# Patient Record
Sex: MALE | Race: OTHER
[De-identification: names, ages, dates, MRNs, and addresses within clinical notes are randomized per-mention and may not be internally consistent; named-entity substitution may affect disease eponyms.]

---

## 2017-01-19 ENCOUNTER — APPOINTMENT (OUTPATIENT)
Dept: CARDIOLOGY | Facility: HOSPITAL | Age: 72
End: 2017-01-19

## 2017-03-13 ENCOUNTER — APPOINTMENT (OUTPATIENT)
Dept: UROLOGY | Facility: HOSPITAL | Age: 72
End: 2017-03-13

## 2017-04-14 ENCOUNTER — INPATIENT (INPATIENT)
Facility: HOSPITAL | Age: 72
LOS: 5 days | Discharge: HOME | End: 2017-04-20
Attending: INTERNAL MEDICINE | Admitting: INTERNAL MEDICINE
Payer: MEDICARE

## 2017-04-14 VITALS — HEIGHT: 65 IN | WEIGHT: 149.91 LBS

## 2017-04-14 LAB
ALBUMIN SERPL ELPH-MCNC: 3.5 G/DL — SIGNIFICANT CHANGE UP (ref 3.3–5)
ALP SERPL-CCNC: 106 U/L — SIGNIFICANT CHANGE UP (ref 40–120)
ALT FLD-CCNC: 17 U/L — SIGNIFICANT CHANGE UP (ref 12–78)
ANION GAP SERPL CALC-SCNC: 7 MMOL/L — SIGNIFICANT CHANGE UP (ref 5–17)
APPEARANCE UR: CLEAR — SIGNIFICANT CHANGE UP
APTT BLD: 37.1 SEC — SIGNIFICANT CHANGE UP (ref 27.5–37.4)
AST SERPL-CCNC: 25 U/L — SIGNIFICANT CHANGE UP (ref 15–37)
BASOPHILS # BLD AUTO: 0.1 K/UL — SIGNIFICANT CHANGE UP (ref 0–0.2)
BASOPHILS NFR BLD AUTO: 1.6 % — SIGNIFICANT CHANGE UP (ref 0–2)
BILIRUB SERPL-MCNC: 1.4 MG/DL — HIGH (ref 0.2–1.2)
BILIRUB UR-MCNC: NEGATIVE — SIGNIFICANT CHANGE UP
BUN SERPL-MCNC: 20 MG/DL — SIGNIFICANT CHANGE UP (ref 7–23)
CALCIUM SERPL-MCNC: 8.6 MG/DL — SIGNIFICANT CHANGE UP (ref 8.5–10.1)
CHLORIDE SERPL-SCNC: 112 MMOL/L — HIGH (ref 96–108)
CK SERPL-CCNC: 250 U/L — SIGNIFICANT CHANGE UP (ref 26–308)
CO2 SERPL-SCNC: 28 MMOL/L — SIGNIFICANT CHANGE UP (ref 22–31)
COLOR SPEC: YELLOW — SIGNIFICANT CHANGE UP
CREAT SERPL-MCNC: 1.02 MG/DL — SIGNIFICANT CHANGE UP (ref 0.5–1.3)
DIFF PNL FLD: NEGATIVE — SIGNIFICANT CHANGE UP
EOSINOPHIL # BLD AUTO: 0.1 K/UL — SIGNIFICANT CHANGE UP (ref 0–0.5)
EOSINOPHIL NFR BLD AUTO: 1.6 % — SIGNIFICANT CHANGE UP (ref 0–6)
GLUCOSE SERPL-MCNC: 88 MG/DL — SIGNIFICANT CHANGE UP (ref 70–99)
GLUCOSE UR QL: NEGATIVE MG/DL — SIGNIFICANT CHANGE UP
HCT VFR BLD CALC: 41.7 % — SIGNIFICANT CHANGE UP (ref 39–50)
HGB BLD-MCNC: 13.3 G/DL — SIGNIFICANT CHANGE UP (ref 13–17)
INR BLD: 1.59 RATIO — HIGH (ref 0.88–1.16)
KETONES UR-MCNC: NEGATIVE — SIGNIFICANT CHANGE UP
LEUKOCYTE ESTERASE UR-ACNC: NEGATIVE — SIGNIFICANT CHANGE UP
LYMPHOCYTES # BLD AUTO: 1.2 K/UL — SIGNIFICANT CHANGE UP (ref 1–3.3)
LYMPHOCYTES # BLD AUTO: 17.6 % — SIGNIFICANT CHANGE UP (ref 13–44)
MCHC RBC-ENTMCNC: 30.6 PG — SIGNIFICANT CHANGE UP (ref 27–34)
MCHC RBC-ENTMCNC: 32 GM/DL — SIGNIFICANT CHANGE UP (ref 32–36)
MCV RBC AUTO: 95.8 FL — SIGNIFICANT CHANGE UP (ref 80–100)
MONOCYTES # BLD AUTO: 0.9 K/UL — SIGNIFICANT CHANGE UP (ref 0–0.9)
MONOCYTES NFR BLD AUTO: 12.4 % — SIGNIFICANT CHANGE UP (ref 2–14)
NEUTROPHILS # BLD AUTO: 4.6 K/UL — SIGNIFICANT CHANGE UP (ref 1.8–7.4)
NEUTROPHILS NFR BLD AUTO: 66.7 % — SIGNIFICANT CHANGE UP (ref 43–77)
NITRITE UR-MCNC: NEGATIVE — SIGNIFICANT CHANGE UP
NT-PROBNP SERPL-SCNC: 2949 PG/ML — HIGH (ref 0–125)
PH UR: 6 — SIGNIFICANT CHANGE UP (ref 4.8–8)
PLATELET # BLD AUTO: 125 K/UL — LOW (ref 150–400)
POTASSIUM SERPL-MCNC: 4.8 MMOL/L — SIGNIFICANT CHANGE UP (ref 3.5–5.3)
POTASSIUM SERPL-SCNC: 4.8 MMOL/L — SIGNIFICANT CHANGE UP (ref 3.5–5.3)
PROT SERPL-MCNC: 6.4 GM/DL — SIGNIFICANT CHANGE UP (ref 6–8.3)
PROT UR-MCNC: NEGATIVE MG/DL — SIGNIFICANT CHANGE UP
PROTHROM AB SERPL-ACNC: 17.3 SEC — HIGH (ref 9.8–12.7)
RBC # BLD: 4.35 M/UL — SIGNIFICANT CHANGE UP (ref 4.2–5.8)
RBC # FLD: 14.2 % — SIGNIFICANT CHANGE UP (ref 10.3–14.5)
SODIUM SERPL-SCNC: 147 MMOL/L — HIGH (ref 135–145)
SP GR SPEC: 1.01 — SIGNIFICANT CHANGE UP (ref 1.01–1.02)
TROPONIN I SERPL-MCNC: 0.42 NG/ML — HIGH (ref 0.01–0.04)
TROPONIN I SERPL-MCNC: 0.44 NG/ML — HIGH (ref 0.01–0.04)
UROBILINOGEN FLD QL: NEGATIVE MG/DL — SIGNIFICANT CHANGE UP
WBC # BLD: 6.9 K/UL — SIGNIFICANT CHANGE UP (ref 3.8–10.5)
WBC # FLD AUTO: 6.9 K/UL — SIGNIFICANT CHANGE UP (ref 3.8–10.5)

## 2017-04-14 PROCEDURE — 93010 ELECTROCARDIOGRAM REPORT: CPT

## 2017-04-14 PROCEDURE — 76870 US EXAM SCROTUM: CPT | Mod: 26

## 2017-04-14 PROCEDURE — 71020: CPT | Mod: 26

## 2017-04-14 RX ORDER — ASPIRIN/CALCIUM CARB/MAGNESIUM 324 MG
324 TABLET ORAL ONCE
Refills: 0 | Status: COMPLETED | OUTPATIENT
Start: 2017-04-14 | End: 2017-04-14

## 2017-04-14 RX ORDER — SODIUM CHLORIDE 9 MG/ML
3 INJECTION INTRAMUSCULAR; INTRAVENOUS; SUBCUTANEOUS EVERY 8 HOURS
Refills: 0 | Status: DISCONTINUED | OUTPATIENT
Start: 2017-04-14 | End: 2017-04-20

## 2017-04-14 RX ORDER — FUROSEMIDE 40 MG
40 TABLET ORAL EVERY 12 HOURS
Refills: 0 | Status: DISCONTINUED | OUTPATIENT
Start: 2017-04-14 | End: 2017-04-19

## 2017-04-14 RX ORDER — FUROSEMIDE 40 MG
40 TABLET ORAL ONCE
Refills: 0 | Status: COMPLETED | OUTPATIENT
Start: 2017-04-14 | End: 2017-04-14

## 2017-04-14 RX ADMIN — Medication 40 MILLIGRAM(S): at 17:47

## 2017-04-14 RX ADMIN — Medication 324 MILLIGRAM(S): at 17:47

## 2017-04-14 NOTE — ED PROVIDER NOTE - PHYSICAL EXAMINATION
Constitutional: mild distress  Eyes: PERRLA EOMI  Head: Normocephalic atraumatic  Cardiac: regular rate 2+ pitting edema  Resp: b/l rales  GI: Abd distended no ttp. no cvat  : left testicular ttp   Neuro: CN2-12 intact  Skin: No rashes

## 2017-04-14 NOTE — ED PROVIDER NOTE - NS ED ROS FT
Constitutional: No fever or chills  Eyes: No visual changes  HEENT: No throat pain  CV: + chest pain + le swelling  Resp: + SOB no cough  GI: + abd pain, no nausea or vomiting  : + dysuria + left testicular pain  MSK: No musculoskeletal pain  Skin: No rash  Neuro: No headache

## 2017-04-14 NOTE — ED PROVIDER NOTE - OBJECTIVE STATEMENT
number [id number]  71M hx afib on coumadin cad pacemaker lv disfunction presents with LE swelling and abdominal swelling. Symptoms started 3 days ago. associated with exertional dyspnea, pnd, orthopnea and chest discomfort. Pt said that he noticed increased swelling in LE and abdomen. Mild dysuria. No f/c/ha/dizziness. no back pain. pt also complains of left testicular pain and swelling.

## 2017-04-14 NOTE — ED PROVIDER NOTE - DETAILS:
I, Ryland Lucas, performed the initial face to face bedside interview with this patient regarding history of present illness, review of symptoms and relevant past medical, social and family history.  I completed an independent physical examination.  I was the initial provider who evaluated this patient.  The history, relevant review of systems, past medical and surgical history, medical decision making, and physical examination was documented by the scribe in my presence and I attest to the accuracy of the documentation. I, Ryland Lucas, performed the initial face to face bedside interview with this patient regarding history of present illness, review of symptoms and relevant past medical, social and family history.  I completed an independent physical examination.  I was the initial provider who evaluated this patient. I have signed out the follow up of any pending tests (i.e. labs, radiological studies) to the resident.  I have communicated the patient’s plan of care and disposition with the resident.  The history, relevant review of systems, past medical and surgical history, medical decision making, and physical examination was documented by the scribe in my presence and I attest to the accuracy of the documentation.

## 2017-04-14 NOTE — ED ADULT NURSE REASSESSMENT NOTE - NS ED NURSE REASSESS COMMENT FT1
Report taken at the change of shift. pt awake alert and oriented x4 resting comfortably in bed with no acute distress noted. c/o 5/10 chest pain. denies sob,ha,dz,n/v/d/fever/chills or urinary sx. VSS. Afebrile. awaiting for hospitalist. Will cont to monitor for safety and comfort.

## 2017-04-14 NOTE — ED ADULT NURSE NOTE - OBJECTIVE STATEMENT
71  presents with LE swelling and abdominal swelling x 3 days ago. pt also has had exertional dyspnea chest discomfort. Pt said that he noticed increased swelling in abdomen. pt also c/o pain on urination and complains of left testicular pain. IVL estab, pt medicated will mtr. Rales noted on auscultation

## 2017-04-14 NOTE — ED PROVIDER NOTE - PROGRESS NOTE DETAILS
Nathan Loya: Pt seen and evaluated by ED attending Dr. Lucas.  used ID#[id number]. 72 y/o male with PMHx of PPM presents to the ED c/o bilateral LE edema, testicular edema and abdominal edema. Pt states that he has SOB that is worse with walking and is associated with chest pain. He states that he has been having a sore throat. Currently pt has no other complaints and denies fever, chills and n/v/d. PMD at [facility]. On exam pt has right basilar rales.

## 2017-04-14 NOTE — ED PROVIDER NOTE - PMH
AF (atrial fibrillation)    AF (paroxysmal atrial fibrillation)    Fatigue    Hypokinesis  mild global hypokinesis  LBBB (left bundle branch block)    LV dysfunction    Pacemaker  single chamber meditronic  placed by Dr Tomas  May  of  2016

## 2017-04-14 NOTE — ED PROVIDER NOTE - MEDICAL DECISION MAKING DETAILS
71M hx afib on coumadin cad pacemaker lv dysfunction presents with orthopnea exertional dyspnea pnd dysuria and LE/abd swelling. Will obtain labs ce ekg xray chest ua us testicles and reassess

## 2017-04-15 DIAGNOSIS — Z95.0 PRESENCE OF CARDIAC PACEMAKER: Chronic | ICD-10-CM

## 2017-04-15 LAB
ANION GAP SERPL CALC-SCNC: 6 MMOL/L — SIGNIFICANT CHANGE UP (ref 5–17)
BUN SERPL-MCNC: 18 MG/DL — SIGNIFICANT CHANGE UP (ref 7–23)
CALCIUM SERPL-MCNC: 8.8 MG/DL — SIGNIFICANT CHANGE UP (ref 8.5–10.1)
CHLORIDE SERPL-SCNC: 110 MMOL/L — HIGH (ref 96–108)
CHOLEST SERPL-MCNC: 103 MG/DL — SIGNIFICANT CHANGE UP (ref 10–199)
CO2 SERPL-SCNC: 31 MMOL/L — SIGNIFICANT CHANGE UP (ref 22–31)
CREAT SERPL-MCNC: 1.12 MG/DL — SIGNIFICANT CHANGE UP (ref 0.5–1.3)
CULTURE RESULTS: SIGNIFICANT CHANGE UP
GLUCOSE SERPL-MCNC: 86 MG/DL — SIGNIFICANT CHANGE UP (ref 70–99)
HDLC SERPL-MCNC: 24 MG/DL — LOW (ref 40–125)
LIPID PNL WITH DIRECT LDL SERPL: 68 MG/DL — SIGNIFICANT CHANGE UP
MAGNESIUM SERPL-MCNC: 1.9 MG/DL — SIGNIFICANT CHANGE UP (ref 1.8–2.4)
NT-PROBNP SERPL-SCNC: 2387 PG/ML — HIGH (ref 0–125)
NT-PROBNP SERPL-SCNC: 2462 PG/ML — HIGH (ref 0–125)
PHOSPHATE SERPL-MCNC: 3.9 MG/DL — SIGNIFICANT CHANGE UP (ref 2.5–4.5)
POTASSIUM SERPL-MCNC: 4.9 MMOL/L — SIGNIFICANT CHANGE UP (ref 3.5–5.3)
POTASSIUM SERPL-SCNC: 4.9 MMOL/L — SIGNIFICANT CHANGE UP (ref 3.5–5.3)
SODIUM SERPL-SCNC: 147 MMOL/L — HIGH (ref 135–145)
SPECIMEN SOURCE: SIGNIFICANT CHANGE UP
TOTAL CHOLESTEROL/HDL RATIO MEASUREMENT: 4.3 RATIO — SIGNIFICANT CHANGE UP (ref 3.4–9.6)
TRIGL SERPL-MCNC: 56 MG/DL — SIGNIFICANT CHANGE UP (ref 10–149)
TSH SERPL-MCNC: 0.84 UIU/ML — SIGNIFICANT CHANGE UP (ref 0.36–3.74)

## 2017-04-15 PROCEDURE — 99285 EMERGENCY DEPT VISIT HI MDM: CPT

## 2017-04-15 RX ORDER — FUROSEMIDE 40 MG
20 TABLET ORAL DAILY
Refills: 0 | Status: DISCONTINUED | OUTPATIENT
Start: 2017-04-15 | End: 2017-04-17

## 2017-04-15 RX ORDER — MORPHINE SULFATE 50 MG/1
2 CAPSULE, EXTENDED RELEASE ORAL ONCE
Refills: 0 | Status: DISCONTINUED | OUTPATIENT
Start: 2017-04-15 | End: 2017-04-15

## 2017-04-15 RX ORDER — RIVAROXABAN 15 MG-20MG
20 KIT ORAL DAILY
Refills: 0 | Status: DISCONTINUED | OUTPATIENT
Start: 2017-04-15 | End: 2017-04-20

## 2017-04-15 RX ORDER — FUROSEMIDE 40 MG
20 TABLET ORAL ONCE
Refills: 0 | Status: COMPLETED | OUTPATIENT
Start: 2017-04-15 | End: 2017-04-15

## 2017-04-15 RX ORDER — DILTIAZEM HCL 120 MG
120 CAPSULE, EXT RELEASE 24 HR ORAL EVERY 12 HOURS
Refills: 0 | Status: DISCONTINUED | OUTPATIENT
Start: 2017-04-15 | End: 2017-04-17

## 2017-04-15 RX ORDER — ZOLPIDEM TARTRATE 10 MG/1
5 TABLET ORAL AT BEDTIME
Refills: 0 | Status: DISCONTINUED | OUTPATIENT
Start: 2017-04-15 | End: 2017-04-15

## 2017-04-15 RX ADMIN — SODIUM CHLORIDE 3 MILLILITER(S): 9 INJECTION INTRAMUSCULAR; INTRAVENOUS; SUBCUTANEOUS at 15:11

## 2017-04-15 RX ADMIN — SODIUM CHLORIDE 3 MILLILITER(S): 9 INJECTION INTRAMUSCULAR; INTRAVENOUS; SUBCUTANEOUS at 06:52

## 2017-04-15 RX ADMIN — Medication 20 MILLIGRAM(S): at 02:14

## 2017-04-15 RX ADMIN — Medication 120 MILLIGRAM(S): at 07:05

## 2017-04-15 RX ADMIN — Medication 40 MILLIGRAM(S): at 06:47

## 2017-04-15 RX ADMIN — MORPHINE SULFATE 2 MILLIGRAM(S): 50 CAPSULE, EXTENDED RELEASE ORAL at 02:14

## 2017-04-15 RX ADMIN — SODIUM CHLORIDE 3 MILLILITER(S): 9 INJECTION INTRAMUSCULAR; INTRAVENOUS; SUBCUTANEOUS at 22:45

## 2017-04-15 RX ADMIN — Medication 120 MILLIGRAM(S): at 18:05

## 2017-04-15 RX ADMIN — MORPHINE SULFATE 2 MILLIGRAM(S): 50 CAPSULE, EXTENDED RELEASE ORAL at 02:43

## 2017-04-15 RX ADMIN — RIVAROXABAN 20 MILLIGRAM(S): KIT at 12:45

## 2017-04-15 RX ADMIN — Medication 40 MILLIGRAM(S): at 18:04

## 2017-04-15 NOTE — H&P ADULT - NSHPLABSRESULTS_GEN_ALL_CORE
CXR elevated R hemidiaphragm, + PVC + CM    EKG  AFib w NS ST-T changes      testicular sono: Mildly heterogeneous left testis unchanged from prior exam. No increased parenchymal vascularity to suggest orchitis. No focal masses.        Enlarged bilateral epididymal bodies left greater than right likely   unchanged however no definite increased vascularity to suggest   epididymitis.

## 2017-04-15 NOTE — DIETITIAN INITIAL EVALUATION ADULT. - ORAL INTAKE PTA
Pt reports difficulty swallowing at times. Pt reports neck swells each month making it hard to eat or breath/fair

## 2017-04-15 NOTE — H&P ADULT - ASSESSMENT
A/P   71 yr old hisp male w hx PAF on NOAC, non-obstr CAD, tachy-yair , PPM, non-ischemic dilated CM w systolic CHF , mitral valve disease, HTN, hx L testicular heaviness  presented to  ED w 3 days of SOB, LOPEZ and increased lower extremity edema as well as L groin pain    1) acute on chronic systolic  CHF w demand ischemia  A) has mitral valve disease  3+ MR on echo  as well as AR 1+, TR 2+ MI 1+ w EF 40-45%  B)   SNEHA showed LV minimallly dilated; mild global hypokinesia w focal hypokinesia of inferolat wall; non-obstCAD  C) anasarca  D) unknown duration in AFib; previously SR so loss of 15%  1. Increase lasix to 40 mg IV q 12  2. ACE I are contra-indicated due to severe documented allergic reaction  3. to follow weights and I/Os  4. may need to add spironolactone  5. enz + ekg  6. supplemental oxygen    2) R neck pain may be anginal equivalent  A) elevated troponin as above  1. management as above    3) AF  A) brief chart review he had been cardioverted in past  B) PAF now AF  C) continue xarelto  1. follow rate  2. on diltiazem    4) HTN w elevated BP most likely due to volume overload  1. added lasix IV 20  2. diltiazem 60 mg po x 1  then resume as  continue as 120 mg q 12    5) PPM for tachy yair  monitor rhythm    6) L testicular heaviness  A) referrred to groin likely from increased pressure if fluid in body   B) sono compared to prior one in  when he had similar complaint  1. looser clothing  2. if continued discomfort, may consider scrotal sling w a towel   3. okayed 1 dose morphine

## 2017-04-15 NOTE — H&P ADULT - NSHPPHYSICALEXAM_GEN_ALL_CORE
Vital Signs Last 24 Hrs  T(C): 36.2, Max: 36.4 (04-14 @ 16:19)  T(F): 97.2, Max: 97.5 (04-14 @ 16:19)  HR: 91 (66 - 99)  BP: 129/99 (129/99 - 151/99)  BP(mean): --  RR: 20 (18 - 20)  SpO2: 98% (95% - 98%)      gen pleasant male in distress when attempts to lie flat, improved when at 45 degrees  HEENT no asymmetry pupils reactive, anicteric sclera  Neck no bruits + JVD  Chest scattered rales bilaterally w decreased BS at R base  CW nontender  Cor irreg irreg  2-3/6 murmur at mitral area and 2/6 tricuspid  ABD + BS distended unable to percuss organ size, + mild suprapubic tenderness   testicles descended  bilaterally, nontender to palpation  no erythema or rash  no urethral discharge  extrem + bilateral pitting edema 4+ up to periumbilical area  skin warm and dry  breast and rectal deferred as not indicated for current complaint

## 2017-04-15 NOTE — H&P ADULT - HISTORY OF PRESENT ILLNESS
Pt is 71 yr old  male w hx PAF on NOAC, non-obstructive CAD, tachy-yair syndrome, PPM, non-ischemic dilated cardiomyopathy w systolic CHF LVEF 40-45% , mitral valve disease, HTN, hx L testicular heaviness ) presented to  ED w    3 days of SOB, LOPEZ and increased lower extremity edema           Pt reports compliance w meds.  No change in diet.        R sided throat discomfort that is described as 8/10 and pulsating.  No radiation.  worsens w activity.  Not clear how long it lasts.  No nausea. palpitations, diaphoresis       c/o abd pain and swelling w radiation of pain into L testicle, no N,V, bowel or bladder incontinence    PMHX/PSHX as per HPI          PE  gen pleasant male in distress when attempts to lie flat, improved when at 45 degrees  HEENT no asymmetry pupils reactive  Neck no bruits + JVD  Chest scattered rales bilaterally w decreased BS R base  CW nontender  Cor irreg irreg  2-3/6 murmur at mitral area and 2/6 tricuspid  ABD + BS distended unable to percuss organ size   testicles descended  bilaterally, nontender to palpation  no erythema or rash   no urethral discharge  extrem + bilateral pitting edema 4+ up to periumbilical area  skin warm and dry  breast and rectal deferred as not indicated for current complaint

## 2017-04-15 NOTE — H&P ADULT - PMH
AF (atrial fibrillation)    AF (paroxysmal atrial fibrillation)    Coronary artery disease involving native heart with angina pectoris, unspecified vessel or lesion type    Fatigue    Hypokinesis  mild global hypokinesis  LBBB (left bundle branch block)    LV dysfunction    Pacemaker  single chamber meditronic  placed by Dr Tomas  May  of  2016

## 2017-04-16 DIAGNOSIS — I48.91 UNSPECIFIED ATRIAL FIBRILLATION: ICD-10-CM

## 2017-04-16 DIAGNOSIS — I10 ESSENTIAL (PRIMARY) HYPERTENSION: ICD-10-CM

## 2017-04-16 DIAGNOSIS — I50.9 HEART FAILURE, UNSPECIFIED: ICD-10-CM

## 2017-04-16 PROCEDURE — 93010 ELECTROCARDIOGRAM REPORT: CPT

## 2017-04-16 RX ADMIN — RIVAROXABAN 20 MILLIGRAM(S): KIT at 11:10

## 2017-04-16 RX ADMIN — Medication 40 MILLIGRAM(S): at 17:28

## 2017-04-16 RX ADMIN — Medication 40 MILLIGRAM(S): at 06:36

## 2017-04-16 RX ADMIN — Medication 20 MILLIGRAM(S): at 06:35

## 2017-04-16 RX ADMIN — Medication 120 MILLIGRAM(S): at 17:28

## 2017-04-16 RX ADMIN — SODIUM CHLORIDE 3 MILLILITER(S): 9 INJECTION INTRAMUSCULAR; INTRAVENOUS; SUBCUTANEOUS at 22:21

## 2017-04-16 RX ADMIN — Medication 120 MILLIGRAM(S): at 06:35

## 2017-04-16 RX ADMIN — SODIUM CHLORIDE 3 MILLILITER(S): 9 INJECTION INTRAMUSCULAR; INTRAVENOUS; SUBCUTANEOUS at 15:59

## 2017-04-16 RX ADMIN — SODIUM CHLORIDE 3 MILLILITER(S): 9 INJECTION INTRAMUSCULAR; INTRAVENOUS; SUBCUTANEOUS at 10:17

## 2017-04-16 NOTE — PROGRESS NOTE ADULT - PROBLEM SELECTOR PLAN 1
- continue tele monitoring  - continue lasix 40mg IV BID  - not on ACE secondary to documented allergy  - monitor daily weights, I/Os

## 2017-04-17 LAB
ALBUMIN SERPL ELPH-MCNC: 3.2 G/DL — LOW (ref 3.3–5)
ANION GAP SERPL CALC-SCNC: 8 MMOL/L — SIGNIFICANT CHANGE UP (ref 5–17)
BUN SERPL-MCNC: 16 MG/DL — SIGNIFICANT CHANGE UP (ref 7–23)
CALCIUM SERPL-MCNC: 8.7 MG/DL — SIGNIFICANT CHANGE UP (ref 8.5–10.1)
CHLORIDE SERPL-SCNC: 104 MMOL/L — SIGNIFICANT CHANGE UP (ref 96–108)
CO2 SERPL-SCNC: 31 MMOL/L — SIGNIFICANT CHANGE UP (ref 22–31)
CREAT SERPL-MCNC: 0.98 MG/DL — SIGNIFICANT CHANGE UP (ref 0.5–1.3)
GLUCOSE SERPL-MCNC: 93 MG/DL — SIGNIFICANT CHANGE UP (ref 70–99)
PHOSPHATE SERPL-MCNC: 2.9 MG/DL — SIGNIFICANT CHANGE UP (ref 2.5–4.5)
POTASSIUM SERPL-MCNC: 3.5 MMOL/L — SIGNIFICANT CHANGE UP (ref 3.5–5.3)
POTASSIUM SERPL-SCNC: 3.5 MMOL/L — SIGNIFICANT CHANGE UP (ref 3.5–5.3)
SODIUM SERPL-SCNC: 143 MMOL/L — SIGNIFICANT CHANGE UP (ref 135–145)
TROPONIN I SERPL-MCNC: 0.44 NG/ML — HIGH (ref 0.01–0.04)
TROPONIN I SERPL-MCNC: 0.45 NG/ML — HIGH (ref 0.01–0.04)
TROPONIN I SERPL-MCNC: 0.47 NG/ML — HIGH (ref 0.01–0.04)

## 2017-04-17 RX ADMIN — SODIUM CHLORIDE 3 MILLILITER(S): 9 INJECTION INTRAMUSCULAR; INTRAVENOUS; SUBCUTANEOUS at 21:59

## 2017-04-17 RX ADMIN — Medication 20 MILLIGRAM(S): at 06:01

## 2017-04-17 RX ADMIN — Medication 120 MILLIGRAM(S): at 06:01

## 2017-04-17 RX ADMIN — RIVAROXABAN 20 MILLIGRAM(S): KIT at 12:23

## 2017-04-17 RX ADMIN — SODIUM CHLORIDE 3 MILLILITER(S): 9 INJECTION INTRAMUSCULAR; INTRAVENOUS; SUBCUTANEOUS at 05:56

## 2017-04-17 RX ADMIN — SODIUM CHLORIDE 3 MILLILITER(S): 9 INJECTION INTRAMUSCULAR; INTRAVENOUS; SUBCUTANEOUS at 12:18

## 2017-04-17 RX ADMIN — Medication 40 MILLIGRAM(S): at 18:17

## 2017-04-17 RX ADMIN — Medication 40 MILLIGRAM(S): at 06:01

## 2017-04-17 NOTE — PROGRESS NOTE ADULT - PROBLEM SELECTOR PLAN 1
- continue tele monitoring  - continue lasix 40mg IV BID  - not on ACE secondary to documented allergy  - monitor daily weights, I/Os  - cardio consult pending
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
## 2017-04-17 NOTE — CONSULT NOTE ADULT - ASSESSMENT
Acute on chronic decompensated HFrEF with known LVEF 45%, s/p ICD complicated by Afib- will continue IV lasix  DC cardizem.  Will start coreg 12.5mg po daily and lisinopril 5 mg po daily.  Strict I/O and daily wt checks. Low sodium diet. Nutrition education.  close monitoring of electorlytes and renal function recommended.    EKG changes with biphasic T waves in lateral leads- not clear if these are new changes.  He will benefit from stress test as inpt.    CHronic Afib- will start coreg.  Continue xarelto for full dose anticoagulation.    HTN- meds as above.    Thank you for allowing me to participate in the care of this patient. Please feel free to contact me with any questions.

## 2017-04-17 NOTE — CHART NOTE - NSCHARTNOTEFT_GEN_A_CORE
RN notified that the patient is having chest pain.    Pt is 71 yr old  male w hx PAF on NOAC, non-obstructive CAD, tachy-yair syndrome, PPM, non-ischemic dilated cardiomyopathy w systolic CHF LVEF 40-45% , mitral valve disease, HTN, hx L testicular heaviness ) presented to  ED w    3 days of SOB, LOPEZ and increased lower extremity edema. Pt reports compliance w meds.  No change in diet. R sided throat discomfort that is described as 8/10 and pulsating.  No radiation.  worsens w activity.  Not clear how long it lasts.  No nausea. palpitations, diaphoresis. c/o abd pain and swelling w radiation of pain into L testicle, no N,V, bowel or bladder incontinence currently treated for the CHF exacerbation.    Vital Signs Last 24 Hrs  T(C): 36.4, Max: 36.5 (04-16 @ 05:36)  T(F): 97.6, Max: 97.7 (04-16 @ 05:36)  HR: 71 (67 - 91)  BP: 116/71 (106/64 - 147/95)  BP(mean): --  RR: 18 (16 - 18)  SpO2: 98% (95% - 98%)    As per the patient its the similar chest pain why he is admitted initially at the time of the admission 8/10 now 6/10 says mild also says he has pain in the Right side of the throat and very mild pain in the epigastric region. Mild SOB and pedal edema. He says he is feeling better with the medication given in the hospital and he is eating and sleeping better.    On examination: pt appears comfortable no apparent distress.  Cvs:s1s2+  Resp:ctal  Abd:soft and nontender  Extr:2+edema.    A/P  Chest pain secondary to the CHF exacerbation.  -EKG stat:71,Atrial fibrillation, no acute st and t wave changes, new T wave inversion in the lead V6 which is present in the previous ekg done at 1/17 ekg read by Dr Ramirez  -Lucia stat  -Patient already on xarelto    D/W with Dr Ramirez and Dr Celis.

## 2017-04-18 DIAGNOSIS — I48.0 PAROXYSMAL ATRIAL FIBRILLATION: ICD-10-CM

## 2017-04-18 DIAGNOSIS — R74.8 ABNORMAL LEVELS OF OTHER SERUM ENZYMES: ICD-10-CM

## 2017-04-18 DIAGNOSIS — I50.23 ACUTE ON CHRONIC SYSTOLIC (CONGESTIVE) HEART FAILURE: ICD-10-CM

## 2017-04-18 DIAGNOSIS — Q21.1 ATRIAL SEPTAL DEFECT: ICD-10-CM

## 2017-04-18 LAB
ANION GAP SERPL CALC-SCNC: 7 MMOL/L — SIGNIFICANT CHANGE UP (ref 5–17)
BUN SERPL-MCNC: 15 MG/DL — SIGNIFICANT CHANGE UP (ref 7–23)
CALCIUM SERPL-MCNC: 8.8 MG/DL — SIGNIFICANT CHANGE UP (ref 8.5–10.1)
CHLORIDE SERPL-SCNC: 103 MMOL/L — SIGNIFICANT CHANGE UP (ref 96–108)
CO2 SERPL-SCNC: 32 MMOL/L — HIGH (ref 22–31)
CREAT SERPL-MCNC: 0.98 MG/DL — SIGNIFICANT CHANGE UP (ref 0.5–1.3)
GLUCOSE SERPL-MCNC: 92 MG/DL — SIGNIFICANT CHANGE UP (ref 70–99)
HCT VFR BLD CALC: 43.1 % — SIGNIFICANT CHANGE UP (ref 39–50)
HGB BLD-MCNC: 13.3 G/DL — SIGNIFICANT CHANGE UP (ref 13–17)
MCHC RBC-ENTMCNC: 29 PG — SIGNIFICANT CHANGE UP (ref 27–34)
MCHC RBC-ENTMCNC: 30.8 GM/DL — LOW (ref 32–36)
MCV RBC AUTO: 94.2 FL — SIGNIFICANT CHANGE UP (ref 80–100)
PLATELET # BLD AUTO: 134 K/UL — LOW (ref 150–400)
POTASSIUM SERPL-MCNC: 3.5 MMOL/L — SIGNIFICANT CHANGE UP (ref 3.5–5.3)
POTASSIUM SERPL-SCNC: 3.5 MMOL/L — SIGNIFICANT CHANGE UP (ref 3.5–5.3)
RBC # BLD: 4.57 M/UL — SIGNIFICANT CHANGE UP (ref 4.2–5.8)
RBC # FLD: 14.1 % — SIGNIFICANT CHANGE UP (ref 10.3–14.5)
SODIUM SERPL-SCNC: 142 MMOL/L — SIGNIFICANT CHANGE UP (ref 135–145)
WBC # BLD: 5.5 K/UL — SIGNIFICANT CHANGE UP (ref 3.8–10.5)
WBC # FLD AUTO: 5.5 K/UL — SIGNIFICANT CHANGE UP (ref 3.8–10.5)

## 2017-04-18 PROCEDURE — 93306 TTE W/DOPPLER COMPLETE: CPT | Mod: 26

## 2017-04-18 PROCEDURE — 93018 CV STRESS TEST I&R ONLY: CPT

## 2017-04-18 PROCEDURE — 99223 1ST HOSP IP/OBS HIGH 75: CPT

## 2017-04-18 PROCEDURE — 93016 CV STRESS TEST SUPVJ ONLY: CPT

## 2017-04-18 PROCEDURE — 99233 SBSQ HOSP IP/OBS HIGH 50: CPT

## 2017-04-18 PROCEDURE — 78452 HT MUSCLE IMAGE SPECT MULT: CPT | Mod: 26

## 2017-04-18 PROCEDURE — 93042 RHYTHM ECG REPORT: CPT

## 2017-04-18 RX ORDER — POTASSIUM CHLORIDE 20 MEQ
20 PACKET (EA) ORAL ONCE
Refills: 0 | Status: COMPLETED | OUTPATIENT
Start: 2017-04-18 | End: 2017-04-18

## 2017-04-18 RX ORDER — CARVEDILOL PHOSPHATE 80 MG/1
25 CAPSULE, EXTENDED RELEASE ORAL EVERY 12 HOURS
Refills: 0 | Status: DISCONTINUED | OUTPATIENT
Start: 2017-04-18 | End: 2017-04-20

## 2017-04-18 RX ORDER — CARVEDILOL PHOSPHATE 80 MG/1
12.5 CAPSULE, EXTENDED RELEASE ORAL EVERY 12 HOURS
Refills: 0 | Status: DISCONTINUED | OUTPATIENT
Start: 2017-04-18 | End: 2017-04-18

## 2017-04-18 RX ORDER — ASPIRIN/CALCIUM CARB/MAGNESIUM 324 MG
81 TABLET ORAL DAILY
Refills: 0 | Status: DISCONTINUED | OUTPATIENT
Start: 2017-04-18 | End: 2017-04-20

## 2017-04-18 RX ORDER — ATORVASTATIN CALCIUM 80 MG/1
20 TABLET, FILM COATED ORAL AT BEDTIME
Refills: 0 | Status: DISCONTINUED | OUTPATIENT
Start: 2017-04-18 | End: 2017-04-20

## 2017-04-18 RX ADMIN — SODIUM CHLORIDE 3 MILLILITER(S): 9 INJECTION INTRAMUSCULAR; INTRAVENOUS; SUBCUTANEOUS at 13:02

## 2017-04-18 RX ADMIN — Medication 40 MILLIGRAM(S): at 21:11

## 2017-04-18 RX ADMIN — SODIUM CHLORIDE 3 MILLILITER(S): 9 INJECTION INTRAMUSCULAR; INTRAVENOUS; SUBCUTANEOUS at 21:16

## 2017-04-18 RX ADMIN — Medication 20 MILLIEQUIVALENT(S): at 21:11

## 2017-04-18 RX ADMIN — CARVEDILOL PHOSPHATE 12.5 MILLIGRAM(S): 80 CAPSULE, EXTENDED RELEASE ORAL at 13:08

## 2017-04-18 RX ADMIN — ATORVASTATIN CALCIUM 20 MILLIGRAM(S): 80 TABLET, FILM COATED ORAL at 21:11

## 2017-04-18 RX ADMIN — CARVEDILOL PHOSPHATE 25 MILLIGRAM(S): 80 CAPSULE, EXTENDED RELEASE ORAL at 17:44

## 2017-04-18 RX ADMIN — RIVAROXABAN 20 MILLIGRAM(S): KIT at 13:08

## 2017-04-18 RX ADMIN — Medication 40 MILLIGRAM(S): at 13:07

## 2017-04-18 RX ADMIN — SODIUM CHLORIDE 3 MILLILITER(S): 9 INJECTION INTRAMUSCULAR; INTRAVENOUS; SUBCUTANEOUS at 06:44
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
## 2017-04-18 NOTE — CONSULT NOTE ADULT - ASSESSMENT
70 yo male PMH non-obstructive CAD, NIDCM (EF40-45%), a.fib on xarelto, difficult to rate control,  previously intolerant of amiodarone and sotalol, underwent cryoablation with PVI december of 2016, presented with sob and lower extremity swelling, with CHF exacerbation and found to have a.fib with RVR.  Given that he developed QT-prolongation on sotalol, it's unlikely that he will be able tolerate dofetilide. At this point would recommend doubling beta blocker to coreg 25bid.

## 2017-04-18 NOTE — PROGRESS NOTE ADULT - PROBLEM SELECTOR PLAN 1
- continue tele monitoring  - continue lasix 40mg IV BID  - not on ACE secondary to documented allergy  - monitor daily weights, I/Os  - cardio consult appreciated , EP eval appreciated  for now will inc coreg to 25 mg BID.    nuclear stress test . EF 41 % , fixed wall defect

## 2017-04-18 NOTE — PROGRESS NOTE ADULT - PROBLEM SELECTOR PLAN 1
Exacerbation of chronic systolic HF with worsening LV function on echocardiography; ischemia evaluation (nuclear stress test) is in progress; suspect exacerbation is related to a recurrence of atrial fibrillation.  Medical optimization with Coreg, lisinopril, IV lasix. Exacerbation of chronic systolic HF with worsening LV function on echocardiography; ischemia evaluation (nuclear stress test) is in progress; suspect exacerbation is related to a recurrence of atrial fibrillation.  Medical optimization with Coreg, IV lasix; no ACE-I due to reported allergy.

## 2017-04-19 DIAGNOSIS — R94.31 ABNORMAL ELECTROCARDIOGRAM [ECG] [EKG]: ICD-10-CM

## 2017-04-19 DIAGNOSIS — R07.0 PAIN IN THROAT: ICD-10-CM

## 2017-04-19 DIAGNOSIS — I34.0 NONRHEUMATIC MITRAL (VALVE) INSUFFICIENCY: ICD-10-CM

## 2017-04-19 LAB
ANION GAP SERPL CALC-SCNC: 9 MMOL/L — SIGNIFICANT CHANGE UP (ref 5–17)
BUN SERPL-MCNC: 17 MG/DL — SIGNIFICANT CHANGE UP (ref 7–23)
CALCIUM SERPL-MCNC: 8.8 MG/DL — SIGNIFICANT CHANGE UP (ref 8.5–10.1)
CHLORIDE SERPL-SCNC: 104 MMOL/L — SIGNIFICANT CHANGE UP (ref 96–108)
CO2 SERPL-SCNC: 30 MMOL/L — SIGNIFICANT CHANGE UP (ref 22–31)
CREAT SERPL-MCNC: 1.08 MG/DL — SIGNIFICANT CHANGE UP (ref 0.5–1.3)
GLUCOSE SERPL-MCNC: 89 MG/DL — SIGNIFICANT CHANGE UP (ref 70–99)
POTASSIUM SERPL-MCNC: 3.7 MMOL/L — SIGNIFICANT CHANGE UP (ref 3.5–5.3)
POTASSIUM SERPL-SCNC: 3.7 MMOL/L — SIGNIFICANT CHANGE UP (ref 3.5–5.3)
SODIUM SERPL-SCNC: 143 MMOL/L — SIGNIFICANT CHANGE UP (ref 135–145)

## 2017-04-19 PROCEDURE — 99233 SBSQ HOSP IP/OBS HIGH 50: CPT

## 2017-04-19 RX ORDER — FUROSEMIDE 40 MG
40 TABLET ORAL
Refills: 0 | Status: DISCONTINUED | OUTPATIENT
Start: 2017-04-19 | End: 2017-04-20

## 2017-04-19 RX ADMIN — Medication 40 MILLIGRAM(S): at 05:34

## 2017-04-19 RX ADMIN — ATORVASTATIN CALCIUM 20 MILLIGRAM(S): 80 TABLET, FILM COATED ORAL at 23:45

## 2017-04-19 RX ADMIN — SODIUM CHLORIDE 3 MILLILITER(S): 9 INJECTION INTRAMUSCULAR; INTRAVENOUS; SUBCUTANEOUS at 05:34

## 2017-04-19 RX ADMIN — RIVAROXABAN 20 MILLIGRAM(S): KIT at 11:35

## 2017-04-19 RX ADMIN — CARVEDILOL PHOSPHATE 25 MILLIGRAM(S): 80 CAPSULE, EXTENDED RELEASE ORAL at 17:58

## 2017-04-19 RX ADMIN — Medication 81 MILLIGRAM(S): at 11:35

## 2017-04-19 RX ADMIN — SODIUM CHLORIDE 3 MILLILITER(S): 9 INJECTION INTRAMUSCULAR; INTRAVENOUS; SUBCUTANEOUS at 15:05

## 2017-04-19 RX ADMIN — Medication 40 MILLIGRAM(S): at 17:59

## 2017-04-19 RX ADMIN — SODIUM CHLORIDE 3 MILLILITER(S): 9 INJECTION INTRAMUSCULAR; INTRAVENOUS; SUBCUTANEOUS at 23:46

## 2017-04-19 RX ADMIN — CARVEDILOL PHOSPHATE 25 MILLIGRAM(S): 80 CAPSULE, EXTENDED RELEASE ORAL at 05:34

## 2017-04-19 NOTE — PROGRESS NOTE ADULT - ATTENDING COMMENTS
Patient seen and examined with Dr. Mohamud Latif, Dr. Dayton Fallon and Dr. Neel Pichardo on the Family Medicine Teaching Service.  Agree with history, physical, labs and plan which were reviewed in detail.

## 2017-04-19 NOTE — PROGRESS NOTE ADULT - PROBLEM SELECTOR PLAN 1
- continue tele monitoring  - continue lasix 40mg IV BID changed to PO 40 mg BID   - not on ACE secondary to documented allergy  - monitor daily weights, I/Os  - cardio consult appreciated , EP sudhakar appreciated  for now will inc coreg to 25 mg BID.    nuclear stress test . EF 41 % , fixed wall defect

## 2017-04-19 NOTE — PROGRESS NOTE ADULT - PROBLEM SELECTOR PLAN 1
Persistent AF; ventricular rate control has improved; appreciate EP/Dr. Tomas's input; continue rate control / anticoagulation strategy with Coreg, Xarelto.

## 2017-04-20 ENCOUNTER — TRANSCRIPTION ENCOUNTER (OUTPATIENT)
Age: 72
End: 2017-04-20

## 2017-04-20 VITALS — WEIGHT: 174.39 LBS

## 2017-04-20 DIAGNOSIS — I48.2 CHRONIC ATRIAL FIBRILLATION: ICD-10-CM

## 2017-04-20 DIAGNOSIS — I51.9 HEART DISEASE, UNSPECIFIED: ICD-10-CM

## 2017-04-20 DIAGNOSIS — I50.23 ACUTE ON CHRONIC SYSTOLIC (CONGESTIVE) HEART FAILURE: ICD-10-CM

## 2017-04-20 PROCEDURE — 99232 SBSQ HOSP IP/OBS MODERATE 35: CPT

## 2017-04-20 PROCEDURE — 99233 SBSQ HOSP IP/OBS HIGH 50: CPT

## 2017-04-20 RX ORDER — CARVEDILOL PHOSPHATE 80 MG/1
1 CAPSULE, EXTENDED RELEASE ORAL
Qty: 60 | Refills: 0
Start: 2017-04-20 | End: 2017-05-20

## 2017-04-20 RX ORDER — ASPIRIN/CALCIUM CARB/MAGNESIUM 324 MG
1 TABLET ORAL
Qty: 30 | Refills: 0
Start: 2017-04-20 | End: 2017-05-20

## 2017-04-20 RX ORDER — IBUPROFEN 200 MG
1 TABLET ORAL
Qty: 10 | Refills: 0
Start: 2017-04-20 | End: 2017-04-25

## 2017-04-20 RX ORDER — ATORVASTATIN CALCIUM 80 MG/1
1 TABLET, FILM COATED ORAL
Qty: 30 | Refills: 0
Start: 2017-04-20 | End: 2017-05-20

## 2017-04-20 RX ORDER — IBUPROFEN 200 MG
400 TABLET ORAL ONCE
Refills: 0 | Status: COMPLETED | OUTPATIENT
Start: 2017-04-20 | End: 2017-04-20

## 2017-04-20 RX ADMIN — RIVAROXABAN 20 MILLIGRAM(S): KIT at 12:53

## 2017-04-20 RX ADMIN — CARVEDILOL PHOSPHATE 25 MILLIGRAM(S): 80 CAPSULE, EXTENDED RELEASE ORAL at 05:42

## 2017-04-20 RX ADMIN — Medication 81 MILLIGRAM(S): at 12:53

## 2017-04-20 RX ADMIN — Medication 40 MILLIGRAM(S): at 05:42

## 2017-04-20 RX ADMIN — SODIUM CHLORIDE 3 MILLILITER(S): 9 INJECTION INTRAMUSCULAR; INTRAVENOUS; SUBCUTANEOUS at 14:49

## 2017-04-20 RX ADMIN — SODIUM CHLORIDE 3 MILLILITER(S): 9 INJECTION INTRAMUSCULAR; INTRAVENOUS; SUBCUTANEOUS at 05:42

## 2017-04-20 RX ADMIN — Medication 400 MILLIGRAM(S): at 13:05

## 2017-04-20 NOTE — PROGRESS NOTE ADULT - PROBLEM SELECTOR PLAN 6
no erythema in throat noted   lymphadenopathy noted mobile left soft lymphnodes .  no dysphagia  - c./op itching /dryness   could be allergic    f/u outpatient with ENT  - unrelated to CHF hx  low dose ibuprofen PRN
no erythema in throat noted  no lymphadenopathy   no dysphagia  - c./op itching /dryness   could be allergic   Will start flonase   f/u outpatient with ENT  - unrelated to CHF hx

## 2017-04-20 NOTE — DISCHARGE NOTE ADULT - CARE PROVIDER_API CALL
Co, Florentino POP), Internal Medicine; Pulmonary Disease  [address] [place] [address]  Phone: [phone]  Fax: [phone]    Jai Foster (MD), Cardiovascular Disease; Internal Medicine  [address] [place] [address]  Phone: [phone]  Fax: [phone]    Dayton Tomas), Cardiac Electrophysiology; Cardiovascular Disease  [address] [place] [address]  Phone: [phone]  Fax: [phone]    Benigno Meade), Otolaryngology  [address] [place] [address]  Phone: [phone]  Fax: [phone]

## 2017-04-20 NOTE — DISCHARGE NOTE ADULT - MEDICATION SUMMARY - MEDICATIONS TO STOP TAKING
I will STOP taking the medications listed below when I get home from the hospital:    amiodarone 200 mg oral tablet  -- 2 tab(s) by mouth once a day    potassium chloride 20 mEq oral granule, extended release  --  by mouth    diltiaZEM 120 mg/12 hours oral capsule, extended release  -- 1 cap(s) by mouth every 12 hours

## 2017-04-20 NOTE — DISCHARGE NOTE ADULT - PLAN OF CARE
prevent exacerbation continue with coreg inc dose. Cont with low salt diet. F/u with cardiologist rate control and anticoagulate cont with coreg increased dose. Stop cardizem. cont with xarelto prevent heart attack likely elevated due to demand ischemia  f/u with Cardiologist   cont with Aspirin , xarelto , coreg and statin medication work up with ENT f/u with ENT physician and Primary doctor. No dysphagia. lymphadenopathy freely mobile ,non tender. soft in neck lateral aspect.no eveidence of infection. Likely post infection stable BP cont with coreg   f./u with primary doctor and cardiologist monitor left to right shunt , likely post ablation. No surgical intervention. Cont to monitor

## 2017-04-20 NOTE — PROGRESS NOTE ADULT - PROBLEM SELECTOR PROBLEM 1
Acute congestive heart failure, unspecified congestive heart failure type Acute on chronic systolic heart failure

## 2017-04-20 NOTE — DISCHARGE NOTE ADULT - CARE PROVIDERS DIRECT ADDRESSES
,DirectAddress_Unknown,[email or web address],DirectAddress_Unknown,DirectAddress_Unknown,DirectAddress_Unknown

## 2017-04-20 NOTE — DISCHARGE NOTE ADULT - MEDICATION SUMMARY - MEDICATIONS TO TAKE
I will START or STAY ON the medications listed below when I get home from the hospital:    aspirin 81 mg oral delayed release tablet  -- 1 tab(s) by mouth once a day  -- Indication: For Primary prevention    Advil 200 mg oral tablet  -- 1 tab(s) by mouth 2 times a day  -- Do not take this drug if you are pregnant.  It is very important that you take or use this exactly as directed.  Do not skip doses or discontinue unless directed by your doctor.  May cause drowsiness or dizziness.  Obtain medical advice before taking any non-prescription drugs as some may affect the action of this medication.  Take with food or milk.    -- Indication: For Throat pain    Xarelto 20 mg oral tablet  -- 1 tab(s) by mouth once a day (in the evening)  -- Indication: For Atrial fibrillation     atorvastatin 20 mg oral tablet  -- 1 tab(s) by mouth once a day (at bedtime)  -- Indication: For hyperlipidemia     carvedilol 25 mg oral tablet  -- 1 tab(s) by mouth every 12 hours  -- Indication: For Atrial fibrillation    furosemide 40 mg oral tablet  -- 1 tab(s) by mouth 2 times a day  -- Indication: For Chf

## 2017-04-20 NOTE — PROGRESS NOTE ADULT - PROBLEM SELECTOR PLAN 1
- d/c planning   - continue lasix 40mg IV BID changed to PO 40 mg BID   - not on ACE secondary to documented allergy  - monitor daily weights, I/Os  - cardio consult appreciated , JEAN PAUL de [place] fuente appreciated  for now will inc coreg to 25 mg BID.  pt has ICD as d/w . Pt to f/u with Dr. Foster   nuclear stress test . EF 41 % , fixed wall defect

## 2017-04-20 NOTE — DISCHARGE NOTE ADULT - ADDITIONAL INSTRUCTIONS
f/u with cardiologist and primary care physician regarding A fib and CHF . also f/u with primary and cardiologist regarding throat pain and lymphadenopathy.

## 2017-04-20 NOTE — DISCHARGE NOTE ADULT - PATIENT PORTAL LINK FT
“You can access the [facility] Patient Portal, offered by [facility], by registering with the following website: [email or web address]”

## 2017-04-20 NOTE — PROGRESS NOTE ADULT - PROBLEM SELECTOR PLAN 5
likely secondary to demand ischemia and PAF   - stress test shows fixed defect   medical mgmt  started on ASA81 + statin

## 2017-04-20 NOTE — PROGRESS NOTE ADULT - SUBJECTIVE AND OBJECTIVE BOX
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  DBili  x      /  AST  x      /  ALT  x      /  AlkPhos  x      2017 06:10      CAPILLARY BLOOD GLUCOSE    LIVER FUNCTIONS - ( 2017 06:10 )  Alb: 3.2 g/dL / Pro: x     / ALK PHOS: x     / ALT: x     / AST: x     / GGT: x
Pt is 71 yr old  male w hx PAF on NOAC, non-obstructive CAD, tachy-yair syndrome, PPM, non-ischemic dilated cardiomyopathy w systolic CHF LVEF 40-45% , mitral valve disease, HTN, hx L testicular heaviness ) presented to  ED w    3 days of SOB, LOPEZ and increased lower extremity edema. Pt reports compliance w meds.  No change in diet. R sided throat discomfort that is described as 8/10 and pulsating.  No radiation.  worsens w activity.  Not clear how long it lasts.  No nausea. palpitations, diaphoresis. c/o abd pain and swelling w radiation of pain into L testicle, no N,V, bowel or bladder incontinence    : Pt seen and examined. No new complaints. SOB and pedal edema greatly improved. Doing well.     REVIEW OF SYSTEMS:  General: NAD, hemodynamically stable, (-)  fever, (-) chills, (-) weakness  HEENT:  Eyes:  No visual loss, blurred vision, double vision or yellow sclerae. Ears, Nose, Throat:  No hearing loss, sneezing, congestion, runny nose or sore throat.  SKIN:  No rash or itching.  CARDIOVASCULAR:  No chest pain, chest pressure or chest discomfort. No palpitations or edema.  RESPIRATORY:  + shortness of breath, No cough or sputum.  GASTROINTESTINAL:  No anorexia, nausea, vomiting or diarrhea. No abdominal pain or blood.  NEUROLOGICAL:  No headache, dizziness, syncope, paralysis, ataxia, numbness or tingling in the extremities. No change in bowel or bladder control.  MUSCULOSKELETAL:  No muscle, back pain, joint pain or stiffness.  HEMATOLOGIC:  No anemia, bleeding or bruising.  LYMPHATICS:  No enlarged nodes. No history of splenectomy.  ENDOCRINOLOGIC:  No reports of sweating, cold or heat intolerance. No polyuria or polydipsia.  ALLERGIES:  No history of asthma, hives, eczema or rhinitis.    Vital Signs Last 24 Hrs  T(C): 36.4, Max: 36.5 (-16 @ 05:36)  T(F): 97.5, Max: 97.7 (-16 @ 05:36)  HR: 67 (67 - 91)  BP: 107/75 (107/75 - 147/95)  BP(mean): --  RR: 17 (16 - 17)  SpO2: 95% (95% - 95%)    PHYSICAL EXAM:      Constitutional: NAD, well-groomed, well-developed  HEENT: PERRLA, EOMI, Normal Hearing  Neck: No LAD, No JVD  Back: Normal spine flexure, No CVA tenderness  Cardiovascular: S1 and S2, irregularly irregular, grade 2/6 systolic murmur  Respiratory: slight rhonchi  Gastrointestinal: BS+, soft, NT/ND  Extremities: b/l pitting edema  Vascular: 2+ peripheral pulses  Neurological: A/O x 3, no focal deficits  Psychiatric: Normal mood, normal affect  Musculoskeletal: 5/5 strength b/l upper and lower extremities  Skin: No rashes    T(C): 36.4, Max: 36.5 (04-16 @ 05:36)  HR: 67 (67 - 91)  BP: 107/75 (107/75 - 147/95)  RR: 17 (16 - 17)  SpO2: 95% (95% - 95%)  Wt(kg): --    CARDIAC MARKERS ( 2017 19:24 )  0.436 ng/mL / x     / x     / x     / x      CARDIAC MARKERS ( 2017 17:24 )  0.422 ng/mL / x     / 250 U/L / x     / x                                13.3   6.9   )-----------( 125      ( 2017 17:24 )             41.7     15 Apr 2017 06:27    147    |  110    |  18     ----------------------------<  86     4.9     |  31     |  1.12     Ca    8.8        15 Apr 2017 06:27  Phos  3.9       15 Apr 2017 06:27  Mg     1.9       15 Apr 2017 06:27    TPro  6.4    /  Alb  3.5    /  TBili  1.4    /  DBili  x      /  AST  25     /  ALT  17     /  AlkPhos  106    2017 17:24    PT/INR - ( 2017 17:24 )   PT: 17.3 sec;   INR: 1.59 ratio         PTT - ( 2017 17:24 )  PTT:37.1 sec  CAPILLARY BLOOD GLUCOSE    LIVER FUNCTIONS - ( 2017 17:24 )  Alb: 3.5 g/dL / Pro: 6.4 gm/dL / ALK PHOS: 106 U/L / ALT: 17 U/L / AST: 25 U/L / GGT: x           Urinalysis Basic - ( 2017 20:48 )    Color: Yellow / Appearance: Clear / S.010 / pH: x  Gluc: x / Ketone: Negative  / Bili: Negative / Urobili: Negative mg/dL   Blood: x / Protein: Negative mg/dL / Nitrite: Negative   Leuk Esterase: Negative / RBC: x / WBC x   Sq Epi: x / Non Sq Epi: x / Bacteria: x
Pt is OOB, still c/o ' throat pulsating symptoms' denies SOB/palpitations.  Tele: Afib, rate is controlled at 's    MEDICATIONS  (STANDING):  sodium chloride 0.9% lock flush 3milliLiter(s) IV Push every 8 hours  rivaroxaban 20milliGRAM(s) Oral daily  carvedilol 25milliGRAM(s) Oral every 12 hours  aspirin enteric coated 81milliGRAM(s) Oral daily  atorvastatin 20milliGRAM(s) Oral at bedtime  furosemide    Tablet 40milliGRAM(s) Oral two times a day    MEDICATIONS  (PRN):      Allergies    ACE inhibitors (Other)  amiodarone (Other)    Intolerances        General: Pt denies recent weight loss/fever/chills    Neurological: denies numbness or  sensation loss    HEENT: denies visual changes, no hearing loss, denies sore throat    Cardiovascular: denies chest pain/palpitations/leg edema (+) pulsating symptoms on throat    Respiratory and Thorax: denies SOB/cough/wheezing    Gastrointestinal: denies abdominal pain/diarrhea/constipation/bloody stool    Genitourinary: denies urinary frequency/urgency/ dysuria    Musculoskeletal: denies joint pain or swelling, denies restricted motion    Skin: denies rashes/sores    Endocrine: denies heat or cold intolerance/excessive thirst    Hematologic: denies abnormal bleeding  	    Vital Signs Last 24 Hrs  T(C): 36.8, Max: 36.8 (04-20 @ 05:38)  T(F): 98.2, Max: 98.2 (04-20 @ 05:38)  HR: 76 (76 - 90)  BP: 136/84 (103/74 - 136/84)  BP(mean): --  RR: 18 (18 - 18)  SpO2: 100% (97% - 100%)      Constitutional: well developed, well nourished, no deformities and no acute distress    Neurological: Alert & Oriented x 3, JACOB, no focal deficits    HEENT: NC/AT, PERRLA, EOMI,  Neck supple.    Respiratory: CTA B/L, No wheezing/crackles/rhonchi (+) Rt chest wall PPM    Cardiovascular: irregular S1S2, No m/r/g    Gastrointestinal: soft, NT, nondistended, (+) BS    Genitourinary: non distended bladder, voiding freely    Extremities: 1+ B/L pedal edema    Skin:  normal skin color and pigmentation, no skin lesions            LABS:    04-19    143  |  104  |  17  ----------------------------<  89  3.7   |  30  |  1.08    Ca    8.8      19 Apr 2017 06:24      TELE: Afib 's bpm
REASON FOR VISIT: CHF    HPI:  Mr Raymundo Melchor is a 71 yr old man with a history of atrial fibrillation s/p ablation (anticoagulated with Xarelto), non-obstructive CAD, tachy-yair syndrome, PPM, non-ischemic with chronic systolic HF (EF previously estimated at 40-45% in 10/2016), mitral valve insufficiency, admitted 4/15/17 with complaints of dyspnea and pedal edema x 3 days associated with "throat pulsating."  He was diagnosed with a recurrence of atrial fibrillation and exacerbation of HF.    4/18/17:  Improving dyspnea; no angina.  4/19/17:  Comfortable; marked improvement in dyspnea and edema; no orthopnea; ambulating.    MEDICATIONS  (STANDING):  furosemide   Injectable 40milliGRAM(s) IV Push every 12 hours  rivaroxaban 20milliGRAM(s) Oral daily  carvedilol 25milliGRAM(s) Oral every 12 hours  aspirin enteric coated 81milliGRAM(s) Oral daily  atorvastatin 20milliGRAM(s) Oral at bedtime    Vital Signs Last 24 Hrs  T(C): 36.4, Max: 36.5 (04-18 @ 16:37)  T(F): 97.5, Max: 97.7 (04-18 @ 16:37)  HR: 87 (82 - 106)  BP: 140/99 (100/65 - 145/81)  BP(mean): --  RR: 18 (17 - 18)  SpO2: 97% (95% - 98%)    PHYSICAL EXAMINATION:  General:  Appears comfortable; no distress  HEENT: Pupils round, EOMI  CARDIAC: Irregularly irregular  PULM: Lungs are clear to auscultation, non labored  GI: Abdomen is soft and nontender with active bowel counds  LYMPH: Trace pedal edema b/l, no cervical LAD  SKIN: warm and dry  PSYCH: Appropriate mood and affect    LABS:                     13.3   5.5   )-----------( 134      ( 18 Apr 2017 06:13 )             43.1     143  |  104  |  17  ----------------------------<  89  3.7   |  30  |  1.08    Ca    8.8      19 Apr 2017 06:24    CXR (4/14/17): small RIGHT pleural effusion with underlying atelectasis or pneumonia. Mild vascular congestion.     ECG (4/14/17): Atrial fibrillation. Nonspecific T wave abnormality  When compared with ECG of 03-JAN-2017 11:36,  Atrial fibrillation has replaced Sinus rhythm    Nuclear Stress: Exercise SPECT Myocardial PerfusionImaging demonstrates:  Dilated and globally hypokinetic left ventricle compatible with cardiomyopathy.   Fixed perfusion defect in the mid and basal segments of the inferolateral  wall with impaired contractility and wall thickening suggestive of a zone of infarct.  No scan evidence of reversible perfusion defects.  Reduced left ventricular ejection fraction of 41%    Echo (4/18):   Moderate to severe (3+) mitral regurgitation is present.  Mild (1+) aortic regurgitation is present.  Moderate (2+) tricuspid valve regurgitation is present.  The left ventricle cavity is mildly dilated.  Mild concentric left ventricular hypertrophy is present.  Estimated left ventricular ejection fraction is 25-30%.  Severe, diffuse hypokinesis of the left ventricle is present.  The right atrium appears mildly dilated.  Left to right shunt across the atrial septum is noted on color doppler

## 2017-04-20 NOTE — PROGRESS NOTE ADULT - PROBLEM SELECTOR PLAN 4
No evidence of stress-induced myocardial ischemia.
likely post ablation.  stable no intervention  left to right shunt

## 2017-04-20 NOTE — DISCHARGE NOTE ADULT - SECONDARY DIAGNOSIS.
Chronic atrial fibrillation Elevated troponin Throat pain Essential hypertension Atrial septal defect

## 2017-04-20 NOTE — DISCHARGE NOTE ADULT - CARE PLAN
Principal Discharge DX:	Acute on chronic systolic (congestive) heart failure  Goal:	prevent exacerbation  Instructions for follow-up, activity and diet:	continue with coreg inc dose. Cont with low salt diet. F/u with cardiologist  Secondary Diagnosis:	Chronic atrial fibrillation  Goal:	rate control and anticoagulate  Instructions for follow-up, activity and diet:	cont with coreg increased dose. Stop cardizem. cont with xarelto  Secondary Diagnosis:	Elevated troponin  Goal:	prevent heart attack  Instructions for follow-up, activity and diet:	likely elevated due to demand ischemia  f/u with Cardiologist   cont with Aspirin , xarelto , coreg and statin medication  Secondary Diagnosis:	Throat pain  Goal:	work up with ENT  Instructions for follow-up, activity and diet:	f/u with ENT physician and Primary doctor. No dysphagia. lymphadenopathy freely mobile ,non tender. soft in neck lateral aspect.no eveidence of infection. Likely post infection  Secondary Diagnosis:	Essential hypertension  Goal:	stable BP  Instructions for follow-up, activity and diet:	cont with coreg   f./u with primary doctor and cardiologist  Secondary Diagnosis:	Atrial septal defect  Goal:	monitor  Instructions for follow-up, activity and diet:	left to right shunt , likely post ablation. No surgical intervention. Cont to monitor

## 2017-04-20 NOTE — PROGRESS NOTE ADULT - ASSESSMENT
72 yo male PMH non-obstructive CAD, NIDCM (EF40-45%), a.fib on xarelto, difficult to rate control,  previously intolerant of amiodarone and sotalol, underwent cryoablation with PVI december of 2016, presented with sob and lower extremity swelling, with CHF exacerbation and found to have a.fib with RVR.  Given that he developed QT-prolongation on sotalol, it's unlikely that he will be able tolerate dofetilide.     - Afib with RVR:       continue coreg 25mg po BID for rate control      anticoagulation with xarelto    -Acute on chronic HFrEF : CHF Mx
Acute on chronic decompensated HFrEF with known LVEF 45%, s/p ICD complicated by Afib- will continue IV lasix  DC cardizem.
Pt is 71 yr old  male w hx PAF on NOAC, non-obstructive CAD, tachy-yair syndrome, PPM, non-ischemic dilated cardiomyopathy w systolic CHF LVEF 40-45% , mitral valve disease, HTN, hx L testicular heaviness ) presented to  ED w    3 days of SOB, LOPEZ and increased lower extremity edema.
* I discussed case with Dr. Guerrero

## 2017-04-20 NOTE — PROGRESS NOTE ADULT - PROBLEM SELECTOR PROBLEM 2
AF (atrial fibrillation)
Acute on chronic systolic (congestive) heart failure
Paroxysmal atrial fibrillation
AF (atrial fibrillation)
LV dysfunction

## 2017-04-20 NOTE — PROGRESS NOTE ADULT - PROBLEM SELECTOR PLAN 2
- continue coreg with inc in dose   - cardizem d/c ed   - continue xarelto
- continue coreg with inc in dose   - cardizem d/c ed   - continue xarelto
- continue coreg with inc in dose   - cardizem d/c ed   - continue xarelto  no ablation for now. pt to f.u with cardiology
- continue diltiazem  - continue xarelto
PAF s/p previous ablation with Dr. Mahmood - patient had been maintaining sinus rhythm; continue Xarelto; rate control with beta-blockade - Correg.  I will consult EP service (? role for anti-arrhythmic therapy).
Resolving acute exacerbation of chronic systolic HF; continue to diurese but change lasix to oral route. No ACE-I or ARB dure to allergy.
- continue diltiazem  - continue xarelto
Continue BB. Continue with BB

## 2017-04-20 NOTE — PROGRESS NOTE ADULT - PROBLEM SELECTOR PLAN 3
- stable  - continue home meds
ASD with left to right shunt seen on today's echo likely related to AF ablation with trans-septal puncture; continue anticoagulation.
Moderate to severe MR due to dilated LV; medical therapy planned.
- stable  - continue home meds

## 2017-04-20 NOTE — PROGRESS NOTE ADULT - PROBLEM SELECTOR PROBLEM 3
Essential hypertension
Atrial septal defect
Non-rheumatic mitral regurgitation
Essential hypertension
Essential hypertension

## 2017-04-20 NOTE — PROGRESS NOTE ADULT - ATTENDING COMMENTS
Patient seen and examined with Dr. Mohamud Latif, Dr. Dayton Fallon and Dr. Neel Pichardo on the Family Medicine Teaching Service.  Agree with history, physical, labs and plan which were reviewed in detail. Patient seen and examined with Dr. Mohamud Latif, Dr. Dayton Fallon and Dr. Neel Pichardo on the [facility] Medicine Teaching Service.  Agree with history, physical, labs and plan which were reviewed in detail.    Acute on chronic decompensated HFrEF with known LVEF 45%, s/p ICD complicated by Afib

## 2017-04-20 NOTE — DISCHARGE NOTE ADULT - HOSPITAL COURSE
Pt is 71 yr old  male w hx PAF on NOAC, non-obstructive CAD, tachy-yair syndrome, PPM, non-ischemic dilated cardiomyopathy w systolic CHF LVEF 40-45% , mitral valve disease, HTN, hx L testicular heaviness ) presented to  ED w 3 days of SOB, LOPEZ and increased lower extremity edema. Pt reports compliance w meds.  No change in diet. R sided throat discomfort that is described as 8/10 and pulsating.  No radiation.  worsens w activity.  Not clear how long it lasts.  No nausea. palpitations, diaphoresis. c/o abd pain and swelling w radiation of pain into L testicle, no N,V, bowel or bladder incontinence  In the hosp,. pt received lasix and gradually improved. He did make minimally elevated troponins and was in rapid A fib.  Previously intolerant of amiodarone and sotalol, underwent cryoablation with PVI december of 2016, presented with sob and lower extremity swelling, with CHF exacerbation and found to have a.fib with RVR.Given that he developed QT-prolongation on sotalol, it's unlikely that he will be able tolerate dofetilide.  hence his coreg dose was inc and cardizem was stopped.He was rat6e controlled on that   Pt also had a nuclear stress test which was as follows      Exercise SPECT Myocardial PerfusionImaging demonstrates:    Dilated and globally hypokinetic left ventricle compatible with   cardiomyopathy.     Fixed perfusion defect in the mid and basal segments of the inferolateral   wall with impaired contractility and wall thickening suggestiveof a zone   of infarct.    No scan evidence of reversible perfusion defects    Reduced left ventricular ejection fraction of 41% (Normal: 50% or   greater).    ECHO was as follows    No evidence of pericardial effusion.   Moderate to severe (3+) mitral regurgitation is present.   Fibrocalcific changes noted to the aortic valve leaflets with preserved   excursion.   Mild (1+) aortic regurgitation is present.   Moderate (2+) tricuspid valve regurgitation is present.   The left ventricle cavity is mildly dilated.   Mild concentric left ventricular hypertrophy is present.   Estim Impression  ated left ventricular ejection fraction is 25-30%.   Severe, diffuse hypokinesis of the left ventricle is present.   The right atrium appears mildly dilated.   A device wire is seen in the RV and RA.   Left to right shunt across the atrial septum is noted on color doppler      PHYSICAL EXAM:    Constitutional: NAD, well-groomed, well-developed  HEENT: PERRLA, EOMI, Normal Hearing  Neck: No LAD, No JVD. No lympadenopathy noted   Back: Normal spine flexure, No CVA tenderness  Cardiovascular: S1 and S2, irregularly irregular, grade 2/6 systolic murmur  Respiratory: slight rhonchi  Gastrointestinal: BS+, soft, NT/ND  Extremities: b/l pitting edema but improved  Vascular: 2+ peripheral pulses  Neurological: A/O x 3, no focal deficits  Psychiatric: Normal mood, normal affect  Musculoskeletal: 5/5 strength b/l upper and lower extremities  Skin: No rashes      T(C): 36.2, Max: 36.8 (04-20 @ 05:38)  HR: 81 (76 - 88)  BP: 128/76 (123/80 - 136/84)  RR: 16 (16 - 18)  SpO2: 100% (97% - 100%)  Wt(kg): --          19 Apr 2017 06:24    143    |  104    |  17     ----------------------------<  89     3.7     |  30     |  1.08     Ca    8.8        19 Apr 2017 06:24        CAPILLARY BLOOD GLUCOSE  95 (20 Apr 2017 08:06)

## 2017-04-20 NOTE — DISCHARGE NOTE ADULT - MEDICATION SUMMARY - MEDICATIONS TO CHANGE
I will SWITCH the dose or number of times a day I take the medications listed below when I get home from the hospital:    furosemide 20 mg oral tablet  -- 1 tab(s) by mouth once a day

## 2017-04-24 DIAGNOSIS — I42.0 DILATED CARDIOMYOPATHY: ICD-10-CM

## 2017-04-24 DIAGNOSIS — I25.10 ATHEROSCLEROTIC HEART DISEASE OF NATIVE CORONARY ARTERY WITHOUT ANGINA PECTORIS: ICD-10-CM

## 2017-04-24 DIAGNOSIS — M19.90 UNSPECIFIED OSTEOARTHRITIS, UNSPECIFIED SITE: ICD-10-CM

## 2017-04-24 DIAGNOSIS — I48.2 CHRONIC ATRIAL FIBRILLATION: ICD-10-CM

## 2017-04-24 DIAGNOSIS — I24.8 OTHER FORMS OF ACUTE ISCHEMIC HEART DISEASE: ICD-10-CM

## 2017-04-24 DIAGNOSIS — R06.02 SHORTNESS OF BREATH: ICD-10-CM

## 2017-04-24 DIAGNOSIS — D56.9 THALASSEMIA, UNSPECIFIED: ICD-10-CM

## 2017-04-24 DIAGNOSIS — Z95.0 PRESENCE OF CARDIAC PACEMAKER: ICD-10-CM

## 2017-04-24 DIAGNOSIS — I44.7 LEFT BUNDLE-BRANCH BLOCK, UNSPECIFIED: ICD-10-CM

## 2017-04-24 DIAGNOSIS — Q21.1 ATRIAL SEPTAL DEFECT: ICD-10-CM

## 2017-04-24 DIAGNOSIS — I11.0 HYPERTENSIVE HEART DISEASE WITH HEART FAILURE: ICD-10-CM

## 2017-04-24 DIAGNOSIS — I48.0 PAROXYSMAL ATRIAL FIBRILLATION: ICD-10-CM

## 2017-04-24 DIAGNOSIS — I08.8 OTHER RHEUMATIC MULTIPLE VALVE DISEASES: ICD-10-CM

## 2017-04-24 DIAGNOSIS — Z79.01 LONG TERM (CURRENT) USE OF ANTICOAGULANTS: ICD-10-CM

## 2017-04-24 DIAGNOSIS — I50.23 ACUTE ON CHRONIC SYSTOLIC (CONGESTIVE) HEART FAILURE: ICD-10-CM

## 2017-04-24 DIAGNOSIS — R59.1 GENERALIZED ENLARGED LYMPH NODES: ICD-10-CM

## 2017-05-01 ENCOUNTER — INPATIENT (INPATIENT)
Facility: HOSPITAL | Age: 72
LOS: 3 days | Discharge: HOME | End: 2017-05-05
Attending: FAMILY MEDICINE | Admitting: FAMILY MEDICINE
Payer: MEDICARE

## 2017-05-01 VITALS
TEMPERATURE: 97 F | SYSTOLIC BLOOD PRESSURE: 151 MMHG | RESPIRATION RATE: 18 BRPM | DIASTOLIC BLOOD PRESSURE: 95 MMHG | HEART RATE: 134 BPM | WEIGHT: 229.94 LBS | OXYGEN SATURATION: 99 %

## 2017-05-01 DIAGNOSIS — Z95.0 PRESENCE OF CARDIAC PACEMAKER: Chronic | ICD-10-CM

## 2017-05-01 DIAGNOSIS — I50.23 ACUTE ON CHRONIC SYSTOLIC (CONGESTIVE) HEART FAILURE: ICD-10-CM

## 2017-05-01 DIAGNOSIS — I25.119 ATHEROSCLEROTIC HEART DISEASE OF NATIVE CORONARY ARTERY WITH UNSPECIFIED ANGINA PECTORIS: ICD-10-CM

## 2017-05-01 DIAGNOSIS — I48.91 UNSPECIFIED ATRIAL FIBRILLATION: ICD-10-CM

## 2017-05-01 DIAGNOSIS — I86.1 SCROTAL VARICES: ICD-10-CM

## 2017-05-01 DIAGNOSIS — R07.0 PAIN IN THROAT: ICD-10-CM

## 2017-05-01 DIAGNOSIS — D69.6 THROMBOCYTOPENIA, UNSPECIFIED: ICD-10-CM

## 2017-05-01 LAB
ALBUMIN SERPL ELPH-MCNC: 3.7 G/DL — SIGNIFICANT CHANGE UP (ref 3.3–5)
ALP SERPL-CCNC: 163 U/L — HIGH (ref 40–120)
ALT FLD-CCNC: 50 U/L — SIGNIFICANT CHANGE UP (ref 12–78)
ANION GAP SERPL CALC-SCNC: 11 MMOL/L — SIGNIFICANT CHANGE UP (ref 5–17)
APPEARANCE UR: CLEAR — SIGNIFICANT CHANGE UP
APTT BLD: 33.2 SEC — SIGNIFICANT CHANGE UP (ref 27.5–37.4)
AST SERPL-CCNC: 56 U/L — HIGH (ref 15–37)
BASOPHILS # BLD AUTO: 0.1 K/UL — SIGNIFICANT CHANGE UP (ref 0–0.2)
BASOPHILS NFR BLD AUTO: 0.9 % — SIGNIFICANT CHANGE UP (ref 0–2)
BILIRUB SERPL-MCNC: 2 MG/DL — HIGH (ref 0.2–1.2)
BILIRUB UR-MCNC: NEGATIVE — SIGNIFICANT CHANGE UP
BUN SERPL-MCNC: 24 MG/DL — HIGH (ref 7–23)
CALCIUM SERPL-MCNC: 8.7 MG/DL — SIGNIFICANT CHANGE UP (ref 8.5–10.1)
CHLORIDE SERPL-SCNC: 106 MMOL/L — SIGNIFICANT CHANGE UP (ref 96–108)
CO2 SERPL-SCNC: 24 MMOL/L — SIGNIFICANT CHANGE UP (ref 22–31)
COLOR SPEC: YELLOW — SIGNIFICANT CHANGE UP
CREAT SERPL-MCNC: 1.2 MG/DL — SIGNIFICANT CHANGE UP (ref 0.5–1.3)
DIFF PNL FLD: NEGATIVE — SIGNIFICANT CHANGE UP
EOSINOPHIL # BLD AUTO: 0.1 K/UL — SIGNIFICANT CHANGE UP (ref 0–0.5)
EOSINOPHIL NFR BLD AUTO: 0.7 % — SIGNIFICANT CHANGE UP (ref 0–6)
GLUCOSE SERPL-MCNC: 107 MG/DL — HIGH (ref 70–99)
GLUCOSE UR QL: NEGATIVE MG/DL — SIGNIFICANT CHANGE UP
HCT VFR BLD CALC: 43.7 % — SIGNIFICANT CHANGE UP (ref 39–50)
HGB BLD-MCNC: 13.2 G/DL — SIGNIFICANT CHANGE UP (ref 13–17)
INR BLD: 1.48 RATIO — HIGH (ref 0.88–1.16)
KETONES UR-MCNC: NEGATIVE — SIGNIFICANT CHANGE UP
LACTATE SERPL-SCNC: 2.7 MMOL/L — HIGH (ref 0.7–2)
LEUKOCYTE ESTERASE UR-ACNC: NEGATIVE — SIGNIFICANT CHANGE UP
LIDOCAIN IGE QN: 77 U/L — SIGNIFICANT CHANGE UP (ref 73–393)
LYMPHOCYTES # BLD AUTO: 1.5 K/UL — SIGNIFICANT CHANGE UP (ref 1–3.3)
LYMPHOCYTES # BLD AUTO: 20.3 % — SIGNIFICANT CHANGE UP (ref 13–44)
MAGNESIUM SERPL-MCNC: 2.2 MG/DL — SIGNIFICANT CHANGE UP (ref 1.8–2.4)
MCHC RBC-ENTMCNC: 29.1 PG — SIGNIFICANT CHANGE UP (ref 27–34)
MCHC RBC-ENTMCNC: 30.3 GM/DL — LOW (ref 32–36)
MCV RBC AUTO: 96 FL — SIGNIFICANT CHANGE UP (ref 80–100)
MONOCYTES # BLD AUTO: 1 K/UL — HIGH (ref 0–0.9)
MONOCYTES NFR BLD AUTO: 13.1 % — SIGNIFICANT CHANGE UP (ref 2–14)
NEUTROPHILS # BLD AUTO: 5 K/UL — SIGNIFICANT CHANGE UP (ref 1.8–7.4)
NEUTROPHILS NFR BLD AUTO: 65 % — SIGNIFICANT CHANGE UP (ref 43–77)
NITRITE UR-MCNC: NEGATIVE — SIGNIFICANT CHANGE UP
NT-PROBNP SERPL-SCNC: 6941 PG/ML — HIGH (ref 0–125)
PH UR: 5 — SIGNIFICANT CHANGE UP (ref 5–8)
PLATELET # BLD AUTO: 109 K/UL — LOW (ref 150–400)
POTASSIUM SERPL-MCNC: 4.1 MMOL/L — SIGNIFICANT CHANGE UP (ref 3.5–5.3)
POTASSIUM SERPL-SCNC: 4.1 MMOL/L — SIGNIFICANT CHANGE UP (ref 3.5–5.3)
PROT SERPL-MCNC: 6.7 GM/DL — SIGNIFICANT CHANGE UP (ref 6–8.3)
PROT UR-MCNC: NEGATIVE MG/DL — SIGNIFICANT CHANGE UP
PROTHROM AB SERPL-ACNC: 16.1 SEC — HIGH (ref 9.8–12.7)
RBC # BLD: 4.55 M/UL — SIGNIFICANT CHANGE UP (ref 4.2–5.8)
RBC # FLD: 14.8 % — HIGH (ref 10.3–14.5)
SODIUM SERPL-SCNC: 141 MMOL/L — SIGNIFICANT CHANGE UP (ref 135–145)
SP GR SPEC: 1.01 — SIGNIFICANT CHANGE UP (ref 1.01–1.02)
TROPONIN I SERPL-MCNC: 0.4 NG/ML — HIGH (ref 0.01–0.04)
TROPONIN I SERPL-MCNC: 0.42 NG/ML — HIGH (ref 0.01–0.04)
UROBILINOGEN FLD QL: NEGATIVE MG/DL — SIGNIFICANT CHANGE UP
WBC # BLD: 7.6 K/UL — SIGNIFICANT CHANGE UP (ref 3.8–10.5)
WBC # FLD AUTO: 7.6 K/UL — SIGNIFICANT CHANGE UP (ref 3.8–10.5)

## 2017-05-01 PROCEDURE — 99285 EMERGENCY DEPT VISIT HI MDM: CPT

## 2017-05-01 PROCEDURE — 76770 US EXAM ABDO BACK WALL COMP: CPT | Mod: 26

## 2017-05-01 PROCEDURE — 71010: CPT | Mod: 26

## 2017-05-01 PROCEDURE — 76870 US EXAM SCROTUM: CPT | Mod: 26

## 2017-05-01 PROCEDURE — 71275 CT ANGIOGRAPHY CHEST: CPT | Mod: 26

## 2017-05-01 PROCEDURE — 93010 ELECTROCARDIOGRAM REPORT: CPT

## 2017-05-01 PROCEDURE — 70491 CT SOFT TISSUE NECK W/DYE: CPT | Mod: 26

## 2017-05-01 RX ORDER — FUROSEMIDE 40 MG
40 TABLET ORAL EVERY 12 HOURS
Refills: 0 | Status: DISCONTINUED | OUTPATIENT
Start: 2017-05-01 | End: 2017-05-01

## 2017-05-01 RX ORDER — NITROGLYCERIN 6.5 MG
1 CAPSULE, EXTENDED RELEASE ORAL ONCE
Refills: 0 | Status: COMPLETED | OUTPATIENT
Start: 2017-05-01 | End: 2017-05-01

## 2017-05-01 RX ORDER — SODIUM CHLORIDE 0.65 %
1 AEROSOL, SPRAY (ML) NASAL
Refills: 0 | Status: DISCONTINUED | OUTPATIENT
Start: 2017-05-01 | End: 2017-05-05

## 2017-05-01 RX ORDER — FLUTICASONE PROPIONATE 50 MCG
1 SPRAY, SUSPENSION NASAL DAILY
Refills: 0 | Status: DISCONTINUED | OUTPATIENT
Start: 2017-05-01 | End: 2017-05-05

## 2017-05-01 RX ORDER — DILTIAZEM HCL 120 MG
26 CAPSULE, EXT RELEASE 24 HR ORAL ONCE
Refills: 0 | Status: COMPLETED | OUTPATIENT
Start: 2017-05-01 | End: 2017-05-01

## 2017-05-01 RX ORDER — RIVAROXABAN 15 MG-20MG
20 KIT ORAL
Refills: 0 | Status: DISCONTINUED | OUTPATIENT
Start: 2017-05-01 | End: 2017-05-03

## 2017-05-01 RX ORDER — FUROSEMIDE 40 MG
40 TABLET ORAL ONCE
Refills: 0 | Status: COMPLETED | OUTPATIENT
Start: 2017-05-01 | End: 2017-05-01

## 2017-05-01 RX ORDER — ASPIRIN/CALCIUM CARB/MAGNESIUM 324 MG
81 TABLET ORAL DAILY
Refills: 0 | Status: DISCONTINUED | OUTPATIENT
Start: 2017-05-01 | End: 2017-05-05

## 2017-05-01 RX ORDER — CARVEDILOL PHOSPHATE 80 MG/1
25 CAPSULE, EXTENDED RELEASE ORAL
Refills: 0 | Status: DISCONTINUED | OUTPATIENT
Start: 2017-05-01 | End: 2017-05-05

## 2017-05-01 RX ORDER — ATORVASTATIN CALCIUM 80 MG/1
20 TABLET, FILM COATED ORAL AT BEDTIME
Refills: 0 | Status: DISCONTINUED | OUTPATIENT
Start: 2017-05-01 | End: 2017-05-05

## 2017-05-01 RX ORDER — FUROSEMIDE 40 MG
40 TABLET ORAL EVERY 12 HOURS
Refills: 0 | Status: DISCONTINUED | OUTPATIENT
Start: 2017-05-01 | End: 2017-05-05

## 2017-05-01 RX ADMIN — Medication 100 MILLIGRAM(S): at 23:27

## 2017-05-01 RX ADMIN — Medication 81 MILLIGRAM(S): at 18:25

## 2017-05-01 RX ADMIN — Medication 1 INCH(S): at 17:38

## 2017-05-01 RX ADMIN — Medication 1 SPRAY(S): at 17:37

## 2017-05-01 RX ADMIN — ATORVASTATIN CALCIUM 20 MILLIGRAM(S): 80 TABLET, FILM COATED ORAL at 23:27

## 2017-05-01 RX ADMIN — Medication 40 MILLIGRAM(S): at 17:38

## 2017-05-01 RX ADMIN — Medication 40 MILLIGRAM(S): at 06:24

## 2017-05-01 RX ADMIN — Medication 26 MILLIGRAM(S): at 07:12

## 2017-05-01 RX ADMIN — CARVEDILOL PHOSPHATE 25 MILLIGRAM(S): 80 CAPSULE, EXTENDED RELEASE ORAL at 17:37

## 2017-05-01 RX ADMIN — Medication 1 INCH(S): at 05:54

## 2017-05-01 RX ADMIN — RIVAROXABAN 20 MILLIGRAM(S): KIT at 17:38

## 2017-05-01 NOTE — H&P ADULT - PROBLEM SELECTOR PLAN 4
CT neck 10/2016 -  1.2 cm LEFT submental lymph node is noted measuring 1.2 x  0.9 cm and is likely reactive. Small BILATERAL pleural effusions.  will repeat CT neck  LAD can be related to poor dentition vs allergic postnasal drip  stop NSAIDs - can cause HF exacerbation, prn tylenol for pain  nasal saline spray, artificial tear  for eye itchiness c/w ASA, BB, statin

## 2017-05-01 NOTE — ED PROVIDER NOTE - OBJECTIVE STATEMENT
72 y/o male presents to ED with c/o throat pain and cough began weeks ago.  Pt states he was seen in ED for similar symptoms had workup and was given medication.  Pt continues to feel same symptoms. Has abdominal pain associated with cough. Has leg swelling and testicular swelling. Pt has appointment with PMD in 1 day. Denies fever, chills, n/v/d. Pt states medication compliance. 72 y/o male presents to ED with c/o throat pain and cough began weeks ago.  Pt states he was seen in ED for similar symptoms had workup and was given medication.  Pt continues to feel same symptoms. Has abdominal pain associated with cough. Has leg swelling and testicular swelling. Pt also c/o chest pain, sob and palpitations.  Pt has appointment with PMD in 1 day. Denies fever, chills, n/v/d. Pt states medication compliance.

## 2017-05-01 NOTE — H&P ADULT - NSHPREVIEWOFSYSTEMS_GEN_ALL_CORE
REVIEW OF SYSTEMS:    CONSTITUTIONAL: No weakness, fevers or chills  EYES/ENT: No visual changes;  No vertigo or throat pain   NECK: No pain or stiffness  RESPIRATORY: +  cough,  no wheezing, no hemoptysis; + shortness of breath  CARDIOVASCULAR: No chest pain, +  palpitations  GASTROINTESTINAL: No abdominal or epigastric pain. No nausea, vomiting, or hematemesis; No diarrhea or constipation. No melena or hematochezia.+ abdominal distention  GENITOURINARY: No dysuria, frequency or hematuria  NEUROLOGICAL: No numbness or weakness  SKIN: No itching, burning, rashes, or lesions   All other review of systems is negative unless indicated above.

## 2017-05-01 NOTE — ED ADULT NURSE NOTE - OBJECTIVE STATEMENT
Pt presents to ED c/o generalized pain, chest pain, and SOB. Pt upon arrival is found to be in rapid Afib. Examined by MD Montague. Will continue to monitor.

## 2017-05-01 NOTE — H&P ADULT - NSHPPHYSICALEXAM_GEN_ALL_CORE
PHYSICAL EXAM:    Constitutional: NAD, awake and alert  HEENT: PERR, EOMI, Normal Hearing, MMM  Neck: Soft and supple, No LAD, + JVD  Respiratory: Breath sounds  decreased bilateral , + rales, No wheezing, rales, + occasional  rhonchi  Cardiovascular: S1 and S2, irregular rate and rhythm, + 3/6 systolic Murmurs, gallops or rubs  Gastrointestinal: Bowel Sounds present, soft, nontender, + distended, no guarding, no rebound  Extremities: 2+  peripheral edema  Vascular: 2+ peripheral pulses  Neurological: A/O x 3, no focal deficits  Musculoskeletal: 5/5 strength b/l upper and lower extremities  Skin: No rashes  rectal : deferred, not indicated

## 2017-05-01 NOTE — ED PROVIDER NOTE - CONSTITUTIONAL, MLM
normal... Well appearing, well nourished, awake, alert, oriented to person, place, time/situation and in no apparent distress.  Occasional cough

## 2017-05-01 NOTE — DIETITIAN INITIAL EVALUATION ADULT. - OTHER INFO
Patient reports pain in his throat/swelling- has had poor appetite due to pain. However, patient has gained ~26#, has hx of CHF

## 2017-05-01 NOTE — H&P ADULT - PMH
AF (atrial fibrillation)    Coronary artery disease involving native heart with angina pectoris, unspecified vessel or lesion type    Fatigue    Hypokinesis  mild global hypokinesis  LBBB (left bundle branch block)    LV dysfunction    Pacemaker  single chamber meditronic  placed by Dr Tomas  May  of  2016

## 2017-05-01 NOTE — H&P ADULT - PROBLEM SELECTOR PLAN 5
testicular sono - bilateral varicocele, no acute pathology  likely related to CHF  monitor for improvement after IV diuresis  check renal sono to r/o renal pathology   consider urology consult if not improving CT neck 10/2016 -  1.2 cm LEFT submental lymph node is noted measuring 1.2 x  0.9 cm and is likely reactive. Small BILATERAL pleural effusions.  will repeat CT neck  LAD can be related to poor dentition vs allergic postnasal drip  stop NSAIDs - can cause HF exacerbation, prn tylenol for pain  nasal saline spray, artificial tear  for eye itchiness

## 2017-05-01 NOTE — DIETITIAN INITIAL EVALUATION ADULT. - ETIOLOGY
food and nutrition knowledge deficit, hx of CHF swallowing difficulty- decreased ability to consume sufficient energy

## 2017-05-01 NOTE — ED PROVIDER NOTE - DETAILS:
Maria T Montague MD - The scribe's documentation has been prepared under my direction and personally reviewed by me in its entirety. I confirm that the note above accurately reflects all work, treatment, procedures, and medical decision making performed by me.

## 2017-05-01 NOTE — ED PROVIDER NOTE - NS ED MD SCRIBE ATTENDING SCRIBE SECTIONS
HISTORY OF PRESENT ILLNESS/HIV/REVIEW OF SYSTEMS/VITAL SIGNS( Pullset)/PHYSICAL EXAM/RESULTS/PROGRESS NOTE/CONSULTATIONS/SHIFT CHANGE/DISPOSITION/PROVIDER CARE INITIATION/MANDATORY DOCUMENTATION/SCALES/PAST MEDICAL/SURGICAL/SOCIAL HISTORY/OBSERVATION MONITORING PLAN/INTAKE ASSESSMENT/SCREENINGS

## 2017-05-01 NOTE — H&P ADULT - HISTORY OF PRESENT ILLNESS
71 yr old Lao speaking male with PMH of  A fib on Xarelto ( but pt not taking it- medication review at bedside),  non-obstructive CAD, tachy-yair syndrome s/p PPM , non-ischemic dilated cardiomyopathy, systolic CHF LVEF 41%,  mitral valve disease, HTN, throat pain on advil, recently discharged from  for similar complains presented to  with 15 days history of  SOB, LOPEZ and increased lower extremity edema, scrotal edema, difficulties walking due to that.  Pt reports compliance with meds, but on review of medication - Xarelto is missing. Patient reports drinking 3 cups of water a day, does not check weight regularly. On prior admission weight was 68 kg now 104 kg.   Patient poor historian, has poor health literacy and understanding of his medical conditions.  Patient reports throat pain, cough , can not describe better, worse with exertion. Patient denies CP, but feels palpitations, abdominal swelling and dyspneic more than before. Patient concerns about testicular swelling which was present on prior admission, but it getting worse.    In ED - Vital Signs T(F): 98.4, Max: 98.5 , HR: 98 (91 - 134), BP: 151/92 (123/98 - 155/107), RR: 16 (16 - 23), SpO2: 97% (96% - 100%) CXR - no pulmonary pathology, US SCROTUM 05/01/2017  Bilateral varicoceles, greater in size on the left, unchanged. No evidence of testicular torsion or epididymal orchitis.  EKG - A fib w , troponin0.4--> 0.4. BNP >6000, Platelets 109, INR 1.48, Cr 1.20, BUN 24, Lactate 2.7  s/p IV cardizem, lasix 40, nitro with improvement of dyspnea

## 2017-05-01 NOTE — H&P ADULT - NSHPLABSRESULTS_GEN_ALL_CORE
141  |  106  |  24<H>  ----------------------------<  107<H>  4.1   |  24  |  1.20    Ca    8.7      01 May 2017 05:16  Mg     2.2         TPro  6.7  /  Alb  3.7  /  TBili  2.0<H>  /  DBili  x   /  AST  56<H>  /  ALT  50  /  AlkPhos  163<H>                              13.2   7.6   )-----------( 109      ( 01 May 2017 05:16 )             43.7       CARDIAC MARKERS ( 01 May 2017 09:09 )  0.416 ng/mL / x     / x     / x     / x      CARDIAC MARKERS ( 01 May 2017 05:16 )  0.400 ng/mL / x     / x     / x     / x            LIVER FUNCTIONS - ( 01 May 2017 05:16 )  Alb: 3.7 g/dL / Pro: 6.7 gm/dL / ALK PHOS: 163 U/L / ALT: 50 U/L / AST: 56 U/L / GGT: x             PT/INR - ( 01 May 2017 05:16 )   PT: 16.1 sec;   INR: 1.48 ratio         PTT - ( 01 May 2017 05:16 )  PTT:33.2 sec    Urinalysis Basic - ( 01 May 2017 07:12 )    Color: Yellow / Appearance: Clear / S.010 / pH: x  Gluc: x / Ketone: Negative  / Bili: Negative / Urobili: Negative mg/dL   Blood: x / Protein: Negative mg/dL / Nitrite: Negative   Leuk Esterase: Negative / RBC: x / WBC x   Sq Epi: x / Non Sq Epi: x / Bacteria: x  EXAM:  CHEST SINGLE VIEW FRONTAL                            PROCEDURE DATE:  2017        INTERPRETATION:  Views:1  Comparison: 17  History: Shortness of breath    The lungs are clear of infiltrates and effusions. A pacemaker remains in   place. There is mild cardiomegaly. The cardiac and mediastinal contours   appear unremarkable.     Impression:  1. No evidence of acute pulmonary disease.      US SCROTUM AND CONTENTS                            PROCEDURE DATE:  2017        INTERPRETATION:  HISTORY: Pain and swelling.    TECHNIQUE: Scrotal sonogram was performed using high lucency linear   transducer. Color and spectral doppler was also performed.    COMPARISON: Scrotal sonogram dated 2017    FINDINGS: Both testes are normal in size with stable homogeneous   parenchymal echotexture on the right and mild heterogeneity on the lef.   The left testis measures 4.4 x 2.6 x 4.3 cm and the right testis measures   4.7 x 2.2 x 3.9 cm. There are no intratesticular masses. Normal,   symmetric arterial flow is present in both testes.    The left epididymis is again greater in size in the right, however,   without increased vascularity.    Bilateral varicoceles, greater in size on the left, unchanged. No   significant hydroceles.      IMPRESSION:   Stable exam. No evidence of testicular torsion or epididymal orchitis.   Stable additional findings as above.

## 2017-05-01 NOTE — H&P ADULT - PROBLEM SELECTOR PROBLEM 3
Coronary artery disease involving native heart with angina pectoris, unspecified vessel or lesion type R/O Troponin I above reference range

## 2017-05-01 NOTE — ED PROVIDER NOTE - CARE PLAN
Principal Discharge DX:	Rapid atrial fibrillation  Secondary Diagnosis:	CHF (congestive heart failure)

## 2017-05-01 NOTE — DIETITIAN INITIAL EVALUATION ADULT. - ENERGY NEEDS
HT: 66 inches   WT: 104.3Kg  BMI: 37  IBW: 63.6  %IBW: 164%  Adjusted: 73.8kg HT: 66 inches   WT: 85.5Kg- bed scale 5/1/17  BMI: 30.4  IBW: 63.6  %IBW: 139%  Adjusted: 69kg, UBW:74.8kg, %UBW:114%

## 2017-05-01 NOTE — H&P ADULT - PROBLEM SELECTOR PLAN 1
tele, serial troponins, IV diuresis, cardiology consult, last 2 d echo and nuclear stress test done in April , CTA - r/o PE, PNA, effusions  c/w BB, statin, ASA

## 2017-05-01 NOTE — H&P ADULT - PROBLEM SELECTOR PLAN 6
mild , monitor testicular sono - bilateral varicocele, no acute pathology  likely related to CHF  monitor for improvement after IV diuresis  check renal sono to r/o renal pathology   consider urology consult if not improving

## 2017-05-01 NOTE — H&P ADULT - ASSESSMENT
71 yr old Samoan speaking male with PMH of  A fib on Xarelto ( but pt not taking it- medication review at bedside),  non-obstructive CAD, tachy-yair syndrome s/p PPM , non-ischemic dilated cardiomyopathy, systolic CHF LVEF 25-30%,  mitral valve disease, HTN, throat pain on advil, recently discharged from  for similar complains presented to  with 15 days history of  SOB, OLPEZ and increased lower extremity edema, scrotal edema, difficulties walking due to that.  Pt reports compliance with meds, but on review of medication - Xarelto is missing. Patient reports drinking 3 cups of water a day, does not check weight regularly. On prior admission weight was 68 kg now 104 kg.   Patient poor historian, has poor health literacy and understanding of his medical conditions.  Patient reports throat pain, cough , can not describe better, worse with exertion. Patient denies CP, but feels palpitations, abdominal swelling and dyspneic more than before. Patient concerns about testicular swelling which was present on prior admission, but it getting worse.    In ED - Vital Signs T(F): 98.4, Max: 98.5 , HR: 98 (91 - 134), BP: 151/92 (123/98 - 155/107), RR: 16 (16 - 23), SpO2: 97% (96% - 100%) CXR - no pulmonary pathology, US SCROTUM 05/01/2017  Bilateral varicoceles, greater in size on the left, unchanged. No evidence of testicular torsion or epididymal orchitis.  EKG - A fib w , troponin0.4--> 0.4. BNP >6000, Platelets 109, INR 1.48, Cr 1.20, BUN 24, Lactate 2.7  s/p IV cardizem, lasix 40, nitro with improvement of dyspnea

## 2017-05-01 NOTE — H&P ADULT - PROBLEM SELECTOR PROBLEM 4
Throat pain Coronary artery disease involving native heart with angina pectoris, unspecified vessel or lesion type

## 2017-05-01 NOTE — ED PROVIDER NOTE - MEDICAL DECISION MAKING DETAILS
70 yo male with complicated PMH with chest pain, sob, palpitations as well as throat pain and c/o testicular swelling.  will admit for further w/u

## 2017-05-02 LAB
ANION GAP SERPL CALC-SCNC: 9 MMOL/L — SIGNIFICANT CHANGE UP (ref 5–17)
BUN SERPL-MCNC: 20 MG/DL — SIGNIFICANT CHANGE UP (ref 7–23)
CALCIUM SERPL-MCNC: 8.6 MG/DL — SIGNIFICANT CHANGE UP (ref 8.5–10.1)
CHLORIDE SERPL-SCNC: 104 MMOL/L — SIGNIFICANT CHANGE UP (ref 96–108)
CO2 SERPL-SCNC: 30 MMOL/L — SIGNIFICANT CHANGE UP (ref 22–31)
CREAT SERPL-MCNC: 1.03 MG/DL — SIGNIFICANT CHANGE UP (ref 0.5–1.3)
GLUCOSE SERPL-MCNC: 94 MG/DL — SIGNIFICANT CHANGE UP (ref 70–99)
HCT VFR BLD CALC: 40.7 % — SIGNIFICANT CHANGE UP (ref 39–50)
HGB BLD-MCNC: 13.1 G/DL — SIGNIFICANT CHANGE UP (ref 13–17)
MCHC RBC-ENTMCNC: 30.1 PG — SIGNIFICANT CHANGE UP (ref 27–34)
MCHC RBC-ENTMCNC: 32.2 GM/DL — SIGNIFICANT CHANGE UP (ref 32–36)
MCV RBC AUTO: 93.4 FL — SIGNIFICANT CHANGE UP (ref 80–100)
NT-PROBNP SERPL-SCNC: 4180 PG/ML — HIGH (ref 0–125)
PLATELET # BLD AUTO: 110 K/UL — LOW (ref 150–400)
POTASSIUM SERPL-MCNC: 3.2 MMOL/L — LOW (ref 3.5–5.3)
POTASSIUM SERPL-SCNC: 3.2 MMOL/L — LOW (ref 3.5–5.3)
RBC # BLD: 4.35 M/UL — SIGNIFICANT CHANGE UP (ref 4.2–5.8)
RBC # FLD: 14.2 % — SIGNIFICANT CHANGE UP (ref 10.3–14.5)
SODIUM SERPL-SCNC: 143 MMOL/L — SIGNIFICANT CHANGE UP (ref 135–145)
TROPONIN I SERPL-MCNC: 0.43 NG/ML — HIGH (ref 0.01–0.04)
TSH SERPL-MCNC: 1 UU/ML — SIGNIFICANT CHANGE UP (ref 0.36–3.74)
WBC # BLD: 7 K/UL — SIGNIFICANT CHANGE UP (ref 3.8–10.5)
WBC # FLD AUTO: 7 K/UL — SIGNIFICANT CHANGE UP (ref 3.8–10.5)

## 2017-05-02 PROCEDURE — 93279 PRGRMG DEV EVAL PM/LDLS PM: CPT | Mod: 26

## 2017-05-02 PROCEDURE — 99223 1ST HOSP IP/OBS HIGH 75: CPT

## 2017-05-02 RX ORDER — POTASSIUM CHLORIDE 20 MEQ
20 PACKET (EA) ORAL DAILY
Refills: 0 | Status: DISCONTINUED | OUTPATIENT
Start: 2017-05-02 | End: 2017-05-05

## 2017-05-02 RX ADMIN — RIVAROXABAN 20 MILLIGRAM(S): KIT at 17:56

## 2017-05-02 RX ADMIN — Medication 100 MILLIGRAM(S): at 05:38

## 2017-05-02 RX ADMIN — Medication 40 MILLIGRAM(S): at 18:03

## 2017-05-02 RX ADMIN — Medication 81 MILLIGRAM(S): at 11:47

## 2017-05-02 RX ADMIN — CARVEDILOL PHOSPHATE 25 MILLIGRAM(S): 80 CAPSULE, EXTENDED RELEASE ORAL at 17:56

## 2017-05-02 RX ADMIN — Medication 1 DROP(S): at 11:47

## 2017-05-02 RX ADMIN — Medication 40 MILLIGRAM(S): at 05:34

## 2017-05-02 RX ADMIN — ATORVASTATIN CALCIUM 20 MILLIGRAM(S): 80 TABLET, FILM COATED ORAL at 22:19

## 2017-05-02 RX ADMIN — Medication 20 MILLIEQUIVALENT(S): at 14:12

## 2017-05-02 RX ADMIN — Medication 1 DROP(S): at 17:56

## 2017-05-02 RX ADMIN — Medication 100 MILLIGRAM(S): at 11:47

## 2017-05-02 RX ADMIN — CARVEDILOL PHOSPHATE 25 MILLIGRAM(S): 80 CAPSULE, EXTENDED RELEASE ORAL at 05:34

## 2017-05-02 RX ADMIN — Medication 100 MILLIGRAM(S): at 17:56

## 2017-05-02 RX ADMIN — Medication 1 SPRAY(S): at 17:56

## 2017-05-02 RX ADMIN — Medication 1 SPRAY(S): at 11:47

## 2017-05-02 NOTE — CONSULT NOTE ADULT - ASSESSMENT
Troponin I above reference range  Coronary artery disease involving native heart with angina pectoris, unspecified vessel or lesion type: Coronary artery disease involving native heart with angina pectoris, unspecified vessel or lesion type  Rapid atrial fibrillation: Rapid atrial fibrillation  Heart failure, systolic, acute on chronic: Heart failure, systolic, acute on chronic  ?throat pain

## 2017-05-02 NOTE — PROCEDURE NOTE - ADDITIONAL PROCEDURE DETAILS
Tele with short burst of NSVT.  Normal PPM function with uncontrolled atrial fib rates. Tele with short burst of NSVT.  Normal PPM function with uncontrolled atrial fib rates.  Consider AVJ ablation in this gentleman.
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
## 2017-05-02 NOTE — PROGRESS NOTE ADULT - SUBJECTIVE AND OBJECTIVE BOX
Chief Complaint/Reason for Admission: dyspnea   	  History of Present Illness: 	  Patient is a 72 y/o [place]ian speaking male, well known to me from previous hospitalizations, extensive hospital admissions, prolonged stays, PMHx A fib (on Xarelto, but pt not taking it- medication review at bedside), non-obstructive CAD, tachy-yair syndrome (s/p PPM), non-ischemic dilated cardiomyopathy, systolic CHF LVEF 41%,  mitral valve disease, HTN, throat pain on advil, recently discharged from  for similar complains presented to  with 15 days history of  SOB, LOPEZ and increased lower extremity edema, scrotal edema, difficulties walking due to that.  Pt reports compliance with meds, but on review of medication - Xarelto is missing. Patient reports drinking 3 cups of water a day, does not check weight regularly. On prior admission weight was 68 kg now 104 kg.   Patient poor historian, has poor health literacy and understanding of his medical conditions.  Patient reports throat pain, cough , can not describe better, worse with exertion. Patient denies CP, but feels palpitations, abdominal swelling and dyspneic more than before. Patient concerns about testicular swelling which was present on prior admission, but it getting worse.    In ED - Vital Signs T(F): 98.4, Max: 98.5 , HR: 98 (91 - 134), BP: 151/92 (123/98 - 155/107), RR: 16 (16 - 23), SpO2: 97% (96% - 100%) CXR - no pulmonary pathology, US SCROTUM 2017  Bilateral varicoceles, greater in size on the left, unchanged. No evidence of testicular torsion or epididymal orchitis.  EKG - A fib w , troponin0.4--> 0.4. BNP >6000, Platelets 109, INR 1.48, Cr 1.20, BUN 24, Lactate 2.7  s/p IV cardizem, lasix 40, nitro with improvement of dyspnea      Physical Exam:   GENERAL APPEARANCE:  NAD, hemodynamically stable  T(C): 36.6, Max: 36.9 (- @ 13:30)  HR: 95 (91 - 109)  BP: 142/88 (123/98 - 151/92)  RR: 16 (16 - 17)  SpO2: 95% (95% - 99%)  Wt(kg): --  HEENT:  Head is normocephalic    Skin:  Warm and dry without any rash   NECK:  Supple without lymphadenopathy.   HEART:  Regular rate and rhythm. normal S1 and S2, No M/R/G  LUNGS:  Good ins/exp effort, no W/R/R/C  ABDOMEN:  Soft, nontender, nondistended with good bowel sounds heard  EXTREMITIES:  Without cyanosis, clubbing or edema.   NEUROLOGICAL:  Gross nonfocal       CBC Full  -  ( 02 May 2017 06:06 )  WBC Count : 7.0 K/uL  Hemoglobin : 13.1 g/dL  Hematocrit : 40.7 %  Platelet Count - Automated : 110 K/uL  Mean Cell Volume : 93.4 fl  Mean Cell Hemoglobin : 30.1 pg  Mean Cell Hemoglobin Concentration : 32.2 gm/dL  Auto Neutrophil # : x  Auto Lymphocyte # : x  Auto Monocyte # : x  Auto Eosinophil # : x  Auto Basophil # : x  Auto Neutrophil % : x  Auto Lymphocyte % : x  Auto Monocyte % : x  Auto Eosinophil % : x  Auto Basophil % : x    PT/INR - ( 01 May 2017 05:16 )   PT: 16.1 sec;   INR: 1.48 ratio         PTT - ( 01 May 2017 05:16 )  PTT:33.2 sec  Urinalysis Basic - ( 01 May 2017 07:12 )    Color: Yellow / Appearance: Clear / S.010 / pH: x  Gluc: x / Ketone: Negative  / Bili: Negative / Urobili: Negative mg/dL   Blood: x / Protein: Negative mg/dL / Nitrite: Negative   Leuk Esterase: Negative / RBC: x / WBC x   Sq Epi: x / Non Sq Epi: x / Bacteria: x      -    143  |  104  |  20  ----------------------------<  94  3.2<L>   |  30  |  1.03    Ca    8.6      02 May 2017 06:06  Mg     2.2     -    TPro  6.7  /  Alb  3.7  /  TBili  2.0<H>  /  DBili  x   /  AST  56<H>  /  ALT  50  /  AlkPhos  163<H>        # acute on chronic hypoxic respiratory distress in the setting   - tele monitor  - serial troponin  - IV diuresis  - last 2 d echo and nuclear stress test done in April , CTA - r/o PE, PNA, effusions  - c/w BB, statin, ASA.       # Rapid atrial fibrillation  - rate uncontrolled, s/p ablation in the past, restart Xarelto, EP consult, tele monitoring.     # Coronary artery disease involving native heart with angina pectoris, unspecified vessel or lesion typeThroat pain.  - c/w ASA, BB, statinCT neck 10/2016 -  1.2 cm LEFT submental lymph node is noted measuring 1.2 x  0.9 cm and is likely reactive. Small BILATERAL pleural effusions.  will repeat CT neck  LAD can be related to poor dentition vs allergic postnasal drip  stop NSAIDs - can cause HF exacerbation, prn tylenol for pain  nasal saline spray, artificial tear  for eye itchiness  # Throat painVaricocele.  Plan: CT neck 10/2016 -  1.2 cm LEFT submental lymph node is noted measuring 1.2 x  0.9 cm and is likely reactive. Small BILATERAL pleural effusions.  will repeat CT neck  LAD can be related to poor dentition vs allergic postnasal drip  stop NSAIDs - can cause HF exacerbation, prn tylenol for pain  nasal saline spray, artificial tear  for eye itchinestesticular sono - bilateral varicocele, no acute pathology  likely related to CHF  monitor for improvement after IV diuresis  check renal sono to r/o renal pathology   consider urology consult if not improving  # testicular swelling secondary to related to CHF  - monitor for improvement after IV diuresis  - check renal sono to r/o renal pathology   - consider urology consult if not improvingmild , monitor. Chief Complaint/Reason for Admission: dyspnea   	  History of Present Illness: 	  Patient is a 72 y/o [place] speaking male, well known to me from previous hospitalizations, extensive hospital admissions, prolonged stays, PMHx A fib (on Xarelto, but pt not taking it- medication review at bedside), non-obstructive CAD, tachy-yair syndrome (s/p PPM), non-ischemic dilated cardiomyopathy, systolic CHF LVEF 41%,  mitral valve disease, HTN, throat pain on advil, recently discharged from  for similar complains presented to  with 15 days history of  SOB, LOPEZ and increased lower extremity edema, scrotal edema, difficulties walking due to that.  Pt reports compliance with meds, but on review of medication - Xarelto is missing. Patient reports drinking 3 cups of water a day, does not check weight regularly. On prior admission weight was 68 kg now 104 kg.   Patient poor historian, has poor health literacy and understanding of his medical conditions.  Patient reports throat pain, cough , can not describe better, worse with exertion. Patient denies CP, but feels palpitations, abdominal swelling and dyspneic more than before. Patient concerns about testicular swelling which was present on prior admission, but it getting worse.    In ED - Vital Signs T(F): 98.4, Max: 98.5 , HR: 98 (91 - 134), BP: 151/92 (123/98 - 155/107), RR: 16 (16 - 23), SpO2: 97% (96% - 100%) CXR - no pulmonary pathology, US SCROTUM 2017  Bilateral varicoceles, greater in size on the left, unchanged. No evidence of testicular torsion or epididymal orchitis.  EKG - A fib w , troponin0.4--> 0.4. BNP >6000, Platelets 109, INR 1.48, Cr 1.20, BUN 24, Lactate 2.7  s/p IV cardizem, lasix 40, nitro with improvement of dyspnea    5/2: seen and eval. [place] speaking -  at the bedside. complaints of testicular swelling - sonogram reasons reviewed. Denies any HA, CP, SOB. Does not of lymph node swelling around the neck region. no overnight fevers, chills or shakes. occasional cough with yellow sputum.       Physical Exam:   GENERAL APPEARANCE:  elderly, deconditioned, ambulating on his own  T(C): 36.6, Max: 36.9 ( @ 13:30)  HR: 95 (91 - 109)  BP: 142/88 (123/98 - 151/92)  RR: 16 (16 - 17)  SpO2: 95% (95% - 99%)  HEENT:  Head is normocephalic    Skin:  Warm and dry without any rash   NECK:  lymphadenopathy   HEART:  irregularly irregular  LUNGS:  Good ins/exp effort, no W/R/R/C  ABDOMEN: soft, mildly distended  EXTREMITIES:  lower extremity swelling  NEUROLOGICAL:  Gross nonfocal       CBC Full  -  ( 02 May 2017 06:06 )  WBC Count : 7.0 K/uL  Hemoglobin : 13.1 g/dL  Hematocrit : 40.7 %  Platelet Count - Automated : 110 K/uL  Mean Cell Volume : 93.4 fl  Mean Cell Hemoglobin : 30.1 pg  Mean Cell Hemoglobin Concentration : 32.2 gm/dL    PT/INR - ( 01 May 2017 05:16 )   PT: 16.1 sec;   INR: 1.48 ratio         PTT - ( 01 May 2017 05:16 )  PTT:33.2 sec  Urinalysis Basic - ( 01 May 2017 07:12 )    Color: Yellow / Appearance: Clear / S.010 / pH: x  Gluc: x / Ketone: Negative  / Bili: Negative / Urobili: Negative mg/dL   Blood: x / Protein: Negative mg/dL / Nitrite: Negative   Leuk Esterase: Negative / RBC: x / WBC x   Sq Epi: x / Non Sq Epi: x / Bacteria: x      -    143  |  104  |  20  ----------------------------<  94  3.2<L>   |  30  |  1.03    Ca    8.6      02 May 2017 06:06  Mg     2.2     -    TPro  6.7  /  Alb  3.7  /  TBili  2.0<H>  /  DBili  x   /  AST  56<H>  /  ALT  50  /  AlkPhos  163<H>        # acute on chronic hypoxic respiratory distress in the setting acute on chronic systolic CHF in a setting of rapid atrial fibrillation  - tele monitor - HR 80s  - mildly elevated trops most likely secondary to demand ischemia  - IV diuresis  - last 2 d echo and nuclear stress test done in  - r/o PE, PNA, effusions  - c/w BB, statin, ASA.   - cardiology follow up      # Rapid atrial fibrillation (currently good rate control)  - rate uncontrolled, s/p ablation in the past, restart Xarelto     # Coronary artery disease involving native heart with angina pectoris, unspecified vessel or lesion typeThroat pain.  - c/w ASA, BB, statinCT neck 10/2016 -  1.2 cm LEFT submental lymph node is noted measuring 1.2 x  0.9 cm and is likely reactive. Small BILATERAL pleural effusions.  will repeat CT neck  LAD can be related to poor dentition vs allergic postnasal drip  stop NSAIDs - can cause HF exacerbation, prn tylenol for pain  nasal saline spray, artificial tear  for eye itchiness  # Throat painVaricocele.  Plan: CT neck 10/2016 -  1.2 cm LEFT submental lymph node is noted measuring 1.2 x  0.9 cm and is likely reactive. Small BILATERAL pleural effusions.  - will repeat CT neck upon discharge  - LAD can be related to poor dentition vs allergic postnasal drip  - stop NSAIDs - can cause HF exacerbation, prn tylenol for pain    likely related to CHF  monitor for improvement after IV diuresis  check renal sono to r/o renal pathology   consider urology consult if not improving  # testicular swelling secondary to related to CHF  - monitor for improvement after IV diuresis  - check renal sono to r/o renal pathology   - consider urology consult if not improvingmild , monitor.

## 2017-05-02 NOTE — PHYSICAL THERAPY INITIAL EVALUATION ADULT - PERTINENT HX OF CURRENT PROBLEM, REHAB EVAL
15 days history of  SOB, LOPEZ and increased lower extremity edema, scrotal edema, difficulties walking.CTA (-) PE, CT neck (soft tissue ) (-) acute findings. mildly elevated trops most likely secondary to demand ischemia per MD note. rate controlled AF. also w/ testicular swelling related to CHF per MD note

## 2017-05-02 NOTE — CONSULT NOTE ADULT - PROBLEM SELECTOR PROBLEM 4
Coronary artery disease involving native heart with angina pectoris, unspecified vessel or lesion type

## 2017-05-02 NOTE — PHYSICAL THERAPY INITIAL EVALUATION ADULT - DIAGNOSIS, PT EVAL
acute on chronic hypoxic respiratory distress in the setting acute on chronic systolic CHF in a setting of rapid atrial fibrillation

## 2017-05-03 DIAGNOSIS — I48.91 UNSPECIFIED ATRIAL FIBRILLATION: ICD-10-CM

## 2017-05-03 LAB
ANION GAP SERPL CALC-SCNC: 4 MMOL/L — LOW (ref 5–17)
BUN SERPL-MCNC: 20 MG/DL — SIGNIFICANT CHANGE UP (ref 7–23)
CALCIUM SERPL-MCNC: 8 MG/DL — LOW (ref 8.5–10.1)
CHLORIDE SERPL-SCNC: 103 MMOL/L — SIGNIFICANT CHANGE UP (ref 96–108)
CO2 SERPL-SCNC: 36 MMOL/L — HIGH (ref 22–31)
CREAT SERPL-MCNC: 0.85 MG/DL — SIGNIFICANT CHANGE UP (ref 0.5–1.3)
GLUCOSE SERPL-MCNC: 95 MG/DL — SIGNIFICANT CHANGE UP (ref 70–99)
HCT VFR BLD CALC: 36.9 % — LOW (ref 39–50)
HGB BLD-MCNC: 11.9 G/DL — LOW (ref 13–17)
MCHC RBC-ENTMCNC: 30.3 PG — SIGNIFICANT CHANGE UP (ref 27–34)
MCHC RBC-ENTMCNC: 32.4 GM/DL — SIGNIFICANT CHANGE UP (ref 32–36)
MCV RBC AUTO: 93.6 FL — SIGNIFICANT CHANGE UP (ref 80–100)
PLATELET # BLD AUTO: 109 K/UL — LOW (ref 150–400)
POTASSIUM SERPL-MCNC: 3.4 MMOL/L — LOW (ref 3.5–5.3)
POTASSIUM SERPL-SCNC: 3.4 MMOL/L — LOW (ref 3.5–5.3)
RBC # BLD: 3.94 M/UL — LOW (ref 4.2–5.8)
RBC # FLD: 14 % — SIGNIFICANT CHANGE UP (ref 10.3–14.5)
SODIUM SERPL-SCNC: 143 MMOL/L — SIGNIFICANT CHANGE UP (ref 135–145)
WBC # BLD: 6.5 K/UL — SIGNIFICANT CHANGE UP (ref 3.8–10.5)
WBC # FLD AUTO: 6.5 K/UL — SIGNIFICANT CHANGE UP (ref 3.8–10.5)

## 2017-05-03 PROCEDURE — 99233 SBSQ HOSP IP/OBS HIGH 50: CPT

## 2017-05-03 RX ORDER — ONDANSETRON 8 MG/1
4 TABLET, FILM COATED ORAL ONCE
Refills: 0 | Status: COMPLETED | OUTPATIENT
Start: 2017-05-03 | End: 2017-05-03

## 2017-05-03 RX ADMIN — Medication 20 MILLIEQUIVALENT(S): at 11:35

## 2017-05-03 RX ADMIN — Medication 1 SPRAY(S): at 11:38

## 2017-05-03 RX ADMIN — ONDANSETRON 4 MILLIGRAM(S): 8 TABLET, FILM COATED ORAL at 02:56

## 2017-05-03 RX ADMIN — ATORVASTATIN CALCIUM 20 MILLIGRAM(S): 80 TABLET, FILM COATED ORAL at 22:51

## 2017-05-03 RX ADMIN — Medication 81 MILLIGRAM(S): at 11:35

## 2017-05-03 RX ADMIN — Medication 40 MILLIGRAM(S): at 05:57

## 2017-05-03 RX ADMIN — CARVEDILOL PHOSPHATE 25 MILLIGRAM(S): 80 CAPSULE, EXTENDED RELEASE ORAL at 05:57

## 2017-05-03 RX ADMIN — CARVEDILOL PHOSPHATE 25 MILLIGRAM(S): 80 CAPSULE, EXTENDED RELEASE ORAL at 18:35

## 2017-05-03 RX ADMIN — Medication 40 MILLIGRAM(S): at 18:35

## 2017-05-03 RX ADMIN — Medication 1 SPRAY(S): at 11:35
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
## 2017-05-03 NOTE — PROGRESS NOTE ADULT - SUBJECTIVE AND OBJECTIVE BOX
REASON FOR VISIT: Atrial fibrillation    HPI:  71 yr old man with a history of AF s/p ablation with recurrence, non-obstructive CAD, tachy-yair syndrome s/p PPM, non-ischemic dilated cardiomyopathy (chronic systolic HF with LVEF 41%),   mitral valve disease, HTN, who was admitted 5/1/17 with complaints of dyspnea, edema, throat pain, reflux, difficulty ambulating, weight gain - diagnosed with an exacerbation of HF with PPM interrogation revealing numerous episodes of AF with RVR.    5/3/17:  No new complaints; feels modestly better with medical therapy AVN ablation and CRT-P upgrade tomorrow.  * Note:  used during this encounter.    MEDICATIONS  (STANDING):  carvedilol 25milliGRAM(s) Oral two times a day  atorvastatin 20milliGRAM(s) Oral at bedtime  aspirin enteric coated 81milliGRAM(s) Oral daily  furosemide   Injectable 40milliGRAM(s) IV Push every 12 hours  fluticasone propionate 50 MICROgram(s)/spray Nasal Spray 1Spray(s) Both Nostrils daily  potassium chloride   Powder 20milliEquivalent(s) Oral daily    MEDICATIONS  (PRN):  sodium chloride 0.65% Nasal 1Spray(s) Both Nostrils every 2 hours PRN Nasal Congestion  artificial  tears Solution 1Drop(s) Both EYES every 2 hours PRN Dry Eyes  guaiFENesin    Syrup 100milliGRAM(s) Oral every 6 hours PRN Cough    Vital Signs Last 24 Hrs  T(C): 36.4, Max: 36.7 (05-02 @ 20:11)  T(F): 97.5, Max: 98 (05-02 @ 20:11)  HR: 81 (76 - 92)  BP: 125/76 (122/72 - 125/76)  RR: 16 (16 - 16)  SpO2: 92% (92% - 96%)    PHYSICAL EXAM:  Constitutional: Seated in bedside chair, no distress  Respiratory: Breath sounds are clear bilaterally, no crackles  Cardiovascular: Irregularly irregular, systolic murmur  Gastrointestinal: Bowel Sounds present, soft, nontender.   Extremities: + edema.   Musculoskeletal: no calf tenderness.  Skin: No rash.    LABS:   CARDIAC MARKERS ( 02 May 2017 06:06 ) 0.427 ng/mL / x     / x     / x     / x                          11.9   6.5   )-----------( 109      ( 03 May 2017 05:26 )             36.9     143  |  103  |  20  ----------------------------<  95  3.4<L>   |  36<H>  |  0.85    Ca    8.0<L>      03 May 2017 05:26    TELE: YONI

## 2017-05-03 NOTE — PROGRESS NOTE ADULT - SUBJECTIVE AND OBJECTIVE BOX
Chief Complaint/Reason for Admission: dyspnea   	  History of Present Illness: 	  Patient is a 72 y/o [place]ian speaking male, well known to me from previous hospitalizations, extensive hospital admissions, prolonged stays, PMHx A fib (on Xarelto, but pt not taking it- medication review at bedside), non-obstructive CAD, tachy-yair syndrome (s/p PPM), non-ischemic dilated cardiomyopathy, systolic CHF LVEF 41%,  mitral valve disease, HTN, throat pain on advil, recently discharged from  for similar complains presented to  with 15 days history of  SOB, LOPEZ and increased lower extremity edema, scrotal edema, difficulties walking due to that.  Pt reports compliance with meds, but on review of medication - Xarelto is missing. Patient reports drinking 3 cups of water a day, does not check weight regularly. On prior admission weight was 68 kg now 104 kg.   Patient poor historian, has poor health literacy and understanding of his medical conditions.  Patient reports throat pain, cough , can not describe better, worse with exertion. Patient denies CP, but feels palpitations, abdominal swelling and dyspneic more than before. Patient concerns about testicular swelling which was present on prior admission, but it getting worse.    In ED - Vital Signs T(F): 98.4, Max: 98.5 , HR: 98 (91 - 134), BP: 151/92 (123/98 - 155/107), RR: 16 (16 - 23), SpO2: 97% (96% - 100%) CXR - no pulmonary pathology, US SCROTUM 2017  Bilateral varicoceles, greater in size on the left, unchanged. No evidence of testicular torsion or epididymal orchitis.  EKG - A fib w , troponin0.4--> 0.4. BNP >6000, Platelets 109, INR 1.48, Cr 1.20, BUN 24, Lactate 2.7  s/p IV cardizem, lasix 40, nitro with improvement of dyspnea    5/2: seen and eval. [place]ian speaking -  at the bedside. complaints of testicular swelling - sonogram reasons reviewed. Denies any HA, CP, SOB. Does not of lymph node swelling around the neck region. no overnight fevers, chills or shakes. occasional cough with yellow sputum.     5/3: seen and eval. hemodynamically stable. Denies any HA, CP, SOB. comfortable. no fevers, chills or shakes. I have extensively discussed with patient risks and benefits of ablation - patient states that if Dr. Tomas discusses with him - consideration of AVJ ablation is being explored with upgrade to CRT-P. Care discussed with Dr. Babin and cardiac nurses.       Physical Exam:   GENERAL APPEARANCE:  elderly, deconditioned, ambulating on his own  T(C): 36.6, Max: 36.9 (- @ 13:30)  HR: 95 (91 - 109)  BP: 142/88 (123/98 - 151/92)  RR: 16 (16 - 17)  SpO2: 95% (95% - 99%)  HEENT:  Head is normocephalic    Skin:  Warm and dry without any rash   NECK:  lymphadenopathy   HEART:  irregularly irregular  LUNGS:  Good ins/exp effort, no W/R/R/C  ABDOMEN: soft, mildly distended  EXTREMITIES:  lower extremity swelling  NEUROLOGICAL:  Gross nonfocal       CBC Full  -  ( 02 May 2017 06:06 )  WBC Count : 7.0 K/uL  Hemoglobin : 13.1 g/dL  Hematocrit : 40.7 %  Platelet Count - Automated : 110 K/uL  Mean Cell Volume : 93.4 fl  Mean Cell Hemoglobin : 30.1 pg  Mean Cell Hemoglobin Concentration : 32.2 gm/dL    PT/INR - ( 01 May 2017 05:16 )   PT: 16.1 sec;   INR: 1.48 ratio         PTT - ( 01 May 2017 05:16 )  PTT:33.2 sec  Urinalysis Basic - ( 01 May 2017 07:12 )    Color: Yellow / Appearance: Clear / S.010 / pH: x  Gluc: x / Ketone: Negative  / Bili: Negative / Urobili: Negative mg/dL   Blood: x / Protein: Negative mg/dL / Nitrite: Negative   Leuk Esterase: Negative / RBC: x / WBC x   Sq Epi: x / Non Sq Epi: x / Bacteria: x          143  |  104  |  20  ----------------------------<  94  3.2<L>   |  30  |  1.03    Ca    8.6      02 May 2017 06:06  Mg     2.2     -    TPro  6.7  /  Alb  3.7  /  TBili  2.0<H>  /  DBili  x   /  AST  56<H>  /  ALT  50  /  AlkPhos  163<H>  05-      # acute on chronic hypoxic respiratory distress in the setting acute on chronic systolic CHF in a setting of rapid atrial fibrillation  - tele monitor - HR 80s  - mildly elevated trops most likely secondary to demand ischemia  - IV diuresis  - last 2 d echo and nuclear stress test done in April , CTA - r/o PE, PNA, effusions  - c/w BB, statin, ASA.   - cardiology follow up      # Rapid atrial fibrillation (currently good rate control)  - rate uncontrolled, s/p ablation in the past, restart Xarelto   - Consideration of AVJ ablation is being explored with upgrade to CRT-P  - EP following    # Coronary artery disease involving native heart with angina pectoris, unspecified vessel or lesion typeThroat pain.  - c/w ASA, BB, statinCT neck 10/2016 -  1.2 cm LEFT submental lymph node is noted measuring 1.2 x  0.9 cm and is likely reactive. Small BILATERAL pleural effusions.  will repeat CT neck  LAD can be related to poor dentition vs allergic postnasal drip  stop NSAIDs - can cause HF exacerbation, prn tylenol for pain  nasal saline spray, artificial tear  for eye itchiness  # Throat painVaricocele.  Plan: CT neck 10/2016 -  1.2 cm LEFT submental lymph node is noted measuring 1.2 x  0.9 cm and is likely reactive. Small BILATERAL pleural effusions.  - will repeat CT neck upon discharge  - LAD can be related to poor dentition vs allergic postnasal drip  - stop NSAIDs - can cause HF exacerbation, prn tylenol for pain    likely related to CHF  monitor for improvement after IV diuresis  check renal sono to r/o renal pathology   consider urology consult if not improving  # testicular swelling secondary to related to CHF  - monitor for improvement after IV diuresis  - check renal sono to r/o renal pathology   - consider urology consult if not improvingmild , monitor.

## 2017-05-04 PROCEDURE — 93010 ELECTROCARDIOGRAM REPORT: CPT

## 2017-05-04 PROCEDURE — 33229 REMV&REPLC PM GEN MULT LEADS: CPT

## 2017-05-04 PROCEDURE — 33225 L VENTRIC PACING LEAD ADD-ON: CPT

## 2017-05-04 PROCEDURE — 93650 ICAR CATH ABLTJ AV NODE FUNC: CPT

## 2017-05-04 RX ORDER — CEFAZOLIN SODIUM 1 G
1000 VIAL (EA) INJECTION EVERY 8 HOURS
Refills: 0 | Status: COMPLETED | OUTPATIENT
Start: 2017-05-04 | End: 2017-05-05

## 2017-05-04 RX ORDER — POTASSIUM CHLORIDE 20 MEQ
20 PACKET (EA) ORAL ONCE
Refills: 0 | Status: COMPLETED | OUTPATIENT
Start: 2017-05-04 | End: 2017-05-04

## 2017-05-04 RX ORDER — OXYCODONE HYDROCHLORIDE 5 MG/1
5 TABLET ORAL EVERY 6 HOURS
Refills: 0 | Status: DISCONTINUED | OUTPATIENT
Start: 2017-05-04 | End: 2017-05-05

## 2017-05-04 RX ORDER — ACETAMINOPHEN 500 MG
650 TABLET ORAL EVERY 6 HOURS
Refills: 0 | Status: DISCONTINUED | OUTPATIENT
Start: 2017-05-04 | End: 2017-05-05

## 2017-05-04 RX ORDER — CEFAZOLIN SODIUM 1 G
1000 VIAL (EA) INJECTION EVERY 8 HOURS
Refills: 0 | Status: DISCONTINUED | OUTPATIENT
Start: 2017-05-04 | End: 2017-05-04

## 2017-05-04 RX ORDER — CEFAZOLIN SODIUM 1 G
2000 VIAL (EA) INJECTION ONCE
Refills: 0 | Status: COMPLETED | OUTPATIENT
Start: 2017-05-04 | End: 2017-05-04

## 2017-05-04 RX ADMIN — Medication 100 MILLIGRAM(S): at 21:42

## 2017-05-04 RX ADMIN — Medication 81 MILLIGRAM(S): at 11:14

## 2017-05-04 RX ADMIN — OXYCODONE HYDROCHLORIDE 5 MILLIGRAM(S): 5 TABLET ORAL at 21:42

## 2017-05-04 RX ADMIN — CARVEDILOL PHOSPHATE 25 MILLIGRAM(S): 80 CAPSULE, EXTENDED RELEASE ORAL at 05:28

## 2017-05-04 RX ADMIN — Medication 100 MILLIGRAM(S): at 22:59

## 2017-05-04 RX ADMIN — Medication 40 MILLIGRAM(S): at 18:36

## 2017-05-04 RX ADMIN — Medication 100 MILLIGRAM(S): at 14:14

## 2017-05-04 RX ADMIN — Medication 20 MILLIEQUIVALENT(S): at 11:14

## 2017-05-04 RX ADMIN — ATORVASTATIN CALCIUM 20 MILLIGRAM(S): 80 TABLET, FILM COATED ORAL at 21:42

## 2017-05-04 RX ADMIN — Medication 1 SPRAY(S): at 11:15

## 2017-05-04 RX ADMIN — Medication 40 MILLIGRAM(S): at 05:28

## 2017-05-04 RX ADMIN — CARVEDILOL PHOSPHATE 25 MILLIGRAM(S): 80 CAPSULE, EXTENDED RELEASE ORAL at 18:36

## 2017-05-04 NOTE — PROGRESS NOTE ADULT - SUBJECTIVE AND OBJECTIVE BOX
Chief Complaint/Reason for Admission: dyspnea   	  History of Present Illness: 	  Patient is a 70 y/o Bengali speaking male, well known to me from previous hospitalizations, extensive hospital admissions, prolonged stays, PMHx A fib (on Xarelto, but pt not taking it- medication review at bedside), non-obstructive CAD, tachy-yair syndrome (s/p PPM), non-ischemic dilated cardiomyopathy, systolic CHF LVEF 41%,  mitral valve disease, HTN, throat pain on advil, recently discharged from  for similar complains presented to  with 15 days history of  SOB, LOPEZ and increased lower extremity edema, scrotal edema, difficulties walking due to that.  Pt reports compliance with meds, but on review of medication - Xarelto is missing. Patient reports drinking 3 cups of water a day, does not check weight regularly. On prior admission weight was 68 kg now 104 kg.   Patient poor historian, has poor health literacy and understanding of his medical conditions.  Patient reports throat pain, cough , can not describe better, worse with exertion. Patient denies CP, but feels palpitations, abdominal swelling and dyspneic more than before. Patient concerns about testicular swelling which was present on prior admission, but it getting worse.    In ED - Vital Signs T(F): 98.4, Max: 98.5 , HR: 98 (91 - 134), BP: 151/92 (123/98 - 155/107), RR: 16 (16 - 23), SpO2: 97% (96% - 100%) CXR - no pulmonary pathology, US SCROTUM 05/01/2017  Bilateral varicoceles, greater in size on the left, unchanged. No evidence of testicular torsion or epididymal orchitis.  EKG - A fib w , troponin0.4--> 0.4. BNP >6000, Platelets 109, INR 1.48, Cr 1.20, BUN 24, Lactate 2.7  s/p IV cardizem, lasix 40, nitro with improvement of dyspnea    5/4: no overngiht event. hemodynamically stable. Denies any HA, CP, SOB. No fevers, chills or shakes. Labs and vitals reviewed. AVN ablation and CRT-P upgrade today - patient throughly explained procedure by EP team and I.  used at the bedside.     Physical Exam:   GENERAL APPEARANCE:  NAD, hemodynamically stable  T(C): 36.5, Max: 36.8 (05-03 @ 15:51)  HR: 75 (75 - 94)  BP: 118/81 (115/85 - 119/81)  RR: 16 (16 - 17)  HEENT:  Head is normocephalic    Skin:  Warm and dry without any rash   NECK: no lymphadenopathy noted upon palpation, no submandibular pain.   HEART:  Regular rate and rhythm. normal S1 and S2, No M/R/G  LUNGS:  Good ins/exp effort, no W/R/R/C  ABDOMEN:  Soft, nontender, nondistended with good bowel sounds heard  EXTREMITIES:  Without cyanosis, clubbing or edema.   NEUROLOGICAL:  Gross nonfocal     CBC Full  -  ( 03 May 2017 05:26 )  WBC Count : 6.5 K/uL  Hemoglobin : 11.9 g/dL  Hematocrit : 36.9 %  Platelet Count - Automated : 109 K/uL      143  |  103  |  20  ----------------------------<  95  3.4<L>   |  36<H>  |  0.85    Ca    8.0<L>      03 May 2017 05:26    # acute on chronic hypoxic respiratory distress in the setting acute on chronic systolic CHF in a setting of rapid atrial fibrillation  - tele monitor  - VN ablation and CRT-P upgrade today by EP team  - mildly elevated trops most likely secondary to demand ischemia  - IV diuresis  - last 2 d echo and nuclear stress test done in April , CTA - r/o PE, PNA, effusions  - c/w BB, statin, ASA.     # Rapid atrial fibrillation (upon pacemaker intoragation - poorly controlled)  - restart Xarelto post VN ablation and CRT-P upgrade today by EP team    # Coronary artery disease involving native heart with angina pectoris, unspecified vessel or lesion typeThroat pain.  - c/w ASA, BB, statinCT neck 10/2016 -  1.2 cm LEFT submental lymph node is noted measuring 1.2 x  0.9 cm and is likely reactive. Small BILATERAL pleural effusions.  will repeat CT neck  LAD can be related to poor dentition vs allergic postnasal drip  stop NSAIDs - can cause HF exacerbation, prn tylenol for pain  nasal saline spray, artificial tear  for eye itchiness  # Throat painVaricocele.  Plan: CT neck 10/2016 -  1.2 cm LEFT submental lymph node is noted measuring 1.2 x  0.9 cm and is likely reactive. Small BILATERAL pleural effusions.  - will repeat CT neck upon discharge  - LAD can be related to poor dentition vs allergic postnasal drip  - stop NSAIDs - can cause HF exacerbation, prn tylenol for pain    likely related to CHF  monitor for improvement after IV diuresis  check renal sono to r/o renal pathology   consider urology consult if not improving  # testicular swelling secondary to related to CHF  - resolved  - renal pathology - vericocele

## 2017-05-04 NOTE — PACU DISCHARGE NOTE - COMMENTS
pt. transferred to  via stretcher.Report given to Estevan BOSS.Telemetry confirmed with Estevan BOSS.

## 2017-05-05 ENCOUNTER — TRANSCRIPTION ENCOUNTER (OUTPATIENT)
Age: 72
End: 2017-05-05

## 2017-05-05 VITALS
SYSTOLIC BLOOD PRESSURE: 123 MMHG | HEART RATE: 80 BPM | OXYGEN SATURATION: 96 % | TEMPERATURE: 98 F | DIASTOLIC BLOOD PRESSURE: 74 MMHG | RESPIRATION RATE: 18 BRPM

## 2017-05-05 LAB
ANION GAP SERPL CALC-SCNC: 9 MMOL/L — SIGNIFICANT CHANGE UP (ref 5–17)
BASOPHILS # BLD AUTO: 0.1 K/UL — SIGNIFICANT CHANGE UP (ref 0–0.2)
BASOPHILS NFR BLD AUTO: 1 % — SIGNIFICANT CHANGE UP (ref 0–2)
BUN SERPL-MCNC: 20 MG/DL — SIGNIFICANT CHANGE UP (ref 7–23)
CALCIUM SERPL-MCNC: 8.5 MG/DL — SIGNIFICANT CHANGE UP (ref 8.5–10.1)
CHLORIDE SERPL-SCNC: 98 MMOL/L — SIGNIFICANT CHANGE UP (ref 96–108)
CO2 SERPL-SCNC: 34 MMOL/L — HIGH (ref 22–31)
CREAT SERPL-MCNC: 1.11 MG/DL — SIGNIFICANT CHANGE UP (ref 0.5–1.3)
EOSINOPHIL # BLD AUTO: 0.2 K/UL — SIGNIFICANT CHANGE UP (ref 0–0.5)
EOSINOPHIL NFR BLD AUTO: 2 % — SIGNIFICANT CHANGE UP (ref 0–6)
GLUCOSE SERPL-MCNC: 97 MG/DL — SIGNIFICANT CHANGE UP (ref 70–99)
HCT VFR BLD CALC: 40.9 % — SIGNIFICANT CHANGE UP (ref 39–50)
HGB BLD-MCNC: 12.7 G/DL — LOW (ref 13–17)
LYMPHOCYTES # BLD AUTO: 1.6 K/UL — SIGNIFICANT CHANGE UP (ref 1–3.3)
LYMPHOCYTES # BLD AUTO: 20.1 % — SIGNIFICANT CHANGE UP (ref 13–44)
MCHC RBC-ENTMCNC: 28.9 PG — SIGNIFICANT CHANGE UP (ref 27–34)
MCHC RBC-ENTMCNC: 31 GM/DL — LOW (ref 32–36)
MCV RBC AUTO: 93.2 FL — SIGNIFICANT CHANGE UP (ref 80–100)
MONOCYTES # BLD AUTO: 0.9 K/UL — SIGNIFICANT CHANGE UP (ref 0–0.9)
MONOCYTES NFR BLD AUTO: 10.7 % — SIGNIFICANT CHANGE UP (ref 2–14)
NEUTROPHILS # BLD AUTO: 5.3 K/UL — SIGNIFICANT CHANGE UP (ref 1.8–7.4)
NEUTROPHILS NFR BLD AUTO: 66.2 % — SIGNIFICANT CHANGE UP (ref 43–77)
PLATELET # BLD AUTO: 147 K/UL — LOW (ref 150–400)
POTASSIUM SERPL-MCNC: 3.8 MMOL/L — SIGNIFICANT CHANGE UP (ref 3.5–5.3)
POTASSIUM SERPL-SCNC: 3.8 MMOL/L — SIGNIFICANT CHANGE UP (ref 3.5–5.3)
RBC # BLD: 4.38 M/UL — SIGNIFICANT CHANGE UP (ref 4.2–5.8)
RBC # FLD: 14.1 % — SIGNIFICANT CHANGE UP (ref 10.3–14.5)
SODIUM SERPL-SCNC: 141 MMOL/L — SIGNIFICANT CHANGE UP (ref 135–145)
WBC # BLD: 8 K/UL — SIGNIFICANT CHANGE UP (ref 3.8–10.5)
WBC # FLD AUTO: 8 K/UL — SIGNIFICANT CHANGE UP (ref 3.8–10.5)

## 2017-05-05 PROCEDURE — 70490 CT SOFT TISSUE NECK W/O DYE: CPT | Mod: 26

## 2017-05-05 PROCEDURE — 93010 ELECTROCARDIOGRAM REPORT: CPT

## 2017-05-05 PROCEDURE — 99233 SBSQ HOSP IP/OBS HIGH 50: CPT

## 2017-05-05 PROCEDURE — 71010: CPT | Mod: 26

## 2017-05-05 RX ADMIN — OXYCODONE HYDROCHLORIDE 5 MILLIGRAM(S): 5 TABLET ORAL at 03:56

## 2017-05-05 RX ADMIN — Medication 20 MILLIEQUIVALENT(S): at 11:19

## 2017-05-05 RX ADMIN — Medication 81 MILLIGRAM(S): at 11:18

## 2017-05-05 RX ADMIN — Medication 40 MILLIGRAM(S): at 06:15

## 2017-05-05 RX ADMIN — Medication 1 SPRAY(S): at 11:19

## 2017-05-05 RX ADMIN — Medication 100 MILLIGRAM(S): at 06:14

## 2017-05-05 RX ADMIN — CARVEDILOL PHOSPHATE 25 MILLIGRAM(S): 80 CAPSULE, EXTENDED RELEASE ORAL at 06:14

## 2017-05-05 RX ADMIN — OXYCODONE HYDROCHLORIDE 5 MILLIGRAM(S): 5 TABLET ORAL at 15:19

## 2017-05-05 NOTE — DISCHARGE NOTE ADULT - CARE PROVIDERS DIRECT ADDRESSES
,DirectAddress_Unknown,[email or web address]

## 2017-05-05 NOTE — PROGRESS NOTE ADULT - PROBLEM SELECTOR PLAN 1
Persistent AF now s/p AVN ablation and CRT-P upgrade due to frequent AF with uncontrolled ventricular rate associated with decompensated heart failure.
Uncontolled rates likely contributing to HF exacerbation.    NPO after MN tonight for AV reta ablation Thursday and upgrade to CRT-P.  Hold Xarelto.   phone used # [phone].
AF is associated with uncontrolled ventricular rate on PPM interrogation and is likely contributing to HF recurrence; EP plans AVN ablation and upgrade to CRT-P device.

## 2017-05-05 NOTE — PROGRESS NOTE ADULT - ASSESSMENT
72 yo [place] speaking male with a.fib difficult to rate control with multiple admission in the last few months due to rapid a.fib, previously intolerant of antiarrhythmics, non-ischemic cardiomyopathy EF 41%, presented with dyspnea and palpitations with rapid afib.   today POD #1 s/p AVJ ablation with ppm upgrade to CRT-P. doing well, stable for discharge from EP stand point. All instructions explained using [employer], pt to restart xarelto tomorrow evening, EP follow up in 2 weeks on 5/17/17 @ 3pm, pt verbalized understanding.
71 yr old Cym[place] speaking male admitted with dyspnea, scrotal and LE edema, found to be in rapid atrial fib and acute on chronic systolic HF exacerbation.  Many episodes of AT fib with RVR since last admission 2 weeks ago on PPM interrogation.

## 2017-05-05 NOTE — PROGRESS NOTE ADULT - SUBJECTIVE AND OBJECTIVE BOX
HPI:  71 yr old Frisian speaking male with PMH of  A fib on Xarelto ( but pt not taking it- medication review at bedside),  non-obstructive CAD, tachy-yair syndrome s/p PPM , non-ischemic dilated cardiomyopathy, systolic CHF LVEF 41%,  mitral valve disease, HTN, throat pain on advil, recently discharged from  for similar complains presented to  with 15 days history of  SOB, LOPEZ and increased lower extremity edema, scrotal edema, difficulties walking due to that.  Pt reports compliance with meds, but on review of medication - Xarelto is missing. Patient reports drinking 3 cups of water a day, does not check weight regularly. On prior admission weight was 68 kg now 104 kg.   Patient poor historian, has poor health literacy and understanding of his medical conditions.  Patient reports throat pain, cough , can not describe better, worse with exertion. Patient denies CP, but feels palpitations, abdominal swelling and dyspneic more than before. Patient concerns about testicular swelling which was present on prior admission, but it getting worse.    In ED - Vital Signs T(F): 98.4, Max: 98.5 , HR: 98 (91 - 134), BP: 151/92 (123/98 - 155/107), RR: 16 (16 - 23), SpO2: 97% (96% - 100%) CXR - no pulmonary pathology, US SCROTUM 05/01/2017  Bilateral varicoceles, greater in size on the left, unchanged. No evidence of testicular torsion or epididymal orchitis.  EKG - A fib w , troponin0.4--> 0.4. BNP >6000, Platelets 109, INR 1.48, Cr 1.20, BUN 24, Lactate 2.7  s/p IV cardizem, lasix 40, nitro with improvement of dyspnea (01 May 2017 13:21)    TELE: v-paced at 80bpm    MEDICATIONS  (STANDING):  carvedilol 25milliGRAM(s) Oral two times a day  atorvastatin 20milliGRAM(s) Oral at bedtime  aspirin enteric coated 81milliGRAM(s) Oral daily  furosemide   Injectable 40milliGRAM(s) IV Push every 12 hours  fluticasone propionate 50 MICROgram(s)/spray Nasal Spray 1Spray(s) Both Nostrils daily  potassium chloride   Powder 20milliEquivalent(s) Oral daily    MEDICATIONS  (PRN):  sodium chloride 0.65% Nasal 1Spray(s) Both Nostrils every 2 hours PRN Nasal Congestion  artificial  tears Solution 1Drop(s) Both EYES every 2 hours PRN Dry Eyes  guaiFENesin    Syrup 100milliGRAM(s) Oral every 6 hours PRN Cough  acetaminophen    Suspension. 650milliGRAM(s) Oral every 6 hours PRN Mild Pain (1 - 3)  oxyCODONE IR 5milliGRAM(s) Oral every 6 hours PRN Moderate Pain (4 - 6)      Allergies    ACE inhibitors (Other)  amiodarone (Other)      Vital Signs Last 24 Hrs  T(C): 36.6, Max: 36.8 (05-04 @ 18:31)  T(F): 97.9, Max: 98.2 (05-04 @ 18:31)  HR: 79 (75 - 90)  BP: 113/71 (113/71 - 136/91)  BP(mean): --  RR: 18 (16 - 20)  SpO2: 96% (91% - 98%)    PHYSICAL EXAMINATION:    GENERAL APPEARANCE:  Pt. is not currently dyspneic, in no distress. Pt. is alert, oriented, and pleasant.  HEENT:  Pupils are normal and react normally. No icterus. Mucous membranes well colored.  NECK:  Supple. No lymphadenopathy. Jugular venous pressure not elevated. Carotids equal.   HEART:   The cardiac impulse has a normal quality. There are no murmurs, rubs or gallops noted  CHEST:  Chest is clear to auscultation. Normal respiratory effort.  ABDOMEN:  Soft and nontender.   EXTREMITIES:  There is no edema.   SKIN:  Right chest wall surgical incision dressing removed, steri strips intact, clean and dry, scant amount of dried blood noted, no evidence of active bleeding or infection.     LABS:                        12.7   8.0   )-----------( 147      ( 05 May 2017 06:36 )             40.9     05-05    141  |  98  |  20  ----------------------------<  97  3.8   |  34<H>  |  1.11    Ca    8.5      05 May 2017 06:36      CXR: post device implant pending.

## 2017-05-05 NOTE — DISCHARGE NOTE ADULT - CARE PROVIDER_API CALL
Dayton Tomas), Cardiac Electrophysiology; Cardiovascular Disease  [address] [place] [address]  Phone: [phone]  Fax: [phone]    Jai Foster), Cardiovascular Disease; Internal Medicine  [address] [place] [address]  Phone: [phone]  Fax: [phone]

## 2017-05-05 NOTE — DISCHARGE NOTE ADULT - MEDICATION SUMMARY - MEDICATIONS TO TAKE
I will START or STAY ON the medications listed below when I get home from the hospital:    Xarelto 15 mg oral tablet  -- 1 tab(s) by mouth once a day (in the evening)  -- Indication: For patient can resume home dose of xarelto tomorrow    carvedilol 25 mg oral tablet  -- 1 tab(s) by mouth every 12 hours  -- Indication: For Heart failure, systolic, acute on chronic    furosemide 40 mg oral tablet  -- 1 tab(s) by mouth 2 times a day  -- Indication: For Heart failure, systolic, acute on chronic

## 2017-05-05 NOTE — DISCHARGE NOTE ADULT - MEDICATION SUMMARY - MEDICATIONS TO CHANGE
I will SWITCH the dose or number of times a day I take the medications listed below when I get home from the hospital:    aspirin 81 mg oral delayed release tablet  -- 1 tab(s) by mouth once a day    atorvastatin 20 mg oral tablet  -- 1 tab(s) by mouth once a day (at bedtime)    carvedilol 25 mg oral tablet  -- 1 tab(s) by mouth every 12 hours    furosemide 40 mg oral tablet  -- 1 tab(s) by mouth 2 times a day    Advil 200 mg oral tablet  -- 1 tab(s) by mouth 2 times a day  -- Do not take this drug if you are pregnant.  It is very important that you take or use this exactly as directed.  Do not skip doses or discontinue unless directed by your doctor.  May cause drowsiness or dizziness.  Obtain medical advice before taking any non-prescription drugs as some may affect the action of this medication.  Take with food or milk.

## 2017-05-05 NOTE — DISCHARGE NOTE ADULT - PATIENT PORTAL LINK FT
“You can access the Follow[employer] Patient Portal, offered by [facility], by registering with the following website: [email or web address]”

## 2017-05-05 NOTE — PROGRESS NOTE ADULT - SUBJECTIVE AND OBJECTIVE BOX
REASON FOR VISIT: Atrial fibrillation    HPI:  71 yr old man with a history of AF s/p ablation with recurrence, non-obstructive CAD, tachy-yair syndrome s/p PPM, non-ischemic dilated cardiomyopathy (chronic systolic HF with LVEF 41%),   mitral valve disease, HTN, who was admitted 5/1/17 with complaints of dyspnea, edema, throat pain, reflux, difficulty ambulating, weight gain - diagnosed with an exacerbation of HF with PPM interrogation revealing numerous episodes of AF with RVR.    5/3/17:  No new complaints; feels modestly better with medical therapy AVN ablation and CRT-P upgrade tomorrow.  * Note:  used during this encounter.  5/5/17:  S/p CRT-P upgrade and AVN ablation; feels well this morning; no new complaints.    MEDICATIONS  (STANDING):  carvedilol 25milliGRAM(s) Oral two times a day  atorvastatin 20milliGRAM(s) Oral at bedtime  aspirin enteric coated 81milliGRAM(s) Oral daily  furosemide   Injectable 40milliGRAM(s) IV Push every 12 hours  fluticasone propionate 50 MICROgram(s)/spray Nasal Spray 1Spray(s) Both Nostrils daily  potassium chloride   Powder 20milliEquivalent(s) Oral daily    Vital Signs Last 24 Hrs  T(C): 36.6, Max: 36.8 (05-04 @ 18:31)  T(F): 97.9, Max: 98.2 (05-04 @ 18:31)  HR: 79 (75 - 90)  BP: 113/71 (113/71 - 136/91)  RR: 18 (16 - 20)  SpO2: 96% (91% - 98%)    PHYSICAL EXAM:  Constitutional: Ambulating in room; well-appearing  Respiratory: Breath sounds are clear bilaterally, no crackles  Cardiovascular: Regular; bandage over PPM generator site with minimal dry blood, systolic murmur  Gastrointestinal: Bowel Sounds present, soft, nontender.   Musculoskeletal: no calf tenderness.  Skin: No rash.    LABS:                    12.7   8.0   )-----------( 147      ( 05 May 2017 06:36 )             40.9     141  |  98  |  20  ----------------------------<  97  3.8   |  34<H>  |  1.11    Ca    8.5      05 May 2017 06:36    TELE: AF, biV pacing    Echo:  Moderate to severe (3+) mitral regurgitation is present.  Mild (1+) aortic regurgitation is present.  Moderate (2+) tricuspid valve regurgitation is present.  The left ventricle cavity is mildly dilated.  Mild concentric left ventricular hypertrophy is present.   Estimated left ventricular ejection fraction is 25-30%.  Severe, diffuse hypokinesis of the left ventricle is present.  The right atrium appears mildly dilated.  A device wire is seen in the RV and RA.  Left to right shunt across the atrial septum is noted on color doppler

## 2017-05-05 NOTE — DISCHARGE NOTE ADULT - PLAN OF CARE
- maintain good medication compliance  - POD #1 s/p AVJ ablation with ppm upgrade to CRT-P  - care discussed with Dr. Babin - needs a close follow up with cardiology within 1 week   - contineu with coreg / lasix  - strict daily weights  - low salt diet  - close follow up with [facility] - monday 11 am.   - Close follow up with Dr. Tomas 5/17 3 pm - appt made - shortness of breath free - patient can restart home dose of xarelto tomorrow - since patient had a 5/4 procedure  - EP NP thorughly discussed with patient goals of care and holding anticoagulation for 1 day - - infection free - patient does not appears to have any signs of infection, cough, post nasal drip, maxillary tenderness  - will need close follow up with [facility] - appt made for patient monday 11 am. Will be seen by Dr. Shen. Care discussed with the nurse in [facility]

## 2017-05-05 NOTE — DISCHARGE NOTE ADULT - CARE PLAN
Principal Discharge DX:	Acute on chronic systolic congestive heart failure  Goal:	- shortness of breath free  Instructions for follow-up, activity and diet:	- maintain good medication compliance  - POD #1 s/p AVJ ablation with ppm upgrade to CRT-P  - care discussed with Dr. Babin - needs a close follow up with cardiology within 1 week   - contineu with coreg / lasix  - strict daily weights  - low salt diet  - close follow up with [facility] - monday 11 am.   - Close follow up with Dr. Tomas 5/17 3 pm - appt made  Secondary Diagnosis:	Atrial fibrillation, unspecified type  Goal:	-  Instructions for follow-up, activity and diet:	- patient can restart home dose of xarelto tomorrow - since patient had a 5/4 procedure  - EP NP vitoly discussed with patient goals of care and holding anticoagulation for 1 day  Secondary Diagnosis:	Cervical lymphadenopathy  Goal:	- infection free  Instructions for follow-up, activity and diet:	- patient does not appears to have any signs of infection, cough, post nasal drip, maxillary tenderness  - will need close follow up with [facility] - appt made for patient monday 11 am. Will be seen by Dr. Shne. Care discussed with the nurse in [facility] Principal Discharge DX:	Acute on chronic systolic congestive heart failure  Goal:	- shortness of breath free  Instructions for follow-up, activity and diet:	- maintain good medication compliance  - POD #1 s/p AVJ ablation with ppm upgrade to CRT-P  - care discussed with Dr. Babin - needs a close follow up with cardiology within 1 week   - contineu with coreg / lasix  - strict daily weights  - low salt diet  - close follow up with [facility] - monday 11 am.   - Close follow up with Dr. Tomas 5/17 3 pm - appt made  Secondary Diagnosis:	Atrial fibrillation, unspecified type  Goal:	-  Instructions for follow-up, activity and diet:	- patient can restart home dose of xarelto tomorrow - since patient had a 5/4 procedure  - EP NP vitoly discussed with patient goals of care and holding anticoagulation for 1 day  Secondary Diagnosis:	Cervical lymphadenopathy  Goal:	- infection free  Instructions for follow-up, activity and diet:	- patient does not appears to have any signs of infection, cough, post nasal drip, maxillary tenderness  - will need close follow up with [facility] - appt made for patient monday 11 am. Will be seen by Dr. Shen. Care discussed with the nurse in [facility]

## 2017-05-05 NOTE — PROGRESS NOTE ADULT - PROBLEM SELECTOR PLAN 2
Acute on chronic systolic HF is improving -- less edema; continue coreg, transition lasix from IV to enteral route; no ACE-I/ARB due to previous reported allergy
Acute on chronic systolic HF; continue coreg, IV lasix

## 2017-05-05 NOTE — DISCHARGE NOTE ADULT - HOSPITAL COURSE
ele is a 70 y/o Canadian speaking male, well known to me from previous hospitalizations, extensive hospital admissions, prolonged stays, PMHx A fib (on Xarelto, but pt not taking it- medication review at bedside), non-obstructive CAD, tachy-yair syndrome (s/p PPM), non-ischemic dilated cardiomyopathy, systolic CHF LVEF 41%,  mitral valve disease, HTN, throat pain on advil, recently discharged from  for similar complains presented to  with 15 days history of  SOB, LOPEZ and increased lower extremity edema, scrotal edema, difficulties walking due to that.  Pt reports compliance with meds, but on review of medication - Xarelto is missing. Patient reports drinking 3 cups of water a day, does not check weight regularly. On prior admission weight was 68 kg now 104 kg.   Patient poor historian, has poor health literacy and understanding of his medical conditions.  Patient reports throat pain, cough , can not describe better, worse with exertion. Patient denies CP, but feels palpitations, abdominal swelling and dyspneic more than before. Patient concerns about testicular swelling which was present on prior admission, but it getting worse.    In ED - Vital Signs T(F): 98.4, Max: 98.5 , HR: 98 (91 - 134), BP: 151/92 (123/98 - 155/107), RR: 16 (16 - 23), SpO2: 97% (96% - 100%) CXR - no pulmonary pathology, US SCROTUM 05/01/2017  Bilateral varicoceles, greater in size on the left, unchanged. No evidence of testicular torsion or epididymal orchitis.  EKG - A fib w , troponin0.4--> 0.4. BNP >6000, Platelets 109, INR 1.48, Cr 1.20, BUN 24, Lactate 2.7  s/p IV cardizem, lasix 40, nitro with improvement of dyspnea    5/5:  POD #1 AVN ablation and CRT-P upgrade. Patient in good health. Denies any HA, CP, SOB. Still notes of submandibular pain - much better. ambulating. tolerating po diet. patient is strongly recommended to follow up with [facility] within 1 week. Patient does have Numerous bilateral scattered lymph nodes without adenopathy without any signs of infection.     Physical Exam:   GENERAL APPEARANCE:  NAD, hemodynamically stable  T(C): 36.6, Max: 36.8 (05-04 @ 18:31)  HR: 79 (75 - 90)  BP: 113/71 (113/71 - 136/91)  RR: 18 (16 - 20)  SpO2: 96% (91% - 98%)  HEENT:  no maxilary sinus tenderness, no signs of swelling aroudn the eyes, no post nasal drip  Skin:  Warm and dry without any rash   NECK:  neck lymphadenopathy / slight pain with palpation.   HEART:  Regular rate and rhythm. normal S1 and S2, No M/R/G  LUNGS:  Good ins/exp effort, no W/R/R/C  ABDOMEN:  Soft, nontender, nondistended with good bowel sounds heard  EXTREMITIES:  Without cyanosis, clubbing or edema.   NEUROLOGICAL:  Gross nonfocal       CBC Full  -  ( 05 May 2017 06:36 )  WBC Count : 8.0 K/uL  Hemoglobin : 12.7 g/dL  Hematocrit : 40.9 %  Platelet Count - Automated : 147 K/uL  Mean Cell Volume : 93.2 fl  Mean Cell Hemoglobin : 28.9 pg  Mean Cell Hemoglobin Concentration : 31.0 gm/dL  Auto Neutrophil # : 5.3 K/uL  Auto Lymphocyte # : 1.6 K/uL  Auto Monocyte # : 0.9 K/uL  Auto Eosinophil # : 0.2 K/uL  Auto Basophil # : 0.1 K/uL  Auto Neutrophil % : 66.2 %  Auto Lymphocyte % : 20.1 %  Auto Monocyte % : 10.7 %  Auto Eosinophil % : 2.0 %  Auto Basophil % : 1.0 %        05-05    141  |  98  |  20  ----------------------------<  97  3.8   |  34<H>  |  1.11    Ca    8.5      05 May 2017 06:36

## 2017-05-08 ENCOUNTER — INPATIENT (INPATIENT)
Facility: HOSPITAL | Age: 72
LOS: 0 days | Discharge: ANOTHER HOSPITAL | End: 2017-05-09
Attending: FAMILY MEDICINE | Admitting: FAMILY MEDICINE
Payer: MEDICARE

## 2017-05-08 VITALS
HEIGHT: 67 IN | DIASTOLIC BLOOD PRESSURE: 106 MMHG | TEMPERATURE: 100 F | WEIGHT: 169.98 LBS | RESPIRATION RATE: 18 BRPM | HEART RATE: 80 BPM | OXYGEN SATURATION: 98 % | SYSTOLIC BLOOD PRESSURE: 146 MMHG

## 2017-05-08 DIAGNOSIS — I50.22 CHRONIC SYSTOLIC (CONGESTIVE) HEART FAILURE: ICD-10-CM

## 2017-05-08 DIAGNOSIS — Z95.0 PRESENCE OF CARDIAC PACEMAKER: Chronic | ICD-10-CM

## 2017-05-08 DIAGNOSIS — R74.8 ABNORMAL LEVELS OF OTHER SERUM ENZYMES: ICD-10-CM

## 2017-05-08 DIAGNOSIS — I97.618 POSTPROCEDURAL HEMORRHAGE OF A CIRCULATORY SYSTEM ORGAN OR STRUCTURE FOLLOWING OTHER CIRCULATORY SYSTEM PROCEDURE: ICD-10-CM

## 2017-05-08 DIAGNOSIS — I48.1 PERSISTENT ATRIAL FIBRILLATION: ICD-10-CM

## 2017-05-08 DIAGNOSIS — J20.9 ACUTE BRONCHITIS, UNSPECIFIED: ICD-10-CM

## 2017-05-08 LAB
ALBUMIN SERPL ELPH-MCNC: 3.8 G/DL — SIGNIFICANT CHANGE UP (ref 3.3–5)
ALP SERPL-CCNC: 195 U/L — HIGH (ref 40–120)
ALT FLD-CCNC: 29 U/L — SIGNIFICANT CHANGE UP (ref 12–78)
ANION GAP SERPL CALC-SCNC: 7 MMOL/L — SIGNIFICANT CHANGE UP (ref 5–17)
APTT BLD: 40 SEC — HIGH (ref 27.5–37.4)
AST SERPL-CCNC: 29 U/L — SIGNIFICANT CHANGE UP (ref 15–37)
BASOPHILS # BLD AUTO: 0.1 K/UL — SIGNIFICANT CHANGE UP (ref 0–0.2)
BASOPHILS NFR BLD AUTO: 1.1 % — SIGNIFICANT CHANGE UP (ref 0–2)
BILIRUB SERPL-MCNC: 1.2 MG/DL — SIGNIFICANT CHANGE UP (ref 0.2–1.2)
BLD GP AB SCN SERPL QL: SIGNIFICANT CHANGE UP
BUN SERPL-MCNC: 16 MG/DL — SIGNIFICANT CHANGE UP (ref 7–23)
CALCIUM SERPL-MCNC: 8.5 MG/DL — SIGNIFICANT CHANGE UP (ref 8.5–10.1)
CHLORIDE SERPL-SCNC: 106 MMOL/L — SIGNIFICANT CHANGE UP (ref 96–108)
CO2 SERPL-SCNC: 29 MMOL/L — SIGNIFICANT CHANGE UP (ref 22–31)
CREAT SERPL-MCNC: 0.95 MG/DL — SIGNIFICANT CHANGE UP (ref 0.5–1.3)
EOSINOPHIL # BLD AUTO: 0.2 K/UL — SIGNIFICANT CHANGE UP (ref 0–0.5)
EOSINOPHIL NFR BLD AUTO: 1.8 % — SIGNIFICANT CHANGE UP (ref 0–6)
GLUCOSE SERPL-MCNC: 104 MG/DL — HIGH (ref 70–99)
HCT VFR BLD CALC: 38.6 % — LOW (ref 39–50)
HGB BLD-MCNC: 12.2 G/DL — LOW (ref 13–17)
INR BLD: 1.5 RATIO — HIGH (ref 0.88–1.16)
LYMPHOCYTES # BLD AUTO: 1.3 K/UL — SIGNIFICANT CHANGE UP (ref 1–3.3)
LYMPHOCYTES # BLD AUTO: 13.3 % — SIGNIFICANT CHANGE UP (ref 13–44)
MCHC RBC-ENTMCNC: 29.2 PG — SIGNIFICANT CHANGE UP (ref 27–34)
MCHC RBC-ENTMCNC: 31.5 GM/DL — LOW (ref 32–36)
MCV RBC AUTO: 92.6 FL — SIGNIFICANT CHANGE UP (ref 80–100)
MONOCYTES # BLD AUTO: 1 K/UL — HIGH (ref 0–0.9)
MONOCYTES NFR BLD AUTO: 10.5 % — SIGNIFICANT CHANGE UP (ref 2–14)
NEUTROPHILS # BLD AUTO: 7 K/UL — SIGNIFICANT CHANGE UP (ref 1.8–7.4)
NEUTROPHILS NFR BLD AUTO: 73.3 % — SIGNIFICANT CHANGE UP (ref 43–77)
PLATELET # BLD AUTO: 157 K/UL — SIGNIFICANT CHANGE UP (ref 150–400)
POTASSIUM SERPL-MCNC: 4.3 MMOL/L — SIGNIFICANT CHANGE UP (ref 3.5–5.3)
POTASSIUM SERPL-SCNC: 4.3 MMOL/L — SIGNIFICANT CHANGE UP (ref 3.5–5.3)
PROT SERPL-MCNC: 7.4 GM/DL — SIGNIFICANT CHANGE UP (ref 6–8.3)
PROTHROM AB SERPL-ACNC: 16.3 SEC — HIGH (ref 9.8–12.7)
RBC # BLD: 4.17 M/UL — LOW (ref 4.2–5.8)
RBC # FLD: 14.7 % — HIGH (ref 10.3–14.5)
SODIUM SERPL-SCNC: 142 MMOL/L — SIGNIFICANT CHANGE UP (ref 135–145)
TYPE + AB SCN PNL BLD: SIGNIFICANT CHANGE UP
WBC # BLD: 9.6 K/UL — SIGNIFICANT CHANGE UP (ref 3.8–10.5)
WBC # FLD AUTO: 9.6 K/UL — SIGNIFICANT CHANGE UP (ref 3.8–10.5)

## 2017-05-08 PROCEDURE — 93010 ELECTROCARDIOGRAM REPORT: CPT

## 2017-05-08 PROCEDURE — 71010: CPT | Mod: 26

## 2017-05-08 RX ORDER — VANCOMYCIN HCL 1 G
1000 VIAL (EA) INTRAVENOUS ONCE
Refills: 0 | Status: COMPLETED | OUTPATIENT
Start: 2017-05-08 | End: 2017-05-08

## 2017-05-08 RX ADMIN — Medication 250 MILLIGRAM(S): at 22:20

## 2017-05-08 NOTE — ED PROVIDER NOTE - SKIN, MLM
Large blood clot coming from PM incision site in right chest wall. No tenderness or active bleeding. No surrounding swelling or erythema.

## 2017-05-08 NOTE — ED ADULT NURSE NOTE - OBJECTIVE STATEMENT
Pt states he was recently discharged from  for placement of right sided pacemaker.  Pt states that he noticed last night the pacemaker was bleeding.  Pt states that now the pacemaker is "falling out".  Large red bloody ball noted to be coming out of skin.  Unable to tell if it is his pacemaker or large hematoma.  Pt A/Ox4 at this time, VSS.  Fever as noted, Dr Matt aware.  Safety maintained

## 2017-05-08 NOTE — ED PROVIDER NOTE - OBJECTIVE STATEMENT
70 y/o male presenting to ED for blood clot bulging from PM incision site of right chest wall. Pt states the PM site looked normal yesterday, then today, he noticed increased bleeding from the site. PM was placed x5 days ago. No CP, SOB. 72 y/o male presenting to ED for blood clot bulging from PM incision site of right chest wall. Pt states the PM site looked normal yesterday, then today, he noticed increased bleeding from the site. PM was placed x5 days ago by Dr. Tomas (EP) for AFib, heart failure, and tachy/yair syndrome. No CP, SOB.

## 2017-05-08 NOTE — ED PROVIDER NOTE - PROGRESS NOTE DETAILS
AJM: ECG shows pacer is working. spoke with dr tinajero who is aware of pt. agrees with abx and admit to tele. will repair in AM. pt stable at this time. AJM: pacemaker working. placed on pacer pads for backup. pt stable and signed out to dr vasquez

## 2017-05-09 ENCOUNTER — TRANSCRIPTION ENCOUNTER (OUTPATIENT)
Age: 72
End: 2017-05-09

## 2017-05-09 ENCOUNTER — INPATIENT (INPATIENT)
Facility: HOSPITAL | Age: 72
LOS: 13 days | Discharge: HOME | DRG: 261 | End: 2017-05-23
Attending: THORACIC SURGERY (CARDIOTHORACIC VASCULAR SURGERY) | Admitting: THORACIC SURGERY (CARDIOTHORACIC VASCULAR SURGERY)
Payer: MEDICARE

## 2017-05-09 VITALS
OXYGEN SATURATION: 96 % | SYSTOLIC BLOOD PRESSURE: 102 MMHG | TEMPERATURE: 99 F | WEIGHT: 167.99 LBS | HEART RATE: 80 BPM | DIASTOLIC BLOOD PRESSURE: 68 MMHG | RESPIRATION RATE: 14 BRPM | HEIGHT: 63.78 IN

## 2017-05-09 VITALS
RESPIRATION RATE: 18 BRPM | HEART RATE: 80 BPM | TEMPERATURE: 98 F | OXYGEN SATURATION: 100 % | SYSTOLIC BLOOD PRESSURE: 128 MMHG | DIASTOLIC BLOOD PRESSURE: 85 MMHG

## 2017-05-09 DIAGNOSIS — R74.8 ABNORMAL LEVELS OF OTHER SERUM ENZYMES: ICD-10-CM

## 2017-05-09 DIAGNOSIS — T82.7XXA INFECTION AND INFLAMMATORY REACTION DUE TO OTHER CARDIAC AND VASCULAR DEVICES, IMPLANTS AND GRAFTS, INITIAL ENCOUNTER: ICD-10-CM

## 2017-05-09 DIAGNOSIS — I42.0 DILATED CARDIOMYOPATHY: ICD-10-CM

## 2017-05-09 DIAGNOSIS — I51.9 HEART DISEASE, UNSPECIFIED: ICD-10-CM

## 2017-05-09 DIAGNOSIS — J45.41 MODERATE PERSISTENT ASTHMA WITH (ACUTE) EXACERBATION: ICD-10-CM

## 2017-05-09 DIAGNOSIS — I50.22 CHRONIC SYSTOLIC (CONGESTIVE) HEART FAILURE: ICD-10-CM

## 2017-05-09 DIAGNOSIS — I48.91 UNSPECIFIED ATRIAL FIBRILLATION: ICD-10-CM

## 2017-05-09 DIAGNOSIS — Z95.0 PRESENCE OF CARDIAC PACEMAKER: Chronic | ICD-10-CM

## 2017-05-09 DIAGNOSIS — I48.2 CHRONIC ATRIAL FIBRILLATION: ICD-10-CM

## 2017-05-09 DIAGNOSIS — I48.1 PERSISTENT ATRIAL FIBRILLATION: ICD-10-CM

## 2017-05-09 DIAGNOSIS — T82.9XXA UNSPECIFIED COMPLICATION OF CARDIAC AND VASCULAR PROSTHETIC DEVICE, IMPLANT AND GRAFT, INITIAL ENCOUNTER: ICD-10-CM

## 2017-05-09 DIAGNOSIS — Z71.89 OTHER SPECIFIED COUNSELING: ICD-10-CM

## 2017-05-09 LAB
ALBUMIN SERPL ELPH-MCNC: 3.4 G/DL — SIGNIFICANT CHANGE UP (ref 3.3–5)
ALBUMIN SERPL ELPH-MCNC: 3.8 G/DL — SIGNIFICANT CHANGE UP (ref 3.3–5.2)
ALBUMIN SERPL ELPH-MCNC: 3.8 G/DL — SIGNIFICANT CHANGE UP (ref 3.3–5.2)
ALP SERPL-CCNC: 131 U/L — HIGH (ref 40–120)
ALP SERPL-CCNC: 141 U/L — HIGH (ref 40–120)
ALP SERPL-CCNC: 143 U/L — HIGH (ref 40–120)
ALT FLD-CCNC: 16 U/L — SIGNIFICANT CHANGE UP
ALT FLD-CCNC: 16 U/L — SIGNIFICANT CHANGE UP
ALT FLD-CCNC: 26 U/L — SIGNIFICANT CHANGE UP (ref 12–78)
ANION GAP SERPL CALC-SCNC: 11 MMOL/L — SIGNIFICANT CHANGE UP (ref 5–17)
ANION GAP SERPL CALC-SCNC: 5 MMOL/L — SIGNIFICANT CHANGE UP (ref 5–17)
APTT BLD: 36.3 SEC — SIGNIFICANT CHANGE UP (ref 27.5–37.4)
APTT BLD: 37.6 SEC — HIGH (ref 27.5–37.4)
AST SERPL-CCNC: 21 U/L — SIGNIFICANT CHANGE UP
AST SERPL-CCNC: 22 U/L — SIGNIFICANT CHANGE UP
AST SERPL-CCNC: 23 U/L — SIGNIFICANT CHANGE UP (ref 15–37)
BASOPHILS # BLD AUTO: 0 K/UL — SIGNIFICANT CHANGE UP (ref 0–0.2)
BASOPHILS # BLD AUTO: 0.1 K/UL — SIGNIFICANT CHANGE UP (ref 0–0.2)
BASOPHILS NFR BLD AUTO: 0.6 % — SIGNIFICANT CHANGE UP (ref 0–2)
BASOPHILS NFR BLD AUTO: 1 % — SIGNIFICANT CHANGE UP (ref 0–2)
BILIRUB DIRECT SERPL-MCNC: 0.6 MG/DL — HIGH (ref 0–0.3)
BILIRUB INDIRECT FLD-MCNC: 1.2 MG/DL — HIGH (ref 0.2–1)
BILIRUB SERPL-MCNC: 1.5 MG/DL — HIGH (ref 0.2–1.2)
BILIRUB SERPL-MCNC: 1.8 MG/DL — SIGNIFICANT CHANGE UP (ref 0.4–2)
BILIRUB SERPL-MCNC: 1.8 MG/DL — SIGNIFICANT CHANGE UP (ref 0.4–2)
BLD GP AB SCN SERPL QL: SIGNIFICANT CHANGE UP
BUN SERPL-MCNC: 13 MG/DL — SIGNIFICANT CHANGE UP (ref 7–23)
BUN SERPL-MCNC: 17 MG/DL — SIGNIFICANT CHANGE UP (ref 8–20)
CALCIUM SERPL-MCNC: 8.5 MG/DL — SIGNIFICANT CHANGE UP (ref 8.5–10.1)
CALCIUM SERPL-MCNC: 9 MG/DL — SIGNIFICANT CHANGE UP (ref 8.6–10.2)
CHLORIDE SERPL-SCNC: 100 MMOL/L — SIGNIFICANT CHANGE UP (ref 98–107)
CHLORIDE SERPL-SCNC: 108 MMOL/L — SIGNIFICANT CHANGE UP (ref 96–108)
CHOLEST SERPL-MCNC: 75 MG/DL — SIGNIFICANT CHANGE UP (ref 10–199)
CK SERPL-CCNC: 71 U/L — SIGNIFICANT CHANGE UP (ref 26–308)
CO2 SERPL-SCNC: 28 MMOL/L — SIGNIFICANT CHANGE UP (ref 22–31)
CO2 SERPL-SCNC: 29 MMOL/L — SIGNIFICANT CHANGE UP (ref 22–29)
CREAT SERPL-MCNC: 0.7 MG/DL — SIGNIFICANT CHANGE UP (ref 0.5–1.3)
CREAT SERPL-MCNC: 0.78 MG/DL — SIGNIFICANT CHANGE UP (ref 0.5–1.3)
EOSINOPHIL # BLD AUTO: 0.1 K/UL — SIGNIFICANT CHANGE UP (ref 0–0.5)
EOSINOPHIL # BLD AUTO: 0.1 K/UL — SIGNIFICANT CHANGE UP (ref 0–0.5)
EOSINOPHIL NFR BLD AUTO: 1.5 % — SIGNIFICANT CHANGE UP (ref 0–6)
EOSINOPHIL NFR BLD AUTO: 1.7 % — SIGNIFICANT CHANGE UP (ref 0–5)
GLUCOSE SERPL-MCNC: 129 MG/DL — HIGH (ref 70–115)
GLUCOSE SERPL-MCNC: 90 MG/DL — SIGNIFICANT CHANGE UP (ref 70–99)
HCT VFR BLD CALC: 34.5 % — LOW (ref 39–50)
HCT VFR BLD CALC: 35.7 % — LOW (ref 42–52)
HCT VFR BLD CALC: 36.7 % — LOW (ref 39–50)
HDLC SERPL-MCNC: 22 MG/DL — LOW (ref 40–125)
HGB BLD-MCNC: 11.2 G/DL — LOW (ref 13–17)
HGB BLD-MCNC: 11.2 G/DL — LOW (ref 14–18)
HGB BLD-MCNC: 12.1 G/DL — LOW (ref 13–17)
INR BLD: 1.32 RATIO — HIGH (ref 0.88–1.16)
INR BLD: 1.35 RATIO — HIGH (ref 0.88–1.16)
LACTATE SERPL-SCNC: 1.2 MMOL/L — SIGNIFICANT CHANGE UP (ref 0.7–2)
LIPID PNL WITH DIRECT LDL SERPL: 43 MG/DL — SIGNIFICANT CHANGE UP
LYMPHOCYTES # BLD AUTO: 1.3 K/UL — SIGNIFICANT CHANGE UP (ref 1–4.8)
LYMPHOCYTES # BLD AUTO: 1.6 K/UL — SIGNIFICANT CHANGE UP (ref 1–3.3)
LYMPHOCYTES # BLD AUTO: 19.4 % — LOW (ref 20–55)
LYMPHOCYTES # BLD AUTO: 19.8 % — SIGNIFICANT CHANGE UP (ref 13–44)
MAGNESIUM SERPL-MCNC: 2.2 MG/DL — SIGNIFICANT CHANGE UP (ref 1.8–2.4)
MCHC RBC-ENTMCNC: 29.8 PG — SIGNIFICANT CHANGE UP (ref 27–31)
MCHC RBC-ENTMCNC: 30.2 PG — SIGNIFICANT CHANGE UP (ref 27–34)
MCHC RBC-ENTMCNC: 31.4 G/DL — LOW (ref 32–36)
MCHC RBC-ENTMCNC: 32.4 GM/DL — SIGNIFICANT CHANGE UP (ref 32–36)
MCV RBC AUTO: 93.1 FL — SIGNIFICANT CHANGE UP (ref 80–100)
MCV RBC AUTO: 94.9 FL — HIGH (ref 80–94)
MONOCYTES # BLD AUTO: 0.9 K/UL — HIGH (ref 0–0.8)
MONOCYTES # BLD AUTO: 1 K/UL — HIGH (ref 0–0.9)
MONOCYTES NFR BLD AUTO: 11.9 % — SIGNIFICANT CHANGE UP (ref 2–14)
MONOCYTES NFR BLD AUTO: 13.3 % — HIGH (ref 3–10)
NEUTROPHILS # BLD AUTO: 4.5 K/UL — SIGNIFICANT CHANGE UP (ref 1.8–8)
NEUTROPHILS # BLD AUTO: 5.3 K/UL — SIGNIFICANT CHANGE UP (ref 1.8–7.4)
NEUTROPHILS NFR BLD AUTO: 64.9 % — SIGNIFICANT CHANGE UP (ref 37–73)
NEUTROPHILS NFR BLD AUTO: 65.8 % — SIGNIFICANT CHANGE UP (ref 43–77)
NT-PROBNP SERPL-SCNC: 3352 PG/ML — HIGH (ref 0–300)
PHOSPHATE SERPL-MCNC: 2.7 MG/DL — SIGNIFICANT CHANGE UP (ref 2.5–4.5)
PLATELET # BLD AUTO: 154 K/UL — SIGNIFICANT CHANGE UP (ref 150–400)
PLATELET # BLD AUTO: 155 K/UL — SIGNIFICANT CHANGE UP (ref 150–400)
POTASSIUM SERPL-MCNC: 4.3 MMOL/L — SIGNIFICANT CHANGE UP (ref 3.5–5.3)
POTASSIUM SERPL-MCNC: 4.8 MMOL/L — SIGNIFICANT CHANGE UP (ref 3.5–5.3)
POTASSIUM SERPL-SCNC: 4.3 MMOL/L — SIGNIFICANT CHANGE UP (ref 3.5–5.3)
POTASSIUM SERPL-SCNC: 4.8 MMOL/L — SIGNIFICANT CHANGE UP (ref 3.5–5.3)
PREALB SERPL-MCNC: 13 MG/DL — LOW (ref 18–38)
PROT SERPL-MCNC: 6.5 GM/DL — SIGNIFICANT CHANGE UP (ref 6–8.3)
PROT SERPL-MCNC: 6.8 G/DL — SIGNIFICANT CHANGE UP (ref 6.6–8.7)
PROT SERPL-MCNC: 6.9 G/DL — SIGNIFICANT CHANGE UP (ref 6.6–8.7)
PROTHROM AB SERPL-ACNC: 14.6 SEC — HIGH (ref 9.8–12.7)
PROTHROM AB SERPL-ACNC: 14.7 SEC — HIGH (ref 9.8–12.7)
RBC # BLD: 3.7 M/UL — LOW (ref 4.2–5.8)
RBC # BLD: 3.76 M/UL — LOW (ref 4.6–6.2)
RBC # FLD: 14 % — SIGNIFICANT CHANGE UP (ref 10.3–14.5)
RBC # FLD: 14.8 % — SIGNIFICANT CHANGE UP (ref 11–15.6)
SODIUM SERPL-SCNC: 140 MMOL/L — SIGNIFICANT CHANGE UP (ref 135–145)
SODIUM SERPL-SCNC: 141 MMOL/L — SIGNIFICANT CHANGE UP (ref 135–145)
TOTAL CHOLESTEROL/HDL RATIO MEASUREMENT: 3 RATIO — LOW (ref 3.4–9.6)
TRIGL SERPL-MCNC: 48 MG/DL — SIGNIFICANT CHANGE UP (ref 10–149)
TROPONIN I SERPL-MCNC: 0.65 NG/ML — HIGH (ref 0.01–0.04)
TYPE + AB SCN PNL BLD: SIGNIFICANT CHANGE UP
WBC # BLD: 6.9 K/UL — SIGNIFICANT CHANGE UP (ref 4.8–10.8)
WBC # BLD: 8.1 K/UL — SIGNIFICANT CHANGE UP (ref 3.8–10.5)
WBC # FLD AUTO: 6.9 K/UL — SIGNIFICANT CHANGE UP (ref 4.8–10.8)
WBC # FLD AUTO: 8.1 K/UL — SIGNIFICANT CHANGE UP (ref 3.8–10.5)

## 2017-05-09 PROCEDURE — 74176 CT ABD & PELVIS W/O CONTRAST: CPT | Mod: 26

## 2017-05-09 PROCEDURE — 99223 1ST HOSP IP/OBS HIGH 75: CPT

## 2017-05-09 PROCEDURE — 71010: CPT | Mod: 26

## 2017-05-09 PROCEDURE — 71250 CT THORAX DX C-: CPT | Mod: 26

## 2017-05-09 PROCEDURE — 99285 EMERGENCY DEPT VISIT HI MDM: CPT

## 2017-05-09 PROCEDURE — 99233 SBSQ HOSP IP/OBS HIGH 50: CPT

## 2017-05-09 PROCEDURE — 93010 ELECTROCARDIOGRAM REPORT: CPT

## 2017-05-09 RX ORDER — VANCOMYCIN HCL 1 G
VIAL (EA) INTRAVENOUS
Refills: 0 | Status: DISCONTINUED | OUTPATIENT
Start: 2017-05-09 | End: 2017-05-09

## 2017-05-09 RX ORDER — IPRATROPIUM/ALBUTEROL SULFATE 18-103MCG
3 AEROSOL WITH ADAPTER (GRAM) INHALATION
Qty: 0 | Refills: 0 | DISCHARGE
Start: 2017-05-09

## 2017-05-09 RX ORDER — TRAMADOL HYDROCHLORIDE 50 MG/1
50 TABLET ORAL EVERY 6 HOURS
Qty: 0 | Refills: 0 | Status: DISCONTINUED | OUTPATIENT
Start: 2017-05-09 | End: 2017-05-11

## 2017-05-09 RX ORDER — TRAMADOL HYDROCHLORIDE 50 MG/1
1 TABLET ORAL
Qty: 0 | Refills: 0 | DISCHARGE
Start: 2017-05-09

## 2017-05-09 RX ORDER — VANCOMYCIN HCL 1 G
1000 VIAL (EA) INTRAVENOUS ONCE
Refills: 0 | Status: COMPLETED | OUTPATIENT
Start: 2017-05-09 | End: 2017-05-09

## 2017-05-09 RX ORDER — TRAMADOL HYDROCHLORIDE 50 MG/1
50 TABLET ORAL EVERY 6 HOURS
Refills: 0 | Status: DISCONTINUED | OUTPATIENT
Start: 2017-05-09 | End: 2017-05-09

## 2017-05-09 RX ORDER — VANCOMYCIN HCL 1 G
1000 VIAL (EA) INTRAVENOUS EVERY 12 HOURS
Qty: 0 | Refills: 0 | Status: DISCONTINUED | OUTPATIENT
Start: 2017-05-09 | End: 2017-05-11

## 2017-05-09 RX ORDER — MORPHINE SULFATE 50 MG/1
3 CAPSULE, EXTENDED RELEASE ORAL ONCE
Refills: 0 | Status: DISCONTINUED | OUTPATIENT
Start: 2017-05-09 | End: 2017-05-09

## 2017-05-09 RX ORDER — MORPHINE SULFATE 50 MG/1
2.5 CAPSULE, EXTENDED RELEASE ORAL ONCE
Refills: 0 | Status: DISCONTINUED | OUTPATIENT
Start: 2017-05-09 | End: 2017-05-09

## 2017-05-09 RX ORDER — CARVEDILOL PHOSPHATE 80 MG/1
25 CAPSULE, EXTENDED RELEASE ORAL EVERY 12 HOURS
Refills: 0 | Status: DISCONTINUED | OUTPATIENT
Start: 2017-05-09 | End: 2017-05-09

## 2017-05-09 RX ORDER — FUROSEMIDE 40 MG
40 TABLET ORAL DAILY
Qty: 0 | Refills: 0 | Status: DISCONTINUED | OUTPATIENT
Start: 2017-05-09 | End: 2017-05-11

## 2017-05-09 RX ORDER — IPRATROPIUM/ALBUTEROL SULFATE 18-103MCG
3 AEROSOL WITH ADAPTER (GRAM) INHALATION EVERY 8 HOURS
Refills: 0 | Status: DISCONTINUED | OUTPATIENT
Start: 2017-05-09 | End: 2017-05-09

## 2017-05-09 RX ORDER — CARVEDILOL PHOSPHATE 80 MG/1
25 CAPSULE, EXTENDED RELEASE ORAL EVERY 12 HOURS
Qty: 0 | Refills: 0 | Status: DISCONTINUED | OUTPATIENT
Start: 2017-05-09 | End: 2017-05-11

## 2017-05-09 RX ORDER — IPRATROPIUM/ALBUTEROL SULFATE 18-103MCG
3 AEROSOL WITH ADAPTER (GRAM) INHALATION EVERY 8 HOURS
Qty: 0 | Refills: 0 | Status: DISCONTINUED | OUTPATIENT
Start: 2017-05-09 | End: 2017-05-11

## 2017-05-09 RX ORDER — ACETAMINOPHEN 500 MG
650 TABLET ORAL EVERY 6 HOURS
Refills: 0 | Status: DISCONTINUED | OUTPATIENT
Start: 2017-05-09 | End: 2017-05-09

## 2017-05-09 RX ORDER — FUROSEMIDE 40 MG
40 TABLET ORAL
Refills: 0 | Status: DISCONTINUED | OUTPATIENT
Start: 2017-05-09 | End: 2017-05-09

## 2017-05-09 RX ORDER — VANCOMYCIN HCL 1 G
1000 VIAL (EA) INTRAVENOUS EVERY 12 HOURS
Refills: 0 | Status: DISCONTINUED | OUTPATIENT
Start: 2017-05-09 | End: 2017-05-09

## 2017-05-09 RX ORDER — CEFEPIME 1 G/1
2000 INJECTION, POWDER, FOR SOLUTION INTRAMUSCULAR; INTRAVENOUS EVERY 12 HOURS
Refills: 0 | Status: DISCONTINUED | OUTPATIENT
Start: 2017-05-09 | End: 2017-05-09

## 2017-05-09 RX ORDER — CEFEPIME 1 G/1
1 INJECTION, POWDER, FOR SOLUTION INTRAMUSCULAR; INTRAVENOUS
Qty: 0 | Refills: 0 | DISCHARGE
Start: 2017-05-09

## 2017-05-09 RX ORDER — VANCOMYCIN HCL 1 G
1 VIAL (EA) INTRAVENOUS
Qty: 0 | Refills: 0 | DISCHARGE
Start: 2017-05-09

## 2017-05-09 RX ORDER — SODIUM CHLORIDE 9 MG/ML
3 INJECTION INTRAMUSCULAR; INTRAVENOUS; SUBCUTANEOUS EVERY 8 HOURS
Qty: 0 | Refills: 0 | Status: DISCONTINUED | OUTPATIENT
Start: 2017-05-09 | End: 2017-05-11

## 2017-05-09 RX ORDER — ACETAMINOPHEN 500 MG
2 TABLET ORAL
Qty: 0 | Refills: 0 | DISCHARGE
Start: 2017-05-09

## 2017-05-09 RX ORDER — CEFEPIME 1 G/1
1000 INJECTION, POWDER, FOR SOLUTION INTRAMUSCULAR; INTRAVENOUS EVERY 12 HOURS
Qty: 0 | Refills: 0 | Status: DISCONTINUED | OUTPATIENT
Start: 2017-05-09 | End: 2017-05-11

## 2017-05-09 RX ADMIN — CEFEPIME 100 MILLIGRAM(S): 1 INJECTION, POWDER, FOR SOLUTION INTRAMUSCULAR; INTRAVENOUS at 23:49

## 2017-05-09 RX ADMIN — MORPHINE SULFATE 3 MILLIGRAM(S): 50 CAPSULE, EXTENDED RELEASE ORAL at 04:30

## 2017-05-09 RX ADMIN — Medication 250 MILLIGRAM(S): at 12:10

## 2017-05-09 RX ADMIN — TRAMADOL HYDROCHLORIDE 50 MILLIGRAM(S): 50 TABLET ORAL at 15:44

## 2017-05-09 RX ADMIN — Medication 3 MILLILITER(S): at 23:09

## 2017-05-09 RX ADMIN — SODIUM CHLORIDE 3 MILLILITER(S): 9 INJECTION INTRAMUSCULAR; INTRAVENOUS; SUBCUTANEOUS at 22:08

## 2017-05-09 RX ADMIN — Medication 250 MILLIGRAM(S): at 22:10

## 2017-05-09 RX ADMIN — Medication 40 MILLIGRAM(S): at 12:13

## 2017-05-09 RX ADMIN — CARVEDILOL PHOSPHATE 25 MILLIGRAM(S): 80 CAPSULE, EXTENDED RELEASE ORAL at 22:09

## 2017-05-09 RX ADMIN — CEFEPIME 100 MILLIGRAM(S): 1 INJECTION, POWDER, FOR SOLUTION INTRAMUSCULAR; INTRAVENOUS at 04:27

## 2017-05-09 RX ADMIN — Medication 3 MILLILITER(S): at 16:11

## 2017-05-09 RX ADMIN — MORPHINE SULFATE 3 MILLIGRAM(S): 50 CAPSULE, EXTENDED RELEASE ORAL at 03:57

## 2017-05-09 RX ADMIN — CARVEDILOL PHOSPHATE 25 MILLIGRAM(S): 80 CAPSULE, EXTENDED RELEASE ORAL at 12:13

## 2017-05-09 RX ADMIN — Medication 650 MILLIGRAM(S): at 11:26

## 2017-05-09 NOTE — PROGRESS NOTE ADULT - PROBLEM SELECTOR PLAN 1
No further fevers or bleeding   C/w IV cefepime and VAnco  ID eval pending   Blood CX x 2 sent   D/w DR Thompson will reansfer to [facility] for devise extraction and reimplant

## 2017-05-09 NOTE — ED ADULT NURSE REASSESSMENT NOTE - NS ED NURSE REASSESS COMMENT FT1
pt returned from CT chest. States current pain level 0/10 s/p morphine. /99, HR 80, paced rhythm. RT upper shoulder PPM site dsg intact with bloody drainage noted. Pt resting at present. In no distress. Will monitor.

## 2017-05-09 NOTE — CONSULT NOTE ADULT - PROBLEM SELECTOR RECOMMENDATION 9
I discussed case with Dr. Tomas (electrophysiology) earlier this AM and defer management of device-related complication to him.

## 2017-05-09 NOTE — H&P ADULT - PROBLEM SELECTOR PLAN 1
ID consult  Continue Rocephin, vanco  Blood cultures  OR for explant as per Dr Boyd> unsure of timing

## 2017-05-09 NOTE — CONSULT NOTE ADULT - PROBLEM SELECTOR RECOMMENDATION 2
Chronic HF appears to be compensated with resolution of last week's acute exacerbation; continue Coreg; no ACE-I or ARB due to past allergy.

## 2017-05-09 NOTE — PROGRESS NOTE ADULT - ASSESSMENT
71 yr old Mosotho speaking male with PMH of  A fib on Xarelto ,  non-obstructive CAD, tachy-yair syndrome s/p PPM , non-ischemic dilated cardiomyopathy, systolic CHF LVEF 25-30%,  mitral valve disease, HTN, presented to ED c/o bleeding and blood clot bulging from PM incision site of right chest wall.
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
## 2017-05-09 NOTE — H&P ADULT - ATTENDING COMMENTS
Pt has erosion of PPM generator.  Risk and benefit PPM explant and temp PPM  d/w pt.  agree to proceed

## 2017-05-09 NOTE — H&P ADULT - HISTORY OF PRESENT ILLNESS
· HPI Objective Statement: 70 y/o male presenting to ED for blood clot bulging from PM incision site of right chest wall. Pt states the PM site looked normal yesterday, then today, he noticed increased bleeding from the site. PM was placed x5 days ago by Dr. Tomas (EP) for AFib, heart failure, and tachy/yair syndrome. No CP, SOB.	  · Presenting Symptoms: blood clot bulging from PM incision site of right chest wall	  · Negative Findings: no chest pain	  · Location: right chest wall	  · Radiation: no radiation	  · Timing: gradual onset, constant	  · Duration: today	  · Context: recent PM placement	  · Aggravating Factors: none 71 yr old Surinamese speaking male with PMH of  A fib on Xarelto ( but pt not taking it- medication review at bedside),  non-obstructive CAD, tachy-yair syndrome s/p PPM , non-ischemic dilated cardiomyopathy, systolic CHF LVEF 41%,  mitral valve disease, HTN,      · HPI Objective Statement: 70 y/o male presenting to ED for blood clot bulging from PM incision site of right chest wall. Pt states the PM site looked normal yesterday, then today, he noticed increased bleeding from the site. PM was placed x5 days ago by Dr. Tomas (EP) for AFib, heart failure, and tachy/yair syndrome. No CP, SOB.	  · Presenting Symptoms: blood clot bulging from PM incision site of right chest wall	  · Negative Findings: no chest pain	  · Location: right chest wall	  · Radiation: no radiation	  · Timing: gradual onset, constant	  · Duration: today	  · Context: recent PM placement	  · Aggravating Factors: none 71 yr old Peruvian speaking male with PMH of  A fib on Xarelto ,  non-obstructive CAD, tachy-yair syndrome s/p PPM , non-ischemic dilated cardiomyopathy, systolic CHF LVEF 25-30%,  mitral valve disease, HTN,       presenting to ED for blood clot bulging from PM incision site of right chest wall. Pt states the PM site looked normal yesterday, then today, he noticed increased bleeding from the site. PM was placed x5 days ago by Dr. Tomas (EP) for AFib, heart failure, and tachy/yair syndrome. No CP, SOB.  Patient reports cough for 2 days with phlegm ,reports epigatsric pain only on coughing,denies nausea or vomiting or diarrhea  Rectal temp in ED was 101  EKG- atrial sensed ventricular paced rhythm with prolonged AV conduction nonspecific ST/T chnages   CXR- no pleural effusion,no consolidation,no acute pulmonary edema ,CXR unchanged from May1/2017

## 2017-05-09 NOTE — PROGRESS NOTE ADULT - PROBLEM SELECTOR PLAN 2
Non ischemic Cardiomyopathy, thought to be tachycardia induced due to AFIB with RVR. S/p AVN ablation   Chronic CHF, compensated now  c/w coreg, lasix   Consider repeat ECHO, might need device upgrade if EF did not improve   Not on ACEI? consider restart if EF <40%

## 2017-05-09 NOTE — DISCHARGE NOTE ADULT - MEDICATION SUMMARY - MEDICATIONS TO TAKE
I will START or STAY ON the medications listed below when I get home from the hospital:    acetaminophen 325 mg oral tablet  -- 2 tab(s) by mouth every 6 hours, As needed, For Temp greater than 38 C (100.4 F) or mild pain  -- Indication: For Pain/fever    benzonatate 100 mg oral capsule  -- 2 cap(s) by mouth every 8 hours, As needed, Cough  -- Indication: For Cough    Coreg 25 mg oral tablet  -- 1 tab(s) by mouth 2 times a day  -- Indication: For CHF    albuterol-ipratropium 2.5 mg-0.5 mg/3 mL inhalation solution  -- 3 milliliter(s) inhaled every 8 hours  -- Indication: For ACute Bronchitis    cefepime  -- 1 gram(s) intravenous 2 times a day  -- Indication: For Fever/infection     furosemide 40 mg oral tablet  -- 1 tab(s) by mouth 2 times a day  -- Indication: For CHF     vancomycin  -- 1 gram(s) intravenous every 12 h   -- Indication: For Infection I will START or STAY ON the medications listed below when I get home from the hospital:    acetaminophen 325 mg oral tablet  -- 2 tab(s) by mouth every 6 hours, As needed, For Temp greater than 38 C (100.4 F) or mild pain  -- Indication: For Pain/fever    traMADol 50 mg oral tablet  -- 1 tab(s) by mouth every 6 hours, As needed, Moderate Pain (4 - 6)  -- Indication: For PAin    benzonatate 100 mg oral capsule  -- 2 cap(s) by mouth every 8 hours, As needed, Cough  -- Indication: For Cough    Coreg 25 mg oral tablet  -- 1 tab(s) by mouth 2 times a day  -- Indication: For CHF    albuterol-ipratropium 2.5 mg-0.5 mg/3 mL inhalation solution  -- 3 milliliter(s) inhaled every 8 hours  -- Indication: For ACute Bronchitis    cefepime  -- 1 gram(s) intravenous 2 times a day  -- Indication: For Fever/infection     furosemide 40 mg oral tablet  -- 1 tab(s) by mouth 2 times a day  -- Indication: For CHF     vancomycin  -- 1 gram(s) intravenous every 12 h   -- Indication: For Infection

## 2017-05-09 NOTE — ED ADULT NURSE REASSESSMENT NOTE - NS ED NURSE REASSESS COMMENT FT1
pt awake, aox3, rates pain to lt PPM site 7/10. Pt medicated with Morphine 3mg ivp. Pt taken to CT. /100, HR 80, R 18

## 2017-05-09 NOTE — H&P ADULT - PROBLEM SELECTOR PLAN 1
Admit to Cardiac step down  wilh pacemaker pads in place (hemodynamically stable)    1. IV cefepime ,IV vanco  2.blood cx    3.monitor H/H  4.Kylah panel  5.avoid anticoagulation for now  6. Dr tinajero consult - for OR in am  7.DVT Ppx- IPC

## 2017-05-09 NOTE — PROGRESS NOTE ADULT - PROBLEM SELECTOR PLAN 5
Pt to be transferred to [facility] to [facility]IN's service when bed available  as arranged by Dr Tomas

## 2017-05-09 NOTE — DISCHARGE NOTE ADULT - CARE PLAN
Principal Discharge DX:	Pacemaker complications, initial encounter  Goal:	resolve  Instructions for follow-up, activity and diet:	to be transferred to [facility]  Secondary Diagnosis:	Atrial fibrillation, unspecified type  Goal:	control  Instructions for follow-up, activity and diet:	c/w current meds, Hold Xarelto fir procedure  Secondary Diagnosis:	Chronic systolic (congestive) heart failure  Goal:	compensate  Instructions for follow-up, activity and diet:	c/w current meds, diet Principal Discharge DX:	Pacemaker complications, initial encounter  Goal:	resolve  Instructions for follow-up, activity and diet:	to be transferred to [facility]  Secondary Diagnosis:	Atrial fibrillation, unspecified type  Goal:	control  Instructions for follow-up, activity and diet:	c/w current meds, Hold Xarelto fir procedure  Secondary Diagnosis:	Chronic systolic (congestive) heart failure  Goal:	compensate  Instructions for follow-up, activity and diet:	c/w current meds, diet Principal Discharge DX:	Pacemaker complications, initial encounter  Goal:	resolve  Instructions for follow-up, activity and diet:	to be transferred to [facility]  Secondary Diagnosis:	Atrial fibrillation, unspecified type  Goal:	control  Instructions for follow-up, activity and diet:	c/w current meds, Hold Xarelto fir procedure  Secondary Diagnosis:	Chronic systolic (congestive) heart failure  Goal:	compensate  Instructions for follow-up, activity and diet:	c/w current meds, diet Principal Discharge DX:	Pacemaker complications, initial encounter  Goal:	resolve  Instructions for follow-up, activity and diet:	to be transferred to [facility]  Secondary Diagnosis:	Atrial fibrillation, unspecified type  Goal:	control  Instructions for follow-up, activity and diet:	c/w current meds, Hold Xarelto fir procedure  Secondary Diagnosis:	Chronic systolic (congestive) heart failure  Goal:	compensate  Instructions for follow-up, activity and diet:	c/w current meds, diet Principal Discharge DX:	Pacemaker complications, initial encounter  Goal:	resolve  Instructions for follow-up, activity and diet:	to be transferred to [facility]  Secondary Diagnosis:	Atrial fibrillation, unspecified type  Goal:	control  Instructions for follow-up, activity and diet:	c/w current meds, Hold Xarelto fir procedure  Secondary Diagnosis:	Chronic systolic (congestive) heart failure  Goal:	compensate  Instructions for follow-up, activity and diet:	c/w current meds, diet

## 2017-05-09 NOTE — PROVIDER CONTACT NOTE (OTHER) - SITUATION
Dr. Matt spoke with EP Dr. Tomas.
left message with answering service;  aware of consult.
left message with answering service;  aware of consult.

## 2017-05-09 NOTE — PROGRESS NOTE ADULT - SUBJECTIVE AND OBJECTIVE BOX
CC: blood clot bulging from pacemaker site, fever        HPI: 71 yr old [place] speaking male with PMH of  A fib on Xarelto ,  non-obstructive CAD, tachy-yair syndrome s/p PPM , non-ischemic dilated cardiomyopathy, systolic CHF LVEF 25-30%,  mitral valve disease, HTN, presented to ED c/o bleeding and blood clot bulging from PM incision site of right chest wall. Pt reported that bleeding started morning of admission, start having pain. Pt also reported fever x 1 day and cough  with yellow  phlegm since discharge, associated with epigastric pain while coughing.  No SOB, no LE swelling, No Chest pain.  Pt had PPM  placed x5 days ago by Dr. Tomas (EP) for AFib, heart failure, and tachy/yair syndrome. Pt was admitted to the hospital started on cefepime and Vanco for possible device infection.  Today Pt states that feels better, less PPM  site pain, no fevers, less cough. Denies SOB or CP. Results and POC discussed including transfer to [facility], Pt agrees with transfer      Vital Signs Last 24 Hrs  T(C): 36.7, Max: 38.4 (05-08 @ 22:10)  T(F): 98, Max: 101.2 (05-08 @ 22:10)  HR: 81 (79 - 81)  BP: 146/89 (136/91 - 169/109)  BP(mean): --  RR: 18 (16 - 21)  SpO2: 96% (96% - 100%)  I&O's Detail      CARDIAC MARKERS ( 09 May 2017 07:56 )  0.650 ng/mL / x     / 71 U/L / x     / x                                11.2   8.1   )-----------( 154      ( 09 May 2017 07:57 )             34.5     09 May 2017 07:56    141    |  108    |  13     ----------------------------<  90     4.3     |  28     |  0.78     Ca    8.5        09 May 2017 07:56  Phos  2.7       09 May 2017 07:56  Mg     2.2       09 May 2017 07:56    TPro  6.5    /  Alb  3.4    /  TBili  1.5    /  DBili  x      /  AST  23     /  ALT  26     /  AlkPhos  141    09 May 2017 07:56    PT/INR - ( 09 May 2017 07:56 )   PT: 14.7 sec;   INR: 1.35 ratio         PTT - ( 09 May 2017 07:56 )  PTT:37.6 sec  CAPILLARY BLOOD GLUCOSE    LIVER FUNCTIONS - ( 09 May 2017 07:56 )  Alb: 3.4 g/dL / Pro: 6.5 gm/dL / ALK PHOS: 141 U/L / ALT: 26 U/L / AST: 23 U/L / GGT: x               MEDICATIONS  (STANDING):  cefepime  IVPB 2000milliGRAM(s) IV Intermittent every 12 hours  furosemide    Tablet 40milliGRAM(s) Oral two times a day  carvedilol 25milliGRAM(s) Oral every 12 hours  ALBUTerol/ipratropium for Nebulization 3milliLiter(s) Nebulizer every 8 hours  vancomycin  IVPB 1000milliGRAM(s) IV Intermittent once  vancomycin  IVPB  IV Intermittent   vancomycin  IVPB 1000milliGRAM(s) IV Intermittent every 12 hours    MEDICATIONS  (PRN):  acetaminophen   Tablet 650milliGRAM(s) Oral every 6 hours PRN For Temp greater than 38 C (100.4 F) or mild pain  benzonatate 200milliGRAM(s) Oral every 8 hours PRN Cough      RADIOLOGY & ADDITIONAL TESTS:  EXAM:  CT ABDOMEN AND PELVIS                            PROCEDURE DATE:  05/09/2017        INTERPRETATION:  CT of the chest, abdomen and pelvis    Indication: pacemaker installed 5 days ago, now with dehiscence    Technique: Axial images were obtained from the thoracic inlet through   pubic symphysis without oral or IV contrast.       Reformatted coronal   and sagittal images were submitted.    Comparison: None    FINDINGS:    Evaluation of the solid abdominal organs is limited without contrast.    The visualized neck, axilla and subcutaneous tissues are unremarkable.   The patient is status post placement of a battery pack in the left upper   thoracic wall.  A couple of foci of soft tissue and intramuscular gas is   postoperative. There ismild infiltrative change in the right anterior   thoracic subcutaneous tissues to be expected in a postoperative patient.    The tracheobronchial tree is patent centrally.  There is no mediastinal   hematoma. Main pulmonary artery is dilated, measuring 3.4 cm, suggesting   pulmonary arterial hypertension.  There is no significant mediastinal or   hilar adenopathy.      The heart is enlarged.  Pacemaker wires are seen in the epicardium and   right ventricle. There is no pericardial effusion.    Lungs: There are dependent atelectatic changes at the lung bases.   Calcified granulomas are present in the lingula.    Pleura: There are trace bilateral pleural effusions.  There is no free air or focal collection.  No free fluid.    Liver: Subcentimeter lucencies too small to characterize.  Spleen: Normal.  Gallbladder: Unremarkable.  Biliary tree: Unremarkable.  Adrenal glands: Normal.  Pancreas: Normal.  Kidneys: No hydronephrosis or obstructing stone.    Bowel:  There is no small bowel obstruction. The appendix is   unremarkable.  There is diverticulosis.    Bladder: Unremarkable.  Pelvic organs: The prostate is enlarged, measuring 5.2 x 4.5 x 5.4 cm.    There is no significant adenopathy.  Vasculature: The aorta is not dilated. Minimal atherosclerotic vascular   calcification.    Retroperitoneum: There is no mass.  Bones: Chronic degenerative changes of the spine.  Subcutaneous tissues: Small fat-containing umbilical hernia.    IMPRESSION:    Postoperative changes in the right upper thoracic subcutaneous tissues   related to recent placement of a battery pack from a pacemaker. There is   no separation of the incisional wound on CT to suggest gross dehiscence.     EXAM:  2D ECHOCARDIOGRAM AD         PROCEDURE DATE:  04/18/2017        INTERPRETATION:  Transthoracic Echocardiography Report (TTE)     Demographics     Patient name          GEOVANNI JALLOH     Age           71 year(s)                         VIRGILIO     [facility] Rec #             [id number]          Gender        Male     Account #             [id number]            Date of Birth 1945     Interpreting          Spencer Mejia MD  Room Number   0316   Physician     Referring Physician   Mohamud Latif M.D.    Sonographer   Jerica Bailey     Date of study         04/18/2017 09:01                         AM     Height                64.96 in           Weight        149.92 pounds    Type of Study:     TTE procedure: 2D echocardiogram     BP: 130/87 mmHg     Study Location: [address] [place] [address] Quality: Fair    Indications   1) I50.9 - Heart failure    M-Mode Measurements (cm)     LVEDd: 6.01 cm            LVESd: 5.33 cm   IVSEd: 1.4 cm   LVPWd: 1.45 cm            AO Root Dimension: 3.5 cm             ACS: 2.2 cm                             LA: 5.6 cm    Doppler Measurements:     TR Velocity:303 cm/s   TR Gradient:36.7236 mmHg   Estimated RAP:10 mmHg   RVSP:47 mmHg     Findings     Mitral Valve   Moderate to severe (3+) mitral regurgitation is present.     Aortic Valve   Fibrocalcific changes noted to the aortic valve leaflets with preserved   excursion.   Mild (1+) aortic regurgitation is present.     Tricuspid Valve   Moderate (2+) tricuspid valve regurgitation is present.     Pulmonic Valve   Mild pulmonic valvular regurgitation (1+) is present.     Left Atrium   The left atrium is moderately dilated.     Left Ventricle   The left ventricle cavity is mildly dilated.   Mild concentric left ventricular hypertrophy is present.  Estimated left ventricular ejection fraction is 25-30 %.   Severe, diffuse hypokinesis of the left ventricle is present.     Right Atrium   The right atrium appears mildly dilated.   A device wire is seen in the RV and RA.   Left to right shunt across the atrial septum is noted on color doppler     Right Ventricle   Normal right ventricle structure and function.     Pericardial Effusion   No evidence of pericardial effusion.     Pleural Effusion   No evidence of pleural effusion.     Miscellaneous   The IVC is dilated.     Impression     Summary     No evidence of pericardial effusion.   Moderate to severe (3+) mitral regurgitation is present.   Fibrocalcific changes noted to the aortic valve leaflets with preserved   excursion.   Mild (1+) aortic regurgitation is present.   Moderate (2+) tricuspid valve regurgitation is present.   The left ventricle cavity is mildly dilated.   Mild concentric left ventricular hypertrophy is present.   Estimated left ventricular ejection fraction is 25-30%.   Severe, diffuse hypokinesis of the left ventricle is present.   The right atrium appears mildly dilated.   A device wire is seen in the RV and RA.   Left to right shunt across the atrial septum is noted on color doppler      EXAM:  ECHO TRANSESOPHAGEAL    EXAM:  DOPPLER ECHO COMP W SPECTRAL    EXAM:  DOPPLER ECHOCARD COLOR FLOW      PROCEDURE DATE:  Dec  7 2016   .      INTERPRETATION:  REPORT:    TRANSESOPHAGEAL ECHOCARDIOGRAM REPORT           Patient Name:   VIRGILIO FELTON Patient Location: [facility] Rec #:  [id number]           Accession #:      [id number]  Account #:                         Height:           66.9 in 170.0 cm  YOB: 1945         Weight:           173.1 lb 78.50 kg  Patient Age:    71 years           BSA:              1.90 m²  Patient Gender: M                  BP:               / mmHg        Date of Exam:        12/7/2016 11:13:47 AM  Sonographer:         Danae Burns  Referring Physician: Juni Grover MD     Procedure:     Transesophageal Echocardiogram.  Indications:   Persistent atrial fibrillation - I48.1  Diagnosis:     Persistent atrial fibrillation - I48.1  Study Details: Technically good study.           2D AND M-MODE MEASUREMENTS (normal ranges within parentheses):  Left Ventricle:                  Normal  IVSd (2D):              1.01 cm (0.7-1.1)  LVPWd (2D):             0.85 cm (0.7-1.1)  LVIDd (2D):             5.88 cm (3.4-5.7)  LVIDs (2D):             4.95 cm  LV FS (2D):             15.8%   (>25%)  Relative Wall Thickness  0.29    (<0.42)     LV SYSTOLIC FUNCTION BY 2D PLANIMETRY (MOD):  EF-A4C View: 43.0 % EF-A2C View: 25.1 % EF-Biplane: 33.4 %     LV DIASTOLIC FUNCTION:  MV Peak E: 0.48 m/s     SPECTRAL DOPPLER ANALYSIS (where applicable):  Mitral Insufficiency by PISA:  MR Volume: 19.39 ml MR Flow Rate: 50.67 ml/s MR EROA: 14.49 mm²     Tricuspid Valve and PA/RV Systolic Pressure: TR Max Velocity: 2.30 m/s   RA Pressure: 5 mmHg RVSP/PASP: 26.2 mmHg           PROCEDURE: After discussion of the risks and benefits of the SNEHA, an   informed consent was obtained. Intravenous sedation was performed by   anesthesia. The SNEHA probe was passed without difficulty. Imaged were   obtained with the patient in a supine position. The patient's vital   signs; including heart rate, blood pressure, and oxygen saturation;   remained stable throughout the procedure. The patient tolerated the   procedure well and without complications.     PHYSICIAN INTERPRETATION:  Left Ventricle: The left ventricular internal cavity size is normal.  Left ventricular ejection fraction, by visual estimation, is 35 to 40%.  Right Ventricle: The right ventricular size is mildly enlarged. RV   systolic function is mildly reduced.  Left Atrium: Severely enlarged left atrium. No left atrial appendage   thrombus is seen, the left atrial appendage is enlarged and decreased   left atrial appendage velocities.  Right Atrium: The right atrium is moderately dilated.  Pericardium: Trivial pericardial effusion is present.  Mitral Valve: Moderate mitral valve regurgitation is seen.  Tricuspid Valve: Moderate tricuspid regurgitation is visualized.  Aortic Valve: Trivial aortic valve regurgitation is seen.  Pulmonic Valve: Trace pulmonic valve regurgitation.  Aorta: The aortic root is normal in size and structure.  Pulmonary Artery: The main pulmonary artery is normal in size.  Venous: All four pulmonary veins are dilated. A systolic blunting flow   pattern is recorded from all four pulmonary veins.       Summary:   1. No cardiac mass, vegetations, thrombus or shunts visualized.   2. Severely enlarged left atrium.   3. No left atrial or left atrial appendage thrombus visualized. Left   atrial appendage enlargement and decreased left atrial appendage   velocities. No PFO.   4. Global diffuse hypokinesis. Left ventricular ejection fraction, by   visual estimation, is 35 to 40%.   5. Moderately dilated right atrium.   6. The right ventricular size is mildly enlarged. RV systolic function   is mildly reduced.   7. Moderate mitral valve regurgitation.   8. Moderate tricuspid regurgitation.   9. Trivial pericardial effusion.

## 2017-05-09 NOTE — DISCHARGE NOTE ADULT - HOSPITAL COURSE
71 yr old [place] speaking male with PMH of  A fib on Xarelto ,  non-obstructive CAD, tachy-yair syndrome s/p PPM , non-ischemic dilated cardiomyopathy, systolic CHF LVEF 25-30%,  mitral valve disease, HTN, presented to ED c/o bleeding and blood clot bulging from PM incision site of right chest wall. Pt reported that bleeding started morning of admission, start having pain. Pt also reported fever x 1 day and cough  with yellow  phlegm since discharge, associated with epigastric pain while coughing.  No SOB, no LE swelling, No Chest pain.  Pt had PPM  placed x5 days ago by Dr. Tomas (EP) for AFib, heart failure, and tachy/yair syndrome. Pt was admitted to the hospital started on cefepime and Vanco for possible device infection. CT Chest showed no PNA, no mediastinal od cutaneous hematoma or fluid collection.  CT abd neg for source of infection.  Pt also started on Neb Tx and cough suppressants for Acute Bronchitis. Was evaluated by Cardio and EP, decision was made to transfer to [facility] for divice extraction and reimplant to DR. Boyd's service. Pt agrees with transfer.

## 2017-05-09 NOTE — CONSULT NOTE ADULT - ASSESSMENT
71 yr old male with PMH of  A fib on Xarelto ,  non-obstructive CAD, tachy-yair syndrome s/p PPM placed 5 days prior to admisssion, non-ischemic dilated cardiomyopathy, systolic CHF LVEF 25-30%,  mitral valve disease, HTN, now admitted on 5/8 for evaluation of bleeding from incision site of PPM. Upon admission the patient was noted to be febrile to 101,has not other specific complaints; earlier on 5/9 the patient had the PPM explanted.  1. PPM pocket infection, s/p explantation  - follow up cultures   - agree with cefepime as ordered  - will add vancomycin to treat resistant bacteria   - check vancomycin trough prior to fourth dose   - wound care  2. other issues;  A fib on Xarelto ,  non-obstructive CAD, tachy-yair syndrome   - per medicine 71 yr old male with PMH of  A fib on Xarelto ,  non-obstructive CAD, tachy-yair syndrome s/p PPM placed 5 days prior to admisssion, non-ischemic dilated cardiomyopathy, systolic CHF LVEF 25-30%,  mitral valve disease, HTN, now admitted on 5/8 for evaluation of bleeding from incision site of PPM. Upon admission the patient was noted to be febrile to 101,has not other specific complaints; earlier on 5/9 the patient had the PPM explanted.  1. PPM pocket infection, awaiting explantation  - follow up cultures   - agree with cefepime as ordered  - will add vancomycin to treat resistant bacteria   - check vancomycin trough prior to fourth dose   - wound care  2. other issues;  A fib on Xarelto ,  non-obstructive CAD, tachy-yair syndrome   - per medicine

## 2017-05-09 NOTE — CONSULT NOTE ADULT - SUBJECTIVE AND OBJECTIVE BOX
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 Upon admission the patient was noted to be febrile to 101,has not other specific complaints; earlier on  the patient had the PPM explanted.          PMH: as above  PSH: as above  Meds: per reconcilation sheet, noted below  MEDICATIONS  (STANDING):  cefepime  IVPB 2000milliGRAM(s) IV Intermittent every 12 hours  furosemide    Tablet 40milliGRAM(s) Oral two times a day  carvedilol 25milliGRAM(s) Oral every 12 hours  ALBUTerol/ipratropium for Nebulization 3milliLiter(s) Nebulizer every 8 hours  vancomycin  IVPB  IV Intermittent   vancomycin  IVPB 1000milliGRAM(s) IV Intermittent every 12 hours    MEDICATIONS  (PRN):  acetaminophen   Tablet 650milliGRAM(s) Oral every 6 hours PRN For Temp greater than 38 C (100.4 F) or mild pain  benzonatate 200milliGRAM(s) Oral every 8 hours PRN Cough  traMADol 50milliGRAM(s) Oral every 6 hours PRN Moderate Pain (4 - 6)    Allergies    ACE inhibitors (Other)  amiodarone (Other)    Intolerances      Social: no smoking, no alcohol, no illegal drugs; no recent travel, no exposure to TB  FAMILY HISTORY:  No pertinent family history in first degree relatives  No pertinent family history in first degree relatives    ROS: the patient has  no chills, no HA, no dizziness, no sore throat, no blurry vision, no CP, no palpitations, no SOB, no cough, no abdominal pain, no diarrhea, no N/V, no dysuria, no leg pain, no claudication, no rash, no joint aches, no rectal pain or bleeding, no night sweats  Vital Signs Last 24 Hrs  T(C): 36.7, Max: 38.4 (05-08 @ 22:10)  T(F): 98, Max: 101.2 (05-08 @ 22:10)  HR: 81 (79 - 81)  BP: 146/89 (136/91 - 169/109)  BP(mean): --  RR: 18 (16 - 21)  SpO2: 96% (96% - 100%)  Daily Height in cm: 170.18 (08 May 2017 21:44)    Daily Weight in k.2 (09 May 2017 10:24)  Constitutional: frail looking  HEENT: NC/AT, EOMI, PERRLA  Neck: supple  Respiratory: clear  Cardiovascular: S1S2 regular, no murmurs  Abdomen: soft, not tender, not distended, positive BS  Genitourinary: deferred  Rectal: deferred  Musculoskeletal: no muscle tenderness, no joint swelling or tenderness  Neurological: AxOx3, moving all extremities, no focal deficits  Skin: ecchymosis of right upper extremity and chest; large dressing over right upper chest                          11.2   8.1   )-----------( 154      ( 09 May 2017 07:57 )             34.5         141  |  108  |  13  ----------------------------<  90  4.3   |  28  |  0.78    Ca    8.5      09 May 2017 07:56  Phos  2.7     05-  Mg     2.2     -    TPro  6.5  /  Alb  3.4  /  TBili  1.5<H>  /  DBili  x   /  AST  23  /  ALT  26  /  AlkPhos  141<H>  -     LIVER FUNCTIONS - ( 09 May 2017 07:56 )  Alb: 3.4 g/dL / Pro: 6.5 gm/dL / ALK PHOS: 141 U/L / ALT: 26 U/L / AST: 23 U/L / GGT: x                 Radiology:  EXAM:  CT ABDOMEN AND PELVIS                            PROCEDURE DATE:  2017        INTERPRETATION:  CT of the chest, abdomen and pelvis    Indication: pacemaker installed 5 days ago, now with dehiscence    Technique: Axial images were obtained from the thoracic inlet through   pubic symphysis without oral or IV contrast.       Reformatted coronal   and sagittal images were submitted.    Comparison: None    FINDINGS:    Evaluation of the solid abdominal organs is limited without contrast.    The visualized neck, axilla and subcutaneous tissues are unremarkable.   The patient is status post placement of a battery pack in the left upper   thoracic wall.  A couple of foci of soft tissue and intramuscular gas is   postoperative. There ismild infiltrative change in the right anterior   thoracic subcutaneous tissues to be expected in a postoperative patient.    The tracheobronchial tree is patent centrally.  There is no mediastinal   hematoma. Main pulmonary artery is dilated, measuring 3.4 cm, suggesting   pulmonary arterial hypertension.  There is no significant mediastinal or   hilar adenopathy.      The heart is enlarged.  Pacemaker wires are seen in the epicardium and   right ventricle. There is no pericardial effusion.    Lungs: There are dependent atelectatic changes at the lung bases.   Calcified granulomas are present in the lingula.    Pleura: There are trace bilateral pleural effusions.  There is no free air or focal collection.  No free fluid.    Liver: Subcentimeter lucencies too small to characterize.  Spleen: Normal.  Gallbladder: Unremarkable.  Biliary tree: Unremarkable.  Adrenal glands: Normal.  Pancreas: Normal.  Kidneys: No hydronephrosis or obstructing stone.    Bowel:  There is no small bowel obstruction. The appendix is   unremarkable.  There is diverticulosis.    Bladder: Unremarkable.  Pelvic organs: The prostate is enlarged, measuring 5.2 x 4.5 x 5.4 cm.    There is no significant adenopathy.  Vasculature: The aorta is not dilated. Minimal atherosclerotic vascular   calcification.    Retroperitoneum: There is no mass.  Bones: Chronic degenerative changes of the spine.  Subcutaneous tissues: Small fat-containing umbilical hernia.    IMPRESSION:    Postoperative changes in the right upper thoracic subcutaneous tissues   related to recent placement of a battery pack from a pacemaker. There is   no separation of the incisional wound on CT to suggest gross dehiscence.        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 Upon admission the patient was noted to be febrile to 101,has not other specific complaints; the plan is to have PPM explanted          PMH: as above  PSH: as above  Meds: per reconcilation sheet, noted below  MEDICATIONS  (STANDING):  cefepime  IVPB 2000milliGRAM(s) IV Intermittent every 12 hours  furosemide    Tablet 40milliGRAM(s) Oral two times a day  carvedilol 25milliGRAM(s) Oral every 12 hours  ALBUTerol/ipratropium for Nebulization 3milliLiter(s) Nebulizer every 8 hours  vancomycin  IVPB  IV Intermittent   vancomycin  IVPB 1000milliGRAM(s) IV Intermittent every 12 hours    MEDICATIONS  (PRN):  acetaminophen   Tablet 650milliGRAM(s) Oral every 6 hours PRN For Temp greater than 38 C (100.4 F) or mild pain  benzonatate 200milliGRAM(s) Oral every 8 hours PRN Cough  traMADol 50milliGRAM(s) Oral every 6 hours PRN Moderate Pain (4 - 6)    Allergies    ACE inhibitors (Other)  amiodarone (Other)    Intolerances      Social: no smoking, no alcohol, no illegal drugs; no recent travel, no exposure to TB  FAMILY HISTORY:  No pertinent family history in first degree relatives  No pertinent family history in first degree relatives    ROS: the patient has  no chills, no HA, no dizziness, no sore throat, no blurry vision, no CP, no palpitations, no SOB, no cough, no abdominal pain, no diarrhea, no N/V, no dysuria, no leg pain, no claudication, no rash, no joint aches, no rectal pain or bleeding, no night sweats  Vital Signs Last 24 Hrs  T(C): 36.7, Max: 38.4 (05-08 @ 22:10)  T(F): 98, Max: 101.2 (05-08 @ 22:10)  HR: 81 (79 - 81)  BP: 146/89 (136/91 - 169/109)  BP(mean): --  RR: 18 (16 - 21)  SpO2: 96% (96% - 100%)  Daily Height in cm: 170.18 (08 May 2017 21:44)    Daily Weight in k.2 (09 May 2017 10:24)  Constitutional: frail looking  HEENT: NC/AT, EOMI, PERRLA  Neck: supple  Respiratory: clear  Cardiovascular: S1S2 regular, no murmurs  Abdomen: soft, not tender, not distended, positive BS  Genitourinary: deferred  Rectal: deferred  Musculoskeletal: no muscle tenderness, no joint swelling or tenderness  Neurological: AxOx3, moving all extremities, no focal deficits  Skin: ecchymosis of right upper extremity and chest; large dressing over right upper chest                          11.2   8.1   )-----------( 154      ( 09 May 2017 07:57 )             34.5         141  |  108  |  13  ----------------------------<  90  4.3   |  28  |  0.78    Ca    8.5      09 May 2017 07:56  Phos  2.7     -  Mg     2.2     -    TPro  6.5  /  Alb  3.4  /  TBili  1.5<H>  /  DBili  x   /  AST  23  /  ALT  26  /  AlkPhos  141<H>       LIVER FUNCTIONS - ( 09 May 2017 07:56 )  Alb: 3.4 g/dL / Pro: 6.5 gm/dL / ALK PHOS: 141 U/L / ALT: 26 U/L / AST: 23 U/L / GGT: x                 Radiology:  EXAM:  CT ABDOMEN AND PELVIS                            PROCEDURE DATE:  2017        INTERPRETATION:  CT of the chest, abdomen and pelvis    Indication: pacemaker installed 5 days ago, now with dehiscence    Technique: Axial images were obtained from the thoracic inlet through   pubic symphysis without oral or IV contrast.       Reformatted coronal   and sagittal images were submitted.    Comparison: None    FINDINGS:    Evaluation of the solid abdominal organs is limited without contrast.    The visualized neck, axilla and subcutaneous tissues are unremarkable.   The patient is status post placement of a battery pack in the left upper   thoracic wall.  A couple of foci of soft tissue and intramuscular gas is   postoperative. There ismild infiltrative change in the right anterior   thoracic subcutaneous tissues to be expected in a postoperative patient.    The tracheobronchial tree is patent centrally.  There is no mediastinal   hematoma. Main pulmonary artery is dilated, measuring 3.4 cm, suggesting   pulmonary arterial hypertension.  There is no significant mediastinal or   hilar adenopathy.      The heart is enlarged.  Pacemaker wires are seen in the epicardium and   right ventricle. There is no pericardial effusion.    Lungs: There are dependent atelectatic changes at the lung bases.   Calcified granulomas are present in the lingula.    Pleura: There are trace bilateral pleural effusions.  There is no free air or focal collection.  No free fluid.    Liver: Subcentimeter lucencies too small to characterize.  Spleen: Normal.  Gallbladder: Unremarkable.  Biliary tree: Unremarkable.  Adrenal glands: Normal.  Pancreas: Normal.  Kidneys: No hydronephrosis or obstructing stone.    Bowel:  There is no small bowel obstruction. The appendix is   unremarkable.  There is diverticulosis.    Bladder: Unremarkable.  Pelvic organs: The prostate is enlarged, measuring 5.2 x 4.5 x 5.4 cm.    There is no significant adenopathy.  Vasculature: The aorta is not dilated. Minimal atherosclerotic vascular   calcification.    Retroperitoneum: There is no mass.  Bones: Chronic degenerative changes of the spine.  Subcutaneous tissues: Small fat-containing umbilical hernia.    IMPRESSION:    Postoperative changes in the right upper thoracic subcutaneous tissues   related to recent placement of a battery pack from a pacemaker. There is   no separation of the incisional wound on CT to suggest gross dehiscence.        Advanced directive addressed: full resuscitation

## 2017-05-09 NOTE — H&P ADULT - NSHPLABSRESULTS_GEN_ALL_CORE
12.2   9.6   )-----------( 157      ( 08 May 2017 21:49 )             38.6       CBC Full  -  ( 08 May 2017 21:49 )  WBC Count : 9.6 K/uL  Hemoglobin : 12.2 g/dL  Hematocrit : 38.6 %  Platelet Count - Automated : 157 K/uL  Mean Cell Volume : 92.6 fl  Mean Cell Hemoglobin : 29.2 pg  Mean Cell Hemoglobin Concentration : 31.5 gm/dL  Auto Neutrophil # : 7.0 K/uL  Auto Lymphocyte # : 1.3 K/uL  Auto Monocyte # : 1.0 K/uL  Auto Eosinophil # : 0.2 K/uL  Auto Basophil # : 0.1 K/uL  Auto Neutrophil % : 73.3 %  Auto Lymphocyte % : 13.3 %  Auto Monocyte % : 10.5 %  Auto Eosinophil % : 1.8 %  Auto Basophil % : 1.1 %      05-08    142  |  106  |  16  ----------------------------<  104<H>  4.3   |  29  |  0.95    Ca    8.5      08 May 2017 21:49    TPro  7.4  /  Alb  3.8  /  TBili  1.2  /  DBili  x   /  AST  29  /  ALT  29  /  AlkPhos  195<H>  05-08      LIVER FUNCTIONS - ( 08 May 2017 21:49 )  Alb: 3.8 g/dL / Pro: 7.4 gm/dL / ALK PHOS: 195 U/L / ALT: 29 U/L / AST: 29 U/L / GGT: x             PT/INR - ( 08 May 2017 21:49 )   PT: 16.3 sec;   INR: 1.50 ratio         PTT - ( 08 May 2017 21:49 )  PTT:40.0 sec

## 2017-05-09 NOTE — DISCHARGE NOTE ADULT - PATIENT PORTAL LINK FT
“You can access the [employer] Patient Portal, offered by [facility], by registering with the following website: [email or web address]”

## 2017-05-09 NOTE — PROGRESS NOTE ADULT - SKIN COMMENTS
R UPPER CHEST ECCHYMOSIS with edema surrounding PPM devise, no erythema no warmth, no active bleeding

## 2017-05-09 NOTE — DISCHARGE NOTE ADULT - CARE PROVIDER_API CALL
Co, Florentino POP), Internal Medicine; Pulmonary Disease  [address] [place] [address]  Phone: [phone]  Fax: [phone]

## 2017-05-09 NOTE — ED ADULT NURSE REASSESSMENT NOTE - NS ED NURSE REASSESS COMMENT FT1
Told by Dr Matt to cover PPM with gauze and tape.  I carefully applied the gauze and tape.  After a few hours, blood noted to be bleeding thru the gauze.  ABD pad applied.  Pt comfortable at this time.  Vitals as noted, pt connected to pacing monitor for caution.  Safety maintained.

## 2017-05-09 NOTE — DISCHARGE NOTE ADULT - PLAN OF CARE
resolve to be transferred to [facility] control c/w current meds, Hold Xarelto fir procedure compensate c/w current meds, diet

## 2017-05-09 NOTE — H&P ADULT - PROBLEM SELECTOR PLAN 4
elevated alkaline phosphatase level with epigastric rosangela only when coughing    1.CT abd /pelvis r/o gall stones ,r/o CBD stones

## 2017-05-09 NOTE — H&P ADULT - NSHPPHYSICALEXAM_GEN_ALL_CORE
PHYSICAL EXAM:    Daily Height in cm: 170.18 (08 May 2017 21:44)    Daily     ICU Vital Signs Last 24 Hrs  T(C): 36.8, Max: 38.4 (05-08 @ 22:10)  T(F): 98.2, Max: 101.2 (05-08 @ 22:10)  HR: 80 (80 - 80)  BP: 157/94 (136/91 - 159/118)  BP(mean): --  ABP: --  ABP(mean): --  RR: 17 (17 - 18)  SpO2: 100% (98% - 100%)      Constitutional: Well appearing  HEENT: Atraumatic, NAEEM, Normal, No congestion  Respiratory: Breath Sounds normal, no rhonchi/wheeze  Cardiovascular: N S1S2; DOROTHEA present  Gastrointestinal: Abdomen soft, non tender, Bowel Ssounds present  Extremities: No edema, peripheral pulses present  Neurological: AAO x 3, no gross focal motor deficits  Skin: Non cellulitic, no rash, ulcers  Lymph Nodes: No lymphadenopathy noted  Back: No CVA tenderness   Musculoskeletal: non tender  Breasts: Deferred  Genitourinary: deferred  Rectal: Deferred PHYSICAL EXAM:    Daily Height in cm: 170.18 (08 May 2017 21:44)    Daily     ICU Vital Signs Last 24 Hrs  T(C): 36.8, Max: 38.4 (05-08 @ 22:10)  T(F): 98.2, Max: 101.2 (05-08 @ 22:10)  HR: 80 (80 - 80)  BP: 157/94 (136/91 - 159/118)  BP(mean): --  ABP: --  ABP(mean): --  RR: 17 (17 - 18)  SpO2: 100% (98% - 100%)      Constitutional: NAD  HEENT: Atraumatic, NAEEM, Normal, No congestion  Respiratory: Breath Sounds normal anterior chest wall, no rhonchi/wheeze,unable to auscultate lungs posteriorly as patient does not want to move due to right shoulder pain at PPM site   Cardiovascular: N S1S2; DOROTHEA present  Gastrointestinal: Abdomen soft, non tender, Bowel Ssounds present  Extremities: No edema, peripheral pulses present  Neurological: AAO x 3, no gross focal motor deficits  Skin: · SKIN: Large blood clot coming from PM incision site in right chest wall. No tenderness or active bleeding. No surrounding swelling or erythema.    Back: No CVA tenderness     Breasts: Deferred  Genitourinary: deferred  Rectal: Deferred

## 2017-05-09 NOTE — H&P ADULT - ASSESSMENT
71 yr old Greek speaking male with PMH of  A fib on Xarelto ,  non-obstructive CAD, tachy-yair syndrome s/p PPM , non-ischemic dilated cardiomyopathy, systolic CHF LVEF 25-30%,  mitral valve disease, HTN,       presenting to ED for blood clot bulging from PM incision site of right chest wall. Pt states the PM site looked normal yesterday, then today, he noticed increased bleeding from the site. PM was placed x5 days ago by Dr. Tomas (EP) for AFib, heart failure, and tachy/yair syndrome. No CP, SOB.  Patient reports cough for 2 days with phlegm ,reports epigatsric pain only on coughing,denies nausea or vomiting or diarrhea  Rectal temp in ED was 101  EKG- atrial sensed ventricular paced rhythm with prolonged AV conduction nonspecific ST/T chnages   CXR- no pleural effusion,no consolidation,no acute pulmonary edema ,CXR unchanged from May1/2017 71 yr old Belgian speaking male with PMH of  A fib on Xarelto ,  non-obstructive CAD, tachy-yair syndrome s/p PPM , non-ischemic dilated cardiomyopathy, systolic CHF LVEF 25-30%,  mitral valve disease, HTN,       presenting to ED for blood clot bulging from PM incision site of right chest wall. Pt states the PM site looked normal yesterday, then today, he noticed increased bleeding from the site. PM was placed x5 days ago by Dr. Tinajero (EP) for AFib, heart failure, and tachy/yiar syndrome. No CP, SOB.  Patient reports cough for 2 days with phlegm ,reports epigatsric pain only on coughing,denies nausea or vomiting or diarrhea  Rectal temp in ED was 101  EKG- atrial sensed ventricular paced rhythm with prolonged AV conduction nonspecific ST/T chnages   CXR- no pleural effusion,no consolidation,no acute pulmonary edema ,CXR unchanged from May1/2017  .  :.ED physician  spoke with dr tinajero who is aware of pt. agrees with abx and admit to tele. will repair in AM.

## 2017-05-09 NOTE — H&P ADULT - HISTORY OF PRESENT ILLNESS
72 y/o male transferred from [facility] where he reports S/P  PPM 6 days ago (Dr Mancera) @   for SSS>  discharged home 5/4 , subsequent low grade temp over weekend,chills  neg sick exposures, denies getting site wet  Today states he saw "big clot" on top of incision> family member drove him to ER upon arrival Temp 101 , clot protruding from incision ( on xarelto)  Pressure dsg applied, antibiotics initiated> transferred H for Dr Boyd to evaluate for explant

## 2017-05-10 DIAGNOSIS — I44.7 LEFT BUNDLE-BRANCH BLOCK, UNSPECIFIED: ICD-10-CM

## 2017-05-10 DIAGNOSIS — I24.8 OTHER FORMS OF ACUTE ISCHEMIC HEART DISEASE: ICD-10-CM

## 2017-05-10 DIAGNOSIS — R07.0 PAIN IN THROAT: ICD-10-CM

## 2017-05-10 DIAGNOSIS — I25.10 ATHEROSCLEROTIC HEART DISEASE OF NATIVE CORONARY ARTERY WITHOUT ANGINA PECTORIS: ICD-10-CM

## 2017-05-10 DIAGNOSIS — I48.91 UNSPECIFIED ATRIAL FIBRILLATION: ICD-10-CM

## 2017-05-10 DIAGNOSIS — I05.9 RHEUMATIC MITRAL VALVE DISEASE, UNSPECIFIED: ICD-10-CM

## 2017-05-10 DIAGNOSIS — J96.21 ACUTE AND CHRONIC RESPIRATORY FAILURE WITH HYPOXIA: ICD-10-CM

## 2017-05-10 DIAGNOSIS — I42.9 CARDIOMYOPATHY, UNSPECIFIED: ICD-10-CM

## 2017-05-10 DIAGNOSIS — I50.23 ACUTE ON CHRONIC SYSTOLIC (CONGESTIVE) HEART FAILURE: ICD-10-CM

## 2017-05-10 DIAGNOSIS — I11.0 HYPERTENSIVE HEART DISEASE WITH HEART FAILURE: ICD-10-CM

## 2017-05-10 DIAGNOSIS — Z95.0 PRESENCE OF CARDIAC PACEMAKER: ICD-10-CM

## 2017-05-10 LAB
ALBUMIN SERPL ELPH-MCNC: 3.4 G/DL — SIGNIFICANT CHANGE UP (ref 3.3–5.2)
ALP SERPL-CCNC: 144 U/L — HIGH (ref 40–120)
ALT FLD-CCNC: 14 U/L — SIGNIFICANT CHANGE UP
ANION GAP SERPL CALC-SCNC: 10 MMOL/L — SIGNIFICANT CHANGE UP (ref 5–17)
APPEARANCE UR: CLEAR — SIGNIFICANT CHANGE UP
AST SERPL-CCNC: 19 U/L — SIGNIFICANT CHANGE UP
BASOPHILS # BLD AUTO: 0 K/UL — SIGNIFICANT CHANGE UP (ref 0–0.2)
BASOPHILS NFR BLD AUTO: 0.4 % — SIGNIFICANT CHANGE UP (ref 0–2)
BILIRUB SERPL-MCNC: 1.4 MG/DL — SIGNIFICANT CHANGE UP (ref 0.4–2)
BILIRUB UR-MCNC: ABNORMAL
BUN SERPL-MCNC: 22 MG/DL — HIGH (ref 8–20)
CALCIUM SERPL-MCNC: 8.7 MG/DL — SIGNIFICANT CHANGE UP (ref 8.6–10.2)
CHLORIDE SERPL-SCNC: 100 MMOL/L — SIGNIFICANT CHANGE UP (ref 98–107)
CO2 SERPL-SCNC: 27 MMOL/L — SIGNIFICANT CHANGE UP (ref 22–29)
COLOR SPEC: YELLOW — SIGNIFICANT CHANGE UP
CREAT SERPL-MCNC: 0.84 MG/DL — SIGNIFICANT CHANGE UP (ref 0.5–1.3)
DIFF PNL FLD: NEGATIVE — SIGNIFICANT CHANGE UP
EOSINOPHIL # BLD AUTO: 0.2 K/UL — SIGNIFICANT CHANGE UP (ref 0–0.5)
EOSINOPHIL NFR BLD AUTO: 2.6 % — SIGNIFICANT CHANGE UP (ref 0–5)
EPI CELLS # UR: SIGNIFICANT CHANGE UP
GLUCOSE SERPL-MCNC: 103 MG/DL — SIGNIFICANT CHANGE UP (ref 70–115)
GLUCOSE UR QL: NEGATIVE MG/DL — SIGNIFICANT CHANGE UP
HCT VFR BLD CALC: 33.6 % — LOW (ref 42–52)
HGB BLD-MCNC: 10.3 G/DL — LOW (ref 14–18)
KETONES UR-MCNC: NEGATIVE — SIGNIFICANT CHANGE UP
LEUKOCYTE ESTERASE UR-ACNC: ABNORMAL
LYMPHOCYTES # BLD AUTO: 1.5 K/UL — SIGNIFICANT CHANGE UP (ref 1–4.8)
LYMPHOCYTES # BLD AUTO: 20 % — SIGNIFICANT CHANGE UP (ref 20–55)
MCHC RBC-ENTMCNC: 29 PG — SIGNIFICANT CHANGE UP (ref 27–31)
MCHC RBC-ENTMCNC: 30.7 G/DL — LOW (ref 32–36)
MCV RBC AUTO: 94.6 FL — HIGH (ref 80–94)
MONOCYTES # BLD AUTO: 1 K/UL — HIGH (ref 0–0.8)
MONOCYTES NFR BLD AUTO: 13.4 % — HIGH (ref 3–10)
MRSA PCR RESULT.: SIGNIFICANT CHANGE UP
NEUTROPHILS # BLD AUTO: 4.7 K/UL — SIGNIFICANT CHANGE UP (ref 1.8–8)
NEUTROPHILS NFR BLD AUTO: 63.5 % — SIGNIFICANT CHANGE UP (ref 37–73)
NITRITE UR-MCNC: NEGATIVE — SIGNIFICANT CHANGE UP
PH UR: 6 — SIGNIFICANT CHANGE UP (ref 5–8)
PLATELET # BLD AUTO: 146 K/UL — LOW (ref 150–400)
POTASSIUM SERPL-MCNC: 4.4 MMOL/L — SIGNIFICANT CHANGE UP (ref 3.5–5.3)
POTASSIUM SERPL-SCNC: 4.4 MMOL/L — SIGNIFICANT CHANGE UP (ref 3.5–5.3)
PROT SERPL-MCNC: 6.4 G/DL — LOW (ref 6.6–8.7)
PROT UR-MCNC: NEGATIVE MG/DL — SIGNIFICANT CHANGE UP
RBC # BLD: 3.55 M/UL — LOW (ref 4.6–6.2)
RBC # FLD: 14.7 % — SIGNIFICANT CHANGE UP (ref 11–15.6)
RBC CASTS # UR COMP ASSIST: NEGATIVE /HPF — SIGNIFICANT CHANGE UP (ref 0–4)
S AUREUS DNA NOSE QL NAA+PROBE: DETECTED
SODIUM SERPL-SCNC: 137 MMOL/L — SIGNIFICANT CHANGE UP (ref 135–145)
SP GR SPEC: 1.01 — SIGNIFICANT CHANGE UP (ref 1.01–1.02)
UROBILINOGEN FLD QL: 12 MG/DL
WBC # BLD: 7.4 K/UL — SIGNIFICANT CHANGE UP (ref 4.8–10.8)
WBC # FLD AUTO: 7.4 K/UL — SIGNIFICANT CHANGE UP (ref 4.8–10.8)
WBC UR QL: SIGNIFICANT CHANGE UP

## 2017-05-10 PROCEDURE — 93306 TTE W/DOPPLER COMPLETE: CPT | Mod: 26

## 2017-05-10 PROCEDURE — 99222 1ST HOSP IP/OBS MODERATE 55: CPT

## 2017-05-10 PROCEDURE — 99233 SBSQ HOSP IP/OBS HIGH 50: CPT

## 2017-05-10 RX ORDER — MUPIROCIN 20 MG/G
1 OINTMENT TOPICAL
Qty: 0 | Refills: 0 | Status: DISCONTINUED | OUTPATIENT
Start: 2017-05-10 | End: 2017-05-11

## 2017-05-10 RX ORDER — ACETAMINOPHEN 500 MG
1000 TABLET ORAL ONCE
Qty: 0 | Refills: 0 | Status: COMPLETED | OUTPATIENT
Start: 2017-05-10 | End: 2017-05-10

## 2017-05-10 RX ADMIN — SODIUM CHLORIDE 3 MILLILITER(S): 9 INJECTION INTRAMUSCULAR; INTRAVENOUS; SUBCUTANEOUS at 06:33

## 2017-05-10 RX ADMIN — CARVEDILOL PHOSPHATE 25 MILLIGRAM(S): 80 CAPSULE, EXTENDED RELEASE ORAL at 21:12

## 2017-05-10 RX ADMIN — CEFEPIME 100 MILLIGRAM(S): 1 INJECTION, POWDER, FOR SOLUTION INTRAMUSCULAR; INTRAVENOUS at 12:18

## 2017-05-10 RX ADMIN — SODIUM CHLORIDE 3 MILLILITER(S): 9 INJECTION INTRAMUSCULAR; INTRAVENOUS; SUBCUTANEOUS at 21:12

## 2017-05-10 RX ADMIN — Medication 3 MILLILITER(S): at 09:10

## 2017-05-10 RX ADMIN — CARVEDILOL PHOSPHATE 25 MILLIGRAM(S): 80 CAPSULE, EXTENDED RELEASE ORAL at 12:17

## 2017-05-10 RX ADMIN — Medication 250 MILLIGRAM(S): at 22:56

## 2017-05-10 RX ADMIN — MUPIROCIN 1 APPLICATION(S): 20 OINTMENT TOPICAL at 18:49

## 2017-05-10 RX ADMIN — CEFEPIME 100 MILLIGRAM(S): 1 INJECTION, POWDER, FOR SOLUTION INTRAMUSCULAR; INTRAVENOUS at 22:13

## 2017-05-10 RX ADMIN — TRAMADOL HYDROCHLORIDE 50 MILLIGRAM(S): 50 TABLET ORAL at 19:10

## 2017-05-10 RX ADMIN — Medication 3 MILLILITER(S): at 16:08

## 2017-05-10 RX ADMIN — Medication 400 MILLIGRAM(S): at 21:11

## 2017-05-10 RX ADMIN — Medication 40 MILLIGRAM(S): at 06:36

## 2017-05-10 RX ADMIN — Medication 250 MILLIGRAM(S): at 12:16

## 2017-05-10 RX ADMIN — TRAMADOL HYDROCHLORIDE 50 MILLIGRAM(S): 50 TABLET ORAL at 18:49

## 2017-05-10 RX ADMIN — SODIUM CHLORIDE 3 MILLILITER(S): 9 INJECTION INTRAMUSCULAR; INTRAVENOUS; SUBCUTANEOUS at 12:22

## 2017-05-10 RX ADMIN — Medication 1000 MILLIGRAM(S): at 21:40

## 2017-05-10 NOTE — CONSULT NOTE ADULT - ASSESSMENT
71 yr old man with a history of atrial fibrillation, non-obstructive CAD, Bsc PPM (implant date 5/10/2016 active fixation lead 53cm Dextrus [facility] [place] [place] pacing lead ), non-ischemic dilated cardiomyopathy with moderate, chronic  systolic heart failure (LVEF 40-45%) , mitral valve disease, HTN, and recurrent admissions for decompensated heart failure in the setting of AF with uncontrolled ventricular rate who had an AVN ablation with CRT-P upgrade last week implant LV lead ( [facility] [place] [place] Quad Model 4672- passive spiral fixation).  He was discharged home on 5/5/17 and initially did well. On 5/9/2017 readmitted to [facility] with bleeding and open bi-PPM pocket.  He describes bleeding and swelling at the site of the new pacemaker generator  but denies fever, chills, rigors upon arrival Temp 101  clot protruding from incision ( on xarelto). Pressure dsg applied, upon ID recommendation surveillance blood cultures were sent broad coverage anti-biotics was initiated Vanco and Cefipime  and the patient was  transferred to [facility] for Dr Boyd to evaluate for explant (09 May 2017 18:55). Patient is pacemaker dependent--- s/p AV node ablation.    Recc;    Follow surveillance cultures  ID eval   Repeat 2-D echo here  Hold Xarelto  Will discuss plans for explant with Dr Boyd ( will need TVP)  and re-implant when cleared by ID service  Will follow with you 71 yr old man with a history of atrial fibrillation, non-obstructive CAD, Bsc PPM (implant date 5/10/2016 active fixation lead 53cm [employer] [facility] pacing [place] ), non-ischemic dilated cardiomyopathy with moderate, chronic  systolic heart failure (LVEF 40-45%) , mitral valve disease, HTN, and recurrent admissions for decompensated heart failure in the setting of AF with uncontrolled ventricular rate who had an AVN ablation with CRT-P upgrade last week implant LV lead ( [facility] Quad Model 4672- passive spiral fixation).  He was discharged home on 5/5/17 and initially did well. On 5/9/2017 readmitted to [facility] with bleeding and open bi-PPM pocket.  He describes bleeding and swelling at the site of the new pacemaker generator  but denies fever, chills, rigors upon arrival Temp 101  clot protruding from incision ( on xarelto). Pressure dsg applied, upon ID recommendation surveillance blood cultures were sent broad coverage anti-biotics was initiated Vanco and Cefipime  and the patient was  transferred to [facility] for Dr Boyd to evaluate for explant (09 May 2017 18:55). Patient is pacemaker dependent--- s/p AV node ablation.    Recc;    Follow surveillance cultures  ID eval   Repeat 2-D echo here  Hold Xarelto  Will discuss plans for explant with Dr Boyd ( will need TVP)  and re-implant when cleared by ID service  Npo past midnight  Type and Hold x 2 units    Risks benefits outlined these include but are not limited to a 3-5% risk of delfina-op bleeding, infection, stroke, death ,damage to any vascular structure, risk of emergent open heart surgery, the patient understands these risks and wishes to proceed informed consent obtained. Consent obtained via  and witnessed
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
## 2017-05-11 ENCOUNTER — APPOINTMENT (OUTPATIENT)
Dept: CARDIOTHORACIC SURGERY | Facility: HOSPITAL | Age: 72
End: 2017-05-11

## 2017-05-11 ENCOUNTER — RESULT REVIEW (OUTPATIENT)
Age: 72
End: 2017-05-11

## 2017-05-11 LAB
ALBUMIN SERPL ELPH-MCNC: 3.9 G/DL — SIGNIFICANT CHANGE UP (ref 3.3–5.2)
ALP SERPL-CCNC: 137 U/L — HIGH (ref 40–120)
ALT FLD-CCNC: 12 U/L — SIGNIFICANT CHANGE UP
ANION GAP SERPL CALC-SCNC: 11 MMOL/L — SIGNIFICANT CHANGE UP (ref 5–17)
ANION GAP SERPL CALC-SCNC: 13 MMOL/L — SIGNIFICANT CHANGE UP (ref 5–17)
APTT BLD: 34.9 SEC — SIGNIFICANT CHANGE UP (ref 27.5–37.4)
AST SERPL-CCNC: 16 U/L — SIGNIFICANT CHANGE UP
BASOPHILS # BLD AUTO: 0 K/UL — SIGNIFICANT CHANGE UP (ref 0–0.2)
BASOPHILS NFR BLD AUTO: 0.3 % — SIGNIFICANT CHANGE UP (ref 0–2)
BILIRUB SERPL-MCNC: 1.7 MG/DL — SIGNIFICANT CHANGE UP (ref 0.4–2)
BUN SERPL-MCNC: 17 MG/DL — SIGNIFICANT CHANGE UP (ref 8–20)
BUN SERPL-MCNC: 19 MG/DL — SIGNIFICANT CHANGE UP (ref 8–20)
CALCIUM SERPL-MCNC: 8.5 MG/DL — LOW (ref 8.6–10.2)
CALCIUM SERPL-MCNC: 9 MG/DL — SIGNIFICANT CHANGE UP (ref 8.6–10.2)
CHLORIDE SERPL-SCNC: 100 MMOL/L — SIGNIFICANT CHANGE UP (ref 98–107)
CHLORIDE SERPL-SCNC: 102 MMOL/L — SIGNIFICANT CHANGE UP (ref 98–107)
CO2 SERPL-SCNC: 26 MMOL/L — SIGNIFICANT CHANGE UP (ref 22–29)
CO2 SERPL-SCNC: 27 MMOL/L — SIGNIFICANT CHANGE UP (ref 22–29)
CREAT SERPL-MCNC: 0.74 MG/DL — SIGNIFICANT CHANGE UP (ref 0.5–1.3)
CREAT SERPL-MCNC: 0.79 MG/DL — SIGNIFICANT CHANGE UP (ref 0.5–1.3)
EOSINOPHIL # BLD AUTO: 0.1 K/UL — SIGNIFICANT CHANGE UP (ref 0–0.5)
EOSINOPHIL NFR BLD AUTO: 1.4 % — SIGNIFICANT CHANGE UP (ref 0–5)
GAS PNL BLDA: SIGNIFICANT CHANGE UP
GLUCOSE SERPL-MCNC: 107 MG/DL — SIGNIFICANT CHANGE UP (ref 70–115)
GLUCOSE SERPL-MCNC: 96 MG/DL — SIGNIFICANT CHANGE UP (ref 70–115)
HAV IGM SER-ACNC: SIGNIFICANT CHANGE UP
HBV CORE IGM SER-ACNC: SIGNIFICANT CHANGE UP
HBV SURFACE AG SER-ACNC: SIGNIFICANT CHANGE UP
HCT VFR BLD CALC: 34.8 % — LOW (ref 42–52)
HCT VFR BLD CALC: 35.4 % — LOW (ref 42–52)
HCV AB S/CO SERPL IA: 0.13 S/CO — SIGNIFICANT CHANGE UP
HCV AB SERPL-IMP: SIGNIFICANT CHANGE UP
HGB BLD-MCNC: 10.9 G/DL — LOW (ref 14–18)
HGB BLD-MCNC: 11.1 G/DL — LOW (ref 14–18)
INR BLD: 1.21 RATIO — HIGH (ref 0.88–1.16)
LYMPHOCYTES # BLD AUTO: 1.1 K/UL — SIGNIFICANT CHANGE UP (ref 1–4.8)
LYMPHOCYTES # BLD AUTO: 15.4 % — LOW (ref 20–55)
MAGNESIUM SERPL-MCNC: 1.9 MG/DL — SIGNIFICANT CHANGE UP (ref 1.8–2.5)
MCHC RBC-ENTMCNC: 29.1 PG — SIGNIFICANT CHANGE UP (ref 27–31)
MCHC RBC-ENTMCNC: 29.2 PG — SIGNIFICANT CHANGE UP (ref 27–31)
MCHC RBC-ENTMCNC: 31.3 G/DL — LOW (ref 32–36)
MCHC RBC-ENTMCNC: 31.4 G/DL — LOW (ref 32–36)
MCV RBC AUTO: 92.9 FL — SIGNIFICANT CHANGE UP (ref 80–94)
MCV RBC AUTO: 93.3 FL — SIGNIFICANT CHANGE UP (ref 80–94)
MONOCYTES # BLD AUTO: 0.9 K/UL — HIGH (ref 0–0.8)
MONOCYTES NFR BLD AUTO: 12.3 % — HIGH (ref 3–10)
NEUTROPHILS # BLD AUTO: 5 K/UL — SIGNIFICANT CHANGE UP (ref 1.8–8)
NEUTROPHILS NFR BLD AUTO: 70.3 % — SIGNIFICANT CHANGE UP (ref 37–73)
PHOSPHATE SERPL-MCNC: 4.1 MG/DL — SIGNIFICANT CHANGE UP (ref 2.4–4.7)
PLATELET # BLD AUTO: 146 K/UL — LOW (ref 150–400)
PLATELET # BLD AUTO: 154 K/UL — SIGNIFICANT CHANGE UP (ref 150–400)
POTASSIUM SERPL-MCNC: 3.9 MMOL/L — SIGNIFICANT CHANGE UP (ref 3.5–5.3)
POTASSIUM SERPL-MCNC: 4.4 MMOL/L — SIGNIFICANT CHANGE UP (ref 3.5–5.3)
POTASSIUM SERPL-SCNC: 3.9 MMOL/L — SIGNIFICANT CHANGE UP (ref 3.5–5.3)
POTASSIUM SERPL-SCNC: 4.4 MMOL/L — SIGNIFICANT CHANGE UP (ref 3.5–5.3)
PROT SERPL-MCNC: 7 G/DL — SIGNIFICANT CHANGE UP (ref 6.6–8.7)
PROTHROM AB SERPL-ACNC: 13.4 SEC — HIGH (ref 9.8–12.7)
RBC # BLD: 3.73 M/UL — LOW (ref 4.6–6.2)
RBC # BLD: 3.81 M/UL — LOW (ref 4.6–6.2)
RBC # FLD: 14.5 % — SIGNIFICANT CHANGE UP (ref 11–15.6)
RBC # FLD: 14.5 % — SIGNIFICANT CHANGE UP (ref 11–15.6)
SODIUM SERPL-SCNC: 139 MMOL/L — SIGNIFICANT CHANGE UP (ref 135–145)
SODIUM SERPL-SCNC: 140 MMOL/L — SIGNIFICANT CHANGE UP (ref 135–145)
VANCOMYCIN TROUGH SERPL-MCNC: 11.6 UG/ML — SIGNIFICANT CHANGE UP (ref 10–20)
WBC # BLD: 7 K/UL — SIGNIFICANT CHANGE UP (ref 4.8–10.8)
WBC # BLD: 7.1 K/UL — SIGNIFICANT CHANGE UP (ref 4.8–10.8)
WBC # FLD AUTO: 7 K/UL — SIGNIFICANT CHANGE UP (ref 4.8–10.8)
WBC # FLD AUTO: 7.1 K/UL — SIGNIFICANT CHANGE UP (ref 4.8–10.8)

## 2017-05-11 PROCEDURE — 99232 SBSQ HOSP IP/OBS MODERATE 35: CPT

## 2017-05-11 PROCEDURE — 33234 REMOVAL OF PACEMAKER SYSTEM: CPT

## 2017-05-11 PROCEDURE — 93010 ELECTROCARDIOGRAM REPORT: CPT

## 2017-05-11 PROCEDURE — 71010: CPT | Mod: 26,77

## 2017-05-11 PROCEDURE — 88300 SURGICAL PATH GROSS: CPT | Mod: 26

## 2017-05-11 PROCEDURE — 71010: CPT | Mod: 26

## 2017-05-11 PROCEDURE — 33229 REMV&REPLC PM GEN MULT LEADS: CPT

## 2017-05-11 RX ORDER — POTASSIUM CHLORIDE 20 MEQ
10 PACKET (EA) ORAL ONCE
Qty: 0 | Refills: 0 | Status: DISCONTINUED | OUTPATIENT
Start: 2017-05-11 | End: 2017-05-11

## 2017-05-11 RX ORDER — INSULIN HUMAN 100 [IU]/ML
2 INJECTION, SOLUTION SUBCUTANEOUS
Qty: 250 | Refills: 0 | Status: DISCONTINUED | OUTPATIENT
Start: 2017-05-11 | End: 2017-05-11

## 2017-05-11 RX ORDER — VANCOMYCIN HCL 1 G
1000 VIAL (EA) INTRAVENOUS EVERY 12 HOURS
Qty: 0 | Refills: 0 | Status: DISCONTINUED | OUTPATIENT
Start: 2017-05-12 | End: 2017-05-12

## 2017-05-11 RX ORDER — FENTANYL CITRATE 50 UG/ML
25 INJECTION INTRAVENOUS ONCE
Qty: 0 | Refills: 0 | Status: DISCONTINUED | OUTPATIENT
Start: 2017-05-11 | End: 2017-05-11

## 2017-05-11 RX ORDER — ASPIRIN/CALCIUM CARB/MAGNESIUM 324 MG
325 TABLET ORAL DAILY
Qty: 0 | Refills: 0 | Status: DISCONTINUED | OUTPATIENT
Start: 2017-05-11 | End: 2017-05-19

## 2017-05-11 RX ORDER — DOCUSATE SODIUM 100 MG
100 CAPSULE ORAL THREE TIMES A DAY
Qty: 0 | Refills: 0 | Status: DISCONTINUED | OUTPATIENT
Start: 2017-05-11 | End: 2017-05-19

## 2017-05-11 RX ORDER — FUROSEMIDE 40 MG
40 TABLET ORAL DAILY
Qty: 0 | Refills: 0 | Status: DISCONTINUED | OUTPATIENT
Start: 2017-05-11 | End: 2017-05-19

## 2017-05-11 RX ORDER — CARVEDILOL PHOSPHATE 80 MG/1
25 CAPSULE, EXTENDED RELEASE ORAL EVERY 12 HOURS
Qty: 0 | Refills: 0 | Status: DISCONTINUED | OUTPATIENT
Start: 2017-05-11 | End: 2017-05-19

## 2017-05-11 RX ORDER — DOCUSATE SODIUM 100 MG
100 CAPSULE ORAL THREE TIMES A DAY
Qty: 0 | Refills: 0 | Status: DISCONTINUED | OUTPATIENT
Start: 2017-05-11 | End: 2017-05-11

## 2017-05-11 RX ORDER — FAMOTIDINE 10 MG/ML
20 INJECTION INTRAVENOUS EVERY 12 HOURS
Qty: 0 | Refills: 0 | Status: DISCONTINUED | OUTPATIENT
Start: 2017-05-11 | End: 2017-05-12

## 2017-05-11 RX ORDER — MUPIROCIN 20 MG/G
1 OINTMENT TOPICAL
Qty: 0 | Refills: 0 | Status: COMPLETED | OUTPATIENT
Start: 2017-05-11 | End: 2017-05-16

## 2017-05-11 RX ORDER — ENOXAPARIN SODIUM 100 MG/ML
40 INJECTION SUBCUTANEOUS DAILY
Qty: 0 | Refills: 0 | Status: DISCONTINUED | OUTPATIENT
Start: 2017-05-12 | End: 2017-05-16

## 2017-05-11 RX ORDER — ACETAMINOPHEN 500 MG
1000 TABLET ORAL ONCE
Qty: 0 | Refills: 0 | Status: COMPLETED | OUTPATIENT
Start: 2017-05-11 | End: 2017-05-11

## 2017-05-11 RX ORDER — SODIUM CHLORIDE 9 MG/ML
1000 INJECTION, SOLUTION INTRAVENOUS
Qty: 0 | Refills: 0 | Status: DISCONTINUED | OUTPATIENT
Start: 2017-05-11 | End: 2017-05-12

## 2017-05-11 RX ORDER — SENNA PLUS 8.6 MG/1
2 TABLET ORAL AT BEDTIME
Qty: 0 | Refills: 0 | Status: DISCONTINUED | OUTPATIENT
Start: 2017-05-11 | End: 2017-05-19

## 2017-05-11 RX ORDER — MAGNESIUM SULFATE 500 MG/ML
2 VIAL (ML) INJECTION ONCE
Qty: 0 | Refills: 0 | Status: COMPLETED | OUTPATIENT
Start: 2017-05-11 | End: 2017-05-11

## 2017-05-11 RX ORDER — POTASSIUM CHLORIDE 20 MEQ
10 PACKET (EA) ORAL
Qty: 0 | Refills: 0 | Status: COMPLETED | OUTPATIENT
Start: 2017-05-11 | End: 2017-05-11

## 2017-05-11 RX ADMIN — Medication 40 MILLIGRAM(S): at 06:39

## 2017-05-11 RX ADMIN — SODIUM CHLORIDE 3 MILLILITER(S): 9 INJECTION INTRAMUSCULAR; INTRAVENOUS; SUBCUTANEOUS at 12:12

## 2017-05-11 RX ADMIN — Medication 3 MILLILITER(S): at 00:22

## 2017-05-11 RX ADMIN — Medication 50 GRAM(S): at 07:07

## 2017-05-11 RX ADMIN — Medication 100 MILLIEQUIVALENT(S): at 10:10

## 2017-05-11 RX ADMIN — FENTANYL CITRATE 25 MICROGRAM(S): 50 INJECTION INTRAVENOUS at 21:50

## 2017-05-11 RX ADMIN — Medication 325 MILLIGRAM(S): at 22:12

## 2017-05-11 RX ADMIN — Medication 3 MILLILITER(S): at 09:11

## 2017-05-11 RX ADMIN — FAMOTIDINE 20 MILLIGRAM(S): 10 INJECTION INTRAVENOUS at 19:20

## 2017-05-11 RX ADMIN — FENTANYL CITRATE 25 MICROGRAM(S): 50 INJECTION INTRAVENOUS at 19:02

## 2017-05-11 RX ADMIN — Medication 40 MILLIGRAM(S): at 22:12

## 2017-05-11 RX ADMIN — Medication 250 MILLIGRAM(S): at 10:10

## 2017-05-11 RX ADMIN — Medication 100 MILLIEQUIVALENT(S): at 08:23

## 2017-05-11 RX ADMIN — FENTANYL CITRATE 25 MICROGRAM(S): 50 INJECTION INTRAVENOUS at 17:45

## 2017-05-11 RX ADMIN — Medication 100 MILLIGRAM(S): at 22:12

## 2017-05-11 RX ADMIN — FENTANYL CITRATE 25 MICROGRAM(S): 50 INJECTION INTRAVENOUS at 21:35

## 2017-05-11 RX ADMIN — CEFEPIME 100 MILLIGRAM(S): 1 INJECTION, POWDER, FOR SOLUTION INTRAMUSCULAR; INTRAVENOUS at 06:39

## 2017-05-11 RX ADMIN — MUPIROCIN 1 APPLICATION(S): 20 OINTMENT TOPICAL at 23:06

## 2017-05-11 RX ADMIN — FENTANYL CITRATE 25 MICROGRAM(S): 50 INJECTION INTRAVENOUS at 17:01

## 2017-05-11 RX ADMIN — Medication 1000 MILLIGRAM(S): at 22:35

## 2017-05-11 RX ADMIN — CARVEDILOL PHOSPHATE 25 MILLIGRAM(S): 80 CAPSULE, EXTENDED RELEASE ORAL at 19:02

## 2017-05-11 RX ADMIN — TRAMADOL HYDROCHLORIDE 50 MILLIGRAM(S): 50 TABLET ORAL at 07:07

## 2017-05-11 RX ADMIN — CARVEDILOL PHOSPHATE 25 MILLIGRAM(S): 80 CAPSULE, EXTENDED RELEASE ORAL at 06:39

## 2017-05-11 RX ADMIN — SODIUM CHLORIDE 3 MILLILITER(S): 9 INJECTION INTRAMUSCULAR; INTRAVENOUS; SUBCUTANEOUS at 06:23

## 2017-05-11 RX ADMIN — Medication 400 MILLIGRAM(S): at 22:05

## 2017-05-11 RX ADMIN — TRAMADOL HYDROCHLORIDE 50 MILLIGRAM(S): 50 TABLET ORAL at 06:39

## 2017-05-11 RX ADMIN — SENNA PLUS 2 TABLET(S): 8.6 TABLET ORAL at 22:12

## 2017-05-11 RX ADMIN — MUPIROCIN 1 APPLICATION(S): 20 OINTMENT TOPICAL at 06:39

## 2017-05-11 NOTE — BRIEF OPERATIVE NOTE - PROCEDURE
Epicardial placement of electrode lead  05/11/2017  via right IJ under flouro  Active  MHOERNING  Debridement and reforming pocket (skin and subcutaneous tissue)  05/11/2017    Active  MHOERNING  Extraction of cardiac pacemaker electrode lead  05/11/2017    Active  MHOERNING

## 2017-05-11 NOTE — PROGRESS NOTE ADULT - SUBJECTIVE AND OBJECTIVE BOX
VIRGILIO SHELLEY is a 71y Male with HPI:  70 y/o male transferred from [facility] where he reports S/P  PPM 6 days ago (Dr Mancera) @   for SSS>  discharged home  , subsequent low grade temp over weekend,chills  neg sick exposures, denies getting site wet  Today states he saw "big clot" on top of incision> family member drove him to ER upon arrival Temp 101 , clot protruding from incision ( on xarelto)  Pressure dsg applied, antibiotics initiated> transferred [facility] for Dr Boyd to evaluate for explant    PT FOR EXTRACTION OF PPM TODAY      Allergies:  ACE inhibitors (Other)  amiodarone (Other)      Medications:  sodium chloride 0.9% lock flush 3milliLiter(s) IV Push every 8 hours  ALBUTerol/ipratropium for Nebulization 3milliLiter(s) Nebulizer every 8 hours  traMADol 50milliGRAM(s) Oral every 6 hours PRN  cefepime  IVPB 1000milliGRAM(s) IV Intermittent every 12 hours  vancomycin  IVPB 1000milliGRAM(s) IV Intermittent every 12 hours  carvedilol 25milliGRAM(s) Oral every 12 hours  furosemide    Tablet 40milliGRAM(s) Oral daily  mupirocin 2% Ointment 1Application(s) Topical two times a day      ANTIBIOTICS:         Review of Systems: - Negative except as mentioned above     Physical Exam:  ICU Vital Signs Last 24 Hrs  T(C): 36.8, Max: 36.9 (05-10 @ 21:06)  T(F): 98.2, Max: 98.5 (05-11 @ 06:20)  HR: 80 (80 - 80)  BP: 120/68 (108/62 - 148/70)  BP(mean): --  ABP: --  ABP(mean): --  RR: 16 (16 - 16)  SpO2: 98% (95% - 98%)    GEN: NAD, pleasant  HEENT: normocephalic and atraumatic. EOMI. KAYA...  NECK: Supple. No carotid bruits.  No lymphadenopathy or thyromegaly.  LUNGS: Clear to auscultation.  HEART: Regular rate and rhythm without murmur. PRESSURE DRESSING IN PLACE NO OBV CELLULITS  ABDOMEN: Soft, nontender, and nondistended.  Positive bowel sounds.  No hepatosplenomegaly was noted.  NO REBOUND NO GUARDING  EXTREMITIES: Without any cyanosis, clubbing, rash, lesions or edema.  NEUROLOGIC: Cranial nerves II through XII are grossly intact.    SKIN: No ulceration or induration present.      Labs:      139  |  102  |  19.0  ----------------------------<  96  3.9   |  26.0  |  0.79    Ca    9.0      11 May 2017 04:44  Phos  4.1       Mg     1.9         TPro  6.4<L>  /  Alb  3.4  /  TBili  1.4  /  DBili  x   /  AST  19  /  ALT  14  /  AlkPhos  144<H>  05-10                          10.9   7.0   )-----------( 146      ( 11 May 2017 04:44 )             34.8       PT/INR - ( 09 May 2017 19:37 )   PT: 14.6 sec;   INR: 1.32 ratio         PTT - ( 09 May 2017 19:37 )  PTT:36.3 sec  Urinalysis Basic - ( 10 May 2017 02:01 )    Color: Yellow / Appearance: Clear / S.015 / pH: x  Gluc: x / Ketone: Negative  / Bili: Small / Urobili: 12 mg/dL   Blood: x / Protein: Negative mg/dL / Nitrite: Negative   Leuk Esterase: Trace / RBC: Negative /HPF / WBC 0-2   Sq Epi: x / Non Sq Epi: Occasional / Bacteria: x      LIVER FUNCTIONS - ( 10 May 2017 05:08 )  Alb: 3.4 g/dL / Pro: 6.4 g/dL / ALK PHOS: 144 U/L / ALT: 14 U/L / AST: 19 U/L / GGT: x               CAPILLARY BLOOD GLUCOSE        RECENT CULTURES:   .Blood None XXXX XXXX   No growth to date.

## 2017-05-11 NOTE — BRIEF OPERATIVE NOTE - POST-OP DX
Hematoma of pacemaker pocket, sequela  05/11/2017    Active  Jo Fabian  Infection of pacemaker pocket, sequela  05/11/2017    Jo Moreland

## 2017-05-11 NOTE — BRIEF OPERATIVE NOTE - PRE-OP DX
Hematoma of pacemaker pocket, initial encounter  05/11/2017    Jo Moreland  Infection of pacemaker pocket, initial encounter  05/11/2017    Jo Moreland

## 2017-05-12 ENCOUNTER — TRANSCRIPTION ENCOUNTER (OUTPATIENT)
Age: 72
End: 2017-05-12

## 2017-05-12 DIAGNOSIS — I48.91 UNSPECIFIED ATRIAL FIBRILLATION: ICD-10-CM

## 2017-05-12 DIAGNOSIS — I08.1 RHEUMATIC DISORDERS OF BOTH MITRAL AND TRICUSPID VALVES: ICD-10-CM

## 2017-05-12 DIAGNOSIS — I48.2 CHRONIC ATRIAL FIBRILLATION: ICD-10-CM

## 2017-05-12 DIAGNOSIS — M19.90 UNSPECIFIED OSTEOARTHRITIS, UNSPECIFIED SITE: ICD-10-CM

## 2017-05-12 DIAGNOSIS — I50.42 CHRONIC COMBINED SYSTOLIC (CONGESTIVE) AND DIASTOLIC (CONGESTIVE) HEART FAILURE: ICD-10-CM
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
## 2017-05-12 DIAGNOSIS — I48.0 PAROXYSMAL ATRIAL FIBRILLATION: ICD-10-CM

## 2017-05-12 DIAGNOSIS — D56.9 THALASSEMIA, UNSPECIFIED: ICD-10-CM

## 2017-05-12 DIAGNOSIS — I44.7 LEFT BUNDLE-BRANCH BLOCK, UNSPECIFIED: ICD-10-CM
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
## 2017-05-12 DIAGNOSIS — I50.32 CHRONIC DIASTOLIC (CONGESTIVE) HEART FAILURE: ICD-10-CM

## 2017-05-12 DIAGNOSIS — I97.638 POSTPROCEDURAL HEMATOMA OF A CIRCULATORY SYSTEM ORGAN OR STRUCTURE FOLLOWING OTHER CIRCULATORY SYSTEM PROCEDURE: ICD-10-CM

## 2017-05-12 DIAGNOSIS — T82.7XXA INFECTION AND INFLAMMATORY REACTION DUE TO OTHER CARDIAC AND VASCULAR DEVICES, IMPLANTS AND GRAFTS, INITIAL ENCOUNTER: ICD-10-CM

## 2017-05-12 DIAGNOSIS — I42.9 CARDIOMYOPATHY, UNSPECIFIED: ICD-10-CM

## 2017-05-12 DIAGNOSIS — I25.10 ATHEROSCLEROTIC HEART DISEASE OF NATIVE CORONARY ARTERY WITHOUT ANGINA PECTORIS: ICD-10-CM

## 2017-05-12 DIAGNOSIS — J45.41 MODERATE PERSISTENT ASTHMA WITH (ACUTE) EXACERBATION: ICD-10-CM

## 2017-05-12 LAB
ALBUMIN SERPL ELPH-MCNC: 3.7 G/DL — SIGNIFICANT CHANGE UP (ref 3.3–5.2)
ALP SERPL-CCNC: 130 U/L — HIGH (ref 40–120)
ALT FLD-CCNC: 12 U/L — SIGNIFICANT CHANGE UP
ANION GAP SERPL CALC-SCNC: 13 MMOL/L — SIGNIFICANT CHANGE UP (ref 5–17)
APTT BLD: 34.4 SEC — SIGNIFICANT CHANGE UP (ref 27.5–37.4)
AST SERPL-CCNC: 16 U/L — SIGNIFICANT CHANGE UP
BILIRUB SERPL-MCNC: 1.9 MG/DL — SIGNIFICANT CHANGE UP (ref 0.4–2)
BUN SERPL-MCNC: 17 MG/DL — SIGNIFICANT CHANGE UP (ref 8–20)
CALCIUM SERPL-MCNC: 8.7 MG/DL — SIGNIFICANT CHANGE UP (ref 8.6–10.2)
CHLORIDE SERPL-SCNC: 97 MMOL/L — LOW (ref 98–107)
CK SERPL-CCNC: 42 U/L — SIGNIFICANT CHANGE UP (ref 30–200)
CO2 SERPL-SCNC: 26 MMOL/L — SIGNIFICANT CHANGE UP (ref 22–29)
CREAT SERPL-MCNC: 0.73 MG/DL — SIGNIFICANT CHANGE UP (ref 0.5–1.3)
GLUCOSE SERPL-MCNC: 103 MG/DL — SIGNIFICANT CHANGE UP (ref 70–115)
HBA1C BLD-MCNC: 5.6 % — SIGNIFICANT CHANGE UP (ref 4–5.6)
HCT VFR BLD CALC: 35.4 % — LOW (ref 42–52)
HGB BLD-MCNC: 11.1 G/DL — LOW (ref 14–18)
INR BLD: 1.24 RATIO — HIGH (ref 0.88–1.16)
MAGNESIUM SERPL-MCNC: 1.8 MG/DL — SIGNIFICANT CHANGE UP (ref 1.6–2.6)
MCHC RBC-ENTMCNC: 29.2 PG — SIGNIFICANT CHANGE UP (ref 27–31)
MCHC RBC-ENTMCNC: 31.4 G/DL — LOW (ref 32–36)
MCV RBC AUTO: 93.2 FL — SIGNIFICANT CHANGE UP (ref 80–94)
PHOSPHATE SERPL-MCNC: 4.1 MG/DL — SIGNIFICANT CHANGE UP (ref 2.4–4.7)
PLATELET # BLD AUTO: 158 K/UL — SIGNIFICANT CHANGE UP (ref 150–400)
POTASSIUM SERPL-MCNC: 3.9 MMOL/L — SIGNIFICANT CHANGE UP (ref 3.5–5.3)
POTASSIUM SERPL-SCNC: 3.9 MMOL/L — SIGNIFICANT CHANGE UP (ref 3.5–5.3)
PROT SERPL-MCNC: 6.6 G/DL — SIGNIFICANT CHANGE UP (ref 6.6–8.7)
PROTHROM AB SERPL-ACNC: 13.7 SEC — HIGH (ref 9.8–12.7)
RBC # BLD: 3.8 M/UL — LOW (ref 4.6–6.2)
RBC # FLD: 14.4 % — SIGNIFICANT CHANGE UP (ref 11–15.6)
SODIUM SERPL-SCNC: 136 MMOL/L — SIGNIFICANT CHANGE UP (ref 135–145)
TROPONIN T SERPL-MCNC: <0.01 NG/ML — SIGNIFICANT CHANGE UP (ref 0–0.06)
WBC # BLD: 6.9 K/UL — SIGNIFICANT CHANGE UP (ref 4.8–10.8)
WBC # FLD AUTO: 6.9 K/UL — SIGNIFICANT CHANGE UP (ref 4.8–10.8)

## 2017-05-12 PROCEDURE — 71010: CPT | Mod: 26

## 2017-05-12 PROCEDURE — 99233 SBSQ HOSP IP/OBS HIGH 50: CPT

## 2017-05-12 RX ORDER — DEXTROSE 50 % IN WATER 50 %
12.5 SYRINGE (ML) INTRAVENOUS ONCE
Qty: 0 | Refills: 0 | Status: DISCONTINUED | OUTPATIENT
Start: 2017-05-12 | End: 2017-05-12

## 2017-05-12 RX ORDER — DEXTROSE 50 % IN WATER 50 %
25 SYRINGE (ML) INTRAVENOUS ONCE
Qty: 0 | Refills: 0 | Status: DISCONTINUED | OUTPATIENT
Start: 2017-05-12 | End: 2017-05-12

## 2017-05-12 RX ORDER — MAGNESIUM SULFATE 500 MG/ML
2 VIAL (ML) INJECTION ONCE
Qty: 0 | Refills: 0 | Status: COMPLETED | OUTPATIENT
Start: 2017-05-12 | End: 2017-05-12

## 2017-05-12 RX ORDER — SODIUM CHLORIDE 9 MG/ML
1000 INJECTION, SOLUTION INTRAVENOUS
Qty: 0 | Refills: 0 | Status: DISCONTINUED | OUTPATIENT
Start: 2017-05-12 | End: 2017-05-12

## 2017-05-12 RX ORDER — VANCOMYCIN HCL 1 G
1250 VIAL (EA) INTRAVENOUS EVERY 12 HOURS
Qty: 0 | Refills: 0 | Status: DISCONTINUED | OUTPATIENT
Start: 2017-05-12 | End: 2017-05-15

## 2017-05-12 RX ORDER — FAMOTIDINE 10 MG/ML
20 INJECTION INTRAVENOUS
Qty: 0 | Refills: 0 | Status: DISCONTINUED | OUTPATIENT
Start: 2017-05-12 | End: 2017-05-19

## 2017-05-12 RX ORDER — POTASSIUM CHLORIDE 20 MEQ
10 PACKET (EA) ORAL ONCE
Qty: 0 | Refills: 0 | Status: COMPLETED | OUTPATIENT
Start: 2017-05-12 | End: 2017-05-12

## 2017-05-12 RX ORDER — GLUCAGON INJECTION, SOLUTION 0.5 MG/.1ML
1 INJECTION, SOLUTION SUBCUTANEOUS ONCE
Qty: 0 | Refills: 0 | Status: DISCONTINUED | OUTPATIENT
Start: 2017-05-12 | End: 2017-05-12

## 2017-05-12 RX ORDER — OXYCODONE HYDROCHLORIDE 5 MG/1
5 TABLET ORAL EVERY 4 HOURS
Qty: 0 | Refills: 0 | Status: DISCONTINUED | OUTPATIENT
Start: 2017-05-12 | End: 2017-05-19

## 2017-05-12 RX ORDER — POTASSIUM CHLORIDE 20 MEQ
10 PACKET (EA) ORAL ONCE
Qty: 0 | Refills: 0 | Status: DISCONTINUED | OUTPATIENT
Start: 2017-05-12 | End: 2017-05-12

## 2017-05-12 RX ORDER — INSULIN LISPRO 100/ML
VIAL (ML) SUBCUTANEOUS
Qty: 0 | Refills: 0 | Status: DISCONTINUED | OUTPATIENT
Start: 2017-05-12 | End: 2017-05-12

## 2017-05-12 RX ORDER — DEXTROSE 50 % IN WATER 50 %
1 SYRINGE (ML) INTRAVENOUS ONCE
Qty: 0 | Refills: 0 | Status: DISCONTINUED | OUTPATIENT
Start: 2017-05-12 | End: 2017-05-12

## 2017-05-12 RX ADMIN — SODIUM CHLORIDE 10 MILLILITER(S): 9 INJECTION, SOLUTION INTRAVENOUS at 05:42

## 2017-05-12 RX ADMIN — FAMOTIDINE 20 MILLIGRAM(S): 10 INJECTION INTRAVENOUS at 05:41

## 2017-05-12 RX ADMIN — Medication 100 MILLIGRAM(S): at 21:58

## 2017-05-12 RX ADMIN — Medication 40 MILLIGRAM(S): at 05:51

## 2017-05-12 RX ADMIN — OXYCODONE HYDROCHLORIDE 5 MILLIGRAM(S): 5 TABLET ORAL at 21:28

## 2017-05-12 RX ADMIN — SODIUM CHLORIDE 10 MILLILITER(S): 9 INJECTION, SOLUTION INTRAVENOUS at 02:56

## 2017-05-12 RX ADMIN — MUPIROCIN 1 APPLICATION(S): 20 OINTMENT TOPICAL at 05:41

## 2017-05-12 RX ADMIN — OXYCODONE HYDROCHLORIDE 5 MILLIGRAM(S): 5 TABLET ORAL at 11:00

## 2017-05-12 RX ADMIN — SENNA PLUS 2 TABLET(S): 8.6 TABLET ORAL at 21:58

## 2017-05-12 RX ADMIN — Medication 325 MILLIGRAM(S): at 12:40

## 2017-05-12 RX ADMIN — Medication 100 MILLIGRAM(S): at 13:23

## 2017-05-12 RX ADMIN — Medication 50 GRAM(S): at 05:42

## 2017-05-12 RX ADMIN — Medication 250 MILLIGRAM(S): at 02:56

## 2017-05-12 RX ADMIN — OXYCODONE HYDROCHLORIDE 5 MILLIGRAM(S): 5 TABLET ORAL at 20:28

## 2017-05-12 RX ADMIN — FAMOTIDINE 20 MILLIGRAM(S): 10 INJECTION INTRAVENOUS at 17:10

## 2017-05-12 RX ADMIN — FAMOTIDINE 20 MILLIGRAM(S): 10 INJECTION INTRAVENOUS at 21:58

## 2017-05-12 RX ADMIN — Medication 10 MILLIEQUIVALENT(S): at 05:51

## 2017-05-12 RX ADMIN — Medication 166.67 MILLIGRAM(S): at 14:06

## 2017-05-12 RX ADMIN — ENOXAPARIN SODIUM 40 MILLIGRAM(S): 100 INJECTION SUBCUTANEOUS at 12:40

## 2017-05-12 RX ADMIN — CARVEDILOL PHOSPHATE 25 MILLIGRAM(S): 80 CAPSULE, EXTENDED RELEASE ORAL at 17:27

## 2017-05-12 RX ADMIN — MUPIROCIN 1 APPLICATION(S): 20 OINTMENT TOPICAL at 17:10

## 2017-05-12 RX ADMIN — CARVEDILOL PHOSPHATE 25 MILLIGRAM(S): 80 CAPSULE, EXTENDED RELEASE ORAL at 05:41

## 2017-05-12 RX ADMIN — Medication 100 MILLIGRAM(S): at 05:41

## 2017-05-12 RX ADMIN — OXYCODONE HYDROCHLORIDE 5 MILLIGRAM(S): 5 TABLET ORAL at 09:31

## 2017-05-12 NOTE — PROGRESS NOTE ADULT - PROBLEM SELECTOR PLAN 2
s/p PPM placement two weeks ago  s/p PPM infection  Continue Vancomycin antibiotics per ID  Continue following with ID
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
## 2017-05-12 NOTE — PROGRESS NOTE ADULT - SUBJECTIVE AND OBJECTIVE BOX
VIRGILIO SHELLEY is a 71y Male with HPI:  70 y/o male transferred from [facility] where he reports S/P  PPM 6 days ago (Dr Mancera) @   for SSS>  discharged home  , subsequent low grade temp over weekend,chills  neg sick exposures, denies getting site wet  Today states he saw "big clot" on top of incision> family member drove him to ER upon arrival Temp 101 , clot protruding from incision ( on xarelto)  Pressure dsg applied, antibiotics initiated> transferred H for Dr Boyd to evaluate for explant    PT S/P   EXTRACTION OF PPM    IN CICU FEELS WELL OOB TO CHAIR      Allergies:  ACE inhibitors (Other)  amiodarone (Other)      Medications:  sodium chloride 0.9% lock flush 3milliLiter(s) IV Push every 8 hours  ALBUTerol/ipratropium for Nebulization 3milliLiter(s) Nebulizer every 8 hours  traMADol 50milliGRAM(s) Oral every 6 hours PRN  cefepime  IVPB 1000milliGRAM(s) IV Intermittent every 12 hours  vancomycin  IVPB 1000milliGRAM(s) IV Intermittent every 12 hours  carvedilol 25milliGRAM(s) Oral every 12 hours  furosemide    Tablet 40milliGRAM(s) Oral daily  mupirocin 2% Ointment 1Application(s) Topical two times a day      ANTIBIOTICS:    VANCOMYCIN     Review of Systems: - Negative except as mentioned above     Physical Exam:  .Vital Signs Last 24 Hrs  T(C): 36.9, Max: 36.9 (05-12 @ 06:30)  T(F): 98.4, Max: 98.4 (05-12 @ 06:30)  HR: 80 (80 - 80)  BP: 157/78 (120/68 - 157/78)  BP(mean): 110 (110 - 110)  RR: 14 (10 - 38)  SpO2: 91% (90% - 100%)    GEN: NAD, pleasant  HEENT: normocephalic and atraumatic. EOMI. KAYA...  NECK: Supple. No carotid bruits.  No lymphadenopathy or thyromegaly.  LUNGS: Clear to auscultation.  HEART: Regular rate and rhythm without murmur. PRESSURE DRESSING IN PLACE NO OBV CELLULITS  ABDOMEN: Soft, nontender, and nondistended.  Positive bowel sounds.  No hepatosplenomegaly was noted.  NO REBOUND NO GUARDING  EXTREMITIES: Without any cyanosis, clubbing, rash, lesions or edema.  NEUROLOGIC: Cranial nerves II through XII are grossly intact.    SKIN: No ulceration or induration present.      Labs:      139  |  102  |  19.0  ----------------------------<  96  3.9   |  26.0  |  0.79    Ca    9.0      11 May 2017 04:44  Phos  4.1       Mg     1.9         TPro  6.4<L>  /  Alb  3.4  /  TBili  1.4  /  DBili  x   /  AST  19  /  ALT  14  /  AlkPhos  144<H>  05-10                          10.9   7.0   )-----------( 146      ( 11 May 2017 04:44 )             34.8       PT/INR - ( 09 May 2017 19:37 )   PT: 14.6 sec;   INR: 1.32 ratio         PTT - ( 09 May 2017 19:37 )  PTT:36.3 sec  Urinalysis Basic - ( 10 May 2017 02:01 )    Color: Yellow / Appearance: Clear / S.015 / pH: x  Gluc: x / Ketone: Negative  / Bili: Small / Urobili: 12 mg/dL   Blood: x / Protein: Negative mg/dL / Nitrite: Negative   Leuk Esterase: Trace / RBC: Negative /HPF / WBC 0-2   Sq Epi: x / Non Sq Epi: Occasional / Bacteria: x      LIVER FUNCTIONS - ( 10 May 2017 05:08 )  Alb: 3.4 g/dL / Pro: 6.4 g/dL / ALK PHOS: 144 U/L / ALT: 14 U/L / AST: 19 U/L / GGT: x               CAPILLARY BLOOD GLUCOSE        RECENT CULTURES:   .Blood None XXXX XXXX   No growth to date.

## 2017-05-12 NOTE — PROGRESS NOTE ADULT - SUBJECTIVE AND OBJECTIVE BOX
Post-op Day 1 S/P PPM extraction, temporary wire wire placement      Pt AAOx3 and OOBTC.  Tolerating clears after being treated with nausea medicine.  Throat a little sore but reassured this would resolve.  Pain is under control.  VSS- [id number], Hr- paced at 80 bpm, O2sat on 2 L/min = 97%. No residual anesthetic issues or complications.
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
## 2017-05-13 LAB
ANION GAP SERPL CALC-SCNC: 9 MMOL/L — SIGNIFICANT CHANGE UP (ref 5–17)
BUN SERPL-MCNC: 17 MG/DL — SIGNIFICANT CHANGE UP (ref 8–20)
CALCIUM SERPL-MCNC: 8.7 MG/DL — SIGNIFICANT CHANGE UP (ref 8.6–10.2)
CHLORIDE SERPL-SCNC: 97 MMOL/L — LOW (ref 98–107)
CO2 SERPL-SCNC: 32 MMOL/L — HIGH (ref 22–29)
CREAT SERPL-MCNC: 0.79 MG/DL — SIGNIFICANT CHANGE UP (ref 0.5–1.3)
GLUCOSE SERPL-MCNC: 115 MG/DL — SIGNIFICANT CHANGE UP (ref 70–115)
HCT VFR BLD CALC: 35.3 % — LOW (ref 42–52)
HGB BLD-MCNC: 11.1 G/DL — LOW (ref 14–18)
MAGNESIUM SERPL-MCNC: 2 MG/DL — SIGNIFICANT CHANGE UP (ref 1.6–2.6)
MCHC RBC-ENTMCNC: 29.4 PG — SIGNIFICANT CHANGE UP (ref 27–31)
MCHC RBC-ENTMCNC: 31.4 G/DL — LOW (ref 32–36)
MCV RBC AUTO: 93.4 FL — SIGNIFICANT CHANGE UP (ref 80–94)
PLATELET # BLD AUTO: 148 K/UL — LOW (ref 150–400)
POTASSIUM SERPL-MCNC: 4 MMOL/L — SIGNIFICANT CHANGE UP (ref 3.5–5.3)
POTASSIUM SERPL-SCNC: 4 MMOL/L — SIGNIFICANT CHANGE UP (ref 3.5–5.3)
RBC # BLD: 3.78 M/UL — LOW (ref 4.6–6.2)
RBC # FLD: 14.2 % — SIGNIFICANT CHANGE UP (ref 11–15.6)
SODIUM SERPL-SCNC: 138 MMOL/L — SIGNIFICANT CHANGE UP (ref 135–145)
VANCOMYCIN TROUGH SERPL-MCNC: 16.8 UG/ML — SIGNIFICANT CHANGE UP (ref 10–20)
WBC # BLD: 8.1 K/UL — SIGNIFICANT CHANGE UP (ref 4.8–10.8)
WBC # FLD AUTO: 8.1 K/UL — SIGNIFICANT CHANGE UP (ref 4.8–10.8)

## 2017-05-13 PROCEDURE — 93010 ELECTROCARDIOGRAM REPORT: CPT

## 2017-05-13 PROCEDURE — 71010: CPT | Mod: 26

## 2017-05-13 RX ADMIN — OXYCODONE HYDROCHLORIDE 5 MILLIGRAM(S): 5 TABLET ORAL at 12:25

## 2017-05-13 RX ADMIN — SENNA PLUS 2 TABLET(S): 8.6 TABLET ORAL at 21:14

## 2017-05-13 RX ADMIN — CARVEDILOL PHOSPHATE 25 MILLIGRAM(S): 80 CAPSULE, EXTENDED RELEASE ORAL at 05:06

## 2017-05-13 RX ADMIN — OXYCODONE HYDROCHLORIDE 5 MILLIGRAM(S): 5 TABLET ORAL at 11:38

## 2017-05-13 RX ADMIN — ENOXAPARIN SODIUM 40 MILLIGRAM(S): 100 INJECTION SUBCUTANEOUS at 11:39

## 2017-05-13 RX ADMIN — MUPIROCIN 1 APPLICATION(S): 20 OINTMENT TOPICAL at 17:46

## 2017-05-13 RX ADMIN — OXYCODONE HYDROCHLORIDE 5 MILLIGRAM(S): 5 TABLET ORAL at 02:26

## 2017-05-13 RX ADMIN — CARVEDILOL PHOSPHATE 25 MILLIGRAM(S): 80 CAPSULE, EXTENDED RELEASE ORAL at 17:46

## 2017-05-13 RX ADMIN — MUPIROCIN 1 APPLICATION(S): 20 OINTMENT TOPICAL at 05:06

## 2017-05-13 RX ADMIN — Medication 40 MILLIGRAM(S): at 05:08

## 2017-05-13 RX ADMIN — Medication 325 MILLIGRAM(S): at 11:38

## 2017-05-13 RX ADMIN — Medication 166.67 MILLIGRAM(S): at 03:12

## 2017-05-13 RX ADMIN — FAMOTIDINE 20 MILLIGRAM(S): 10 INJECTION INTRAVENOUS at 05:06

## 2017-05-13 RX ADMIN — FAMOTIDINE 20 MILLIGRAM(S): 10 INJECTION INTRAVENOUS at 17:46

## 2017-05-13 RX ADMIN — OXYCODONE HYDROCHLORIDE 5 MILLIGRAM(S): 5 TABLET ORAL at 20:18

## 2017-05-13 RX ADMIN — OXYCODONE HYDROCHLORIDE 5 MILLIGRAM(S): 5 TABLET ORAL at 03:15

## 2017-05-13 RX ADMIN — Medication 100 MILLIGRAM(S): at 21:14

## 2017-05-13 RX ADMIN — Medication 100 MILLIGRAM(S): at 05:06

## 2017-05-13 RX ADMIN — OXYCODONE HYDROCHLORIDE 5 MILLIGRAM(S): 5 TABLET ORAL at 21:13

## 2017-05-13 RX ADMIN — Medication 166.67 MILLIGRAM(S): at 15:42
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
## 2017-05-13 NOTE — PROGRESS NOTE ADULT - PROBLEM SELECTOR PLAN 2
s/p PPM placement two weeks ago   5/11  s/p PPM explant> temporary wire in place currently  Occlusive dressing to be maintained  Continue Vancomycin antibiotics per ID  Continue following with ID

## 2017-05-14 LAB
ALBUMIN SERPL ELPH-MCNC: 3.6 G/DL — SIGNIFICANT CHANGE UP (ref 3.3–5.2)
ALP SERPL-CCNC: 143 U/L — HIGH (ref 40–120)
ALT FLD-CCNC: 11 U/L — SIGNIFICANT CHANGE UP
ANION GAP SERPL CALC-SCNC: 11 MMOL/L — SIGNIFICANT CHANGE UP (ref 5–17)
APTT BLD: 37 SEC — SIGNIFICANT CHANGE UP (ref 27.5–37.4)
AST SERPL-CCNC: 19 U/L — SIGNIFICANT CHANGE UP
BILIRUB SERPL-MCNC: 1.2 MG/DL — SIGNIFICANT CHANGE UP (ref 0.4–2)
BLD GP AB SCN SERPL QL: SIGNIFICANT CHANGE UP
BUN SERPL-MCNC: 17 MG/DL — SIGNIFICANT CHANGE UP (ref 8–20)
CALCIUM SERPL-MCNC: 9 MG/DL — SIGNIFICANT CHANGE UP (ref 8.6–10.2)
CHLORIDE SERPL-SCNC: 95 MMOL/L — LOW (ref 98–107)
CO2 SERPL-SCNC: 32 MMOL/L — HIGH (ref 22–29)
CREAT SERPL-MCNC: 0.81 MG/DL — SIGNIFICANT CHANGE UP (ref 0.5–1.3)
CULTURE RESULTS: SIGNIFICANT CHANGE UP
GLUCOSE SERPL-MCNC: 125 MG/DL — HIGH (ref 70–115)
HCT VFR BLD CALC: 35.5 % — LOW (ref 42–52)
HGB BLD-MCNC: 11 G/DL — LOW (ref 14–18)
INR BLD: 1.15 RATIO — SIGNIFICANT CHANGE UP (ref 0.88–1.16)
MAGNESIUM SERPL-MCNC: 1.9 MG/DL — SIGNIFICANT CHANGE UP (ref 1.6–2.6)
MCHC RBC-ENTMCNC: 29.1 PG — SIGNIFICANT CHANGE UP (ref 27–31)
MCHC RBC-ENTMCNC: 31 G/DL — LOW (ref 32–36)
MCV RBC AUTO: 93.9 FL — SIGNIFICANT CHANGE UP (ref 80–94)
PLATELET # BLD AUTO: 149 K/UL — LOW (ref 150–400)
POTASSIUM SERPL-MCNC: 4.1 MMOL/L — SIGNIFICANT CHANGE UP (ref 3.5–5.3)
POTASSIUM SERPL-SCNC: 4.1 MMOL/L — SIGNIFICANT CHANGE UP (ref 3.5–5.3)
PROT SERPL-MCNC: 6.9 G/DL — SIGNIFICANT CHANGE UP (ref 6.6–8.7)
PROTHROM AB SERPL-ACNC: 12.7 SEC — SIGNIFICANT CHANGE UP (ref 9.8–12.7)
RBC # BLD: 3.78 M/UL — LOW (ref 4.6–6.2)
RBC # FLD: 14.5 % — SIGNIFICANT CHANGE UP (ref 11–15.6)
SODIUM SERPL-SCNC: 138 MMOL/L — SIGNIFICANT CHANGE UP (ref 135–145)
SPECIMEN SOURCE: SIGNIFICANT CHANGE UP
TYPE + AB SCN PNL BLD: SIGNIFICANT CHANGE UP
VANCOMYCIN TROUGH SERPL-MCNC: 14.7 UG/ML — SIGNIFICANT CHANGE UP (ref 10–20)
WBC # BLD: 5.6 K/UL — SIGNIFICANT CHANGE UP (ref 4.8–10.8)
WBC # FLD AUTO: 5.6 K/UL — SIGNIFICANT CHANGE UP (ref 4.8–10.8)

## 2017-05-14 PROCEDURE — 99233 SBSQ HOSP IP/OBS HIGH 50: CPT

## 2017-05-14 RX ORDER — MAGNESIUM SULFATE 500 MG/ML
2 VIAL (ML) INJECTION ONCE
Qty: 0 | Refills: 0 | Status: COMPLETED | OUTPATIENT
Start: 2017-05-14 | End: 2017-05-14

## 2017-05-14 RX ADMIN — Medication 50 GRAM(S): at 11:13

## 2017-05-14 RX ADMIN — Medication 325 MILLIGRAM(S): at 11:13

## 2017-05-14 RX ADMIN — Medication 40 MILLIGRAM(S): at 09:06

## 2017-05-14 RX ADMIN — MUPIROCIN 1 APPLICATION(S): 20 OINTMENT TOPICAL at 17:23

## 2017-05-14 RX ADMIN — OXYCODONE HYDROCHLORIDE 5 MILLIGRAM(S): 5 TABLET ORAL at 05:17

## 2017-05-14 RX ADMIN — CARVEDILOL PHOSPHATE 25 MILLIGRAM(S): 80 CAPSULE, EXTENDED RELEASE ORAL at 17:23

## 2017-05-14 RX ADMIN — Medication 100 MILLIGRAM(S): at 21:33

## 2017-05-14 RX ADMIN — CARVEDILOL PHOSPHATE 25 MILLIGRAM(S): 80 CAPSULE, EXTENDED RELEASE ORAL at 05:15

## 2017-05-14 RX ADMIN — SENNA PLUS 2 TABLET(S): 8.6 TABLET ORAL at 21:33

## 2017-05-14 RX ADMIN — MUPIROCIN 1 APPLICATION(S): 20 OINTMENT TOPICAL at 05:14

## 2017-05-14 RX ADMIN — FAMOTIDINE 20 MILLIGRAM(S): 10 INJECTION INTRAVENOUS at 17:23

## 2017-05-14 RX ADMIN — FAMOTIDINE 20 MILLIGRAM(S): 10 INJECTION INTRAVENOUS at 05:15

## 2017-05-14 RX ADMIN — ENOXAPARIN SODIUM 40 MILLIGRAM(S): 100 INJECTION SUBCUTANEOUS at 21:33

## 2017-05-14 RX ADMIN — OXYCODONE HYDROCHLORIDE 5 MILLIGRAM(S): 5 TABLET ORAL at 06:00

## 2017-05-14 RX ADMIN — Medication 166.67 MILLIGRAM(S): at 03:04

## 2017-05-14 RX ADMIN — Medication 100 MILLIGRAM(S): at 05:15

## 2017-05-14 RX ADMIN — Medication 100 MILLIGRAM(S): at 11:13

## 2017-05-14 RX ADMIN — Medication 166.67 MILLIGRAM(S): at 16:38
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
## 2017-05-14 NOTE — PROGRESS NOTE ADULT - PROBLEM SELECTOR PLAN 2
s/p PPM placement two weeks ago   5/11  s/p PPM explant> temporary wire in place currently  Occlusive dressing to be maintained  Continue Vancomycin antibiotics per ID

## 2017-05-15 LAB
ALBUMIN SERPL ELPH-MCNC: 3.8 G/DL — SIGNIFICANT CHANGE UP (ref 3.3–5.2)
ALP SERPL-CCNC: 163 U/L — HIGH (ref 40–120)
ALT FLD-CCNC: 15 U/L — SIGNIFICANT CHANGE UP
ANION GAP SERPL CALC-SCNC: 11 MMOL/L — SIGNIFICANT CHANGE UP (ref 5–17)
AST SERPL-CCNC: 24 U/L — SIGNIFICANT CHANGE UP
BILIRUB SERPL-MCNC: 1.1 MG/DL — SIGNIFICANT CHANGE UP (ref 0.4–2)
BUN SERPL-MCNC: 18 MG/DL — SIGNIFICANT CHANGE UP (ref 8–20)
CALCIUM SERPL-MCNC: 9.3 MG/DL — SIGNIFICANT CHANGE UP (ref 8.6–10.2)
CHLORIDE SERPL-SCNC: 101 MMOL/L — SIGNIFICANT CHANGE UP (ref 98–107)
CO2 SERPL-SCNC: 32 MMOL/L — HIGH (ref 22–29)
CREAT SERPL-MCNC: 0.71 MG/DL — SIGNIFICANT CHANGE UP (ref 0.5–1.3)
GLUCOSE SERPL-MCNC: 91 MG/DL — SIGNIFICANT CHANGE UP (ref 70–115)
HCT VFR BLD CALC: 37.2 % — LOW (ref 42–52)
HGB BLD-MCNC: 11.8 G/DL — LOW (ref 14–18)
MAGNESIUM SERPL-MCNC: 2.1 MG/DL — SIGNIFICANT CHANGE UP (ref 1.6–2.6)
MCHC RBC-ENTMCNC: 29.4 PG — SIGNIFICANT CHANGE UP (ref 27–31)
MCHC RBC-ENTMCNC: 31.7 G/DL — LOW (ref 32–36)
MCV RBC AUTO: 92.8 FL — SIGNIFICANT CHANGE UP (ref 80–94)
PLATELET # BLD AUTO: 145 K/UL — LOW (ref 150–400)
POTASSIUM SERPL-MCNC: 4.3 MMOL/L — SIGNIFICANT CHANGE UP (ref 3.5–5.3)
POTASSIUM SERPL-SCNC: 4.3 MMOL/L — SIGNIFICANT CHANGE UP (ref 3.5–5.3)
PROT SERPL-MCNC: 7.3 G/DL — SIGNIFICANT CHANGE UP (ref 6.6–8.7)
RBC # BLD: 4.01 M/UL — LOW (ref 4.6–6.2)
RBC # FLD: 14.4 % — SIGNIFICANT CHANGE UP (ref 11–15.6)
SODIUM SERPL-SCNC: 144 MMOL/L — SIGNIFICANT CHANGE UP (ref 135–145)
SURGICAL PATHOLOGY FINAL REPORT - CH: SIGNIFICANT CHANGE UP
WBC # BLD: 4.9 K/UL — SIGNIFICANT CHANGE UP (ref 4.8–10.8)
WBC # FLD AUTO: 4.9 K/UL — SIGNIFICANT CHANGE UP (ref 4.8–10.8)

## 2017-05-15 PROCEDURE — 99233 SBSQ HOSP IP/OBS HIGH 50: CPT

## 2017-05-15 RX ORDER — SORBITOL SOLUTION 70 %
30 SOLUTION, ORAL MISCELLANEOUS ONCE
Qty: 0 | Refills: 0 | Status: COMPLETED | OUTPATIENT
Start: 2017-05-15 | End: 2017-05-15

## 2017-05-15 RX ORDER — VANCOMYCIN HCL 1 G
1250 VIAL (EA) INTRAVENOUS EVERY 12 HOURS
Qty: 0 | Refills: 0 | Status: DISCONTINUED | OUTPATIENT
Start: 2017-05-15 | End: 2017-05-16

## 2017-05-15 RX ADMIN — MUPIROCIN 1 APPLICATION(S): 20 OINTMENT TOPICAL at 17:02

## 2017-05-15 RX ADMIN — OXYCODONE HYDROCHLORIDE 5 MILLIGRAM(S): 5 TABLET ORAL at 06:42

## 2017-05-15 RX ADMIN — Medication 100 MILLIGRAM(S): at 22:17

## 2017-05-15 RX ADMIN — FAMOTIDINE 20 MILLIGRAM(S): 10 INJECTION INTRAVENOUS at 05:55

## 2017-05-15 RX ADMIN — Medication 325 MILLIGRAM(S): at 14:01

## 2017-05-15 RX ADMIN — ENOXAPARIN SODIUM 40 MILLIGRAM(S): 100 INJECTION SUBCUTANEOUS at 22:17

## 2017-05-15 RX ADMIN — OXYCODONE HYDROCHLORIDE 5 MILLIGRAM(S): 5 TABLET ORAL at 05:55

## 2017-05-15 RX ADMIN — Medication 166.67 MILLIGRAM(S): at 05:56

## 2017-05-15 RX ADMIN — CARVEDILOL PHOSPHATE 25 MILLIGRAM(S): 80 CAPSULE, EXTENDED RELEASE ORAL at 05:55

## 2017-05-15 RX ADMIN — SENNA PLUS 2 TABLET(S): 8.6 TABLET ORAL at 22:17

## 2017-05-15 RX ADMIN — CARVEDILOL PHOSPHATE 25 MILLIGRAM(S): 80 CAPSULE, EXTENDED RELEASE ORAL at 17:03

## 2017-05-15 RX ADMIN — Medication 100 MILLIGRAM(S): at 14:01

## 2017-05-15 RX ADMIN — Medication 30 MILLILITER(S): at 17:02

## 2017-05-15 RX ADMIN — MUPIROCIN 1 APPLICATION(S): 20 OINTMENT TOPICAL at 05:56

## 2017-05-15 RX ADMIN — Medication 100 MILLIGRAM(S): at 05:55

## 2017-05-15 RX ADMIN — FAMOTIDINE 20 MILLIGRAM(S): 10 INJECTION INTRAVENOUS at 17:03

## 2017-05-15 RX ADMIN — Medication 166.67 MILLIGRAM(S): at 17:02

## 2017-05-15 RX ADMIN — Medication 40 MILLIGRAM(S): at 05:55

## 2017-05-15 NOTE — PROGRESS NOTE ADULT - PROBLEM SELECTOR PLAN 2
s/p PPM placement two weeks ago   5/11  s/p PPM explant> temporary wire in place currently  Occlusive dressing to be maintained  Continue Vancomycin antibiotics per ID  daily vanco trough
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
## 2017-05-16 LAB
ALBUMIN SERPL ELPH-MCNC: 3.8 G/DL — SIGNIFICANT CHANGE UP (ref 3.3–5.2)
ALP SERPL-CCNC: 157 U/L — HIGH (ref 40–120)
ALT FLD-CCNC: 16 U/L — SIGNIFICANT CHANGE UP
ANION GAP SERPL CALC-SCNC: 12 MMOL/L — SIGNIFICANT CHANGE UP (ref 5–17)
AST SERPL-CCNC: 21 U/L — SIGNIFICANT CHANGE UP
BILIRUB SERPL-MCNC: 1.2 MG/DL — SIGNIFICANT CHANGE UP (ref 0.4–2)
BLD GP AB SCN SERPL QL: SIGNIFICANT CHANGE UP
BUN SERPL-MCNC: 14 MG/DL — SIGNIFICANT CHANGE UP (ref 8–20)
CALCIUM SERPL-MCNC: 9.2 MG/DL — SIGNIFICANT CHANGE UP (ref 8.6–10.2)
CHLORIDE SERPL-SCNC: 97 MMOL/L — LOW (ref 98–107)
CO2 SERPL-SCNC: 31 MMOL/L — HIGH (ref 22–29)
CREAT SERPL-MCNC: 0.75 MG/DL — SIGNIFICANT CHANGE UP (ref 0.5–1.3)
GLUCOSE SERPL-MCNC: 97 MG/DL — SIGNIFICANT CHANGE UP (ref 70–115)
HCT VFR BLD CALC: 36.3 % — LOW (ref 42–52)
HGB BLD-MCNC: 11.8 G/DL — LOW (ref 14–18)
MAGNESIUM SERPL-MCNC: 2 MG/DL — SIGNIFICANT CHANGE UP (ref 1.6–2.6)
MCHC RBC-ENTMCNC: 29.8 PG — SIGNIFICANT CHANGE UP (ref 27–31)
MCHC RBC-ENTMCNC: 32.5 G/DL — SIGNIFICANT CHANGE UP (ref 32–36)
MCV RBC AUTO: 91.7 FL — SIGNIFICANT CHANGE UP (ref 80–94)
PLATELET # BLD AUTO: 138 K/UL — LOW (ref 150–400)
POTASSIUM SERPL-MCNC: 3.8 MMOL/L — SIGNIFICANT CHANGE UP (ref 3.5–5.3)
POTASSIUM SERPL-SCNC: 3.8 MMOL/L — SIGNIFICANT CHANGE UP (ref 3.5–5.3)
PROT SERPL-MCNC: 7.1 G/DL — SIGNIFICANT CHANGE UP (ref 6.6–8.7)
RBC # BLD: 3.96 M/UL — LOW (ref 4.6–6.2)
RBC # FLD: 14.3 % — SIGNIFICANT CHANGE UP (ref 11–15.6)
SODIUM SERPL-SCNC: 140 MMOL/L — SIGNIFICANT CHANGE UP (ref 135–145)
TYPE + AB SCN PNL BLD: SIGNIFICANT CHANGE UP
VANCOMYCIN TROUGH SERPL-MCNC: 18 UG/ML — SIGNIFICANT CHANGE UP (ref 10–20)
WBC # BLD: 7 K/UL — SIGNIFICANT CHANGE UP (ref 4.8–10.8)
WBC # FLD AUTO: 7 K/UL — SIGNIFICANT CHANGE UP (ref 4.8–10.8)

## 2017-05-16 PROCEDURE — 99232 SBSQ HOSP IP/OBS MODERATE 35: CPT

## 2017-05-16 PROCEDURE — 93010 ELECTROCARDIOGRAM REPORT: CPT

## 2017-05-16 RX ORDER — POTASSIUM CHLORIDE 20 MEQ
40 PACKET (EA) ORAL ONCE
Qty: 0 | Refills: 0 | Status: COMPLETED | OUTPATIENT
Start: 2017-05-16 | End: 2017-05-16

## 2017-05-16 RX ORDER — CEFAZOLIN SODIUM 1 G
2000 VIAL (EA) INJECTION EVERY 8 HOURS
Qty: 0 | Refills: 0 | Status: DISCONTINUED | OUTPATIENT
Start: 2017-05-16 | End: 2017-05-19

## 2017-05-16 RX ADMIN — Medication 166.67 MILLIGRAM(S): at 05:18

## 2017-05-16 RX ADMIN — Medication 100 MILLIGRAM(S): at 22:47

## 2017-05-16 RX ADMIN — Medication 100 MILLIGRAM(S): at 05:16

## 2017-05-16 RX ADMIN — Medication 40 MILLIEQUIVALENT(S): at 06:53

## 2017-05-16 RX ADMIN — SENNA PLUS 2 TABLET(S): 8.6 TABLET ORAL at 22:47

## 2017-05-16 RX ADMIN — CARVEDILOL PHOSPHATE 25 MILLIGRAM(S): 80 CAPSULE, EXTENDED RELEASE ORAL at 05:16

## 2017-05-16 RX ADMIN — FAMOTIDINE 20 MILLIGRAM(S): 10 INJECTION INTRAVENOUS at 05:16

## 2017-05-16 RX ADMIN — Medication 100 MILLIGRAM(S): at 13:29

## 2017-05-16 RX ADMIN — FAMOTIDINE 20 MILLIGRAM(S): 10 INJECTION INTRAVENOUS at 17:08

## 2017-05-16 RX ADMIN — MUPIROCIN 1 APPLICATION(S): 20 OINTMENT TOPICAL at 05:16

## 2017-05-16 RX ADMIN — Medication 325 MILLIGRAM(S): at 12:05

## 2017-05-16 RX ADMIN — CARVEDILOL PHOSPHATE 25 MILLIGRAM(S): 80 CAPSULE, EXTENDED RELEASE ORAL at 17:08

## 2017-05-16 RX ADMIN — Medication 40 MILLIGRAM(S): at 05:16
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
## 2017-05-17 ENCOUNTER — APPOINTMENT (OUTPATIENT)
Dept: ELECTROPHYSIOLOGY | Facility: CLINIC | Age: 72
End: 2017-05-17

## 2017-05-17 LAB
ANION GAP SERPL CALC-SCNC: 13 MMOL/L — SIGNIFICANT CHANGE UP (ref 5–17)
BUN SERPL-MCNC: 19 MG/DL — SIGNIFICANT CHANGE UP (ref 8–20)
CALCIUM SERPL-MCNC: 9.5 MG/DL — SIGNIFICANT CHANGE UP (ref 8.6–10.2)
CHLORIDE SERPL-SCNC: 101 MMOL/L — SIGNIFICANT CHANGE UP (ref 98–107)
CO2 SERPL-SCNC: 28 MMOL/L — SIGNIFICANT CHANGE UP (ref 22–29)
CREAT SERPL-MCNC: 0.78 MG/DL — SIGNIFICANT CHANGE UP (ref 0.5–1.3)
GLUCOSE SERPL-MCNC: 95 MG/DL — SIGNIFICANT CHANGE UP (ref 70–115)
HCT VFR BLD CALC: 37.7 % — LOW (ref 42–52)
HGB BLD-MCNC: 11.9 G/DL — LOW (ref 14–18)
MAGNESIUM SERPL-MCNC: 2 MG/DL — SIGNIFICANT CHANGE UP (ref 1.6–2.6)
MCHC RBC-ENTMCNC: 29.3 PG — SIGNIFICANT CHANGE UP (ref 27–31)
MCHC RBC-ENTMCNC: 31.6 G/DL — LOW (ref 32–36)
MCV RBC AUTO: 92.9 FL — SIGNIFICANT CHANGE UP (ref 80–94)
PLATELET # BLD AUTO: 137 K/UL — LOW (ref 150–400)
POTASSIUM SERPL-MCNC: 4 MMOL/L — SIGNIFICANT CHANGE UP (ref 3.5–5.3)
POTASSIUM SERPL-SCNC: 4 MMOL/L — SIGNIFICANT CHANGE UP (ref 3.5–5.3)
RBC # BLD: 4.06 M/UL — LOW (ref 4.6–6.2)
RBC # FLD: 14.7 % — SIGNIFICANT CHANGE UP (ref 11–15.6)
SODIUM SERPL-SCNC: 142 MMOL/L — SIGNIFICANT CHANGE UP (ref 135–145)
WBC # BLD: 5.2 K/UL — SIGNIFICANT CHANGE UP (ref 4.8–10.8)
WBC # FLD AUTO: 5.2 K/UL — SIGNIFICANT CHANGE UP (ref 4.8–10.8)

## 2017-05-17 PROCEDURE — 0387T: CPT | Mod: Q0

## 2017-05-17 PROCEDURE — 93010 ELECTROCARDIOGRAM REPORT: CPT

## 2017-05-17 RX ADMIN — Medication 100 MILLIGRAM(S): at 18:00

## 2017-05-17 RX ADMIN — Medication 40 MILLIGRAM(S): at 06:09

## 2017-05-17 RX ADMIN — CARVEDILOL PHOSPHATE 25 MILLIGRAM(S): 80 CAPSULE, EXTENDED RELEASE ORAL at 06:09

## 2017-05-17 RX ADMIN — Medication 100 MILLIGRAM(S): at 14:19

## 2017-05-17 RX ADMIN — CARVEDILOL PHOSPHATE 25 MILLIGRAM(S): 80 CAPSULE, EXTENDED RELEASE ORAL at 21:12

## 2017-05-17 RX ADMIN — Medication 325 MILLIGRAM(S): at 12:24

## 2017-05-17 RX ADMIN — OXYCODONE HYDROCHLORIDE 5 MILLIGRAM(S): 5 TABLET ORAL at 22:06

## 2017-05-17 RX ADMIN — Medication 100 MILLIGRAM(S): at 06:08

## 2017-05-17 RX ADMIN — FAMOTIDINE 20 MILLIGRAM(S): 10 INJECTION INTRAVENOUS at 06:09

## 2017-05-17 RX ADMIN — OXYCODONE HYDROCHLORIDE 5 MILLIGRAM(S): 5 TABLET ORAL at 22:36

## 2017-05-17 RX ADMIN — FAMOTIDINE 20 MILLIGRAM(S): 10 INJECTION INTRAVENOUS at 22:06

## 2017-05-17 RX ADMIN — Medication 100 MILLIGRAM(S): at 06:09

## 2017-05-17 NOTE — PROGRESS NOTE ADULT - SUBJECTIVE AND OBJECTIVE BOX
PROCEDURE(S): [employer] Micra Leadless PPM Implant.     ELECTRPHYSIOLOGIST(S): Jai Carrion MD & Miah Smith MD         COMPLICATIONS:  none        DISPOSITION:  observation   CONDITION: Stable      Pt doing well s/p leadless PPM implant. Denies complaint.       MEDICATIONS  (STANDING):  aspirin enteric coated 325milliGRAM(s) Oral daily  docusate sodium 100milliGRAM(s) Oral three times a day  furosemide    Tablet 40milliGRAM(s) Oral daily  senna 2Tablet(s) Oral at bedtime  carvedilol 25milliGRAM(s) Oral every 12 hours  famotidine    Tablet 20milliGRAM(s) Oral two times a day  ceFAZolin   IVPB 2000milliGRAM(s) IV Intermittent every 8 hours    MEDICATIONS  (PRN):  oxyCODONE IR 5milliGRAM(s) Oral every 4 hours PRN Mild Pain (1 - 3)      Allergies  ACE inhibitors (Other)  amiodarone (Other)    Exam:   T(C): 36.6, Max: 36.8 (05-17 @ 07:21)  HR: 80 (80 - 80)  BP: 136/80 (120/72 - 138/87)  RR: 29 (16 - 29)  SpO2: 100% (100% - 100%)    VSS, NAD, A&O x 3  Card: S1/S2, RRR, no m/g/r  Resp: lungs CTA b/l  Abd: S/NT/ND  Groin (right only):hemostatic suture in place; bandage C/D/I; no bleeding, hematoma, erythema, exudate or edema  Ext: no edema; distal pulses intact      Assessment:   71 yr old man with a history of atrial fibrillation, non-obstructive CAD, Bsc PPM (implant date 5/10/2016 active fixation lead 53cm Dextrus BSC pacing lead ), non-ischemic dilated cardiomyopathy with moderate, chronic  systolic heart failure (LVEF 40-45%) , mitral valve disease, HTN, and recurrent admissions for decompensated heart failure in the setting of AF with uncontrolled ventricular rate who had an AVN ablation with CRT-P upgrade 2 weeks ago, w/ subsequent pocket infection requiring system extraction & semi-perm RV pacer implant. He is now status post uncomplicated MDT Micra leadless PPM implant.       Plan:   Bedrest x 6 hours following sheath removal and hemostasis, then OOB  w/ assist & advance as tolerated.   Groin suture to be removed by EP service in AM.   Pain control w/ PO analgesia PRN.   Continued observation on telemetry overnight.   Cont Ancef per ID recommendations.    NO HEPARIN OR LOVENOX, INCLUDING PROPHYLACTIC/SUBCUT DOSING, UNTIL OTHERWISE ADVISED BY EP.   Resume other prior medications.   PA/Lat CXR and device check in AM.   Pending status overnight, anticipate d/c semi perm pacer in AM.
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
## 2017-05-18 DIAGNOSIS — S30.1XXA CONTUSION OF ABDOMINAL WALL, INITIAL ENCOUNTER: ICD-10-CM

## 2017-05-18 LAB
ANION GAP SERPL CALC-SCNC: 10 MMOL/L — SIGNIFICANT CHANGE UP (ref 5–17)
BUN SERPL-MCNC: 21 MG/DL — HIGH (ref 8–20)
CALCIUM SERPL-MCNC: 8.7 MG/DL — SIGNIFICANT CHANGE UP (ref 8.6–10.2)
CHLORIDE SERPL-SCNC: 104 MMOL/L — SIGNIFICANT CHANGE UP (ref 98–107)
CO2 SERPL-SCNC: 29 MMOL/L — SIGNIFICANT CHANGE UP (ref 22–29)
CREAT SERPL-MCNC: 0.87 MG/DL — SIGNIFICANT CHANGE UP (ref 0.5–1.3)
GLUCOSE SERPL-MCNC: 92 MG/DL — SIGNIFICANT CHANGE UP (ref 70–115)
HCT VFR BLD CALC: 36.1 % — LOW (ref 42–52)
HCT VFR BLD CALC: 36.6 % — LOW (ref 42–52)
HGB BLD-MCNC: 11.3 G/DL — LOW (ref 14–18)
HGB BLD-MCNC: 11.5 G/DL — LOW (ref 14–18)
MAGNESIUM SERPL-MCNC: 1.8 MG/DL — SIGNIFICANT CHANGE UP (ref 1.6–2.6)
MCHC RBC-ENTMCNC: 29.3 PG — SIGNIFICANT CHANGE UP (ref 27–31)
MCHC RBC-ENTMCNC: 29.5 PG — SIGNIFICANT CHANGE UP (ref 27–31)
MCHC RBC-ENTMCNC: 31.3 G/DL — LOW (ref 32–36)
MCHC RBC-ENTMCNC: 31.4 G/DL — LOW (ref 32–36)
MCV RBC AUTO: 93.1 FL — SIGNIFICANT CHANGE UP (ref 80–94)
MCV RBC AUTO: 94.3 FL — HIGH (ref 80–94)
PHOSPHATE SERPL-MCNC: 4.9 MG/DL — HIGH (ref 2.4–4.7)
PLATELET # BLD AUTO: 120 K/UL — LOW (ref 150–400)
PLATELET # BLD AUTO: 128 K/UL — LOW (ref 150–400)
POTASSIUM SERPL-MCNC: 4 MMOL/L — SIGNIFICANT CHANGE UP (ref 3.5–5.3)
POTASSIUM SERPL-SCNC: 4 MMOL/L — SIGNIFICANT CHANGE UP (ref 3.5–5.3)
RBC # BLD: 3.83 M/UL — LOW (ref 4.6–6.2)
RBC # BLD: 3.93 M/UL — LOW (ref 4.6–6.2)
RBC # FLD: 14.8 % — SIGNIFICANT CHANGE UP (ref 11–15.6)
RBC # FLD: 15 % — SIGNIFICANT CHANGE UP (ref 11–15.6)
SODIUM SERPL-SCNC: 143 MMOL/L — SIGNIFICANT CHANGE UP (ref 135–145)
WBC # BLD: 4.8 K/UL — SIGNIFICANT CHANGE UP (ref 4.8–10.8)
WBC # BLD: 5.2 K/UL — SIGNIFICANT CHANGE UP (ref 4.8–10.8)
WBC # FLD AUTO: 4.8 K/UL — SIGNIFICANT CHANGE UP (ref 4.8–10.8)
WBC # FLD AUTO: 5.2 K/UL — SIGNIFICANT CHANGE UP (ref 4.8–10.8)

## 2017-05-18 PROCEDURE — 93926 LOWER EXTREMITY STUDY: CPT | Mod: 26,RT

## 2017-05-18 PROCEDURE — 93010 ELECTROCARDIOGRAM REPORT: CPT | Mod: 76

## 2017-05-18 PROCEDURE — 71020: CPT | Mod: 26

## 2017-05-18 RX ORDER — FENTANYL CITRATE 50 UG/ML
25 INJECTION INTRAVENOUS ONCE
Qty: 0 | Refills: 0 | Status: DISCONTINUED | OUTPATIENT
Start: 2017-05-18 | End: 2017-05-18

## 2017-05-18 RX ORDER — MAGNESIUM SULFATE 500 MG/ML
2 VIAL (ML) INJECTION ONCE
Qty: 0 | Refills: 0 | Status: COMPLETED | OUTPATIENT
Start: 2017-05-18 | End: 2017-05-18

## 2017-05-18 RX ADMIN — OXYCODONE HYDROCHLORIDE 5 MILLIGRAM(S): 5 TABLET ORAL at 23:30

## 2017-05-18 RX ADMIN — Medication 325 MILLIGRAM(S): at 14:56

## 2017-05-18 RX ADMIN — Medication 100 MILLIGRAM(S): at 14:54

## 2017-05-18 RX ADMIN — OXYCODONE HYDROCHLORIDE 5 MILLIGRAM(S): 5 TABLET ORAL at 04:53

## 2017-05-18 RX ADMIN — FENTANYL CITRATE 25 MICROGRAM(S): 50 INJECTION INTRAVENOUS at 10:55

## 2017-05-18 RX ADMIN — FENTANYL CITRATE 25 MICROGRAM(S): 50 INJECTION INTRAVENOUS at 10:40

## 2017-05-18 RX ADMIN — OXYCODONE HYDROCHLORIDE 5 MILLIGRAM(S): 5 TABLET ORAL at 05:30

## 2017-05-18 RX ADMIN — Medication 100 MILLIGRAM(S): at 22:33

## 2017-05-18 RX ADMIN — OXYCODONE HYDROCHLORIDE 5 MILLIGRAM(S): 5 TABLET ORAL at 22:33

## 2017-05-18 RX ADMIN — Medication 100 MILLIGRAM(S): at 04:57

## 2017-05-18 RX ADMIN — Medication 40 MILLIGRAM(S): at 05:50

## 2017-05-18 RX ADMIN — Medication 100 MILLIGRAM(S): at 05:50

## 2017-05-18 RX ADMIN — CARVEDILOL PHOSPHATE 25 MILLIGRAM(S): 80 CAPSULE, EXTENDED RELEASE ORAL at 05:50

## 2017-05-18 RX ADMIN — Medication 100 MILLIGRAM(S): at 05:52

## 2017-05-18 RX ADMIN — FAMOTIDINE 20 MILLIGRAM(S): 10 INJECTION INTRAVENOUS at 05:50

## 2017-05-18 RX ADMIN — CARVEDILOL PHOSPHATE 25 MILLIGRAM(S): 80 CAPSULE, EXTENDED RELEASE ORAL at 18:18

## 2017-05-18 RX ADMIN — Medication 50 GRAM(S): at 08:27

## 2017-05-18 RX ADMIN — Medication 100 MILLIGRAM(S): at 14:56

## 2017-05-18 RX ADMIN — FAMOTIDINE 20 MILLIGRAM(S): 10 INJECTION INTRAVENOUS at 18:18

## 2017-05-18 NOTE — PROGRESS NOTE ADULT - PROBLEM SELECTOR PLAN 2
s/p PPM placement two weeks ago   5/11  s/p PPM explant> temporary wire in place currently  Occlusive dressing to be maintained  Continue Vancomycin antibiotics per ID  daily vanco trough s/p PPM placement two weeks ago   5/11  s/p PPM explant> temporary wire in place currently  Occlusive dressing to be maintained  Continue ancef (IV inpatient only)

## 2017-05-18 NOTE — PROGRESS NOTE ADULT - SUBJECTIVE AND OBJECTIVE BOX
Called by RN around 10pm, pt c/o R groin pain on arrival to the floor. Pt seen and assessed, small R groin hematoma noted and manually compressed x15 minutes with significant improvement, however, some swelling noted again about 1 hour later, likely due to pt's coughing. Vital signs stable.  A/P:  -small R groin hematoma  -manual compression followed by sandbag x1 hour  -bedrest overnight  -Robitussin for cough  -pain control  -frequent groin checks  -pt to be seen by EP service in AM
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
## 2017-05-19 LAB
ALBUMIN SERPL ELPH-MCNC: 3.7 G/DL — SIGNIFICANT CHANGE UP (ref 3.3–5.2)
ALP SERPL-CCNC: 126 U/L — HIGH (ref 40–120)
ALT FLD-CCNC: 8 U/L — SIGNIFICANT CHANGE UP
ANION GAP SERPL CALC-SCNC: 12 MMOL/L — SIGNIFICANT CHANGE UP (ref 5–17)
ANION GAP SERPL CALC-SCNC: 15 MMOL/L — SIGNIFICANT CHANGE UP (ref 5–17)
APTT BLD: 35.9 SEC — SIGNIFICANT CHANGE UP (ref 27.5–37.4)
AST SERPL-CCNC: 21 U/L — SIGNIFICANT CHANGE UP
BASOPHILS # BLD AUTO: 0 K/UL — SIGNIFICANT CHANGE UP (ref 0–0.2)
BASOPHILS NFR BLD AUTO: 0.5 % — SIGNIFICANT CHANGE UP (ref 0–2)
BILIRUB SERPL-MCNC: 1.5 MG/DL — SIGNIFICANT CHANGE UP (ref 0.4–2)
BUN SERPL-MCNC: 16 MG/DL — SIGNIFICANT CHANGE UP (ref 8–20)
BUN SERPL-MCNC: 20 MG/DL — SIGNIFICANT CHANGE UP (ref 8–20)
CALCIUM SERPL-MCNC: 8.6 MG/DL — SIGNIFICANT CHANGE UP (ref 8.6–10.2)
CALCIUM SERPL-MCNC: 9.1 MG/DL — SIGNIFICANT CHANGE UP (ref 8.6–10.2)
CHLORIDE SERPL-SCNC: 100 MMOL/L — SIGNIFICANT CHANGE UP (ref 98–107)
CHLORIDE SERPL-SCNC: 98 MMOL/L — SIGNIFICANT CHANGE UP (ref 98–107)
CO2 SERPL-SCNC: 25 MMOL/L — SIGNIFICANT CHANGE UP (ref 22–29)
CO2 SERPL-SCNC: 29 MMOL/L — SIGNIFICANT CHANGE UP (ref 22–29)
CREAT SERPL-MCNC: 0.78 MG/DL — SIGNIFICANT CHANGE UP (ref 0.5–1.3)
CREAT SERPL-MCNC: 0.83 MG/DL — SIGNIFICANT CHANGE UP (ref 0.5–1.3)
EOSINOPHIL # BLD AUTO: 0.2 K/UL — SIGNIFICANT CHANGE UP (ref 0–0.5)
EOSINOPHIL NFR BLD AUTO: 3.6 % — SIGNIFICANT CHANGE UP (ref 0–5)
GAS PNL BLDA: SIGNIFICANT CHANGE UP
GLUCOSE SERPL-MCNC: 96 MG/DL — SIGNIFICANT CHANGE UP (ref 70–115)
GLUCOSE SERPL-MCNC: 98 MG/DL — SIGNIFICANT CHANGE UP (ref 70–115)
HCT VFR BLD CALC: 36.7 % — LOW (ref 42–52)
HCT VFR BLD CALC: 37.6 % — LOW (ref 42–52)
HGB BLD-MCNC: 11.7 G/DL — LOW (ref 14–18)
HGB BLD-MCNC: 11.9 G/DL — LOW (ref 14–18)
INR BLD: 1.24 RATIO — HIGH (ref 0.88–1.16)
LYMPHOCYTES # BLD AUTO: 1.2 K/UL — SIGNIFICANT CHANGE UP (ref 1–4.8)
LYMPHOCYTES # BLD AUTO: 20.5 % — SIGNIFICANT CHANGE UP (ref 20–55)
MAGNESIUM SERPL-MCNC: 2 MG/DL — SIGNIFICANT CHANGE UP (ref 1.6–2.6)
MCHC RBC-ENTMCNC: 29.5 PG — SIGNIFICANT CHANGE UP (ref 27–31)
MCHC RBC-ENTMCNC: 29.6 PG — SIGNIFICANT CHANGE UP (ref 27–31)
MCHC RBC-ENTMCNC: 31.6 G/DL — LOW (ref 32–36)
MCHC RBC-ENTMCNC: 31.9 G/DL — LOW (ref 32–36)
MCV RBC AUTO: 92.4 FL — SIGNIFICANT CHANGE UP (ref 80–94)
MCV RBC AUTO: 93.5 FL — SIGNIFICANT CHANGE UP (ref 80–94)
MONOCYTES # BLD AUTO: 0.5 K/UL — SIGNIFICANT CHANGE UP (ref 0–0.8)
MONOCYTES NFR BLD AUTO: 7.8 % — SIGNIFICANT CHANGE UP (ref 3–10)
NEUTROPHILS # BLD AUTO: 4 K/UL — SIGNIFICANT CHANGE UP (ref 1.8–8)
NEUTROPHILS NFR BLD AUTO: 67.4 % — SIGNIFICANT CHANGE UP (ref 37–73)
PHOSPHATE SERPL-MCNC: 3.8 MG/DL — SIGNIFICANT CHANGE UP (ref 2.4–4.7)
PLATELET # BLD AUTO: 112 K/UL — LOW (ref 150–400)
PLATELET # BLD AUTO: 116 K/UL — LOW (ref 150–400)
POTASSIUM SERPL-MCNC: 3.5 MMOL/L — SIGNIFICANT CHANGE UP (ref 3.5–5.3)
POTASSIUM SERPL-MCNC: 4.2 MMOL/L — SIGNIFICANT CHANGE UP (ref 3.5–5.3)
POTASSIUM SERPL-SCNC: 3.5 MMOL/L — SIGNIFICANT CHANGE UP (ref 3.5–5.3)
POTASSIUM SERPL-SCNC: 4.2 MMOL/L — SIGNIFICANT CHANGE UP (ref 3.5–5.3)
PROT SERPL-MCNC: 6.6 G/DL — SIGNIFICANT CHANGE UP (ref 6.6–8.7)
PROTHROM AB SERPL-ACNC: 13.7 SEC — HIGH (ref 9.8–12.7)
RBC # BLD: 3.97 M/UL — LOW (ref 4.6–6.2)
RBC # BLD: 4.02 M/UL — LOW (ref 4.6–6.2)
RBC # FLD: 14.6 % — SIGNIFICANT CHANGE UP (ref 11–15.6)
RBC # FLD: 14.9 % — SIGNIFICANT CHANGE UP (ref 11–15.6)
SODIUM SERPL-SCNC: 139 MMOL/L — SIGNIFICANT CHANGE UP (ref 135–145)
SODIUM SERPL-SCNC: 140 MMOL/L — SIGNIFICANT CHANGE UP (ref 135–145)
WBC # BLD: 4.8 K/UL — SIGNIFICANT CHANGE UP (ref 4.8–10.8)
WBC # BLD: 5.9 K/UL — SIGNIFICANT CHANGE UP (ref 4.8–10.8)
WBC # FLD AUTO: 4.8 K/UL — SIGNIFICANT CHANGE UP (ref 4.8–10.8)
WBC # FLD AUTO: 5.9 K/UL — SIGNIFICANT CHANGE UP (ref 4.8–10.8)

## 2017-05-19 PROCEDURE — 33207 INSERT HEART PM VENTRICULAR: CPT | Mod: 78,KX

## 2017-05-19 PROCEDURE — 93010 ELECTROCARDIOGRAM REPORT: CPT

## 2017-05-19 PROCEDURE — 71010: CPT | Mod: 26

## 2017-05-19 RX ORDER — FAMOTIDINE 10 MG/ML
20 INJECTION INTRAVENOUS DAILY
Qty: 0 | Refills: 0 | Status: DISCONTINUED | OUTPATIENT
Start: 2017-05-19 | End: 2017-05-23

## 2017-05-19 RX ORDER — POTASSIUM CHLORIDE 20 MEQ
20 PACKET (EA) ORAL ONCE
Qty: 0 | Refills: 0 | Status: COMPLETED | OUTPATIENT
Start: 2017-05-19 | End: 2017-05-19

## 2017-05-19 RX ORDER — CEFAZOLIN SODIUM 1 G
2000 VIAL (EA) INJECTION EVERY 8 HOURS
Qty: 0 | Refills: 0 | Status: DISCONTINUED | OUTPATIENT
Start: 2017-05-19 | End: 2017-05-22

## 2017-05-19 RX ORDER — CARVEDILOL PHOSPHATE 80 MG/1
12.5 CAPSULE, EXTENDED RELEASE ORAL ONCE
Qty: 0 | Refills: 0 | Status: COMPLETED | OUTPATIENT
Start: 2017-05-19 | End: 2017-05-19

## 2017-05-19 RX ORDER — SENNA PLUS 8.6 MG/1
2 TABLET ORAL AT BEDTIME
Qty: 0 | Refills: 0 | Status: DISCONTINUED | OUTPATIENT
Start: 2017-05-19 | End: 2017-05-23

## 2017-05-19 RX ORDER — FENTANYL CITRATE 50 UG/ML
25 INJECTION INTRAVENOUS ONCE
Qty: 0 | Refills: 0 | Status: DISCONTINUED | OUTPATIENT
Start: 2017-05-19 | End: 2017-05-19

## 2017-05-19 RX ORDER — CARVEDILOL PHOSPHATE 80 MG/1
25 CAPSULE, EXTENDED RELEASE ORAL EVERY 12 HOURS
Qty: 0 | Refills: 0 | Status: DISCONTINUED | OUTPATIENT
Start: 2017-05-19 | End: 2017-05-23

## 2017-05-19 RX ORDER — FUROSEMIDE 40 MG
40 TABLET ORAL DAILY
Qty: 0 | Refills: 0 | Status: DISCONTINUED | OUTPATIENT
Start: 2017-05-19 | End: 2017-05-23

## 2017-05-19 RX ORDER — SODIUM CHLORIDE 9 MG/ML
1000 INJECTION INTRAMUSCULAR; INTRAVENOUS; SUBCUTANEOUS
Qty: 0 | Refills: 0 | Status: DISCONTINUED | OUTPATIENT
Start: 2017-05-19 | End: 2017-05-21

## 2017-05-19 RX ORDER — ASPIRIN/CALCIUM CARB/MAGNESIUM 324 MG
325 TABLET ORAL DAILY
Qty: 0 | Refills: 0 | Status: DISCONTINUED | OUTPATIENT
Start: 2017-05-19 | End: 2017-05-22

## 2017-05-19 RX ORDER — DOCUSATE SODIUM 100 MG
100 CAPSULE ORAL DAILY
Qty: 0 | Refills: 0 | Status: DISCONTINUED | OUTPATIENT
Start: 2017-05-19 | End: 2017-05-23

## 2017-05-19 RX ADMIN — Medication 20 MILLIEQUIVALENT(S): at 23:22

## 2017-05-19 RX ADMIN — FENTANYL CITRATE 25 MICROGRAM(S): 50 INJECTION INTRAVENOUS at 23:34

## 2017-05-19 RX ADMIN — Medication 40 MILLIGRAM(S): at 23:36

## 2017-05-19 RX ADMIN — FAMOTIDINE 20 MILLIGRAM(S): 10 INJECTION INTRAVENOUS at 05:39

## 2017-05-19 RX ADMIN — CARVEDILOL PHOSPHATE 12.5 MILLIGRAM(S): 80 CAPSULE, EXTENDED RELEASE ORAL at 23:49

## 2017-05-19 RX ADMIN — Medication 100 MILLIGRAM(S): at 05:40

## 2017-05-19 RX ADMIN — Medication 100 MILLIGRAM(S): at 22:30

## 2017-05-19 RX ADMIN — Medication 100 MILLIGRAM(S): at 14:00

## 2017-05-19 RX ADMIN — CARVEDILOL PHOSPHATE 25 MILLIGRAM(S): 80 CAPSULE, EXTENDED RELEASE ORAL at 05:40

## 2017-05-19 RX ADMIN — OXYCODONE HYDROCHLORIDE 5 MILLIGRAM(S): 5 TABLET ORAL at 13:21

## 2017-05-19 RX ADMIN — OXYCODONE HYDROCHLORIDE 5 MILLIGRAM(S): 5 TABLET ORAL at 12:21

## 2017-05-19 RX ADMIN — FENTANYL CITRATE 25 MICROGRAM(S): 50 INJECTION INTRAVENOUS at 23:51

## 2017-05-19 RX ADMIN — Medication 40 MILLIGRAM(S): at 05:39

## 2017-05-19 RX ADMIN — SENNA PLUS 2 TABLET(S): 8.6 TABLET ORAL at 23:22

## 2017-05-19 RX ADMIN — Medication 1 DROP(S): at 23:22

## 2017-05-19 RX ADMIN — Medication 100 MILLIGRAM(S): at 23:22

## 2017-05-19 NOTE — CHART NOTE - NSCHARTNOTEFT_GEN_A_CORE
Called ~2210 to assess pt for bradycardia. On tele, pt with ventricular capture at rate of 40 with other non capturing spikes.  Pt asymptomatic. /84.  patient began again pacing at 80. EKG obtained which showed appropriate capture at 80.  spoke with Dr. Carrion. leadless pacemaker set for 80, RIJ backup pacer set at 40. likely leadless pacemaker stopped capturing (noted to have high threshold during day 5/18). decision made to transfer pt to ICU for monitoring. pt made NPO, bedrest. will need revision in AM with EP. Dr. Boyd aware.
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
## 2017-05-19 NOTE — PRE-OP CHECKLIST - SELECT TESTS ORDERED
CMP/EKG/Type and Screen/Results in MD note/Urinalysis/Hepatic Function/BMP/PT/PTT/Type and Cross/CXR/INR
CBC/Results in MD note/PT/PTT/BMP
EKG/CBC/BMP

## 2017-05-20 LAB
ANION GAP SERPL CALC-SCNC: 14 MMOL/L — SIGNIFICANT CHANGE UP (ref 5–17)
BUN SERPL-MCNC: 17 MG/DL — SIGNIFICANT CHANGE UP (ref 8–20)
CALCIUM SERPL-MCNC: 8.8 MG/DL — SIGNIFICANT CHANGE UP (ref 8.6–10.2)
CHLORIDE SERPL-SCNC: 101 MMOL/L — SIGNIFICANT CHANGE UP (ref 98–107)
CO2 SERPL-SCNC: 25 MMOL/L — SIGNIFICANT CHANGE UP (ref 22–29)
CREAT SERPL-MCNC: 0.85 MG/DL — SIGNIFICANT CHANGE UP (ref 0.5–1.3)
GLUCOSE SERPL-MCNC: 107 MG/DL — SIGNIFICANT CHANGE UP (ref 70–115)
HCT VFR BLD CALC: 36 % — LOW (ref 42–52)
HGB BLD-MCNC: 11.3 G/DL — LOW (ref 14–18)
MAGNESIUM SERPL-MCNC: 1.7 MG/DL — LOW (ref 1.8–2.6)
MCHC RBC-ENTMCNC: 29.1 PG — SIGNIFICANT CHANGE UP (ref 27–31)
MCHC RBC-ENTMCNC: 31.4 G/DL — LOW (ref 32–36)
MCV RBC AUTO: 92.8 FL — SIGNIFICANT CHANGE UP (ref 80–94)
PHOSPHATE SERPL-MCNC: 4.1 MG/DL — SIGNIFICANT CHANGE UP (ref 2.4–4.7)
PLATELET # BLD AUTO: 112 K/UL — LOW (ref 150–400)
POTASSIUM SERPL-MCNC: 4.4 MMOL/L — SIGNIFICANT CHANGE UP (ref 3.5–5.3)
POTASSIUM SERPL-SCNC: 4.4 MMOL/L — SIGNIFICANT CHANGE UP (ref 3.5–5.3)
RBC # BLD: 3.88 M/UL — LOW (ref 4.6–6.2)
RBC # FLD: 14.6 % — SIGNIFICANT CHANGE UP (ref 11–15.6)
SODIUM SERPL-SCNC: 140 MMOL/L — SIGNIFICANT CHANGE UP (ref 135–145)
WBC # BLD: 8.8 K/UL — SIGNIFICANT CHANGE UP (ref 4.8–10.8)
WBC # FLD AUTO: 8.8 K/UL — SIGNIFICANT CHANGE UP (ref 4.8–10.8)

## 2017-05-20 PROCEDURE — 99231 SBSQ HOSP IP/OBS SF/LOW 25: CPT

## 2017-05-20 PROCEDURE — 71010: CPT | Mod: 26

## 2017-05-20 PROCEDURE — 93010 ELECTROCARDIOGRAM REPORT: CPT

## 2017-05-20 RX ORDER — ENOXAPARIN SODIUM 100 MG/ML
40 INJECTION SUBCUTANEOUS DAILY
Qty: 0 | Refills: 0 | Status: DISCONTINUED | OUTPATIENT
Start: 2017-05-20 | End: 2017-05-22

## 2017-05-20 RX ORDER — MAGNESIUM OXIDE 400 MG ORAL TABLET 241.3 MG
400 TABLET ORAL ONCE
Qty: 0 | Refills: 0 | Status: COMPLETED | OUTPATIENT
Start: 2017-05-20 | End: 2017-05-20

## 2017-05-20 RX ORDER — OFLOXACIN 0.3 %
1 DROPS OPHTHALMIC (EYE)
Qty: 0 | Refills: 0 | Status: DISCONTINUED | OUTPATIENT
Start: 2017-05-20 | End: 2017-05-21

## 2017-05-20 RX ORDER — BENZOCAINE AND MENTHOL 5; 1 G/100ML; G/100ML
1 LIQUID ORAL THREE TIMES A DAY
Qty: 0 | Refills: 0 | Status: DISCONTINUED | OUTPATIENT
Start: 2017-05-20 | End: 2017-05-23

## 2017-05-20 RX ADMIN — ENOXAPARIN SODIUM 40 MILLIGRAM(S): 100 INJECTION SUBCUTANEOUS at 11:45

## 2017-05-20 RX ADMIN — Medication 100 MILLIGRAM(S): at 06:52

## 2017-05-20 RX ADMIN — Medication 1 DROP(S): at 06:52

## 2017-05-20 RX ADMIN — BENZOCAINE AND MENTHOL 1 LOZENGE: 5; 1 LIQUID ORAL at 22:55

## 2017-05-20 RX ADMIN — CARVEDILOL PHOSPHATE 25 MILLIGRAM(S): 80 CAPSULE, EXTENDED RELEASE ORAL at 06:52

## 2017-05-20 RX ADMIN — Medication 100 MILLIGRAM(S): at 22:57

## 2017-05-20 RX ADMIN — FAMOTIDINE 20 MILLIGRAM(S): 10 INJECTION INTRAVENOUS at 11:45

## 2017-05-20 RX ADMIN — Medication 100 MILLIGRAM(S): at 11:45

## 2017-05-20 RX ADMIN — Medication 1 DROP(S): at 14:02

## 2017-05-20 RX ADMIN — SENNA PLUS 2 TABLET(S): 8.6 TABLET ORAL at 22:55

## 2017-05-20 RX ADMIN — SODIUM CHLORIDE 5 MILLILITER(S): 9 INJECTION INTRAMUSCULAR; INTRAVENOUS; SUBCUTANEOUS at 06:56

## 2017-05-20 RX ADMIN — Medication 40 MILLIGRAM(S): at 06:52

## 2017-05-20 RX ADMIN — Medication 100 MILLIGRAM(S): at 14:45

## 2017-05-20 RX ADMIN — MAGNESIUM OXIDE 400 MG ORAL TABLET 400 MILLIGRAM(S): 241.3 TABLET ORAL at 06:52

## 2017-05-20 RX ADMIN — Medication 325 MILLIGRAM(S): at 11:45
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
## 2017-05-20 NOTE — PROGRESS NOTE ADULT - SUBJECTIVE AND OBJECTIVE BOX
Cardiology NP note:     -s/p successful implantation of single chamber PPM via left cephalic vein cutdown; VVIR ; unsuccessful explantation of micra pacemaker  -LCW site benign without hematoma/bleeding; LUE warm/mobile/acyanotic; + Left radial pulse  -RCW prior site of PPM extraction secondary to pocket erosion with staples CDI  -Right groin site benign without hematoma; mild ooze; soft no bruit; + distal pulse  -Sutures discontinued by me from Left groin site--benign without hematoma/bleeding; soft; + distal pulse  -VSS  -EKG: paced 60 bpm    A/P: 72 y/o male with h/o single chamber ppm implant 2016 and CRT-P upgrade April 2017 who presented with ppm pocket erosion and underwent ppm extraction placement of temporary semi-permanent pacemaker.  He underwent leadless ppm implant 5/17/17 but had intermittent loss of capture thought to be positional.  Given pacemaker dependency, the patient was referred for ppm implantation and removal of semipermanent device now s/p the above mentioned procedures.  -Restart Xarelto 3 days post op.  -CXR PA and lateral prior to discharge as per Dr. Smith  -Wound check and ppm testing as outpatient in two weeks.   -IV abx as ordered post procedure  -Activity instructions d/w patient with   -Additonal plan as per CTICU staff

## 2017-05-20 NOTE — CHART NOTE - NSCHARTNOTEFT_GEN_A_CORE
Provider met with  at bedside to analyze and assess patient.  Patient made provider aware via  that he was having discomfort in his left eye,  Left medial aspect of eye conjunctiva was noted to be injected and appeared inflamed.  As per patient, he denies difficulty seeing or blurry vision but does admit pain and is requesting drops.  Patient stated he believed that he might have scratched his eye with the pulse ox finger sticker when he was itching his eye after returning back to the ICU after PPM placement in the OR.  As such drops were prescribed.  Patient agreed with treatment plan.   Patient without other acute complaints at this time.

## 2017-05-21 DIAGNOSIS — I50.43 ACUTE ON CHRONIC COMBINED SYSTOLIC (CONGESTIVE) AND DIASTOLIC (CONGESTIVE) HEART FAILURE: ICD-10-CM

## 2017-05-21 LAB
ANION GAP SERPL CALC-SCNC: 12 MMOL/L — SIGNIFICANT CHANGE UP (ref 5–17)
BUN SERPL-MCNC: 17 MG/DL — SIGNIFICANT CHANGE UP (ref 8–20)
CALCIUM SERPL-MCNC: 8.7 MG/DL — SIGNIFICANT CHANGE UP (ref 8.6–10.2)
CHLORIDE SERPL-SCNC: 98 MMOL/L — SIGNIFICANT CHANGE UP (ref 98–107)
CO2 SERPL-SCNC: 29 MMOL/L — SIGNIFICANT CHANGE UP (ref 22–29)
CREAT SERPL-MCNC: 0.87 MG/DL — SIGNIFICANT CHANGE UP (ref 0.5–1.3)
GLUCOSE SERPL-MCNC: 95 MG/DL — SIGNIFICANT CHANGE UP (ref 70–115)
HCT VFR BLD CALC: 34.2 % — LOW (ref 42–52)
HGB BLD-MCNC: 10.9 G/DL — LOW (ref 14–18)
MAGNESIUM SERPL-MCNC: 1.7 MG/DL — SIGNIFICANT CHANGE UP (ref 1.6–2.6)
MCHC RBC-ENTMCNC: 29.7 PG — SIGNIFICANT CHANGE UP (ref 27–31)
MCHC RBC-ENTMCNC: 31.9 G/DL — LOW (ref 32–36)
MCV RBC AUTO: 93.2 FL — SIGNIFICANT CHANGE UP (ref 80–94)
PHOSPHATE SERPL-MCNC: 3.7 MG/DL — SIGNIFICANT CHANGE UP (ref 2.4–4.7)
PLATELET # BLD AUTO: 98 K/UL — LOW (ref 150–400)
POTASSIUM SERPL-MCNC: 3.9 MMOL/L — SIGNIFICANT CHANGE UP (ref 3.5–5.3)
POTASSIUM SERPL-SCNC: 3.9 MMOL/L — SIGNIFICANT CHANGE UP (ref 3.5–5.3)
RBC # BLD: 3.67 M/UL — LOW (ref 4.6–6.2)
RBC # FLD: 14.6 % — SIGNIFICANT CHANGE UP (ref 11–15.6)
SODIUM SERPL-SCNC: 139 MMOL/L — SIGNIFICANT CHANGE UP (ref 135–145)
WBC # BLD: 6 K/UL — SIGNIFICANT CHANGE UP (ref 4.8–10.8)
WBC # FLD AUTO: 6 K/UL — SIGNIFICANT CHANGE UP (ref 4.8–10.8)

## 2017-05-21 PROCEDURE — 71010: CPT | Mod: 26

## 2017-05-21 RX ORDER — MAGNESIUM SULFATE 500 MG/ML
1 VIAL (ML) INJECTION ONCE
Qty: 0 | Refills: 0 | Status: COMPLETED | OUTPATIENT
Start: 2017-05-21 | End: 2017-05-21

## 2017-05-21 RX ORDER — MAGNESIUM SULFATE 500 MG/ML
2 VIAL (ML) INJECTION ONCE
Qty: 0 | Refills: 0 | Status: DISCONTINUED | OUTPATIENT
Start: 2017-05-21 | End: 2017-05-21

## 2017-05-21 RX ORDER — POTASSIUM CHLORIDE 20 MEQ
40 PACKET (EA) ORAL ONCE
Qty: 0 | Refills: 0 | Status: COMPLETED | OUTPATIENT
Start: 2017-05-21 | End: 2017-05-21

## 2017-05-21 RX ADMIN — Medication 40 MILLIEQUIVALENT(S): at 13:39

## 2017-05-21 RX ADMIN — Medication 1 DROP(S): at 13:39

## 2017-05-21 RX ADMIN — Medication 40 MILLIGRAM(S): at 05:16

## 2017-05-21 RX ADMIN — Medication 100 MILLIGRAM(S): at 13:38

## 2017-05-21 RX ADMIN — Medication 100 MILLIGRAM(S): at 13:43

## 2017-05-21 RX ADMIN — Medication 1 DROP(S): at 05:17

## 2017-05-21 RX ADMIN — CARVEDILOL PHOSPHATE 25 MILLIGRAM(S): 80 CAPSULE, EXTENDED RELEASE ORAL at 18:49

## 2017-05-21 RX ADMIN — Medication 100 MILLIGRAM(S): at 06:14

## 2017-05-21 RX ADMIN — Medication 1 DROP(S): at 05:16

## 2017-05-21 RX ADMIN — Medication 100 MILLIGRAM(S): at 23:00

## 2017-05-21 RX ADMIN — SENNA PLUS 2 TABLET(S): 8.6 TABLET ORAL at 23:23

## 2017-05-21 RX ADMIN — FAMOTIDINE 20 MILLIGRAM(S): 10 INJECTION INTRAVENOUS at 13:38

## 2017-05-21 RX ADMIN — Medication 325 MILLIGRAM(S): at 13:38

## 2017-05-21 RX ADMIN — ENOXAPARIN SODIUM 40 MILLIGRAM(S): 100 INJECTION SUBCUTANEOUS at 23:23

## 2017-05-21 RX ADMIN — CARVEDILOL PHOSPHATE 25 MILLIGRAM(S): 80 CAPSULE, EXTENDED RELEASE ORAL at 05:16

## 2017-05-21 RX ADMIN — Medication 1 DROP(S): at 23:23

## 2017-05-21 RX ADMIN — Medication 100 GRAM(S): at 09:38

## 2017-05-21 RX ADMIN — Medication 1 DROP(S): at 18:49
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
## 2017-05-21 NOTE — PROGRESS NOTE ADULT - PROBLEM SELECTOR PLAN 2
Continue ancef (IV inpatient only)  Dr. Smith would like Keflex 500 mg PO BID x 14 days upon discharge

## 2017-05-22 ENCOUNTER — TRANSCRIPTION ENCOUNTER (OUTPATIENT)
Age: 72
End: 2017-05-22

## 2017-05-22 LAB
ANION GAP SERPL CALC-SCNC: 12 MMOL/L — SIGNIFICANT CHANGE UP (ref 5–17)
BUN SERPL-MCNC: 16 MG/DL — SIGNIFICANT CHANGE UP (ref 8–20)
CALCIUM SERPL-MCNC: 9 MG/DL — SIGNIFICANT CHANGE UP (ref 8.6–10.2)
CHLORIDE SERPL-SCNC: 99 MMOL/L — SIGNIFICANT CHANGE UP (ref 98–107)
CO2 SERPL-SCNC: 29 MMOL/L — SIGNIFICANT CHANGE UP (ref 22–29)
CREAT SERPL-MCNC: 0.95 MG/DL — SIGNIFICANT CHANGE UP (ref 0.5–1.3)
GLUCOSE SERPL-MCNC: 98 MG/DL — SIGNIFICANT CHANGE UP (ref 70–115)
HCT VFR BLD CALC: 32.9 % — LOW (ref 42–52)
HGB BLD-MCNC: 10.5 G/DL — LOW (ref 14–18)
MAGNESIUM SERPL-MCNC: 1.9 MG/DL — SIGNIFICANT CHANGE UP (ref 1.8–2.6)
MCHC RBC-ENTMCNC: 29.8 PG — SIGNIFICANT CHANGE UP (ref 27–31)
MCHC RBC-ENTMCNC: 31.9 G/DL — LOW (ref 32–36)
MCV RBC AUTO: 93.5 FL — SIGNIFICANT CHANGE UP (ref 80–94)
PHOSPHATE SERPL-MCNC: 3.5 MG/DL — SIGNIFICANT CHANGE UP (ref 2.4–4.7)
PLATELET # BLD AUTO: 98 K/UL — LOW (ref 150–400)
POTASSIUM SERPL-MCNC: 4.6 MMOL/L — SIGNIFICANT CHANGE UP (ref 3.5–5.3)
POTASSIUM SERPL-SCNC: 4.6 MMOL/L — SIGNIFICANT CHANGE UP (ref 3.5–5.3)
RBC # BLD: 3.52 M/UL — LOW (ref 4.6–6.2)
RBC # FLD: 14.7 % — SIGNIFICANT CHANGE UP (ref 11–15.6)
SODIUM SERPL-SCNC: 140 MMOL/L — SIGNIFICANT CHANGE UP (ref 135–145)
WBC # BLD: 6.6 K/UL — SIGNIFICANT CHANGE UP (ref 4.8–10.8)
WBC # FLD AUTO: 6.6 K/UL — SIGNIFICANT CHANGE UP (ref 4.8–10.8)

## 2017-05-22 PROCEDURE — 71020: CPT | Mod: 26

## 2017-05-22 PROCEDURE — 71010: CPT | Mod: 26

## 2017-05-22 RX ORDER — RIVAROXABAN 15 MG-20MG
1 KIT ORAL
Qty: 0 | Refills: 0 | COMMUNITY
Start: 2017-05-22

## 2017-05-22 RX ORDER — CEPHALEXIN 500 MG
500 CAPSULE ORAL EVERY 12 HOURS
Qty: 0 | Refills: 0 | Status: DISCONTINUED | OUTPATIENT
Start: 2017-05-22 | End: 2017-05-23

## 2017-05-22 RX ORDER — FAMOTIDINE 10 MG/ML
1 INJECTION INTRAVENOUS
Qty: 0 | Refills: 0 | COMMUNITY
Start: 2017-05-22

## 2017-05-22 RX ORDER — CEPHALEXIN 500 MG
1 CAPSULE ORAL
Qty: 28 | Refills: 0 | OUTPATIENT
Start: 2017-05-22 | End: 2017-06-05

## 2017-05-22 RX ORDER — RIVAROXABAN 15 MG-20MG
20 KIT ORAL DAILY
Qty: 0 | Refills: 0 | Status: DISCONTINUED | OUTPATIENT
Start: 2017-05-22 | End: 2017-05-23

## 2017-05-22 RX ORDER — ASPIRIN/CALCIUM CARB/MAGNESIUM 324 MG
81 TABLET ORAL DAILY
Qty: 0 | Refills: 0 | Status: DISCONTINUED | OUTPATIENT
Start: 2017-05-22 | End: 2017-05-23

## 2017-05-22 RX ORDER — ASPIRIN/CALCIUM CARB/MAGNESIUM 324 MG
1 TABLET ORAL
Qty: 0 | Refills: 0 | DISCHARGE
Start: 2017-05-22

## 2017-05-22 RX ADMIN — RIVAROXABAN 20 MILLIGRAM(S): KIT at 17:33

## 2017-05-22 RX ADMIN — Medication 1 DROP(S): at 06:42

## 2017-05-22 RX ADMIN — Medication 5 MILLIGRAM(S): at 09:51

## 2017-05-22 RX ADMIN — Medication 1 DROP(S): at 21:31

## 2017-05-22 RX ADMIN — Medication 100 MILLIGRAM(S): at 12:46

## 2017-05-22 RX ADMIN — Medication 40 MILLIGRAM(S): at 06:41

## 2017-05-22 RX ADMIN — Medication 1 DROP(S): at 17:36

## 2017-05-22 RX ADMIN — Medication 100 MILLIGRAM(S): at 06:41

## 2017-05-22 RX ADMIN — Medication 81 MILLIGRAM(S): at 12:48

## 2017-05-22 RX ADMIN — Medication 1 DROP(S): at 12:47

## 2017-05-22 RX ADMIN — CARVEDILOL PHOSPHATE 25 MILLIGRAM(S): 80 CAPSULE, EXTENDED RELEASE ORAL at 17:33

## 2017-05-22 RX ADMIN — CARVEDILOL PHOSPHATE 25 MILLIGRAM(S): 80 CAPSULE, EXTENDED RELEASE ORAL at 06:41

## 2017-05-22 RX ADMIN — Medication 500 MILLIGRAM(S): at 17:33

## 2017-05-22 RX ADMIN — FAMOTIDINE 20 MILLIGRAM(S): 10 INJECTION INTRAVENOUS at 12:46
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
## 2017-05-22 NOTE — PROGRESS NOTE ADULT - PROBLEM SELECTOR PLAN 2
Staples to d/c as O/P  Follow up June 7 at 4 pm Dr Gilbert>spoke w/ Chela  Instructed pt w/ language services> verbalizes understanding   Keflex 500 mg PO BID x 14 days upon discharge  Discharge home today

## 2017-05-22 NOTE — DISCHARGE NOTE ADULT - PLAN OF CARE
recover from surgery Follow up Dr Terrazas [id number]  [phone]  June 7th at 4  pm for exam and staple removal  Continue antibiotics x 2 weeks  No DRIVING  NO SHOWER FOR  2 WEEKS  Sleep on back x 2 weeks  Seek medical attention if fever, chills, swelling or drainage from pacemaker site

## 2017-05-22 NOTE — DISCHARGE NOTE ADULT - PATIENT PORTAL LINK FT
“You can access the [facility] Patient Portal, offered by [facility], by registering with the following website: [email or web address]”

## 2017-05-22 NOTE — PROGRESS NOTE ADULT - PROBLEM SELECTOR PROBLEM 4
Dilated cardiomyopathy

## 2017-05-22 NOTE — DISCHARGE NOTE ADULT - HOSPITAL COURSE
72 y/o male with h/o single chamber ppm implant 2016 and CRT-P upgrade April 2017 who presented with ppm pocket erosion and underwent ppm extraction placement of temporary semi-permanent pacemaker.  He underwent leadless ppm implant 5/17/17 but had intermittent loss of capture thought to be positional.  Given pacemaker dependency, the patient was referred for ppm implantation and removal of semipermanent device now s/p the above mentioned procedures.  -Restart Xarelto   -Wound check and ppm testing as outpatient Dr Terrazas office June 7th at 4pm  -oral abx x 2 weeks  post procedure  -Activity instructions d/w patient with

## 2017-05-22 NOTE — PROGRESS NOTE ADULT - PROBLEM SELECTOR PLAN 3
ASA coreg statin CAD
encourage f/u with outpatient physician

## 2017-05-22 NOTE — PROGRESS NOTE ADULT - PROBLEM SELECTOR PLAN 1
Patient is 100% PPM dependent  Left chest PPM placed by Dr. Carrion  Leadless PPM still in place - unable to be retrieved.
Patient is 100% PPM dependent  Left chest PPM placed by Dr. Carrion  Leadless PPM still in place - unable to be retrieved.
Patient is 100% PPM dependent  Left chest PPM placed by Dr. Vance  Leadless PPM still in place
Patient is 100% PPM dependent  Plan for pacemaker genarator Today w/ Dr Talley-  Pt currently NPO
Patient is 100% PPM dependent  Plan for pacemaker genarator Wednesday w/ Dr Jauregui
Patient is 100% PPM dependent  Plan for pacemaker iraida Valera w/ Dr Boyd
Patient is 100% PPM dependent  Plan for pacemaker iraida Valera w/ Dr Boyd
Patient is 100% PPM dependent  leadless pacemaker with high thresholds - maintain temporary pacemaker for another day and recheck.
NPO p midnight for PPM explantation. Plan to place temporary pacing wire before reimplanting new PPM   awaiting ID consult cont Vanco and Cefepime for now    Blood culture results pending  Maintain pressure dressing change as needed
s/p extraction 5/11/17  All blood cultures no growth  No fever no leukocytosis  No cultures taken from old PPM site since it was mostly hematoma  Will D/C Vancomycin  Start Cefazolin for Pre op antibiotics prior New PPM  Placing new PPM benefit outweighs the risk of not having one so if he needs a PPM please place it given negative blood cultures, no fever and no sign of active infection.
s/p PPM placement  Patient is 100% PPM dependent  Plan to be discussed with CTU team in AM regarding implantation of PPM generator

## 2017-05-22 NOTE — PROGRESS NOTE ADULT - PROBLEM SELECTOR PROBLEM 2
Pacemaker infection, initial encounter
Chronic atrial fibrillation
Dilated cardiomyopathy
Pacemaker infection, initial encounter

## 2017-05-22 NOTE — PROGRESS NOTE ADULT - PROBLEM SELECTOR PROBLEM 6
Acute on chronic combined systolic (congestive) and diastolic (congestive) heart failure
Acute on chronic combined systolic (congestive) and diastolic (congestive) heart failure
Hematoma of groin, initial encounter
Hematoma of groin, initial encounter

## 2017-05-22 NOTE — PROGRESS NOTE ADULT - PROBLEM SELECTOR PROBLEM 1
Chronic atrial fibrillation
Pacemaker electrode infection, initial encounter
Pacemaker infection, initial encounter
Chronic atrial fibrillation

## 2017-05-22 NOTE — DISCHARGE NOTE ADULT - NS AS ACTIVITY OBS
Stairs allowed/Bathing allowed/Do not make important decisions/Walking-Indoors allowed/Walking-Outdoors allowed/No Heavy lifting/straining
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
## 2017-05-22 NOTE — PROGRESS NOTE ADULT - PROBLEM SELECTOR PLAN 4
Continue Coreg 25mg BID  Continue diuresis
Continue Coreg 25mg BID  Continue diuresis  Encourage outpatient f/u

## 2017-05-22 NOTE — DISCHARGE NOTE ADULT - CARE PROVIDER_API CALL
Dayton Tomas), Cardiac Electrophysiology; Cardiovascular Disease  [address] [place] [address]  Phone: [phone]  Fax: [phone]

## 2017-05-22 NOTE — PROGRESS NOTE ADULT - PROBLEM SELECTOR PROBLEM 5
Chronic diastolic heart failure
Hematoma of groin, initial encounter
Hematoma of groin, initial encounter
Chronic diastolic heart failure

## 2017-05-22 NOTE — PROGRESS NOTE ADULT - PROBLEM SELECTOR PLAN 5
Continue Coreg 25mg BID  Continue diuresis
Q Shift R groin checks  Stable hematoma
Stable hematoma
Continue Coreg 25mg BID  Continue diuresis  Encourage outpatient f/u

## 2017-05-22 NOTE — DISCHARGE NOTE ADULT - MEDICATION SUMMARY - MEDICATIONS TO STOP TAKING
I will STOP taking the medications listed below when I get home from the hospital:    cefepime  -- 1 gram(s) intravenous 2 times a day    vancomycin  -- 1 gram(s) intravenous every 12 h

## 2017-05-22 NOTE — PROGRESS NOTE ADULT - PROBLEM SELECTOR PLAN 6
Q Shift R groin checks  Stable hematoma
diuresis  pacing
diuresis  pacing
check hct  frequent groin checks  ultrasound to r/o pseudoaneurysm

## 2017-05-22 NOTE — CHART NOTE - NSCHARTNOTEFT_GEN_A_CORE
Examined/interviewed w/ language services Mark  Pt declines discharge"My stomach is upset," "I spit yellow liquid in toilet"  Pt reported no BM x 48 hrs earlier , declining discharge until BM  given fleets with good results,  Abdominal exam unremarkable  friend has left the hospital, pt doesn't feel he is "not ready" to leave..."I had a major operation"  Nurse manager notified. Will refer to  tomorrow for potential dispo options

## 2017-05-22 NOTE — DISCHARGE NOTE ADULT - CARE PLAN
Principal Discharge DX:	Cardiac pacemaker  Goal:	recover from surgery  Instructions for follow-up, activity and diet:	Follow up Dr Terrazas [id number]  [phone]  June 7th at 4  pm for exam and staple removal  Continue antibiotics x 2 weeks  No DRIVING  NO SHOWER FOR  2 WEEKS  Sleep on back x 2 weeks  Seek medical attention if fever, chills, swelling or drainage from pacemaker site Principal Discharge DX:	Cardiac pacemaker  Goal:	recover from surgery  Instructions for follow-up, activity and diet:	Follow up Dr Terrazas [id number]  [phone]  June 7th at 4  pm for exam and staple removal  Continue antibiotics x 2 weeks  No DRIVING  NO SHOWER FOR  2 WEEKS  Sleep on back x 2 weeks  Seek medical attention if fever, chills, swelling or drainage from pacemaker site

## 2017-05-22 NOTE — DISCHARGE NOTE ADULT - MEDICATION SUMMARY - MEDICATIONS TO TAKE
I will START or STAY ON the medications listed below when I get home from the hospital:    acetaminophen 325 mg oral tablet  -- 2 tab(s) by mouth every 6 hours, As needed, For Temp greater than 38 C (100.4 F) or mild pain  -- Indication: For Pain    traMADol 50 mg oral tablet  -- 1 tab(s) by mouth every 6 hours, As needed, Moderate Pain (4 - 6)  -- Indication: For Pain    aspirin 81 mg oral tablet, chewable  -- 1 tab(s) by mouth once a day  -- Indication: For Pain    rivaroxaban 20 mg oral tablet  -- 1 tab(s) by mouth once a day  -- Indication: For Anticoagulation    benzonatate 100 mg oral capsule  -- 2 cap(s) by mouth every 8 hours, As needed, Cough  -- Indication: For Cough    Coreg 25 mg oral tablet  -- 1 tab(s) by mouth 2 times a day  -- Indication: For betablocker    albuterol-ipratropium 2.5 mg-0.5 mg/3 mL inhalation solution  -- 3 milliliter(s) inhaled every 8 hours  -- Indication: For neb    Keflex 500 mg oral capsule  -- 1 cap(s) by mouth every 12 hours  -- Indication: For Antiboitic x 2 weeks    furosemide 40 mg oral tablet  -- 1 tab(s) by mouth 2 times a day  -- Indication: For Diuretic    Pepcid AC Maximum Strength 20 mg oral tablet  -- 1 tab(s) by mouth once a day  -- Indication: For PPI

## 2017-05-22 NOTE — PROGRESS NOTE ADULT - PROBLEM SELECTOR PROBLEM 3
Coronary artery disease involving native coronary artery of native heart without angina pectoris

## 2017-05-23 VITALS
SYSTOLIC BLOOD PRESSURE: 128 MMHG | RESPIRATION RATE: 16 BRPM | TEMPERATURE: 99 F | DIASTOLIC BLOOD PRESSURE: 68 MMHG | HEART RATE: 60 BPM | OXYGEN SATURATION: 100 %

## 2017-05-23 PROCEDURE — 93926 LOWER EXTREMITY STUDY: CPT

## 2017-05-23 PROCEDURE — 76000 FLUOROSCOPY <1 HR PHYS/QHP: CPT

## 2017-05-23 PROCEDURE — 82435 ASSAY OF BLOOD CHLORIDE: CPT

## 2017-05-23 PROCEDURE — 93005 ELECTROCARDIOGRAM TRACING: CPT

## 2017-05-23 PROCEDURE — C1769: CPT

## 2017-05-23 PROCEDURE — 85014 HEMATOCRIT: CPT

## 2017-05-23 PROCEDURE — 36415 COLL VENOUS BLD VENIPUNCTURE: CPT

## 2017-05-23 PROCEDURE — 93306 TTE W/DOPPLER COMPLETE: CPT

## 2017-05-23 PROCEDURE — 85027 COMPLETE CBC AUTOMATED: CPT

## 2017-05-23 PROCEDURE — T1013: CPT

## 2017-05-23 PROCEDURE — 83036 HEMOGLOBIN GLYCOSYLATED A1C: CPT

## 2017-05-23 PROCEDURE — 84100 ASSAY OF PHOSPHORUS: CPT

## 2017-05-23 PROCEDURE — 93320 DOPPLER ECHO COMPLETE: CPT

## 2017-05-23 PROCEDURE — 82803 BLOOD GASES ANY COMBINATION: CPT

## 2017-05-23 PROCEDURE — 84295 ASSAY OF SERUM SODIUM: CPT

## 2017-05-23 PROCEDURE — 86900 BLOOD TYPING SEROLOGIC ABO: CPT

## 2017-05-23 PROCEDURE — 85610 PROTHROMBIN TIME: CPT

## 2017-05-23 PROCEDURE — C1773: CPT

## 2017-05-23 PROCEDURE — 87641 MR-STAPH DNA AMP PROBE: CPT

## 2017-05-23 PROCEDURE — C1786: CPT

## 2017-05-23 PROCEDURE — 94640 AIRWAY INHALATION TREATMENT: CPT

## 2017-05-23 PROCEDURE — 83605 ASSAY OF LACTIC ACID: CPT

## 2017-05-23 PROCEDURE — C1894: CPT

## 2017-05-23 PROCEDURE — 88300 SURGICAL PATH GROSS: CPT

## 2017-05-23 PROCEDURE — 80076 HEPATIC FUNCTION PANEL: CPT

## 2017-05-23 PROCEDURE — 82550 ASSAY OF CK (CPK): CPT

## 2017-05-23 PROCEDURE — 85730 THROMBOPLASTIN TIME PARTIAL: CPT

## 2017-05-23 PROCEDURE — 81001 URINALYSIS AUTO W/SCOPE: CPT

## 2017-05-23 PROCEDURE — 93312 ECHO TRANSESOPHAGEAL: CPT

## 2017-05-23 PROCEDURE — 83735 ASSAY OF MAGNESIUM: CPT

## 2017-05-23 PROCEDURE — 83880 ASSAY OF NATRIURETIC PEPTIDE: CPT

## 2017-05-23 PROCEDURE — C1898: CPT

## 2017-05-23 PROCEDURE — 80053 COMPREHEN METABOLIC PANEL: CPT

## 2017-05-23 PROCEDURE — 94760 N-INVAS EAR/PLS OXIMETRY 1: CPT

## 2017-05-23 PROCEDURE — 86920 COMPATIBILITY TEST SPIN: CPT

## 2017-05-23 PROCEDURE — 82947 ASSAY GLUCOSE BLOOD QUANT: CPT

## 2017-05-23 PROCEDURE — 84134 ASSAY OF PREALBUMIN: CPT

## 2017-05-23 PROCEDURE — 84132 ASSAY OF SERUM POTASSIUM: CPT

## 2017-05-23 PROCEDURE — C1766: CPT

## 2017-05-23 PROCEDURE — 80202 ASSAY OF VANCOMYCIN: CPT

## 2017-05-23 PROCEDURE — 86850 RBC ANTIBODY SCREEN: CPT

## 2017-05-23 PROCEDURE — 93325 DOPPLER ECHO COLOR FLOW MAPG: CPT

## 2017-05-23 PROCEDURE — 80074 ACUTE HEPATITIS PANEL: CPT

## 2017-05-23 PROCEDURE — 84484 ASSAY OF TROPONIN QUANT: CPT

## 2017-05-23 PROCEDURE — 87640 STAPH A DNA AMP PROBE: CPT

## 2017-05-23 PROCEDURE — 87040 BLOOD CULTURE FOR BACTERIA: CPT

## 2017-05-23 PROCEDURE — 80048 BASIC METABOLIC PNL TOTAL CA: CPT

## 2017-05-23 PROCEDURE — 82330 ASSAY OF CALCIUM: CPT

## 2017-05-23 PROCEDURE — 86901 BLOOD TYPING SEROLOGIC RH(D): CPT

## 2017-05-23 PROCEDURE — 71046 X-RAY EXAM CHEST 2 VIEWS: CPT

## 2017-05-23 PROCEDURE — 71045 X-RAY EXAM CHEST 1 VIEW: CPT

## 2017-05-23 RX ORDER — FUROSEMIDE 40 MG
1 TABLET ORAL
Qty: 60 | Refills: 0
Start: 2017-05-23 | End: 2017-06-06

## 2017-05-23 RX ADMIN — Medication 40 MILLIGRAM(S): at 06:21

## 2017-05-23 RX ADMIN — Medication 1 DROP(S): at 06:22

## 2017-05-23 RX ADMIN — CARVEDILOL PHOSPHATE 25 MILLIGRAM(S): 80 CAPSULE, EXTENDED RELEASE ORAL at 06:21

## 2017-05-23 RX ADMIN — Medication 500 MILLIGRAM(S): at 06:20

## 2017-05-24 RX ORDER — RIVAROXABAN 15 MG-20MG
1 KIT ORAL
Qty: 30 | Refills: 1 | OUTPATIENT
Start: 2017-05-24 | End: 2017-07-22

## 2017-05-24 RX ORDER — TRAMADOL HYDROCHLORIDE 50 MG/1
1 TABLET ORAL
Qty: 40 | Refills: 0
Start: 2017-05-24 | End: 2017-06-03

## 2017-05-24 RX ORDER — CARVEDILOL PHOSPHATE 80 MG/1
1 CAPSULE, EXTENDED RELEASE ORAL
Qty: 60 | Refills: 1
Start: 2017-05-24 | End: 2017-07-22

## 2017-06-07 ENCOUNTER — APPOINTMENT (OUTPATIENT)
Dept: ELECTROPHYSIOLOGY | Facility: CLINIC | Age: 72
End: 2017-06-07

## 2017-06-07 VITALS
BODY MASS INDEX: 34.36 KG/M2 | HEIGHT: 60 IN | DIASTOLIC BLOOD PRESSURE: 62 MMHG | HEART RATE: 62 BPM | WEIGHT: 175 LBS | SYSTOLIC BLOOD PRESSURE: 146 MMHG

## 2017-06-07 RX ORDER — ASPIRIN 81 MG/1
81 TABLET, DELAYED RELEASE ORAL DAILY
Qty: 90 | Refills: 3 | Status: ACTIVE | COMMUNITY
Start: 2017-06-07 | End: 1900-01-01

## 2017-06-07 RX ORDER — RIVAROXABAN 20 MG/1
20 TABLET, FILM COATED ORAL DAILY
Qty: 90 | Refills: 1 | Status: COMPLETED | COMMUNITY
Start: 2017-06-07

## 2017-06-12 ENCOUNTER — CLINICAL ADVICE (OUTPATIENT)
Age: 72
End: 2017-06-12

## 2017-06-21 ENCOUNTER — INPATIENT (INPATIENT)
Facility: HOSPITAL | Age: 72
LOS: 2 days | Discharge: HOME | End: 2017-06-24
Attending: INTERNAL MEDICINE | Admitting: INTERNAL MEDICINE
Payer: MEDICARE

## 2017-06-21 VITALS — HEIGHT: 65 IN | WEIGHT: 175.05 LBS

## 2017-06-21 DIAGNOSIS — Z95.0 PRESENCE OF CARDIAC PACEMAKER: Chronic | ICD-10-CM

## 2017-06-21 LAB
ALBUMIN SERPL ELPH-MCNC: 3.3 G/DL — SIGNIFICANT CHANGE UP (ref 3.3–5)
ALP SERPL-CCNC: 165 U/L — HIGH (ref 40–120)
ALT FLD-CCNC: 18 U/L — SIGNIFICANT CHANGE UP (ref 12–78)
ANION GAP SERPL CALC-SCNC: 9 MMOL/L — SIGNIFICANT CHANGE UP (ref 5–17)
APPEARANCE UR: CLEAR — SIGNIFICANT CHANGE UP
APTT BLD: 33.9 SEC — SIGNIFICANT CHANGE UP (ref 27.5–37.4)
AST SERPL-CCNC: 28 U/L — SIGNIFICANT CHANGE UP (ref 15–37)
BACTERIA # UR AUTO: (no result)
BASOPHILS # BLD AUTO: 0.1 K/UL — SIGNIFICANT CHANGE UP (ref 0–0.2)
BASOPHILS NFR BLD AUTO: 1.7 % — SIGNIFICANT CHANGE UP (ref 0–2)
BILIRUB SERPL-MCNC: 1.6 MG/DL — HIGH (ref 0.2–1.2)
BILIRUB UR-MCNC: NEGATIVE — SIGNIFICANT CHANGE UP
BUN SERPL-MCNC: 19 MG/DL — SIGNIFICANT CHANGE UP (ref 7–23)
CALCIUM SERPL-MCNC: 8.7 MG/DL — SIGNIFICANT CHANGE UP (ref 8.5–10.1)
CHLORIDE SERPL-SCNC: 110 MMOL/L — HIGH (ref 96–108)
CO2 SERPL-SCNC: 24 MMOL/L — SIGNIFICANT CHANGE UP (ref 22–31)
COLOR SPEC: YELLOW — SIGNIFICANT CHANGE UP
COMMENT - URINE: SIGNIFICANT CHANGE UP
CREAT SERPL-MCNC: 1.12 MG/DL — SIGNIFICANT CHANGE UP (ref 0.5–1.3)
DIFF PNL FLD: NEGATIVE — SIGNIFICANT CHANGE UP
EOSINOPHIL # BLD AUTO: 0.3 K/UL — SIGNIFICANT CHANGE UP (ref 0–0.5)
EOSINOPHIL NFR BLD AUTO: 3.9 % — SIGNIFICANT CHANGE UP (ref 0–6)
EPI CELLS # UR: SIGNIFICANT CHANGE UP
GLUCOSE SERPL-MCNC: 86 MG/DL — SIGNIFICANT CHANGE UP (ref 70–99)
GLUCOSE UR QL: NEGATIVE MG/DL — SIGNIFICANT CHANGE UP
GRAN CASTS # UR COMP ASSIST: (no result) /LPF
HCT VFR BLD CALC: 39.8 % — SIGNIFICANT CHANGE UP (ref 39–50)
HGB BLD-MCNC: 12.7 G/DL — LOW (ref 13–17)
HYALINE CASTS # UR AUTO: (no result) /LPF
INR BLD: 1.63 RATIO — HIGH (ref 0.88–1.16)
KETONES UR-MCNC: NEGATIVE — SIGNIFICANT CHANGE UP
LEUKOCYTE ESTERASE UR-ACNC: (no result)
LYMPHOCYTES # BLD AUTO: 2 K/UL — SIGNIFICANT CHANGE UP (ref 1–3.3)
LYMPHOCYTES # BLD AUTO: 26.3 % — SIGNIFICANT CHANGE UP (ref 13–44)
MCHC RBC-ENTMCNC: 30 PG — SIGNIFICANT CHANGE UP (ref 27–34)
MCHC RBC-ENTMCNC: 31.9 GM/DL — LOW (ref 32–36)
MCV RBC AUTO: 94.1 FL — SIGNIFICANT CHANGE UP (ref 80–100)
MONOCYTES # BLD AUTO: 0.9 K/UL — SIGNIFICANT CHANGE UP (ref 0–0.9)
MONOCYTES NFR BLD AUTO: 12.1 % — SIGNIFICANT CHANGE UP (ref 2–14)
NEUTROPHILS # BLD AUTO: 4.2 K/UL — SIGNIFICANT CHANGE UP (ref 1.8–7.4)
NEUTROPHILS NFR BLD AUTO: 56.1 % — SIGNIFICANT CHANGE UP (ref 43–77)
NITRITE UR-MCNC: NEGATIVE — SIGNIFICANT CHANGE UP
NT-PROBNP SERPL-SCNC: 7251 PG/ML — HIGH (ref 0–125)
PH UR: 6 — SIGNIFICANT CHANGE UP (ref 5–8)
PLATELET # BLD AUTO: 180 K/UL — SIGNIFICANT CHANGE UP (ref 150–400)
POTASSIUM SERPL-MCNC: 4.3 MMOL/L — SIGNIFICANT CHANGE UP (ref 3.5–5.3)
POTASSIUM SERPL-SCNC: 4.3 MMOL/L — SIGNIFICANT CHANGE UP (ref 3.5–5.3)
PROT SERPL-MCNC: 6.8 GM/DL — SIGNIFICANT CHANGE UP (ref 6–8.3)
PROT UR-MCNC: 30 MG/DL
PROTHROM AB SERPL-ACNC: 17.8 SEC — HIGH (ref 9.8–12.7)
RBC # BLD: 4.23 M/UL — SIGNIFICANT CHANGE UP (ref 4.2–5.8)
RBC # FLD: 15.7 % — HIGH (ref 10.3–14.5)
RBC CASTS # UR COMP ASSIST: NEGATIVE /HPF — SIGNIFICANT CHANGE UP (ref 0–4)
SODIUM SERPL-SCNC: 143 MMOL/L — SIGNIFICANT CHANGE UP (ref 135–145)
SP GR SPEC: 1.01 — SIGNIFICANT CHANGE UP (ref 1.01–1.02)
TROPONIN I SERPL-MCNC: 0.23 NG/ML — HIGH (ref 0.01–0.04)
UROBILINOGEN FLD QL: 1 MG/DL
WBC # BLD: 7.4 K/UL — SIGNIFICANT CHANGE UP (ref 3.8–10.5)
WBC # FLD AUTO: 7.4 K/UL — SIGNIFICANT CHANGE UP (ref 3.8–10.5)
WBC UR QL: (no result)

## 2017-06-21 PROCEDURE — 71010: CPT | Mod: 26

## 2017-06-21 PROCEDURE — 99285 EMERGENCY DEPT VISIT HI MDM: CPT

## 2017-06-21 PROCEDURE — 93010 ELECTROCARDIOGRAM REPORT: CPT

## 2017-06-21 RX ORDER — FUROSEMIDE 40 MG
40 TABLET ORAL ONCE
Refills: 0 | Status: COMPLETED | OUTPATIENT
Start: 2017-06-21 | End: 2017-06-21

## 2017-06-21 RX ORDER — NITROGLYCERIN 6.5 MG
0.5 CAPSULE, EXTENDED RELEASE ORAL ONCE
Refills: 0 | Status: COMPLETED | OUTPATIENT
Start: 2017-06-21 | End: 2017-06-21

## 2017-06-21 RX ORDER — FUROSEMIDE 40 MG
20 TABLET ORAL ONCE
Refills: 0 | Status: COMPLETED | OUTPATIENT
Start: 2017-06-21 | End: 2017-06-21

## 2017-06-21 RX ORDER — ASPIRIN/CALCIUM CARB/MAGNESIUM 324 MG
325 TABLET ORAL ONCE
Refills: 0 | Status: COMPLETED | OUTPATIENT
Start: 2017-06-21 | End: 2017-06-21

## 2017-06-21 RX ADMIN — Medication 0.5 INCH(S): at 19:01

## 2017-06-21 RX ADMIN — Medication 325 MILLIGRAM(S): at 19:01

## 2017-06-21 RX ADMIN — Medication 40 MILLIGRAM(S): at 18:00

## 2017-06-21 RX ADMIN — Medication 20 MILLIGRAM(S): at 22:19

## 2017-06-21 NOTE — H&P ADULT - ASSESSMENT
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 The patient was subsequently transferred to [facility] where the patient underwent lead extraction on 5/11/2017. A Medtronic Micra Leadless PPM was implanted on 5/17/17 but had intermittent loss of capture. The patient is s/p LCW single-chamber PPM implantation on 5/19/2017 who presents to the ED w/ c/o progressive worsening sob, associated w/ B/L lower extremity swelling, w/ significant weight gain (~5kgs). The patient denies any associated chest pain, or palpitations.

## 2017-06-21 NOTE — ED PROVIDER NOTE - OBJECTIVE STATEMENT
72 y/o M with PMHx of asthma, AFib, CAD, LBBB s/p pacemaker presents to the ED for SOB, leg swelling, and abd swelling x3 days. Pt states it felt like he was choking. Pt states x3 months ago he had similar symptoms ans that x2 months ago a new pacemaker was placed. Pt denies alcohol, drug, or tobacco use. Pt also c/o mild cough but thinks its his asthma. No CP, LOPEZ, chills, fever, HA, back pain, N/V/D, diaphoresis, abd pain. PMD  at [facility].

## 2017-06-21 NOTE — ED ADULT TRIAGE NOTE - CHIEF COMPLAINT QUOTE
c/o tonsil swelling, abdominal swelling, testicular swelling radiating to bilateral lower extremities x 2 days, pt states swelling has caused pain when walking, pt states he has history PPM

## 2017-06-21 NOTE — ED ADULT NURSE NOTE - OBJECTIVE STATEMENT
Pt presents to ED for SOB, leg swelling. and abd swelling x3 days.  Legs +3 edema noted. Pt also c/o mild cough but thinks its his asthma. No active CP or SOB. IVL estab by prior RN. Pt placed on cardiac mtr. PT b/p elevated. pt medicated per MD orders. no acute distress noted.

## 2017-06-21 NOTE — ED PROVIDER NOTE - ENMT, MLM
Airway patent, Nasal mucosa clear. Mouth with normal mucosa. Throat has no vesicles, no oropharyngeal exudates and uvula is midline. +JVD

## 2017-06-21 NOTE — ED STATDOCS - PROGRESS NOTE DETAILS
70 y/o male with PMHx of AFib, CAD, LBBB s/p pacemaker presents to the ED c/o generalized swelling to the body. Denies dyspnea. Will send pt to the main ED for further evaluation.

## 2017-06-21 NOTE — ED PROVIDER NOTE - MUSCULOSKELETAL, MLM
Spine appears normal, range of motion is not limited, no muscle or joint tenderness. 3+  pedal edema.

## 2017-06-21 NOTE — ED PROVIDER NOTE - CARE PLAN
Principal Discharge DX:	Acute on chronic congestive heart failure, unspecified congestive heart failure type  Secondary Diagnosis:	ACS (acute coronary syndrome)

## 2017-06-21 NOTE — H&P ADULT - HISTORY OF PRESENT ILLNESS
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 The patient was subsequently transferred to [facility] where the patient underwent lead extraction on 5/11/2017. A Medtronic Micra Leadless PPM was implanted on 5/17/17 but had intermittent loss of capture. The patient is s/p LCW single-chamber PPM implantation on 5/19/2017 who presents to the ED w/ c/o progressive worsening sob, associated w/ B/L lower extremity swelling, w/ significant weight gain (~5kgs). The patient denies any associated chest pain, or palpitations. 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 The patient was subsequently transferred to [facility] where the patient underwent lead extraction on 5/11/2017. A Medtronic Micra Leadless PPM was implanted on 5/17/17 but had intermittent loss of capture. The patient is s/p LCW Medtronic single-chamber PPM implantation on 5/19/2017 who presents to the ED w/ c/o progressive worsening sob, associated w/ B/L lower extremity swelling, w/ significant weight gain (~5kgs). The patient denies any associated chest pain, or palpitations.

## 2017-06-21 NOTE — H&P ADULT - NSHPPHYSICALEXAM_GEN_ALL_CORE
Vital Signs Last 24 Hrs  T(C): 36.6, Max: 36.6 (06-21 @ 19:13)  T(F): 97.8, Max: 97.8 (06-21 @ 19:13)  HR: 60 (59 - 60)  BP: 187/109 (165/103 - 187/109)  RR: 16 (16 - 18)  SpO2: 98% (98% - 100%)

## 2017-06-21 NOTE — H&P ADULT - PROBLEM SELECTOR PLAN 1
~admit to Telemetry  ~serial Abigail/EKGs  ~daily wts.  ~strict I/Os  ~cont. Lasix 40mg IV q12h then reassess

## 2017-06-22 DIAGNOSIS — I48.2 CHRONIC ATRIAL FIBRILLATION: ICD-10-CM

## 2017-06-22 DIAGNOSIS — R60.0 LOCALIZED EDEMA: ICD-10-CM

## 2017-06-22 DIAGNOSIS — Z95.0 PRESENCE OF CARDIAC PACEMAKER: ICD-10-CM

## 2017-06-22 DIAGNOSIS — Z29.9 ENCOUNTER FOR PROPHYLACTIC MEASURES, UNSPECIFIED: ICD-10-CM

## 2017-06-22 DIAGNOSIS — R07.0 PAIN IN THROAT: ICD-10-CM

## 2017-06-22 DIAGNOSIS — I50.9 HEART FAILURE, UNSPECIFIED: ICD-10-CM

## 2017-06-22 DIAGNOSIS — I25.119 ATHEROSCLEROTIC HEART DISEASE OF NATIVE CORONARY ARTERY WITH UNSPECIFIED ANGINA PECTORIS: ICD-10-CM

## 2017-06-22 LAB
ALBUMIN SERPL ELPH-MCNC: 3 G/DL — LOW (ref 3.3–5)
ALP SERPL-CCNC: 139 U/L — HIGH (ref 40–120)
ALT FLD-CCNC: 15 U/L — SIGNIFICANT CHANGE UP (ref 12–78)
ANION GAP SERPL CALC-SCNC: 9 MMOL/L — SIGNIFICANT CHANGE UP (ref 5–17)
AST SERPL-CCNC: 22 U/L — SIGNIFICANT CHANGE UP (ref 15–37)
BILIRUB SERPL-MCNC: 1.5 MG/DL — HIGH (ref 0.2–1.2)
BUN SERPL-MCNC: 17 MG/DL — SIGNIFICANT CHANGE UP (ref 7–23)
CALCIUM SERPL-MCNC: 8.1 MG/DL — LOW (ref 8.5–10.1)
CHLORIDE SERPL-SCNC: 106 MMOL/L — SIGNIFICANT CHANGE UP (ref 96–108)
CO2 SERPL-SCNC: 28 MMOL/L — SIGNIFICANT CHANGE UP (ref 22–31)
CREAT SERPL-MCNC: 1.03 MG/DL — SIGNIFICANT CHANGE UP (ref 0.5–1.3)
GLUCOSE SERPL-MCNC: 83 MG/DL — SIGNIFICANT CHANGE UP (ref 70–99)
HCT VFR BLD CALC: 37.7 % — LOW (ref 39–50)
HGB BLD-MCNC: 11.7 G/DL — LOW (ref 13–17)
MAGNESIUM SERPL-MCNC: 1.8 MG/DL — SIGNIFICANT CHANGE UP (ref 1.6–2.6)
MCHC RBC-ENTMCNC: 29.1 PG — SIGNIFICANT CHANGE UP (ref 27–34)
MCHC RBC-ENTMCNC: 30.9 GM/DL — LOW (ref 32–36)
MCV RBC AUTO: 94.2 FL — SIGNIFICANT CHANGE UP (ref 80–100)
NT-PROBNP SERPL-SCNC: 5786 PG/ML — HIGH (ref 0–125)
NT-PROBNP SERPL-SCNC: 5932 PG/ML — HIGH (ref 0–125)
PLATELET # BLD AUTO: 177 K/UL — SIGNIFICANT CHANGE UP (ref 150–400)
POTASSIUM SERPL-MCNC: 3.1 MMOL/L — LOW (ref 3.5–5.3)
POTASSIUM SERPL-SCNC: 3.1 MMOL/L — LOW (ref 3.5–5.3)
PROT SERPL-MCNC: 6.1 GM/DL — SIGNIFICANT CHANGE UP (ref 6–8.3)
RBC # BLD: 4 M/UL — LOW (ref 4.2–5.8)
RBC # FLD: 15.9 % — HIGH (ref 10.3–14.5)
SODIUM SERPL-SCNC: 143 MMOL/L — SIGNIFICANT CHANGE UP (ref 135–145)
TROPONIN I SERPL-MCNC: 0.25 NG/ML — HIGH (ref 0.01–0.04)
WBC # BLD: 7 K/UL — SIGNIFICANT CHANGE UP (ref 3.8–10.5)
WBC # FLD AUTO: 7 K/UL — SIGNIFICANT CHANGE UP (ref 3.8–10.5)

## 2017-06-22 PROCEDURE — 74176 CT ABD & PELVIS W/O CONTRAST: CPT | Mod: 26

## 2017-06-22 PROCEDURE — 99223 1ST HOSP IP/OBS HIGH 75: CPT

## 2017-06-22 RX ORDER — DOCUSATE SODIUM 100 MG
100 CAPSULE ORAL THREE TIMES A DAY
Refills: 0 | Status: DISCONTINUED | OUTPATIENT
Start: 2017-06-22 | End: 2017-06-24

## 2017-06-22 RX ORDER — CARVEDILOL PHOSPHATE 80 MG/1
25 CAPSULE, EXTENDED RELEASE ORAL EVERY 12 HOURS
Refills: 0 | Status: DISCONTINUED | OUTPATIENT
Start: 2017-06-22 | End: 2017-06-22

## 2017-06-22 RX ORDER — DILTIAZEM HCL 120 MG
120 CAPSULE, EXT RELEASE 24 HR ORAL DAILY
Refills: 0 | Status: DISCONTINUED | OUTPATIENT
Start: 2017-06-22 | End: 2017-06-24

## 2017-06-22 RX ORDER — PANTOPRAZOLE SODIUM 20 MG/1
40 TABLET, DELAYED RELEASE ORAL DAILY
Refills: 0 | Status: DISCONTINUED | OUTPATIENT
Start: 2017-06-22 | End: 2017-06-24

## 2017-06-22 RX ORDER — CARVEDILOL PHOSPHATE 80 MG/1
25 CAPSULE, EXTENDED RELEASE ORAL
Refills: 0 | Status: DISCONTINUED | OUTPATIENT
Start: 2017-06-22 | End: 2017-06-24

## 2017-06-22 RX ORDER — FUROSEMIDE 40 MG
40 TABLET ORAL EVERY 12 HOURS
Refills: 0 | Status: COMPLETED | OUTPATIENT
Start: 2017-06-22 | End: 2017-06-22

## 2017-06-22 RX ORDER — SENNA PLUS 8.6 MG/1
2 TABLET ORAL AT BEDTIME
Refills: 0 | Status: DISCONTINUED | OUTPATIENT
Start: 2017-06-22 | End: 2017-06-24

## 2017-06-22 RX ORDER — POTASSIUM CHLORIDE 20 MEQ
40 PACKET (EA) ORAL ONCE
Refills: 0 | Status: COMPLETED | OUTPATIENT
Start: 2017-06-22 | End: 2017-06-22

## 2017-06-22 RX ORDER — RIVAROXABAN 15 MG-20MG
20 KIT ORAL DAILY
Refills: 0 | Status: DISCONTINUED | OUTPATIENT
Start: 2017-06-22 | End: 2017-06-24

## 2017-06-22 RX ORDER — ONDANSETRON 8 MG/1
4 TABLET, FILM COATED ORAL EVERY 6 HOURS
Refills: 0 | Status: DISCONTINUED | OUTPATIENT
Start: 2017-06-22 | End: 2017-06-24

## 2017-06-22 RX ORDER — ASPIRIN/CALCIUM CARB/MAGNESIUM 324 MG
81 TABLET ORAL DAILY
Refills: 0 | Status: DISCONTINUED | OUTPATIENT
Start: 2017-06-22 | End: 2017-06-24

## 2017-06-22 RX ORDER — DILTIAZEM HCL 120 MG
120 CAPSULE, EXT RELEASE 24 HR ORAL DAILY
Refills: 0 | Status: DISCONTINUED | OUTPATIENT
Start: 2017-06-22 | End: 2017-06-22

## 2017-06-22 RX ADMIN — Medication 120 MILLIGRAM(S): at 06:10

## 2017-06-22 RX ADMIN — Medication 40 MILLIGRAM(S): at 06:09

## 2017-06-22 RX ADMIN — Medication 0.5 INCH(S): at 09:10

## 2017-06-22 RX ADMIN — Medication 81 MILLIGRAM(S): at 11:07

## 2017-06-22 RX ADMIN — CARVEDILOL PHOSPHATE 25 MILLIGRAM(S): 80 CAPSULE, EXTENDED RELEASE ORAL at 06:10

## 2017-06-22 RX ADMIN — RIVAROXABAN 20 MILLIGRAM(S): KIT at 11:07

## 2017-06-22 RX ADMIN — Medication 40 MILLIGRAM(S): at 18:03

## 2017-06-22 RX ADMIN — CARVEDILOL PHOSPHATE 25 MILLIGRAM(S): 80 CAPSULE, EXTENDED RELEASE ORAL at 18:04

## 2017-06-22 RX ADMIN — PANTOPRAZOLE SODIUM 40 MILLIGRAM(S): 20 TABLET, DELAYED RELEASE ORAL at 12:24

## 2017-06-22 RX ADMIN — Medication 40 MILLIEQUIVALENT(S): at 12:24

## 2017-06-22 NOTE — CONSULT NOTE ADULT - PROBLEM SELECTOR RECOMMENDATION 9
improving ,probably mild decompensation , now improving , encourage to be compliant to diet , medication , increase physical activity , continue IV lasix ,

## 2017-06-22 NOTE — CONSULT NOTE ADULT - PROBLEM SELECTOR RECOMMENDATION 5
with nocturnal cough , upper abdominal discomfort ( poor historian ) suspect GERD  add Protonix.  consider GI evaluation

## 2017-06-22 NOTE — DIETITIAN INITIAL EVALUATION ADULT. - OTHER INFO
patient has a hx of heatr failure Patient has a hx of heart failure, has recently gained 9# due to fluid retention. Patient reports good PO intake since admission. 24 hour Recall: Breakfast: Pancake, coffee; Lunch: Salad with grilled chicken and tomatoes; dinner: Milk and bread- sometimes nothing. Patient reports that his largest meal is at lunch time

## 2017-06-22 NOTE — ED ADULT NURSE REASSESSMENT NOTE - NS ED NURSE REASSESS COMMENT FT1
MD Caban made aware of pt's elevated BP, despite IV Lasix and nitroglycerine paste.  Pt denies chest pain, Headache, sob dizziness.  Will continue to monitor.
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
## 2017-06-22 NOTE — DIETITIAN INITIAL EVALUATION ADULT. - PERTINENT LABORATORY DATA
6/22: Hgb/Hct: 11.7/37.7 (L), K+: 3.1 (L), Ca+: 8.1 (L), Albumin: 3 (L), Corrected Ca+: 8.9 (WNL) 6/22: Hgb/Hct: 11.7/37.7 (L), K+: 3.1 (L), Ca+: 8.1 (L), Albumin: 3 (L), Corrected Ca+: 8.9 (WNL), HgbA1c: 5.6% (WNL)

## 2017-06-22 NOTE — CONSULT NOTE ADULT - PROBLEM SELECTOR RECOMMENDATION 3
mostly dependent edema with mild decompensation ,CHF , continue diuretic , elevate feet , would benefit with pressure stockings ,

## 2017-06-22 NOTE — PROGRESS NOTE ADULT - SUBJECTIVE AND OBJECTIVE BOX
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 The patient was subsequently transferred to [facility] where the patient underwent lead extraction on 5/11/2017. A Medtronic Micra Leadless PPM was implanted on 5/17/17 but had intermittent loss of capture. The patient is s/p LCW Medtronic single-chamber PPM implantation on 5/19/2017 who presents to the ED w/ c/o progressive  mild worsening sob, associated w/ B/L lower extremity swelling, w/ significant weight gain (~5kgs). Adm for CHF    Feels better today; c/o pain assoc with cough LLQ; bilat LE swelling, dry cough at times      Vital Signs Last 24 Hrs  T(C): 36.7, Max: 36.7 (06-22 @ 09:51)  T(F): 98.1, Max: 98.1 (06-22 @ 09:51)  HR: 60 (59 - 63)  BP: 134/70 (134/70 - 176/115)  BP(mean): --  RR: 17 (16 - 18)  SpO2: 97% (97% - 100%)      PHYSICAL EXAM:    Constitutional: NAD, awake and alert, well-developed  HEENT: PERR, EOMI,  Nol cyanosis  Neck:  supple,  No JVD  Respiratory: Breath sounds are clear bilaterally, No wheezing, rales or rhonchi  Cardiovascular: S1 and S2, regular rate and rhythm, no Murmurs, gallops or rubs  Gastrointestinal: distended  Bowel Sounds present, soft, poss left side hernia, ascites  Extremities: 2+  LE  edema. No clubbing or cyanosis.  Vascular: 1+ peripheral pulses  Neurological: A/O x 3, no focal deficits  Musculoskeletal: no calf tenderness.  Skin: No rashes.      LABS: All Labs Reviewed:                        11.7   7.0   )-----------( 177      ( 22 Jun 2017 06:01 )             37.7                         12.7   7.4   )-----------( 180      ( 21 Jun 2017 17:08 )             39.8     22 Jun 2017 06:01    143    |  106    |  17     ----------------------------<  83     3.1     |  28     |  1.03   21 Jun 2017 17:08    143    |  110    |  19     ----------------------------<  86     4.3     |  24     |  1.12     Ca    8.1        22 Jun 2017 06:01  Ca    8.7        21 Jun 2017 17:08  Mg     1.8       22 Jun 2017 06:01    TPro  6.1    /  Alb  3.0    /  TBili  1.5    /  DBili  x      /  AST  22     /  ALT  15     /  AlkPhos  139    22 Jun 2017 06:01  TPro  6.8    /  Alb  3.3    /  TBili  1.6    /  DBili  x      /  AST  28     /  ALT  18     /  AlkPhos  165    21 Jun 2017 17:08    PT/INR - ( 21 Jun 2017 17:08 )   PT: 17.8 sec;   INR: 1.63 ratio         PTT - ( 21 Jun 2017 17:08 )  PTT:33.9 sec  CARDIAC MARKERS ( 22 Jun 2017 02:49 )  0.248 ng/mL / x     / x     / x     / x      CARDIAC MARKERS ( 21 Jun 2017 17:08 )  0.234 ng/mL / x     / x     / x     / x          Blood Culture:   06-22 @ 06:01  Pro Bnp [id number]  06-22 @ 02:49  Pro Bnp [id number]  06-21 @ 17:08  Pro Bnp [id number]        RADIOLOGY/EKG:  Ventricular paced rhythm        nuclear  study      4/18/2017      SPECT Myocardial PerfusionImaging demonstrates:    Dilated and globally hypokinetic left ventricle compatible with   cardiomyopathy.     Fixed perfusion defect in the mid and basal segments of the inferolateral   wall with impaired contractility and wall thickening suggestive of a zone   of infarct.    No scan evidence of reversible perfusion defects    Reduced left ventricular ejection fraction of 41% (Normal: 50% or   greater).    Echo  5/10/2017  Summary:   1. Technically good study.   2. Moderately increased LV wall thickness.   3. Moderately decreased global left ventricular systolic function.   4. Left ventricular ejection fraction, by visual estimation, is 35 to   40%.   5. Spectral Doppler shows impaired relaxation pattern of left   ventricular myocardial filling (Grade I diastolic dysfunction).   6. There is moderate concentric left ventricular hypertrophy.   7. Severely enlarged left atrium.   8. Severely dilated right atrium.   9. Thickening of the anterior and posterior mitral valve leaflets.  10. Mild to moderate mitral valve regurgitation.  11. Small secundum atrial septal defect with predominantly left to right   shunting across the atrial septum.  12. Trivial pericardial effusion.             Problem/Recommendation - 1:  Problem: Acute on chronic congestive heart failure, unspecified congestive heart failure type; assoc MR; severely dilated LA and RA; right side component > left side in this case  - lasix, supplem O2  - hypokalemia- replace po  - left to right shunting through ASD    Problem/Recommendation - 2:  ·  Problem: Chronic atrial fibrillation.  Recommendation: s/p Av reta ablation Single chamber PPM ,.   - rate is ctr; on ASA; not AC seems reasonable    Problem/Recommendation - 3:  ·  Problem: Bilateral edema of lower extremity.  Recommendation: mostly dependent edema with mild decompensation ,CHF , continue diuretic , elevate feet , would benefit with pressure stockings ,.     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 The patient was subsequently transferred to [facility] where the patient underwent lead extraction on 5/11/2017. A Medtronic Micra Leadless PPM was implanted on 5/17/17 but had intermittent loss of capture. The patient is s/p LCW Medtronic single-chamber PPM implantation on 5/19/2017 who presents to the ED w/ c/o progressive  mild worsening sob, associated w/ B/L lower extremity swelling, w/ significant weight gain (~5kgs). Adm for CHF    Feels better today; c/o pain assoc with cough LLQ; bilat LE swelling, dry cough at times      Vital Signs Last 24 Hrs  T(C): 36.7, Max: 36.7 (06-22 @ 09:51)  T(F): 98.1, Max: 98.1 (06-22 @ 09:51)  HR: 60 (59 - 63)  BP: 134/70 (134/70 - 176/115)  BP(mean): --  RR: 17 (16 - 18)  SpO2: 97% (97% - 100%)      PHYSICAL EXAM:    Constitutional: NAD, awake and alert, well-developed  HEENT: PERR, EOMI,  Nol cyanosis  Neck:  supple,  No JVD  Respiratory: Breath sounds are clear bilaterally, No wheezing, rales or rhonchi  Cardiovascular: S1 and S2, regular rate and rhythm, no Murmurs, gallops or rubs  Gastrointestinal: distended  Bowel Sounds present, soft, poss left side hernia, ascites  Extremities: 2+  LE  edema. No clubbing or cyanosis.  Vascular: 1+ peripheral pulses  Neurological: A/O x 3, no focal deficits  Musculoskeletal: no calf tenderness.  Skin: No rashes.      LABS: All Labs Reviewed:                        11.7   7.0   )-----------( 177      ( 22 Jun 2017 06:01 )             37.7                         12.7   7.4   )-----------( 180      ( 21 Jun 2017 17:08 )             39.8     22 Jun 2017 06:01    143    |  106    |  17     ----------------------------<  83     3.1     |  28     |  1.03   21 Jun 2017 17:08    143    |  110    |  19     ----------------------------<  86     4.3     |  24     |  1.12     Ca    8.1        22 Jun 2017 06:01  Ca    8.7        21 Jun 2017 17:08  Mg     1.8       22 Jun 2017 06:01    TPro  6.1    /  Alb  3.0    /  TBili  1.5    /  DBili  x      /  AST  22     /  ALT  15     /  AlkPhos  139    22 Jun 2017 06:01  TPro  6.8    /  Alb  3.3    /  TBili  1.6    /  DBili  x      /  AST  28     /  ALT  18     /  AlkPhos  165    21 Jun 2017 17:08    PT/INR - ( 21 Jun 2017 17:08 )   PT: 17.8 sec;   INR: 1.63 ratio         PTT - ( 21 Jun 2017 17:08 )  PTT:33.9 sec  CARDIAC MARKERS ( 22 Jun 2017 02:49 )  0.248 ng/mL / x     / x     / x     / x      CARDIAC MARKERS ( 21 Jun 2017 17:08 )  0.234 ng/mL / x     / x     / x     / x          Blood Culture:   06-22 @ 06:01  Pro Bnp [id number]  06-22 @ 02:49  Pro Bnp [id number]  06-21 @ 17:08  Pro Bnp [id number]        RADIOLOGY/EKG:  Ventricular paced rhythm        nuclear  study      4/18/2017      SPECT Myocardial PerfusionImaging demonstrates:    Dilated and globally hypokinetic left ventricle compatible with   cardiomyopathy.     Fixed perfusion defect in the mid and basal segments of the inferolateral   wall with impaired contractility and wall thickening suggestive of a zone   of infarct.    No scan evidence of reversible perfusion defects    Reduced left ventricular ejection fraction of 41% (Normal: 50% or   greater).    Echo  5/10/2017  Summary:   1. Technically good study.   2. Moderately increased LV wall thickness.   3. Moderately decreased global left ventricular systolic function.   4. Left ventricular ejection fraction, by visual estimation, is 35 to   40%.   5. Spectral Doppler shows impaired relaxation pattern of left   ventricular myocardial filling (Grade I diastolic dysfunction).   6. There is moderate concentric left ventricular hypertrophy.   7. Severely enlarged left atrium.   8. Severely dilated right atrium.   9. Thickening of the anterior and posterior mitral valve leaflets.  10. Mild to moderate mitral valve regurgitation.  11. Small secundum atrial septal defect with predominantly left to right   shunting across the atrial septum.  12. Trivial pericardial effusion.             Problem/Recommendation - 1:  Problem: Acute on chronic congestive heart failure, unspecified congestive heart failure type; assoc MR; severely dilated LA and RA; right side component > left side in this case  - lasix, supplem O2  - hypokalemia- replace po  - left to right shunting through ASD    Problem/Recommendation - 2:  ·  Problem: Chronic atrial fibrillation.  Recommendation: s/p Av reta ablation Single chamber PPM ,.   - rate is ctr; on DOAC  Problem/Recommendation - 3:  ·  Problem: Bilateral edema of lower extremity.  Recommendation: mostly dependent edema with mild decompensation ,CHF , continue diuretic , elevate feet , would benefit with pressure stockings ,.     Problem/Recommendation - 4:  ·  Problem:Abd pain with cough: CT abd r/o hernia; asses ascites

## 2017-06-22 NOTE — ED ADULT NURSE REASSESSMENT NOTE - COMFORT CARE
Assisted with urinal.
Rounded with patient at 19:00, pillow provided and patient resting comfortably
ambulated to bathroom
assisted with urinal/warm blanket provided
emptied urinal/assisted to bathroom
meal provided/warm blanket provided
darkened lights/side rails up
side rails up

## 2017-06-23 LAB
ANION GAP SERPL CALC-SCNC: 6 MMOL/L — SIGNIFICANT CHANGE UP (ref 5–17)
BUN SERPL-MCNC: 16 MG/DL — SIGNIFICANT CHANGE UP (ref 7–23)
CALCIUM SERPL-MCNC: 8.2 MG/DL — LOW (ref 8.5–10.1)
CHLORIDE SERPL-SCNC: 102 MMOL/L — SIGNIFICANT CHANGE UP (ref 96–108)
CO2 SERPL-SCNC: 33 MMOL/L — HIGH (ref 22–31)
CREAT SERPL-MCNC: 0.93 MG/DL — SIGNIFICANT CHANGE UP (ref 0.5–1.3)
GLUCOSE SERPL-MCNC: 96 MG/DL — SIGNIFICANT CHANGE UP (ref 70–99)
POTASSIUM SERPL-MCNC: 3.4 MMOL/L — LOW (ref 3.5–5.3)
POTASSIUM SERPL-SCNC: 3.4 MMOL/L — LOW (ref 3.5–5.3)
SODIUM SERPL-SCNC: 141 MMOL/L — SIGNIFICANT CHANGE UP (ref 135–145)

## 2017-06-23 PROCEDURE — 99233 SBSQ HOSP IP/OBS HIGH 50: CPT

## 2017-06-23 RX ORDER — FUROSEMIDE 40 MG
40 TABLET ORAL DAILY
Refills: 0 | Status: DISCONTINUED | OUTPATIENT
Start: 2017-06-23 | End: 2017-06-24

## 2017-06-23 RX ORDER — SPIRONOLACTONE 25 MG/1
25 TABLET, FILM COATED ORAL DAILY
Refills: 0 | Status: DISCONTINUED | OUTPATIENT
Start: 2017-06-23 | End: 2017-06-24

## 2017-06-23 RX ADMIN — PANTOPRAZOLE SODIUM 40 MILLIGRAM(S): 20 TABLET, DELAYED RELEASE ORAL at 11:02

## 2017-06-23 RX ADMIN — SPIRONOLACTONE 25 MILLIGRAM(S): 25 TABLET, FILM COATED ORAL at 17:22

## 2017-06-23 RX ADMIN — Medication 120 MILLIGRAM(S): at 05:49

## 2017-06-23 RX ADMIN — Medication 40 MILLIGRAM(S): at 13:31

## 2017-06-23 RX ADMIN — RIVAROXABAN 20 MILLIGRAM(S): KIT at 11:02

## 2017-06-23 RX ADMIN — CARVEDILOL PHOSPHATE 25 MILLIGRAM(S): 80 CAPSULE, EXTENDED RELEASE ORAL at 05:49

## 2017-06-23 RX ADMIN — Medication 81 MILLIGRAM(S): at 11:02

## 2017-06-23 RX ADMIN — CARVEDILOL PHOSPHATE 25 MILLIGRAM(S): 80 CAPSULE, EXTENDED RELEASE ORAL at 17:21
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
## 2017-06-23 NOTE — PROGRESS NOTE ADULT - SUBJECTIVE AND OBJECTIVE BOX
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 The patient was subsequently transferred to [facility] where the patient underwent lead extraction on 2017. A Medtronic Micra Leadless PPM was implanted on 17 but had intermittent loss of capture. The patient is s/p LCW Medtronic single-chamber PPM implantation on 2017 who presents to the ED w/ c/o progressive  mild worsening sob, associated w/ B/L lower extremity swelling, w/ significant weight gain (~5kgs). The patient denies any associated chest pain, or palpitations. (2017 21:55). Patient is mainly concerned about his lower extremity swelling and mild distention of abdomen  than shortness of breath which is some what same as before , Patient sits most of the day , swelling is worse in the evening . Patient also complaining nocturnal cough  spasm, throat discomfort  , denies any orthopnea .    : seen and eval. hemodynamically stable. Mongolian speaker. denies any HA, CP, SOB. notes of lower extremity swelling. no overnight events.     REVIEW OF SYSTEMS:  General: NAD, hemodynamically stable, (-)  fever, (-) chills, (+) weakness  HEENT:  Eyes:  No visual loss, blurred vision, double vision or yellow sclerae. Ears, Nose, Throat:  No hearing loss, sneezing, congestion, runny nose or sore throat.  SKIN:  No rash or itching.  CARDIOVASCULAR:  No chest pain, chest pressure or chest discomfort. No palpitations or edema.  RESPIRATORY:  No shortness of breath, cough or sputum.  GASTROINTESTINAL:  No anorexia, nausea, vomiting or diarrhea. No abdominal pain or blood.  NEUROLOGICAL:  No headache, dizziness, syncope, paralysis, ataxia, numbness or tingling in the extremities. No change in bowel or bladder control.  MUSCULOSKELETAL:  No muscle, back pain, joint pain or stiffness.  HEMATOLOGIC:  No anemia, bleeding or bruising.  LYMPHATICS:  No enlarged nodes. No history of splenectomy.  ENDOCRINOLOGIC:  No reports of sweating, cold or heat intolerance. No polyuria or polydipsia.  ALLERGIES:  No history of asthma, hives, eczema or rhinitis.    PHYSICAL EXAM:  Constitutional: NAD, awake and alert, well-developed  HEENT: PERR, EOMI,  Nol cyanosis  Neck:  supple,  No JVD  Respiratory: Breath sounds are clear bilaterally, No wheezing, rales or rhonchi  Cardiovascular: S1 and S2, regular rate and rhythm, no Murmurs, gallops or rubs  Gastrointestinal: distended  Bowel Sounds present, soft, poss left side hernia, ascites  Extremities: 2+  LE  edema. No clubbing or cyanosis.  Vascular: 1+ peripheral pulses  Neurological: A/O x 3, no focal deficits  Musculoskeletal: no calf tenderness.  Skin: No rashes.        CBC Full  -  ( 2017 06:01 )  WBC Count : 7.0 K/uL  Hemoglobin : 11.7 g/dL  Hematocrit : 37.7 %  Platelet Count - Automated : 177 K/uL     PTT - ( 2017 17:08 )  PTT:33.9 sec  Urinalysis Basic - ( 2017 19:14 )    Color: Yellow / Appearance: Clear / S.015 / pH: x  Gluc: x / Ketone: Negative  / Bili: Negative / Urobili: 1 mg/dL   Blood: x / Protein: 30 mg/dL / Nitrite: Negative   Leuk Esterase: Trace / RBC: Negative /HPF / WBC 6-10   Sq Epi: x / Non Sq Epi: Occasional / Bacteria: Occasional          141  |  102  |  16  ----------------------------<  96  3.4<L>   |  33<H>  |  0.93    Ca    8.2<L>      2017 05:48  Mg     1.8         TPro  6.1  /  Alb  3.0<L>  /  TBili  1.5<H>  /  DBili  x   /  AST  22  /  ALT  15  /  AlkPhos  139<H>          # Acute on chronic systolic congestive heart failure,  - improving, with frequent recurrent hospitalizations  - daily weights, daily i/o, low salt diet  - encourage physical activity  - on lasix / start on aldactone today  - on coreg  - patient will need arrangement with Wolfgang and close follow up with cardiology. Given extensive hospitalizations for similar issues - patient high risk for re-admissions.     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 The patient was subsequently transferred to [facility] where the patient underwent lead extraction on 2017. A Medtronic Micra Leadless PPM was implanted on 17 but had intermittent loss of capture. The patient is s/p LCW Medtronic single-chamber PPM implantation on 2017 who presents to the ED w/ c/o progressive  mild worsening sob, associated w/ B/L lower extremity swelling, w/ significant weight gain (~5kgs). The patient denies any associated chest pain, or palpitations. (2017 21:55). Patient is mainly concerned about his lower extremity swelling and mild distention of abdomen  than shortness of breath which is some what same as before , Patient sits most of the day , swelling is worse in the evening . Patient also complaining nocturnal cough  spasm, throat discomfort  , denies any orthopnea .    : seen and eval. hemodynamically stable. Hong Konger speaker. denies any HA, CP, SOB. notes of lower extremity swelling. no overnight events.     REVIEW OF SYSTEMS:  General: NAD, hemodynamically stable, (-)  fever, (-) chills, (+) weakness  HEENT:  Eyes:  No visual loss, blurred vision, double vision or yellow sclerae. Ears, Nose, Throat:  No hearing loss, sneezing, congestion, runny nose or sore throat.  SKIN:  No rash or itching.  CARDIOVASCULAR:  No chest pain, chest pressure or chest discomfort. No palpitations or edema.  RESPIRATORY:  No shortness of breath, cough or sputum.  GASTROINTESTINAL:  No anorexia, nausea, vomiting or diarrhea. No abdominal pain or blood.  NEUROLOGICAL:  No headache, dizziness, syncope, paralysis, ataxia, numbness or tingling in the extremities. No change in bowel or bladder control.  MUSCULOSKELETAL:  No muscle, back pain, joint pain or stiffness.  HEMATOLOGIC:  No anemia, bleeding or bruising.  LYMPHATICS:  No enlarged nodes. No history of splenectomy.  ENDOCRINOLOGIC:  No reports of sweating, cold or heat intolerance. No polyuria or polydipsia.  ALLERGIES:  No history of asthma, hives, eczema or rhinitis.    PHYSICAL EXAM:  Constitutional: NAD, awake and alert, well-developed  HEENT: PERR, EOMI,  Nol cyanosis  Neck:  supple,  No JVD  Respiratory: Breath sounds are clear bilaterally, No wheezing, rales or rhonchi  Cardiovascular: S1 and S2, regular rate and rhythm, no Murmurs, gallops or rubs  Gastrointestinal: distended  Bowel Sounds present, soft, poss left side hernia, ascites  Extremities: 2+  LE  edema. No clubbing or cyanosis.  Vascular: 1+ peripheral pulses  Neurological: A/O x 3, no focal deficits  Musculoskeletal: no calf tenderness.  Skin: No rashes.        CBC Full  -  ( 2017 06:01 )  WBC Count : 7.0 K/uL  Hemoglobin : 11.7 g/dL  Hematocrit : 37.7 %  Platelet Count - Automated : 177 K/uL     PTT - ( 2017 17:08 )  PTT:33.9 sec  Urinalysis Basic - ( 2017 19:14 )    Color: Yellow / Appearance: Clear / S.015 / pH: x  Gluc: x / Ketone: Negative  / Bili: Negative / Urobili: 1 mg/dL   Blood: x / Protein: 30 mg/dL / Nitrite: Negative   Leuk Esterase: Trace / RBC: Negative /HPF / WBC 6-10   Sq Epi: x / Non Sq Epi: Occasional / Bacteria: Occasional          141  |  102  |  16  ----------------------------<  96  3.4<L>   |  33<H>  |  0.93    Ca    8.2<L>      2017 05:48  Mg     1.8         TPro  6.1  /  Alb  3.0<L>  /  TBili  1.5<H>  /  DBili  x   /  AST  22  /  ALT  15  /  AlkPhos  139<H>          # Acute on chronic systolic congestive heart failure,  - improving /  frequent recurrent hospitalizations - will need  arrangments since patient is high risk of re-admission.   - daily weights, daily i/o, low salt diet  - encourage physical activity  - on lasix / start on aldactone today  - on coreg  - patient will need arrangement with Wolfgang and close follow up with cardiology. Given extensive hospitalizations for similar issues - patient high risk for re-admissions.     # Chronic atrial fibrillation - good rate control  - continue with AV reta blocker agents (coreg / cardizem)  - s/p AVN ablation and CRT-P upgrade during last admissio,  - tele noting atrial fibrillation with V-paced  - on xarelto    # troponemia secondary to CHF  - chest pain free

## 2017-06-24 VITALS — WEIGHT: 177.47 LBS

## 2017-06-24 LAB
ANION GAP SERPL CALC-SCNC: 5 MMOL/L — SIGNIFICANT CHANGE UP (ref 5–17)
BUN SERPL-MCNC: 16 MG/DL — SIGNIFICANT CHANGE UP (ref 7–23)
CALCIUM SERPL-MCNC: 8.8 MG/DL — SIGNIFICANT CHANGE UP (ref 8.5–10.1)
CHLORIDE SERPL-SCNC: 102 MMOL/L — SIGNIFICANT CHANGE UP (ref 96–108)
CO2 SERPL-SCNC: 34 MMOL/L — HIGH (ref 22–31)
CREAT SERPL-MCNC: 0.97 MG/DL — SIGNIFICANT CHANGE UP (ref 0.5–1.3)
GLUCOSE SERPL-MCNC: 109 MG/DL — HIGH (ref 70–99)
MAGNESIUM SERPL-MCNC: 2 MG/DL — SIGNIFICANT CHANGE UP (ref 1.6–2.6)
PHOSPHATE SERPL-MCNC: 3.3 MG/DL — SIGNIFICANT CHANGE UP (ref 2.5–4.5)
POTASSIUM SERPL-MCNC: 4 MMOL/L — SIGNIFICANT CHANGE UP (ref 3.5–5.3)
POTASSIUM SERPL-SCNC: 4 MMOL/L — SIGNIFICANT CHANGE UP (ref 3.5–5.3)
SODIUM SERPL-SCNC: 141 MMOL/L — SIGNIFICANT CHANGE UP (ref 135–145)

## 2017-06-24 RX ORDER — RIVAROXABAN 15 MG-20MG
1 KIT ORAL
Qty: 30 | Refills: 1
Start: 2017-06-24 | End: 2017-08-22

## 2017-06-24 RX ORDER — POTASSIUM CHLORIDE 20 MEQ
1 PACKET (EA) ORAL
Qty: 30 | Refills: 0
Start: 2017-06-24 | End: 2017-07-24

## 2017-06-24 RX ORDER — PANTOPRAZOLE SODIUM 20 MG/1
1 TABLET, DELAYED RELEASE ORAL
Qty: 42 | Refills: 0
Start: 2017-06-24 | End: 2017-08-05

## 2017-06-24 RX ORDER — DILTIAZEM HCL 120 MG
1 CAPSULE, EXT RELEASE 24 HR ORAL
Qty: 30 | Refills: 0
Start: 2017-06-24 | End: 2017-07-24

## 2017-06-24 RX ORDER — FUROSEMIDE 40 MG
1 TABLET ORAL
Qty: 30 | Refills: 0
Start: 2017-06-24 | End: 2017-07-24

## 2017-06-24 RX ADMIN — Medication 81 MILLIGRAM(S): at 13:23

## 2017-06-24 RX ADMIN — SPIRONOLACTONE 25 MILLIGRAM(S): 25 TABLET, FILM COATED ORAL at 06:45

## 2017-06-24 RX ADMIN — Medication 120 MILLIGRAM(S): at 06:45

## 2017-06-24 RX ADMIN — Medication 40 MILLIGRAM(S): at 06:45

## 2017-06-24 RX ADMIN — CARVEDILOL PHOSPHATE 25 MILLIGRAM(S): 80 CAPSULE, EXTENDED RELEASE ORAL at 06:45

## 2017-06-24 RX ADMIN — RIVAROXABAN 20 MILLIGRAM(S): KIT at 13:23

## 2017-06-24 RX ADMIN — PANTOPRAZOLE SODIUM 40 MILLIGRAM(S): 20 TABLET, DELAYED RELEASE ORAL at 13:23

## 2017-06-24 NOTE — DISCHARGE NOTE ADULT - HOSPITAL COURSE
70 y/o M PMHx significant for non-obstructive CAD, HFrEF (LVEF 35-40%), with recurrent admissions for decompensated HF, mitral valve disease, AFib s/p ablation with recurrence, LBBB, tachy-yair syndrome s/p RCW single chamber ppm implantation 4/2016 and recent AV ablation and CRT-P upgrade on 5/3/2017 who was readmitted to  on 5/8/2017 for evaluation/management PPM pocket erosion. The patient was subsequently transferred to [facility] where the patient underwent lead extraction on 5/11/2017. A Medtronic Micra Leadless PPM was implanted on 5/17/17 but had intermittent loss of capture. The patient is s/p LCW Medtronic single-chamber PPM implantation on 5/19/2017 who presented to the ED w/ c/o progressive worsening sob, associated w/ B/L lower extremity swelling, w/ significant weight gain (~5kgs). The patient denied any associated chest pain, or palpitations.  He was admitted with acute CHF exacerbation likely related to medication non compliance.  Was diuresed while here. medication compliance has been stressed. Had CT a/p for abd pain which was negative. Also had s/s suggestive of GERD and was started on protonix. At this point he is hemodynamically stable. Tele was negative for significant arrhythmia. He will be discharged home today. He has been advised to f/u with cardiology and his pcp within one week. Please note patient is not on ace/arb due to documented allergy.    For physical exam, please see progress note from 6/24/17    LABS:    06-24    141  |  102  |  16  ----------------------------<  109<H>  4.0   |  34<H>  |  0.97    Ca    8.8      24 Jun 2017 09:49  Phos  3.3     06-24  Mg     2.0     06-24        CT a/p:  IMPRESSION:     No acute intra-abdominal or pelvic pathology. Specifically, no bowel   obstruction or hernia.    Small right pleural effusion and small volume abdominal and pelvic   ascites.    Final diagnosis:  1. Acute systolic chf exacerbation  2. Atrial fibrillation/tachybrady syndrome s/p ppm  3. GERD  4. Troponinemia    time taken in preparation for dc 65 minutes.

## 2017-06-24 NOTE — DISCHARGE NOTE ADULT - CARE PLAN
Principal Discharge DX:	Acute on chronic congestive heart failure, unspecified congestive heart failure type  Goal:	prevent exacerbation  Instructions for follow-up, activity and diet:	take medications as prescribed and follow up with cardiology and your primary care physician  Secondary Diagnosis:	Gastroesophageal reflux disease, esophagitis presence not specified  Goal:	improvement in symptoms  Instructions for follow-up, activity and diet:	take protonix as prescribed and follow up with your primary care physician in 1-2 weeks. if no improvement in symptoms you will need GI referal.

## 2017-06-24 NOTE — DISCHARGE NOTE ADULT - CARE PROVIDER_API CALL
Dayton Kowalski (MD), Cardiovascular Disease  [address] [place] [address]  Phone: [phone]  Fax: [phone]    Co, Florentino POP), Internal Medicine  [address] [place] [address]  Phone: [phone]  Fax: [phone]

## 2017-06-24 NOTE — DISCHARGE NOTE ADULT - MEDICATION SUMMARY - MEDICATIONS TO STOP TAKING
I will STOP taking the medications listed below when I get home from the hospital:    ibuprofen 200 mg oral tablet  -- 1 tab(s) by mouth 2 times a day, As Needed

## 2017-06-24 NOTE — PROGRESS NOTE ADULT - SUBJECTIVE AND OBJECTIVE BOX
72 y/o M PMHx significant for non-obstructive CAD, HFrEF (LVEF 35-40%), with recurrent admissions for decompensated HF, mitral valve disease, AFib s/p ablation with recurrence, LBBB, tachy-yair syndrome s/p RCW single chamber ppm implantation 4/2016 and recent AV ablation and CRT-P upgrade on 5/3/2017 who was readmitted to  on 5/8/2017 for evaluation/management PPM pocket erosion. The patient was subsequently transferred to [facility] where the patient underwent lead extraction on 5/11/2017. A Medtronic Micra Leadless PPM was implanted on 5/17/17 but had intermittent loss of capture. The patient is s/p LCW Medtronic single-chamber PPM implantation on 5/19/2017 who presented to the ED w/ c/o progressive worsening sob, associated w/ B/L lower extremity swelling, w/ significant weight gain (~5kgs). The patient denied any associated chest pain, or palpitations. He was admitted with acute CHF exacerbation. Was diuresed while here. Had CT a/p for abd pain which was negative. Also had s/s suggestive of GERD and was started on protonix. At this point he is hemodynamically stable. Tele was negative for significant arrhythmia.     6/24: pt seen and examined earlier today. Was feeling better. no sob/chest pain. Wanted to go home. Tolerating po. No dizziness/lightheadedness.     REVIEW OF SYSTEMS:  All 10 systems reviewed and is as per hpi otherwise negative.    PHYSICAL EXAM:  Constitutional: NAD, awake and alert, well-developed  HEENT: PERRLA, EOMI,    Neck:  supple,  No JVD  Respiratory: Breath sounds are clear bilaterally, No wheezing, rales or rhonchi  Cardiovascular: S1 and S2, regular rate and rhythm  Gastrointestinal: soft, non tender, bowel sounds present.   Extremities: 2+  LE  edema. No clubbing or cyanosis.  Vascular: 1+ peripheral pulses  Neurological: A/O x 3, no focal deficits  Musculoskeletal: no calf tenderness.  Skin: No rashes.    LABS:    06-24    141  |  102  |  16  ----------------------------<  109<H>  4.0   |  34<H>  |  0.97    Ca    8.8      24 Jun 2017 09:49  Phos  3.3     06-24  Mg     2.0     06-24

## 2017-06-24 NOTE — DISCHARGE NOTE ADULT - PATIENT PORTAL LINK FT
“You can access the FollowHealth Patient Portal, offered by [facility], by registering with the following website: [email or web address]”

## 2017-06-24 NOTE — DISCHARGE NOTE ADULT - PLAN OF CARE
prevent exacerbation take medications as prescribed and follow up with cardiology and your primary care physician improvement in symptoms take protonix as prescribed and follow up with your primary care physician in 1-2 weeks. if no improvement in symptoms you will need GI referal.

## 2017-06-24 NOTE — DISCHARGE NOTE ADULT - CARE PROVIDERS DIRECT ADDRESSES
[email or web address]

## 2017-06-24 NOTE — DISCHARGE NOTE ADULT - CONDITIONS AT DISCHARGE
stable mina,  unable to make an appointment with Dr Bearden for f/u CHF.  Gave the phone number and address to patient to make an appointment on Monday.  [address] [facility], [phone] mina,  made an appointment with Dr Bearden for f/u CHF on Monday at 4;30 pm.  [address] [place] [address], [phone]

## 2017-06-24 NOTE — PROGRESS NOTE ADULT - ASSESSMENT
71M, pmh as above a/w:    1. Acute systolic CHF exacerbation:  -2Decho from 5/10/17 with LVEV 35-40%, grade 1DD, mod LVH, enlarged LA/dilated RA, mild to mod MR, Small asd with L to R shunt  -likely related to medication non compliance  -needs close f/u as outpt.  -will dc on po lasix.  -continue bb    2. Atrial fibrillation/Tachybrady syndrome s/p ablation, s/p LCW single chamber PPM:  -continue coreg/ccb  -on xarelto  -to f/u with EP as outpt.    3. Nocturnal cough/epigastric discomfort/throat pain:  -started on ppi for suspected gerd  -to f/u with pcp/gi as outpt.    4. Elevated troponins:  -likely related to chf    5. Dispo:  dc planning to home with home care  close outpt f/u  d/w patient and RN.

## 2017-06-24 NOTE — DISCHARGE NOTE ADULT - MEDICATION SUMMARY - MEDICATIONS TO TAKE
I will START or STAY ON the medications listed below when I get home from the hospital:    aspirin 81 mg oral tablet, chewable  -- 1 tab(s) by mouth once a day  -- Indication: For home med    DilTIAZem Hydrochloride  mg/24 hours oral capsule, extended release  -- 1 cap(s) by mouth once a day  -- Indication: For home med    rivaroxaban 20 mg oral tablet  -- 1 tab(s) by mouth once a day  -- Indication: For home med    Coreg 25 mg oral tablet  -- 1 tab(s) by mouth 2 times a day  -- Indication: For home med    furosemide 40 mg oral tablet  -- 1 tab(s) by mouth once a day  -- Indication: For home med (for CHF)    potassium chloride 20 mEq oral tablet, extended release  -- 1 tab(s) by mouth once a day  -- Indication: For home med    pantoprazole 40 mg oral delayed release tablet  -- 1 tab(s) by mouth once a day  -- Indication: For gerd

## 2017-06-28 DIAGNOSIS — K21.9 GASTRO-ESOPHAGEAL REFLUX DISEASE WITHOUT ESOPHAGITIS: ICD-10-CM

## 2017-06-28 DIAGNOSIS — I44.7 LEFT BUNDLE-BRANCH BLOCK, UNSPECIFIED: ICD-10-CM

## 2017-06-28 DIAGNOSIS — I50.23 ACUTE ON CHRONIC SYSTOLIC (CONGESTIVE) HEART FAILURE: ICD-10-CM

## 2017-06-28 DIAGNOSIS — M19.90 UNSPECIFIED OSTEOARTHRITIS, UNSPECIFIED SITE: ICD-10-CM

## 2017-06-28 DIAGNOSIS — I49.5 SICK SINUS SYNDROME: ICD-10-CM

## 2017-06-28 DIAGNOSIS — I48.2 CHRONIC ATRIAL FIBRILLATION: ICD-10-CM

## 2017-06-28 DIAGNOSIS — I25.119 ATHEROSCLEROTIC HEART DISEASE OF NATIVE CORONARY ARTERY WITH UNSPECIFIED ANGINA PECTORIS: ICD-10-CM

## 2017-06-28 DIAGNOSIS — Z79.01 LONG TERM (CURRENT) USE OF ANTICOAGULANTS: ICD-10-CM

## 2017-06-28 DIAGNOSIS — R06.02 SHORTNESS OF BREATH: ICD-10-CM

## 2017-06-28 DIAGNOSIS — Z95.0 PRESENCE OF CARDIAC PACEMAKER: ICD-10-CM

## 2017-06-28 DIAGNOSIS — D56.9 THALASSEMIA, UNSPECIFIED: ICD-10-CM

## 2017-06-28 DIAGNOSIS — Z79.2 LONG TERM (CURRENT) USE OF ANTIBIOTICS: ICD-10-CM

## 2017-06-28 DIAGNOSIS — Z88.8 ALLERGY STATUS TO OTHER DRUGS, MEDICAMENTS AND BIOLOGICAL SUBSTANCES: ICD-10-CM

## 2017-06-28 DIAGNOSIS — I08.1 RHEUMATIC DISORDERS OF BOTH MITRAL AND TRICUSPID VALVES: ICD-10-CM

## 2017-07-17 ENCOUNTER — APPOINTMENT (OUTPATIENT)
Dept: CARDIOLOGY | Facility: CLINIC | Age: 72
End: 2017-07-17
Payer: MEDICARE

## 2017-07-17 ENCOUNTER — NON-APPOINTMENT (OUTPATIENT)
Age: 72
End: 2017-07-17

## 2017-07-17 VITALS
HEIGHT: 63 IN | OXYGEN SATURATION: 96 % | WEIGHT: 178 LBS | DIASTOLIC BLOOD PRESSURE: 68 MMHG | SYSTOLIC BLOOD PRESSURE: 139 MMHG | HEART RATE: 59 BPM | BODY MASS INDEX: 31.54 KG/M2

## 2017-07-17 DIAGNOSIS — I36.9 NONRHEUMATIC TRICUSPID VALVE DISORDER, UNSPECIFIED: ICD-10-CM

## 2017-07-17 DIAGNOSIS — Z87.891 PERSONAL HISTORY OF NICOTINE DEPENDENCE: ICD-10-CM

## 2017-07-17 DIAGNOSIS — Z86.2 PERSONAL HISTORY OF DISEASES OF THE BLOOD AND BLOOD-FORMING ORGANS AND CERTAIN DISORDERS INVOLVING THE IMMUNE MECHANISM: ICD-10-CM

## 2017-07-17 PROCEDURE — 99215 OFFICE O/P EST HI 40 MIN: CPT | Mod: 25

## 2017-07-17 PROCEDURE — 93000 ELECTROCARDIOGRAM COMPLETE: CPT

## 2017-07-17 RX ORDER — ALBUTEROL SULFATE 2.5 MG/3ML
(2.5 MG/3ML) SOLUTION RESPIRATORY (INHALATION) EVERY 6 HOURS
Qty: 90 | Refills: 0 | Status: DISCONTINUED | COMMUNITY
Start: 2017-06-07 | End: 2017-07-17

## 2017-07-17 RX ORDER — FAMOTIDINE 20 MG/1
20 TABLET, FILM COATED ORAL DAILY
Qty: 90 | Refills: 3 | Status: DISCONTINUED | COMMUNITY
Start: 2017-06-07 | End: 2017-07-17

## 2017-07-24 ENCOUNTER — APPOINTMENT (OUTPATIENT)
Dept: CARDIOLOGY | Facility: CLINIC | Age: 72
End: 2017-07-24

## 2017-08-21 ENCOUNTER — NON-APPOINTMENT (OUTPATIENT)
Age: 72
End: 2017-08-21

## 2017-08-21 ENCOUNTER — APPOINTMENT (OUTPATIENT)
Dept: CARDIOLOGY | Facility: CLINIC | Age: 72
End: 2017-08-21
Payer: MEDICARE

## 2017-08-21 VITALS
HEIGHT: 63 IN | OXYGEN SATURATION: 98 % | HEART RATE: 60 BPM | SYSTOLIC BLOOD PRESSURE: 150 MMHG | BODY MASS INDEX: 31.54 KG/M2 | DIASTOLIC BLOOD PRESSURE: 70 MMHG | WEIGHT: 178 LBS

## 2017-08-21 PROCEDURE — 93000 ELECTROCARDIOGRAM COMPLETE: CPT

## 2017-08-21 PROCEDURE — 99214 OFFICE O/P EST MOD 30 MIN: CPT | Mod: 25

## 2017-08-21 RX ORDER — FUROSEMIDE 40 MG/1
40 TABLET ORAL DAILY
Qty: 90 | Refills: 3 | Status: DISCONTINUED | COMMUNITY
Start: 2017-06-07 | End: 2017-08-21

## 2017-08-21 RX ORDER — DILTIAZEM HYDROCHLORIDE 120 MG/1
120 CAPSULE, EXTENDED RELEASE ORAL DAILY
Refills: 0 | Status: DISCONTINUED | COMMUNITY
End: 2017-08-21

## 2017-09-15 ENCOUNTER — APPOINTMENT (OUTPATIENT)
Dept: ELECTROPHYSIOLOGY | Facility: CLINIC | Age: 72
End: 2017-09-15

## 2017-10-23 ENCOUNTER — APPOINTMENT (OUTPATIENT)
Dept: CARDIOLOGY | Facility: CLINIC | Age: 72
End: 2017-10-23
Payer: MEDICARE

## 2017-10-23 ENCOUNTER — NON-APPOINTMENT (OUTPATIENT)
Age: 72
End: 2017-10-23

## 2017-10-23 VITALS
TEMPERATURE: 98.7 F | WEIGHT: 183 LBS | HEART RATE: 60 BPM | SYSTOLIC BLOOD PRESSURE: 105 MMHG | HEIGHT: 63 IN | DIASTOLIC BLOOD PRESSURE: 84 MMHG | BODY MASS INDEX: 32.43 KG/M2

## 2017-10-23 PROCEDURE — 93000 ELECTROCARDIOGRAM COMPLETE: CPT

## 2017-10-23 PROCEDURE — 99214 OFFICE O/P EST MOD 30 MIN: CPT | Mod: 25

## 2018-01-22 ENCOUNTER — NON-APPOINTMENT (OUTPATIENT)
Age: 73
End: 2018-01-22

## 2018-01-22 ENCOUNTER — APPOINTMENT (OUTPATIENT)
Dept: CARDIOLOGY | Facility: CLINIC | Age: 73
End: 2018-01-22
Payer: MEDICARE

## 2018-01-22 VITALS — SYSTOLIC BLOOD PRESSURE: 150 MMHG | DIASTOLIC BLOOD PRESSURE: 80 MMHG

## 2018-01-22 VITALS
HEIGHT: 63 IN | HEART RATE: 60 BPM | TEMPERATURE: 98.5 F | DIASTOLIC BLOOD PRESSURE: 92 MMHG | WEIGHT: 190 LBS | SYSTOLIC BLOOD PRESSURE: 176 MMHG | OXYGEN SATURATION: 94 % | BODY MASS INDEX: 33.66 KG/M2

## 2018-01-22 PROCEDURE — 99214 OFFICE O/P EST MOD 30 MIN: CPT | Mod: 25

## 2018-01-22 PROCEDURE — 93000 ELECTROCARDIOGRAM COMPLETE: CPT

## 2018-04-24 ENCOUNTER — RX RENEWAL (OUTPATIENT)
Age: 73
End: 2018-04-24

## 2018-05-04 NOTE — H&P ADULT - NSHPPHYSICALEXAM_GEN_ALL_CORE
good, to achieve stated therapy goals Physical Exam:    Neuro: alert, good historian, aware of events/plan    Pulm: essentially clear, no wheeze  Suppl O2 in place    CV:  S1  S2  RRR    Abd: soft  non tender  non distended  + BS    Extremities: no edema, no calf pain    Inc:  R infraclavic  PPM site w/ pressure dsg> no crepitus, no drainage, no soft  tissue swelling surrounding dsg

## 2018-06-04 NOTE — DISCHARGE NOTE ADULT - MEDICATION SUMMARY - MEDICATIONS TO CHANGE
1.  We are sending you for a consult with Dr. Luli Zhang at [facility].    
I will SWITCH the dose or number of times a day I take the medications listed below when I get home from the hospital:  None

## 2018-07-10 ENCOUNTER — MEDICATION RENEWAL (OUTPATIENT)
Age: 73
End: 2018-07-10

## 2018-08-13 ENCOUNTER — RX RENEWAL (OUTPATIENT)
Age: 73
End: 2018-08-13

## 2018-09-24 ENCOUNTER — APPOINTMENT (OUTPATIENT)
Dept: CARDIOLOGY | Facility: CLINIC | Age: 73
End: 2018-09-24
Payer: MEDICARE

## 2018-09-24 ENCOUNTER — NON-APPOINTMENT (OUTPATIENT)
Age: 73
End: 2018-09-24

## 2018-09-24 VITALS
HEIGHT: 63 IN | HEART RATE: 68 BPM | SYSTOLIC BLOOD PRESSURE: 125 MMHG | WEIGHT: 176 LBS | DIASTOLIC BLOOD PRESSURE: 69 MMHG | BODY MASS INDEX: 31.18 KG/M2 | OXYGEN SATURATION: 96 %

## 2018-09-24 PROCEDURE — 99214 OFFICE O/P EST MOD 30 MIN: CPT | Mod: 25

## 2018-09-24 PROCEDURE — 93000 ELECTROCARDIOGRAM COMPLETE: CPT

## 2018-10-08 ENCOUNTER — APPOINTMENT (OUTPATIENT)
Dept: CARDIOLOGY | Facility: CLINIC | Age: 73
End: 2018-10-08
Payer: MEDICARE

## 2018-10-08 PROCEDURE — 93306 TTE W/DOPPLER COMPLETE: CPT

## 2018-10-10 ENCOUNTER — APPOINTMENT (OUTPATIENT)
Dept: ELECTROPHYSIOLOGY | Facility: CLINIC | Age: 73
End: 2018-10-10
Payer: MEDICARE

## 2018-10-10 ENCOUNTER — MEDICATION RENEWAL (OUTPATIENT)
Age: 73
End: 2018-10-10

## 2018-10-10 ENCOUNTER — RX RENEWAL (OUTPATIENT)
Age: 73
End: 2018-10-10

## 2018-10-10 VITALS — SYSTOLIC BLOOD PRESSURE: 108 MMHG | DIASTOLIC BLOOD PRESSURE: 62 MMHG | HEART RATE: 60 BPM

## 2018-10-10 PROCEDURE — 93279 PRGRMG DEV EVAL PM/LDLS PM: CPT

## 2018-12-17 ENCOUNTER — APPOINTMENT (OUTPATIENT)
Dept: CARDIOLOGY | Facility: CLINIC | Age: 73
End: 2018-12-17
Payer: MEDICARE

## 2018-12-17 ENCOUNTER — NON-APPOINTMENT (OUTPATIENT)
Age: 73
End: 2018-12-17

## 2018-12-17 VITALS
WEIGHT: 188 LBS | HEART RATE: 68 BPM | BODY MASS INDEX: 29.51 KG/M2 | SYSTOLIC BLOOD PRESSURE: 151 MMHG | OXYGEN SATURATION: 97 % | HEIGHT: 67 IN | DIASTOLIC BLOOD PRESSURE: 82 MMHG

## 2018-12-17 VITALS — DIASTOLIC BLOOD PRESSURE: 76 MMHG | SYSTOLIC BLOOD PRESSURE: 120 MMHG

## 2018-12-17 DIAGNOSIS — R42 DIZZINESS AND GIDDINESS: ICD-10-CM

## 2018-12-17 PROCEDURE — 99214 OFFICE O/P EST MOD 30 MIN: CPT | Mod: 25

## 2018-12-17 PROCEDURE — 93000 ELECTROCARDIOGRAM COMPLETE: CPT

## 2018-12-17 RX ORDER — CHLORTHALIDONE 25 MG/1
25 TABLET ORAL DAILY
Refills: 0 | Status: DISCONTINUED | COMMUNITY
End: 2018-12-17

## 2018-12-17 NOTE — REVIEW OF SYSTEMS
[Fever] : no fever [Blurry Vision] : no blurred vision [Earache] : no earache [Mouth Sores] : no mouth sores [Chest Pain] : no chest pain [Palpitations] : no palpitations [Cough] : no cough [Abdominal Pain] : no abdominal pain [Heartburn] : no heartburn [Dysphagia] : no dysphagia [Urinary Frequency] : no change in urinary frequency [Joint Pain] : no joint pain [Skin: A Rash] : no rash: [Dizziness] : no dizziness [Confusion] : no confusion was observed [Excessive Thirst] : no polydipsia [Easy Bleeding] : no tendency for easy bleeding

## 2018-12-17 NOTE — HISTORY OF PRESENT ILLNESS
[FreeTextEntry1] : 73 year old male with hx of chronic atrial fibrillation , s/p ablation , with PPM placement , chronic systolic heart failure  who came for follow up says he was feeling episodes of dizziness every  2 weeks ,last for 10 minutes resolves , no other associated symptoms , patient is taking medication together , \par  \par patient is feeling well,  denies any shortness of breath or chest pain . patient is physically active , denies dizziness \par \par patient is on xarelto ,  taking lasix 80 mg     patient is not sure of his medication \par \par patients blood pressure is controlled  , his medication were reviewed    patient had blood work with primary , had PPM interrogated on october 2018 \par \par his follow up echo improvement in EF , MR severity , pulmonary hypertension improved

## 2018-12-17 NOTE — REASON FOR VISIT
[Follow-Up - Clinic] : a clinic follow-up of [Atrial Fibrillation] : atrial fibrillation [Dizziness] : dizziness [Heart Failure] : congestive heart failure [Hyperlipidemia] : hyperlipidemia [Hypertension] : hypertension [Medication Management] : Medication management

## 2018-12-17 NOTE — ASSESSMENT
[FreeTextEntry1] : Patient with chronic systolic heart failure  appears compensated   :cardiomyopathy  no evidence of ischemia on stress test . improved EF on follow up ECHO ,  continue current medication ,  continue coreg . \par will  continue  the  entresto dose  1 tab po BID\par \par dizziness episode possibly due to medications , taking at one time , explained the patient to discontinue chlorthalidone ,  norvasc in pm , lasix 80 mg po in am ,  \par \par \par Chronic atrial fibrillation s/p ablation PPM ( replaced two times as patient had complications ) now has single chamber VVI pacemaker . continue periodic interrogation with EP and continue anticoagulation  xarelto \par \par \par Cardiac Murmur   improved MR now mild to moderate  , mild  TR mild  pulmonary hypertension ( secondary )  continue his current medication , lasix 80 mg po in am , norvasc 5 mg po iN PM  \par \par MIld COPD  : continue inhaler PRN , \par \par \par explained to patient in detail  via  ,  follow up after 4 months  will obtain blood work from primary

## 2018-12-17 NOTE — PHYSICAL EXAM
[General Appearance - Well Developed] : well developed [Normal Conjunctiva] : the conjunctiva exhibited no abnormalities [Normal Oral Mucosa] : normal oral mucosa [Normal Jugular Venous A Waves Present] : normal jugular venous A waves present [] : no respiratory distress [Respiration, Rhythm And Depth] : normal respiratory rhythm and effort [Exaggerated Use Of Accessory Muscles For Inspiration] : no accessory muscle use [Auscultation Breath Sounds / Voice Sounds] : lungs were clear to auscultation bilaterally [Chest Palpation] : palpation of the chest revealed no abnormalities [Lungs Percussion] : the lungs were normal to percussion [Bowel Sounds] : normal bowel sounds [Abdomen Soft] : soft [Abdomen Tenderness] : non-tender [Abnormal Walk] : normal gait [Nail Clubbing] : no clubbing of the fingernails [Cyanosis, Localized] : no localized cyanosis [Skin Turgor] : normal skin turgor [Oriented To Time, Place, And Person] : oriented to person, place, and time [5th Left ICS - MCL] : palpated at the 5th LICS in the midclavicular line [Normal] : normal [Normal Rate] : normal [Rhythm Regular] : regular [Normal S1] : normal S1 [Normal S2] : normal S2 [III] : a grade 3 [II] : a grade 2 [2+] : left 2+ [1+] : left 1+ [No Pitting Edema] : no pitting edema present [Right Carotid Bruit] : no bruit heard over the right carotid [Left Carotid Bruit] : no bruit heard over the left carotid [Right Femoral Bruit] : no bruit heard over the right femoral artery [Left Femoral Bruit] : no bruit heard over the left femoral artery

## 2019-03-25 ENCOUNTER — APPOINTMENT (OUTPATIENT)
Dept: ELECTROPHYSIOLOGY | Facility: CLINIC | Age: 74
End: 2019-03-25
Payer: MEDICARE

## 2019-03-25 VITALS
HEIGHT: 67 IN | DIASTOLIC BLOOD PRESSURE: 75 MMHG | WEIGHT: 186 LBS | HEART RATE: 60 BPM | SYSTOLIC BLOOD PRESSURE: 127 MMHG | BODY MASS INDEX: 29.19 KG/M2

## 2019-03-25 PROCEDURE — 93279 PRGRMG DEV EVAL PM/LDLS PM: CPT

## 2019-04-08 ENCOUNTER — APPOINTMENT (OUTPATIENT)
Dept: CARDIOLOGY | Facility: CLINIC | Age: 74
End: 2019-04-08

## 2019-06-01 ENCOUNTER — OUTPATIENT (OUTPATIENT)
Dept: OUTPATIENT SERVICES | Facility: HOSPITAL | Age: 74
LOS: 1 days | End: 2019-06-01
Payer: MEDICARE

## 2019-06-01 DIAGNOSIS — Z95.0 PRESENCE OF CARDIAC PACEMAKER: Chronic | ICD-10-CM

## 2019-06-01 PROCEDURE — G9001: CPT

## 2019-06-04 ENCOUNTER — EMERGENCY (EMERGENCY)
Facility: HOSPITAL | Age: 74
LOS: 0 days | Discharge: HOME | End: 2019-06-04
Attending: EMERGENCY MEDICINE | Admitting: EMERGENCY MEDICINE
Payer: MEDICARE

## 2019-06-04 VITALS — WEIGHT: 175.05 LBS

## 2019-06-04 VITALS
SYSTOLIC BLOOD PRESSURE: 123 MMHG | OXYGEN SATURATION: 95 % | RESPIRATION RATE: 18 BRPM | HEART RATE: 59 BPM | DIASTOLIC BLOOD PRESSURE: 60 MMHG | TEMPERATURE: 99 F

## 2019-06-04 DIAGNOSIS — I25.10 ATHEROSCLEROTIC HEART DISEASE OF NATIVE CORONARY ARTERY WITHOUT ANGINA PECTORIS: ICD-10-CM

## 2019-06-04 DIAGNOSIS — R05 COUGH: ICD-10-CM

## 2019-06-04 DIAGNOSIS — M79.10 MYALGIA, UNSPECIFIED SITE: ICD-10-CM

## 2019-06-04 DIAGNOSIS — Z95.0 PRESENCE OF CARDIAC PACEMAKER: ICD-10-CM

## 2019-06-04 DIAGNOSIS — I10 ESSENTIAL (PRIMARY) HYPERTENSION: ICD-10-CM

## 2019-06-04 DIAGNOSIS — I48.91 UNSPECIFIED ATRIAL FIBRILLATION: ICD-10-CM

## 2019-06-04 DIAGNOSIS — J06.9 ACUTE UPPER RESPIRATORY INFECTION, UNSPECIFIED: ICD-10-CM

## 2019-06-04 DIAGNOSIS — R50.9 FEVER, UNSPECIFIED: ICD-10-CM

## 2019-06-04 DIAGNOSIS — Z95.0 PRESENCE OF CARDIAC PACEMAKER: Chronic | ICD-10-CM

## 2019-06-04 LAB
ALBUMIN SERPL ELPH-MCNC: 3.3 G/DL — SIGNIFICANT CHANGE UP (ref 3.3–5)
ALP SERPL-CCNC: 165 U/L — HIGH (ref 40–120)
ALT FLD-CCNC: 49 U/L — SIGNIFICANT CHANGE UP (ref 12–78)
ANION GAP SERPL CALC-SCNC: 8 MMOL/L — SIGNIFICANT CHANGE UP (ref 5–17)
AST SERPL-CCNC: 30 U/L — SIGNIFICANT CHANGE UP (ref 15–37)
BASOPHILS # BLD AUTO: 0.06 K/UL — SIGNIFICANT CHANGE UP (ref 0–0.2)
BASOPHILS NFR BLD AUTO: 0.6 % — SIGNIFICANT CHANGE UP (ref 0–2)
BILIRUB SERPL-MCNC: 0.9 MG/DL — SIGNIFICANT CHANGE UP (ref 0.2–1.2)
BUN SERPL-MCNC: 22 MG/DL — SIGNIFICANT CHANGE UP (ref 7–23)
CALCIUM SERPL-MCNC: 9 MG/DL — SIGNIFICANT CHANGE UP (ref 8.5–10.1)
CHLORIDE SERPL-SCNC: 103 MMOL/L — SIGNIFICANT CHANGE UP (ref 96–108)
CO2 SERPL-SCNC: 27 MMOL/L — SIGNIFICANT CHANGE UP (ref 22–31)
CREAT SERPL-MCNC: 1.42 MG/DL — HIGH (ref 0.5–1.3)
EOSINOPHIL # BLD AUTO: 0.06 K/UL — SIGNIFICANT CHANGE UP (ref 0–0.5)
EOSINOPHIL NFR BLD AUTO: 0.6 % — SIGNIFICANT CHANGE UP (ref 0–6)
GLUCOSE SERPL-MCNC: 109 MG/DL — HIGH (ref 70–99)
HCT VFR BLD CALC: 42.2 % — SIGNIFICANT CHANGE UP (ref 39–50)
HGB BLD-MCNC: 13.2 G/DL — SIGNIFICANT CHANGE UP (ref 13–17)
IMM GRANULOCYTES NFR BLD AUTO: 0.5 % — SIGNIFICANT CHANGE UP (ref 0–1.5)
LACTATE SERPL-SCNC: 1.1 MMOL/L — SIGNIFICANT CHANGE UP (ref 0.7–2)
LYMPHOCYTES # BLD AUTO: 1.28 K/UL — SIGNIFICANT CHANGE UP (ref 1–3.3)
LYMPHOCYTES # BLD AUTO: 12.9 % — LOW (ref 13–44)
MCHC RBC-ENTMCNC: 30 PG — SIGNIFICANT CHANGE UP (ref 27–34)
MCHC RBC-ENTMCNC: 31.3 GM/DL — LOW (ref 32–36)
MCV RBC AUTO: 95.9 FL — SIGNIFICANT CHANGE UP (ref 80–100)
MONOCYTES # BLD AUTO: 1.09 K/UL — HIGH (ref 0–0.9)
MONOCYTES NFR BLD AUTO: 11 % — SIGNIFICANT CHANGE UP (ref 2–14)
NEUTROPHILS # BLD AUTO: 7.4 K/UL — SIGNIFICANT CHANGE UP (ref 1.8–7.4)
NEUTROPHILS NFR BLD AUTO: 74.4 % — SIGNIFICANT CHANGE UP (ref 43–77)
PLATELET # BLD AUTO: 241 K/UL — SIGNIFICANT CHANGE UP (ref 150–400)
POTASSIUM SERPL-MCNC: 4.5 MMOL/L — SIGNIFICANT CHANGE UP (ref 3.5–5.3)
POTASSIUM SERPL-SCNC: 4.5 MMOL/L — SIGNIFICANT CHANGE UP (ref 3.5–5.3)
PROT SERPL-MCNC: 8.4 GM/DL — HIGH (ref 6–8.3)
RBC # BLD: 4.4 M/UL — SIGNIFICANT CHANGE UP (ref 4.2–5.8)
RBC # FLD: 13.1 % — SIGNIFICANT CHANGE UP (ref 10.3–14.5)
SODIUM SERPL-SCNC: 138 MMOL/L — SIGNIFICANT CHANGE UP (ref 135–145)
WBC # BLD: 9.94 K/UL — SIGNIFICANT CHANGE UP (ref 3.8–10.5)
WBC # FLD AUTO: 9.94 K/UL — SIGNIFICANT CHANGE UP (ref 3.8–10.5)

## 2019-06-04 PROCEDURE — 71046 X-RAY EXAM CHEST 2 VIEWS: CPT | Mod: 26

## 2019-06-04 PROCEDURE — 93010 ELECTROCARDIOGRAM REPORT: CPT

## 2019-06-04 PROCEDURE — 99284 EMERGENCY DEPT VISIT MOD MDM: CPT | Mod: 25

## 2019-06-04 RX ORDER — AZITHROMYCIN 500 MG/1
1 TABLET, FILM COATED ORAL
Qty: 4 | Refills: 0
Start: 2019-06-04

## 2019-06-04 RX ORDER — AZITHROMYCIN 500 MG/1
500 TABLET, FILM COATED ORAL ONCE
Refills: 0 | Status: COMPLETED | OUTPATIENT
Start: 2019-06-04 | End: 2019-06-04

## 2019-06-04 RX ORDER — ACETAMINOPHEN 500 MG
650 TABLET ORAL ONCE
Refills: 0 | Status: COMPLETED | OUTPATIENT
Start: 2019-06-04 | End: 2019-06-04

## 2019-06-04 RX ORDER — CEFTRIAXONE 500 MG/1
1000 INJECTION, POWDER, FOR SOLUTION INTRAMUSCULAR; INTRAVENOUS ONCE
Refills: 0 | Status: COMPLETED | OUTPATIENT
Start: 2019-06-04 | End: 2019-06-04

## 2019-06-04 RX ORDER — SODIUM CHLORIDE 9 MG/ML
1000 INJECTION INTRAMUSCULAR; INTRAVENOUS; SUBCUTANEOUS ONCE
Refills: 0 | Status: COMPLETED | OUTPATIENT
Start: 2019-06-04 | End: 2019-06-04

## 2019-06-04 RX ADMIN — SODIUM CHLORIDE 2000 MILLILITER(S): 9 INJECTION INTRAMUSCULAR; INTRAVENOUS; SUBCUTANEOUS at 13:50

## 2019-06-04 RX ADMIN — Medication 650 MILLIGRAM(S): at 14:16

## 2019-06-04 RX ADMIN — CEFTRIAXONE 1000 MILLIGRAM(S): 500 INJECTION, POWDER, FOR SOLUTION INTRAMUSCULAR; INTRAVENOUS at 14:17

## 2019-06-04 RX ADMIN — AZITHROMYCIN 255 MILLIGRAM(S): 500 TABLET, FILM COATED ORAL at 14:19

## 2019-06-04 NOTE — ED STATDOCS - OBJECTIVE STATEMENT
72 y/o male with a PMHx of Afib, CAD, fatigue, hypokinesis, HTN, LBBB, LV dysfunction, pacemaker presents to the ED c/o flu like symptoms. Pt notes sx started 10 days ago. +myalgias, +fever, +productive cough with yellow phlegm, +sore throat, +rhinorrhea. Denies n/v/d. No sick contacts. No recent travel. PCP at [facility]. Former smoker. No drug use. No EtOH use. No other complaints at this time.  ID#: [id number].

## 2019-06-04 NOTE — ED STATDOCS - PROGRESS NOTE DETAILS
Patient presented with cough, congestion, runny nose x 1 week. tx given in ED. patient feeling better. case discussed with dr. Egan. no need for urine test. ED evaluation and management discussed with the patient in detail.  Close PMD follow up encouraged.  Strict ED return instructions discussed in detail and patient given the opportunity to ask any questions about their discharge diagnosis and instructions. Patient verbalized understanding. ROSANNA Henriquez

## 2019-06-04 NOTE — ED STATDOCS - PHYSICAL EXAMINATION
GEN: AOX3, NAD. HEENT: Throat clear. Head NC/AT. NECK: Supple, No JVD. FROM. C-spine non-tender. CV:S1S2, RRR, LUNGS: Slight wheeze RLL. ABD: Soft, NT/ND, no rebound, no guarding. No CVAT. EXT: No e/c/c. 2+ distal pulses. SKIN: No rashes. NEURO: No focal deficits. CN II-XII intact. FROM. 5/5 motor and sensory. ROSANNA Henriquez

## 2019-06-04 NOTE — ED STATDOCS - ATTENDING CONTRIBUTION TO CARE
I, Ryland Lucas, performed the initial face to face bedside interview with this patient regarding history of present illness, review of symptoms and relevant past medical, social and family history.  I completed an independent physical examination.  I was the initial provider who evaluated this patient. I have signed out the follow up of any pending tests (i.e. labs, radiological studies) to the ACP.  I have communicated the patient’s plan of care and disposition with the ACP.  The history, relevant review of systems, past medical and surgical history, medical decision making, and physical examination was documented by the scribe in my presence and I attest to the accuracy of the documentation.

## 2019-06-04 NOTE — ED STATDOCS - ENMT, MLM
Nasal mucosa clear.  Mouth with normal mucosa  Throat has no vesicles, no oropharyngeal exudates and uvula is midline. Throat slightly erythematous.

## 2019-06-04 NOTE — ED STATDOCS - CARE PLAN
Principal Discharge DX:	Upper respiratory infection  Assessment and plan of treatment:	dc home on zithromax. f/u PMD. ROSANNA Henriquez

## 2019-06-04 NOTE — ED STATDOCS - PMH
AF (atrial fibrillation)    Coronary artery disease involving native heart with angina pectoris, unspecified vessel or lesion type    Fatigue    HTN (hypertension)    Hypokinesis  mild global hypokinesis  LBBB (left bundle branch block)    LV dysfunction    Pacemaker  single chamber meditronic  placed by Dr Tomas  May  of  2016

## 2019-06-05 DIAGNOSIS — Z71.89 OTHER SPECIFIED COUNSELING: ICD-10-CM

## 2019-06-09 LAB
CULTURE RESULTS: SIGNIFICANT CHANGE UP
CULTURE RESULTS: SIGNIFICANT CHANGE UP
SPECIMEN SOURCE: SIGNIFICANT CHANGE UP
SPECIMEN SOURCE: SIGNIFICANT CHANGE UP

## 2019-07-01 ENCOUNTER — APPOINTMENT (OUTPATIENT)
Dept: ELECTROPHYSIOLOGY | Facility: CLINIC | Age: 74
End: 2019-07-01

## 2019-07-09 ENCOUNTER — EMERGENCY (EMERGENCY)
Facility: HOSPITAL | Age: 74
LOS: 0 days | Discharge: HOME | End: 2019-07-09
Attending: EMERGENCY MEDICINE | Admitting: EMERGENCY MEDICINE
Payer: MEDICARE

## 2019-07-09 VITALS
TEMPERATURE: 98 F | DIASTOLIC BLOOD PRESSURE: 88 MMHG | OXYGEN SATURATION: 97 % | HEART RATE: 62 BPM | RESPIRATION RATE: 18 BRPM | SYSTOLIC BLOOD PRESSURE: 145 MMHG

## 2019-07-09 VITALS — WEIGHT: 199.96 LBS | HEIGHT: 69 IN

## 2019-07-09 DIAGNOSIS — Z88.8 ALLERGY STATUS TO OTHER DRUGS, MEDICAMENTS AND BIOLOGICAL SUBSTANCES: ICD-10-CM

## 2019-07-09 DIAGNOSIS — M25.561 PAIN IN RIGHT KNEE: ICD-10-CM

## 2019-07-09 DIAGNOSIS — M25.511 PAIN IN RIGHT SHOULDER: ICD-10-CM

## 2019-07-09 DIAGNOSIS — I48.91 UNSPECIFIED ATRIAL FIBRILLATION: ICD-10-CM

## 2019-07-09 DIAGNOSIS — M19.011 PRIMARY OSTEOARTHRITIS, RIGHT SHOULDER: ICD-10-CM

## 2019-07-09 DIAGNOSIS — I80.01 PHLEBITIS AND THROMBOPHLEBITIS OF SUPERFICIAL VESSELS OF RIGHT LOWER EXTREMITY: ICD-10-CM

## 2019-07-09 DIAGNOSIS — Z95.0 PRESENCE OF CARDIAC PACEMAKER: ICD-10-CM

## 2019-07-09 DIAGNOSIS — Z95.0 PRESENCE OF CARDIAC PACEMAKER: Chronic | ICD-10-CM

## 2019-07-09 DIAGNOSIS — Z79.899 OTHER LONG TERM (CURRENT) DRUG THERAPY: ICD-10-CM

## 2019-07-09 DIAGNOSIS — M19.012 PRIMARY OSTEOARTHRITIS, LEFT SHOULDER: ICD-10-CM

## 2019-07-09 DIAGNOSIS — I25.10 ATHEROSCLEROTIC HEART DISEASE OF NATIVE CORONARY ARTERY WITHOUT ANGINA PECTORIS: ICD-10-CM

## 2019-07-09 DIAGNOSIS — I10 ESSENTIAL (PRIMARY) HYPERTENSION: ICD-10-CM

## 2019-07-09 DIAGNOSIS — I44.7 LEFT BUNDLE-BRANCH BLOCK, UNSPECIFIED: ICD-10-CM

## 2019-07-09 DIAGNOSIS — M25.512 PAIN IN LEFT SHOULDER: ICD-10-CM

## 2019-07-09 DIAGNOSIS — Z79.82 LONG TERM (CURRENT) USE OF ASPIRIN: ICD-10-CM

## 2019-07-09 DIAGNOSIS — M17.0 BILATERAL PRIMARY OSTEOARTHRITIS OF KNEE: ICD-10-CM

## 2019-07-09 PROCEDURE — 73030 X-RAY EXAM OF SHOULDER: CPT | Mod: 26,50

## 2019-07-09 PROCEDURE — 93010 ELECTROCARDIOGRAM REPORT: CPT

## 2019-07-09 PROCEDURE — 99283 EMERGENCY DEPT VISIT LOW MDM: CPT | Mod: 25

## 2019-07-09 PROCEDURE — 73564 X-RAY EXAM KNEE 4 OR MORE: CPT | Mod: 26,50

## 2019-07-09 RX ORDER — OXYCODONE AND ACETAMINOPHEN 5; 325 MG/1; MG/1
1 TABLET ORAL
Qty: 8 | Refills: 0
Start: 2019-07-09

## 2019-07-09 NOTE — ED STATDOCS - MUSCULOSKELETAL, MLM
Intact ROM. TTP b/l knees and shoulder Intact ROM. TTP b/l knees and shoulder. No laxity of any joints. No visible deformity. 5/5 strength on flexion and extension of all limbs.

## 2019-07-09 NOTE — ED STATDOCS - NS_ ATTENDINGSCRIBEDETAILS _ED_A_ED_FT
I Mahesh Pinto MD saw and examined the patient. Scribe documented for me and under my supervision. I have modified the scribe's documentation where necessary to reflect my history, physical exam and other relevant documentations pertinent to the care of the patient.

## 2019-07-09 NOTE — ED STATDOCS - OBJECTIVE STATEMENT
72 y/o Sinhala speaking M with hx of Afib, HTN, and CAD with a PM presents to the ED c/o chronic b/l shoulder and knee pain for 2 weeks. Pt was seen in the ED for same one month ago and was given medication that did not provide relief. The pain is worse with movement and has been persistent for the past 2 weeks. No acute injury or trauma. No specific ankle, hip, wrist, or elbow pain. Pt denies any fevers, chills, recent illness, CP, SOB, cough, congestion, abdominal pain, n/v/d, urinary sx's, back pain, neck pain, or other musculoskeletal pain. No further complaints at this time.     ID # [id number]

## 2019-07-09 NOTE — ED ADULT NURSE NOTE - NSIMPLEMENTINTERV_GEN_ALL_ED
Implemented All Universal Safety Interventions:  [place] to call system. Call bell, personal items and telephone within reach. Instruct patient to call for assistance. Room bathroom lighting operational. Non-slip footwear when patient is off stretcher. Physically safe environment: no spills, clutter or unnecessary equipment. Stretcher in lowest position, wheels locked, appropriate side rails in place.

## 2019-07-09 NOTE — ED STATDOCS - ATTENDING CONTRIBUTION TO CARE
no
I Mahesh Pinto MD saw and examined the patient. MLP saw and examined the patient under my supervision. I discussed the care of the patient with MLP and agree with MLP's plan, assessment and care of the patient while in the ED.

## 2019-07-09 NOTE — ED STATDOCS - CLINICAL SUMMARY MEDICAL DECISION MAKING FREE TEXT BOX
given cardiac hx offered pt labs, he is declining. Will X-ray b/l knees and shoulders, provide pain control, and provide ortho f/u. given cardiac hx offered pt labs, he is declining. Will X-ray b/l knees and shoulders, provide pain control, and provide ortho f/u. Imaging and EKG unremarkable. d/c home with percocet, advised to continue with oxycodone. Recommended warm compresses for phlebitis. [facility] referral.

## 2019-07-09 NOTE — ED STATDOCS - NSFOLLOWUPINSTRUCTIONS_ED_ALL_ED_FT
Aplique compresas tibias en la pierna derecha beth 20 minutos a la vez. continuar con motrin en casa para el dolor. Utilice percocet para el dolor no shade con motrin. celeste un seguimiento con ortopedia en el [facility] lo antes posible.    apply warm compresses to right leg for 20 minutes at a time. continue with motrin at home for pain. use percocet for pain not improved with motrin. follow up with orthopedics at the [facility] as soon as possible.

## 2019-07-09 NOTE — ED STATDOCS - CARE PLAN
Principal Discharge DX:	Osteoarthritis of multiple joints, unspecified osteoarthritis type  Secondary Diagnosis:	Superficial phlebitis of right leg

## 2019-07-09 NOTE — ED STATDOCS - NSFOLLOWUPCLINICS_GEN_ALL_ED_FT
[facility]  Family Medicine  [address] [place] [address]  Phone: [phone]  Fax:   Follow Up Time:

## 2019-07-09 NOTE — ED ADULT NURSE NOTE - CHIEF COMPLAINT QUOTE
Pt alert and oriented x3. Pt presents with BLE pain and BUE pain. Pt was seen In ED for the same symptoms a few weeks ago took motrin and has not helped. Pt worked today and began to have the pain again. Denies chest pain SOB.  [id number]

## 2019-07-09 NOTE — ED STATDOCS - PROGRESS NOTE DETAILS
History with assistance from  ID#[id number]. 72 y/o M with PMH of a-fib, CAD, PPM presents with bilateral shoulder and knee pain x 2 weeks. Seen in this ED for same 1 month ago, states he has not followed up with PCP since that time. Pt reports "I don't have a doctor, I just come here every 3 months to have my pacemaker checked." Denies acute trauma/injury since last ED visit. No change with motrin at home. Denies fever, chills, nausea, vomiting, CP, SOB, palpitations, abdominal pain, numbness/tingling in extremities. PE: Well appearing. Cardiac: s1s2, RRR. Lungs: CTAB. Abdomen: NBS x4, soft, nontender. MSK: No obvious deformity to upper or lower extremities. Diffuse tenderness to palpation over bilateral shoulder and knee joints. No significant erythema, edema, ecchymosis. +superficial phlebitis in right lower extremity distal to knee. Full ROM in bilateral shoulders and knees with reports of pain at end range. 5/5 upper and lower extremity strength. Sensation intact to light touch in all extremities. A/P: Diffuse joint pains. plan for imaging, analgesia. Pt offered cardiac evaluation, refused. Likely d/c home with orthopedic follow up. - Hamzah Vela PA-C

## 2019-07-09 NOTE — ED ADULT TRIAGE NOTE - CHIEF COMPLAINT QUOTE
Pt alert and oriented x3. Pt presents with BLE pain and BUE pain. Pt was seen In ED for the same symptoms a few weeks ago took motrin and has not helped. Pt worked today and began to have the pain again. Denies chest pain SOB.  [id number]

## 2020-05-11 ENCOUNTER — INPATIENT (INPATIENT)
Facility: HOSPITAL | Age: 75
LOS: 4 days | Discharge: HOME HEALTH | DRG: 683 | End: 2020-05-16
Attending: HOSPITALIST | Admitting: FAMILY MEDICINE
Payer: MEDICARE

## 2020-05-11 VITALS — WEIGHT: 158.73 LBS | HEIGHT: 69 IN

## 2020-05-11 DIAGNOSIS — Z95.0 PRESENCE OF CARDIAC PACEMAKER: Chronic | ICD-10-CM

## 2020-05-11 DIAGNOSIS — R79.89 OTHER SPECIFIED ABNORMAL FINDINGS OF BLOOD CHEMISTRY: ICD-10-CM

## 2020-05-11 LAB
ADD ON TEST-SPECIMEN IN LAB: SIGNIFICANT CHANGE UP
ALBUMIN SERPL ELPH-MCNC: 4 G/DL — SIGNIFICANT CHANGE UP (ref 3.3–5)
ALP SERPL-CCNC: 109 U/L — SIGNIFICANT CHANGE UP (ref 40–120)
ALT FLD-CCNC: 66 U/L — SIGNIFICANT CHANGE UP (ref 12–78)
ANION GAP SERPL CALC-SCNC: 10 MMOL/L — SIGNIFICANT CHANGE UP (ref 5–17)
APPEARANCE UR: CLEAR — SIGNIFICANT CHANGE UP
APTT BLD: 35.4 SEC — SIGNIFICANT CHANGE UP (ref 27.5–36.3)
AST SERPL-CCNC: 76 U/L — HIGH (ref 15–37)
BASOPHILS # BLD AUTO: 0.03 K/UL — SIGNIFICANT CHANGE UP (ref 0–0.2)
BASOPHILS NFR BLD AUTO: 0.3 % — SIGNIFICANT CHANGE UP (ref 0–2)
BILIRUB SERPL-MCNC: 4.3 MG/DL — HIGH (ref 0.2–1.2)
BILIRUB UR-MCNC: NEGATIVE — SIGNIFICANT CHANGE UP
BUN SERPL-MCNC: 29 MG/DL — HIGH (ref 7–23)
CALCIUM SERPL-MCNC: 9.5 MG/DL — SIGNIFICANT CHANGE UP (ref 8.5–10.1)
CHLORIDE SERPL-SCNC: 100 MMOL/L — SIGNIFICANT CHANGE UP (ref 96–108)
CO2 SERPL-SCNC: 27 MMOL/L — SIGNIFICANT CHANGE UP (ref 22–31)
COLOR SPEC: YELLOW — SIGNIFICANT CHANGE UP
CREAT SERPL-MCNC: 1.73 MG/DL — HIGH (ref 0.5–1.3)
DIFF PNL FLD: NEGATIVE — SIGNIFICANT CHANGE UP
EOSINOPHIL # BLD AUTO: 0 K/UL — SIGNIFICANT CHANGE UP (ref 0–0.5)
EOSINOPHIL NFR BLD AUTO: 0 % — SIGNIFICANT CHANGE UP (ref 0–6)
GLUCOSE SERPL-MCNC: 107 MG/DL — HIGH (ref 70–99)
GLUCOSE UR QL: NEGATIVE MG/DL — SIGNIFICANT CHANGE UP
HCT VFR BLD CALC: 44.5 % — SIGNIFICANT CHANGE UP (ref 39–50)
HGB BLD-MCNC: 14.5 G/DL — SIGNIFICANT CHANGE UP (ref 13–17)
IMM GRANULOCYTES NFR BLD AUTO: 0.5 % — SIGNIFICANT CHANGE UP (ref 0–1.5)
INR BLD: 1.67 RATIO — HIGH (ref 0.88–1.16)
KETONES UR-MCNC: NEGATIVE — SIGNIFICANT CHANGE UP
LEUKOCYTE ESTERASE UR-ACNC: NEGATIVE — SIGNIFICANT CHANGE UP
LYMPHOCYTES # BLD AUTO: 1.21 K/UL — SIGNIFICANT CHANGE UP (ref 1–3.3)
LYMPHOCYTES # BLD AUTO: 11.2 % — LOW (ref 13–44)
MCHC RBC-ENTMCNC: 31.4 PG — SIGNIFICANT CHANGE UP (ref 27–34)
MCHC RBC-ENTMCNC: 32.6 GM/DL — SIGNIFICANT CHANGE UP (ref 32–36)
MCV RBC AUTO: 96.3 FL — SIGNIFICANT CHANGE UP (ref 80–100)
MONOCYTES # BLD AUTO: 1.18 K/UL — HIGH (ref 0–0.9)
MONOCYTES NFR BLD AUTO: 10.9 % — SIGNIFICANT CHANGE UP (ref 2–14)
NEUTROPHILS # BLD AUTO: 8.37 K/UL — HIGH (ref 1.8–7.4)
NEUTROPHILS NFR BLD AUTO: 77.1 % — HIGH (ref 43–77)
NITRITE UR-MCNC: NEGATIVE — SIGNIFICANT CHANGE UP
PH UR: 6 — SIGNIFICANT CHANGE UP (ref 5–8)
PLATELET # BLD AUTO: 132 K/UL — LOW (ref 150–400)
POTASSIUM SERPL-MCNC: 5.1 MMOL/L — SIGNIFICANT CHANGE UP (ref 3.5–5.3)
POTASSIUM SERPL-SCNC: 5.1 MMOL/L — SIGNIFICANT CHANGE UP (ref 3.5–5.3)
PROT SERPL-MCNC: 6.9 GM/DL — SIGNIFICANT CHANGE UP (ref 6–8.3)
PROT UR-MCNC: 30 MG/DL
PROTHROM AB SERPL-ACNC: 18.8 SEC — HIGH (ref 10–12.9)
RBC # BLD: 4.62 M/UL — SIGNIFICANT CHANGE UP (ref 4.2–5.8)
RBC # FLD: 13.4 % — SIGNIFICANT CHANGE UP (ref 10.3–14.5)
SODIUM SERPL-SCNC: 137 MMOL/L — SIGNIFICANT CHANGE UP (ref 135–145)
SP GR SPEC: 1.01 — SIGNIFICANT CHANGE UP (ref 1.01–1.02)
TROPONIN I SERPL-MCNC: 0.18 NG/ML — HIGH (ref 0.01–0.04)
TROPONIN I SERPL-MCNC: 0.2 NG/ML — HIGH (ref 0.01–0.04)
UROBILINOGEN FLD QL: NEGATIVE MG/DL — SIGNIFICANT CHANGE UP
WBC # BLD: 10.84 K/UL — HIGH (ref 3.8–10.5)
WBC # FLD AUTO: 10.84 K/UL — HIGH (ref 3.8–10.5)

## 2020-05-11 PROCEDURE — 93005 ELECTROCARDIOGRAM TRACING: CPT

## 2020-05-11 PROCEDURE — 80048 BASIC METABOLIC PNL TOTAL CA: CPT

## 2020-05-11 PROCEDURE — 80069 RENAL FUNCTION PANEL: CPT

## 2020-05-11 PROCEDURE — 71045 X-RAY EXAM CHEST 1 VIEW: CPT

## 2020-05-11 PROCEDURE — 93010 ELECTROCARDIOGRAM REPORT: CPT

## 2020-05-11 PROCEDURE — 83605 ASSAY OF LACTIC ACID: CPT

## 2020-05-11 PROCEDURE — 80061 LIPID PANEL: CPT

## 2020-05-11 PROCEDURE — 93017 CV STRESS TEST TRACING ONLY: CPT

## 2020-05-11 PROCEDURE — 83735 ASSAY OF MAGNESIUM: CPT

## 2020-05-11 PROCEDURE — 76770 US EXAM ABDO BACK WALL COMP: CPT | Mod: 26

## 2020-05-11 PROCEDURE — 87040 BLOOD CULTURE FOR BACTERIA: CPT

## 2020-05-11 PROCEDURE — 81001 URINALYSIS AUTO W/SCOPE: CPT

## 2020-05-11 PROCEDURE — 36415 COLL VENOUS BLD VENIPUNCTURE: CPT

## 2020-05-11 PROCEDURE — 93308 TTE F-UP OR LMTD: CPT

## 2020-05-11 PROCEDURE — 80076 HEPATIC FUNCTION PANEL: CPT

## 2020-05-11 PROCEDURE — 84484 ASSAY OF TROPONIN QUANT: CPT

## 2020-05-11 PROCEDURE — U0003: CPT

## 2020-05-11 PROCEDURE — 78452 HT MUSCLE IMAGE SPECT MULT: CPT

## 2020-05-11 PROCEDURE — A9500: CPT

## 2020-05-11 PROCEDURE — 83036 HEMOGLOBIN GLYCOSYLATED A1C: CPT

## 2020-05-11 PROCEDURE — 85027 COMPLETE CBC AUTOMATED: CPT

## 2020-05-11 PROCEDURE — 99223 1ST HOSP IP/OBS HIGH 75: CPT | Mod: AI

## 2020-05-11 PROCEDURE — 71045 X-RAY EXAM CHEST 1 VIEW: CPT | Mod: 26

## 2020-05-11 RX ORDER — HYDRALAZINE HCL 50 MG
5 TABLET ORAL ONCE
Refills: 0 | Status: COMPLETED | OUTPATIENT
Start: 2020-05-11 | End: 2020-05-11

## 2020-05-11 RX ORDER — ASPIRIN/CALCIUM CARB/MAGNESIUM 324 MG
325 TABLET ORAL ONCE
Refills: 0 | Status: COMPLETED | OUTPATIENT
Start: 2020-05-11 | End: 2020-05-11

## 2020-05-11 RX ORDER — CEPHALEXIN 500 MG
1 CAPSULE ORAL
Qty: 10 | Refills: 0
Start: 2020-05-11 | End: 2020-05-15

## 2020-05-11 RX ORDER — SODIUM CHLORIDE 9 MG/ML
1000 INJECTION INTRAMUSCULAR; INTRAVENOUS; SUBCUTANEOUS ONCE
Refills: 0 | Status: DISCONTINUED | OUTPATIENT
Start: 2020-05-11 | End: 2020-05-11

## 2020-05-11 RX ADMIN — Medication 5 MILLIGRAM(S): at 19:33

## 2020-05-11 RX ADMIN — Medication 325 MILLIGRAM(S): at 17:34

## 2020-05-11 NOTE — ED ADULT NURSE REASSESSMENT NOTE - NS ED NURSE REASSESS COMMENT FT1
pt Belarusian speaking pacific  Mathieu [id number]. Pt verbalized understanding of plan of care, pending bed/orders. pt received in bed, alert and oriented x4. Cardiac monitoring in progress. Urinary catheter draining clear yellow urine. Dr Erickson made aware of elevated pt BP, hydralizine ordered and administered, SEE MAR. Safety maintained.

## 2020-05-11 NOTE — H&P ADULT - NSHPPHYSICALEXAM_GEN_ALL_CORE
Vital Signs Last 24 Hrs  T(C): 36.6 (11 May 2020 22:50), Max: 36.9 (11 May 2020 19:11)  T(F): 97.9 (11 May 2020 22:50), Max: 98.4 (11 May 2020 19:11)  HR: 60 (11 May 2020 22:50) (59 - 68)  BP: 174/84 (11 May 2020 22:50) (149/91 - 188/99)    RR: 19 (11 May 2020 22:50) (17 - 19)  SpO2: 96% (11 May 2020 22:50) (95% - 100%)

## 2020-05-11 NOTE — ED PROVIDER NOTE - CLINICAL SUMMARY MEDICAL DECISION MAKING FREE TEXT BOX
Seo placed for urinary retention, with 1400 cc out.  Pt feeling much improved.  No signs of hematuria or clot retention.  UA negative for infection.  Pt to be discharged home with leg bag, Keflex to prevent UTI while seo in place, and follow up with urology. Powers placed for urinary retention, with 1400 cc out.  Pt feeling much improved.  No signs of hematuria or clot retention.  UA negative for infection.  On discharge patient has secondary complaint of dizziness, vague chest "inflammation".  EKG nonischemic paced rhythm.  Troponin is slightly elevated.  Cr elevated 1.79 likely 2/2 urinary obstruction now resolved.  Pending US KUB.  Plan for admission due to symptomatic with elevated troponin, likely demand ischemic.  Will need cardiology eval as inpatient.  Will give ASA. James Erickson D.TYRESE.

## 2020-05-11 NOTE — ED PROVIDER NOTE - PROGRESS NOTE DETAILS
On discharge patient c/o dizziness, and inability to sleep.  Given cardiac history will obtain labs, EKG, CXR.  James Erickson D.O. On discharge patient c/o dizziness, nonspecific chest discomfort described as "inflammation" and inability to sleep.  Given cardiac history will obtain labs, EKG, CXR.  James Erickson D.O. BNP elevated to 5000, similar to prior.  However patient states he has had some worsening swelling in his lower extremities as well.  Has already put out 2550 cc out of seo, will not give lasix at this time.  Will continue to monitor. Plan for admission.  James Erickson D.O.

## 2020-05-11 NOTE — ED PROVIDER NOTE - OBJECTIVE STATEMENT
73 yo M hx of afib, HTN, CAD, s/p PM, presents with CC of urinary retention.  Symptoms over the last day.  States unable to urinate.  Associated feeling of "inflammation" and discomfort in the suprapubic area. Denies fever, chills, hematuria.  Denies prior occurrence.  No other concerns. 75 yo M hx of afib, HTN, CAD, CHF, s/p PM, presents with CC of urinary retention.  Symptoms over the last day.  States unable to urinate.  Associated feeling of "inflammation" and discomfort in the suprapubic area. Denies fever, chills, hematuria.  Denies prior occurrence.  No other concerns.

## 2020-05-11 NOTE — ED ADULT NURSE NOTE - NSIMPLEMENTINTERV_GEN_ALL_ED
Implemented All Universal Safety Interventions:  [place] to call system. Call bell, personal items and telephone within reach. Instruct patient to call for assistance. Room bathroom lighting operational. Non-slip footwear when patient is off stretcher. Physically safe environment: no spills, clutter or unnecessary equipment. Stretcher in lowest position, wheels locked, appropriate side rails in place.

## 2020-05-11 NOTE — ED ADULT NURSE REASSESSMENT NOTE - NS ED NURSE REASSESS COMMENT FT1
pt connected to bedside drainage bag and cardiac monitor at this time. COVID swab performed. pt awaiting US at this time. US to come to bedside.

## 2020-05-11 NOTE — H&P ADULT - REASON FOR ADMISSION
ARF  Urinary Retention  Fluid Overload  Troponin elevation  Afib on xaralto ARF  Urinary Retention  Fluid Overload  Troponin elevation  Afib

## 2020-05-11 NOTE — ED PROVIDER NOTE - PATIENT PORTAL LINK FT
You can access the FollowMyHealth Patient Portal offered by [facility] by registering at the following website: [email or web address]. By joining [facility]’s FollowMyHealth portal, you will also be able to view your health information using other applications (apps) compatible with our system.

## 2020-05-11 NOTE — ED PROVIDER NOTE - CARE PROVIDER_API CALL
Kuldeep Castillo)  Urology  [address], [address] [place] [address]  Phone: [phone]  Fax: [phone]  Follow Up Time:

## 2020-05-11 NOTE — H&P ADULT - NSHPSOCIALHISTORY_GEN_ALL_CORE
Pt lives in an apartment.  He formerly worked in construction.  He is from [place]/ [facility].  He denies tob/ETOH /drug use.

## 2020-05-11 NOTE — H&P ADULT - NSICDXPASTMEDICALHX_GEN_ALL_CORE_FT
PAST MEDICAL HISTORY:  AF (atrial fibrillation)     Coronary artery disease involving native heart with angina pectoris, unspecified vessel or lesion type     Fatigue     HTN (hypertension)     Hypokinesis mild global hypokinesis    LBBB (left bundle branch block)     LV dysfunction     Pacemaker single chamber meditronic  placed by Dr Tomas  May  of  2016

## 2020-05-11 NOTE — ED ADULT TRIAGE NOTE - CHIEF COMPLAINT QUOTE
Patient comes in after not being able to urinate for 24 hours. Patient with c/o lower abd pain x2 days.

## 2020-05-11 NOTE — H&P ADULT - RESPIRATORY
I have no medical opinion on whether or not he is taking hemp oil for mood. He would need to discuss this with his PCP or other provider. I am not treating his depression/anxiety or mood symptoms. detailed exam

## 2020-05-11 NOTE — H&P ADULT - HISTORY OF PRESENT ILLNESS
75 yo M hx of afib, HTN, CAD, CHF, s/p PM, presents with CC of urinary retention.  Symptoms over the last day.  States unable to urinate.  Associated feeling of "inflammation" and discomfort in the suprapubic area. Denies fever, chills, hematuria.  Denies prior occurrence. Pt is a 73 yo M w PMHx of afib, s/p pacemaker, HTN, CAD, CHF, who presented to the ED c/o increased abd distension and urinary retention.  He reports difficulty urinating for a few days and "inflammation" and discomfort in the suprapubic area. Pt reports he takes a little pill with a heart on it, but no other medications.  Pt denies chest pain/SOB, no fever/chills, hematuria.    Surg hx:  L side hernia repair    Fam Hx  :  Father MI at age 82  Brother MI at age 67

## 2020-05-11 NOTE — H&P ADULT - ASSESSMENT
Pt is admitted with   ARF  Urinary Retention  Fluid Overload  Troponin elevation  Afib on xaralto  - s/p ASA in the ED, will cont  - admit to telemetry  - repeat troponin and EKGs  - cardiology consult  - DVT prophylaxis : pt on xaralto  - IMPROVE VTE Individual Risk Assessment    RISK                                                                Points    [  ] Previous VTE                                                  3    [  ] Thrombophilia                                               2    [  ] Lower limb paralysis                                      2        (unable to hold up >15 seconds)      [  ] Current Cancer                                              2         (within 6 months)    [ X ] Immobilization > 24 hrs                                1    [  ] ICU/CCU stay > 24 hours                              1    [X  ] Age > 60                                                      1    IMPROVE VTE Score ____2_____    IMPROVE Score 0-1: Low Risk, No VTE prophylaxis required for most patients, encourage ambulation.   IMPROVE Score 2-3: At risk, pharmacologic VTE prophylaxis is indicated for most patients (in the absence of a contraindication)  IMPROVE Score > or = 4: High Risk, pharmacologic VTE prophylaxis is indicated for most patients (in the absence of a contraindication) Pt is admitted with   ARF  Urinary Retention,  2500ml urine retained  Fluid Overload  Troponin elevation  Afib on xaralto? pt reports he takes a small pill with a heart on it,  ??? baby ASA  - s/p ASA in the ED, will cont  - will add flomax  - admit to telemetry  - repeat troponins and EKGs  - add xaralto for now and review with cardiology  - cardiology consult  - urology and nephrology consults  - DVT prophylaxis : pt on xaralto??1  - IMPROVE VTE Individual Risk Assessment    RISK                                                                Points    [  ] Previous VTE                                                  3    [  ] Thrombophilia                                               2    [  ] Lower limb paralysis                                      2        (unable to hold up >15 seconds)      [  ] Current Cancer                                              2         (within 6 months)    [ X ] Immobilization > 24 hrs                                1    [  ] ICU/CCU stay > 24 hours                              1    [X  ] Age > 60                                                      1    IMPROVE VTE Score ____2_____    IMPROVE Score 0-1: Low Risk, No VTE prophylaxis required for most patients, encourage ambulation.   IMPROVE Score 2-3: At risk, pharmacologic VTE prophylaxis is indicated for most patients (in the absence of a contraindication)  IMPROVE Score > or = 4: High Risk, pharmacologic VTE prophylaxis is indicated for most patients (in the absence of a contraindication)

## 2020-05-11 NOTE — ED PROVIDER NOTE - CARE PLAN
Principal Discharge DX:	Urinary retention Principal Discharge DX:	Elevated troponin  Secondary Diagnosis:	Urinary retention

## 2020-05-12 DIAGNOSIS — I10 ESSENTIAL (PRIMARY) HYPERTENSION: ICD-10-CM

## 2020-05-12 DIAGNOSIS — I50.22 CHRONIC SYSTOLIC (CONGESTIVE) HEART FAILURE: ICD-10-CM

## 2020-05-12 DIAGNOSIS — R79.89 OTHER SPECIFIED ABNORMAL FINDINGS OF BLOOD CHEMISTRY: ICD-10-CM

## 2020-05-12 DIAGNOSIS — R06.02 SHORTNESS OF BREATH: ICD-10-CM

## 2020-05-12 DIAGNOSIS — I48.91 UNSPECIFIED ATRIAL FIBRILLATION: ICD-10-CM

## 2020-05-12 LAB
ADD ON TEST-SPECIMEN IN LAB: SIGNIFICANT CHANGE UP
ANION GAP SERPL CALC-SCNC: 7 MMOL/L — SIGNIFICANT CHANGE UP (ref 5–17)
BUN SERPL-MCNC: 25 MG/DL — HIGH (ref 7–23)
CALCIUM SERPL-MCNC: 8.5 MG/DL — SIGNIFICANT CHANGE UP (ref 8.5–10.1)
CHLORIDE SERPL-SCNC: 103 MMOL/L — SIGNIFICANT CHANGE UP (ref 96–108)
CHOLEST SERPL-MCNC: 87 MG/DL — SIGNIFICANT CHANGE UP (ref 10–199)
CO2 SERPL-SCNC: 29 MMOL/L — SIGNIFICANT CHANGE UP (ref 22–31)
CREAT SERPL-MCNC: 1.38 MG/DL — HIGH (ref 0.5–1.3)
CULTURE RESULTS: SIGNIFICANT CHANGE UP
GLUCOSE SERPL-MCNC: 80 MG/DL — SIGNIFICANT CHANGE UP (ref 70–99)
HDLC SERPL-MCNC: 21 MG/DL — LOW
LIPID PNL WITH DIRECT LDL SERPL: 54 MG/DL — SIGNIFICANT CHANGE UP
POTASSIUM SERPL-MCNC: 4.3 MMOL/L — SIGNIFICANT CHANGE UP (ref 3.5–5.3)
POTASSIUM SERPL-SCNC: 4.3 MMOL/L — SIGNIFICANT CHANGE UP (ref 3.5–5.3)
SARS-COV-2 RNA SPEC QL NAA+PROBE: SIGNIFICANT CHANGE UP
SODIUM SERPL-SCNC: 139 MMOL/L — SIGNIFICANT CHANGE UP (ref 135–145)
SPECIMEN SOURCE: SIGNIFICANT CHANGE UP
TOTAL CHOLESTEROL/HDL RATIO MEASUREMENT: 4.1 RATIO — SIGNIFICANT CHANGE UP (ref 3.4–9.6)
TRIGL SERPL-MCNC: 56 MG/DL — SIGNIFICANT CHANGE UP (ref 10–149)
TROPONIN I SERPL-MCNC: 0.2 NG/ML — HIGH (ref 0.01–0.04)

## 2020-05-12 PROCEDURE — 99223 1ST HOSP IP/OBS HIGH 75: CPT

## 2020-05-12 PROCEDURE — 99233 SBSQ HOSP IP/OBS HIGH 50: CPT

## 2020-05-12 PROCEDURE — 93279 PRGRMG DEV EVAL PM/LDLS PM: CPT | Mod: 26

## 2020-05-12 PROCEDURE — 93308 TTE F-UP OR LMTD: CPT | Mod: 26

## 2020-05-12 RX ORDER — RIVAROXABAN 15 MG-20MG
15 KIT ORAL
Refills: 0 | Status: DISCONTINUED | OUTPATIENT
Start: 2020-05-12 | End: 2020-05-13

## 2020-05-12 RX ORDER — ASPIRIN/CALCIUM CARB/MAGNESIUM 324 MG
325 TABLET ORAL DAILY
Refills: 0 | Status: DISCONTINUED | OUTPATIENT
Start: 2020-05-12 | End: 2020-05-12

## 2020-05-12 RX ORDER — ENOXAPARIN SODIUM 100 MG/ML
40 INJECTION SUBCUTANEOUS DAILY
Refills: 0 | Status: DISCONTINUED | OUTPATIENT
Start: 2020-05-12 | End: 2020-05-12

## 2020-05-12 RX ORDER — ATORVASTATIN CALCIUM 80 MG/1
20 TABLET, FILM COATED ORAL AT BEDTIME
Refills: 0 | Status: DISCONTINUED | OUTPATIENT
Start: 2020-05-12 | End: 2020-05-16

## 2020-05-12 RX ORDER — TAMSULOSIN HYDROCHLORIDE 0.4 MG/1
0.4 CAPSULE ORAL AT BEDTIME
Refills: 0 | Status: DISCONTINUED | OUTPATIENT
Start: 2020-05-12 | End: 2020-05-16

## 2020-05-12 RX ORDER — CARVEDILOL PHOSPHATE 80 MG/1
3.12 CAPSULE, EXTENDED RELEASE ORAL EVERY 12 HOURS
Refills: 0 | Status: DISCONTINUED | OUTPATIENT
Start: 2020-05-12 | End: 2020-05-13

## 2020-05-12 RX ORDER — CHLORTHALIDONE 50 MG
25 TABLET ORAL DAILY
Refills: 0 | Status: DISCONTINUED | OUTPATIENT
Start: 2020-05-12 | End: 2020-05-16

## 2020-05-12 RX ORDER — ASPIRIN/CALCIUM CARB/MAGNESIUM 324 MG
81 TABLET ORAL DAILY
Refills: 0 | Status: DISCONTINUED | OUTPATIENT
Start: 2020-05-12 | End: 2020-05-16

## 2020-05-12 RX ORDER — ACETAMINOPHEN 500 MG
650 TABLET ORAL EVERY 6 HOURS
Refills: 0 | Status: DISCONTINUED | OUTPATIENT
Start: 2020-05-12 | End: 2020-05-16

## 2020-05-12 RX ADMIN — TAMSULOSIN HYDROCHLORIDE 0.4 MILLIGRAM(S): 0.4 CAPSULE ORAL at 01:25

## 2020-05-12 RX ADMIN — Medication 325 MILLIGRAM(S): at 12:19

## 2020-05-12 RX ADMIN — Medication 650 MILLIGRAM(S): at 21:50

## 2020-05-12 RX ADMIN — CARVEDILOL PHOSPHATE 3.12 MILLIGRAM(S): 80 CAPSULE, EXTENDED RELEASE ORAL at 18:04

## 2020-05-12 RX ADMIN — RIVAROXABAN 15 MILLIGRAM(S): KIT at 18:04

## 2020-05-12 RX ADMIN — TAMSULOSIN HYDROCHLORIDE 0.4 MILLIGRAM(S): 0.4 CAPSULE ORAL at 21:50

## 2020-05-12 RX ADMIN — Medication 25 MILLIGRAM(S): at 05:48

## 2020-05-12 RX ADMIN — Medication 650 MILLIGRAM(S): at 01:27

## 2020-05-12 RX ADMIN — ATORVASTATIN CALCIUM 20 MILLIGRAM(S): 80 TABLET, FILM COATED ORAL at 21:50

## 2020-05-12 NOTE — CONSULT NOTE ADULT - PROBLEM SELECTOR RECOMMENDATION 2
hx of chronic mild systolic dysfunction non obstructive CAD  probably due to afib and hypertension related myopathy , will check echo , resume his regular cardiac medication ( patient was taking only one medication ) details unknown )

## 2020-05-12 NOTE — CONSULT NOTE ADULT - ASSESSMENT
74y Male whom presented to the hospital with PMHx of Afib, tachy-yair syndrome s/p pacemaker, HTN, non-obstructive CAD, Systolic HF (EF 2017 25-30%) with recurrent admissions for decompensated HF, who presented to the ED c/o increased abd distension and urinary retention, renal evaluation of ELIS. Seo inserted, maintained and noted with urinary retention.  He reports difficulty urinating for a few days and  discomfort.    ELIS  -APpears to be obstructive secondary to urinary retention  -Seo  -Flomax  -COnsideration of TOV if more mobile, otherwise home with urology/seo  -Trend renal function    CM  -No issue with maintenance of oral diuretic  -On tele, [facility] following    thanks, will follow with yout

## 2020-05-12 NOTE — CONSULT NOTE ADULT - PROBLEM SELECTOR RECOMMENDATION 3
possible due to demand ischemia ( he had elevated troponin similar level during prior hospitalization ) will check echo , possible ischemic evaluation  ecotrin , low dose bb ,

## 2020-05-12 NOTE — PROGRESS NOTE ADULT - SUBJECTIVE AND OBJECTIVE BOX
CHIEF COMPLAINT/DIAGNOSIS:    HPI: 75 y/o male w/ PMHx of afib, s/p pacemaker, HTN, CAD, CHF, who presented to the ED c/o increased abd distension and urinary retention.  He reports difficulty urinating for a few days and "inflammation" and discomfort in the suprapubic area. Pt reports he takes a little pill with a heart on it, but no other medications.  Pt denies chest pain/SOB, no fever/chills, hematuria.      SUBJECTIVE:  5/12 -     REVIEW OF SYSTEMS:  All other review of systems is negative unless indicated above      PHYSICAL EXAM:  Constitutional: NAD, awake and alert, well-developed  HEENT: PERR, EOMI, Normal Hearing, MMM  Neck: Soft and supple, No LAD, No JVD  Respiratory: Breath sounds are clear bilaterally, No wheezing, rales or rhonchi  Cardiovascular: S1 and S2, regular rate and rhythm, no Murmurs, gallops or rubs  Gastrointestinal: Bowel Sounds present, soft, nontender, nondistended, no guarding, no rebound  Extremities: No peripheral edema  Vascular: 2+ peripheral pulses  Neurological: A/O x 3, no focal deficits  Musculoskeletal: 5/5 strength b/l upper and lower extremities  Skin: No rashes      Vital Signs Last 24 Hrs  T(C): 36.5 (12 May 2020 05:42), Max: 36.9 (11 May 2020 19:11)  T(F): 97.7 (12 May 2020 05:42), Max: 98.4 (11 May 2020 19:11)  HR: 60 (12 May 2020 05:42) (59 - 68)  BP: 142/78 (12 May 2020 05:42) (142/78 - 188/99)  BP(mean): --  RR: 19 (11 May 2020 22:50) (17 - 19)  SpO2: 96% (12 May 2020 05:42) (95% - 100%)        LABS: All Labs Reviewed:                        14.5   10.84 )-----------( 132      ( 11 May 2020 15:58 )             44.5     05-11    137  |  100  |  29<H>  ----------------------------<  107<H>  5.1   |  27  |  1.73<H>    Ca    9.5      11 May 2020 15:58    TPro  6.9  /  Alb  4.0  /  TBili  4.3<H>  /  DBili  x   /  AST  76<H>  /  ALT  66  /  AlkPhos  109  05-11    PT/INR - ( 11 May 2020 15:58 )   PT: 18.8 sec;   INR: 1.67 ratio         PTT - ( 11 May 2020 15:58 )  PTT:35.4 sec  CARDIAC MARKERS ( 11 May 2020 19:33 )  0.202 ng/mL / x     / x     / x     / x      CARDIAC MARKERS ( 11 May 2020 15:58 )  0.181 ng/mL / x     / x     / x     / x            COVID-19 PCR: NotDetec:  (05.11.20 @ 16:51)        RADIOLOGY:  < from: US Kidney and Bladder (05.11.20 @ 17:24) >  IMPRESSION:   No evidence of calculus or hydronephrosis  Echogenic kidneys suggest medical renal disease.  Bladder was not evaluated as it was nondistended in the presence of a Powers catheter  < end of copied text >      < from: Xray Chest 1 View- PORTABLE-Urgent (05.11.20 @ 16:24) >  IMPRESSION:  Clear lungs.  < end of copied text >          ECHOCARDIOGRAM:  < from: Transthoracic Echocardiogram (04.18.17 @ 09:21) >   Impression     Summary     No evidence of pericardial effusion.   Moderate to severe (3+) mitral regurgitation is present.   Fibrocalcific changes noted to the aortic valve leaflets with preserved   excursion.   Mild (1+) aortic regurgitation is present.   Moderate (2+) tricuspid valve regurgitation is present.   The left ventricle cavity is mildly dilated.   Mild concentric left ventricular hypertrophy is present.   Estimated left ventricular ejection fraction is 25-30%.   Severe, diffuse hypokinesis of the left ventricle is present.   The right atrium appears mildly dilated.   A device wire is seen in the RV and RA.   Left to right shunt across the atrial septum is noted on color doppler    < end of copied text >        MEDICATIONS  (STANDING):  aspirin 325 milliGRAM(s) Oral daily  chlorthalidone 25 milliGRAM(s) Oral daily  rivaroxaban 15 milliGRAM(s) Oral with dinner  tamsulosin 0.4 milliGRAM(s) Oral at bedtime    MEDICATIONS  (PRN):  acetaminophen   Tablet .. 650 milliGRAM(s) Oral every 6 hours PRN Mild Pain (1 - 3)          TELEMETRY REVIEW:  5/12 - Underlying Afib, V-Paced 60s          ASSESSMENT AND PLAN:      1)  Acute renal failure due to urinary retention  - s/p Powers  - Started on Flomax   - 1l bolus in ED, repeat BMP   - renal consult  - urology consult     2) Elevated Troponin  - tele monitoring. tele review as above  - troponin mildly elevated x2  - ECG w/o ST changes   - cont. ?? - cardio please advise  - check lipid panel / a1c     3) Chronic Afib, PPM  - patient with underlying afib   - cont. xarelto for stroke ppx    4) CAD  - ASA  - not on statin, check lipid panel     5) Systolic HF  - echo 2017 EF 25-30%  - low sodium diet / I&Os / daily weight  - cardio consult     6) DVT PPX  - Xarelto CHIEF COMPLAINT/DIAGNOSIS: ARF / Urinary Retention / HF / Afib    HPI: 75 y/o male w/ PMHx of Afib, tachy-yair syndrome s/p pacemaker, HTN, non-obstructive CAD, Systolic HF (EF 2017 25-30%) with recurrent admissions for decompensated HF, who presented to the ED c/o increased abd distension and urinary retention.  He reports difficulty urinating for a few days and "inflammation" and discomfort in the suprapubic area. Pt reports he takes a little pill with a heart on it, but no other medications.  Pt denies chest pain/SOB, no fever/chills, hematuria.    SUBJECTIVE:  5/12 - c/o of +abdominal tenderness - improving. feeling a little better. denies cp or palp, sob or cough.   # [id number]     REVIEW OF SYSTEMS:  All other review of systems is negative unless indicated above      PHYSICAL EXAM:  Constitutional: Awake and alert, well-developed  HEENT: PERR, EOMI, Normal Hearing, MMM  Neck: Soft and supple, No LAD, No JVD  Respiratory: Breath sounds are clear bilaterally, No wheezing, rales or rhonchi  Cardiovascular: S1 and S2, regular rate and rhythm, no Murmurs, gallops or rubs  Gastrointestinal/ : Bowel Sounds present, soft, nontender, nondistended, no guarding, no rebound - Powers   Extremities: No peripheral edema  Vascular: 2+ peripheral pulses  Neurological: A/O x 3, no focal deficits  Musculoskeletal: 5/5 strength b/l upper and lower extremities  Skin: No rashes      Vital Signs Last 24 Hrs  T(C): 36.5 (12 May 2020 05:42), Max: 36.9 (11 May 2020 19:11)  T(F): 97.7 (12 May 2020 05:42), Max: 98.4 (11 May 2020 19:11)  HR: 60 (12 May 2020 05:42) (59 - 68)  BP: 142/78 (12 May 2020 05:42) (142/78 - 188/99)  BP(mean): --  RR: 19 (11 May 2020 22:50) (17 - 19)  SpO2: 96% (12 May 2020 05:42) (95% - 100%)        LABS: All Labs Reviewed:                        14.5   10.84 )-----------( 132      ( 11 May 2020 15:58 )             44.5     05-11    137  |  100  |  29<H>  ----------------------------<  107<H>  5.1   |  27  |  1.73<H>    Ca    9.5      11 May 2020 15:58    TPro  6.9  /  Alb  4.0  /  TBili  4.3<H>  /  DBili  x   /  AST  76<H>  /  ALT  66  /  AlkPhos  109  05-11    PT/INR - ( 11 May 2020 15:58 )   PT: 18.8 sec;   INR: 1.67 ratio         PTT - ( 11 May 2020 15:58 )  PTT:35.4 sec  CARDIAC MARKERS ( 11 May 2020 19:33 )  0.202 ng/mL / x     / x     / x     / x      CARDIAC MARKERS ( 11 May 2020 15:58 )  0.181 ng/mL / x     / x     / x     / x            COVID-19 PCR: NotDetec:  (05.11.20 @ 16:51)        RADIOLOGY:  < from: US Kidney and Bladder (05.11.20 @ 17:24) >  IMPRESSION:   No evidence of calculus or hydronephrosis  Echogenic kidneys suggest medical renal disease.  Bladder was not evaluated as it was nondistended in the presence of a Powers catheter  < end of copied text >      < from: Xray Chest 1 View- PORTABLE-Urgent (05.11.20 @ 16:24) >  IMPRESSION:  Clear lungs.  < end of copied text >          ECHOCARDIOGRAM:  < from: Transthoracic Echocardiogram (04.18.17 @ 09:21) >   Impression     Summary     No evidence of pericardial effusion.   Moderate to severe (3+) mitral regurgitation is present.   Fibrocalcific changes noted to the aortic valve leaflets with preserved   excursion.   Mild (1+) aortic regurgitation is present.   Moderate (2+) tricuspid valve regurgitation is present.   The left ventricle cavity is mildly dilated.   Mild concentric left ventricular hypertrophy is present.   Estimated left ventricular ejection fraction is 25-30%.   Severe, diffuse hypokinesis of the left ventricle is present.   The right atrium appears mildly dilated.   A device wire is seen in the RV and RA.   Left to right shunt across the atrial septum is noted on color doppler    < end of copied text >        MEDICATIONS  (STANDING):  aspirin 325 milliGRAM(s) Oral daily  chlorthalidone 25 milliGRAM(s) Oral daily  rivaroxaban 15 milliGRAM(s) Oral with dinner  tamsulosin 0.4 milliGRAM(s) Oral at bedtime    MEDICATIONS  (PRN):  acetaminophen   Tablet .. 650 milliGRAM(s) Oral every 6 hours PRN Mild Pain (1 - 3)          TELEMETRY REVIEW:  5/12 - Underlying Afib, V-Paced 60s          ASSESSMENT AND PLAN:      1)  Acute renal failure due to urinary retention  - s/p Powers  - Started on Flomax   - 1L bolus in ED, repeat BMP - improving    - renal consult  - urology consult     2) Elevated Troponin  - tele monitoring. tele review as above  - troponin mildly elevated x2  - ECG w/o ST changes   - cont. ASA / statin   - check lipid panel / a1c     3) Chronic systolic HF   - pt. euvolemic, no evidence of acute overload  - patient non-compliant with medications, has not followed up with cardiologist in over 1 year  - echo 2017 EF 25-30% / repeat echo pending   - started coreg (monitor VS, patient previously on Entresto, consider starting ACEi)   - Cont. diuretic  - low sodium diet / I&Os / daily weight  - cardio f/u appreciated     4) Chronic Afib, PPM  - patient with underlying Afib   - Cont. BB    - PPM interrogated ~ revealing two brief episodes of NSVT in December of 2019.  Recommend routine follow up every three months.  - cont. Xarelto for stroke ppx  - EP f/u appreciated     5) CAD  - ASA / statin     6) DVT PPX  - Xarelto CHIEF COMPLAINT/DIAGNOSIS: ARF / Urinary Retention / HF / Afib    HPI: 73 y/o male w/ PMHx of Afib, tachy-yair syndrome s/p pacemaker, HTN, non-obstructive CAD, Systolic HF (EF 2017 25-30%) with recurrent admissions for decompensated HF, who presented to the ED c/o increased abd distension and urinary retention.  He reports difficulty urinating for a few days and "inflammation" and discomfort in the suprapubic area. Pt reports he takes a little pill with a heart on it, but no other medications.  Pt denies chest pain/SOB, no fever/chills, hematuria.    SUBJECTIVE:  5/12 - c/o of +abdominal tenderness - improving. feeling a little better. denies cp or palp, sob or cough.   # [id number]     REVIEW OF SYSTEMS:  All other review of systems is negative unless indicated above      PHYSICAL EXAM:  Constitutional: Awake and alert, well-developed  HEENT: PERR, EOMI, Normal Hearing, MMM  Neck: Soft and supple, No LAD, No JVD  Respiratory: Breath sounds are clear bilaterally, No wheezing, rales or rhonchi  Cardiovascular: S1 and S2, regular rate and rhythm, no Murmurs, gallops or rubs  Gastrointestinal/ : Bowel Sounds present, soft, nontender, nondistended, no guarding, no rebound - Powers   Extremities: No peripheral edema  Vascular: 2+ peripheral pulses  Neurological: A/O x 3, no focal deficits  Musculoskeletal: 5/5 strength b/l upper and lower extremities  Skin: No rashes      Vital Signs Last 24 Hrs  T(C): 36.5 (12 May 2020 05:42), Max: 36.9 (11 May 2020 19:11)  T(F): 97.7 (12 May 2020 05:42), Max: 98.4 (11 May 2020 19:11)  HR: 60 (12 May 2020 05:42) (59 - 68)  BP: 142/78 (12 May 2020 05:42) (142/78 - 188/99)  BP(mean): --  RR: 19 (11 May 2020 22:50) (17 - 19)  SpO2: 96% (12 May 2020 05:42) (95% - 100%)        LABS: All Labs Reviewed:                        14.5   10.84 )-----------( 132      ( 11 May 2020 15:58 )             44.5     05-11    137  |  100  |  29<H>  ----------------------------<  107<H>  5.1   |  27  |  1.73<H>    Ca    9.5      11 May 2020 15:58    TPro  6.9  /  Alb  4.0  /  TBili  4.3<H>  /  DBili  x   /  AST  76<H>  /  ALT  66  /  AlkPhos  109  05-11    PT/INR - ( 11 May 2020 15:58 )   PT: 18.8 sec;   INR: 1.67 ratio         PTT - ( 11 May 2020 15:58 )  PTT:35.4 sec  CARDIAC MARKERS ( 11 May 2020 19:33 )  0.202 ng/mL / x     / x     / x     / x      CARDIAC MARKERS ( 11 May 2020 15:58 )  0.181 ng/mL / x     / x     / x     / x            COVID-19 PCR: NotDetec:  (05.11.20 @ 16:51)        RADIOLOGY:  < from: US Kidney and Bladder (05.11.20 @ 17:24) >  IMPRESSION:   No evidence of calculus or hydronephrosis  Echogenic kidneys suggest medical renal disease.  Bladder was not evaluated as it was nondistended in the presence of a Powers catheter  < end of copied text >      < from: Xray Chest 1 View- PORTABLE-Urgent (05.11.20 @ 16:24) >  IMPRESSION:  Clear lungs.  < end of copied text >          ECHOCARDIOGRAM:  < from: Transthoracic Echocardiogram (04.18.17 @ 09:21) >   Impression     Summary     No evidence of pericardial effusion.   Moderate to severe (3+) mitral regurgitation is present.   Fibrocalcific changes noted to the aortic valve leaflets with preserved   excursion.   Mild (1+) aortic regurgitation is present.   Moderate (2+) tricuspid valve regurgitation is present.   The left ventricle cavity is mildly dilated.   Mild concentric left ventricular hypertrophy is present.   Estimated left ventricular ejection fraction is 25-30%.   Severe, diffuse hypokinesis of the left ventricle is present.   The right atrium appears mildly dilated.   A device wire is seen in the RV and RA.   Left to right shunt across the atrial septum is noted on color doppler    < end of copied text >        MEDICATIONS  (STANDING):  aspirin 325 milliGRAM(s) Oral daily  chlorthalidone 25 milliGRAM(s) Oral daily  rivaroxaban 15 milliGRAM(s) Oral with dinner  tamsulosin 0.4 milliGRAM(s) Oral at bedtime    MEDICATIONS  (PRN):  acetaminophen   Tablet .. 650 milliGRAM(s) Oral every 6 hours PRN Mild Pain (1 - 3)          TELEMETRY REVIEW:  5/12 - Underlying Afib, V-Paced 60s          ASSESSMENT AND PLAN:      1)  Acute renal failure due to urinary retention  - s/p Powers  - Started on Flomax   - 1L bolus in ED, repeat BMP - improving    - renal consult  - urology consult     2) Elevated Troponin  - tele monitoring. tele review as above  - troponin mildly elevated x2  - ECG w/o ST changes   - cont. ASA / statin   - check lipid panel / a1c     3) Chronic systolic HF   - pt. euvolemic, no evidence of acute overload  - patient non-compliant with medications, has not followed up with cardiologist in over 1 year  - echo 2017 EF 25-30% / repeat echo pending   - started coreg (monitor VS, patient previously on Entresto, consider starting ACEi)   - Cont. diuretic  - low sodium diet / I&Os / daily weight  - cardio f/u appreciated     4) Chronic Afib, PPM  - patient with underlying Afib   - Cont. BB    - PPM interrogated ~ revealing two brief episodes of NSVT in December of 2019.  Recommend routine follow up every three months.  - cont. Xarelto for stroke ppx  - EP f/u appreciated     5) CAD  - ASA / statin     6) Elevated AST  - likely fatty liver disease, denies alcohol use.    7) DVT PPX  - Xarelto CHIEF COMPLAINT/DIAGNOSIS: ARF / Urinary Retention / HF / Afib    HPI: 73 y/o male w/ PMHx of Afib, tachy-yair syndrome s/p pacemaker, HTN, non-obstructive CAD, Systolic HF (EF 2017 25-30%) with recurrent admissions for decompensated HF, who presented to the ED c/o increased abd distension and urinary retention.  He reports difficulty urinating for a few days and "inflammation" and discomfort in the suprapubic area. Pt reports he takes a little pill with a heart on it, but no other medications.  Pt denies chest pain/SOB, no fever/chills, hematuria.    SUBJECTIVE:  5/12 - c/o of +abdominal tenderness - improving. feeling a little better. denies cp or palp, sob or cough.   # [id number]     REVIEW OF SYSTEMS:  All other review of systems is negative unless indicated above      PHYSICAL EXAM:  Constitutional: Awake and alert, well-developed  HEENT: PERR, EOMI, Normal Hearing, MMM  Neck: Soft and supple, No LAD, No JVD  Respiratory: Breath sounds are clear bilaterally, No wheezing, rales or rhonchi  Cardiovascular: S1 and S2, regular rate and rhythm, no Murmurs, gallops or rubs  Gastrointestinal/ : Bowel Sounds present, soft, nontender, nondistended, no guarding, no rebound - Powers   Extremities: No peripheral edema  Vascular: 2+ peripheral pulses  Neurological: A/O x 3, no focal deficits  Musculoskeletal: 5/5 strength b/l upper and lower extremities  Skin: No rashes      Vital Signs Last 24 Hrs  T(C): 36.5 (12 May 2020 05:42), Max: 36.9 (11 May 2020 19:11)  T(F): 97.7 (12 May 2020 05:42), Max: 98.4 (11 May 2020 19:11)  HR: 60 (12 May 2020 05:42) (59 - 68)  BP: 142/78 (12 May 2020 05:42) (142/78 - 188/99)  BP(mean): --  RR: 19 (11 May 2020 22:50) (17 - 19)  SpO2: 96% (12 May 2020 05:42) (95% - 100%)        LABS: All Labs Reviewed:                        14.5   10.84 )-----------( 132      ( 11 May 2020 15:58 )             44.5     05-11    137  |  100  |  29<H>  ----------------------------<  107<H>  5.1   |  27  |  1.73<H>    Ca    9.5      11 May 2020 15:58    TPro  6.9  /  Alb  4.0  /  TBili  4.3<H>  /  DBili  x   /  AST  76<H>  /  ALT  66  /  AlkPhos  109  05-11    PT/INR - ( 11 May 2020 15:58 )   PT: 18.8 sec;   INR: 1.67 ratio         PTT - ( 11 May 2020 15:58 )  PTT:35.4 sec  CARDIAC MARKERS ( 11 May 2020 19:33 )  0.202 ng/mL / x     / x     / x     / x      CARDIAC MARKERS ( 11 May 2020 15:58 )  0.181 ng/mL / x     / x     / x     / x            COVID-19 PCR: NotDetec:  (05.11.20 @ 16:51)        RADIOLOGY:  < from: US Kidney and Bladder (05.11.20 @ 17:24) >  IMPRESSION:   No evidence of calculus or hydronephrosis  Echogenic kidneys suggest medical renal disease.  Bladder was not evaluated as it was nondistended in the presence of a Powers catheter  < end of copied text >      < from: Xray Chest 1 View- PORTABLE-Urgent (05.11.20 @ 16:24) >  IMPRESSION:  Clear lungs.  < end of copied text >          ECHOCARDIOGRAM:  < from: Transthoracic Echocardiogram (04.18.17 @ 09:21) >   Impression     Summary     No evidence of pericardial effusion.   Moderate to severe (3+) mitral regurgitation is present.   Fibrocalcific changes noted to the aortic valve leaflets with preserved   excursion.   Mild (1+) aortic regurgitation is present.   Moderate (2+) tricuspid valve regurgitation is present.   The left ventricle cavity is mildly dilated.   Mild concentric left ventricular hypertrophy is present.   Estimated left ventricular ejection fraction is 25-30%.   Severe, diffuse hypokinesis of the left ventricle is present.   The right atrium appears mildly dilated.   A device wire is seen in the RV and RA.   Left to right shunt across the atrial septum is noted on color doppler    < end of copied text >        MEDICATIONS  (STANDING):  aspirin enteric coated 81 milliGRAM(s) Oral daily  atorvastatin 20 milliGRAM(s) Oral at bedtime  carvedilol 3.125 milliGRAM(s) Oral every 12 hours  chlorthalidone 25 milliGRAM(s) Oral daily  rivaroxaban 15 milliGRAM(s) Oral with dinner  tamsulosin 0.4 milliGRAM(s) Oral at bedtime    MEDICATIONS  (PRN):  acetaminophen   Tablet .. 650 milliGRAM(s) Oral every 6 hours PRN Mild Pain (1 - 3)            TELEMETRY REVIEW:  5/12 - Underlying Afib, V-Paced 60s          ASSESSMENT AND PLAN:      1)  Acute renal failure due to urinary retention  - s/p Powers  - Started on Flomax   - 1L bolus in ED, repeat BMP - improving    - renal US no stone or hydronephrosis  - renal consult  - urology consult      2) Elevated Troponin, CAD hx   - tele monitoring- tele review as above  - troponin mildly elevated x2  - ECG w/o ST changes   - cont. ASA / statin   - check lipid panel / a1c   - Cardio f/u appreciated- echo pending, possible ischemic eval       3) Chronic systolic HF   - pt. euvolemic, no evidence of acute overload  - patient non-compliant with medications, has not followed up with cardiologist in over 1 year  - echo 2017 EF 25-30% / repeat echo pending   - started coreg (monitor VS, patient previously on Entresto, consider starting ACEi if Cr improves)   - Cont. diuretic  - low sodium diet / I&Os / daily weight  - Cardio f/u appreciated     4) Chronic Afib, PPM  - patient with underlying Afib   - Cont. BB    - PPM interrogated ~ revealing two brief episodes of NSVT in December of 2019.  Recommend routine follow up every three months.  - cont. Xarelto for stroke ppx  - EP f/u appreciated     5) Elevated AST  - likely fatty liver disease, denies alcohol use.   lipid panel pending  repeat labs in am     6) DVT PPX  - Xarelto

## 2020-05-12 NOTE — PROCEDURE NOTE - ADDITIONAL PROCEDURE DETAILS
Patient with single chamber PPM with normal function, adequate sensing and pacing threshold.  Interrogation revealed two brief episodes of NSVT in December of 2019.  Recommend routine follow up every three months.

## 2020-05-12 NOTE — PROGRESS NOTE ADULT - REASON FOR ADMISSION
ARF / Urinary Retention  Fluid Overload  Troponin elevation  Afib ARF / Urinary Retention / HF / Afib

## 2020-05-12 NOTE — CONSULT NOTE ADULT - PROBLEM SELECTOR RECOMMENDATION 9
patient with  above hx who has sob with acute urinary retention possible cause of his worsening of sob with uncontrolled hypertension with systolic heart failure , no  evidence of CHF ON CXR

## 2020-05-13 LAB
ANION GAP SERPL CALC-SCNC: 6 MMOL/L — SIGNIFICANT CHANGE UP (ref 5–17)
BUN SERPL-MCNC: 21 MG/DL — SIGNIFICANT CHANGE UP (ref 7–23)
CALCIUM SERPL-MCNC: 8.4 MG/DL — LOW (ref 8.5–10.1)
CHLORIDE SERPL-SCNC: 102 MMOL/L — SIGNIFICANT CHANGE UP (ref 96–108)
CO2 SERPL-SCNC: 31 MMOL/L — SIGNIFICANT CHANGE UP (ref 22–31)
CREAT SERPL-MCNC: 1.39 MG/DL — HIGH (ref 0.5–1.3)
GLUCOSE SERPL-MCNC: 103 MG/DL — HIGH (ref 70–99)
HCT VFR BLD CALC: 44.1 % — SIGNIFICANT CHANGE UP (ref 39–50)
HGB BLD-MCNC: 14.2 G/DL — SIGNIFICANT CHANGE UP (ref 13–17)
MAGNESIUM SERPL-MCNC: 2 MG/DL — SIGNIFICANT CHANGE UP (ref 1.6–2.6)
MCHC RBC-ENTMCNC: 31.2 PG — SIGNIFICANT CHANGE UP (ref 27–34)
MCHC RBC-ENTMCNC: 32.2 GM/DL — SIGNIFICANT CHANGE UP (ref 32–36)
MCV RBC AUTO: 96.9 FL — SIGNIFICANT CHANGE UP (ref 80–100)
PLATELET # BLD AUTO: 132 K/UL — LOW (ref 150–400)
POTASSIUM SERPL-MCNC: 4.4 MMOL/L — SIGNIFICANT CHANGE UP (ref 3.5–5.3)
POTASSIUM SERPL-SCNC: 4.4 MMOL/L — SIGNIFICANT CHANGE UP (ref 3.5–5.3)
RBC # BLD: 4.55 M/UL — SIGNIFICANT CHANGE UP (ref 4.2–5.8)
RBC # FLD: 13.6 % — SIGNIFICANT CHANGE UP (ref 10.3–14.5)
SODIUM SERPL-SCNC: 139 MMOL/L — SIGNIFICANT CHANGE UP (ref 135–145)
TROPONIN I SERPL-MCNC: 0.21 NG/ML — HIGH (ref 0.01–0.04)
WBC # BLD: 11.18 K/UL — HIGH (ref 3.8–10.5)
WBC # FLD AUTO: 11.18 K/UL — HIGH (ref 3.8–10.5)

## 2020-05-13 PROCEDURE — 93010 ELECTROCARDIOGRAM REPORT: CPT

## 2020-05-13 PROCEDURE — 99233 SBSQ HOSP IP/OBS HIGH 50: CPT

## 2020-05-13 PROCEDURE — 99221 1ST HOSP IP/OBS SF/LOW 40: CPT

## 2020-05-13 RX ORDER — CARVEDILOL PHOSPHATE 80 MG/1
6.25 CAPSULE, EXTENDED RELEASE ORAL EVERY 12 HOURS
Refills: 0 | Status: DISCONTINUED | OUTPATIENT
Start: 2020-05-13 | End: 2020-05-16

## 2020-05-13 RX ORDER — SACUBITRIL AND VALSARTAN 24; 26 MG/1; MG/1
1 TABLET, FILM COATED ORAL
Refills: 0 | Status: DISCONTINUED | OUTPATIENT
Start: 2020-05-13 | End: 2020-05-15

## 2020-05-13 RX ORDER — POLYETHYLENE GLYCOL 3350 17 G/17G
17 POWDER, FOR SOLUTION ORAL DAILY
Refills: 0 | Status: DISCONTINUED | OUTPATIENT
Start: 2020-05-13 | End: 2020-05-16

## 2020-05-13 RX ORDER — LOSARTAN POTASSIUM 100 MG/1
25 TABLET, FILM COATED ORAL DAILY
Refills: 0 | Status: DISCONTINUED | OUTPATIENT
Start: 2020-05-13 | End: 2020-05-13

## 2020-05-13 RX ORDER — RIVAROXABAN 15 MG-20MG
1 KIT ORAL
Qty: 30 | Refills: 0
Start: 2020-05-13 | End: 2020-06-11

## 2020-05-13 RX ORDER — SENNA PLUS 8.6 MG/1
1 TABLET ORAL ONCE
Refills: 0 | Status: COMPLETED | OUTPATIENT
Start: 2020-05-13 | End: 2020-05-13

## 2020-05-13 RX ADMIN — Medication 25 MILLIGRAM(S): at 06:29

## 2020-05-13 RX ADMIN — CARVEDILOL PHOSPHATE 6.25 MILLIGRAM(S): 80 CAPSULE, EXTENDED RELEASE ORAL at 18:32

## 2020-05-13 RX ADMIN — Medication 30 MILLILITER(S): at 21:30

## 2020-05-13 RX ADMIN — CARVEDILOL PHOSPHATE 3.12 MILLIGRAM(S): 80 CAPSULE, EXTENDED RELEASE ORAL at 06:29

## 2020-05-13 RX ADMIN — Medication 81 MILLIGRAM(S): at 10:28

## 2020-05-13 RX ADMIN — SACUBITRIL AND VALSARTAN 1 TABLET(S): 24; 26 TABLET, FILM COATED ORAL at 18:32

## 2020-05-13 RX ADMIN — SENNA PLUS 1 TABLET(S): 8.6 TABLET ORAL at 21:29

## 2020-05-13 RX ADMIN — ATORVASTATIN CALCIUM 20 MILLIGRAM(S): 80 TABLET, FILM COATED ORAL at 21:30

## 2020-05-13 RX ADMIN — POLYETHYLENE GLYCOL 3350 17 GRAM(S): 17 POWDER, FOR SOLUTION ORAL at 10:28

## 2020-05-13 RX ADMIN — TAMSULOSIN HYDROCHLORIDE 0.4 MILLIGRAM(S): 0.4 CAPSULE ORAL at 21:29

## 2020-05-13 NOTE — PROGRESS NOTE ADULT - PROBLEM SELECTOR PLAN 2
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  Cont. coreg, confirm w/ pt  no h/o anaphylactic reaction on entresto if not restart w/ careful monitoring of cr.

## 2020-05-13 NOTE — PROGRESS NOTE ADULT - PROBLEM SELECTOR PLAN 5
rate controlled cont. coreg, anticoag. rate controlled cont. coreg, hold anticoag in case stress test is positive.

## 2020-05-13 NOTE — PROGRESS NOTE ADULT - SUBJECTIVE AND OBJECTIVE BOX
CHIEF COMPLAINT/DIAGNOSIS: ARF / Urinary Retention / HF / Afib    HPI: 75 y/o male w/ PMHx of Afib, tachy-yair syndrome s/p pacemaker, HTN, non-obstructive CAD, Systolic HF (EF 2017 25-30%) with recurrent admissions for decompensated HF, who presented to the ED c/o increased abd distension and urinary retention.  He reports difficulty urinating for a few days and "inflammation" and discomfort in the suprapubic area. Pt reports he takes a little pill with a heart on it, but no other medications.  Pt denies chest pain/SOB, no fever/chills, hematuria.    SUBJECTIVE:  5/12 - c/o of +abdominal tenderness - improving. feeling a little better. denies cp or palp, sob or cough.   # [id number]   5/13 - c/o + constipation, c/o CP prior to admission with exertion, patient reports no allergies causing SOB or anaphylaxis, does not remember his allergies. -  phone used.       REVIEW OF SYSTEMS:  All other review of systems is negative unless indicated above      PHYSICAL EXAM:  Constitutional: Awake and alert, well-developed  HEENT: PERR, EOMI, Normal Hearing, MMM  Neck: Soft and supple, No LAD, No JVD  Respiratory: Breath sounds are clear bilaterally, No wheezing, rales or rhonchi  Cardiovascular: S1 and S2, regular rate and rhythm, no Murmurs, gallops or rubs  Gastrointestinal/ : Bowel Sounds present, soft, nontender, nondistended, no guarding, no rebound - Powers   Extremities: No peripheral edema  Vascular: 2+ peripheral pulses  Neurological: A/O x 3, no focal deficits  Musculoskeletal: 5/5 strength b/l upper and lower extremities  Skin: No rashes      Vital Signs Last 24 Hrs  T(C): 36.8 (13 May 2020 10:55), Max: 36.8 (12 May 2020 16:04)  T(F): 98.2 (13 May 2020 10:55), Max: 98.2 (12 May 2020 16:04)  HR: 60 (13 May 2020 10:55) (60 - 60)  BP: 149/83 (13 May 2020 10:55) (123/70 - 157/86)  BP(mean): --  RR: 18 (13 May 2020 10:55) (18 - 18)  SpO2: 100% (13 May 2020 10:55) (98% - 100%)--room air        LABS: All Labs Reviewed:                        14.2   11.18 )-----------( 132      ( 13 May 2020 07:53 )             44.1     05-13    139  |  102  |  21  ----------------------------<  103<H>  4.4   |  31  |  1.39<H>    Ca    8.4<L>      13 May 2020 07:53  Mg     2.0     05-13    TPro  6.9  /  Alb  4.0  /  TBili  4.3<H>  /  DBili  x   /  AST  76<H>  /  ALT  66  /  AlkPhos  109  05-11    PT/INR - ( 11 May 2020 15:58 )   PT: 18.8 sec;   INR: 1.67 ratio         PTT - ( 11 May 2020 15:58 )  PTT:35.4 sec    COVID-19 PCR: NotDetec:  (05.11.20 @ 16:51)        RADIOLOGY:  < from: US Kidney and Bladder (05.11.20 @ 17:24) >  IMPRESSION:   No evidence of calculus or hydronephrosis  Echogenic kidneys suggest medical renal disease.  Bladder was not evaluated as it was nondistended in the presence of a Powers catheter  < end of copied text >      < from: Xray Chest 1 View- PORTABLE-Urgent (05.11.20 @ 16:24) >  IMPRESSION:  Clear lungs.  < end of copied text >          ECHOCARDIOGRAM:  < from: Transthoracic Echocardiogram (04.18.17 @ 09:21) >   Impression     Summary     No evidence of pericardial effusion.   Moderate to severe (3+) mitral regurgitation is present.   Fibrocalcific changes noted to the aortic valve leaflets with preserved   excursion.   Mild (1+) aortic regurgitation is present.   Moderate (2+) tricuspid valve regurgitation is present.   The left ventricle cavity is mildly dilated.   Mild concentric left ventricular hypertrophy is present.   Estimated left ventricular ejection fraction is 25-30%.   Severe, diffuse hypokinesis of the left ventricle is present.   The right atrium appears mildly dilated.   A device wire is seen in the RV and RA.   Left to right shunt across the atrial septum is noted on color doppler    < end of copied text >      < from: Transthoracic Echocardiogram Follow Up (05.12.20 @ 11:41) >   Impression   Summary   The left ventricle cavity is mildly dilated.   Mild concentric left ventricular hypertrophy is present.   Estimated left ventricular ejection fraction is 40-45 %.   The left atrium is moderately dilated.   The right atrium appears mildly dilated.   A device wire is seen in the RV and RA.   A PFO is noted with color doppler.   Normal appearing right ventricle structure and function.   Mild aortic sclerosis is present with normal valvular opening.   Mild (1+) aortic regurgitation is present.   Moderate to severe (3+) mitral regurgitation is present.   Mild mitral annular calcification is present.   Moderate (2+) tricuspid valve regurgitation is present.   The IVC is dilated with decreased respiratory variation    < end of copied text >          MEDICATIONS  (STANDING):  aspirin enteric coated 81 milliGRAM(s) Oral daily  atorvastatin 20 milliGRAM(s) Oral at bedtime  carvedilol 6.25 milliGRAM(s) Oral every 12 hours  chlorthalidone 25 milliGRAM(s) Oral daily  polyethylene glycol 3350 17 Gram(s) Oral daily  tamsulosin 0.4 milliGRAM(s) Oral at bedtime    MEDICATIONS  (PRN):  acetaminophen   Tablet .. 650 milliGRAM(s) Oral every 6 hours PRN Mild Pain (1 - 3)        TELEMETRY REVIEW:  5/12 - Underlying Afib, V-Paced 60s   5/13 - Underlying Afib, V-Paced 60s          ASSESSMENT AND PLAN:      1)  Acute renal failure due to urinary retention  - s/p Powers  - Started on Flomax   - 1L bolus in ED, repeat BMP - improving    - renal US no stone or hydronephrosis  - renal consult - ok to start acei/arb   - urology consult, voiding trial in 2 days 5/15 - outpt. f/u with [facility]    2) Elevated Troponin, CAD hx  - pt. w/ c/o CP on exertion prior to admission  - tele monitoring- tele review as above  - troponin mildly elevated x2  - ECG w/o ST changes   - cont. ASA / statin   - Cardio f/u appreciated- echo pending, possible ischemic eval     3) Chronic systolic HF   - pt. euvolemic, no evidence of acute overload  - patient non-compliant with medications, has not followed up with cardiologist in over 1 year  - echo 2017 EF 25-30% / repeat echo EF 40-45%   - started coreg  - d/w patient, unaware of allergies with resultant anaphylaxis, will resume entresto (was on outpatient in 2018)  - Cont. diuretics   - low sodium diet / I&Os / daily weight  - Cardio f/u appreciated     4) Chronic Afib, PPM  - patient with underlying Afib   - Cont. BB    - PPM interrogated ~ revealing two brief episodes of NSVT in December of 2019.  Recommend routine follow up every three months.  - cont. Xarelto for stroke ppx  - EP f/u appreciated     5) Elevated AST  - likely fatty liver disease, denies alcohol use.   - Lipid panel pending -- wnl      6) DVT PPX  - Xarelto CHIEF COMPLAINT/DIAGNOSIS: ARF / Urinary Retention / HF / Afib    HPI: 75 y/o male w/ PMHx of Afib, tachy-yair syndrome s/p pacemaker, HTN, non-obstructive CAD, Systolic HF (EF 2017 25-30%) with recurrent admissions for decompensated HF, who presented to the ED c/o increased abd distension and urinary retention.  He reports difficulty urinating for a few days and "inflammation" and discomfort in the suprapubic area. Pt reports he takes a little pill with a heart on it, but no other medications.  Pt denies chest pain/SOB, no fever/chills, hematuria.    SUBJECTIVE:  5/12 - c/o of +abdominal tenderness - improving. feeling a little better. denies cp or palp, sob or cough.   # [id number]   5/13 - c/o + constipation, c/o CP prior to admission with exertion, patient reports no allergies causing SOB or anaphylaxis, does not remember his allergies. -  phone used.       REVIEW OF SYSTEMS:  All other review of systems is negative unless indicated above      Vital Signs Last 24 Hrs  T(C): 36.8 (13 May 2020 10:55), Max: 36.8 (12 May 2020 16:04)  T(F): 98.2 (13 May 2020 10:55), Max: 98.2 (12 May 2020 16:04)  HR: 60 (13 May 2020 10:55) (60 - 60)  BP: 149/83 (13 May 2020 10:55) (123/70 - 157/86)  BP(mean): --  RR: 18 (13 May 2020 10:55) (18 - 18)  SpO2: 100% (13 May 2020 10:55) (98% - 100%) on room air    PHYSICAL EXAM:  Constitutional: Awake and alert, well-developed  HEENT: PERR, EOMI, Normal Hearing, MMM  Neck: Soft and supple, No LAD, No JVD  Respiratory: Breath sounds are clear bilaterally, No wheezing, rales or rhonchi  Cardiovascular: S1 and S2, regular rate and rhythm, no Murmurs, gallops or rubs  Gastrointestinal/ : Bowel Sounds present, soft, nontender, nondistended, no guarding, no rebound - Powers   Extremities: No peripheral edema  Vascular: 2+ peripheral pulses  Neurological: A/O x 3, no focal deficits  Musculoskeletal: 5/5 strength b/l upper and lower extremities  Skin: No rashes      LABS: All Labs Reviewed:                        14.2   11.18 )-----------( 132      ( 13 May 2020 07:53 )             44.1     05-13    139  |  102  |  21  ----------------------------<  103<H>    4.4   |  31  |  1.39<H>    Ca    8.4<L>      13 May 2020 07:53  Mg     2.0     05-13    TPro  6.9  /  Alb  4.0  /  TBili  4.3<H>  /  DBili  x   /  AST  76<H>  /  ALT  66  /  AlkPhos  109  05-11    PT/INR - ( 11 May 2020 15:58 )   PT: 18.8 sec;   INR: 1.67 ratio         PTT - ( 11 May 2020 15:58 )  PTT:35.4 sec    COVID-19 PCR: NotDetec:  (05.11.20 @ 16:51)        RADIOLOGY:  < from: US Kidney and Bladder (05.11.20 @ 17:24) >  IMPRESSION:   No evidence of calculus or hydronephrosis  Echogenic kidneys suggest medical renal disease.  Bladder was not evaluated as it was nondistended in the presence of a Powers catheter  < end of copied text >      < from: Xray Chest 1 View- PORTABLE-Urgent (05.11.20 @ 16:24) >  IMPRESSION:  Clear lungs.  < end of copied text >          ECHOCARDIOGRAM:  < from: Transthoracic Echocardiogram (04.18.17 @ 09:21) >   Impression     Summary     No evidence of pericardial effusion.   Moderate to severe (3+) mitral regurgitation is present.   Fibrocalcific changes noted to the aortic valve leaflets with preserved   excursion.   Mild (1+) aortic regurgitation is present.   Moderate (2+) tricuspid valve regurgitation is present.   The left ventricle cavity is mildly dilated.   Mild concentric left ventricular hypertrophy is present.   Estimated left ventricular ejection fraction is 25-30%.   Severe, diffuse hypokinesis of the left ventricle is present.   The right atrium appears mildly dilated.   A device wire is seen in the RV and RA.   Left to right shunt across the atrial septum is noted on color doppler    < end of copied text >      < from: Transthoracic Echocardiogram Follow Up (05.12.20 @ 11:41) >   Impression   Summary   The left ventricle cavity is mildly dilated.   Mild concentric left ventricular hypertrophy is present.   Estimated left ventricular ejection fraction is 40-45 %.   The left atrium is moderately dilated.   The right atrium appears mildly dilated.   A device wire is seen in the RV and RA.   A PFO is noted with color doppler.   Normal appearing right ventricle structure and function.   Mild aortic sclerosis is present with normal valvular opening.   Mild (1+) aortic regurgitation is present.   Moderate to severe (3+) mitral regurgitation is present.   Mild mitral annular calcification is present.   Moderate (2+) tricuspid valve regurgitation is present.   The IVC is dilated with decreased respiratory variation    < end of copied text >          MEDICATIONS  (STANDING):  aspirin enteric coated 81 milliGRAM(s) Oral daily  atorvastatin 20 milliGRAM(s) Oral at bedtime  carvedilol 6.25 milliGRAM(s) Oral every 12 hours  chlorthalidone 25 milliGRAM(s) Oral daily  polyethylene glycol 3350 17 Gram(s) Oral daily  tamsulosin 0.4 milliGRAM(s) Oral at bedtime    MEDICATIONS  (PRN):  acetaminophen   Tablet .. 650 milliGRAM(s) Oral every 6 hours PRN Mild Pain (1 - 3)        TELEMETRY REVIEW:  5/12 - Underlying Afib, V-Paced 60s   5/13 - Underlying Afib, V-Paced 60s          ASSESSMENT AND PLAN:      1)  Acute renal failure due to urinary retention  - s/p Powers  - Started on Flomax   - 1L bolus in ED, repeat BMP - improving    - renal US no stone or hydronephrosis  - renal consult - ok to start acei/arb   - urology consult appreciated -  voiding trial in 2 days 5/15 - outpt. f/u with [facility]    2) Elevated Troponin, CAD hx  - pt. w/ c/o CP on exertion prior to admission  - tele monitoring- tele review as above  - troponin mildly elevated x2  - ECG w/o ST changes   - cont. ASA / statin   - Echo EF improved now 45%, mod to severe MR  - Cardio f/u appreciated    3) Chronic systolic CHF  - pt. euvolemic, no evidence of acute overload  - patient non-compliant with medications, has not followed up with cardiologist in over 1 year  - echo 2017 EF 25-30% / repeat echo EF 40-45% now ; mod to severe MR   - started coreg  - d/w patient, unaware of allergies with resultant anaphylaxis, will resume entresto (was on outpatient in 2018)  - Cont. diuretics   - low sodium diet / I&Os / daily weight  - Cardio f/u appreciated     4) Chronic Afib, PPM  - patient with underlying Afib   - Cont. BB    - PPM interrogated ~ revealing two brief episodes of NSVT in December of 2019.  Recommend routine follow up every three months.  - cont. Xarelto for stroke ppx  - EP f/u appreciated     5) Elevated AST  - likely fatty liver disease, denies alcohol use.    - Lipid panel pending -- wnl      6) DVT PPX  - Xarelto CHIEF COMPLAINT/DIAGNOSIS: ARF / Urinary Retention / HF / Afib    HPI: 75 y/o male w/ PMHx of Afib, tachy-yair syndrome s/p pacemaker, HTN, non-obstructive CAD, Systolic HF (EF 2017 25-30%) with recurrent admissions for decompensated HF, who presented to the ED c/o increased abd distension and urinary retention.  He reports difficulty urinating for a few days and "inflammation" and discomfort in the suprapubic area. Pt reports he takes a little pill with a heart on it, but no other medications.  Pt denies chest pain/SOB, no fever/chills, hematuria.    SUBJECTIVE:  5/12 - c/o of +abdominal tenderness - improving. feeling a little better. denies cp or palp, sob or cough.   # [id number]   5/13 - c/o + constipation, c/o CP prior to admission with exertion, patient reports no allergies causing SOB or anaphylaxis, does not remember his allergies. -  phone used.   5/14 -       REVIEW OF SYSTEMS:  All other review of systems is negative unless indicated above      Vital Signs Last 24 Hrs  T(C): 36.6 (14 May 2020 11:51), Max: 37.9 (13 May 2020 16:27)  T(F): 97.8 (14 May 2020 11:51), Max: 100.3 (13 May 2020 16:27)  HR: 60 (14 May 2020 11:51) (60 - 60)  BP: 144/75 (14 May 2020 11:51) (132/74 - 153/80)  BP(mean): --  RR: 18 (14 May 2020 11:51) (9 - 18)  SpO2: 97% (14 May 2020 11:51) (95% - 97%) - room air         PHYSICAL EXAM:  Constitutional: Awake and alert, well-developed  HEENT: PERR, EOMI, Normal Hearing, MMM  Neck: Soft and supple, No LAD, No JVD  Respiratory: Breath sounds are clear bilaterally, No wheezing, rales or rhonchi  Cardiovascular: S1 and S2, regular rate and rhythm, no Murmurs, gallops or rubs  Gastrointestinal/ : Bowel Sounds present, soft, nontender, nondistended, no guarding, no rebound - Powers   Extremities: No peripheral edema  Vascular: 2+ peripheral pulses  Neurological: A/O x 3, no focal deficits  Musculoskeletal: 5/5 strength b/l upper and lower extremities  Skin: No rashes      LABS: All Labs Reviewed:                        14.2   11.18 )-----------( 132      ( 13 May 2020 07:53 )             44.1     05-13    139  |  102  |  21  ----------------------------<  103<H>  4.4   |  31  |  1.39<H>    Ca    8.4<L>      13 May 2020 07:53  Mg     2.0     05-13        CARDIAC MARKERS ( 13 May 2020 21:37 )  0.210 ng/mL / x     / x     / x     / x            Blood Culture: 05-11 @ 14:36  Organism --  Gram Stain Blood -- Gram Stain --  Specimen Source .Urine None  Culture-Blood --      LABS:                  COVID-19 PCR: NotDetec:  (05.11.20 @ 16:51)        RADIOLOGY:  < from: US Kidney and Bladder (05.11.20 @ 17:24) >  IMPRESSION:   No evidence of calculus or hydronephrosis  Echogenic kidneys suggest medical renal disease.  Bladder was not evaluated as it was nondistended in the presence of a Powers catheter  < end of copied text >      < from: Xray Chest 1 View- PORTABLE-Urgent (05.11.20 @ 16:24) >  IMPRESSION:  Clear lungs.  < end of copied text >          ECHOCARDIOGRAM:  < from: Transthoracic Echocardiogram (04.18.17 @ 09:21) >   Impression     Summary     No evidence of pericardial effusion.   Moderate to severe (3+) mitral regurgitation is present.   Fibrocalcific changes noted to the aortic valve leaflets with preserved   excursion.   Mild (1+) aortic regurgitation is present.   Moderate (2+) tricuspid valve regurgitation is present.   The left ventricle cavity is mildly dilated.   Mild concentric left ventricular hypertrophy is present.   Estimated left ventricular ejection fraction is 25-30%.   Severe, diffuse hypokinesis of the left ventricle is present.   The right atrium appears mildly dilated.   A device wire is seen in the RV and RA.   Left to right shunt across the atrial septum is noted on color doppler    < end of copied text >      < from: Transthoracic Echocardiogram Follow Up (05.12.20 @ 11:41) >   Impression   Summary   The left ventricle cavity is mildly dilated.   Mild concentric left ventricular hypertrophy is present.   Estimated left ventricular ejection fraction is 40-45 %.   The left atrium is moderately dilated.   The right atrium appears mildly dilated.   A device wire is seen in the RV and RA.   A PFO is noted with color doppler.   Normal appearing right ventricle structure and function.   Mild aortic sclerosis is present with normal valvular opening.   Mild (1+) aortic regurgitation is present.   Moderate to severe (3+) mitral regurgitation is present.   Mild mitral annular calcification is present.   Moderate (2+) tricuspid valve regurgitation is present.   The IVC is dilated with decreased respiratory variation    < end of copied text >        MEDICATIONS  (STANDING):  aspirin enteric coated 81 milliGRAM(s) Oral daily  atorvastatin 20 milliGRAM(s) Oral at bedtime  carvedilol 6.25 milliGRAM(s) Oral every 12 hours  chlorthalidone 25 milliGRAM(s) Oral daily  heparin   Injectable 5000 Unit(s) SubCutaneous every 8 hours  polyethylene glycol 3350 17 Gram(s) Oral daily  regadenoson Injectable 0.4 milliGRAM(s) IV Push once  sacubitril 24 mG/valsartan 26 mG 1 Tablet(s) Oral two times a day  tamsulosin 0.4 milliGRAM(s) Oral at bedtime    MEDICATIONS  (PRN):  acetaminophen   Tablet .. 650 milliGRAM(s) Oral every 6 hours PRN Mild Pain (1 - 3)        TELEMETRY REVIEW:  5/12 - Underlying Afib, V-Paced 60s   5/13 - Underlying Afib, V-Paced 60s  5/14 - Underlying Afib, V-Paced 60s          ASSESSMENT AND PLAN:    1)  Acute renal failure due to urinary retention  - s/p Powers  - Started on Flomax   - 1L bolus in ED, repeat BMP - improving    - renal US no stone or hydronephrosis  - renal consult - ok to start acei/arb   - urology consult appreciated -  voiding trial in AM - outpt. f/u with [facility]    2) Elevated Troponin, CAD hx  - pt. w/ c/o CP on exertion prior to admission  - tele monitoring- tele review as above  - troponin mildly elevated x2 / ECG w/o ST changes   - cont. ASA / statin   - Echo EF improved now 45%, mod to severe MR  - Cardio f/u appreciated  5/14 - Stress Test ~ Not significantly changed from the previous study in 2017    3) Chronic systolic CHF  - pt. euvolemic, no evidence of acute overload  - patient non-compliant with medications, has not followed up with cardiologist in over 1 year  - echo 2017 EF 25-30% / repeat echo EF 40-45% now ; mod to severe MR, +PFO  - started coreg  - d/w patient, unaware of allergies with resultant anaphylaxis, will resume entresto (was on outpatient in 2018)  - Cont. diuretics   - low sodium diet / I&Os / daily weight  - Cardio f/u appreciated     4) Fever, unknown origin  - Repeat CXR, COVID PCR  - Lactate, BCX x2  - UA    5) Chronic Afib, PPM  - patient with underlying Afib   - Cont. BB    - PPM interrogated ~ revealing two brief episodes of NSVT in December of 2019.  Recommend routine follow up every three months.  - cont. Xarelto for stroke ppx  - EP f/u appreciated     6) Elevated AST  - likely fatty liver disease, denies alcohol use.    - Lipid panel pending -- wnl    7) DVT PPX  - Xarelto CHIEF COMPLAINT/DIAGNOSIS: ARF / Urinary Retention / HF / Afib    HPI: 75 y/o male w/ PMHx of Afib, tachy-yair syndrome s/p pacemaker, HTN, non-obstructive CAD, Systolic HF (EF 2017 25-30%) with recurrent admissions for decompensated HF, who presented to the ED c/o increased abd distension and urinary retention.  He reports difficulty urinating for a few days and "inflammation" and discomfort in the suprapubic area. Pt reports he takes a little pill with a heart on it, but no other medications.  Pt denies chest pain/SOB, no fever/chills, hematuria.    SUBJECTIVE:  5/12 - c/o of +abdominal tenderness - improving. feeling a little better. denies cp or palp, sob or cough.   # [id number]   5/13 - c/o + constipation, c/o CP prior to admission with exertion, patient reports no allergies causing SOB or anaphylaxis, does not remember his allergies. -  phone used.       REVIEW OF SYSTEMS:  All other review of systems is negative unless indicated above      Vital Signs Last 24 Hrs  T(C): 36.6 (14 May 2020 11:51), Max: 37.9 (13 May 2020 16:27)  T(F): 97.8 (14 May 2020 11:51), Max: 100.3 (13 May 2020 16:27)  HR: 60 (14 May 2020 11:51) (60 - 60)  BP: 144/75 (14 May 2020 11:51) (132/74 - 153/80)  BP(mean): --  RR: 18 (14 May 2020 11:51) (9 - 18)  SpO2: 97% (14 May 2020 11:51) (95% - 97%) - room air         PHYSICAL EXAM:  Constitutional: Awake and alert, well-developed  HEENT: PERR, EOMI, Normal Hearing, MMM  Neck: Soft and supple, No LAD, No JVD  Respiratory: Breath sounds are clear bilaterally, No wheezing, rales or rhonchi  Cardiovascular: S1 and S2, regular rate and rhythm, no Murmurs, gallops or rubs  Gastrointestinal/ : Bowel Sounds present, soft, nontender, nondistended, no guarding, no rebound - Powers   Extremities: No peripheral edema  Vascular: 2+ peripheral pulses  Neurological: A/O x 3, no focal deficits  Musculoskeletal: 5/5 strength b/l upper and lower extremities  Skin: No rashes      LABS: All Labs Reviewed:                        14.2   11.18 )-----------( 132      ( 13 May 2020 07:53 )             44.1     05-13    139  |  102  |  21  ----------------------------<  103<H>  4.4   |  31  |  1.39<H>    Ca    8.4<L>      13 May 2020 07:53  Mg     2.0     05-13        COVID-19 PCR: NotDetec:  (05.11.20 @ 16:51)        RADIOLOGY:  < from: US Kidney and Bladder (05.11.20 @ 17:24) >  IMPRESSION:   No evidence of calculus or hydronephrosis  Echogenic kidneys suggest medical renal disease.  Bladder was not evaluated as it was nondistended in the presence of a Powers catheter  < end of copied text >      < from: Xray Chest 1 View- PORTABLE-Urgent (05.11.20 @ 16:24) >  IMPRESSION:  Clear lungs.  < end of copied text >          ECHOCARDIOGRAM:  < from: Transthoracic Echocardiogram (04.18.17 @ 09:21) >   Impression     Summary     No evidence of pericardial effusion.   Moderate to severe (3+) mitral regurgitation is present.   Fibrocalcific changes noted to the aortic valve leaflets with preserved   excursion.   Mild (1+) aortic regurgitation is present.   Moderate (2+) tricuspid valve regurgitation is present.   The left ventricle cavity is mildly dilated.   Mild concentric left ventricular hypertrophy is present.   Estimated left ventricular ejection fraction is 25-30%.   Severe, diffuse hypokinesis of the left ventricle is present.   The right atrium appears mildly dilated.   A device wire is seen in the RV and RA.   Left to right shunt across the atrial septum is noted on color doppler    < end of copied text >      < from: Transthoracic Echocardiogram Follow Up (05.12.20 @ 11:41) >   Impression   Summary   The left ventricle cavity is mildly dilated.   Mild concentric left ventricular hypertrophy is present.   Estimated left ventricular ejection fraction is 40-45 %.   The left atrium is moderately dilated.   The right atrium appears mildly dilated.   A device wire is seen in the RV and RA.   A PFO is noted with color doppler.   Normal appearing right ventricle structure and function.   Mild aortic sclerosis is present with normal valvular opening.   Mild (1+) aortic regurgitation is present.   Moderate to severe (3+) mitral regurgitation is present.   Mild mitral annular calcification is present.   Moderate (2+) tricuspid valve regurgitation is present.   The IVC is dilated with decreased respiratory variation    < end of copied text >        MEDICATIONS  (STANDING):  aspirin enteric coated 81 milliGRAM(s) Oral daily  atorvastatin 20 milliGRAM(s) Oral at bedtime  carvedilol 6.25 milliGRAM(s) Oral every 12 hours  chlorthalidone 25 milliGRAM(s) Oral daily  heparin   Injectable 5000 Unit(s) SubCutaneous every 8 hours  polyethylene glycol 3350 17 Gram(s) Oral daily  regadenoson Injectable 0.4 milliGRAM(s) IV Push once  sacubitril 24 mG/valsartan 26 mG 1 Tablet(s) Oral two times a day  tamsulosin 0.4 milliGRAM(s) Oral at bedtime    MEDICATIONS  (PRN):  acetaminophen   Tablet .. 650 milliGRAM(s) Oral every 6 hours PRN Mild Pain (1 - 3)        TELEMETRY REVIEW:  5/12 - Underlying Afib, V-Paced 60s   5/13 - Underlying Afib, V-Paced 60s        ASSESSMENT AND PLAN:    1)  Acute renal failure due to urinary retention  - s/p Powers  - Started on Flomax   - 1L bolus in ED, repeat BMP - improving    - renal US no stone or hydronephrosis  - renal consult - ok to start acei/arb   - urology consult appreciated     2) Elevated Troponin, CAD hx  - pt. w/ c/o CP on exertion prior to admission  - tele monitoring- tele review as above  - troponin mildly elevated x2 / ECG w/o ST changes   - cont. ASA / statin   - Echo EF improved now 45%, mod to severe MR  - Cardio f/u appreciated    3) Chronic systolic CHF  - pt. euvolemic, no evidence of acute overload  - patient non-compliant with medications, has not followed up with cardiologist in over 1 year  - echo 2017 EF 25-30% / repeat echo EF 40-45% now ; mod to severe MR, +PFO  - started coreg  - d/w patient, unaware of allergies with resultant anaphylaxis, will resume entresto (was on outpatient in 2018)  - Cont. diuretics   - low sodium diet / I&Os / daily weight  - Cardio f/u appreciated     4) Chronic Afib, PPM  - patient with underlying Afib   - Cont. BB    - PPM interrogated ~ revealing two brief episodes of NSVT in December of 2019.  Recommend routine follow up every three months.  - cont. Xarelto for stroke ppx  - EP f/u appreciated     6) Elevated AST  - likely fatty liver disease, denies alcohol use.    - Lipid panel pending -- wnl    7) DVT PPX  - Xarelto

## 2020-05-13 NOTE — CONSULT NOTE ADULT - ASSESSMENT
73 yo male with PMH as listed above, presented to the ER for acute urinary retention with ELIS and had a seo placed in the ER with PVR of 2550cc. CT consistent with enlargement of the prostate. No HN b/l. Pt's creat slowly trending down 1.73>1.38>1.39. Seo in place draining yellow urine. UCx neg  Discussed with pt that he would benefit with outpatient urodynamic studies and trial of void but he insists on trial of void prior to discharge- all risks and benefits explained, pt verbalized understanding.   Recommend  - Continue Flomax  - Stool softeners prn constipation  - Trial of void in 2 days (5/15), check PVR if >200cc replace seo and discharge with seo to leg bag  - Trend Creat  - Follow up with Dr. Walden outpatient     Case discussed with Dr. Walden

## 2020-05-13 NOTE — CONSULT NOTE ADULT - SUBJECTIVE AND OBJECTIVE BOX
HPI:  Pt is a 73 yo M w PMHx of afib, s/p pacemaker, HTN, CAD, CHF, who presented to the ED c/o increased abd distension and urinary retention.  He reports difficulty urinating for a few days and "inflammation" and discomfort in the suprapubic area. Pt reports he takes a little pill with a heart on it, but no other medications.  Pt denies chest pain/SOB, no fever/chills, hematuria.  Surg hx:  L side hernia repair  Fam Hx  :  Father MI at age 82  Brother MI at age 67 (11 May 2020 21:00)    73 yo male with PMH as listed above, presented to the ER for acute urinary retention and had a seo placed in the ER with PVR of 2550cc. Patient seen and examined at bedside. Pt interviewed via  ID [id number]. Patient reports that he developed acute urinary retention 3 hours prior to coming to the hospital. He states he was unable to void and developed abdominal discomfort. He also notes prior to this that he was straining to void and admits to urinary hesitancy, urgency and nocturia x5. He reports he felt that he was incompletely emptying his bladder over the past few months but has not seen a urologist in the past and reports he is not on any medications for his prostate. Patient denies any fevers, chills, current flank/abd pain, hematuria, dysuria, urinary retention prior to this episode.       REVIEW OF SYSTEMS     All other review of systems neg, except as noted in HPI    MEDICATIONS  (STANDING):  aspirin enteric coated 81 milliGRAM(s) Oral daily  atorvastatin 20 milliGRAM(s) Oral at bedtime  carvedilol 3.125 milliGRAM(s) Oral every 12 hours  chlorthalidone 25 milliGRAM(s) Oral daily  rivaroxaban 15 milliGRAM(s) Oral with dinner  tamsulosin 0.4 milliGRAM(s) Oral at bedtime    MEDICATIONS  (PRN):  acetaminophen   Tablet .. 650 milliGRAM(s) Oral every 6 hours PRN Mild Pain (1 - 3)      Allergies    ACE inhibitors (Other)  amiodarone (Other)    Intolerances        SOCIAL HISTORY:    FAMILY HISTORY:      Vital Signs Last 24 Hrs  T(C): 36.5 (13 May 2020 05:30), Max: 36.8 (12 May 2020 16:04)  T(F): 97.7 (13 May 2020 05:30), Max: 98.2 (12 May 2020 16:04)  HR: 60 (13 May 2020 05:30) (59 - 60)  BP: 157/86 (13 May 2020 05:30) (123/70 - 157/86)  BP(mean): --  RR: 18 (13 May 2020 05:30) (18 - 18)  SpO2: 98% (13 May 2020 05:30) (94% - 98%)    PHYSICAL EXAM:      General: No distress, No anxiety  VITALS  T(C): 36.5 (20 @ 05:30), Max: 36.8 (20 @ 16:04)  HR: 60 (20 @ 05:30) (59 - 60)  BP: 157/86 (20 @ 05:30) (123/70 - 157/86)  RR: 18 (20 @ 05:30) (18 - 18)  SpO2: 98% (20 @ 05:30) (94% - 98%)            Skin     : No jaundice, No lesions  HEENT: No icterus , EOM full , No epistaxis  Lung    :  No resp distress  Abdo:   : Soft, Non tender, No guarding, No distension    Back    : No CVAT b/l  Extremity: No calf tenderness, No edema  Genitalia Male: Pt with seo in place draining yellow urine  VEL     :  Pt refused at this time    Neuro   : A&Ox3           LABS:                        14.2   11.18 )-----------( 132      ( 13 May 2020 07:53 )             44.1         139  |  102  |  21  ----------------------------<  103<H>  4.4   |  31  |  1.39<H>    Ca    8.4<L>      13 May 2020 07:53  Mg     2.0         TPro  6.9  /  Alb  4.0  /  TBili  4.3<H>  /  DBili  x   /  AST  76<H>  /  ALT  66  /  AlkPhos  109  05-11    PT/INR - ( 11 May 2020 15:58 )   PT: 18.8 sec;   INR: 1.67 ratio         PTT - ( 11 May 2020 15:58 )  PTT:35.4 sec  Urinalysis Basic - ( 11 May 2020 14:36 )    Color: Yellow / Appearance: Clear / S.010 / pH: x  Gluc: x / Ketone: Negative  / Bili: Negative / Urobili: Negative mg/dL   Blood: x / Protein: 30 mg/dL / Nitrite: Negative   Leuk Esterase: Negative / RBC: 0-2 /HPF / WBC Negative   Sq Epi: x / Non Sq Epi: Negative / Bacteria: Negative    Culture - Urine (20 @ 14:36)    Specimen Source: .Urine None    Culture Results:   <10,000 CFU/mL Normal Urogenital Luz        RADIOLOGY & ADDITIONAL STUDIES:    EXAM:  CT ABDOMEN AND PELVIS OC                            PROCEDURE DATE:  2017        INTERPRETATION:  CT ABDOMEN AND PELVIS OC    HISTORY:  Abdominal distention and groin pain. Small bowel obstruction   versus hernia.    Technique: CT of the abdomen and pelvis is performed with oral without   intravenous contrast. Axial images are supplemented with coronal and   sagittal reformations. This study was performed using automatic exposure   control (radiation dose reduction software) to obtain a diagnostic image   quality scan with patient dose as low as reasonably achievable.    Contrast:     Oral contrast only    Comparison: CT abdomen and pelvis 2017    Findings:  LIVER: Normal aside from scattered subcentimeter cysts.  SPLEEN: Normal.  PANCREAS: Diffuse atrophy.  GALLBLADDER/BILIARY TREE: Nondilated. Normal gallbladder.  ADRENALS: Normal.  KIDNEYS: No calcification, hydronephrosis, or renal mass.  LYMPHADENOPATHY/RETROPERITONEUM: No adenopathy.  VASCULATURE: Normal caliber aorta.  BOWEL: No bowel-related abnormality. Specifically, no evidence of acute   diverticulitis. Normal appendix and ileocecal region. No bowel   obstruction. Third portion duodenal diverticulum.  PELVIC VISCERA: Mild enlargement of the prostate. Enlarged periprostatic   venous plexus.  PELVIC LYMPH NODES: No pelvic adenopathy.  PERITONEUM/ABDOMINAL WALL: No free air. Small volume ascites.  SKELETAL: No acute bony abnormality.  LUNG BASES: Small right pleural effusion with right base atelectasis.   Cardiomegaly with intracardiac leads.    IMPRESSION:     No acute intra-abdominal or pelvic pathology. Specifically, no bowel   obstruction or hernia.    Small right pleural effusion and small volume abdominal and pelvic   ascites.              PRETTY SHIRLEY   This document has been electronically signed. 2017  4:02PM            EXAM:  US KIDNEYS AND BLADDER                            PROCEDURE DATE:  2020          INTERPRETATION:  CLINICAL INFORMATION: Urinary retention    COMPARISON: None available.    TECHNIQUE: Sonography of the kidneys and bladder.     FINDINGS:The kidneys are echogenic suggesting medical renal disease.    Right kidney:  11.0 cm. No renal mass, hydronephrosis or calculi.    Left kidney:  11.7 cm. No renal mass, hydronephrosis or calculi.    Urinary bladder: Decompressed in the presence of aFoley catheter.    IMPRESSION:     No evidence of calculus or hydronephrosis  Echogenic kidneys suggest medical renal disease.  Bladder was not evaluated as it was nondistended in the presence of a Seo catheter                DAVINA BHAT M.D.,ATTENDING RADIOLOGIST  This document has been electronically signed. May 11 2020  5:32PM
Patient is a 74y Male whom presented to the hospital with PMHx of Afib, tachy-yair syndrome s/p pacemaker, HTN, non-obstructive CAD, Systolic HF (EF 2017 25-30%) with recurrent admissions for decompensated HF, who presented to the ED c/o increased abd distension and urinary retention, renal evaluation of ELIS. Seo inserted, maintained and noted with urinary retention.  He reports difficulty urinating for a few days and  discomfort. Has not seen urology in past, denies seo.    PAST MEDICAL & SURGICAL HISTORY:  HTN (hypertension)  Coronary artery disease involving native heart with angina pectoris, unspecified vessel or lesion type  LV dysfunction  LBBB (left bundle branch block)  Hypokinesis: mild global hypokinesis  Pacemaker: single chamber meditronic  placed by Dr Tomas  May  of  2016  Fatigue  AF (atrial fibrillation)  Cardiac pacemaker: Refer to [facility]      MEDICATIONS  (STANDING):  aspirin enteric coated 81 milliGRAM(s) Oral daily  atorvastatin 20 milliGRAM(s) Oral at bedtime  carvedilol 3.125 milliGRAM(s) Oral every 12 hours  chlorthalidone 25 milliGRAM(s) Oral daily  rivaroxaban 15 milliGRAM(s) Oral with dinner  tamsulosin 0.4 milliGRAM(s) Oral at bedtime    MEDICATIONS  (PRN):  acetaminophen   Tablet .. 650 milliGRAM(s) Oral every 6 hours PRN Mild Pain (1 - 3)      Allergies    ACE inhibitors (Other)  amiodarone (Other)    Intolerances        SOCIAL HISTORY:  no etoh/cigg reported  FAMILY HISTORY:  no renal disease known    REVIEW OF SYSTEMS:    CONSTITUTIONAL: No weakness, fevers or chills  EYES/ENT: No visual changes;  No vertigo or throat pain   NECK: No pain or stiffness  RESPIRATORY: No cough, wheezing, hemoptysis; No shortness of breath  CARDIOVASCULAR: No chest pain or palpitations  GASTROINTESTINAL: No abdominal or epigastric pain. No nausea, vomiting, or hematemesis; No diarrhea or constipation. No melena or hematochezia.  GENITOURINARY: No dysuria, frequency or hematuria  NEUROLOGICAL: No numbness or weakness  SKIN: No itching, burning, rashes, or lesions   All other review of systems is negative unless indicated above.      T(C): , Max: 36.9 (20 @ 19:11)  T(F): , Max: 98.4 (20 @ 19:11)  HR: 59 (20 @ 11:20)  BP: 133/65 (20 @ 11:20)  BP(mean): --  RR: 18 (20 @ 11:20)  SpO2: 94% (20 @ 11:20)  Wt(kg): --     @ 07:01  -   @ 07:00  --------------------------------------------------------  IN: 0 mL / OUT: 5575 mL / NET: -5575 mL          PHYSICAL EXAM:    Constitutional: NAD, well-groomed, well-developed  HEENT: PERRLA, EOMI,  MMM  Neck: No LAD, No JVD  Respiratory: good aeration  Cardiovascular: S1 and S2, RRR  Gastrointestinal: BS+, soft, NT/ND  Extremities: No peripheral edema  Neurological: A/O x 3, no focal deficits  Psychiatric: Normal mood, normal affect  :  Seo  Skin: No rashes  Access: Not applicable        LABS:                        14.5   10.84 )-----------( 132      ( 11 May 2020 15:58 )             44.5     12 May 2020 07:59    139    |  103    |  25     ----------------------------<  80     4.3     |  29     |  1.38   11 May 2020 15:58    137    |  100    |  29     ----------------------------<  107    5.1     |  27     |  1.73     Ca    8.5        12 May 2020 07:59  Ca    9.5        11 May 2020 15:58    TPro  6.9    /  Alb  4.0    /  TBili  4.3    /  DBili  x      /  AST  76     /  ALT  66     /  AlkPhos  109    11 May 2020 15:58          Urine Studies:  Urinalysis Basic - ( 11 May 2020 14:36 )    Color: Yellow / Appearance: Clear / S.010 / pH: x  Gluc: x / Ketone: Negative  / Bili: Negative / Urobili: Negative mg/dL   Blood: x / Protein: 30 mg/dL / Nitrite: Negative   Leuk Esterase: Negative / RBC: 0-2 /HPF / WBC Negative   Sq Epi: x / Non Sq Epi: Negative / Bacteria: Negative            RADIOLOGY & ADDITIONAL STUDIES:
CHIEF COMPLAINT:    HPI:  74 year old male with past hx  of  non-obstructive CAD, HFrEF (LVEF 35-40%), with recurrent admissions for decompensated HF, mitral valve disease (mod-severe MR), AFib s/p ablation with recurrence, LBBB, tachy-yair syndrome s/p RCW single chamber ppm implantation 4/2016 and recent AV ablation and CRT-P upgrade on 5/3/2017 who was readmitted to  on 5/8/2017 for evaluation/management PPM pocket erosion. The patient was subsequently transferred to [facility] where the patient underwent lead extraction on 5/11/2017. A Medtronic Micra Leadless PPM was implanted on 5/17/17 but had intermittent loss of capture. The patient is s/p LCW Medtronic single-chamber PPM implantation on 5/19/2017 who presented to the ED c/o increased abd distension and urinary retention.  He reports difficulty urinating for a few days and "inflammation" and discomfort in the suprapubic area.    Patient does give hx of SOB on exertion , with elevated blood pressure , patient does have chest discomfort at pacemaker site which is not associated with any other symptoms . patient is not compliant to recommendation , have seen me in office last dec 2018 , patient was explained via   nurse who speaks [place]oc[place]               PAST MEDICAL & SURGICAL HISTORY:  HTN (hypertension)  Coronary artery disease involving native heart with angina pectoris, unspecified vessel or lesion type  LV dysfunction  LBBB (left bundle branch block)  Hypokinesis: mild global hypokinesis  Pacemaker: single chamber meditronic  placed by Dr Tomas  May  of  2016  Fatigue  AF (atrial fibrillation)  Cardiac pacemaker: Refer to [facility]  Left hernia surgery   explantation of pacemaker lead due to infection in may  2017, and after micra explanation     Allergies    ACE inhibitors (Other)  amiodarone (Other)    Intolerances        SOCIAL HISTORY: former smoker     FAMILY HISTORY: Father MI at age 82  Brother MI at age 67 (11 May 2020 21:00)        MEDICATIONS:  MEDICATIONS  (STANDING):  aspirin 325 milliGRAM(s) Oral daily  chlorthalidone 25 milliGRAM(s) Oral daily  rivaroxaban 15 milliGRAM(s) Oral with dinner  tamsulosin 0.4 milliGRAM(s) Oral at bedtime    MEDICATIONS  (PRN):  acetaminophen   Tablet .. 650 milliGRAM(s) Oral every 6 hours PRN Mild Pain (1 - 3)      REVIEW OF SYSTEMS:    CONSTITUTIONAL: No weakness, fevers or chills  EYES/ENT: No visual changes;  No vertigo or throat pain   NECK: No pain or stiffness  RESPIRATORY: SOB on exertion   CARDIOVASCULAR: mild chest discomfort at PPM site   GASTROINTESTINAL: mild distention  No nausea, vomiting, or hematemesis; No diarrhea or constipation. No melena or hematochezia.  GENITOURINARY: No dysuria, frequency or hematuria  NEUROLOGICAL: No numbness or weakness  SKIN: No itching, burning, rashes, or lesions   All other review of systems is negative unless indicated above    Vital Signs Last 24 Hrs  T(C): 36.5 (12 May 2020 05:42), Max: 36.9 (11 May 2020 19:11)  T(F): 97.7 (12 May 2020 05:42), Max: 98.4 (11 May 2020 19:11)  HR: 60 (12 May 2020 05:42) (59 - 68)  BP: 142/78 (12 May 2020 05:42) (142/78 - 188/99)  BP(mean): --  RR: 19 (11 May 2020 22:50) (17 - 19)  SpO2: 96% (12 May 2020 05:42) (95% - 100%)    I&O's Summary    11 May 2020 07:01  -  12 May 2020 07:00  --------------------------------------------------------  IN: 0 mL / OUT: 5575 mL / NET: -5575 mL        PHYSICAL EXAM:    Constitutional: NAD, awake and alert, well-developed  HEENT: PERR, EOMI,  No oral cyananosis.  Neck:  supple,  No JVD  Respiratory: Breath sounds are clear bilaterally, No wheezing, rales or rhonchi  Cardiovascular: S1 and S2, regular rate and rhythm, no Murmurs, gallops or rubs  Gastrointestinal: Bowel Sounds present, soft, nontender.   Extremities: No peripheral edema. No clubbing or cyanosis.  Vascular: 2+ peripheral pulses  Neurological: A/O x 3, no focal deficits  Musculoskeletal: no calf tenderness.  Skin: No rashes.      LABS: All Labs Reviewed:                        14.5   10.84 )-----------( 132      ( 11 May 2020 15:58 )             44.5     12 May 2020 07:59    139    |  103    |  25     ----------------------------<  80     4.3     |  29     |  1.38   11 May 2020 15:58    137    |  100    |  29     ----------------------------<  107    5.1     |  27     |  1.73     Ca    8.5        12 May 2020 07:59  Ca    9.5        11 May 2020 15:58    TPro  6.9    /  Alb  4.0    /  TBili  4.3    /  DBili  x      /  AST  76     /  ALT  66     /  AlkPhos  109    11 May 2020 15:58    PT/INR - ( 11 May 2020 15:58 )   PT: 18.8 sec;   INR: 1.67 ratio         PTT - ( 11 May 2020 15:58 )  PTT:35.4 sec  CARDIAC MARKERS ( 12 May 2020 07:59 )  0.197 ng/mL / x     / x     / x     / x      CARDIAC MARKERS ( 11 May 2020 19:33 )  0.202 ng/mL / x     / x     / x     / x      CARDIAC MARKERS ( 11 May 2020 15:58 )  0.181 ng/mL / x     / x     / x     / x          Blood Culture:   05-11 @ 15:58  Pro Bnp [id number]        RADIOLOGY/EKG:  < from: 12 Lead ECG (05.11.20 @ 15:43) >  Ventricular-paced rhythm  Abnormal ECG  When compared with ECG of 09-JUL-2019 16:56,  No significant change was found  Confirmed by SONJA MCKEON MD ([id number]) on 5/11/2020 7:05:53 PM  Also confirmed by SONJA MCKEON MD ([id number])  on 5/11/2020 7:06:10 PM    < end of copied text >      < from: TTE Echo Complete w/Doppler (05.10.17 @ 11:27) >   Summary:   1. Technically good study.   2. Moderately increased LV wall thickness.   3. Moderately decreased global left ventricular systolic function.   4. Left ventricular ejection fraction, by visual estimation, is 35 to   40%.   5. Spectral Doppler shows impaired relaxation pattern of left   ventricular myocardial filling (Grade I diastolic dysfunction).   6. There is moderate concentric left ventricular hypertrophy.   7. Severely enlarged left atrium.   8. Severely dilated right atrium.   9. Thickening of the anterior and posterior mitral valve leaflets.  10. Mild to moderate mitral valve regurgitation.  11. Small secundum atrial septal defect with predominantly left to right   shunting across the atrial septum.  12. Trivial pericardial effusion.     MD Vinay Electronically signed on 5/10/2017 at 3:01:41 PM    < end of copied text >  < from:  Kidney and Bladder (05.11.20 @ 17:24) >    IMPRESSION:     No evidence of calculus or hydronephrosis  Echogenic kidneys suggest medical renal disease.  Bladder was not evaluated as it was nondistended in the presence of a Powers catheter    < end of copied text >

## 2020-05-13 NOTE — PROGRESS NOTE ADULT - SUBJECTIVE AND OBJECTIVE BOX
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 The patient was subsequently transferred to [facility] where the patient underwent lead extraction on 5/11/2017. A Medtronic Micra Leadless PPM was implanted on 5/17/17 but had intermittent loss of capture. The patient is s/p LCW Medtronic single-chamber PPM implantation on 5/19/2017 who presented to the ED c/o increased abd distension and urinary retention.  He reports difficulty urinating for a few days and "inflammation" and discomfort in the suprapubic area.    Patient does give hx of SOB on exertion , with elevated blood pressure , patient does have chest discomfort at pacemaker site which is not associated with any other symptoms . patient is not compliant to recommendation , have seen me in office last dec 2018 , patient was explained via   nurse who speaks Azeri     5/13/20: V paced w/ afib on tele, brief episode 8 beats of WCT cannot exclude NSVT.  spoke w/ pt via  - reports multiple episodes of chest discomfort 2 days prior to admission possibly related to exertion.  Poor, rambling historian.    MEDICATIONS:    MEDICATIONS  (STANDING):  aspirin enteric coated 81 milliGRAM(s) Oral daily  atorvastatin 20 milliGRAM(s) Oral at bedtime  carvedilol 3.125 milliGRAM(s) Oral every 12 hours  chlorthalidone 25 milliGRAM(s) Oral daily  polyethylene glycol 3350 17 Gram(s) Oral daily  rivaroxaban 15 milliGRAM(s) Oral with dinner  tamsulosin 0.4 milliGRAM(s) Oral at bedtime    MEDICATIONS  (PRN):  acetaminophen   Tablet .. 650 milliGRAM(s) Oral every 6 hours PRN Mild Pain (1 - 3)      Vital Signs Last 24 Hrs  T(C): 36.8 (13 May 2020 10:55), Max: 36.8 (12 May 2020 16:04)  T(F): 98.2 (13 May 2020 10:55), Max: 98.2 (12 May 2020 16:04)  HR: 60 (13 May 2020 10:55) (59 - 60)  BP: 149/83 (13 May 2020 10:55) (123/70 - 157/86)  BP(mean): --  RR: 18 (13 May 2020 10:55) (18 - 18)  SpO2: 100% (13 May 2020 10:55) (94% - 100%)Daily     Daily I&O's Summary    12 May 2020 07:01  -  13 May 2020 07:00  --------------------------------------------------------  IN: 0 mL / OUT: 2600 mL / NET: -2600 mL      PHYSICAL EXAM:    Constitutional: NAD, awake and alert, well-developed  HEENT: PERR, EOMI,  No oral cyananosis.  Neck:  supple,  No JVD  Respiratory: Breath sounds are clear bilaterally, No wheezing, rales or rhonchi  Cardiovascular: S1 and S2, regular rate and rhythm, no Murmurs, gallops or rubs  Gastrointestinal: Bowel Sounds present, soft, nontender.   Extremities: No peripheral edema. No clubbing or cyanosis.  Vascular: 2+ peripheral pulses        LABS: All Labs Reviewed:                        14.2   11.18 )-----------( 132      ( 13 May 2020 07:53 )             44.1                         14.5   10.84 )-----------( 132      ( 11 May 2020 15:58 )             44.5     13 May 2020 07:53    139    |  102    |  21     ----------------------------<  103    4.4     |  31     |  1.39   12 May 2020 07:59    139    |  103    |  25     ----------------------------<  80     4.3     |  29     |  1.38   11 May 2020 15:58    137    |  100    |  29     ----------------------------<  107    5.1     |  27     |  1.73     Ca    8.4        13 May 2020 07:53  Ca    8.5        12 May 2020 07:59  Ca    9.5        11 May 2020 15:58  Mg     2.0       13 May 2020 07:53    TPro  6.9    /  Alb  4.0    /  TBili  4.3    /  DBili  x      /  AST  76     /  ALT  66     /  AlkPhos  109    11 May 2020 15:58  PT/INR - ( 11 May 2020 15:58 )   PT: 18.8 sec;   INR: 1.67 ratio         PTT - ( 11 May 2020 15:58 )  PTT:35.4 sec  CARDIAC MARKERS ( 12 May 2020 07:59 )  0.197 ng/mL / x     / x     / x     / x      CARDIAC MARKERS ( 11 May 2020 19:33 )  0.202 ng/mL / x     / x     / x     / x      CARDIAC MARKERS ( 11 May 2020 15:58 )  0.181 ng/mL / x     / x     / x     / x        05-11 @ 15:58  Pro Bnp [id number]    RADIOLOGY/EKG:  < from: 12 Lead ECG (05.11.20 @ 15:43) >  Ventricular-paced rhythm  Abnormal ECG  When compared with ECG of 09-JUL-2019 16:56,  No significant change was found  Confirmed by SONJA MCKEON MD ([id number]) on 5/11/2020 7:05:53 PM  Also confirmed by SONJA MCKEON MD ([id number])  on 5/11/2020 7:06:10 PM    < end of copied text >      < from: TTE Echo Complete w/Doppler (05.10.17 @ 11:27) >   Summary:   1. Technically good study.   2. Moderately increased LV wall thickness.   3. Moderately decreased global left ventricular systolic function.   4. Left ventricular ejection fraction, by visual estimation, is 35 to   40%.   5. Spectral Doppler shows impaired relaxation pattern of left   ventricular myocardial filling (Grade I diastolic dysfunction).   6. There is moderate concentric left ventricular hypertrophy.   7. Severely enlarged left atrium.   8. Severely dilated right atrium.   9. Thickening of the anterior and posterior mitral valve leaflets.  10. Mild to moderate mitral valve regurgitation.  11. Small secundum atrial septal defect with predominantly left to right   shunting across the atrial septum.  12. Trivial pericardial effusion.     MD Vinay Electronically signed on 5/10/2017 at 3:01:41 PM    < end of copied text >  < from: US Kidney and Bladder (05.11.20 @ 17:24) >    IMPRESSION:     No evidence of calculus or hydronephrosis  Echogenic kidneys suggest medical renal disease.  Bladder was not evaluated as it was nondistended in the presence of a Powers catheter    < end of copied text >    < from: Transthoracic Echocardiogram Follow Up (05.12.20 @ 11:41) >   Impression     Summary     The left ventricle cavity is mildly dilated.   Mild concentric left ventricular hypertrophy is present.   Estimated left ventricular ejection fraction is 40-45 %.   The left atrium is moderately dilated.   The right atrium appears mildly dilated.   A device wire is seen in the RV and RA.   A PFO is noted with color doppler.   Normal appearing right ventricle structure and function.   Mild aortic sclerosis is present with normal valvular opening.   Mild (1+) aortic regurgitation is present.   Moderate to severe (3+) mitral regurgitation is present.   Mild mitral annular calcification is present.   Moderate (2+) tricuspid valve regurgitation is present.   The IVC is dilated with decreased respiratory variation     Signature     ----------------------------------------------------------------   Electronically signed by Arnav Morrison MD(Interpreting   physician) on 05/12/2020 05:28 PM    < end of copied text >

## 2020-05-13 NOTE — CONSULT NOTE ADULT - REASON FOR ADMISSION
ARF  Urinary Retention  Fluid Overload  Troponin elevation  Afib

## 2020-05-13 NOTE — PROGRESS NOTE ADULT - ATTENDING COMMENTS
Pt seen and examined.  Reviewed and agree w/ NP Jenniffer above and changes made where necessary. Pt seen and examined.  Reviewed and agree w/ NP Jenniffer above and changes made where necessary.  signed on 5/13

## 2020-05-13 NOTE — PROGRESS NOTE ADULT - SUBJECTIVE AND OBJECTIVE BOX
Patient is a 74y Male who reports no complaints overnight. h ad a good BM    REVIEW OF SYSTEMS:    CONSTITUTIONAL: No weakness, fevers or chills  RESPIRATORY: No cough, wheezing, hemoptysis; No shortness of breath  CARDIOVASCULAR: No chest pain or palpitations  GENITOURINARY: No dysuria, frequency or hematuria  All other review of systems is negative unless indicated above.    MEDICATIONS  (STANDING):  aspirin enteric coated 81 milliGRAM(s) Oral daily  atorvastatin 20 milliGRAM(s) Oral at bedtime  carvedilol 3.125 milliGRAM(s) Oral every 12 hours  chlorthalidone 25 milliGRAM(s) Oral daily  polyethylene glycol 3350 17 Gram(s) Oral daily  rivaroxaban 15 milliGRAM(s) Oral with dinner  tamsulosin 0.4 milliGRAM(s) Oral at bedtime    MEDICATIONS  (PRN):  acetaminophen   Tablet .. 650 milliGRAM(s) Oral every 6 hours PRN Mild Pain (1 - 3)        T(C): , Max: 36.8 (20 @ 16:04)  T(F): , Max: 98.2 (20 @ 16:04)  HR: 60 (20 @ 05:30)  BP: 157/86 (20 @ 05:30)  BP(mean): --  RR: 18 (20 @ 05:30)  SpO2: 98% (20 @ 05:30)  Wt(kg): --     @ 07:01  -   @ 07:00  --------------------------------------------------------  IN: 0 mL / OUT: 2600 mL / NET: -2600 mL          PHYSICAL EXAM:    Constitutional: NAD  HEENT: PERRLA, EOMI,  MMM  Neck: No LAD, No JVD  Respiratory: dist BS  Cardiovascular: S1 and S2,  Extremities: No peripheral edema  Neurological: A/O x 3, no focal deficits  Psychiatric: Normal mood, normal affect  :  Powers  Skin: No rashes  Access: Not applicable        LABS:                        14.2   11.18 )-----------( 132      ( 13 May 2020 07:53 )             44.1     13 May 2020 07:53    139    |  102    |  21     ----------------------------<  103    4.4     |  31     |  1.39   12 May 2020 07:59    139    |  103    |  25     ----------------------------<  80     4.3     |  29     |  1.38   11 May 2020 15:58    137    |  100    |  29     ----------------------------<  107    5.1     |  27     |  1.73     Ca    8.4        13 May 2020 07:53  Ca    8.5        12 May 2020 07:59  Ca    9.5        11 May 2020 15:58  Mg     2.0       13 May 2020 07:53    TPro  6.9    /  Alb  4.0    /  TBili  4.3    /  DBili  x      /  AST  76     /  ALT  66     /  AlkPhos  109    11 May 2020 15:58          Urine Studies:  Urinalysis Basic - ( 11 May 2020 14:36 )    Color: Yellow / Appearance: Clear / S.010 / pH: x  Gluc: x / Ketone: Negative  / Bili: Negative / Urobili: Negative mg/dL   Blood: x / Protein: 30 mg/dL / Nitrite: Negative   Leuk Esterase: Negative / RBC: 0-2 /HPF / WBC Negative   Sq Epi: x / Non Sq Epi: Negative / Bacteria: Negative            RADIOLOGY & ADDITIONAL STUDIES:

## 2020-05-13 NOTE — PROVIDER CONTACT NOTE (OTHER) - ASSESSMENT
VSS as charted. Pt complained mild chest pain, sob and abdominal discomfort. No s/s of acute distress.

## 2020-05-14 LAB
ADD ON TEST-SPECIMEN IN LAB: SIGNIFICANT CHANGE UP
ALBUMIN SERPL ELPH-MCNC: 3.3 G/DL — SIGNIFICANT CHANGE UP (ref 3.3–5)
ANION GAP SERPL CALC-SCNC: 6 MMOL/L — SIGNIFICANT CHANGE UP (ref 5–17)
APPEARANCE UR: ABNORMAL
BILIRUB UR-MCNC: NEGATIVE — SIGNIFICANT CHANGE UP
BUN SERPL-MCNC: 19 MG/DL — SIGNIFICANT CHANGE UP (ref 7–23)
CALCIUM SERPL-MCNC: 8.7 MG/DL — SIGNIFICANT CHANGE UP (ref 8.5–10.1)
CHLORIDE SERPL-SCNC: 103 MMOL/L — SIGNIFICANT CHANGE UP (ref 96–108)
CO2 SERPL-SCNC: 30 MMOL/L — SIGNIFICANT CHANGE UP (ref 22–31)
COLOR SPEC: ABNORMAL
CREAT SERPL-MCNC: 1.12 MG/DL — SIGNIFICANT CHANGE UP (ref 0.5–1.3)
DIFF PNL FLD: ABNORMAL
GLUCOSE SERPL-MCNC: 101 MG/DL — HIGH (ref 70–99)
GLUCOSE UR QL: NEGATIVE MG/DL — SIGNIFICANT CHANGE UP
HCT VFR BLD CALC: 44.1 % — SIGNIFICANT CHANGE UP (ref 39–50)
HGB BLD-MCNC: 14.2 G/DL — SIGNIFICANT CHANGE UP (ref 13–17)
KETONES UR-MCNC: NEGATIVE — SIGNIFICANT CHANGE UP
LACTATE SERPL-SCNC: 1.1 MMOL/L — SIGNIFICANT CHANGE UP (ref 0.7–2)
LEUKOCYTE ESTERASE UR-ACNC: ABNORMAL
MCHC RBC-ENTMCNC: 31 PG — SIGNIFICANT CHANGE UP (ref 27–34)
MCHC RBC-ENTMCNC: 32.2 GM/DL — SIGNIFICANT CHANGE UP (ref 32–36)
MCV RBC AUTO: 96.3 FL — SIGNIFICANT CHANGE UP (ref 80–100)
NITRITE UR-MCNC: POSITIVE
PH UR: 7 — SIGNIFICANT CHANGE UP (ref 5–8)
PHOSPHATE SERPL-MCNC: 3.1 MG/DL — SIGNIFICANT CHANGE UP (ref 2.5–4.5)
PLATELET # BLD AUTO: 138 K/UL — LOW (ref 150–400)
POTASSIUM SERPL-MCNC: 3.7 MMOL/L — SIGNIFICANT CHANGE UP (ref 3.5–5.3)
POTASSIUM SERPL-SCNC: 3.7 MMOL/L — SIGNIFICANT CHANGE UP (ref 3.5–5.3)
PROT UR-MCNC: 100 MG/DL
RBC # BLD: 4.58 M/UL — SIGNIFICANT CHANGE UP (ref 4.2–5.8)
RBC # FLD: 13.4 % — SIGNIFICANT CHANGE UP (ref 10.3–14.5)
SARS-COV-2 RNA SPEC QL NAA+PROBE: SIGNIFICANT CHANGE UP
SODIUM SERPL-SCNC: 139 MMOL/L — SIGNIFICANT CHANGE UP (ref 135–145)
SP GR SPEC: 1.01 — SIGNIFICANT CHANGE UP (ref 1.01–1.02)
UROBILINOGEN FLD QL: 12 MG/DL
WBC # BLD: 10.92 K/UL — HIGH (ref 3.8–10.5)
WBC # FLD AUTO: 10.92 K/UL — HIGH (ref 3.8–10.5)

## 2020-05-14 PROCEDURE — 71045 X-RAY EXAM CHEST 1 VIEW: CPT | Mod: 26

## 2020-05-14 PROCEDURE — 99233 SBSQ HOSP IP/OBS HIGH 50: CPT

## 2020-05-14 PROCEDURE — 78452 HT MUSCLE IMAGE SPECT MULT: CPT | Mod: 26

## 2020-05-14 RX ORDER — CEFTRIAXONE 500 MG/1
1000 INJECTION, POWDER, FOR SOLUTION INTRAMUSCULAR; INTRAVENOUS EVERY 24 HOURS
Refills: 0 | Status: DISCONTINUED | OUTPATIENT
Start: 2020-05-15 | End: 2020-05-16

## 2020-05-14 RX ORDER — REGADENOSON 0.08 MG/ML
0.4 INJECTION, SOLUTION INTRAVENOUS ONCE
Refills: 0 | Status: DISCONTINUED | OUTPATIENT
Start: 2020-05-14 | End: 2020-05-15

## 2020-05-14 RX ORDER — CEFTRIAXONE 500 MG/1
INJECTION, POWDER, FOR SOLUTION INTRAMUSCULAR; INTRAVENOUS
Refills: 0 | Status: DISCONTINUED | OUTPATIENT
Start: 2020-05-14 | End: 2020-05-16

## 2020-05-14 RX ORDER — HEPARIN SODIUM 5000 [USP'U]/ML
5000 INJECTION INTRAVENOUS; SUBCUTANEOUS EVERY 8 HOURS
Refills: 0 | Status: DISCONTINUED | OUTPATIENT
Start: 2020-05-14 | End: 2020-05-15

## 2020-05-14 RX ORDER — CEFTRIAXONE 500 MG/1
1000 INJECTION, POWDER, FOR SOLUTION INTRAMUSCULAR; INTRAVENOUS ONCE
Refills: 0 | Status: COMPLETED | OUTPATIENT
Start: 2020-05-14 | End: 2020-05-14

## 2020-05-14 RX ADMIN — Medication 25 MILLIGRAM(S): at 06:14

## 2020-05-14 RX ADMIN — CARVEDILOL PHOSPHATE 6.25 MILLIGRAM(S): 80 CAPSULE, EXTENDED RELEASE ORAL at 17:29

## 2020-05-14 RX ADMIN — SACUBITRIL AND VALSARTAN 1 TABLET(S): 24; 26 TABLET, FILM COATED ORAL at 06:14

## 2020-05-14 RX ADMIN — Medication 81 MILLIGRAM(S): at 11:36

## 2020-05-14 RX ADMIN — ATORVASTATIN CALCIUM 20 MILLIGRAM(S): 80 TABLET, FILM COATED ORAL at 21:51

## 2020-05-14 RX ADMIN — CEFTRIAXONE 1000 MILLIGRAM(S): 500 INJECTION, POWDER, FOR SOLUTION INTRAMUSCULAR; INTRAVENOUS at 21:50

## 2020-05-14 RX ADMIN — TAMSULOSIN HYDROCHLORIDE 0.4 MILLIGRAM(S): 0.4 CAPSULE ORAL at 21:51

## 2020-05-14 RX ADMIN — SACUBITRIL AND VALSARTAN 1 TABLET(S): 24; 26 TABLET, FILM COATED ORAL at 17:29

## 2020-05-14 RX ADMIN — HEPARIN SODIUM 5000 UNIT(S): 5000 INJECTION INTRAVENOUS; SUBCUTANEOUS at 21:51

## 2020-05-14 RX ADMIN — CARVEDILOL PHOSPHATE 6.25 MILLIGRAM(S): 80 CAPSULE, EXTENDED RELEASE ORAL at 06:14

## 2020-05-14 RX ADMIN — POLYETHYLENE GLYCOL 3350 17 GRAM(S): 17 POWDER, FOR SOLUTION ORAL at 11:36

## 2020-05-14 RX ADMIN — HEPARIN SODIUM 5000 UNIT(S): 5000 INJECTION INTRAVENOUS; SUBCUTANEOUS at 16:04

## 2020-05-14 NOTE — PROGRESS NOTE ADULT - SUBJECTIVE AND OBJECTIVE BOX
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 The patient was subsequently transferred to [facility] where the patient underwent lead extraction on 2017. A Medtronic Micra Leadless PPM was implanted on 17 but had intermittent loss of capture. The patient is s/p LCW Medtronic single-chamber PPM implantation on 2017 who presented to the ED c/o increased abd distension and urinary retention.  He reports difficulty urinating for a few days and "inflammation" and discomfort in the suprapubic area.    Patient does give hx of SOB on exertion , with elevated blood pressure , patient does have chest discomfort at pacemaker site which is not associated with any other symptoms . patient is not compliant to recommendation , have seen me in office last dec 2018 , patient was explained via   nurse who speaks Burmese     20: V paced w/ afib on tele, brief episode 8 beats of WCT cannot exclude NSVT.  spoke w/ pt via  - reports multiple episodes of chest discomfort 2 days prior to admission possibly related to exertion.  Poor, rambling historian.  20: V paced. No chest pain, no shortness of breath.     Surg hx:  L side hernia repair    Fam Hx  :  Father MI at age 82  Brother MI at age 67 (11 May 2020 21:00)      PAST MEDICAL & SURGICAL HISTORY:  HTN (hypertension)  Coronary artery disease involving native heart with angina pectoris, unspecified vessel or lesion type  LV dysfunction  LBBB (left bundle branch block)  Hypokinesis: mild global hypokinesis  Pacemaker: single chamber meditronic  placed by Dr Tomas  May  of  2016  Fatigue  AF (atrial fibrillation)  Cardiac pacemaker: Refer to [facility]      SOCIAL HISTORY:    FAMILY HISTORY:      ALLERGIES:  Allergies    ACE inhibitors (Other)  amiodarone (Other)    Intolerances        MEDICATIONS:    MEDICATIONS  (STANDING):  aspirin enteric coated 81 milliGRAM(s) Oral daily  atorvastatin 20 milliGRAM(s) Oral at bedtime  carvedilol 6.25 milliGRAM(s) Oral every 12 hours  chlorthalidone 25 milliGRAM(s) Oral daily  heparin   Injectable 5000 Unit(s) SubCutaneous every 8 hours  polyethylene glycol 3350 17 Gram(s) Oral daily  regadenoson Injectable 0.4 milliGRAM(s) IV Push once  sacubitril 24 mG/valsartan 26 mG 1 Tablet(s) Oral two times a day  tamsulosin 0.4 milliGRAM(s) Oral at bedtime    MEDICATIONS  (PRN):  acetaminophen   Tablet .. 650 milliGRAM(s) Oral every 6 hours PRN Mild Pain (1 - 3)        Vital Signs Last 24 Hrs  T(C): 36.6 (14 May 2020 11:51), Max: 37.9 (13 May 2020 16:27)  T(F): 97.8 (14 May 2020 11:51), Max: 100.3 (13 May 2020 16:27)  HR: 60 (14 May 2020 11:51) (60 - 60)  BP: 144/75 (14 May 2020 11:51) (132/74 - 153/80)  BP(mean): --  RR: 18 (14 May 2020 11:51) (9 - 18)  SpO2: 97% (14 May 2020 11:51) (95% - 97%)Daily     Daily Weight in k.3 (14 May 2020 09:48)I&O's Summary    13 May 2020 07:01  -  14 May 2020 07:00  --------------------------------------------------------  IN: 0 mL / OUT: 3000 mL / NET: -3000 mL        PHYSICAL EXAM:    Constitutional: NAD, awake and alert, well-developed  HEENT: PERR, EOMI,  No oral cyananosis.  Neck:  supple,  No JVD  Respiratory: Breath sounds are clear bilaterally, No wheezing, rales or rhonchi  Cardiovascular: S1 and S2, regular rate and rhythm, no Murmurs, gallops or rubs  Gastrointestinal: Bowel Sounds present, soft, nontender.   Extremities: No peripheral edema. No clubbing or cyanosis.  Vascular: 2+ peripheral pulses  Neurological: A/O x 3, no focal deficits  Musculoskeletal: no calf tenderness.  Skin: No rashes.      LABS: All Labs Reviewed:                        14.2   10.92 )-----------( 138      ( 14 May 2020 12:29 )             44.1                         14.2   11.18 )-----------( 132      ( 13 May 2020 07:53 )             44.1                         14.5   10.84 )-----------( 132      ( 11 May 2020 15:58 )             44.5     13 May 2020 07:53    139    |  102    |  21     ----------------------------<  103    4.4     |  31     |  1.39   12 May 2020 07:59    139    |  103    |  25     ----------------------------<  80     4.3     |  29     |  1.38   11 May 2020 15:58    137    |  100    |  29     ----------------------------<  107    5.1     |  27     |  1.73     Ca    8.4        13 May 2020 07:53  Ca    8.5        12 May 2020 07:59  Ca    9.5        11 May 2020 15:58  Mg     2.0       13 May 2020 07:53    TPro  6.9    /  Alb  4.0    /  TBili  4.3    /  DBili  x      /  AST  76     /  ALT  66     /  AlkPhos  109    11 May 2020 15:58      CARDIAC MARKERS ( 13 May 2020 21:37 )  0.210 ng/mL / x     / x     / x     / x          Blood Culture: Organism --  Gram Stain Blood -- Gram Stain --  Specimen Source .Urine None  Culture-Blood --       @ 15:58  Pro Bnp [id number]        RADIOLOGY/EKG:      ECHO/CARDIAC CATHTERIZATION/STRESS TEST:  < from: NM Nuclear Stress Pharmacologic Multiple (20 @ 10:28) >  IMPRESSION: Non-gated SPECT Myocardial Perfusion Imaging demonstrates:    Enlarged left ventricle with fixed perfusion defects in the inferior and inferolateral wall, not significantly changed from the previous study of 2017.    No scan evidence of reversible perfusion defects.    Please refer to cardiac stress test report for dosage of Regadenoson administered, EKG findings and symptoms during the procedure.                ANDRES CHATTERJEE M.D., ATTENDING RADIOLOGIST  This document has been electronically signed. May 14 2020 11:22AM          < end of copied text >

## 2020-05-14 NOTE — PROGRESS NOTE ADULT - SUBJECTIVE AND OBJECTIVE BOX
Patient is a 74y Male whom presented to the hospital with PMHx of Afib, tachy-yair syndrome s/p pacemaker, HTN, non-obstructive CAD, Systolic HF (EF 2017 25-30%) with recurrent admissions for decompensated HF, who presented to the ED c/o increased abd distension and urinary retention, renal evaluation of ELIS. Seo inserted, maintained and noted with urinary retention.  He reports difficulty urinating for a few days and  discomfort. Has not seen urology in past, denies seo.      today no new events     PAST MEDICAL & SURGICAL HISTORY:  HTN (hypertension)  Coronary artery disease involving native heart with angina pectoris, unspecified vessel or lesion type  LV dysfunction  LBBB (left bundle branch block)  Hypokinesis: mild global hypokinesis  Pacemaker: single chamber meditronic  placed by Dr Tomas  May  of  2016  Fatigue  AF (atrial fibrillation)  Cardiac pacemaker: Refer to [facility]    MEDICATIONS  (STANDING):  aspirin enteric coated 81 milliGRAM(s) Oral daily  atorvastatin 20 milliGRAM(s) Oral at bedtime  carvedilol 6.25 milliGRAM(s) Oral every 12 hours  chlorthalidone 25 milliGRAM(s) Oral daily  heparin   Injectable 5000 Unit(s) SubCutaneous every 8 hours  polyethylene glycol 3350 17 Gram(s) Oral daily  regadenoson Injectable 0.4 milliGRAM(s) IV Push once  sacubitril 24 mG/valsartan 26 mG 1 Tablet(s) Oral two times a day  tamsulosin 0.4 milliGRAM(s) Oral at bedtime    MEDICATIONS  (PRN):  acetaminophen   Tablet .. 650 milliGRAM(s) Oral every 6 hours PRN Mild Pain (1 - 3)      Allergies    ACE inhibitors (Other)  amiodarone (Other)    Intolerances        SOCIAL HISTORY:  no etoh/cigg reported  FAMILY HISTORY:  no renal disease known        Vital Signs Last 24 Hrs  T(C): 36.6 (14 May 2020 11:51), Max: 37.9 (13 May 2020 16:27)  T(F): 97.8 (14 May 2020 11:51), Max: 100.3 (13 May 2020 16:27)  HR: 60 (14 May 2020 11:51) (60 - 60)  BP: 144/75 (14 May 2020 11:51) (132/74 - 153/80)  BP(mean): --  RR: 18 (14 May 2020 11:51) (9 - 18)  SpO2: 97% (14 May 2020 11:51) (95% - 97%)    I&O's Detail    13 May 2020 07:01  -  14 May 2020 07:00  --------------------------------------------------------  IN:  Total IN: 0 mL    OUT:    Indwelling Catheter - Urethral: 1000 mL    Voided: 2000 mL  Total OUT: 3000 mL    Total NET: -3000 mL        LABS:    139    |  103    |  19     ----------------------------<  101       14 May 2020 12:29  3.7     |  30     |  1.12     139    |  102    |  21     ----------------------------<  103       13 May 2020 07:53  4.4     |  31     |  1.39     139    |  103    |  25     ----------------------------<  80        12 May 2020 07:59  4.3     |  29     |  1.38     Ca    8.7        14 May 2020 12:29  Ca    8.4        13 May 2020 07:53    Phos  3.1       14 May 2020 12:29      Urine Studies:  Urinalysis Basic - ( 11 May 2020 14:36 )    Color: Yellow / Appearance: Clear / S.010 / pH: x  Gluc: x / Ketone: Negative  / Bili: Negative / Urobili: Negative mg/dL   Blood: x / Protein: 30 mg/dL / Nitrite: Negative   Leuk Esterase: Negative / RBC: 0-2 /HPF / WBC Negative   Sq Epi: x / Non Sq Epi: Negative / Bacteria: Negative        RADIOLOGY & ADDITIONAL STUDIES:

## 2020-05-14 NOTE — PROGRESS NOTE ADULT - PROBLEM SELECTOR PLAN 2
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  Cont. coreg, confirm w/ pt  no h/o anaphylactic reaction on entresto if not restart w/ careful monitoring of cr.

## 2020-05-14 NOTE — PROGRESS NOTE ADULT - SUBJECTIVE AND OBJECTIVE BOX
CHIEF COMPLAINT/DIAGNOSIS: ARF / Urinary Retention / HF / Afib    HPI: 75 y/o male w/ PMHx of Afib, tachy-yair syndrome s/p pacemaker, HTN, non-obstructive CAD, Systolic HF (EF 2017 25-30%) with recurrent admissions for decompensated HF, who presented to the ED c/o increased abd distension and urinary retention.  He reports difficulty urinating for a few days and "inflammation" and discomfort in the suprapubic area. Pt reports he takes a little pill with a heart on it, but no other medications.  Pt denies chest pain/SOB, no fever/chills, hematuria.    SUBJECTIVE:  5/12 - c/o of +abdominal tenderness - improving. feeling a little better. denies cp or palp, sob or cough.   # [id number]   5/13 - c/o + constipation, c/o CP prior to admission with exertion, patient reports no allergies causing SOB or anaphylaxis, does not remember his allergies. -  phone used.   5/14 -       REVIEW OF SYSTEMS:  All other review of systems is negative unless indicated above      Vital Signs Last 24 Hrs  T(C): 36.6 (14 May 2020 11:51), Max: 37.9 (13 May 2020 16:27)  T(F): 97.8 (14 May 2020 11:51), Max: 100.3 (13 May 2020 16:27)  HR: 60 (14 May 2020 11:51) (60 - 60)  BP: 144/75 (14 May 2020 11:51) (132/74 - 153/80)  BP(mean): --  RR: 18 (14 May 2020 11:51) (9 - 18)  SpO2: 97% (14 May 2020 11:51) (95% - 97%) - room air         PHYSICAL EXAM:  Constitutional: Awake and alert, well-developed  HEENT: PERR, EOMI, Normal Hearing, MMM  Neck: Soft and supple, No LAD, No JVD  Respiratory: Breath sounds are clear bilaterally, No wheezing, rales or rhonchi  Cardiovascular: S1 and S2, regular rate and rhythm, no Murmurs, gallops or rubs  Gastrointestinal/ : Bowel Sounds present, soft, nontender, nondistended, no guarding, no rebound - Powers   Extremities: No peripheral edema  Vascular: 2+ peripheral pulses  Neurological: A/O x 3, no focal deficits  Musculoskeletal: 5/5 strength b/l upper and lower extremities  Skin: No rashes      LABS: All Labs Reviewed:                        14.2   11.18 )-----------( 132      ( 13 May 2020 07:53 )             44.1     05-13    139  |  102  |  21  ----------------------------<  103<H>  4.4   |  31  |  1.39<H>    Ca    8.4<L>      13 May 2020 07:53  Mg     2.0     05-13        CARDIAC MARKERS ( 13 May 2020 21:37 )  0.210 ng/mL / x     / x     / x     / x            Blood Culture: 05-11 @ 14:36  Organism --  Gram Stain Blood -- Gram Stain --  Specimen Source .Urine None  Culture-Blood --      LABS:                  COVID-19 PCR: NotDetec:  (05.11.20 @ 16:51)        RADIOLOGY:  < from: US Kidney and Bladder (05.11.20 @ 17:24) >  IMPRESSION:   No evidence of calculus or hydronephrosis  Echogenic kidneys suggest medical renal disease.  Bladder was not evaluated as it was nondistended in the presence of a Powers catheter  < end of copied text >      < from: Xray Chest 1 View- PORTABLE-Urgent (05.11.20 @ 16:24) >  IMPRESSION:  Clear lungs.  < end of copied text >          ECHOCARDIOGRAM:  < from: Transthoracic Echocardiogram (04.18.17 @ 09:21) >   Impression     Summary     No evidence of pericardial effusion.   Moderate to severe (3+) mitral regurgitation is present.   Fibrocalcific changes noted to the aortic valve leaflets with preserved   excursion.   Mild (1+) aortic regurgitation is present.   Moderate (2+) tricuspid valve regurgitation is present.   The left ventricle cavity is mildly dilated.   Mild concentric left ventricular hypertrophy is present.   Estimated left ventricular ejection fraction is 25-30%.   Severe, diffuse hypokinesis of the left ventricle is present.   The right atrium appears mildly dilated.   A device wire is seen in the RV and RA.   Left to right shunt across the atrial septum is noted on color doppler    < end of copied text >      < from: Transthoracic Echocardiogram Follow Up (05.12.20 @ 11:41) >   Impression   Summary   The left ventricle cavity is mildly dilated.   Mild concentric left ventricular hypertrophy is present.   Estimated left ventricular ejection fraction is 40-45 %.   The left atrium is moderately dilated.   The right atrium appears mildly dilated.   A device wire is seen in the RV and RA.   A PFO is noted with color doppler.   Normal appearing right ventricle structure and function.   Mild aortic sclerosis is present with normal valvular opening.   Mild (1+) aortic regurgitation is present.   Moderate to severe (3+) mitral regurgitation is present.   Mild mitral annular calcification is present.   Moderate (2+) tricuspid valve regurgitation is present.   The IVC is dilated with decreased respiratory variation    < end of copied text >        MEDICATIONS  (STANDING):  aspirin enteric coated 81 milliGRAM(s) Oral daily  atorvastatin 20 milliGRAM(s) Oral at bedtime  carvedilol 6.25 milliGRAM(s) Oral every 12 hours  chlorthalidone 25 milliGRAM(s) Oral daily  heparin   Injectable 5000 Unit(s) SubCutaneous every 8 hours  polyethylene glycol 3350 17 Gram(s) Oral daily  regadenoson Injectable 0.4 milliGRAM(s) IV Push once  sacubitril 24 mG/valsartan 26 mG 1 Tablet(s) Oral two times a day  tamsulosin 0.4 milliGRAM(s) Oral at bedtime    MEDICATIONS  (PRN):  acetaminophen   Tablet .. 650 milliGRAM(s) Oral every 6 hours PRN Mild Pain (1 - 3)        TELEMETRY REVIEW:  5/12 - Underlying Afib, V-Paced 60s   5/13 - Underlying Afib, V-Paced 60s  5/14 - Underlying Afib, V-Paced 60s          ASSESSMENT AND PLAN:    1)  Acute renal failure due to urinary retention  - s/p Powers  - Started on Flomax   - 1L bolus in ED, repeat BMP - improving    - renal US no stone or hydronephrosis  - renal consult - ok to start acei/arb   - urology consult appreciated -  voiding trial in AM - outpt. f/u with [facility]    2) Elevated Troponin, CAD hx  - pt. w/ c/o CP on exertion prior to admission  - tele monitoring- tele review as above  - troponin mildly elevated x2 / ECG w/o ST changes   - cont. ASA / statin   - Echo EF improved now 45%, mod to severe MR  - Cardio f/u appreciated  5/14 - Stress Test ~ Not significantly changed from the previous study in 2017    3) Chronic systolic CHF  - pt. euvolemic, no evidence of acute overload  - patient non-compliant with medications, has not followed up with cardiologist in over 1 year  - echo 2017 EF 25-30% / repeat echo EF 40-45% now ; mod to severe MR, +PFO  - started coreg  - d/w patient, unaware of allergies with resultant anaphylaxis, will resume entresto (was on outpatient in 2018)  - Cont. diuretics   - low sodium diet / I&Os / daily weight  - Cardio f/u appreciated     4) Fever, unknown origin  - Repeat CXR, COVID PCR  - Lactate, BCX x2  - UA    5) Chronic Afib, PPM  - patient with underlying Afib   - Cont. BB    - PPM interrogated ~ revealing two brief episodes of NSVT in December of 2019.  Recommend routine follow up every three months.  - cont. Xarelto for stroke ppx  - EP f/u appreciated     6) Elevated AST  - likely fatty liver disease, denies alcohol use.    - Lipid panel pending -- wnl    7) DVT PPX  - Xarelto CHIEF COMPLAINT/DIAGNOSIS: ARF / Urinary Retention / HF / Afib    HPI: 75 y/o male w/ PMHx of Afib, tachy-yair syndrome s/p pacemaker, HTN, non-obstructive CAD, Systolic HF (EF 2017 25-30%) with recurrent admissions for decompensated HF, who presented to the ED c/o increased abd distension and urinary retention.  He reports difficulty urinating for a few days and "inflammation" and discomfort in the suprapubic area. Pt reports he takes a little pill with a heart on it, but no other medications.  Pt denies chest pain/SOB, no fever/chills, hematuria.    SUBJECTIVE:  5/12 - c/o of +abdominal tenderness - improving. feeling a little better. denies cp or palp, sob or cough.   # [id number]   5/13 - c/o + constipation, c/o CP prior to admission with exertion, patient reports no allergies causing SOB or anaphylaxis, does not remember his allergies. -  phone used.   5/14 - no complaints. denies cough, sob, fevers.  # [id number]      REVIEW OF SYSTEMS:  All other review of systems is negative unless indicated above      Vital Signs Last 24 Hrs  T(C): 36.6 (14 May 2020 11:51), Max: 37.9 (13 May 2020 16:27)  T(F): 97.8 (14 May 2020 11:51), Max: 100.3 (13 May 2020 16:27)  HR: 60 (14 May 2020 11:51) (60 - 60)  BP: 144/75 (14 May 2020 11:51) (132/74 - 153/80)  BP(mean): --  RR: 18 (14 May 2020 11:51) (9 - 18)  SpO2: 97% (14 May 2020 11:51) (95% - 97%) - room air       PHYSICAL EXAM:  Constitutional: Awake and alert, well-developed  HEENT: PERR, EOMI, Normal Hearing, MMM  Neck: Soft and supple, No LAD, No JVD  Respiratory: Breath sounds are clear bilaterally, No wheezing, rales or rhonchi  Cardiovascular: S1 and S2, regular rate and rhythm, no Murmurs, gallops or rubs  Gastrointestinal/ : Bowel Sounds present, soft, nontender, nondistended, no guarding, no rebound - Powers   Extremities: No peripheral edema  Vascular: 2+ peripheral pulses  Neurological: A/O x 3, no focal deficits  Musculoskeletal: 5/5 strength b/l upper and lower extremities  Skin: No rashes      LABS: All Labs Reviewed:                        14.2   10.92 )-----------( 138      ( 14 May 2020 12:29 )             44.1     05-14    139  |  103  |  19  ----------------------------<  101<H>  3.7   |  30  |  1.12    Ca    8.7      14 May 2020 12:29  Phos  3.1     05-14  Mg     2.0     05-13    TPro  x   /  Alb  3.3  /  TBili  x   /  DBili  x   /  AST  x   /  ALT  x   /  AlkPhos  x   05-14      CARDIAC MARKERS ( 13 May 2020 21:37 )  0.210 ng/mL / x     / x     / x     / x            COVID-19 PCR: NotDetec:  (05.11.20 @ 16:51)        RADIOLOGY:  < from: US Kidney and Bladder (05.11.20 @ 17:24) >  IMPRESSION:   No evidence of calculus or hydronephrosis  Echogenic kidneys suggest medical renal disease.  Bladder was not evaluated as it was nondistended in the presence of a Powers catheter  < end of copied text >      < from: Xray Chest 1 View- PORTABLE-Urgent (05.11.20 @ 16:24) >  IMPRESSION:  Clear lungs.  < end of copied text >          ECHOCARDIOGRAM:  < from: Transthoracic Echocardiogram (04.18.17 @ 09:21) >   Impression     Summary     No evidence of pericardial effusion.   Moderate to severe (3+) mitral regurgitation is present.   Fibrocalcific changes noted to the aortic valve leaflets with preserved   excursion.   Mild (1+) aortic regurgitation is present.   Moderate (2+) tricuspid valve regurgitation is present.   The left ventricle cavity is mildly dilated.   Mild concentric left ventricular hypertrophy is present.   Estimated left ventricular ejection fraction is 25-30%.   Severe, diffuse hypokinesis of the left ventricle is present.   The right atrium appears mildly dilated.   A device wire is seen in the RV and RA.   Left to right shunt across the atrial septum is noted on color doppler    < end of copied text >      < from: Transthoracic Echocardiogram Follow Up (05.12.20 @ 11:41) >   Impression   Summary   The left ventricle cavity is mildly dilated.   Mild concentric left ventricular hypertrophy is present.   Estimated left ventricular ejection fraction is 40-45 %.   The left atrium is moderately dilated.   The right atrium appears mildly dilated.   A device wire is seen in the RV and RA.   A PFO is noted with color doppler.   Normal appearing right ventricle structure and function.   Mild aortic sclerosis is present with normal valvular opening.   Mild (1+) aortic regurgitation is present.   Moderate to severe (3+) mitral regurgitation is present.   Mild mitral annular calcification is present.   Moderate (2+) tricuspid valve regurgitation is present.   The IVC is dilated with decreased respiratory variation    < end of copied text >        MEDICATIONS  (STANDING):  aspirin enteric coated 81 milliGRAM(s) Oral daily  atorvastatin 20 milliGRAM(s) Oral at bedtime  carvedilol 6.25 milliGRAM(s) Oral every 12 hours  chlorthalidone 25 milliGRAM(s) Oral daily  heparin   Injectable 5000 Unit(s) SubCutaneous every 8 hours  polyethylene glycol 3350 17 Gram(s) Oral daily  regadenoson Injectable 0.4 milliGRAM(s) IV Push once  sacubitril 24 mG/valsartan 26 mG 1 Tablet(s) Oral two times a day  tamsulosin 0.4 milliGRAM(s) Oral at bedtime    MEDICATIONS  (PRN):  acetaminophen   Tablet .. 650 milliGRAM(s) Oral every 6 hours PRN Mild Pain (1 - 3)        TELEMETRY REVIEW:  5/12 - Underlying Afib, V-Paced 60s   5/13 - Underlying Afib, V-Paced 60s  5/14 - Underlying Afib, V-Paced 60s          ASSESSMENT AND PLAN:    1)  Acute renal failure due to urinary retention  - s/p Powers  - Started on Flomax   - 1L bolus in ED, repeat BMP - improving    - renal US no stone or hydronephrosis  - renal consult - ok to start acei/arb   - urology consult appreciated -  voiding trial in AM - outpt. f/u with [facility]    2) Elevated Troponin, CAD hx  - pt. w/ c/o CP on exertion prior to admission  - tele monitoring- tele review as above  - troponin mildly elevated x2 / ECG w/o ST changes   - cont. ASA / statin   - Echo EF improved now 45%, mod to severe MR  - Cardio f/u appreciated  5/14 - Stress Test ~ Not significantly changed from the previous study in 2017    3) Chronic systolic CHF  - pt. euvolemic, no evidence of acute overload  - patient non-compliant with medications, has not followed up with cardiologist in over 1 year  - echo 2017 EF 25-30% / repeat echo EF 40-45% now ; mod to severe MR, +PFO  - started coreg  - d/w patient, unaware of allergies with resultant anaphylaxis, will resume entresto (was on outpatient in 2018)  - Cont. diuretics   - low sodium diet / I&Os / daily weight  - Cardio f/u appreciated     4) Fever, unknown origin  - Repeat CXR, COVID PCR  - Lactate, BCX x2  - UA    5) Chronic Afib, PPM  - patient with underlying Afib   - Cont. BB    - PPM interrogated ~ revealing two brief episodes of NSVT in December of 2019.  Recommend routine follow up every three months.  - cont. Xarelto for stroke ppx  - EP f/u appreciated     6) Elevated AST  - likely fatty liver disease, denies alcohol use.    - Lipid panel pending -- wnl    7) DVT PPX  - Xarelto CHIEF COMPLAINT/DIAGNOSIS: ARF / Urinary Retention / HF / Afib    HPI: 75 y/o male w/ PMHx of Afib, tachy-yair syndrome s/p pacemaker, HTN, non-obstructive CAD, Systolic HF (EF 2017 25-30%) with recurrent admissions for decompensated HF, who presented to the ED c/o increased abd distension and urinary retention.  He reports difficulty urinating for a few days and "inflammation" and discomfort in the suprapubic area. Pt reports he takes a little pill with a heart on it, but no other medications.  Pt denies chest pain/SOB, no fever/chills, hematuria.    SUBJECTIVE:  5/12 - c/o of +abdominal tenderness - improving. feeling a little better. denies cp or palp, sob or cough.   # [id number]   5/13 - c/o + constipation, c/o CP prior to admission with exertion, patient reports no allergies causing SOB or anaphylaxis, does not remember his allergies. -  phone used.   5/14 - no complaints. denies cough, sob, fevers.  # [id number]      REVIEW OF SYSTEMS:  All other review of systems is negative unless indicated above      Vital Signs Last 24 Hrs  T(C): 36.6 (14 May 2020 11:51), Max: 37.9 (13 May 2020 16:27)  T(F): 97.8 (14 May 2020 11:51), Max: 100.3 (13 May 2020 16:27)  HR: 60 (14 May 2020 11:51) (60 - 60)  BP: 144/75 (14 May 2020 11:51) (132/74 - 153/80)  BP(mean): --  RR: 18 (14 May 2020 11:51) (9 - 18)  SpO2: 97% (14 May 2020 11:51) (95% - 97%) - room air       PHYSICAL EXAM:  Constitutional: Awake and alert, well-developed  HEENT: PERR, EOMI, Normal Hearing, MMM  Neck: Soft and supple, No LAD, No JVD  Respiratory: Breath sounds are clear bilaterally, No wheezing, rales or rhonchi  Cardiovascular: S1 and S2, regular rate and rhythm, no Murmurs, gallops or rubs  Gastrointestinal/ : Bowel Sounds present, soft, nontender, nondistended, no guarding, no rebound - Powers   Extremities: No peripheral edema  Vascular: 2+ peripheral pulses  Neurological: A/O x 3, no focal deficits  Musculoskeletal: 5/5 strength b/l upper and lower extremities  Skin: No rashes      LABS: All Labs Reviewed:                        14.2   10.92 )-----------( 138      ( 14 May 2020 12:29 )             44.1     05-14    139  |  103  |  19  ----------------------------<  101<H>  3.7   |  30  |  1.12    Ca    8.7      14 May 2020 12:29  Phos  3.1     05-14  Mg     2.0     05-13    TPro  x   /  Alb  3.3  /  TBili  x   /  DBili  x   /  AST  x   /  ALT  x   /  AlkPhos  x   05-14      CARDIAC MARKERS ( 13 May 2020 21:37 )  0.210 ng/mL / x     / x     / x     / x            COVID-19 PCR: NotDetec:  (05.11.20 @ 16:51)        RADIOLOGY:  < from: US Kidney and Bladder (05.11.20 @ 17:24) >  IMPRESSION:   No evidence of calculus or hydronephrosis  Echogenic kidneys suggest medical renal disease.  Bladder was not evaluated as it was nondistended in the presence of a Powers catheter  < end of copied text >      < from: Xray Chest 1 View- PORTABLE-Urgent (05.11.20 @ 16:24) >  IMPRESSION:  Clear lungs.  < end of copied text >          ECHOCARDIOGRAM:  < from: Transthoracic Echocardiogram (04.18.17 @ 09:21) >   Impression     Summary     No evidence of pericardial effusion.   Moderate to severe (3+) mitral regurgitation is present.   Fibrocalcific changes noted to the aortic valve leaflets with preserved   excursion.   Mild (1+) aortic regurgitation is present.   Moderate (2+) tricuspid valve regurgitation is present.   The left ventricle cavity is mildly dilated.   Mild concentric left ventricular hypertrophy is present.   Estimated left ventricular ejection fraction is 25-30%.   Severe, diffuse hypokinesis of the left ventricle is present.   The right atrium appears mildly dilated.   A device wire is seen in the RV and RA.   Left to right shunt across the atrial septum is noted on color doppler    < end of copied text >      < from: Transthoracic Echocardiogram Follow Up (05.12.20 @ 11:41) >   Impression   Summary   The left ventricle cavity is mildly dilated.   Mild concentric left ventricular hypertrophy is present.   Estimated left ventricular ejection fraction is 40-45 %.   The left atrium is moderately dilated.   The right atrium appears mildly dilated.   A device wire is seen in the RV and RA.   A PFO is noted with color doppler.   Normal appearing right ventricle structure and function.   Mild aortic sclerosis is present with normal valvular opening.   Mild (1+) aortic regurgitation is present.   Moderate to severe (3+) mitral regurgitation is present.   Mild mitral annular calcification is present.   Moderate (2+) tricuspid valve regurgitation is present.   The IVC is dilated with decreased respiratory variation    < end of copied text >        MEDICATIONS  (STANDING):  aspirin enteric coated 81 milliGRAM(s) Oral daily  atorvastatin 20 milliGRAM(s) Oral at bedtime  carvedilol 6.25 milliGRAM(s) Oral every 12 hours  chlorthalidone 25 milliGRAM(s) Oral daily  heparin   Injectable 5000 Unit(s) SubCutaneous every 8 hours  polyethylene glycol 3350 17 Gram(s) Oral daily  regadenoson Injectable 0.4 milliGRAM(s) IV Push once  sacubitril 24 mG/valsartan 26 mG 1 Tablet(s) Oral two times a day  tamsulosin 0.4 milliGRAM(s) Oral at bedtime    MEDICATIONS  (PRN):  acetaminophen   Tablet .. 650 milliGRAM(s) Oral every 6 hours PRN Mild Pain (1 - 3)        TELEMETRY REVIEW:  5/12 - Underlying Afib, V-Paced 60s   5/13 - Underlying Afib, V-Paced 60s  5/14 - Underlying Afib, V-Paced 60s          ASSESSMENT AND PLAN:    1)  Acute renal failure due to urinary retention  - s/p Powers  - Started on Flomax   - 1L bolus in ED, repeat BMP - improving    - renal US no stone or hydronephrosis  - renal consult - ok to start acei/arb   - urology consult appreciated -  voiding trial in AM - outpt. f/u with [facility]    2) Elevated Troponin, CAD hx  - pt. w/ c/o CP on exertion prior to admission  - tele monitoring- tele review as above  - troponin mildly elevated x2 / ECG w/o ST changes   - cont. ASA / statin   - Echo EF improved now 45%, mod to severe MR  - Cardio f/u appreciated  5/14 - Stress Test ~ Not significantly changed from the previous study in 2017    3) Chronic systolic CHF  - pt. euvolemic, no evidence of acute overload  - patient non-compliant with medications, has not followed up with cardiologist in over 1 year  - echo 2017 EF 25-30% / repeat echo EF 40-45% now ; mod to severe MR, +PFO  - started coreg  - d/w patient, unaware of allergies with resultant anaphylaxis, will resume entresto (was on outpatient in 2018)  - Cont. diuretics   - low sodium diet / I&Os / daily weight  - Cardio f/u appreciated     4) Fever, unknown origin, leukocytosis   - Repeat CXR, COVID PCR  - Lactate, BCX x2  - UA    5) Chronic Afib, PPM  - patient with underlying Afib   - Cont. BB    - PPM interrogated ~ revealing two brief episodes of NSVT in December of 2019.  Recommend routine follow up every three months.  - cont. Xarelto for stroke ppx  - EP f/u appreciated     6) Elevated AST  - likely fatty liver disease, denies alcohol use.    - Lipid panel pending -- wnl    7) DVT PPX  - Xarelto CHIEF COMPLAINT/DIAGNOSIS: ARF / Urinary Retention / HF / Afib    HPI: 75 y/o male w/ PMHx of Afib, tachy-yair syndrome s/p pacemaker, HTN, non-obstructive CAD, Systolic HF (EF 2017 25-30%) with recurrent admissions for decompensated HF, who presented to the ED c/o increased abd distension and urinary retention.  He reports difficulty urinating for a few days and "inflammation" and discomfort in the suprapubic area. Pt reports he takes a little pill with a heart on it, but no other medications.  Pt denies chest pain/SOB, no fever/chills, hematuria.    SUBJECTIVE:  5/12 - c/o of +abdominal tenderness - improving. feeling a little better. denies cp or palp, sob or cough.   # [id number]   5/13 - c/o + constipation, c/o CP prior to admission with exertion, patient reports no allergies causing SOB or anaphylaxis, does not remember his allergies. -  phone used.   5/14 - no complaints. denies cough, sob, fevers.  # [id number]      REVIEW OF SYSTEMS:  All other review of systems is negative unless indicated above      Vital Signs Last 24 Hrs  Vital Signs Last 24 Hrs  T(C): 36.6 (14 May 2020 11:51), Max: 37.9 (13 May 2020 16:27)  T(F): 97.8 (14 May 2020 11:51), Max: 100.3 (13 May 2020 16:27)  HR: 60 (14 May 2020 11:51) (60 - 60)  BP: 144/75 (14 May 2020 11:51) (132/74 - 153/80)  BP(mean): --  RR: 18 (14 May 2020 11:51) (9 - 18)  SpO2: 97% (14 May 2020 11:51) (95% - 97%)  T(C): 36.6 (14 May 2020 11:51), Max: 37.9 (13 May 2020 16:27)  T(F): 97.8 (14 May 2020 11:51), Max: 100.3 (13 May 2020 16:27)  HR: 60 (14 May 2020 11:51) (60 - 60)  BP: 144/75 (14 May 2020 11:51) (132/74 - 153/80)  BP(mean): --  RR: 18 (14 May 2020 11:51) (9 - 18)  SpO2: 97% (14 May 2020 11:51) (95% - 97%) - room air       PHYSICAL EXAM:  Constitutional: Awake and alert, well-developed  HEENT: PERR, EOMI, Normal Hearing, MMM  Neck: Soft and supple, No LAD, No JVD  Respiratory: Breath sounds are clear bilaterally, No wheezing, rales or rhonchi  Cardiovascular: S1 and S2, regular rate and rhythm, no Murmurs, gallops or rubs  Gastrointestinal/ : Bowel Sounds present, soft, nontender, nondistended, no guarding, no rebound - Powers   Extremities: No peripheral edema  Vascular: 2+ peripheral pulses  Neurological: A/O x 3, no focal deficits  Musculoskeletal: 5/5 strength b/l upper and lower extremities  Skin: No rashes      LABS: All Labs Reviewed:                        14.2   10.92 )-----------( 138      ( 14 May 2020 12:29 )             44.1     05-14    139  |  103  |  19  ----------------------------<  101<H>  3.7   |  30  |  1.12    Ca    8.7      14 May 2020 12:29  Phos  3.1     05-14  Mg     2.0     05-13    TPro  x   /  Alb  3.3  /  TBili  x   /  DBili  x   /  AST  x   /  ALT  x   /  AlkPhos  x   05-14      CARDIAC MARKERS ( 13 May 2020 21:37 )  0.210 ng/mL / x     / x     / x     / x            COVID-19 PCR: NotDetec:  (05.11.20 @ 16:51)        RADIOLOGY:  < from: US Kidney and Bladder (05.11.20 @ 17:24) >  IMPRESSION:   No evidence of calculus or hydronephrosis  Echogenic kidneys suggest medical renal disease.  Bladder was not evaluated as it was nondistended in the presence of a Powers catheter  < end of copied text >      < from: Xray Chest 1 View- PORTABLE-Urgent (05.11.20 @ 16:24) >  IMPRESSION:  Clear lungs.  < end of copied text >          ECHOCARDIOGRAM:  < from: Transthoracic Echocardiogram (04.18.17 @ 09:21) >   Impression     Summary     No evidence of pericardial effusion.   Moderate to severe (3+) mitral regurgitation is present.   Fibrocalcific changes noted to the aortic valve leaflets with preserved   excursion.   Mild (1+) aortic regurgitation is present.   Moderate (2+) tricuspid valve regurgitation is present.   The left ventricle cavity is mildly dilated.   Mild concentric left ventricular hypertrophy is present.   Estimated left ventricular ejection fraction is 25-30%.   Severe, diffuse hypokinesis of the left ventricle is present.   The right atrium appears mildly dilated.   A device wire is seen in the RV and RA.   Left to right shunt across the atrial septum is noted on color doppler    < end of copied text >      < from: Transthoracic Echocardiogram Follow Up (05.12.20 @ 11:41) >   Impression   Summary   The left ventricle cavity is mildly dilated.   Mild concentric left ventricular hypertrophy is present.   Estimated left ventricular ejection fraction is 40-45 %.   The left atrium is moderately dilated.   The right atrium appears mildly dilated.   A device wire is seen in the RV and RA.   A PFO is noted with color doppler.   Normal appearing right ventricle structure and function.   Mild aortic sclerosis is present with normal valvular opening.   Mild (1+) aortic regurgitation is present.   Moderate to severe (3+) mitral regurgitation is present.   Mild mitral annular calcification is present.   Moderate (2+) tricuspid valve regurgitation is present.   The IVC is dilated with decreased respiratory variation    < end of copied text >        MEDICATIONS  (STANDING):  aspirin enteric coated 81 milliGRAM(s) Oral daily  atorvastatin 20 milliGRAM(s) Oral at bedtime  carvedilol 6.25 milliGRAM(s) Oral every 12 hours  chlorthalidone 25 milliGRAM(s) Oral daily  heparin   Injectable 5000 Unit(s) SubCutaneous every 8 hours  polyethylene glycol 3350 17 Gram(s) Oral daily  regadenoson Injectable 0.4 milliGRAM(s) IV Push once  sacubitril 24 mG/valsartan 26 mG 1 Tablet(s) Oral two times a day  tamsulosin 0.4 milliGRAM(s) Oral at bedtime    MEDICATIONS  (PRN):  acetaminophen   Tablet .. 650 milliGRAM(s) Oral every 6 hours PRN Mild Pain (1 - 3)        TELEMETRY REVIEW:  5/12 - Underlying Afib, V-Paced 60s   5/13 - Underlying Afib, V-Paced 60s  5/14 - Underlying Afib, V-Paced 60s          ASSESSMENT AND PLAN:    1)  Acute renal failure due to urinary retention  - s/p Powers  - Started on Flomax   - 1L bolus in ED, repeat BMP - improving    - renal US no stone or hydronephrosis  - renal consult - ok to start acei/arb   - urology consult appreciated -  voiding trial in AM - outpt. f/u with [facility]    2) Elevated Troponin, CAD hx  - pt. w/ c/o CP on exertion prior to admission  - tele monitoring- tele review as above  - troponin mildly elevated x2 / ECG w/o ST changes   - cont. ASA / statin   - Echo EF improved now 45%, mod to severe MR  - Cardio f/u appreciated  5/14 - Stress Test ~ Not significantly changed from the previous study in 2017, outpt f/u     3) Chronic diastolic/systolic CHF  - pt. euvolemic, no evidence of acute overload  - patient non-compliant with medications, has not followed up with cardiologist in over 1 year  - echo 2017 EF 25-30% / repeat echo EF 40-45% now ; mod to severe MR, +PFO  - started coreg  - d/w patient, unaware of allergies with resultant anaphylaxis, will resume entresto (was on outpatient in 2018)  - Cont. diuretics   - low sodium diet / I&Os / daily weight  - Cardio f/u appreciated     4) leukocytosis  temp 100.3  - Repeat CXR, COVID PCR  - Lactate, BCX x2   - UA    5) Chronic Afib, PPM  - patient with underlying Afib   - Cont. BB    - PPM interrogated ~ revealing two brief episodes of NSVT in December of 2019.  Recommend routine follow up every three months.  - cont. Xarelto for stroke ppx  - EP f/u appreciated     6) Elevated AST  repeat labs  - likely fatty liver disease, denies alcohol use.    - Lipid panel pending -- wnl    7) DVT PPX  - Xarelto

## 2020-05-15 LAB
ALBUMIN SERPL ELPH-MCNC: 3 G/DL — LOW (ref 3.3–5)
ANION GAP SERPL CALC-SCNC: 6 MMOL/L — SIGNIFICANT CHANGE UP (ref 5–17)
BUN SERPL-MCNC: 19 MG/DL — SIGNIFICANT CHANGE UP (ref 7–23)
CALCIUM SERPL-MCNC: 8.5 MG/DL — SIGNIFICANT CHANGE UP (ref 8.5–10.1)
CHLORIDE SERPL-SCNC: 104 MMOL/L — SIGNIFICANT CHANGE UP (ref 96–108)
CO2 SERPL-SCNC: 30 MMOL/L — SIGNIFICANT CHANGE UP (ref 22–31)
CREAT SERPL-MCNC: 1.09 MG/DL — SIGNIFICANT CHANGE UP (ref 0.5–1.3)
GLUCOSE SERPL-MCNC: 101 MG/DL — HIGH (ref 70–99)
PHOSPHATE SERPL-MCNC: 3.2 MG/DL — SIGNIFICANT CHANGE UP (ref 2.5–4.5)
POTASSIUM SERPL-MCNC: 3.4 MMOL/L — LOW (ref 3.5–5.3)
POTASSIUM SERPL-SCNC: 3.4 MMOL/L — LOW (ref 3.5–5.3)
SODIUM SERPL-SCNC: 140 MMOL/L — SIGNIFICANT CHANGE UP (ref 135–145)

## 2020-05-15 PROCEDURE — 99233 SBSQ HOSP IP/OBS HIGH 50: CPT

## 2020-05-15 RX ORDER — ATORVASTATIN CALCIUM 80 MG/1
1 TABLET, FILM COATED ORAL
Qty: 30 | Refills: 0
Start: 2020-05-15 | End: 2020-06-13

## 2020-05-15 RX ORDER — POTASSIUM CHLORIDE 20 MEQ
1 PACKET (EA) ORAL
Qty: 30 | Refills: 0
Start: 2020-05-15 | End: 2020-06-13

## 2020-05-15 RX ORDER — CHLORTHALIDONE 50 MG
1 TABLET ORAL
Qty: 0 | Refills: 0 | DISCHARGE
Start: 2020-05-15

## 2020-05-15 RX ORDER — POTASSIUM CHLORIDE 20 MEQ
20 PACKET (EA) ORAL ONCE
Refills: 0 | Status: COMPLETED | OUTPATIENT
Start: 2020-05-15 | End: 2020-05-15

## 2020-05-15 RX ORDER — ASPIRIN/CALCIUM CARB/MAGNESIUM 324 MG
1 TABLET ORAL
Qty: 30 | Refills: 0
Start: 2020-05-15 | End: 2020-06-13

## 2020-05-15 RX ORDER — POTASSIUM CHLORIDE 20 MEQ
20 PACKET (EA) ORAL DAILY
Refills: 0 | Status: DISCONTINUED | OUTPATIENT
Start: 2020-05-16 | End: 2020-05-16

## 2020-05-15 RX ORDER — SACUBITRIL AND VALSARTAN 24; 26 MG/1; MG/1
1 TABLET, FILM COATED ORAL
Refills: 0 | Status: DISCONTINUED | OUTPATIENT
Start: 2020-05-15 | End: 2020-05-16

## 2020-05-15 RX ORDER — CARVEDILOL PHOSPHATE 80 MG/1
1 CAPSULE, EXTENDED RELEASE ORAL
Qty: 60 | Refills: 0
Start: 2020-05-15 | End: 2020-06-13

## 2020-05-15 RX ORDER — RIVAROXABAN 15 MG-20MG
20 KIT ORAL
Refills: 0 | Status: DISCONTINUED | OUTPATIENT
Start: 2020-05-15 | End: 2020-05-16

## 2020-05-15 RX ORDER — RIVAROXABAN 15 MG-20MG
15 KIT ORAL
Refills: 0 | Status: DISCONTINUED | OUTPATIENT
Start: 2020-05-15 | End: 2020-05-15

## 2020-05-15 RX ORDER — MAGNESIUM HYDROXIDE 400 MG/1
30 TABLET, CHEWABLE ORAL ONCE
Refills: 0 | Status: COMPLETED | OUTPATIENT
Start: 2020-05-15 | End: 2020-05-15

## 2020-05-15 RX ORDER — RIVAROXABAN 15 MG-20MG
1 KIT ORAL
Qty: 30 | Refills: 0
Start: 2020-05-15 | End: 2020-06-13

## 2020-05-15 RX ORDER — TAMSULOSIN HYDROCHLORIDE 0.4 MG/1
1 CAPSULE ORAL
Qty: 30 | Refills: 0
Start: 2020-05-15 | End: 2020-06-13

## 2020-05-15 RX ADMIN — CARVEDILOL PHOSPHATE 6.25 MILLIGRAM(S): 80 CAPSULE, EXTENDED RELEASE ORAL at 17:08

## 2020-05-15 RX ADMIN — Medication 25 MILLIGRAM(S): at 05:40

## 2020-05-15 RX ADMIN — SACUBITRIL AND VALSARTAN 1 TABLET(S): 24; 26 TABLET, FILM COATED ORAL at 17:08

## 2020-05-15 RX ADMIN — POLYETHYLENE GLYCOL 3350 17 GRAM(S): 17 POWDER, FOR SOLUTION ORAL at 13:50

## 2020-05-15 RX ADMIN — ATORVASTATIN CALCIUM 20 MILLIGRAM(S): 80 TABLET, FILM COATED ORAL at 21:09

## 2020-05-15 RX ADMIN — Medication 20 MILLIEQUIVALENT(S): at 17:08

## 2020-05-15 RX ADMIN — CEFTRIAXONE 1000 MILLIGRAM(S): 500 INJECTION, POWDER, FOR SOLUTION INTRAMUSCULAR; INTRAVENOUS at 17:09

## 2020-05-15 RX ADMIN — HEPARIN SODIUM 5000 UNIT(S): 5000 INJECTION INTRAVENOUS; SUBCUTANEOUS at 05:40

## 2020-05-15 RX ADMIN — SACUBITRIL AND VALSARTAN 1 TABLET(S): 24; 26 TABLET, FILM COATED ORAL at 05:40

## 2020-05-15 RX ADMIN — CARVEDILOL PHOSPHATE 6.25 MILLIGRAM(S): 80 CAPSULE, EXTENDED RELEASE ORAL at 05:40

## 2020-05-15 RX ADMIN — Medication 81 MILLIGRAM(S): at 13:50

## 2020-05-15 RX ADMIN — TAMSULOSIN HYDROCHLORIDE 0.4 MILLIGRAM(S): 0.4 CAPSULE ORAL at 21:09

## 2020-05-15 RX ADMIN — MAGNESIUM HYDROXIDE 30 MILLILITER(S): 400 TABLET, CHEWABLE ORAL at 13:50

## 2020-05-15 RX ADMIN — RIVAROXABAN 20 MILLIGRAM(S): KIT at 17:10

## 2020-05-15 NOTE — PROGRESS NOTE ADULT - SUBJECTIVE AND OBJECTIVE BOX
To [facility] east, no reported events.     MEDICATIONS  (STANDING):  aspirin enteric coated 81 milliGRAM(s) Oral daily  atorvastatin 20 milliGRAM(s) Oral at bedtime  carvedilol 6.25 milliGRAM(s) Oral every 12 hours  cefTRIAXone Injectable. 1000 milliGRAM(s) IV Push every 24 hours  cefTRIAXone Injectable.      chlorthalidone 25 milliGRAM(s) Oral daily  heparin   Injectable 5000 Unit(s) SubCutaneous every 8 hours  polyethylene glycol 3350 17 Gram(s) Oral daily  regadenoson Injectable 0.4 milliGRAM(s) IV Push once  sacubitril 24 mG/valsartan 26 mG 1 Tablet(s) Oral two times a day  tamsulosin 0.4 milliGRAM(s) Oral at bedtime    MEDICATIONS  (PRN):  acetaminophen   Tablet .. 650 milliGRAM(s) Oral every 6 hours PRN Mild Pain (1 - 3)        T(C): , Max: 37.1 (05-15-20 @ 05:35)  T(F): , Max: 98.7 (05-15-20 @ 05:35)  HR: 60 (05-15-20 @ 11:55)  BP: 133/77 (05-15-20 @ 11:55)  BP(mean): --  RR: 18 (05-15-20 @ 11:55)  SpO2: 99% (05-15-20 @ 11:55)  Wt(kg): --     @ 07:  -  05-15 @ 07:00  --------------------------------------------------------  IN: 100 mL / OUT: 1375 mL / NET: -1275 mL    05-15 @ 07:01  -  05-15 @ 12:29  --------------------------------------------------------  IN: 480 mL / OUT: 0 mL / NET: 480 mL          PHYSICAL EXAM:     Constitutional: NAD  MMM  Neck: No LAD, No JVD  obese  alert      LABS:                        14.2   10.92 )-----------( 138      ( 14 May 2020 12:29 )             44.1     15 May 2020 06:33    140    |  104    |  19     ----------------------------<  101    3.4     |  30     |  1.09   14 May 2020 12:29    139    |  103    |  19     ----------------------------<  101    3.7     |  30     |  1.12   13 May 2020 07:53    139    |  102    |  21     ----------------------------<  103    4.4     |  31     |  1.39   12 May 2020 07:59    139    |  103    |  25     ----------------------------<  80     4.3     |  29     |  1.38   11 May 2020 15:58    137    |  100    |  29     ----------------------------<  107    5.1     |  27     |  1.73     Ca    8.5        15 May 2020 06:33  Ca    8.7        14 May 2020 12:29  Ca    8.4        13 May 2020 07:53  Ca    8.5        12 May 2020 07:59  Ca    9.5        11 May 2020 15:58  Phos  3.2       15 May 2020 06:33  Phos  3.1       14 May 2020 12:29  Mg     2.0       13 May 2020 07:53    TPro  x      /  Alb  3.0    /  TBili  x      /  DBili  x      /  AST  x      /  ALT  x      /  AlkPhos  x      15 May 2020 06:33  TPro  6.4    /  Alb  3.3    /  TBili  2.0    /  DBili  0.7    /  AST  61     /  ALT  94     /  AlkPhos  90     14 May 2020 12:29  TPro  6.9    /  Alb  4.0    /  TBili  4.3    /  DBili  x      /  AST  76     /  ALT  66     /  AlkPhos  109    11 May 2020 15:58          Urine Studies:  Urinalysis Basic - ( 14 May 2020 17:35 )    Color: Sheila / Appearance: Slightly Turbid / S.010 / pH: x  Gluc: x / Ketone: Negative  / Bili: Negative / Urobili: 12 mg/dL   Blood: x / Protein: 100 mg/dL / Nitrite: Positive   Leuk Esterase: Moderate / RBC: >50 /HPF / WBC >50   Sq Epi: x / Non Sq Epi: Few / Bacteria: Many            RADIOLOGY & ADDITIONAL STUDIES:

## 2020-05-15 NOTE — PROGRESS NOTE ADULT - PROVIDER SPECIALTY LIST ADULT
Cardiology
Hospitalist
Hospitalist
Nephrology
Cardiology
Cardiology
Hospitalist
Hospitalist

## 2020-05-15 NOTE — PROGRESS NOTE ADULT - ASSESSMENT
74y Male whom presented to the hospital with PMHx of Afib, tachy-yair syndrome s/p pacemaker, HTN, non-obstructive CAD, Systolic HF (EF 2017 25-30%) with recurrent admissions for decompensated HF, who presented to the ED c/o increased abd distension and urinary retention, renal evaluation of ELIS. Powers inserted, maintained and noted with urinary retention.  He reports difficulty urinating for a few days and  discomfort.    ELIS sec to obstructiion w  urinary retention   Cr improved   -Powers   -Flomax  - TOV w  urology fup outpt   -Trend renal function while on Entresto     CM  -No issue with maintenance of oral diuretic  -On tele, [facility] following
74y Male whom presented to the hospital with PMHx of Afib, tachy-yair syndrome s/p pacemaker, HTN, non-obstructive CAD, Systolic HF (EF 2017 25-30%) with recurrent admissions for decompensated HF, who presented to the ED c/o increased abd distension and urinary retention, renal evaluation of ELIS. Powers inserted, maintained and noted with urinary retention.  He reports difficulty urinating for a few days and  discomfort.    ELIS sec to obstructiion w  urinary retention   Cr improved   -Powers, Flomax  urology fup  -Trend renal function while on Entresto, stable  -Monitor and replete K as needed    CM  -No issue with maintenance of oral diuretic, entresto renally tolerated thus far    will sign off, call with any new questions or issues
74y Male whom presented to the hospital with PMHx of Afib, tachy-yair syndrome s/p pacemaker, HTN, non-obstructive CAD, Systolic HF (EF 2017 25-30%) with recurrent admissions for decompensated HF, who presented to the ED c/o increased abd distension and urinary retention, renal evaluation of ELIS. Powers inserted, maintained and noted with urinary retention.  He reports difficulty urinating for a few days and  discomfort.    ELIS-Possibly mild degree of CKD as well  -Appears to be obstructive secondary to urinary retention  -Powers, Flomax  -COnsideration of TOV pending 15th, urology noted    CM  -No issue with maintenance of oral diuretic  -On tele, [facility] following    d/c with staff
Will sign off, please call if questions.

## 2020-05-15 NOTE — PROGRESS NOTE ADULT - PROBLEM SELECTOR PLAN 3
possible due to demand ischemia.  No regional wall motion abnormalities on echo yesterday.  ETT non ischemic
possible due to demand ischemia.  No regional wall motion abnormalities on echo yesterday.  ETT non ischemic
possible due to demand ischemia.  No regional wall motion abnormalities on echo yesterday.  Reports multiple episodes of chest pain 2 days prior to admit, possibly w/ exertion ([place] speaking & poor, rambling historian).  Recommend stress test for further evaluation of chest pain.

## 2020-05-15 NOTE — PROGRESS NOTE ADULT - PROBLEM SELECTOR PLAN 1
dyspnea improved likely related to uncontrolled hypertension.  No evidence of CHF on CXR.

## 2020-05-15 NOTE — PROGRESS NOTE ADULT - SUBJECTIVE AND OBJECTIVE BOX
CHIEF COMPLAINT/DIAGNOSIS: ARF / Urinary Retention / HF / Afib    HPI: 73 y/o male w/ PMHx of Afib, tachy-yair syndrome s/p pacemaker, HTN, non-obstructive CAD, Systolic HF (EF 2017 25-30%) with recurrent admissions for decompensated HF, who presented to the ED c/o increased abd distension and urinary retention.  He reports difficulty urinating for a few days and "inflammation" and discomfort in the suprapubic area. Pt reports he takes a little pill with a heart on it, but no other medications.  Pt denies chest pain/SOB, no fever/chills, hematuria.    SUBJECTIVE:  5/12 - c/o of +abdominal tenderness - improving. feeling a little better. denies cp or palp, sob or cough.   # [id number]   5/13 - c/o + constipation, c/o CP prior to admission with exertion, patient reports no allergies causing SOB or anaphylaxis, does not remember his allergies. -  phone used.   5/14 - no complaints. denies cough, sob, fevers.  # [id number]  5/15 -       REVIEW OF SYSTEMS:  All other review of systems is negative unless indicated above      Vital Signs Last 24 Hrs  T(C): 37.1 (15 May 2020 05:35), Max: 37.1 (15 May 2020 05:35)  T(F): 98.7 (15 May 2020 05:35), Max: 98.7 (15 May 2020 05:35)  HR: 63 (15 May 2020 05:35) (60 - 63)  BP: 155/83 (15 May 2020 05:35) (130/74 - 155/83)  BP(mean): --  RR: 18 (15 May 2020 05:35) (18 - 18)  SpO2: 95% (15 May 2020 05:35) (95% - 97%)      PHYSICAL EXAM:  Constitutional: Awake and alert, well-developed  HEENT: PERR, EOMI, Normal Hearing, MMM  Neck: Soft and supple, No LAD, No JVD  Respiratory: Breath sounds are clear bilaterally, No wheezing, rales or rhonchi  Cardiovascular: S1 and S2, regular rate and rhythm, no Murmurs, gallops or rubs  Gastrointestinal/ : Bowel Sounds present, soft, nontender, nondistended, no guarding, no rebound - Powers   Extremities: No peripheral edema  Vascular: 2+ peripheral pulses  Neurological: A/O x 3, no focal deficits  Musculoskeletal: 5/5 strength b/l upper and lower extremities  Skin: No rashes        LABS: All Labs Reviewed:             05-15    140  |  104  |  19  ----------------------------<  101<H>  3.4<L>   |  30  |  1.09    Ca    8.5      15 May 2020 06:33  Phos  3.2     05-15    TPro  x   /  Alb  3.0<L>  /  TBili  x   /  DBili  x   /  AST  x   /  ALT  x   /  AlkPhos  x   05-15      COVID-19 PCR: NotDetec:  (05.11.20 @ 16:51)  COVID-19 PCR: NotDetec:  (05.14.20 @ 12:15)      RADIOLOGY:    < from: US Kidney and Bladder (05.11.20 @ 17:24) >  IMPRESSION:   No evidence of calculus or hydronephrosis  Echogenic kidneys suggest medical renal disease.  Bladder was not evaluated as it was nondistended in the presence of a Powers catheter  < end of copied text >      < from: Xray Chest 1 View- PORTABLE-Urgent (05.11.20 @ 16:24) >  IMPRESSION:  Clear lungs.  < end of copied text >          ECHOCARDIOGRAM:  < from: Transthoracic Echocardiogram (04.18.17 @ 09:21) >   Impression   Summary   No evidence of pericardial effusion.   Moderate to severe (3+) mitral regurgitation is present.   Fibrocalcific changes noted to the aortic valve leaflets with preserved   excursion.   Mild (1+) aortic regurgitation is present.   Moderate (2+) tricuspid valve regurgitation is present.   The left ventricle cavity is mildly dilated.   Mild concentric left ventricular hypertrophy is present.   Estimated left ventricular ejection fraction is 25-30%.   Severe, diffuse hypokinesis of the left ventricle is present.   The right atrium appears mildly dilated.   A device wire is seen in the RV and RA.   Left to right shunt across the atrial septum is noted on color doppler  < end of copied text >      < from: Transthoracic Echocardiogram Follow Up (05.12.20 @ 11:41) >   Impression   Summary   The left ventricle cavity is mildly dilated.   Mild concentric left ventricular hypertrophy is present.   Estimated left ventricular ejection fraction is 40-45 %.   The left atrium is moderately dilated.   The right atrium appears mildly dilated.   A device wire is seen in the RV and RA.   A PFO is noted with color doppler.   Normal appearing right ventricle structure and function.   Mild aortic sclerosis is present with normal valvular opening.   Mild (1+) aortic regurgitation is present.   Moderate to severe (3+) mitral regurgitation is present.   Mild mitral annular calcification is present.   Moderate (2+) tricuspid valve regurgitation is present.   The IVC is dilated with decreased respiratory variation    < end of copied text >        MEDICATIONS  (STANDING):  aspirin enteric coated 81 milliGRAM(s) Oral daily  atorvastatin 20 milliGRAM(s) Oral at bedtime  carvedilol 6.25 milliGRAM(s) Oral every 12 hours  cefTRIAXone Injectable. 1000 milliGRAM(s) IV Push every 24 hours  cefTRIAXone Injectable.      chlorthalidone 25 milliGRAM(s) Oral daily  heparin   Injectable 5000 Unit(s) SubCutaneous every 8 hours  polyethylene glycol 3350 17 Gram(s) Oral daily  regadenoson Injectable 0.4 milliGRAM(s) IV Push once  sacubitril 24 mG/valsartan 26 mG 1 Tablet(s) Oral two times a day  tamsulosin 0.4 milliGRAM(s) Oral at bedtime    MEDICATIONS  (PRN):  acetaminophen   Tablet .. 650 milliGRAM(s) Oral every 6 hours PRN Mild Pain (1 - 3)          TELEMETRY REVIEW:  5/12 - Underlying Afib, V-Paced 60s   5/13 - Underlying Afib, V-Paced 60s  5/14 - Underlying Afib, V-Paced 60s - d/c tele          ASSESSMENT AND PLAN:    1)  Acute renal failure due to urinary retention  - s/p Powers  - Started on Flomax   - 1L bolus in ED, repeat BMP - improving    - renal US no stone or hydronephrosis  - renal consult - ok to start acei/arb   - urology consult appreciated -  voiding trial in AM - outpt. f/u with [facility]    2) Elevated Troponin, CAD hx  - pt. w/ c/o CP on exertion prior to admission  - tele monitoring- tele review as above  - troponin mildly elevated x2 / ECG w/o ST changes   - cont. ASA / statin   - Echo EF improved now 45%, mod to severe MR  - Cardio f/u appreciated  5/14 - Stress Test ~ Not significantly changed from the previous study in 2017, outpt f/u     3) Chronic diastolic/systolic CHF  - pt. euvolemic, no evidence of acute overload  - patient non-compliant with medications, has not followed up with cardiologist in over 1 year  - echo 2017 EF 25-30% / repeat echo EF 40-45% now ; mod to severe MR, +PFO  - started coreg  - d/w patient, unaware of allergies with resultant anaphylaxis, will resume entresto (was on outpatient in 2018)  - Cont. diuretics   - low sodium diet / I&Os / daily weight  - Cardio f/u appreciated     4) leukocytosis  temp 100.3  - Repeat CXR, COVID PCR  - Lactate, BCX x2   - UA    5) Chronic Afib, PPM  - patient with underlying Afib   - Cont. BB    - PPM interrogated ~ revealing two brief episodes of NSVT in December of 2019.  Recommend routine follow up every three months.  - cont. Xarelto for stroke ppx  - EP f/u appreciated     6) Elevated AST  repeat labs  - likely fatty liver disease, denies alcohol use.    - Lipid panel pending -- wnl    7) DVT PPX  - Xarelto CHIEF COMPLAINT/DIAGNOSIS: ARF / Urinary Retention / HF / Afib    HPI: 75 y/o male w/ PMHx of Afib, tachy-yair syndrome s/p pacemaker, HTN, non-obstructive CAD, Systolic HF (EF 2017 25-30%) with recurrent admissions for decompensated HF, who presented to the ED c/o increased abd distension and urinary retention.  He reports difficulty urinating for a few days and "inflammation" and discomfort in the suprapubic area. Pt reports he takes a little pill with a heart on it, but no other medications.  Pt denies chest pain/SOB, no fever/chills, hematuria.    SUBJECTIVE:  5/12 - c/o of +abdominal tenderness - improving. feeling a little better. denies cp or palp, sob or cough.   # [id number]   5/13 - c/o + constipation, c/o CP prior to admission with exertion, patient reports no allergies causing SOB or anaphylaxis, does not remember his allergies. -  phone used.   5/14 - no complaints. denies cough, sob, fevers.  # [id number]  5/15 -  Leslye ~ [id number], feeling better now, pain w/ urination, + constipation; no abdominal pressure, sob or chest pain.       REVIEW OF SYSTEMS:  All other review of systems is negative unless indicated above      Vital Signs Last 24 Hrs  T(C): 37.1 (15 May 2020 05:35), Max: 37.1 (15 May 2020 05:35)  T(F): 98.7 (15 May 2020 05:35), Max: 98.7 (15 May 2020 05:35)  HR: 63 (15 May 2020 05:35) (60 - 63)  BP: 155/83 (15 May 2020 05:35) (130/74 - 155/83)  BP(mean): --  RR: 18 (15 May 2020 05:35) (18 - 18)  SpO2: 95% (15 May 2020 05:35) (95% - 97%)      PHYSICAL EXAM:  Constitutional: Awake and alert, well-developed  HEENT: PERR, EOMI, Normal Hearing, MMM  Neck: Soft and supple, No LAD, No JVD  Respiratory: Breath sounds are clear bilaterally, No wheezing, rales or rhonchi  Cardiovascular: S1 and S2, regular rate and rhythm, no Murmurs, gallops or rubs  Gastrointestinal: Bowel Sounds present, soft, nontender, nondistended, no guarding, no rebound    Extremities: No peripheral edema  Vascular: 2+ peripheral pulses  Neurological: A/O x 3, no focal deficits  Musculoskeletal: 5/5 strength b/l upper and lower extremities  Skin: No rashes        LABS: All Labs Reviewed:             05-15    140  |  104  |  19  ----------------------------<  101<H>  3.4<L>   |  30  |  1.09    Ca    8.5      15 May 2020 06:33  Phos  3.2     05-15    TPro  x   /  Alb  3.0<L>  /  TBili  x   /  DBili  x   /  AST  x   /  ALT  x   /  AlkPhos  x   05-15      COVID-19 PCR: NotDetec:  (05.11.20 @ 16:51)  COVID-19 PCR: NotDetec:  (05.14.20 @ 12:15)      RADIOLOGY:    < from: US Kidney and Bladder (05.11.20 @ 17:24) >  IMPRESSION:   No evidence of calculus or hydronephrosis  Echogenic kidneys suggest medical renal disease.  Bladder was not evaluated as it was nondistended in the presence of a Powers catheter  < end of copied text >      < from: Xray Chest 1 View- PORTABLE-Urgent (05.11.20 @ 16:24) >  IMPRESSION:  Clear lungs.  < end of copied text >          ECHOCARDIOGRAM:  < from: Transthoracic Echocardiogram (04.18.17 @ 09:21) >   Impression   Summary   No evidence of pericardial effusion.   Moderate to severe (3+) mitral regurgitation is present.   Fibrocalcific changes noted to the aortic valve leaflets with preserved   excursion.   Mild (1+) aortic regurgitation is present.   Moderate (2+) tricuspid valve regurgitation is present.   The left ventricle cavity is mildly dilated.   Mild concentric left ventricular hypertrophy is present.   Estimated left ventricular ejection fraction is 25-30%.   Severe, diffuse hypokinesis of the left ventricle is present.   The right atrium appears mildly dilated.   A device wire is seen in the RV and RA.   Left to right shunt across the atrial septum is noted on color doppler  < end of copied text >      < from: Transthoracic Echocardiogram Follow Up (05.12.20 @ 11:41) >   Impression   Summary   The left ventricle cavity is mildly dilated.   Mild concentric left ventricular hypertrophy is present.   Estimated left ventricular ejection fraction is 40-45 %.   The left atrium is moderately dilated.   The right atrium appears mildly dilated.   A device wire is seen in the RV and RA.   A PFO is noted with color doppler.   Normal appearing right ventricle structure and function.   Mild aortic sclerosis is present with normal valvular opening.   Mild (1+) aortic regurgitation is present.   Moderate to severe (3+) mitral regurgitation is present.   Mild mitral annular calcification is present.   Moderate (2+) tricuspid valve regurgitation is present.   The IVC is dilated with decreased respiratory variation    < end of copied text >        MEDICATIONS  (STANDING):  aspirin enteric coated 81 milliGRAM(s) Oral daily  atorvastatin 20 milliGRAM(s) Oral at bedtime  carvedilol 6.25 milliGRAM(s) Oral every 12 hours  cefTRIAXone Injectable. 1000 milliGRAM(s) IV Push every 24 hours  cefTRIAXone Injectable.      chlorthalidone 25 milliGRAM(s) Oral daily  heparin   Injectable 5000 Unit(s) SubCutaneous every 8 hours  polyethylene glycol 3350 17 Gram(s) Oral daily  regadenoson Injectable 0.4 milliGRAM(s) IV Push once  sacubitril 24 mG/valsartan 26 mG 1 Tablet(s) Oral two times a day  tamsulosin 0.4 milliGRAM(s) Oral at bedtime    MEDICATIONS  (PRN):  acetaminophen   Tablet .. 650 milliGRAM (s) Oral every 6 hours PRN Mild Pain (1 - 3)          TELEMETRY REVIEW:  5/12 - Underlying Afib, V-Paced 60s   5/13 - Underlying Afib, V-Paced 60s  5/14 - Underlying Afib, V-Paced 60s - d/c tele          ASSESSMENT AND PLAN:    1)  Acute renal failure due to urinary retention - resolved   - s/p Powers  - Started on Flomax   - 1L bolus in ED  - renal US no stone or hydronephrosis  - renal consult - ok to start acei/arb   - urology consult appreciated -  voiding trial in AM - outpt. f/u with [facility]    2) Leukocytosis due to Complicated UTI - GNR  - repeat CXR negative / COVID19 negative   - lactate wnl / BCx pending x2  - positive UA, pt. c/o of abdominal pressure/ pain w/ urination     - Ceftriaxone day #2  - afebrile overnight, repeat cbc in AM     3) Elevated Troponin, CAD hx  - pt. w/ c/o CP on exertion prior to admission  - troponin mildly elevated x2 / ECG w/o ST changes   - cont. ASA / statin   - Echo EF improved now 45%, mod to severe MR  - Cardio f/u appreciated  5/14 - Stress Test ~ Not significantly changed from the previous study in 2017, outpatient f/u w/ cardio    4) Chronic diastolic/systolic CHF  - pt. euvolemic, no evidence of acute overload  - patient non-compliant with medications, has not followed up with cardiologist in over 1 year  - echo 2017 EF 25-30% / repeat echo EF 40-45% now ; mod to severe MR, +PFO  - started coreg  - d/w patient, unaware of allergies with resultant anaphylaxis, will resume entresto (was on outpatient in 2018)  - Cont. diuretics   - low sodium diet / I&Os / daily weight  - Cardio f/u appreciated     5) Chronic Afib, PPM - controlled   - patient with underlying Afib   - Cont. BB    - PPM interrogated ~ revealing two brief episodes of NSVT in December of 2019.  Recommend routine follow up every three months.  - cont. Xarelto for stroke ppx  - EP f/u appreciated     6) Elevated AST  - likely fatty liver disease, denies alcohol use.    - Lipid panel pending -- wnl    7) DVT PPX  - Xarelto CHIEF COMPLAINT/DIAGNOSIS: ARF / Urinary Retention / HF / Afib    HPI: 75 y/o male w/ PMHx of Afib, tachy-yair syndrome s/p pacemaker, HTN, non-obstructive CAD, Systolic HF (EF 2017 25-30%) with recurrent admissions for decompensated HF, who presented to the ED c/o increased abd distension and urinary retention.  He reports difficulty urinating for a few days and "inflammation" and discomfort in the suprapubic area. Pt reports he takes a little pill with a heart on it, but no other medications.  Pt denies chest pain/SOB, no fever/chills, hematuria.    SUBJECTIVE:  5/12 - c/o of +abdominal tenderness - improving. feeling a little better. denies cp or palp, sob or cough.   # [id number]   5/13 - c/o + constipation, c/o CP prior to admission with exertion, patient reports no allergies causing SOB or anaphylaxis, does not remember his allergies. -  phone used.   5/14 - no complaints. denies cough, sob, fevers.  # [id number]  5/15 -  Leslye ~ [id number], feeling better now, pain w/ urination, + constipation; no abdominal pressure, sob or chest pain.       REVIEW OF SYSTEMS:  All other review of systems is negative unless indicated above      Vital Signs Last 24 Hrs  T(C): 37.1 (15 May 2020 05:35), Max: 37.1 (15 May 2020 05:35)  T(F): 98.7 (15 May 2020 05:35), Max: 98.7 (15 May 2020 05:35)  HR: 63 (15 May 2020 05:35) (60 - 63)  BP: 155/83 (15 May 2020 05:35) (130/74 - 155/83)  BP(mean): --  RR: 18 (15 May 2020 05:35) (18 - 18)  SpO2: 95% (15 May 2020 05:35) (95% - 97%)      PHYSICAL EXAM:  Constitutional: Awake and alert, well-developed  HEENT: PERR, EOMI, Normal Hearing, MMM  Neck: Soft and supple, No LAD, No JVD  Respiratory: Breath sounds are clear bilaterally, No wheezing, rales or rhonchi  Cardiovascular: S1 and S2, regular rate and rhythm, no Murmurs, gallops or rubs  Gastrointestinal: Bowel Sounds present, soft, nontender, nondistended, no guarding, no rebound    Extremities: No peripheral edema  Vascular: 2+ peripheral pulses  Neurological: A/O x 3, no focal deficits  Musculoskeletal: 5/5 strength b/l upper and lower extremities  Skin: No rashes        LABS: All Labs Reviewed:             05-15    140  |  104  |  19  ----------------------------<  101<H>  3.4<L>   |  30  |  1.09    Ca    8.5      15 May 2020 06:33  Phos  3.2     05-15    TPro  x   /  Alb  3.0<L>  /  TBili  x   /  DBili  x   /  AST  x   /  ALT  x   /  AlkPhos  x   05-15      COVID-19 PCR: NotDetec:  (05.11.20 @ 16:51)  COVID-19 PCR: NotDetec:  (05.14.20 @ 12:15)      RADIOLOGY:    < from: US Kidney and Bladder (05.11.20 @ 17:24) >  IMPRESSION:   No evidence of calculus or hydronephrosis  Echogenic kidneys suggest medical renal disease.  Bladder was not evaluated as it was nondistended in the presence of a Powers catheter  < end of copied text >      < from: Xray Chest 1 View- PORTABLE-Urgent (05.11.20 @ 16:24) >  IMPRESSION:  Clear lungs.  < end of copied text >          ECHOCARDIOGRAM:  < from: Transthoracic Echocardiogram (04.18.17 @ 09:21) >   Impression   Summary   No evidence of pericardial effusion.   Moderate to severe (3+) mitral regurgitation is present.   Fibrocalcific changes noted to the aortic valve leaflets with preserved   excursion.   Mild (1+) aortic regurgitation is present.   Moderate (2+) tricuspid valve regurgitation is present.   The left ventricle cavity is mildly dilated.   Mild concentric left ventricular hypertrophy is present.   Estimated left ventricular ejection fraction is 25-30%.   Severe, diffuse hypokinesis of the left ventricle is present.   The right atrium appears mildly dilated.   A device wire is seen in the RV and RA.   Left to right shunt across the atrial septum is noted on color doppler  < end of copied text >      < from: Transthoracic Echocardiogram Follow Up (05.12.20 @ 11:41) >   Impression   Summary   The left ventricle cavity is mildly dilated.   Mild concentric left ventricular hypertrophy is present.   Estimated left ventricular ejection fraction is 40-45 %.   The left atrium is moderately dilated.   The right atrium appears mildly dilated.   A device wire is seen in the RV and RA.   A PFO is noted with color doppler.   Normal appearing right ventricle structure and function.   Mild aortic sclerosis is present with normal valvular opening.   Mild (1+) aortic regurgitation is present.   Moderate to severe (3+) mitral regurgitation is present.   Mild mitral annular calcification is present.   Moderate (2+) tricuspid valve regurgitation is present.   The IVC is dilated with decreased respiratory variation    < end of copied text >        MEDICATIONS  (STANDING):  aspirin enteric coated 81 milliGRAM(s) Oral daily  atorvastatin 20 milliGRAM(s) Oral at bedtime  carvedilol 6.25 milliGRAM(s) Oral every 12 hours  cefTRIAXone Injectable. 1000 milliGRAM(s) IV Push every 24 hours  cefTRIAXone Injectable.      chlorthalidone 25 milliGRAM(s) Oral daily  heparin   Injectable 5000 Unit(s) SubCutaneous every 8 hours  polyethylene glycol 3350 17 Gram(s) Oral daily  regadenoson Injectable 0.4 milliGRAM(s) IV Push once  sacubitril 24 mG/valsartan 26 mG 1 Tablet(s) Oral two times a day  tamsulosin 0.4 milliGRAM(s) Oral at bedtime    MEDICATIONS  (PRN):  acetaminophen   Tablet .. 650 milliGRAM (s) Oral every 6 hours PRN Mild Pain (1 - 3)          TELEMETRY REVIEW:  5/12 - Underlying Afib, V-Paced 60s   5/13 - Underlying Afib, V-Paced 60s  5/14 - Underlying Afib, V-Paced 60s - d/c tele          ASSESSMENT AND PLAN:    1)  Acute renal failure due to urinary retention - resolved   - s/p Powers  - Started on Flomax   - 1L bolus in ED  - renal US no stone or hydronephrosis  - renal consult - ok to start acei/arb   - urology consult appreciated -  voiding trial in AM - outpt. f/u with [facility]    2) Leukocytosis due to Complicated UTI - GNR  - repeat CXR negative / COVID19 negative   - lactate wnl / BCx pending x2  - positive UA, pt. c/o of abdominal pressure/ pain w/ urination     - Ceftriaxone day #2  - afebrile overnight, repeat cbc in AM     3) Elevated Troponin, CAD hx  - pt. w/ c/o CP on exertion prior to admission  - troponin mildly elevated x2 / ECG w/o ST changes   - cont. ASA / statin   - Echo EF improved now 45%, mod to severe MR  - Cardio f/u appreciated  5/14 - Stress Test ~ Not significantly changed from the previous study in 2017, outpatient f/u w/ cardio    4) Chronic diastolic/systolic CHF  - pt. euvolemic, no evidence of acute overload  - patient non-compliant with medications, has not followed up with cardiologist in over 1 year  - echo 2017 EF 25-30% / repeat echo EF 40-45% now ; mod to severe MR, +PFO  - started coreg  - d/w patient, unaware of allergies with resultant anaphylaxis, will resume entresto (was on outpatient in 2018)  - Cont. diuretics   - low sodium diet / I&Os / daily weight  - Cardio f/u appreciated   5/15 - uptitrate entresto d/w cardio, monitor bp    5) Chronic Afib, PPM - controlled   - patient with underlying Afib   - Cont. BB    - PPM interrogated ~ revealing two brief episodes of NSVT in December of 2019.  Recommend routine follow up every three months.  - cont. Xarelto for stroke ppx  - EP f/u appreciated     6) Elevated AST  - likely fatty liver disease, denies alcohol use.    - Lipid panel pending -- wnl    7) DVT PPX  - resume Xarelto CHIEF COMPLAINT/DIAGNOSIS: ARF / Urinary Retention / HF / Afib    HPI: 75 y/o male w/ PMHx of Afib, tachy-yair syndrome s/p pacemaker, HTN, non-obstructive CAD, Systolic HF (EF 2017 25-30%) with recurrent admissions for decompensated HF, who presented to the ED c/o increased abd distension and urinary retention.  He reports difficulty urinating for a few days and "inflammation" and discomfort in the suprapubic area. Pt reports he takes a little pill with a heart on it, but no other medications.  Pt denies chest pain/SOB, no fever/chills, hematuria.    SUBJECTIVE:  5/12 - c/o of +abdominal tenderness - improving. feeling a little better. denies cp or palp, sob or cough.   # [id number]   5/13 - c/o + constipation, c/o CP prior to admission with exertion, patient reports no allergies causing SOB or anaphylaxis, does not remember his allergies. -  phone used.   5/14 - no complaints. denies cough, sob, fevers.  # [id number]  5/15 -  Leslye ~ [id number], feeling better now, pain w/ urination, + constipation; no abdominal pressure, sob or chest pain.       REVIEW OF SYSTEMS:  All other review of systems is negative unless indicated above      Vital Signs Last 24 Hrs  T(C): 37.1 (15 May 2020 05:35), Max: 37.1 (15 May 2020 05:35)  T(F): 98.7 (15 May 2020 05:35), Max: 98.7 (15 May 2020 05:35)  HR: 63 (15 May 2020 05:35) (60 - 63)  BP: 155/83 (15 May 2020 05:35) (130/74 - 155/83)  BP(mean): --  RR: 18 (15 May 2020 05:35) (18 - 18)  SpO2: 95% (15 May 2020 05:35) (95% - 97%)      PHYSICAL EXAM:  Constitutional: Awake and alert, well-developed  HEENT: PERR, EOMI, Normal Hearing, MMM  Neck: Soft and supple, No LAD, No JVD  Respiratory: Breath sounds are clear bilaterally, No wheezing, rales or rhonchi  Cardiovascular: S1 and S2, regular rate and rhythm, no Murmurs, gallops or rubs  Gastrointestinal: Bowel Sounds present, soft, nontender, nondistended, no guarding, no rebound    Extremities: No peripheral edema  Vascular: 2+ peripheral pulses  Neurological: A/O x 3, no focal deficits  Musculoskeletal: 5/5 strength b/l upper and lower extremities  Skin: No rashes        LABS: All Labs Reviewed:             05-15    140  |  104  |  19  ----------------------------<  101<H>  3.4<L>   |  30  |  1.09    Ca    8.5      15 May 2020 06:33  Phos  3.2     05-15    TPro  x   /  Alb  3.0<L>  /  TBili  x   /  DBili  x   /  AST  x   /  ALT  x   /  AlkPhos  x   05-15      COVID-19 PCR: NotDetec:  (05.11.20 @ 16:51)  COVID-19 PCR: NotDetec:  (05.14.20 @ 12:15)      RADIOLOGY:    < from: US Kidney and Bladder (05.11.20 @ 17:24) >  IMPRESSION:   No evidence of calculus or hydronephrosis  Echogenic kidneys suggest medical renal disease.  Bladder was not evaluated as it was nondistended in the presence of a Powers catheter  < end of copied text >      < from: Xray Chest 1 View- PORTABLE-Urgent (05.11.20 @ 16:24) >  IMPRESSION:  Clear lungs.  < end of copied text >          ECHOCARDIOGRAM:  < from: Transthoracic Echocardiogram (04.18.17 @ 09:21) >   Impression   Summary   No evidence of pericardial effusion.   Moderate to severe (3+) mitral regurgitation is present.   Fibrocalcific changes noted to the aortic valve leaflets with preserved   excursion.   Mild (1+) aortic regurgitation is present.   Moderate (2+) tricuspid valve regurgitation is present.   The left ventricle cavity is mildly dilated.   Mild concentric left ventricular hypertrophy is present.   Estimated left ventricular ejection fraction is 25-30%.   Severe, diffuse hypokinesis of the left ventricle is present.   The right atrium appears mildly dilated.   A device wire is seen in the RV and RA.   Left to right shunt across the atrial septum is noted on color doppler  < end of copied text >      < from: Transthoracic Echocardiogram Follow Up (05.12.20 @ 11:41) >   Impression   Summary   The left ventricle cavity is mildly dilated.   Mild concentric left ventricular hypertrophy is present.   Estimated left ventricular ejection fraction is 40-45 %.   The left atrium is moderately dilated.   The right atrium appears mildly dilated.   A device wire is seen in the RV and RA.   A PFO is noted with color doppler.   Normal appearing right ventricle structure and function.   Mild aortic sclerosis is present with normal valvular opening.   Mild (1+) aortic regurgitation is present.   Moderate to severe (3+) mitral regurgitation is present.   Mild mitral annular calcification is present.   Moderate (2+) tricuspid valve regurgitation is present.   The IVC is dilated with decreased respiratory variation    < end of copied text >        MEDICATIONS  (STANDING):  aspirin enteric coated 81 milliGRAM(s) Oral daily  atorvastatin 20 milliGRAM(s) Oral at bedtime  carvedilol 6.25 milliGRAM(s) Oral every 12 hours  cefTRIAXone Injectable. 1000 milliGRAM(s) IV Push every 24 hours  cefTRIAXone Injectable.      chlorthalidone 25 milliGRAM(s) Oral daily  heparin   Injectable 5000 Unit(s) SubCutaneous every 8 hours  polyethylene glycol 3350 17 Gram(s) Oral daily  regadenoson Injectable 0.4 milliGRAM(s) IV Push once  sacubitril 24 mG/valsartan 26 mG 1 Tablet(s) Oral two times a day  tamsulosin 0.4 milliGRAM(s) Oral at bedtime    MEDICATIONS  (PRN):  acetaminophen   Tablet .. 650 milliGRAM (s) Oral every 6 hours PRN Mild Pain (1 - 3)          TELEMETRY REVIEW:  5/12 - Underlying Afib, V-Paced 60s   5/13 - Underlying Afib, V-Paced 60s  5/14 - Underlying Afib, V-Paced 60s - d/c tele          ASSESSMENT AND PLAN:    1)  Acute renal failure due to urinary retention - resolved   - s/p Powers  - Started on Flomax   - 1L bolus in ED  - renal US no stone or hydronephrosis  - renal consult - ok to start acei/arb   - urology consult appreciated -  voiding trial in AM - outpt. f/u with [facility] **patient voiding**    2) Leukocytosis due to Complicated UTI - GNR  - repeat CXR negative / COVID19 negative   - lactate wnl / BCx pending x2  - positive UA, pt. c/o of abdominal pressure/ pain w/ urination     - Ceftriaxone day #2  - afebrile overnight, repeat cbc in AM     3) Elevated Troponin, CAD hx  - pt. w/ c/o CP on exertion prior to admission  - troponin mildly elevated x2 / ECG w/o ST changes   - cont. ASA / statin   - Echo EF improved now 45%, mod to severe MR  - Cardio f/u appreciated  5/14 - Stress Test ~ Not significantly changed from the previous study in 2017, outpatient f/u w/ cardio    4) Chronic diastolic/systolic CHF  - pt. euvolemic, no evidence of acute overload  - patient non-compliant with medications, has not followed up with cardiologist in over 1 year  - echo 2017 EF 25-30% / repeat echo EF 40-45% now ; mod to severe MR, +PFO  - started coreg  - d/w patient, unaware of allergies with resultant anaphylaxis, will resume entresto (was on outpatient in 2018)  - Cont. diuretics   - low sodium diet / I&Os / daily weight  - Cardio f/u appreciated   5/15 - uptitrate entresto d/w cardio, monitor bp    5) Chronic Afib, PPM - controlled   - patient with underlying Afib   - Cont. BB    - PPM interrogated ~ revealing two brief episodes of NSVT in December of 2019.  Recommend routine follow up every three months.  - cont. Xarelto for stroke ppx  - EP f/u appreciated     6) Elevated AST  - likely fatty liver disease, denies alcohol use.    - Lipid panel pending -- wnl    7) DVT PPX  - resume Xarelto ( no need for renal adjustment, increased to 20mg)

## 2020-05-15 NOTE — PROGRESS NOTE ADULT - PROBLEM SELECTOR PLAN 2
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  Cont. coreg, entresto.

## 2020-05-15 NOTE — PROGRESS NOTE ADULT - SUBJECTIVE AND OBJECTIVE BOX
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 The patient was subsequently transferred to [facility] where the patient underwent lead extraction on 2017. A Medtronic Micra Leadless PPM was implanted on 17 but had intermittent loss of capture. The patient is s/p LCW Medtronic single-chamber PPM implantation on 2017 who presented to the ED c/o increased abd distension and urinary retention.  He reports difficulty urinating for a few days and "inflammation" and discomfort in the suprapubic area.    Patient does give hx of SOB on exertion , with elevated blood pressure , patient does have chest discomfort at pacemaker site which is not associated with any other symptoms . patient is not compliant to recommendation , have seen me in office last dec 2018 , patient was explained via   nurse who speaks Swedish     20: V paced w/ afib on tele, brief episode 8 beats of WCT cannot exclude NSVT.  spoke w/ pt via  - reports multiple episodes of chest discomfort 2 days prior to admission possibly related to exertion.  Poor, rambling historian.  20: V paced. No chest pain, no shortness of breath.   5/15/20: transferred to nontele floor. no chest pain, no dyspnea.    MEDICATIONS:    MEDICATIONS  (STANDING):  aspirin enteric coated 81 milliGRAM(s) Oral daily  atorvastatin 20 milliGRAM(s) Oral at bedtime  carvedilol 6.25 milliGRAM(s) Oral every 12 hours  cefTRIAXone Injectable. 1000 milliGRAM(s) IV Push every 24 hours  cefTRIAXone Injectable.      chlorthalidone 25 milliGRAM(s) Oral daily  polyethylene glycol 3350 17 Gram(s) Oral daily  rivaroxaban 15 milliGRAM(s) Oral with dinner  sacubitril 49 mG/valsartan 51 mG 1 Tablet(s) Oral two times a day  tamsulosin 0.4 milliGRAM(s) Oral at bedtime    MEDICATIONS  (PRN):  acetaminophen   Tablet .. 650 milliGRAM(s) Oral every 6 hours PRN Mild Pain (1 - 3)    Vital Signs Last 24 Hrs  T(C): 36.4 (15 May 2020 11:55), Max: 37.1 (15 May 2020 05:35)  T(F): 97.6 (15 May 2020 11:55), Max: 98.7 (15 May 2020 05:35)  HR: 60 (15 May 2020 11:55) (60 - 63)  BP: 133/77 (15 May 2020 11:55) (130/74 - 155/83)  BP(mean): --  RR: 18 (15 May 2020 11:55) (18 - 18)  SpO2: 99% (15 May 2020 11:55) (95% - 99%)Daily     Daily Weight in k.3 (15 May 2020 05:35)I&O's Summary    14 May 2020 07:  -  15 May 2020 07:00  --------------------------------------------------------  IN: 100 mL / OUT: 1375 mL / NET: -1275 mL    15 May 2020 07:01  -  15 May 2020 14:42  --------------------------------------------------------  IN: 480 mL / OUT: 0 mL / NET: 480 mL        PHYSICAL EXAM:    Constitutional: NAD, awake and alert,   HEENT: PERR, EOMI,    Neck:  supple,  No JVD  Respiratory: Breath sounds are clear bilaterally, No wheezing, rales or rhonchi  Cardiovascular: S1 and S2, regular rate and rhythm, no Murmurs, gallops or rubs  Gastrointestinal: Bowel Sounds present, soft, nontender.   Extremities: No peripheral edema. No clubbing or cyanosis.  Vascular: 2+ peripheral pulses  Neurological: A/O x 3, no focal deficits      LABS: All Labs Reviewed:                        14.2   10.92 )-----------( 138      ( 14 May 2020 12:29 )             44.1                         14.2   11.18 )-----------( 132      ( 13 May 2020 07:53 )             44.1     15 May 2020 06:33    140    |  104    |  19     ----------------------------<  101    3.4     |  30     |  1.09   14 May 2020 12:29    139    |  103    |  19     ----------------------------<  101    3.7     |  30     |  1.12   13 May 2020 07:53    139    |  102    |  21     ----------------------------<  103    4.4     |  31     |  1.39     Ca    8.5        15 May 2020 06:33  Ca    8.7        14 May 2020 12:29  Ca    8.4        13 May 2020 07:53  Phos  3.2       15 May 2020 06:33  Phos  3.1       14 May 2020 12:29  Mg     2.0       13 May 2020 07:53    TPro  x      /  Alb  3.0    /  TBili  x      /  DBili  x      /  AST  x      /  ALT  x      /  AlkPhos  x      15 May 2020 06:33  TPro  6.4    /  Alb  3.3    /  TBili  2.0    /  DBili  0.7    /  AST  61     /  ALT  94     /  AlkPhos  90     14 May 2020 12:29      CARDIAC MARKERS ( 13 May 2020 21:37 )  0.210 ng/mL / x     / x     / x     / x        ====================================  ECHO:  < from: Transthoracic Echocardiogram Follow Up (20 @ 11:41) >   Impression     Summary     The left ventricle cavity is mildly dilated.   Mild concentric left ventricular hypertrophy is present.   Estimated left ventricular ejection fraction is 40-45 %.   The left atrium is moderately dilated.   The right atrium appears mildly dilated.   A device wire is seen in the RV and RA.   A PFO is noted with color doppler.   Normal appearing right ventricle structure and function.   Mild aortic sclerosis is present with normal valvular opening.   Mild (1+) aortic regurgitation is present.   Moderate to severe (3+) mitral regurgitation is present.   Mild mitral annular calcification is present.   Moderate (2+) tricuspid valve regurgitation is present.   The IVC is dilated with decreased respiratory variation     Signature     ----------------------------------------------------------------   Electronically signed by Arnav Morrison MD(Interpreting   physician) on 2020 05:28 PM   ----------------------------------------------------------------    < end of copied text >  ====================================    < from: NM Nuclear Stress Pharmacologic Multiple (20 @ 10:28) >  IMPRESSION: Non-gated SPECT Myocardial Perfusion Imaging demonstrates:    Enlarged left ventricle with fixed perfusion defects in the inferior and inferolateral wall, not significantly changed from the previous study of 2017.    No scan evidence of reversible perfusion defects.    Please refer to cardiac stress test report for dosage of Regadenoson administered, EKG findings and symptoms during the procedure.    ANDRES CHATTERJEE M.D., ATTENDING RADIOLOGIST  This document has been electronically signed. May 14 2020 11:22AM    < end of copied text >  ====================================

## 2020-05-16 ENCOUNTER — TRANSCRIPTION ENCOUNTER (OUTPATIENT)
Age: 75
End: 2020-05-16

## 2020-05-16 LAB
ANION GAP SERPL CALC-SCNC: 8 MMOL/L — SIGNIFICANT CHANGE UP (ref 5–17)
BUN SERPL-MCNC: 21 MG/DL — SIGNIFICANT CHANGE UP (ref 7–23)
CALCIUM SERPL-MCNC: 9.2 MG/DL — SIGNIFICANT CHANGE UP (ref 8.5–10.1)
CHLORIDE SERPL-SCNC: 100 MMOL/L — SIGNIFICANT CHANGE UP (ref 96–108)
CO2 SERPL-SCNC: 25 MMOL/L — SIGNIFICANT CHANGE UP (ref 22–31)
CREAT SERPL-MCNC: 1.01 MG/DL — SIGNIFICANT CHANGE UP (ref 0.5–1.3)
GLUCOSE SERPL-MCNC: 113 MG/DL — HIGH (ref 70–99)
HCT VFR BLD CALC: 47 % — SIGNIFICANT CHANGE UP (ref 39–50)
HGB BLD-MCNC: 15.7 G/DL — SIGNIFICANT CHANGE UP (ref 13–17)
MCHC RBC-ENTMCNC: 32 PG — SIGNIFICANT CHANGE UP (ref 27–34)
MCHC RBC-ENTMCNC: 33.4 GM/DL — SIGNIFICANT CHANGE UP (ref 32–36)
MCV RBC AUTO: 95.9 FL — SIGNIFICANT CHANGE UP (ref 80–100)
PLATELET # BLD AUTO: 164 K/UL — SIGNIFICANT CHANGE UP (ref 150–400)
POTASSIUM SERPL-MCNC: 4 MMOL/L — SIGNIFICANT CHANGE UP (ref 3.5–5.3)
POTASSIUM SERPL-SCNC: 4 MMOL/L — SIGNIFICANT CHANGE UP (ref 3.5–5.3)
RBC # BLD: 4.9 M/UL — SIGNIFICANT CHANGE UP (ref 4.2–5.8)
RBC # FLD: 13.2 % — SIGNIFICANT CHANGE UP (ref 10.3–14.5)
SODIUM SERPL-SCNC: 133 MMOL/L — LOW (ref 135–145)
WBC # BLD: 8.96 K/UL — SIGNIFICANT CHANGE UP (ref 3.8–10.5)
WBC # FLD AUTO: 8.96 K/UL — SIGNIFICANT CHANGE UP (ref 3.8–10.5)

## 2020-05-16 PROCEDURE — 99232 SBSQ HOSP IP/OBS MODERATE 35: CPT

## 2020-05-16 RX ORDER — SACUBITRIL AND VALSARTAN 24; 26 MG/1; MG/1
1 TABLET, FILM COATED ORAL
Qty: 60 | Refills: 0
Start: 2020-05-16 | End: 2020-06-14

## 2020-05-16 RX ORDER — POLYETHYLENE GLYCOL 3350 17 G/17G
17 POWDER, FOR SOLUTION ORAL ONCE
Refills: 0 | Status: COMPLETED | OUTPATIENT
Start: 2020-05-16 | End: 2020-05-16

## 2020-05-16 RX ORDER — LIDOCAINE HCL 20 MG/ML
1 VIAL (ML) INJECTION ONCE
Refills: 0 | Status: COMPLETED | OUTPATIENT
Start: 2020-05-16 | End: 2020-05-16

## 2020-05-16 RX ORDER — MAGNESIUM HYDROXIDE 400 MG/1
30 TABLET, CHEWABLE ORAL DAILY
Refills: 0 | Status: DISCONTINUED | OUTPATIENT
Start: 2020-05-16 | End: 2020-05-16

## 2020-05-16 RX ADMIN — CEFTRIAXONE 1000 MILLIGRAM(S): 500 INJECTION, POWDER, FOR SOLUTION INTRAMUSCULAR; INTRAVENOUS at 16:08

## 2020-05-16 RX ADMIN — Medication 25 MILLIGRAM(S): at 06:38

## 2020-05-16 RX ADMIN — Medication 81 MILLIGRAM(S): at 09:51

## 2020-05-16 RX ADMIN — CARVEDILOL PHOSPHATE 6.25 MILLIGRAM(S): 80 CAPSULE, EXTENDED RELEASE ORAL at 06:38

## 2020-05-16 RX ADMIN — POLYETHYLENE GLYCOL 3350 17 GRAM(S): 17 POWDER, FOR SOLUTION ORAL at 09:51

## 2020-05-16 RX ADMIN — Medication 20 MILLIEQUIVALENT(S): at 09:51

## 2020-05-16 RX ADMIN — MAGNESIUM HYDROXIDE 30 MILLILITER(S): 400 TABLET, CHEWABLE ORAL at 13:04

## 2020-05-16 RX ADMIN — Medication 1 MILLILITER(S): at 06:36

## 2020-05-16 RX ADMIN — Medication 650 MILLIGRAM(S): at 03:57

## 2020-05-16 RX ADMIN — POLYETHYLENE GLYCOL 3350 17 GRAM(S): 17 POWDER, FOR SOLUTION ORAL at 06:58

## 2020-05-16 RX ADMIN — SACUBITRIL AND VALSARTAN 1 TABLET(S): 24; 26 TABLET, FILM COATED ORAL at 06:38

## 2020-05-16 NOTE — DISCHARGE NOTE PROVIDER - NSDCMRMEDTOKEN_GEN_ALL_CORE_FT
aspirin 81 mg oral delayed release tablet: 1 tab(s) orally once a day  atorvastatin 20 mg oral tablet: 1 tab(s) orally once a day (at bedtime)  carvedilol 6.25 mg oral tablet: 1 tab(s) orally every 12 hours  chlorthalidone 25 mg oral tablet: 1 tab(s) orally once a day  potassium chloride 20 mEq oral tablet, extended release: 1 tab(s) orally once  rivaroxaban 20 mg oral tablet: 1 tab(s) orally once a day (before a meal)  sacubitril-valsartan 49 mg-51 mg oral tablet: 1 tab(s) orally 2 times a day MDD:no allergy  tamsulosin 0.4 mg oral capsule: 1 cap(s) orally once a day (at bedtime)

## 2020-05-16 NOTE — DISCHARGE NOTE PROVIDER - CARE PROVIDERS DIRECT ADDRESSES
[email or web address]

## 2020-05-16 NOTE — DISCHARGE NOTE PROVIDER - PROVIDER TOKENS
PROVIDER:[TOKEN:[[id number]]],PROVIDER:[TOKEN:[[id number]]],PROVIDER:[TOKEN:[[id number]]],PROVIDER:[TOKEN:[[id number]]]

## 2020-05-16 NOTE — DISCHARGE NOTE PROVIDER - NSDCCPCAREPLAN_GEN_ALL_CORE_FT
PRINCIPAL DISCHARGE DIAGNOSIS  Diagnosis: Urinary retention  Assessment and Plan of Treatment: this resolved w/ seo and you are now voiding without seo  - you completed antibiotics for uti  - kidney function improved  - **take flomax daily and follow up for repeat labs 1 week  - NO NSAIDS   - follow up with Urology        SECONDARY DISCHARGE DIAGNOSES  Diagnosis: LFTs abnormal  Assessment and Plan of Treatment: - improved but you should have repeat labs (check liver tests) in 1-2 weeks    Diagnosis: Chronic CHF  Assessment and Plan of Treatment: - your ejection fraction has improved from 30 to 45%  - **continue taking meds as prescribed- coreg, entresto, chlorthalidone  - follow up with Cardiology 1 week  - check daily weights, fluid restrict, low sodium diet  - you had stress test 5/14 not significantly changed from prior. continue home meds  - you have chronic afib/pacemaker - rate controlled.  Pacemaker interrogation- revealing two brief episodes of non sustained v tach in December of 2019.  Recommend routine follow up every three months and continue  Xarelto for stroke prevention

## 2020-05-16 NOTE — DISCHARGE NOTE NURSING/CASE MANAGEMENT/SOCIAL WORK - PATIENT PORTAL LINK FT
You can access the FollowMyHealth Patient Portal offered by [facility] by registering at the following website: [email or web address]. By joining [employer]’s FollowMyHealth portal, you will also be able to view your health information using other applications (apps) compatible with our system.

## 2020-05-16 NOTE — DISCHARGE NOTE PROVIDER - HOSPITAL COURSE
cc: abd distention    hpi: 74y male w/ pmh afib, sss s/p ppm, htn, cad, chronic systolic chf here w/ abd distention, urinary retention.    5/16- voided yesterday, seo remains out, voided again today- discussed w/ RN.   Had BM.  No complaints.  Stable for d/c home.     ros- as per hpi , 10 point ros negative    Vital Signs Last 24 Hrs    T(C): 36.1 (16 May 2020 11:02), Max: 36.7 (15 May 2020 21:23)    T(F): 96.9 (16 May 2020 11:02), Max: 98 (15 May 2020 21:23)    HR: 60 (16 May 2020 11:02) (60 - 60)    BP: 111/62 (16 May 2020 11:02) (111/62 - 137/86)    BP(mean): --    RR: 18 (16 May 2020 13:01) (18 - 18)    SpO2: 96% (16 May 2020 13:01) (96% - 98%) on room air            PHYSICAL EXAM:    General: NAD, comfortable    Neuro: AAOx3, no focal deficits    HEENT: NCAT, EOMI    Neck: Soft and supple    Respiratory: CTA b/l, no w/r/r    Cardiovascular: S1 and S2, RRR    Gastrointestinal: soft; non ttp     Extremities: No edema    Vascular: 2+ peripheral pulses    Musculoskeletal: 5/5 strength b/l UE and LE                LABS: All Labs Reviewed:                            15.7     8.96  )-----------( 164      ( 16 May 2020 07:26 )               47.0         05-16        133<L>  |  100  |  21    ----------------------------<  113<H>    4.0   |  25  |  1.01        Ca    9.2      16 May 2020 07:26    Phos  3.2     05-15        TPro  x   /  Alb  3.0<L>  /  TBili  x   /  DBili  x   /  AST  x   /  ALT  x   /  AlkPhos  x   05-15        MEDICATIONS  (STANDING):    aspirin enteric coated 81 milliGRAM(s) Oral daily    atorvastatin 20 milliGRAM(s) Oral at bedtime    carvedilol 6.25 milliGRAM(s) Oral every 12 hours    cefTRIAXone Injectable. 1000 milliGRAM(s) IV Push every 24 hours    cefTRIAXone Injectable.        chlorthalidone 25 milliGRAM(s) Oral daily    polyethylene glycol 3350 17 Gram(s) Oral daily    potassium chloride    Tablet ER 20 milliEquivalent(s) Oral daily    rivaroxaban 20 milliGRAM(s) Oral with dinner    sacubitril 49 mG/valsartan 51 mG 1 Tablet(s) Oral two times a day    tamsulosin 0.4 milliGRAM(s) Oral at bedtime        MEDICATIONS  (PRN):    acetaminophen   Tablet .. 650 milliGRAM(s) Oral every 6 hours PRN Mild Pain (1 - 3)    magnesium hydroxide Suspension 30 milliLiter(s) Oral daily PRN Constipation        ASSESSMENT AND PLAN:    1. ELIS due to urinary retention    - resolved w/ seo.  passed tov. pt now urinating.  continue flomax.      - US no hydro or stone.  Nephro and Urology f/u appreciated- outpt f/u.      2. UTI- completed iv abx.  No sx, no leukocytosis, afebrile    3. CAD hx- stress test 5/14 not significantly changed from prior. continue home meds, Cardio f/u appreciated - outpt f/u    4. chronic diastolic /systolic CHF- EF previously 30% and now 45%, mod to severe MR, PFO on echo.  GDMT started.  close f/u outpt     -5. chronic afib/ppm- rate controlled.  PPM interrogation- revealing two brief episodes of NSVT in December of 2019.  Recommend routine follow up every three months.    - cont. Xarelto for stroke ppx    - EP f/u appreciated     6. elevated lfts- improved, likely fatty liver disease.  close outpt f/u , repeat labs 1-2 weeks         stable for d/c time 75min

## 2020-05-16 NOTE — DISCHARGE NOTE PROVIDER - CARE PROVIDER_API CALL
Florentino Fritz  INTERNAL MEDICINE  [address] [place] [address]  Phone: [phone]  Fax: [phone]  Follow Up Time:     Aakash Walden  UROLOGY  [id number] [place] [address]  Phone: [phone]  Fax: [phone]  Follow Up Time:     Dayton Tomas  CARDIAC ELECTROPHYSIOLOGY  [address] [place] [address]  Phone: [phone]  Fax: [phone]  Follow Up Time:     Jai Foster  CARDIOVASCULAR DISEASE  [address] [place] [address]  Phone: [phone]  Fax: [phone]  Follow Up Time:

## 2020-05-17 ENCOUNTER — EMERGENCY (EMERGENCY)
Facility: HOSPITAL | Age: 75
LOS: 0 days | Discharge: HOME | End: 2020-05-18
Attending: EMERGENCY MEDICINE
Payer: MEDICARE

## 2020-05-17 VITALS — WEIGHT: 184.97 LBS | HEIGHT: 69 IN

## 2020-05-17 VITALS
DIASTOLIC BLOOD PRESSURE: 65 MMHG | SYSTOLIC BLOOD PRESSURE: 108 MMHG | TEMPERATURE: 98 F | RESPIRATION RATE: 18 BRPM | OXYGEN SATURATION: 98 % | HEART RATE: 87 BPM

## 2020-05-17 DIAGNOSIS — N40.1 BENIGN PROSTATIC HYPERPLASIA WITH LOWER URINARY TRACT SYMPTOMS: ICD-10-CM

## 2020-05-17 DIAGNOSIS — I11.0 HYPERTENSIVE HEART DISEASE WITH HEART FAILURE: ICD-10-CM

## 2020-05-17 DIAGNOSIS — I50.22 CHRONIC SYSTOLIC (CONGESTIVE) HEART FAILURE: ICD-10-CM

## 2020-05-17 DIAGNOSIS — J44.9 CHRONIC OBSTRUCTIVE PULMONARY DISEASE, UNSPECIFIED: ICD-10-CM

## 2020-05-17 DIAGNOSIS — R33.8 OTHER RETENTION OF URINE: ICD-10-CM

## 2020-05-17 DIAGNOSIS — Z88.8 ALLERGY STATUS TO OTHER DRUGS, MEDICAMENTS AND BIOLOGICAL SUBSTANCES: ICD-10-CM

## 2020-05-17 DIAGNOSIS — R33.9 RETENTION OF URINE, UNSPECIFIED: ICD-10-CM

## 2020-05-17 DIAGNOSIS — Z95.0 PRESENCE OF CARDIAC PACEMAKER: ICD-10-CM

## 2020-05-17 DIAGNOSIS — I48.91 UNSPECIFIED ATRIAL FIBRILLATION: ICD-10-CM

## 2020-05-17 DIAGNOSIS — E87.1 HYPO-OSMOLALITY AND HYPONATREMIA: ICD-10-CM

## 2020-05-17 DIAGNOSIS — Z95.0 PRESENCE OF CARDIAC PACEMAKER: Chronic | ICD-10-CM

## 2020-05-17 DIAGNOSIS — K59.00 CONSTIPATION, UNSPECIFIED: ICD-10-CM

## 2020-05-17 DIAGNOSIS — I44.7 LEFT BUNDLE-BRANCH BLOCK, UNSPECIFIED: ICD-10-CM

## 2020-05-17 DIAGNOSIS — E78.5 HYPERLIPIDEMIA, UNSPECIFIED: ICD-10-CM

## 2020-05-17 DIAGNOSIS — N17.9 ACUTE KIDNEY FAILURE, UNSPECIFIED: ICD-10-CM

## 2020-05-17 DIAGNOSIS — M19.90 UNSPECIFIED OSTEOARTHRITIS, UNSPECIFIED SITE: ICD-10-CM

## 2020-05-17 DIAGNOSIS — I25.119 ATHEROSCLEROTIC HEART DISEASE OF NATIVE CORONARY ARTERY WITH UNSPECIFIED ANGINA PECTORIS: ICD-10-CM

## 2020-05-17 DIAGNOSIS — G47.33 OBSTRUCTIVE SLEEP APNEA (ADULT) (PEDIATRIC): ICD-10-CM

## 2020-05-17 PROCEDURE — 36415 COLL VENOUS BLD VENIPUNCTURE: CPT

## 2020-05-17 PROCEDURE — 87086 URINE CULTURE/COLONY COUNT: CPT

## 2020-05-17 PROCEDURE — 99285 EMERGENCY DEPT VISIT HI MDM: CPT

## 2020-05-17 PROCEDURE — 99284 EMERGENCY DEPT VISIT MOD MDM: CPT | Mod: 25

## 2020-05-17 PROCEDURE — 81001 URINALYSIS AUTO W/SCOPE: CPT

## 2020-05-17 PROCEDURE — 80053 COMPREHEN METABOLIC PANEL: CPT

## 2020-05-17 PROCEDURE — 74176 CT ABD & PELVIS W/O CONTRAST: CPT

## 2020-05-17 PROCEDURE — 80048 BASIC METABOLIC PNL TOTAL CA: CPT

## 2020-05-17 PROCEDURE — 51702 INSERT TEMP BLADDER CATH: CPT

## 2020-05-17 PROCEDURE — 85025 COMPLETE CBC W/AUTO DIFF WBC: CPT

## 2020-05-18 VITALS — SYSTOLIC BLOOD PRESSURE: 130 MMHG | OXYGEN SATURATION: 99 % | DIASTOLIC BLOOD PRESSURE: 75 MMHG | HEART RATE: 60 BPM

## 2020-05-18 DIAGNOSIS — Q21.1 ATRIAL SEPTAL DEFECT: ICD-10-CM

## 2020-05-18 DIAGNOSIS — I11.0 HYPERTENSIVE HEART DISEASE WITH HEART FAILURE: ICD-10-CM

## 2020-05-18 DIAGNOSIS — N39.0 URINARY TRACT INFECTION, SITE NOT SPECIFIED: ICD-10-CM

## 2020-05-18 DIAGNOSIS — I44.7 LEFT BUNDLE-BRANCH BLOCK, UNSPECIFIED: ICD-10-CM

## 2020-05-18 DIAGNOSIS — I50.42 CHRONIC COMBINED SYSTOLIC (CONGESTIVE) AND DIASTOLIC (CONGESTIVE) HEART FAILURE: ICD-10-CM

## 2020-05-18 DIAGNOSIS — Z91.14 PATIENT'S OTHER NONCOMPLIANCE WITH MEDICATION REGIMEN: ICD-10-CM

## 2020-05-18 DIAGNOSIS — K76.0 FATTY (CHANGE OF) LIVER, NOT ELSEWHERE CLASSIFIED: ICD-10-CM

## 2020-05-18 DIAGNOSIS — R33.9 RETENTION OF URINE, UNSPECIFIED: ICD-10-CM

## 2020-05-18 DIAGNOSIS — I49.5 SICK SINUS SYNDROME: ICD-10-CM

## 2020-05-18 DIAGNOSIS — I25.10 ATHEROSCLEROTIC HEART DISEASE OF NATIVE CORONARY ARTERY WITHOUT ANGINA PECTORIS: ICD-10-CM

## 2020-05-18 DIAGNOSIS — N17.9 ACUTE KIDNEY FAILURE, UNSPECIFIED: ICD-10-CM

## 2020-05-18 DIAGNOSIS — Z95.0 PRESENCE OF CARDIAC PACEMAKER: ICD-10-CM

## 2020-05-18 DIAGNOSIS — I48.20 CHRONIC ATRIAL FIBRILLATION, UNSPECIFIED: ICD-10-CM

## 2020-05-18 DIAGNOSIS — K59.00 CONSTIPATION, UNSPECIFIED: ICD-10-CM

## 2020-05-18 LAB
ALBUMIN SERPL ELPH-MCNC: 3.4 G/DL — SIGNIFICANT CHANGE UP (ref 3.3–5)
ALP SERPL-CCNC: 94 U/L — SIGNIFICANT CHANGE UP (ref 40–120)
ALT FLD-CCNC: 53 U/L — SIGNIFICANT CHANGE UP (ref 12–78)
ANION GAP SERPL CALC-SCNC: 10 MMOL/L — SIGNIFICANT CHANGE UP (ref 5–17)
ANION GAP SERPL CALC-SCNC: 7 MMOL/L — SIGNIFICANT CHANGE UP (ref 5–17)
APPEARANCE UR: CLEAR — SIGNIFICANT CHANGE UP
AST SERPL-CCNC: 30 U/L — SIGNIFICANT CHANGE UP (ref 15–37)
BASOPHILS # BLD AUTO: 0.04 K/UL — SIGNIFICANT CHANGE UP (ref 0–0.2)
BASOPHILS NFR BLD AUTO: 0.6 % — SIGNIFICANT CHANGE UP (ref 0–2)
BILIRUB SERPL-MCNC: 1.1 MG/DL — SIGNIFICANT CHANGE UP (ref 0.2–1.2)
BILIRUB UR-MCNC: NEGATIVE — SIGNIFICANT CHANGE UP
BUN SERPL-MCNC: 20 MG/DL — SIGNIFICANT CHANGE UP (ref 7–23)
BUN SERPL-MCNC: 23 MG/DL — SIGNIFICANT CHANGE UP (ref 7–23)
CALCIUM SERPL-MCNC: 7.9 MG/DL — LOW (ref 8.5–10.1)
CALCIUM SERPL-MCNC: 8.5 MG/DL — SIGNIFICANT CHANGE UP (ref 8.5–10.1)
CHLORIDE SERPL-SCNC: 106 MMOL/L — SIGNIFICANT CHANGE UP (ref 96–108)
CHLORIDE SERPL-SCNC: 95 MMOL/L — LOW (ref 96–108)
CO2 SERPL-SCNC: 24 MMOL/L — SIGNIFICANT CHANGE UP (ref 22–31)
CO2 SERPL-SCNC: 25 MMOL/L — SIGNIFICANT CHANGE UP (ref 22–31)
COLOR SPEC: YELLOW — SIGNIFICANT CHANGE UP
CREAT SERPL-MCNC: 1.14 MG/DL — SIGNIFICANT CHANGE UP (ref 0.5–1.3)
CREAT SERPL-MCNC: 1.46 MG/DL — HIGH (ref 0.5–1.3)
DIFF PNL FLD: ABNORMAL
EOSINOPHIL # BLD AUTO: 0.16 K/UL — SIGNIFICANT CHANGE UP (ref 0–0.5)
EOSINOPHIL NFR BLD AUTO: 2.3 % — SIGNIFICANT CHANGE UP (ref 0–6)
GLUCOSE SERPL-MCNC: 116 MG/DL — HIGH (ref 70–99)
GLUCOSE SERPL-MCNC: 129 MG/DL — HIGH (ref 70–99)
GLUCOSE UR QL: NEGATIVE MG/DL — SIGNIFICANT CHANGE UP
HCT VFR BLD CALC: 41 % — SIGNIFICANT CHANGE UP (ref 39–50)
HGB BLD-MCNC: 13.5 G/DL — SIGNIFICANT CHANGE UP (ref 13–17)
IMM GRANULOCYTES NFR BLD AUTO: 0.3 % — SIGNIFICANT CHANGE UP (ref 0–1.5)
KETONES UR-MCNC: NEGATIVE — SIGNIFICANT CHANGE UP
LEUKOCYTE ESTERASE UR-ACNC: NEGATIVE — SIGNIFICANT CHANGE UP
LYMPHOCYTES # BLD AUTO: 0.76 K/UL — LOW (ref 1–3.3)
LYMPHOCYTES # BLD AUTO: 11.2 % — LOW (ref 13–44)
MCHC RBC-ENTMCNC: 31 PG — SIGNIFICANT CHANGE UP (ref 27–34)
MCHC RBC-ENTMCNC: 32.9 GM/DL — SIGNIFICANT CHANGE UP (ref 32–36)
MCV RBC AUTO: 94.3 FL — SIGNIFICANT CHANGE UP (ref 80–100)
MONOCYTES # BLD AUTO: 1.17 K/UL — HIGH (ref 0–0.9)
MONOCYTES NFR BLD AUTO: 17.2 % — HIGH (ref 2–14)
NEUTROPHILS # BLD AUTO: 4.66 K/UL — SIGNIFICANT CHANGE UP (ref 1.8–7.4)
NEUTROPHILS NFR BLD AUTO: 68.4 % — SIGNIFICANT CHANGE UP (ref 43–77)
NITRITE UR-MCNC: NEGATIVE — SIGNIFICANT CHANGE UP
PH UR: 6 — SIGNIFICANT CHANGE UP (ref 5–8)
PLATELET # BLD AUTO: 173 K/UL — SIGNIFICANT CHANGE UP (ref 150–400)
POTASSIUM SERPL-MCNC: 3.4 MMOL/L — LOW (ref 3.5–5.3)
POTASSIUM SERPL-MCNC: 3.7 MMOL/L — SIGNIFICANT CHANGE UP (ref 3.5–5.3)
POTASSIUM SERPL-SCNC: 3.4 MMOL/L — LOW (ref 3.5–5.3)
POTASSIUM SERPL-SCNC: 3.7 MMOL/L — SIGNIFICANT CHANGE UP (ref 3.5–5.3)
PROT SERPL-MCNC: 6.5 GM/DL — SIGNIFICANT CHANGE UP (ref 6–8.3)
PROT UR-MCNC: NEGATIVE MG/DL — SIGNIFICANT CHANGE UP
RBC # BLD: 4.35 M/UL — SIGNIFICANT CHANGE UP (ref 4.2–5.8)
RBC # FLD: 13 % — SIGNIFICANT CHANGE UP (ref 10.3–14.5)
SODIUM SERPL-SCNC: 129 MMOL/L — LOW (ref 135–145)
SODIUM SERPL-SCNC: 138 MMOL/L — SIGNIFICANT CHANGE UP (ref 135–145)
SP GR SPEC: 1.01 — SIGNIFICANT CHANGE UP (ref 1.01–1.02)
UROBILINOGEN FLD QL: NEGATIVE MG/DL — SIGNIFICANT CHANGE UP
WBC # BLD: 6.81 K/UL — SIGNIFICANT CHANGE UP (ref 3.8–10.5)
WBC # FLD AUTO: 6.81 K/UL — SIGNIFICANT CHANGE UP (ref 3.8–10.5)

## 2020-05-18 PROCEDURE — 74176 CT ABD & PELVIS W/O CONTRAST: CPT | Mod: 26

## 2020-05-18 RX ORDER — SODIUM CHLORIDE 9 MG/ML
1000 INJECTION INTRAMUSCULAR; INTRAVENOUS; SUBCUTANEOUS ONCE
Refills: 0 | Status: COMPLETED | OUTPATIENT
Start: 2020-05-18 | End: 2020-05-18

## 2020-05-18 RX ADMIN — SODIUM CHLORIDE 1000 MILLILITER(S): 9 INJECTION INTRAMUSCULAR; INTRAVENOUS; SUBCUTANEOUS at 01:43

## 2020-05-18 RX ADMIN — SODIUM CHLORIDE 1000 MILLILITER(S): 9 INJECTION INTRAMUSCULAR; INTRAVENOUS; SUBCUTANEOUS at 03:05

## 2020-05-18 NOTE — ED PROVIDER NOTE - SECONDARY DIAGNOSIS.
Acute renal failure, unspecified acute renal failure type Hyponatremia SHU (obstructive sleep apnea)

## 2020-05-18 NOTE — ED PROVIDER NOTE - PMH
AF (atrial fibrillation)    Arthritis    Chronic systolic heart failure    COPD (chronic obstructive pulmonary disease)    Coronary artery disease involving native heart with angina pectoris, unspecified vessel or lesion type    Fatigue    HLD (hyperlipidemia)    HTN (hypertension)    Hypokinesis  mild global hypokinesis  LBBB (left bundle branch block)    LV dysfunction    Pacemaker  single chamber meditronic  placed by Dr Tomas  May  of  2016

## 2020-05-18 NOTE — ED ADULT NURSE NOTE - NS_ED_NURSE_TEACHING_TOPIC_ED_A_ED
teaching performed by  who assisted pt with translation in Frisian and education, pt had difficulty with translation  and all questions addressed with pt at this time./

## 2020-05-18 NOTE — ED PROVIDER NOTE - NSFOLLOWUPCLINICS_GEN_ALL_ED_FT
[facility]  Family Medicine  [address] [place] [address]  Phone: [phone]  Fax:   Follow Up Time:

## 2020-05-18 NOTE — ED PROVIDER NOTE - CONSTITUTIONAL, MLM
normal... Uncomfortable appearing, difficult to sit still, awake, alert, oriented to person, place, time/situation.

## 2020-05-18 NOTE — ED ADULT NURSE NOTE - OBJECTIVE STATEMENT
Pt presents to er with complaints of urinary retention with history of having seo catheter and removed 2 days ago, pt states he has not urinated since 9am yesterday, seo catheter inserted utilizing sterile technique with removal of 12oocc'ss of clear/yellow urine, safety maintained, will continue to monitor.    -[id number]

## 2020-05-18 NOTE — ED PROVIDER NOTE - PATIENT PORTAL LINK FT
You can access the FollowMyHealth Patient Portal offered by [facility] by registering at the following website: [email or web address]. By joining [employer]’s FollowMyHealth portal, you will also be able to view your health information using other applications (apps) compatible with our system.

## 2020-05-18 NOTE — ED PROVIDER NOTE - NSFOLLOWUPINSTRUCTIONS_ED_ALL_ED_FT
Cuidados para un catéter urinario permanente, en adultos  Indwelling Urinary Catheter Care, Adult  Un catéter urinario permanente es un tubo james que se inserta en la vejiga. Kisha tubo ayuda a eliminar el pis (la orina) del cuerpo. El tubo entra a través de la uretra. La uretra es por donde sale la orina del cuerpo. La orina saldrá a través del catéter hacia simona bolsa (bolsa de drenaje).  Cuide jai el catéter de modo que funcione jai.  Cómo usar el catéter y la bolsa  Materiales necesarios     Cinta adhesiva o simona bauman para pierna.Paño con alcohol o agua y jabón (si usa cinta adhesiva).Simona toalla limpia (si usa cinta adhesiva).Simona bolsa angelina para la noche.Simona bolsa pequeña (bolsa de pierna).Para usar el catéter     Sujete el catéter a la pierna con cinta adhesiva o simona bauman para pierna.  Asegúrese de que el catéter no esté tirante.Si la bauman para pierna se humedece, retírela y reemplácela por simona seca.Si usa cinta adhesiva para sostener la bolsa en la pierna:  Use un paño con alcohol o agua y jabón para elisha la piel donde haya restos de adhesivo de la cinta adhesiva previa.Use simona toalla limpia para secar la piel sin frotar.Use simona nueva cinta adhesiva para sujetar la bolsa a gongora pierna.Para usar las bolsas    Se le tiene que wilfred entregado simona bolsa angelina para la noche.  Puede usar la bolsa para la noche beth el día o la noche.Siempre tenga la bolsa para la noche por debajo de la vejiga. No deje que la bolsa toque el suelo.Antes de ir a dormir, coloque simona bolsa de plástico limpia en un cesto de basura. Luego, cuelgue la bolsa para la noche dentro del cesto de basura.También debe tener simona bolsa de pierna más pequeña para usar debajo de la ropa.  Use siempre la bolsa de pierna por debajo de gongora rodilla.No use la bolsa de pierna a la noche.Cómo cuidar la piel y el catéter  Materiales necesarios     Un paño limpio.Agua y jabón suave.Simona toalla limpia.Para cuidar la piel y el catéter         Limpie la piel alrededor del catéter todos los días:  Lávese las eric con agua y jabón.Moje un paño limpio con agua tibia y jabón suave.Limpie la piel alrededor de la uretra.  Si es noah:  Donn suavemente los pliegues de piel alrededor de la vagina (labios de la vulva).Con el paño en la otra mano, limpie el lado interior de los labios de la vulva de cada lado. Higienícese de adelante hacia atrás.Si es hombre:  Empuje hacia atrás la piel que cubre el extremo del pene (prepucio).Con el paño en la otra mano, limpie el pene haciendo círculos pequeños. Comience a limpiar la punta del pene, luego limpie alejándose del catéter.Mueva el prepucio a gongora lugar, si es necesario.Con la otra mano, sostenga el catéter cerca de donde entra en el cuerpo.  Siga sosteniendo el catéter mientras limpia la jessika de modo de no tirar y sacarlo.Con el paño en la otra mano, limpie el catéter.  Solo limpie el catéter con movimientos hacia abajo.No limpie hacia arriba, hacia el cuerpo. [place] puede hacer que ingresen gérmenes en la uretra y provocar simona infección.Use simona toalla limpia para secar el catéter y la piel a gongora alrededor sin frotar. Asegúrese de eliminar todos los restos de jabón.Lávese las eric con agua y jabón.Dúchese a diario. No tome connie de inmersión.No use cremas, ungüentos o lociones en la jessika donde el catéter ingresa en el cuerpo, a menos que se lo indique el médico.No use talcos, aerosoles ni lociones en la jessika genital.Controle la piel alrededor del catéter todos los días para detectar signos de infección. Esté atento a los siguientes signos:  Enrojecimiento, hinchazón o dolor.Líquido o tom.Calor.Pus o mal olor.Cómo vaciar la bolsa  Materiales necesarios     Alcohol rectificado.Apósito de gasa o trace de algodón.Cinta adhesiva o bauman para pierna.Para vaciar la bolsa     Saque la orina de la bolsa cuando esta se haya llenado hasta la mitad, o por lo menos 2 o 3 veces al día. Katie esto para la bolsa para la noche y la bolsa de pierna.  Lávese las eric con agua y jabón.  Separe (desprenda) la bolsa de gongora pierna.  Sostenga la bolsa sobre el inodoro o simona cubeta limpia. Mantenga la bolsa por debajo de las caderas y vejiga. [place] es para que el pis (la orina) no vuelva a ingresar al tubo.  Donn el baltazar vertedor. Se encuentra en el fondo de la bolsa.  Vacíe la orina en el inodoro o cubeta. No permita que el baltazar vertedor toque ninguna superficie.  Coloque alcohol rectificado en el apósito de gasa o la trace de algodón.  Use el apósito de gasa o la trace de algodón para limpiar el baltazar vertedor.  Cierre el baltazar vertedor.  Sujete la bolsa a gongora pierna con cinta adhesiva o simona bauman para pierna.  Lávese las eric con agua y jabón.  Siga las instrucciones para limpiar la bolsa de drenaje:  Del fabricante del producto.Melody se lo haya indicado el médico.Cómo cambiar la bolsa  Materiales necesarios     Paños con alcohol.Simona bolsa limpia.Cinta adhesiva o bauman para pierna.Para cambiar la bolsa     Reemplácela cuando comience a gotear, oler mal o tener un aspecto sucio.  Lávese las eric con agua y jabón.  Separe la bolsa sucia de gongora pierna.  Apriete el catéter con los dedos para que no se derrame la orina.  Separe el tubo del catéter del tubo de la bolsa en donde se conectan ambos tubos (en la válvula de conexión). No permita que las sondas toquen ninguna superficie.  Limpie el extremo del catéter con un paño con alcohol. Use otro paño con alcohol para limpiar el extremo del tubo de la bolsa.  Conecte el tubo del catéter al tubo de la bolsa limpia.  Sujete la bolsa limpia a gongora pierna con cinta adhesiva o simona bauman para pierna. No ajuste demasiado la bolsa sobre la pierna.  Lávese las eric con agua y jabón.  Normas generales     Nunca tire del catéter. Nunca intente sacarlo. Hacer eso podría lastimarlo.Lávese siempre las eric antes y después de tocar el catéter o la bolsa. Use un jabón suave y sin perfume. Use un desinfectante para eric si no dispone de agua y jabón.Asegúrese siempre de que no haya torceduras (pliegues) en el tubo del catéter.Asegúrese siempre de que no haya filtraciones en el catéter ni en la bolsa.[place] suficiente líquido melody para mantener la orina de color amarillo pálido.No tome connie de inmersión, no practique natación ni utilice el jacuzzi.Si es noah, higienícese de adelante hacia atrás después de defecar (tener simona deposición).Comuníquese con un médico si:  Gongora orina es turbia.La orina huele peor que lo habitual.El catéter se obstruye.El catéter gotea.Siente que gongora vejiga está llena.Solicite ayuda inmediatamente si:  Tiene enrojecimiento, hinchazón o dolor donde el catéter ingresa en el cuerpo.Tiene líquido, tom, pus o mal olor del área donde el catéter ingresa en el cuerpo.La piel del área donde el catéter ingresa en el cuerpo se siente caliente.Tiene fiebre.Siente dolor en:  El vientre (abdomen).Las piernas.La parte inferior de la espalda.La vejiga.Observa tom en el catéter.Gongora orina es de color eugene o carr.Siente malestar estomacal (náuseas).Vomita.Tiene escalofríos.Gongora orina no drena dentro de la bolsa.El catéter se sale del lugar.Resumen  Un catéter urinario permanente es un tubo james que se coloca en el interior de la vejiga para ayudar a drenar el pis (orina) fuera del cuerpo. El catéter se coloca dentro de la parte del cuerpo que drena la orina de la vejiga (uretra).Si cuida adecuadamente del catéter, hará que funcione de manera correcta y ayudará a evitar que surjan problemas.Lávese siempre las eric antes y después de tocar el catéter o la bolsa.Nunca tire del catéter ni trate de quitarlo.Esta información no tiene melody fin reemplazar el consejo del médico. Asegúrese de hacerle al médico cualquier pregunta que tenga.          Hidronefrosis  Hydronephrosis     La hidronefrosis es el agrandamiento de david o ambos riñones debido a simona obstrucción que impide la evacuación de orina del organismo. Los riñones filtran los desechos de la tom y producen orina. Esta afección puede provocar insuficiencia renal y ser potencialmente mortal si no se trata de inmediato.  ¿Cuáles son las causas?  Las causas más frecuentes de esta afección incluyen las siguientes:  Problemas que ocurren cuando un bebé se está desarrollando en el útero (defectos congénitos). Estos pueden incluir problemas:  En los riñones.En los conductos que drenan orina desde los riñones hasta la vejiga (uréteres).Cálculos renales.Infección de la vejiga.Agrandamiento de la próstata.Tejido cicatricial por simona cirugía o lesión anterior.Un coágulo de tom.Un tumor o quiste en el abdomen o la pelvis.Cáncer de próstata, vejiga, útero, ovario o colon.¿Cuáles son los signos o los síntomas?  Los síntomas de esta afección incluyen los siguientes:  Dolor o molestias al costado (en fosa renal).Dolor e hinchazón en el abdomen.Náuseas y vómitos.Fiebre.Dolor al orinar.Sensación de urgencia cuando tiene que orinar.Orinar con mayor frecuencia que lo normal.En determinados casos, es posible no tener síntomas.  ¿Cómo se diagnostica?  Esta afección se puede diagnosticar en función de lo siguiente:  Los síntomas y antecedentes médicos.Un examen físico.Análisis de tom y orina.Estudios de diagnóstico por imágenes, melody ecografía, exploración por tomografía computarizada (TC) o resonancia magnética (RM).Un procedimiento en el que se inserta un citoscopio en el interior de la uretra para observar partes de las vías urinarias y la vejiga (cistoscopia).¿Cómo se trata?  El tratamiento de esta afección depende del lugar de la obstrucción, el tiempo que ha estado allí y qué la causó. El objetivo del tratamiento es eliminar la obstrucción. El tratamiento puede incluir lo siguiente:  Antibióticos para tratar o prevenir la infección.Un procedimiento para colocar un pequeño tubo james (stent) en el uréter obstruido. El stent mantendrá abierto el uréter, de modo que la orina pueda drenar a través de él.Un procedimiento no quirúrgico que destruye los cálculos renales con ondas de choque (litotricia extracorpórea por ondas de choque).Si se produce insuficiencia renal, el tratamiento puede incluir diálisis o un trasplante de riñón.Siga estas indicaciones en gongora casa:     [place] los medicamentos de venta klarissa y los recetados solamente melody se lo haya indicado el médico.Descanse y retome dunia actividades normales melody se lo haya indicado el médico. Pregúntele al médico qué actividades son seguras para usted.[place] suficiente líquido melody para mantener la orina de color amarillo pálido.Si le recetaron un antibiótico, tómelo exactamente melody se lo haya indicado el médico. No deje de lesly el antibiótico aunque comience a sentirse mejor.Concurra a todas las visitas de control melody se lo haya indicado el médico. [place] es importante.Comuníquese con un médico si:  Continúa teniendo síntomas después del tratamiento.Presenta nuevos síntomas.Orina de color turbio o con tom.Tiene fiebre.Solicite ayuda de inmediato si:  Siente dolor intenso en la fosa renal o en el abdomen.No puede beber líquidos sin vomitar.Resumen  La hidronefrosis es el agrandamiento de david o ambos riñones debido a simona obstrucción que impide la evacuación de orina del organismo.La hidronefrosis puede provocar insuficiencia renal y ser potencialmente mortal si no se trata de inmediato.El objetivo del tratamiento es tratar la causa de la obstrucción. Puede incluir la inserción de un stent en el uréter obstruido, un procedimiento para tratar los cálculos renales y antibióticos.Siga las indicaciones del médico para cuidarse en gongora casa, incluidas las indicaciones respecto de la ingesta de líquidos, lesly los medicamentos y limitar las actividades.Esta información no tiene melody fin reemplazar el consejo del médico. Asegúrese de hacerle al médico cualquier pregunta que tenga.        Apnea del sueño  Sleep Apnea  La apnea del sueño afecta la respiración mientras se duerme. Hace que la respiración se detenga por poco tiempo o se vuelva superficial. También puede aumentar el riesgo de:  Infarto de miocardio.Accidente cerebrovascular.Tener mucho sobrepeso (obesidad).Diabetes.Insuficiencia cardíaca.Latidos cardíacos irregulares.El objetivo del tratamiento es ayudarle a respirar normalmente otra vez.  ¿Cuáles son las causas?  Existen katelyn tipos de apnea del sueño:  Apnea obstructiva del sueño. Esta ocurre cuando las vías respiratorias se obstruyen o colapsan.Apnea central del sueño. Esta ocurre cuando el cerebro no envía las señales correctas a los músculos que controlan la respiración.Apnea mixta del sueño. Esta es simona combinación de apnea obstructiva y central del sueño.La causa más frecuente de esta afección es la obstrucción o el colapso de las vías respiratorias. [place] puede suceder si:  Los músculos de la garganta están demasiado relajados.Tiene la lengua y las amígdalas demasiado grandes.Tiene sobrepeso.Tiene las vías respiratorias demasiado pequeñas.¿Qué incrementa el riesgo?  Tener sobrepeso.Fumar.Tener vías respiratorias pequeñas.El envejecimiento.Ser hombre.El consumo de alcohol.Lesly medicamentos para calmarse (sedantes o tranquilizantes).Tener familiares con esta afección.¿Cuáles son los signos o los síntomas?  Dificultad para permanecer dormido.Estar somnoliento o cansado beth el día.Enojarse mucho.Ronquidos mylene.Dolor de vasyl por la mañana.Imposibilidad de enfocar la mente (concentrarse).Olvidar cosas.Menos interés por el sexo.Cambios en el estado de ánimo.Cambios en la personalidad.Sentimientos de tristeza (depresión).Levantarse mucho beth la noche para ir a orinar.Sequedad en la boca.Dolor de garganta.¿Cómo se diagnostica?  Dunia antecedentes médicos.Un examen físico.Simona prueba que se realiza mientras la persona duerme (estudio del sueño). La prueba se realiza con mayor frecuencia en un laboratorio del sueño, minesh también puede realizarse en el hogar.¿Cómo se trata?     Dormir de costado.Usar un medicamento para eliminar la mucosidad de la nariz (descongestivo).Evitar el consumo de alcohol, medicamentos que ayudan a relajarse o ciertos analgésicos (narcóticos).Bajar de peso, si es necesario.Cambios en la dieta.No fumar.Usar simona máquina para abrir las vías respiratorias mientras duerme; por ejemplo:  Un aparato bucal. Se trata de simona boquilla que desplaza la mandíbula hacia adelante.Un dispositivo CPAP. Kisha dispositivo sopla aire a través de simona máscara cuando usted exhala.Un dispositivo EPAP. Kisha tiene válvulas que se colocan en cada fosa nasal.Un dispositivo BPAP. Kisha dispositivo sopla aire a través de simona máscara cuando usted inhala y exhala.Someterse a cirugía si los demás tratamientos no resultan eficaces.Realizar un tratamiento para la apnea del sueño es importante. Sin tratamiento, esta afección puede derivar en lo siguiente:  Presión arterial marilou.Arteriopatía coronaria.En los hombres, no poder tener simona erección (impotencia).Reducción de la capacidad de pensar.Siga estas instrucciones en gongora casa:  Estilo de latosha     Katie los cambios que le haya recomendado el médico.Siga simona dieta saludable.Baje de peso, si es necesario.Evite el alcohol, los medicamentos para relajarse y algunos analgésicos.No consuma ningún producto que contenga nicotina o tabaco, melody cigarrillos, cigarrillos electrónicos y tabaco de mascar. Si necesita ayuda para dejar de fumar, consulte al médico.Instrucciones generales     [place] los medicamentos de venta klarissa y los recetados solamente melody se lo haya indicado el médico.Si le proporcionaron simona máquina para usar mientras duerme, úsela solamente melody se lo haya indicado el médico.Si va a someterse a simona cirugía, no olvide informarle al médico que tiene apnea del sueño. Puede ser necesario que lleve gongora dispositivo consigo.Concurra a todas las visitas de seguimiento melody se lo haya indicado el médico. [place] es importante.Comuníquese con un médico si:  El dispositivo que le proporcionaron para usar mientras duerme le molesta o parece no funcionar.No se siente mejor.Empeora.Solicite ayuda inmediatamente si:  Le duele el pecho.Tiene dificultad para inhalar suficiente aire.Tiene molestias en la espalda, en los brazos o en el estómago.Tiene dificultad para hablar.Siente debilidad en un lado del cuerpo.Se le  un lado de la jesenia.Estos síntomas pueden indicar simona emergencia. No espere a ethan si los síntomas desaparecen. Solicite atención médica de inmediato. Comuníquese con el servicio de emergencias de gongora localidad (911 en los [place]). No conduzca por dunia propios medios hasta el hospital.   Resumen  Esta afección afecta la respiración beth el sueño.La causa más frecuente es la obstrucción o el colapso de las vías respiratorias.El objetivo del tratamiento es ayudarlo a respirar normalmente mientras duerme.Esta información no tiene melody fin reemplazar el consejo del médico. Asegúrese de hacerle al médico cualquier pregunta que tenga.

## 2020-05-18 NOTE — ED PROVIDER NOTE - NS ED MD DISPO DISCHARGE CCDA
SUBJECTIVE:   Lynda Cohen is a 35 year old female who presents to clinic today for the following health issues:      Medication Followup of  Zoloft     Taking Medication as prescribed: yes    Side Effects:  None    Medication Helping Symptoms:  yes       Has been on zoloft for 17 years.  Problems with OCD when was younger; tried to go off it when pregnant, but symptoms recurred;  Is now. On 200 mg daily.    No side effects.  Sleeping well.  Occasionally gets a headache if symptoms worsen.      No suicidal ideation or homicidal ideation.     Problem list and histories reviewed & adjusted, as indicated.  Additional history: as documented    Patient Active Problem List   Diagnosis     Generalized anxiety disorders     Blood type A+     Obsessive-compulsive disorder, unspecified type     Past Surgical History:   Procedure Laterality Date      SECTION  2012    Procedure:  SECTION;   Section.;  Surgeon: Juancarlos Lezama MD;  Location:  [facility]       Social History   Substance Use Topics     Smoking status: Never Smoker     Smokeless tobacco: Never Used     Alcohol use No     Family History   Problem Relation Age of Onset     Lipids Mother      Lipids Brother      C.A.D. Maternal Grandfather      quintuple bypass.  CHF     Blood Disease Paternal Grandfather      Non-Hodgkins Lymphoma     Alzheimer Disease Maternal Aunt      late 50s     DIABETES Brother      Type 2         Current Outpatient Prescriptions   Medication Sig Dispense Refill     Fexofenadine HCl (ALLEGRA PO)        norethindrone-ethinyl estradiol (NECON) 0.5-35 MG-MCG per tablet Take 1 tablet by mouth daily Skip placebo pills for 3 months. 108 tablet 3     sertraline (ZOLOFT) 100 MG tablet Take 2 tablets (200 mg) by mouth daily 30 tablet 0     [DISCONTINUED] sertraline (ZOLOFT) 100 MG tablet TAKE 2 TABLETS DAILY 60 tablet 0     [DISCONTINUED] sertraline (ZOLOFT) 100 MG tablet Take 2 tablets (200 mg) by mouth daily 180  tablet 3     Allergies   Allergen Reactions     Caffeine      Codeine Sulfate      Vicodin [Hydrocodone-Acetaminophen]      BP Readings from Last 3 Encounters:   05/03/18 124/82   06/15/17 134/84   03/07/17 112/74    Wt Readings from Last 3 Encounters:   05/03/18 141 lb (64 kg)   06/15/17 136 lb 9.6 oz (62 kg)   03/07/17 136 lb 1.6 oz (61.7 kg)                  Labs reviewed in EPIC    Reviewed and updated as needed this visit by clinical staff       Reviewed and updated as needed this visit by Provider         ROS:  CONSTITUTIONAL: NEGATIVE for fever, chills, change in weight  ENT/MOUTH: NEGATIVE for ear, mouth and throat problems  RESP: NEGATIVE for significant cough or SOB  CV: NEGATIVE for chest pain, palpitations or peripheral edema    OBJECTIVE:                                                    /82  Pulse 92  Temp 98.1  F (36.7  C) (Oral)  Ht 5' (1.524 m)  Wt 141 lb (64 kg)  SpO2 97%  BMI 27.54 kg/m2  Body mass index is 27.54 kg/(m^2).   GENERAL: healthy, alert, well nourished, well hydrated, no distress  HENT: ear canals- normal; TMs- normal; Nose- normal; Mouth- no ulcers, no lesions  NECK: no tenderness, no adenopathy, no asymmetry, no masses, no stiffness; thyroid- normal to palpation  RESP: lungs clear to auscultation - no rales, no rhonchi, no wheezes  CV: regular rates and rhythm, normal S1 S2, no S3 or S4 and no murmur, no click or rub -  ABDOMEN: soft, no tenderness, no  hepatosplenomegaly, no masses, normal bowel sounds    Diagnostic test results:  Diagnostic Test Results:  none      ASSESSMENT/PLAN:                                                    1. Generalized anxiety disorders  Refilled. Doing well on current dosing. No active issues.    - sertraline (ZOLOFT) 100 MG tablet; Take 2 tablets (200 mg) by mouth daily  Dispense: 30 tablet; Refill: 0    2. Obsessive-compulsive disorder, unspecified type  As above.    return to clinic 1 year for complete physcial exam; needs pap smear at  [facility] next year.      See Patient Instructions    Jcarlos Mcclelland MD  [facility]     Patient/Caregiver provided printed discharge information.

## 2020-05-18 NOTE — ED ADULT NURSE NOTE - CHIEF COMPLAINT QUOTE
unable to urinate since yesterday 9am. Discharged from  4/16. hx urinary retention. Powers removed 2 days ago. Denies fever/chills.

## 2020-05-18 NOTE — ED PROVIDER NOTE - CARE PROVIDER_API CALL
Aakash Walden  UROLOGY  [address] [address] [place], [place] [address]  Phone: [phone]  Fax: [phone]  Follow Up Time:     Florentino Gutierrez  INTERNAL MEDICINE  [address] [place] [id number]  Phone: [phone]  Fax: [phone]  Follow Up Time:

## 2020-05-18 NOTE — ED PROVIDER NOTE - CARE PLAN
Principal Discharge DX:	Urinary retention due to benign prostatic hyperplasia  Secondary Diagnosis:	Acute renal failure, unspecified acute renal failure type  Secondary Diagnosis:	Hyponatremia  Secondary Diagnosis:	SHU (obstructive sleep apnea)

## 2020-05-18 NOTE — ED PROVIDER NOTE - NS ED MD DISPO DISCHARGE
Detail Level: Zone
Continue Regimen: Sulfacleanse: Wash face 1-2 times daily \\nMetronidazole 1% gel: Apply to face once daily\\nMinocycline 45mg: Take one pill by mouth once daily
Initiate Treatment: Rhofade cream: apply to face in the morning as needed for redness
Home

## 2020-05-18 NOTE — ED PROVIDER NOTE - CLINICAL SUMMARY MEDICAL DECISION MAKING FREE TEXT BOX
Pt with urinary retention immediately after Powers removal. New Pwoers to be placed, labs and urine to be sent, and pt to f/u with his urologist.

## 2020-05-18 NOTE — ED ADULT NURSE REASSESSMENT NOTE - NS ED NURSE REASSESS COMMENT FT1
Pt observed with apneic episode and snoring while sleeping, pulse-ox decreased to 82% on ra and increased to 92% after episode, pt not on cpap at home,  notified, pt placed on 2Lo2 NC, pulse-ox now 100%, safety maintained, will continue to monitor.

## 2020-05-18 NOTE — ED PROVIDER NOTE - CARE PROVIDERS DIRECT ADDRESSES
[email or web address]

## 2020-05-18 NOTE — ED PROVIDER NOTE - OBJECTIVE STATEMENT
75 y/o male with PMHx of Afib, HTN, HLD, CAD, CHF, COPD, arthritis and s/p cardiac pacemaker presents to the ED c/o +urinary retention x2 days. Endorses +constipation as well. Pt was admitted to  5/11-5/16 for ARF, urinary retention, fluid overload, and elevated troponin. Had Powers removed on 5/16, was initially able to void so was d/c home but since returning home has not been able to urinate. No fever, chills, chest pain, or SOB. PTA only able to void scant amounts of urine. Allergic to amiodarone and ACE inhibitors.

## 2020-05-18 NOTE — ED PROVIDER NOTE - PROGRESS NOTE DETAILS
Pt. fell asleep in ED.  Snoring loudly with fluctuating O2 sat down to 86%.  Waking patient up brings O2 sat to 97% RA.  Patient likely with SHU, will refer patient for pulm evaluation for CPAP. LOWELLG:  I speak Ecuadorean and was able to get history and discuss results with patient , but also used  for assistance. JG:  Creatinine and sodium level improved after IVFs.  Patient to be discharged home.  Will have f/u with Dr. Fritz, Dr. Gutierrez from nephrology and Dr. Walden as seo needed re-insertion due to urinary retention.

## 2020-05-18 NOTE — ED ADULT NURSE NOTE - NSFALLRSKASSESASSIST_ED_ALL_ED
no

## 2020-05-19 ENCOUNTER — APPOINTMENT (OUTPATIENT)
Dept: UROLOGY | Facility: CLINIC | Age: 75
End: 2020-05-19
Payer: MEDICARE

## 2020-05-19 LAB
CULTURE RESULTS: NO GROWTH — SIGNIFICANT CHANGE UP
CULTURE RESULTS: SIGNIFICANT CHANGE UP
CULTURE RESULTS: SIGNIFICANT CHANGE UP
SPECIMEN SOURCE: SIGNIFICANT CHANGE UP

## 2020-05-19 PROCEDURE — 99214 OFFICE O/P EST MOD 30 MIN: CPT

## 2020-05-19 NOTE — PHYSICAL EXAM
[General Appearance - Well Developed] : well developed [General Appearance - Well Nourished] : well nourished [General Appearance - In No Acute Distress] : no acute distress [Normal Appearance] : normal appearance [Well Groomed] : well groomed [Edema] : no peripheral edema [Abdomen Soft] : soft [Exaggerated Use Of Accessory Muscles For Inspiration] : no accessory muscle use [Respiration, Rhythm And Depth] : normal respiratory rhythm and effort [Abdomen Tenderness] : non-tender [Costovertebral Angle Tenderness] : no ~M costovertebral angle tenderness [Urethral Meatus] : meatus normal [Urinary Bladder Findings] : the bladder was normal on palpation [Scrotum] : the scrotum was normal [Testes Mass (___cm)] : there were no testicular masses [No Prostate Nodules] : no prostate nodules [FreeTextEntry1] : Prostate is small to moderate in size and asymmetric with the right side being somewhat larger. [Normal Station and Gait] : the gait and station were normal for the patient's age [Oriented To Time, Place, And Person] : oriented to person, place, and time [No Focal Deficits] : no focal deficits [] : no rash [Not Anxious] : not anxious [Affect] : the affect was normal [Mood] : the mood was normal [No Palpable Adenopathy] : no palpable adenopathy

## 2020-05-19 NOTE — END OF VISIT
[FreeTextEntry3] : He will start bethanechol and tamsulosin will follow-up with a urodynamic study in 10 days.  A PSA sent as well

## 2020-05-19 NOTE — HISTORY OF PRESENT ILLNESS
[FreeTextEntry1] : This patient presents having had a catheter placed several days ago.  He states that he had a period of urgency and slowing stream leading up to this.

## 2020-06-09 ENCOUNTER — APPOINTMENT (OUTPATIENT)
Dept: UROLOGY | Facility: CLINIC | Age: 75
End: 2020-06-09
Payer: MEDICARE

## 2020-06-09 VITALS — DIASTOLIC BLOOD PRESSURE: 97 MMHG | SYSTOLIC BLOOD PRESSURE: 170 MMHG | OXYGEN SATURATION: 100 % | HEART RATE: 60 BPM

## 2020-06-09 PROCEDURE — 51784 ANAL/URINARY MUSCLE STUDY: CPT

## 2020-06-09 PROCEDURE — 51797 INTRAABDOMINAL PRESSURE TEST: CPT

## 2020-06-09 PROCEDURE — 51728 CYSTOMETROGRAM W/VP: CPT

## 2020-06-09 PROCEDURE — 51741 ELECTRO-UROFLOWMETRY FIRST: CPT

## 2020-06-16 ENCOUNTER — APPOINTMENT (OUTPATIENT)
Dept: UROLOGY | Facility: CLINIC | Age: 75
End: 2020-06-16
Payer: MEDICARE

## 2020-06-16 VITALS
SYSTOLIC BLOOD PRESSURE: 164 MMHG | DIASTOLIC BLOOD PRESSURE: 100 MMHG | TEMPERATURE: 98 F | HEART RATE: 60 BPM | OXYGEN SATURATION: 99 %

## 2020-06-16 PROCEDURE — 51741 ELECTRO-UROFLOWMETRY FIRST: CPT

## 2020-06-16 PROCEDURE — 76857 US EXAM PELVIC LIMITED: CPT

## 2020-06-16 PROCEDURE — 76872 US TRANSRECTAL: CPT

## 2020-06-23 LAB — PSA SERPL-MCNC: 5.5 NG/ML

## 2020-07-07 ENCOUNTER — APPOINTMENT (OUTPATIENT)
Dept: UROLOGY | Facility: CLINIC | Age: 75
End: 2020-07-07
Payer: MEDICARE

## 2020-07-07 VITALS
HEART RATE: 60 BPM | OXYGEN SATURATION: 95 % | TEMPERATURE: 98 F | WEIGHT: 203 LBS | SYSTOLIC BLOOD PRESSURE: 167 MMHG | DIASTOLIC BLOOD PRESSURE: 90 MMHG | HEIGHT: 67 IN | BODY MASS INDEX: 31.86 KG/M2

## 2020-07-07 PROCEDURE — 51798 US URINE CAPACITY MEASURE: CPT

## 2020-07-07 PROCEDURE — 99213 OFFICE O/P EST LOW 20 MIN: CPT | Mod: 25

## 2020-07-08 NOTE — HISTORY OF PRESENT ILLNESS
[FreeTextEntry1] : Patient has been on treatment for prostatism and had a large PVR.  Today 1 hour post void his postvoid residual was 37 cc.

## 2020-07-08 NOTE — PHYSICAL EXAM
[General Appearance - Well Developed] : well developed [General Appearance - Well Nourished] : well nourished [Well Groomed] : well groomed [Normal Appearance] : normal appearance [General Appearance - In No Acute Distress] : no acute distress [Abdomen Soft] : soft [Abdomen Tenderness] : non-tender [Costovertebral Angle Tenderness] : no ~M costovertebral angle tenderness [Urinary Bladder Findings] : the bladder was normal on palpation [Scrotum] : the scrotum was normal [Urethral Meatus] : meatus normal [No Prostate Nodules] : no prostate nodules [Testes Mass (___cm)] : there were no testicular masses [FreeTextEntry1] : Prostate is small to moderate in size and asymmetric with the right side being somewhat larger. [Edema] : no peripheral edema [Exaggerated Use Of Accessory Muscles For Inspiration] : no accessory muscle use [Respiration, Rhythm And Depth] : normal respiratory rhythm and effort [] : no respiratory distress [Affect] : the affect was normal [Mood] : the mood was normal [Oriented To Time, Place, And Person] : oriented to person, place, and time [Normal Station and Gait] : the gait and station were normal for the patient's age [Not Anxious] : not anxious [No Focal Deficits] : no focal deficits [No Palpable Adenopathy] : no palpable adenopathy

## 2020-07-08 NOTE — PHYSICAL EXAM
[General Appearance - Well Nourished] : well nourished [General Appearance - Well Developed] : well developed [Well Groomed] : well groomed [Normal Appearance] : normal appearance [General Appearance - In No Acute Distress] : no acute distress [Costovertebral Angle Tenderness] : no ~M costovertebral angle tenderness [Abdomen Soft] : soft [Abdomen Tenderness] : non-tender [Scrotum] : the scrotum was normal [Urinary Bladder Findings] : the bladder was normal on palpation [Urethral Meatus] : meatus normal [No Prostate Nodules] : no prostate nodules [Testes Mass (___cm)] : there were no testicular masses [Edema] : no peripheral edema [FreeTextEntry1] : Prostate is small to moderate in size and asymmetric with the right side being somewhat larger. [Respiration, Rhythm And Depth] : normal respiratory rhythm and effort [] : no respiratory distress [Exaggerated Use Of Accessory Muscles For Inspiration] : no accessory muscle use [Oriented To Time, Place, And Person] : oriented to person, place, and time [Affect] : the affect was normal [Mood] : the mood was normal [No Focal Deficits] : no focal deficits [Not Anxious] : not anxious [Normal Station and Gait] : the gait and station were normal for the patient's age [No Palpable Adenopathy] : no palpable adenopathy

## 2020-08-27 ENCOUNTER — INPATIENT (INPATIENT)
Facility: HOSPITAL | Age: 75
LOS: 3 days | Discharge: HOME | DRG: 287 | End: 2020-08-31
Attending: FAMILY MEDICINE | Admitting: INTERNAL MEDICINE
Payer: MEDICARE

## 2020-08-27 VITALS
RESPIRATION RATE: 16 BRPM | DIASTOLIC BLOOD PRESSURE: 74 MMHG | HEART RATE: 59 BPM | SYSTOLIC BLOOD PRESSURE: 105 MMHG | TEMPERATURE: 98 F | WEIGHT: 195.11 LBS | OXYGEN SATURATION: 95 %

## 2020-08-27 DIAGNOSIS — I50.23 ACUTE ON CHRONIC SYSTOLIC (CONGESTIVE) HEART FAILURE: ICD-10-CM

## 2020-08-27 DIAGNOSIS — Z95.0 PRESENCE OF CARDIAC PACEMAKER: Chronic | ICD-10-CM

## 2020-08-27 LAB
ALBUMIN SERPL ELPH-MCNC: 3.2 G/DL — LOW (ref 3.3–5)
ALP SERPL-CCNC: 178 U/L — HIGH (ref 40–120)
ALT FLD-CCNC: 32 U/L — SIGNIFICANT CHANGE UP (ref 12–78)
ANION GAP SERPL CALC-SCNC: 3 MMOL/L — LOW (ref 5–17)
AST SERPL-CCNC: 22 U/L — SIGNIFICANT CHANGE UP (ref 15–37)
BASE EXCESS BLDV CALC-SCNC: 3.9 MMOL/L — HIGH (ref -2–2)
BASOPHILS # BLD AUTO: 0.04 K/UL — SIGNIFICANT CHANGE UP (ref 0–0.2)
BASOPHILS NFR BLD AUTO: 0.5 % — SIGNIFICANT CHANGE UP (ref 0–2)
BILIRUB SERPL-MCNC: 2.1 MG/DL — HIGH (ref 0.2–1.2)
BUN SERPL-MCNC: 25 MG/DL — HIGH (ref 7–23)
CALCIUM SERPL-MCNC: 8.3 MG/DL — LOW (ref 8.5–10.1)
CHLORIDE SERPL-SCNC: 110 MMOL/L — HIGH (ref 96–108)
CO2 SERPL-SCNC: 32 MMOL/L — HIGH (ref 22–31)
CREAT SERPL-MCNC: 1.4 MG/DL — HIGH (ref 0.5–1.3)
EOSINOPHIL # BLD AUTO: 0.09 K/UL — SIGNIFICANT CHANGE UP (ref 0–0.5)
EOSINOPHIL NFR BLD AUTO: 1 % — SIGNIFICANT CHANGE UP (ref 0–6)
GLUCOSE SERPL-MCNC: 120 MG/DL — HIGH (ref 70–99)
HCO3 BLDV-SCNC: 31 MMOL/L — HIGH (ref 21–29)
HCT VFR BLD CALC: 41.4 % — SIGNIFICANT CHANGE UP (ref 39–50)
HGB BLD-MCNC: 12.7 G/DL — LOW (ref 13–17)
IMM GRANULOCYTES NFR BLD AUTO: 0.5 % — SIGNIFICANT CHANGE UP (ref 0–1.5)
LYMPHOCYTES # BLD AUTO: 1 K/UL — SIGNIFICANT CHANGE UP (ref 1–3.3)
LYMPHOCYTES # BLD AUTO: 11.6 % — LOW (ref 13–44)
MAGNESIUM SERPL-MCNC: 2.2 MG/DL — SIGNIFICANT CHANGE UP (ref 1.6–2.6)
MCHC RBC-ENTMCNC: 30.4 PG — SIGNIFICANT CHANGE UP (ref 27–34)
MCHC RBC-ENTMCNC: 30.7 GM/DL — LOW (ref 32–36)
MCV RBC AUTO: 99 FL — SIGNIFICANT CHANGE UP (ref 80–100)
MONOCYTES # BLD AUTO: 0.88 K/UL — SIGNIFICANT CHANGE UP (ref 0–0.9)
MONOCYTES NFR BLD AUTO: 10.2 % — SIGNIFICANT CHANGE UP (ref 2–14)
NEUTROPHILS # BLD AUTO: 6.57 K/UL — SIGNIFICANT CHANGE UP (ref 1.8–7.4)
NEUTROPHILS NFR BLD AUTO: 76.2 % — SIGNIFICANT CHANGE UP (ref 43–77)
NT-PROBNP SERPL-SCNC: 4927 PG/ML — HIGH (ref 0–450)
PCO2 BLDV: 60 MMHG — HIGH (ref 35–50)
PH BLDV: 7.33 — LOW (ref 7.35–7.45)
PHOSPHATE SERPL-MCNC: 3.4 MG/DL — SIGNIFICANT CHANGE UP (ref 2.5–4.5)
PLATELET # BLD AUTO: 80 K/UL — LOW (ref 150–400)
PO2 BLDV: 39 MMHG — SIGNIFICANT CHANGE UP (ref 25–45)
POTASSIUM SERPL-MCNC: 4.7 MMOL/L — SIGNIFICANT CHANGE UP (ref 3.5–5.3)
POTASSIUM SERPL-SCNC: 4.7 MMOL/L — SIGNIFICANT CHANGE UP (ref 3.5–5.3)
PROT SERPL-MCNC: 6 GM/DL — SIGNIFICANT CHANGE UP (ref 6–8.3)
RBC # BLD: 4.18 M/UL — LOW (ref 4.2–5.8)
RBC # FLD: 17.2 % — HIGH (ref 10.3–14.5)
SAO2 % BLDV: 64 % — LOW (ref 67–88)
SODIUM SERPL-SCNC: 145 MMOL/L — SIGNIFICANT CHANGE UP (ref 135–145)
TROPONIN I SERPL-MCNC: 0.16 NG/ML — HIGH (ref 0.01–0.04)
WBC # BLD: 8.62 K/UL — SIGNIFICANT CHANGE UP (ref 3.8–10.5)
WBC # FLD AUTO: 8.62 K/UL — SIGNIFICANT CHANGE UP (ref 3.8–10.5)

## 2020-08-27 PROCEDURE — 86769 SARS-COV-2 COVID-19 ANTIBODY: CPT

## 2020-08-27 PROCEDURE — 80076 HEPATIC FUNCTION PANEL: CPT

## 2020-08-27 PROCEDURE — 84484 ASSAY OF TROPONIN QUANT: CPT

## 2020-08-27 PROCEDURE — C1889: CPT

## 2020-08-27 PROCEDURE — 85730 THROMBOPLASTIN TIME PARTIAL: CPT

## 2020-08-27 PROCEDURE — 85027 COMPLETE CBC AUTOMATED: CPT

## 2020-08-27 PROCEDURE — 36415 COLL VENOUS BLD VENIPUNCTURE: CPT

## 2020-08-27 PROCEDURE — C1769: CPT

## 2020-08-27 PROCEDURE — 71046 X-RAY EXAM CHEST 2 VIEWS: CPT | Mod: 26

## 2020-08-27 PROCEDURE — 76705 ECHO EXAM OF ABDOMEN: CPT

## 2020-08-27 PROCEDURE — 84100 ASSAY OF PHOSPHORUS: CPT

## 2020-08-27 PROCEDURE — 93460 R&L HRT ART/VENTRICLE ANGIO: CPT

## 2020-08-27 PROCEDURE — 93306 TTE W/DOPPLER COMPLETE: CPT

## 2020-08-27 PROCEDURE — C1887: CPT

## 2020-08-27 PROCEDURE — 80053 COMPREHEN METABOLIC PANEL: CPT

## 2020-08-27 PROCEDURE — 82607 VITAMIN B-12: CPT

## 2020-08-27 PROCEDURE — 93010 ELECTROCARDIOGRAM REPORT: CPT

## 2020-08-27 PROCEDURE — 83735 ASSAY OF MAGNESIUM: CPT

## 2020-08-27 PROCEDURE — 81003 URINALYSIS AUTO W/O SCOPE: CPT

## 2020-08-27 PROCEDURE — 74177 CT ABD & PELVIS W/CONTRAST: CPT

## 2020-08-27 PROCEDURE — C1894: CPT

## 2020-08-27 PROCEDURE — 76705 ECHO EXAM OF ABDOMEN: CPT | Mod: 26

## 2020-08-27 PROCEDURE — 85610 PROTHROMBIN TIME: CPT

## 2020-08-27 PROCEDURE — 74177 CT ABD & PELVIS W/CONTRAST: CPT | Mod: 26

## 2020-08-27 PROCEDURE — 80048 BASIC METABOLIC PNL TOTAL CA: CPT

## 2020-08-27 RX ORDER — FUROSEMIDE 40 MG
40 TABLET ORAL ONCE
Refills: 0 | Status: COMPLETED | OUTPATIENT
Start: 2020-08-27 | End: 2020-08-27

## 2020-08-27 RX ADMIN — Medication 40 MILLIGRAM(S): at 20:40

## 2020-08-27 NOTE — ED ADULT NURSE NOTE - CHIEF COMPLAINT QUOTE
pt sent to ed from [facility] for abdominal pain, b/l le swelling and sob. denies chest pain. denies fever and chills. denies nausea, vomiting and diarrhea.

## 2020-08-27 NOTE — ED PROVIDER NOTE - OBJECTIVE STATEMENT
75 y/o male with PMHx of HTN, HLD, CAD, CHF, COPD, Afib on rivaroxaban, arthritis presents to the ED c/o LE swelling in both legs up to abdomen worsening for 10 days and diffuse abd pain. Pt went to PCP's office today who referred pt to ED for further management. States ran out of furosemide two weeks ago and swelling started to worsen. 1 month ago could walk 6-8 blocks or ride bicycle without issue, now can only walk 15 minutes before becoming SOB and feeling like he is going to pass out. 75 y/o male with PMHx of HTN, HLD, CAD, CHF, COPD, Afib on rivaroxaban, arthritis presents to the ED c/o LE swelling in both legs up to abdomen worsening for 10 days and diffuse abd pain. Pt went to PCP's office today who referred pt to ED for further management. States he ran out of furosemide two weeks ago and swelling started to worsen. 1 month ago pt states he could walk 6-8 blocks or ride bicycle without issue, now can only walk 15 minutes before becoming SOB and feeling near syncopal.

## 2020-08-27 NOTE — ED STATDOCS - CLINICAL SUMMARY MEDICAL DECISION MAKING FREE TEXT BOX
Will send pt to main ED for further evaluation. Will send pt to main ED for further evaluation.    Anibal PGY-3:  75yo M w/ pmhx as described in HPI including CHF who stopped taking all meds including furosemide two weeks ago now with worsening BL LE swelling and SOB likely CHF exacerbation will give IV lasix, bloodwork and admit for further mange ment

## 2020-08-27 NOTE — ED STATDOCS - OBJECTIVE STATEMENT
75 y/o male with PMHx of Afib, HTN, HLD, CAD, CHF, COPD, afib on rivaroxaban, arthritis and s/p cardiac pacemaker here w/ cc of BLE LE swelling    swelling in both legs up to abdomen worsening for two weeks, went to PCP's office today who referred pt to ED for further management. states ran out of furosemide (along with all other medications) two weeks ago and swelling started to worsen. 1 month ago could walk 6-8 blocks or ride bicycle without issue, now can only walk 15 minutes before becoming SOB.      Cardiologist&pcp: does not know name or location

## 2020-08-27 NOTE — ED ADULT TRIAGE NOTE - CHIEF COMPLAINT QUOTE
pt sent to ed from [facility] for abdominal pain, b/l le swelling and sob. denies chest pain. denies fever and chills. denies nausea, vomiting and diarrhea.

## 2020-08-27 NOTE — ED STATDOCS - PROGRESS NOTE DETAILS
Nathan Saeed for attending Dr. Haque: 75 y/o male with a PMHx of Afib, arthritis, chronic systolic heart failure, COPD, CAD, fatigue, HLD, HTN, hypokinesis, LBBB, LV dysfunction, pacemaker, presents to the ED c/o increased LE edema and SOB. Pt states he ran out of Furosemide as well as all other medications. No other complaints at this time. Will send pt to main ED for further evaluation.

## 2020-08-27 NOTE — ED STATDOCS - ATTENDING CONTRIBUTION TO CARE
I, Radha Haque MD,  performed the initial face to face bedside interview with this patient regarding history of present illness, review of symptoms and relevant past medical, social and family history.  I completed an independent physical examination.  I was the initial provider who evaluated this patient. I have signed out the follow up of any pending tests (i.e. labs, radiological studies) to the resident.  I have communicated the patient’s plan of care and disposition with the resident.

## 2020-08-27 NOTE — ED ADULT NURSE REASSESSMENT NOTE - NS ED NURSE REASSESS COMMENT FT1
pt had positive first troponin level. MD Goyal contacted, verbal order received for second troponin level. as per MD Goyal no ASA at this time. pt remains hypertensive, awaiting orders. RN alerted by tele tech that pt had run of v tach. pt is paced on monitor at this time. MD lopez. will continue to monitor. pt denies chest pain at this time.

## 2020-08-27 NOTE — ED PROVIDER NOTE - SKIN, MLM
Skin normal color for race, warm, dry and intact. No evidence of rash. +b/l pitting edema of LE extending to abd

## 2020-08-27 NOTE — ED STATDOCS - PHYSICAL EXAMINATION
General:  NAD  HEENT: pupils equal and reactive, normal external ears bilaterally   Cardiac: RRR, no MRG appreciated  Resp: Crackles at lung bases BL, clear mid and upper lungs BL, symmetric chest wall rise  Abd: soft, nontender, nondistended,   : no CVA tenderness  Neuro: Moving all extremities  Lower ext: 2+ pitting edema to shins  Skin:  normal color for race

## 2020-08-27 NOTE — ED STATDOCS - NS ED ROS FT
CONSTITUTIONAL: No fevers, no chills  Cardiovascular: + Chest pain  Respiratory: refer to HPI  Gastrointestinal: No n/v/d  SKIN: no rashes.  NEURO: no headache  PSYCHIATRIC: no known mental health issues.  Endocrine: No unexplained weight loss

## 2020-08-27 NOTE — ED PROVIDER NOTE - CARE PLAN
Principal Discharge DX:	Acute on chronic systolic congestive heart failure  Secondary Diagnosis:	Chronic systolic heart failure

## 2020-08-28 DIAGNOSIS — Z95.0 PRESENCE OF CARDIAC PACEMAKER: ICD-10-CM

## 2020-08-28 DIAGNOSIS — I25.119 ATHEROSCLEROTIC HEART DISEASE OF NATIVE CORONARY ARTERY WITH UNSPECIFIED ANGINA PECTORIS: ICD-10-CM

## 2020-08-28 DIAGNOSIS — I50.23 ACUTE ON CHRONIC SYSTOLIC (CONGESTIVE) HEART FAILURE: ICD-10-CM

## 2020-08-28 DIAGNOSIS — I10 ESSENTIAL (PRIMARY) HYPERTENSION: ICD-10-CM

## 2020-08-28 DIAGNOSIS — I48.91 UNSPECIFIED ATRIAL FIBRILLATION: ICD-10-CM

## 2020-08-28 DIAGNOSIS — E78.5 HYPERLIPIDEMIA, UNSPECIFIED: ICD-10-CM

## 2020-08-28 LAB
ALBUMIN SERPL ELPH-MCNC: 3.6 G/DL — SIGNIFICANT CHANGE UP (ref 3.3–5)
ALP SERPL-CCNC: 148 U/L — HIGH (ref 40–120)
ALT FLD-CCNC: 32 U/L — SIGNIFICANT CHANGE UP (ref 12–78)
ANION GAP SERPL CALC-SCNC: 6 MMOL/L — SIGNIFICANT CHANGE UP (ref 5–17)
APPEARANCE UR: CLEAR — SIGNIFICANT CHANGE UP
APTT BLD: 43.6 SEC — HIGH (ref 27.5–35.5)
AST SERPL-CCNC: 27 U/L — SIGNIFICANT CHANGE UP (ref 15–37)
BILIRUB SERPL-MCNC: 3 MG/DL — HIGH (ref 0.2–1.2)
BILIRUB UR-MCNC: NEGATIVE — SIGNIFICANT CHANGE UP
BUN SERPL-MCNC: 22 MG/DL — SIGNIFICANT CHANGE UP (ref 7–23)
CALCIUM SERPL-MCNC: 9 MG/DL — SIGNIFICANT CHANGE UP (ref 8.5–10.1)
CHLORIDE SERPL-SCNC: 107 MMOL/L — SIGNIFICANT CHANGE UP (ref 96–108)
CO2 SERPL-SCNC: 30 MMOL/L — SIGNIFICANT CHANGE UP (ref 22–31)
COLOR SPEC: YELLOW — SIGNIFICANT CHANGE UP
CREAT SERPL-MCNC: 1.14 MG/DL — SIGNIFICANT CHANGE UP (ref 0.5–1.3)
DIFF PNL FLD: NEGATIVE — SIGNIFICANT CHANGE UP
GLUCOSE SERPL-MCNC: 91 MG/DL — SIGNIFICANT CHANGE UP (ref 70–99)
GLUCOSE UR QL: NEGATIVE MG/DL — SIGNIFICANT CHANGE UP
HCT VFR BLD CALC: 42.3 % — SIGNIFICANT CHANGE UP (ref 39–50)
HGB BLD-MCNC: 13 G/DL — SIGNIFICANT CHANGE UP (ref 13–17)
INR BLD: 1.89 RATIO — HIGH (ref 0.88–1.16)
KETONES UR-MCNC: NEGATIVE — SIGNIFICANT CHANGE UP
LEUKOCYTE ESTERASE UR-ACNC: NEGATIVE — SIGNIFICANT CHANGE UP
MAGNESIUM SERPL-MCNC: 2.1 MG/DL — SIGNIFICANT CHANGE UP (ref 1.6–2.6)
MCHC RBC-ENTMCNC: 30.4 PG — SIGNIFICANT CHANGE UP (ref 27–34)
MCHC RBC-ENTMCNC: 30.7 GM/DL — LOW (ref 32–36)
MCV RBC AUTO: 98.8 FL — SIGNIFICANT CHANGE UP (ref 80–100)
NITRITE UR-MCNC: NEGATIVE — SIGNIFICANT CHANGE UP
PH UR: 7 — SIGNIFICANT CHANGE UP (ref 5–8)
PHOSPHATE SERPL-MCNC: 2.6 MG/DL — SIGNIFICANT CHANGE UP (ref 2.5–4.5)
PLATELET # BLD AUTO: 82 K/UL — LOW (ref 150–400)
POTASSIUM SERPL-MCNC: 4.3 MMOL/L — SIGNIFICANT CHANGE UP (ref 3.5–5.3)
POTASSIUM SERPL-SCNC: 4.3 MMOL/L — SIGNIFICANT CHANGE UP (ref 3.5–5.3)
PROT SERPL-MCNC: 6.5 GM/DL — SIGNIFICANT CHANGE UP (ref 6–8.3)
PROT UR-MCNC: NEGATIVE MG/DL — SIGNIFICANT CHANGE UP
PROTHROM AB SERPL-ACNC: 21.4 SEC — HIGH (ref 10.6–13.6)
RBC # BLD: 4.28 M/UL — SIGNIFICANT CHANGE UP (ref 4.2–5.8)
RBC # FLD: 17.5 % — HIGH (ref 10.3–14.5)
SARS-COV-2 IGG SERPL QL IA: NEGATIVE — SIGNIFICANT CHANGE UP
SARS-COV-2 IGM SERPL IA-ACNC: <0.2 RATIO — SIGNIFICANT CHANGE UP
SARS-COV-2 RNA SPEC QL NAA+PROBE: SIGNIFICANT CHANGE UP
SODIUM SERPL-SCNC: 143 MMOL/L — SIGNIFICANT CHANGE UP (ref 135–145)
SP GR SPEC: 1 — LOW (ref 1.01–1.02)
TROPONIN I SERPL-MCNC: 0.17 NG/ML — HIGH (ref 0.01–0.04)
TROPONIN I SERPL-MCNC: 0.18 NG/ML — HIGH (ref 0.01–0.04)
UROBILINOGEN FLD QL: NEGATIVE MG/DL — SIGNIFICANT CHANGE UP
VIT B12 SERPL-MCNC: 639 PG/ML — SIGNIFICANT CHANGE UP (ref 232–1245)
WBC # BLD: 7.86 K/UL — SIGNIFICANT CHANGE UP (ref 3.8–10.5)
WBC # FLD AUTO: 7.86 K/UL — SIGNIFICANT CHANGE UP (ref 3.8–10.5)

## 2020-08-28 PROCEDURE — 93279 PRGRMG DEV EVAL PM/LDLS PM: CPT | Mod: 26

## 2020-08-28 PROCEDURE — 93306 TTE W/DOPPLER COMPLETE: CPT | Mod: 26

## 2020-08-28 PROCEDURE — 99223 1ST HOSP IP/OBS HIGH 75: CPT

## 2020-08-28 PROCEDURE — 93460 R&L HRT ART/VENTRICLE ANGIO: CPT | Mod: 26

## 2020-08-28 PROCEDURE — 99223 1ST HOSP IP/OBS HIGH 75: CPT | Mod: AI

## 2020-08-28 RX ORDER — HYDRALAZINE HCL 50 MG
5 TABLET ORAL ONCE
Refills: 0 | Status: COMPLETED | OUTPATIENT
Start: 2020-08-28 | End: 2020-08-28

## 2020-08-28 RX ORDER — TAMSULOSIN HYDROCHLORIDE 0.4 MG/1
0.4 CAPSULE ORAL AT BEDTIME
Refills: 0 | Status: DISCONTINUED | OUTPATIENT
Start: 2020-08-28 | End: 2020-08-31

## 2020-08-28 RX ORDER — SODIUM CHLORIDE 9 MG/ML
3 INJECTION INTRAMUSCULAR; INTRAVENOUS; SUBCUTANEOUS EVERY 8 HOURS
Refills: 0 | Status: DISCONTINUED | OUTPATIENT
Start: 2020-08-28 | End: 2020-08-31

## 2020-08-28 RX ORDER — ATORVASTATIN CALCIUM 80 MG/1
20 TABLET, FILM COATED ORAL AT BEDTIME
Refills: 0 | Status: DISCONTINUED | OUTPATIENT
Start: 2020-08-28 | End: 2020-08-31

## 2020-08-28 RX ORDER — ASPIRIN/CALCIUM CARB/MAGNESIUM 324 MG
81 TABLET ORAL DAILY
Refills: 0 | Status: DISCONTINUED | OUTPATIENT
Start: 2020-08-28 | End: 2020-08-31

## 2020-08-28 RX ORDER — CARVEDILOL PHOSPHATE 80 MG/1
6.25 CAPSULE, EXTENDED RELEASE ORAL EVERY 12 HOURS
Refills: 0 | Status: DISCONTINUED | OUTPATIENT
Start: 2020-08-28 | End: 2020-08-31

## 2020-08-28 RX ORDER — RIVAROXABAN 15 MG-20MG
15 KIT ORAL
Refills: 0 | Status: DISCONTINUED | OUTPATIENT
Start: 2020-08-28 | End: 2020-08-31

## 2020-08-28 RX ORDER — LORATADINE 10 MG/1
10 TABLET ORAL DAILY
Refills: 0 | Status: DISCONTINUED | OUTPATIENT
Start: 2020-08-28 | End: 2020-08-31

## 2020-08-28 RX ORDER — BETHANECHOL CHLORIDE 25 MG
50 TABLET ORAL
Refills: 0 | Status: DISCONTINUED | OUTPATIENT
Start: 2020-08-28 | End: 2020-08-28

## 2020-08-28 RX ORDER — FUROSEMIDE 40 MG
40 TABLET ORAL
Refills: 0 | Status: DISCONTINUED | OUTPATIENT
Start: 2020-08-28 | End: 2020-08-29

## 2020-08-28 RX ORDER — SACUBITRIL AND VALSARTAN 24; 26 MG/1; MG/1
1 TABLET, FILM COATED ORAL
Refills: 0 | Status: DISCONTINUED | OUTPATIENT
Start: 2020-08-28 | End: 2020-08-31

## 2020-08-28 RX ADMIN — SACUBITRIL AND VALSARTAN 1 TABLET(S): 24; 26 TABLET, FILM COATED ORAL at 09:37

## 2020-08-28 RX ADMIN — Medication 5 MILLIGRAM(S): at 00:29

## 2020-08-28 RX ADMIN — SACUBITRIL AND VALSARTAN 1 TABLET(S): 24; 26 TABLET, FILM COATED ORAL at 03:17

## 2020-08-28 RX ADMIN — Medication 5 MILLIGRAM(S): at 03:11

## 2020-08-28 RX ADMIN — SACUBITRIL AND VALSARTAN 1 TABLET(S): 24; 26 TABLET, FILM COATED ORAL at 21:50

## 2020-08-28 RX ADMIN — CARVEDILOL PHOSPHATE 6.25 MILLIGRAM(S): 80 CAPSULE, EXTENDED RELEASE ORAL at 09:37

## 2020-08-28 RX ADMIN — RIVAROXABAN 15 MILLIGRAM(S): KIT at 03:11

## 2020-08-28 RX ADMIN — Medication 81 MILLIGRAM(S): at 09:37

## 2020-08-28 RX ADMIN — LORATADINE 10 MILLIGRAM(S): 10 TABLET ORAL at 09:37

## 2020-08-28 RX ADMIN — Medication 40 MILLIGRAM(S): at 09:37

## 2020-08-28 RX ADMIN — RIVAROXABAN 15 MILLIGRAM(S): KIT at 17:46

## 2020-08-28 RX ADMIN — TAMSULOSIN HYDROCHLORIDE 0.4 MILLIGRAM(S): 0.4 CAPSULE ORAL at 21:50

## 2020-08-28 RX ADMIN — SODIUM CHLORIDE 3 MILLILITER(S): 9 INJECTION INTRAMUSCULAR; INTRAVENOUS; SUBCUTANEOUS at 21:50

## 2020-08-28 RX ADMIN — CARVEDILOL PHOSPHATE 6.25 MILLIGRAM(S): 80 CAPSULE, EXTENDED RELEASE ORAL at 03:10

## 2020-08-28 RX ADMIN — ATORVASTATIN CALCIUM 20 MILLIGRAM(S): 80 TABLET, FILM COATED ORAL at 21:50

## 2020-08-28 RX ADMIN — Medication 40 MILLIGRAM(S): at 17:46

## 2020-08-28 RX ADMIN — CARVEDILOL PHOSPHATE 6.25 MILLIGRAM(S): 80 CAPSULE, EXTENDED RELEASE ORAL at 21:50

## 2020-08-28 NOTE — H&P ADULT - ASSESSMENT
73 yo Nigerien-speaking M with a PMH HFrEF s/p PPM, atrial fibrillation on Xarelto, HTN, HLD, thalassemia, BPH, and COPD (not on inhalers) who presents with abdominal and lower extremity swelling due to CHF exacerbation.    1) Acute Decompensated Heart Failure    2) Congestive Hepatopathy    3) Acute Kidney Injury    4) History of Atrial fibrillation    5) BPH    6) Thrombocytopenia    7) Prophylactic measure  - DVT PPX: IMPROVE score of 1, on Xarelto  - Diet: DASH with 1500 ml fluid restriction  - Dispo: pending improvement in sxs and consultant myles 73 yo [place]isian-speaking M with a PMH HFrEF s/p PPM, CAD, atrial fibrillation on Xarelto, HTN, HLD, thalassemia, BPH, and COPD (not on inhalers) who presents with abdominal and lower extremity swelling due to CHF exacerbation.    1) Acute Decompensated Heart Failure  - Likely from running out of   -     2) Congestive Hepatopathy  - Likely the cause of ascites, elevated alkaline phosphatase    3) Acute Kidney Injury    4) History of Atrial fibrillation    5) BPH    6) Thrombocytopenia    7) Prophylactic measure  - DVT PPX: IMPROVE score of 1, on Xarelto  - Diet: DASH with 1500 ml fluid restriction  - Dispo: pending improvement in sxs and consultant myles 73 yo Mexican-speaking M with a PMH HFrEF s/p PPM, CAD, atrial fibrillation on Xarelto, HTN, HLD, thalassemia, BPH, and COPD (not on inhalers) who presents with abdominal and lower extremity swelling due to CHF exacerbation.    1) Acute Decompensated Heart Failure  - Likely from running out of medications, including diuretics, anti-hypertensives and beta blockers  -     2) Congestive Hepatopathy  - Likely the cause of ascites, elevated alkaline phosphatase    3) Uncontrolled Hypertension    4) Acute Kidney Injury    5) History of Atrial fibrillation  - Currently in paced rhythm  - C/w Xarelto 15 mg QD    6) BPH  - Restart Tamsulosin 0.4 mg QHS    7) Thrombocytopenia    8) Prophylactic measure  - DVT PPX: IMPROVE score of 1, on Xarelto  - Diet: DASH with 1500 ml fluid restriction  - Dispo: pending improvement in sxs and consultant myles 73 yo Malian-speaking M with a PMH HFrEF s/p PPM, CAD, atrial fibrillation on Xarelto, HTN, HLD, thalassemia, BPH, and COPD (not on inhalers) who presents with abdominal and lower extremity swelling due to CHF exacerbation.    1) Acute Decompensated Heart Failure  - Likely from running out of medications, including diuretics, anti-hypertensives and beta blockers  - BNP markedly elevated at 4927 with suspected congestive hepatopathy and ascites  - Not requiring O2 at this time  - Given Lasix in ED, will continue with 40 IV Lasix BID  - Strict I/Os, daily weights, and fluid restriction  - Restart beta blocker (Coreg 6.25 BID)  - Cardiology consult  - Interrogate PPM  - Monitor on telemetry  - Trend troponin at least x3    2) (Congestive) Hepatopathy  - Elevated alk phos and Tbili with dilated portal veins and ascites  - Likely the cause of ascites, elevated alkaline phosphatase  - S/p RUQ US  - Check UA to r/o proteinuria as a cause of ascites  - Management as above with diuresis and I/O, weight monitoring  - No evidence of GB wall or CBD dilation  - Check CMPs daily    3) Uncontrolled Hypertension  - BPs ~ 180s/100s on admission  - C/w Entresto and restart Furosemide and Carvedilol, which have limited anti-hypertensive activity  - May consider additional antihypertensives if remains uncontrolled    4) Acute Kidney Injury  - Admission creatinine 1.4, baseline 1.0  - Likely cardiorenal from CHF  - C/w Furosemide with close renal function and electrolyte monitoring    5) Paroxysmal Atrial fibrillation  - Currently in paced rhythm  - [id number] of 3, c/w Xarelto 15 mg QD for A/C    6) CAD  - Restart aspirin 81 mg QD and Atorvastatin 20 mg QD    7) BPH  - Restart Tamsulosin 0.4 mg QHS    8) Thrombocytopenia  - May be in the setting of congestive hepatopathy  - No evidence of cirrhosis on imaging  - Check INR to evaluate hepatic function  - Check B12 level  - Check blue top platelet    9) Prophylactic measure  - DVT PPX: IMPROVE score of 1, on Xarelto  - Diet: DASH with 1500 ml fluid restriction  - Dispo: pending improvement in sxs and consultant myles

## 2020-08-28 NOTE — H&P ADULT - NSHPPHYSICALEXAM_GEN_ALL_CORE
Vital Signs Last 24 Hrs  T(C): 36.9 (27 Aug 2020 22:20), Max: 36.9 (27 Aug 2020 22:20)  T(F): 98.5 (27 Aug 2020 22:20), Max: 98.5 (27 Aug 2020 22:20)  HR: 61 (28 Aug 2020 01:30) (58 - 61)  BP: 181/105 (28 Aug 2020 01:30) (105/74 - 181/105)  BP(mean): 125 (28 Aug 2020 01:30) (79 - 125)  RR: 20 (28 Aug 2020 01:30) (16 - 22)  SpO2: 97% (28 Aug 2020 01:30) (95% - 98%)    GENERAL: No acute distress  HEENT: PERRL, EOMI, MMM, no oropharyngeal lesions  NECK: supple, no stiffness, no JVD, no thyromegaly  PULM: respirations non-labored, clear to auscultation bilaterally, no rales, rhonchi, or wheezes  CV: regular rate and rhythm, no murmurs, gallops, or rubs  GI: abdomen distended, epigastric/RUQ/periumbilical TTP. Soft, no masses felt, normal bowel sounds  : no genital lesions or ulcers  MSK: strength 5/5 bilateral upper/lower extremities. No joint swelling, erythema, or warmth.  LYMPH: no anterior cervical, posterior cervical, supraclavicular, or inguinal lymphadenopathy  NEURO: A&Ox3, no tremors, sensation intact  SKIN: 2+ pitting edema up to the upper thighs bilaterally. No rashes or lesions

## 2020-08-28 NOTE — H&P ADULT - NSHPLABSRESULTS_GEN_ALL_CORE
Labs personally reviewed and interpreted. Notable for no leukocytosis (WBC 8.62), left shift, or lymphopenia. Hb stable at 12.7, plt low at 80 (baseline 100s). Na 145, K 4.7, Cl 110, HCO3 32, BUN/creatinine 25/1.4 (baseline creatinine 1.0 ). . Calcium 8.3 with albumin 3.2. Mg 2.2, phos 3.4. Tbili elevated at 2.1. Alk phos elevated at 178, while AST and ALT are normal at 22 and 32, respectively. VBG pH 7.33, pCO2 slightly elevated at 60, and pO2 39. ProBNP elevated at 4927. Troponin 0.163 x1, then 0.165 x1.  COVID-19 PCR negative.    CXR personally reviewed and interpreted. Notable for clear lungs, no focal consolidations, effusions, interstitial markings, pneumothorax, or obvious cardiomegaly.  CT A/P with IV contrast ______________________    EKG personally reviewed. Ventricular-paced rhythm, left axis deviation. No ST depressions or elevations. Rate 60, QTc 478. Labs personally reviewed and interpreted. Notable for no leukocytosis (WBC 8.62), left shift, or lymphopenia. Hb stable at 12.7, plt low at 80 (baseline 100s). Na 145, K 4.7, Cl 110, HCO3 32, BUN/creatinine 25/1.4 (baseline creatinine 1.0 ). . Calcium 8.3 with albumin 3.2. Mg 2.2, phos 3.4. Tbili elevated at 2.1. Alk phos elevated at 178, while AST and ALT are normal at 22 and 32, respectively. VBG pH 7.33, pCO2 slightly elevated at 60, and pO2 39. ProBNP elevated at 4927. Troponin 0.163 x1, then 0.165 x1.  COVID-19 PCR negative.    CXR personally reviewed and interpreted. Notable for significant but stable cardiomegaly. Otherwise, clear lungs, no focal consolidations, effusions, interstitial markings, or pneumothorax.    CT A/P with IV contrast personally reviewed and interpreted. Notable for circumferential bladder wall thickening suggesting cystitis. Small volume abdominal and pelvic ascites. Small right pleural effusion. Hepatic steatosis. 1.3 cm indeterminate splenic hypervascular lesion, possibly a hemangioma. The liver is again enlarged and of diffuse lower attenuation than the spleen suggesting fatty infiltration. Small right hepatic lobe cyst, unchanged. The pancreas, gallbladder and adrenal glands are unremarkable aside from trace pericholecystic fluid which is likely related to ascites.    RUQ US personally reviewed and interpreted. Notable for non-specific gallbladder wall thickening. No gallstones are identified. Common bile duct measures 4-5 mm with no significant bile duct dilation. Pulsatile portal venous flow in hepatic vein dilatation. This may be from right heart failure. Free fluid. Right pleural effusion. Liver measures 16.4 cm in length. No focal liver lesion is identified.    EKG personally reviewed. Ventricular-paced rhythm, left axis deviation. No ST depressions or elevations. Rate 60, QTc 478.

## 2020-08-28 NOTE — CHART NOTE - NSCHARTNOTEFT_GEN_A_CORE
Nurse Practitioner Progress note:     HPI:  75 yo Citizen of [place]-speaking M with a PMH HFrEF s/p PPM, CAD, atrial fibrillation on Xarelto, HTN, HLD, thalassemia, BPH, and COPD (not on inhalers) who presents with abdominal and lower extremity swelling. For the past 2-3 weeks, since he ran out of his medications, he has been having abdominal and lower extremity swelling. He also has pain from just below his umbilicus spreading down to his legs, worse when he walks. He describes the pain as an achy, moderate in intensity pain. He feels he has SOB, especially when he is walking and when he is sleeping. He denies CP, wheezing, palpitations, fevers, chills, nausea, vomiting, diarrhea, constipation, dysuria, or hematuria. He does endorse a slight non-productive cough with sore throat for the past week.  His last PPM interrogation was 3 months ago and he is due for another interrogation tomorrow.    ROS is also positive for some right eye lacrimation without blurry vision or headache.    In the ED, he was given Furosemide 40 mg IV x1 and Hydralazine 5 mg IV x1. On telemetry, he was briefly in V-tach. (28 Aug 2020 01:49)      T(C): 36.8 (08-28-20 @ 08:22), Max: 36.9 (08-27-20 @ 22:20)  HR: 60 (08-28-20 @ 13:34) (58 - 68)  BP: 179/95 (08-28-20 @ 13:34) (105/74 - 181/105)  RR: 16 (08-28-20 @ 13:34) (16 - 22)  SpO2: 97% (08-28-20 @ 13:34) (95% - 99%)  Wt(kg): --    PHYSICAL EXAM:  Neurologic: Non-focal, AxOx3.  No neuro deficits  Vascular: Peripheral pulses palpable 2+ bilaterally  Procedure Site:    12 lead EKG:  	    LABS:	 	                        13.0   7.86  )-----------( 82       ( 28 Aug 2020 08:23 )             42.3   08-28< from: Cardiac Cath Lab - Adult (08.28.20 @ 14:25) >      143  |  107  |  22  ----------------------------<  91  4.3   |  30  |  1.14    Ca    9.0      28 Aug 2020 08:23  Phos  2.6     08-28  Mg     2.1     08-28    TPro  6.5  /  Alb  3.6  /  TBili  3.0<H>  /  DBili  x   /  AST  27  /  ALT  32  /  AlkPhos  148<H>  08-28        PROCEDURE RESULTS:  [facility]  Ventriculography Findings:  Mildly reduced left ventricular systolic function.   Impression   Diagnostic Conclusions   Non-Obstructive CAD with Abnormal left ventricular function.   Severe pulmonary hypertension.      ASSESSMENT/PLAN: 	  -VS, labs, diet, activity as per post cath orders  -IV hydration  -Encourage PO fluids  -Manage with medical therapy.  -Continue current medications  -Plan of care D/W pt. and MD  -Post cath instructions reviewed with pt., pt. verbalizes and understands instructions  -Follow-up with attending

## 2020-08-28 NOTE — PROGRESS NOTE ADULT - ASSESSMENT
73 yo Martiniquais-speaking M with a PMH HFrEF s/p PPM, CAD, atrial fibrillation on Xarelto, HTN, HLD, thalassemia, BPH, and COPD (not on inhalers) who presents with abdominal and lower extremity swelling due to CHF exacerbation.    #  Acute on chronic systolic decompensated CHF   - Likely from running out of medications, including diuretics, anti-hypertensives and beta blockers  - BNP markedly elevated at 4927 with suspected congestive hepatopathy and ascites  - Not requiring O2 at this time  - Given Lasix in ED, will continue with 40 IV Lasix BID  - Strict I/Os, daily weights, and fluid restriction  - Restart beta blocker (Coreg 6.25 BID)  - Cardiology consult  - PPM -interrogated - normal   - [facility] 8/28 - shows nonobstructive CAD and severe pulmonary HTN - will need RHC   - Cardiology consult appreciated    # (Congestive) Hepatopathy  - Elevated alk phos and Tbili with dilated portal veins and ascites  - Likely the cause of ascites, elevated alkaline phosphatase  - S/p RUQ US - results above   - Check UA to r/o proteinuria as a cause of ascites  - Management as above with diuresis and I/O, weight monitoring  - No evidence of GB wall or CBD dilation  - Check CMPs daily    # Uncontrolled Hypertension  - BPs ~ 180s/100s on admission  - C/w Entresto and restart Furosemide and Carvedilol  - May consider additional antihypertensives if remains uncontrolled    # Acute Kidney Injury - resolved, creatinine wnl 8/28  - Admission creatinine 1.4, baseline 1.0  - Likely cardiorenal from CHF  - C/w Furosemide with close renal function and electrolyte monitoring    5) Paroxysmal Atrial fibrillation  - Currently in paced rhythm  - [id number] of 3, c/w Xarelto 15 mg QD for A/C    # CAD  - Restart aspirin 81 mg QD and Atorvastatin 20 mg QD  sp [facility] 8/28 that shows non obstructive CAD and severe pulmonary HTN   - Cardiology consult apprecaited     # BPH  - Restart Tamsulosin 0.4 mg QHS    # Thrombocytopenia  - May be in the setting of congestive hepatopathy  - No evidence of cirrhosis on imaging  - Check INR to evaluate hepatic function  - Check B12 level  - Check blue top platelet

## 2020-08-28 NOTE — ED ADULT NURSE REASSESSMENT NOTE - NS ED NURSE REASSESS COMMENT FT1
pt provided sandwich and water. resting in bed, comfortable appearance. urinating frequently since administration of lasix. given BP medications as ordered. /90 at this time. other VSS. second troponin drawn. awaiting transport to .

## 2020-08-28 NOTE — CONSULT NOTE ADULT - PROBLEM SELECTOR RECOMMENDATION 9
Recent Echo (May) reveals lower EF than previous and PFO is seen. Etiology of edema and symptoms could be related to right sided failure secondary to valvulopathy or shunt. Right and left cath is indicated

## 2020-08-28 NOTE — H&P ADULT - NSICDXPASTSURGICALHX_GEN_ALL_CORE_FT
PAST SURGICAL HISTORY:  Cardiac pacemaker single chamber meditronic  placed by Dr Tomas  May  of  2016

## 2020-08-28 NOTE — H&P ADULT - HISTORY OF PRESENT ILLNESS
75 yo M with a PMH HFrEF s/p PPM, atrial fibrillation on Xarelto, HTN, HLD, thalassemia, BPH, and COPD (not on O2) who presents with ______________    In the ED, he was given Furosemide 40 mg IV x1 and Hydralazine 5 mg IV x1. 73 yo Greek-speaking M with a PMH HFrEF s/p PPM, atrial fibrillation on Xarelto, HTN, HLD, thalassemia, BPH, and COPD (not on inhalers) who presents with abdominal and lower extremity swelling. For the past 2-3 weeks, since he ran out of his medications, he has been having abdominal and lower extremity swelling. He also has pain from just below his umbilicus spreading down to his legs, worse when he walks. He describes the pain as an achy, moderate in intensity pain. He feels he has SOB, especially when he is walking and when he is sleeping. He denies CP, wheezing, palpitations, fevers, chills, nausea, vomiting, diarrhea, constipation, dysuria, or hematuria. He does endorse a slight non-productive cough with sore throat for the past week.  His last PPM interrogation was 3 months ago and he is due for another interrogation tomorrow.    ROS is also positive for some right eye lacrimation without blurry vision or headache.    In the ED, he was given Furosemide 40 mg IV x1 and Hydralazine 5 mg IV x1. 75 yo Georgian-speaking M with a PMH HFrEF s/p PPM, atrial fibrillation on Xarelto, HTN, HLD, thalassemia, BPH, and COPD (not on inhalers) who presents with abdominal and lower extremity swelling. For the past 2-3 weeks, since he ran out of his medications, he has been having abdominal and lower extremity swelling. He also has pain from just below his umbilicus spreading down to his legs, worse when he walks. He describes the pain as an achy, moderate in intensity pain. He feels he has SOB, especially when he is walking and when he is sleeping. He denies CP, wheezing, palpitations, fevers, chills, nausea, vomiting, diarrhea, constipation, dysuria, or hematuria. He does endorse a slight non-productive cough with sore throat for the past week.  His last PPM interrogation was 3 months ago and he is due for another interrogation tomorrow.    ROS is also positive for some right eye lacrimation without blurry vision or headache.    In the ED, he was given Furosemide 40 mg IV x1 and Hydralazine 5 mg IV x1. On telemetry, he was briefly in V-tach. 73 yo Yoruba-speaking M with a PMH HFrEF s/p PPM, CAD, atrial fibrillation on Xarelto, HTN, HLD, thalassemia, BPH, and COPD (not on inhalers) who presents with abdominal and lower extremity swelling. For the past 2-3 weeks, since he ran out of his medications, he has been having abdominal and lower extremity swelling. He also has pain from just below his umbilicus spreading down to his legs, worse when he walks. He describes the pain as an achy, moderate in intensity pain. He feels he has SOB, especially when he is walking and when he is sleeping. He denies CP, wheezing, palpitations, fevers, chills, nausea, vomiting, diarrhea, constipation, dysuria, or hematuria. He does endorse a slight non-productive cough with sore throat for the past week.  His last PPM interrogation was 3 months ago and he is due for another interrogation tomorrow.    ROS is also positive for some right eye lacrimation without blurry vision or headache.    In the ED, he was given Furosemide 40 mg IV x1 and Hydralazine 5 mg IV x1. On telemetry, he was briefly in V-tach.

## 2020-08-28 NOTE — ED ADULT NURSE REASSESSMENT NOTE - NS ED NURSE REASSESS COMMENT FT1
pt /105, . MD Del Real aware and going to bedside to evaluate pt. awaiting orders. will continue to monitor.

## 2020-08-28 NOTE — H&P ADULT - NSHPSOCIALHISTORY_GEN_ALL_CORE
No smoking, alcohol, or drug use history.  Worked in [employer] but has not worked since the COVID-19 pandemic.  Lives with family.

## 2020-08-28 NOTE — PROGRESS NOTE ADULT - SUBJECTIVE AND OBJECTIVE BOX
Chief Complaint: CHF exacerbation     Subjective and objective   75 yo Mexican-speaking M with a PMH HFrEF s/p PPM, CAD, atrial fibrillation on Xarelto, HTN, HLD, thalassemia, BPH, and COPD (not on inhalers) who presents with abdominal and lower extremity swelling. For the past 2-3 weeks, since he ran out of his medications, he has been having abdominal and lower extremity swelling. He also has pain from just below his umbilicus spreading down to his legs, worse when he walks. He describes the pain as an achy, moderate in intensity pain. He feels he has SOB, especially when he is walking and when he is sleeping. He denies CP, wheezing, palpitations, fevers, chills, nausea, vomiting, diarrhea, constipation, dysuria, or hematuria. He does endorse a slight non-productive cough with sore throat for the past week.  His last PPM interrogation was 3 months ago and he is due for another interrogation tomorrow.    ROS is also positive for some right eye lacrimation without blurry vision or headache.    In the ED, he was given Furosemide 40 mg IV x1 and Hydralazine 5 mg IV x1. On telemetry, he was briefly in V-tach.    REVIEW OF SYSTEMS:  All other review of systems is negative unless indicated above    Vital Signs Last 24 Hrs  T(C): 36.8 (28 Aug 2020 08:22), Max: 36.9 (27 Aug 2020 22:20)  T(F): 98.2 (28 Aug 2020 08:22), Max: 98.5 (27 Aug 2020 22:20)  HR: 60 (28 Aug 2020 15:15) (58 - 68)  BP: 170/82 (28 Aug 2020 15:15) (105/74 - 185/96)  BP(mean): 101 (28 Aug 2020 03:30) (79 - 125)  RR: 16 (28 Aug 2020 15:15) (16 - 22)  SpO2: 99% (28 Aug 2020 15:15) (95% - 99%)    I&O's Summary    28 Aug 2020 07:01  -  28 Aug 2020 15:29  --------------------------------------------------------  IN: 0 mL / OUT: 800 mL / NET: -800 mL    PHYSICAL EXAM:  Constitutional: NAD, awake and alert, well-developed  HEENT: PERR, EOMI,  No oral cyananosis.  Neck:  supple,  No JVD  Respiratory: Breath sounds are clear bilaterally, No wheezing, rales or rhonchi  Cardiovascular: S1 and S2, regular rate and rhythm, no Murmurs, gallops or rubs  Gastrointestinal: Bowel Sounds present, soft, nontender.   Extremities: 2 + edema  Vascular: 1+ peripheral pulses  Neurological: A/O x 3, no focal deficits  Musculoskeletal: no calf tenderness.  Skin: No rashes.    Medications:  MEDICATIONS  (STANDING):  aspirin  chewable 81 milliGRAM(s) Oral daily  atorvastatin 20 milliGRAM(s) Oral at bedtime  carvedilol 6.25 milliGRAM(s) Oral every 12 hours  furosemide   Injectable 40 milliGRAM(s) IV Push two times a day  loratadine 10 milliGRAM(s) Oral daily  rivaroxaban 15 milliGRAM(s) Oral with dinner  sacubitril 49 mG/valsartan 51 mG 1 Tablet(s) Oral two times a day  sodium chloride 0.9% lock flush 3 milliLiter(s) IV Push every 8 hours  tamsulosin 0.4 milliGRAM(s) Oral at bedtime      Labs: All Labs Reviewed:                        13.0   7.86  )-----------( 82       ( 28 Aug 2020 08:23 )             42.3     08-    143  |  107  |  22  ----------------------------<  91  4.3   |  30  |  1.14    Ca    9.0      28 Aug 2020 08:23  Phos  2.6     08  Mg     2.1         TPro  6.5  /  Alb  3.6  /  TBili  3.0<H>  /  DBili  x   /  AST  27  /  ALT  32  /  AlkPhos  148<H>  -    PT/INR - ( 28 Aug 2020 08:23 )   PT: 21.4 sec;   INR: 1.89 ratio         PTT - ( 28 Aug 2020 08:23 )  PTT:43.6 sec  Urinalysis Basic - ( 28 Aug 2020 02:56 )    Color: Yellow / Appearance: Clear / S.005 / pH: x  Gluc: x / Ketone: Negative  / Bili: Negative / Urobili: Negative mg/dL   Blood: x / Protein: Negative mg/dL / Nitrite: Negative   < from: US Abdomen Limited (20 @ 22:45) >  IMPRESSION:  1. Non-specific gallbladder wall thickening. No gallstones are identified. No  significant bile duct dilation.  2. Pulsatile portal venous flow and hepatic vein dilatation. This may be from  right heart failure.  3. Ascites.  4. Right pleural effusion.    < end of copied text >  Leuk Esterase: Negative / RBC: x / WBC x   Sq Epi: x / Non Sq Epi: x / Bacteria: x      CARDIAC MARKERS ( 28 Aug 2020 02:56 )  0.177 ng/mL / x     / x     / x     / x      CARDIAC MARKERS ( 27 Aug 2020 23:58 )  0.165 ng/mL / x     / x     / x     / x      CARDIAC MARKERS ( 27 Aug 2020 20:05 )  0.163 ng/mL / x     / x     / x     / x        RADIOLOGY/EKG:  < from: US Abdomen Limited (20 @ 22:45) >  IMPRESSION:  1. Non-specific gallbladder wall thickening. No gallstones are identified. No  significant bile duct dilation.  2. Pulsatile portal venous flow and hepatic vein dilatation. This may be from  right heart failure.  3. Ascites.  4. Right pleural effusion.    < end of copied text >    < from: Cardiac Cath Lab - Adult (20 @ 14:25) >   Impression     Diagnostic Conclusions     Non-Obstructive CAD with Abnormal left ventricular function.   Severe pulmonary hypertension.     Recommendations     Manage with medical therapy.    < end of copied text >      DVT PPX: xarelto     Advance Directive: full code     Disposition: diuresis with lasix

## 2020-08-28 NOTE — H&P ADULT - NSHPREVIEWOFSYSTEMS_GEN_ALL_CORE
Gen: + malaise, fatigue. Negative for fevers or chills  Eyes: + lacrimation. No blurred vision  ENT: no tinnitus, vertigo, or decreased hearing  Resp: + dyspnea, cough, orthopnea. No wheezing, pleuritic chest pain, or hemoptysis  CV: + dyspnea on exertion. No chest pain or palpitations  GI: + abdominal pain. No nausea, vomiting, diarrhea, constipation, melena, or hematochezia  : no dysuria, hematuria, or incontinence  MSK: no arthralgias, joint swelling, or myalgias  Neuro: + weakness. No focal deficits, confusion, dizziness, tremors, or seizures  Skin: + edema. No rash or lesions

## 2020-08-28 NOTE — PACU DISCHARGE NOTE - COMMENTS
pt a+o x4. vital signs stable. Pt safety maintained. Rt radial/brachial site benign, DGS dry and intact, no hematoma or bleeding noted. Site soft & nontender, +2 palpable pulses bilaterally. Bilateral upper extremities warm/intact/mobile. PIVL site benign. Skin intact. Report given to accepting RN: Jayla , Transferred via stretcher with telebox in place in stable condition. Verified pt is on 3N central monitoring station.

## 2020-08-28 NOTE — H&P ADULT - NSICDXPASTMEDICALHX_GEN_ALL_CORE_FT
PAST MEDICAL HISTORY:  AF (atrial fibrillation) on Eliquis    Arthritis     COPD (chronic obstructive pulmonary disease) not on inhalers    Coronary artery disease involving native heart with angina pectoris, unspecified vessel or lesion type     Heart failure with reduced ejection fraction     HLD (hyperlipidemia)     HTN (hypertension)     Pacemaker single chamber meditronic  placed by Dr Tomas  May  of  2016

## 2020-08-29 LAB
ALBUMIN SERPL ELPH-MCNC: 3.1 G/DL — LOW (ref 3.3–5)
ALP SERPL-CCNC: 142 U/L — HIGH (ref 40–120)
ALT FLD-CCNC: 29 U/L — SIGNIFICANT CHANGE UP (ref 12–78)
ANION GAP SERPL CALC-SCNC: 5 MMOL/L — SIGNIFICANT CHANGE UP (ref 5–17)
AST SERPL-CCNC: 25 U/L — SIGNIFICANT CHANGE UP (ref 15–37)
BILIRUB SERPL-MCNC: 2.7 MG/DL — HIGH (ref 0.2–1.2)
BUN SERPL-MCNC: 21 MG/DL — SIGNIFICANT CHANGE UP (ref 7–23)
CALCIUM SERPL-MCNC: 8.6 MG/DL — SIGNIFICANT CHANGE UP (ref 8.5–10.1)
CHLORIDE SERPL-SCNC: 106 MMOL/L — SIGNIFICANT CHANGE UP (ref 96–108)
CO2 SERPL-SCNC: 33 MMOL/L — HIGH (ref 22–31)
CREAT SERPL-MCNC: 1.12 MG/DL — SIGNIFICANT CHANGE UP (ref 0.5–1.3)
GLUCOSE SERPL-MCNC: 88 MG/DL — SIGNIFICANT CHANGE UP (ref 70–99)
HCT VFR BLD CALC: 41.4 % — SIGNIFICANT CHANGE UP (ref 39–50)
HGB BLD-MCNC: 12.7 G/DL — LOW (ref 13–17)
MCHC RBC-ENTMCNC: 30.2 PG — SIGNIFICANT CHANGE UP (ref 27–34)
MCHC RBC-ENTMCNC: 30.7 GM/DL — LOW (ref 32–36)
MCV RBC AUTO: 98.6 FL — SIGNIFICANT CHANGE UP (ref 80–100)
PLATELET # BLD AUTO: 81 K/UL — LOW (ref 150–400)
POTASSIUM SERPL-MCNC: 3.9 MMOL/L — SIGNIFICANT CHANGE UP (ref 3.5–5.3)
POTASSIUM SERPL-SCNC: 3.9 MMOL/L — SIGNIFICANT CHANGE UP (ref 3.5–5.3)
PROT SERPL-MCNC: 6 GM/DL — SIGNIFICANT CHANGE UP (ref 6–8.3)
RBC # BLD: 4.2 M/UL — SIGNIFICANT CHANGE UP (ref 4.2–5.8)
RBC # FLD: 17.2 % — HIGH (ref 10.3–14.5)
SODIUM SERPL-SCNC: 144 MMOL/L — SIGNIFICANT CHANGE UP (ref 135–145)
WBC # BLD: 6.18 K/UL — SIGNIFICANT CHANGE UP (ref 3.8–10.5)
WBC # FLD AUTO: 6.18 K/UL — SIGNIFICANT CHANGE UP (ref 3.8–10.5)

## 2020-08-29 PROCEDURE — 99233 SBSQ HOSP IP/OBS HIGH 50: CPT

## 2020-08-29 RX ORDER — FUROSEMIDE 40 MG
80 TABLET ORAL ONCE
Refills: 0 | Status: COMPLETED | OUTPATIENT
Start: 2020-08-29 | End: 2020-08-29

## 2020-08-29 RX ORDER — FUROSEMIDE 40 MG
40 TABLET ORAL EVERY 12 HOURS
Refills: 0 | Status: DISCONTINUED | OUTPATIENT
Start: 2020-08-29 | End: 2020-08-31

## 2020-08-29 RX ADMIN — SACUBITRIL AND VALSARTAN 1 TABLET(S): 24; 26 TABLET, FILM COATED ORAL at 09:05

## 2020-08-29 RX ADMIN — CARVEDILOL PHOSPHATE 6.25 MILLIGRAM(S): 80 CAPSULE, EXTENDED RELEASE ORAL at 21:12

## 2020-08-29 RX ADMIN — SODIUM CHLORIDE 3 MILLILITER(S): 9 INJECTION INTRAMUSCULAR; INTRAVENOUS; SUBCUTANEOUS at 15:25

## 2020-08-29 RX ADMIN — Medication 81 MILLIGRAM(S): at 09:06

## 2020-08-29 RX ADMIN — CARVEDILOL PHOSPHATE 6.25 MILLIGRAM(S): 80 CAPSULE, EXTENDED RELEASE ORAL at 09:06

## 2020-08-29 RX ADMIN — SODIUM CHLORIDE 3 MILLILITER(S): 9 INJECTION INTRAMUSCULAR; INTRAVENOUS; SUBCUTANEOUS at 05:41

## 2020-08-29 RX ADMIN — SACUBITRIL AND VALSARTAN 1 TABLET(S): 24; 26 TABLET, FILM COATED ORAL at 21:12

## 2020-08-29 RX ADMIN — TAMSULOSIN HYDROCHLORIDE 0.4 MILLIGRAM(S): 0.4 CAPSULE ORAL at 21:12

## 2020-08-29 RX ADMIN — Medication 80 MILLIGRAM(S): at 16:43

## 2020-08-29 RX ADMIN — LORATADINE 10 MILLIGRAM(S): 10 TABLET ORAL at 09:05

## 2020-08-29 RX ADMIN — Medication 40 MILLIGRAM(S): at 09:06

## 2020-08-29 RX ADMIN — RIVAROXABAN 15 MILLIGRAM(S): KIT at 16:43

## 2020-08-29 RX ADMIN — ATORVASTATIN CALCIUM 20 MILLIGRAM(S): 80 TABLET, FILM COATED ORAL at 21:12

## 2020-08-29 RX ADMIN — SODIUM CHLORIDE 3 MILLILITER(S): 9 INJECTION INTRAMUSCULAR; INTRAVENOUS; SUBCUTANEOUS at 23:14

## 2020-08-29 NOTE — PROGRESS NOTE ADULT - SUBJECTIVE AND OBJECTIVE BOX
PCP:    REQUESTING PHYSICIAN:    REASON FOR CONSULT:    CHIEF COMPLAINT:    HPI:  73 yo American-speaking M with a PMH HFrEF s/p PPM, CAD, atrial fibrillation on Xarelto, HTN, HLD, thalassemia, BPH, and COPD (not on inhalers) who presents with abdominal and lower extremity swelling. For the past 2-3 weeks, since he ran out of his medications, he has been having abdominal and lower extremity swelling. He also has pain from just below his umbilicus spreading down to his legs, worse when he walks. He describes the pain as an achy, moderate in intensity pain. He feels he has SOB, especially when he is walking and when he is sleeping. He denies CP, wheezing, palpitations, fevers, chills, nausea, vomiting, diarrhea, constipation, dysuria, or hematuria. He does endorse a slight non-productive cough with sore throat for the past week.  Cardiology was requested to evaluate his symptoms and cardiac status. Pt was last seen in our office in 2018. Echo at that time revealed an EF of 55%. Pt reports brief episodes of chest discomfort today but primarily is concerned about his lower extremity edema.     8/29/20 Awake alert feeling well no CP/SOB Tele V Paced       PAST MEDICAL & SURGICAL HISTORY:  Heart failure with reduced ejection fraction  HLD (hyperlipidemia)  COPD (chronic obstructive pulmonary disease): not on inhalers  Arthritis  HTN (hypertension)  Coronary artery disease involving native heart with angina pectoris, unspecified vessel or lesion type  Pacemaker: single chamber meditronic  placed by Dr Tomas  May  of  2016  AF (atrial fibrillation): on Eliquis  Cardiac pacemaker: single chamber meditronic  placed by Dr Tomas  May  of  2016      Allergies    ACE inhibitors (Other)  amiodarone (Other)    Intolerances        SOCIAL HISTORY:    FAMILY HISTORY:  No pertinent family history: of diabetes or heart disease      MEDICATIONS  (STANDING):  aspirin  chewable 81 milliGRAM(s) Oral daily  atorvastatin 20 milliGRAM(s) Oral at bedtime  carvedilol 6.25 milliGRAM(s) Oral every 12 hours  furosemide   Injectable 40 milliGRAM(s) IV Push two times a day  loratadine 10 milliGRAM(s) Oral daily  rivaroxaban 15 milliGRAM(s) Oral with dinner  sacubitril 49 mG/valsartan 51 mG 1 Tablet(s) Oral two times a day  sodium chloride 0.9% lock flush 3 milliLiter(s) IV Push every 8 hours  tamsulosin 0.4 milliGRAM(s) Oral at bedtime    MEDICATIONS  (PRN):    Vital Signs Last 24 Hrs  T(C): 36.4 (28 Aug 2020 17:30), Max: 36.4 (28 Aug 2020 17:30)  T(F): 97.5 (28 Aug 2020 17:30), Max: 97.5 (28 Aug 2020 17:30)  HR: 60 (28 Aug 2020 21:45) (60 - 60)  BP: 139/67 (28 Aug 2020 21:45) (139/67 - 185/96)  BP(mean): --  RR: 16 (28 Aug 2020 21:45) (16 - 16)  SpO2: 96% (28 Aug 2020 21:45) (96% - 99%)      PHYSICAL EXAM:    Constitutional: NAD, awake and alert, NAD  HEENT: PERR, EOMI,  No oral cyananosis.  Neck:  supple,  No JVD  Respiratory: Breath sounds are clear bilaterally,  Cardiovascular: S1 and S2, regular rate and rhythm,   Gastrointestinal:  soft, nontender.   Extremities: No LE Edema   Vascular: 1+ peripheral pulses  Neurological: A/O x 3, no focal deficits  Musculoskeletal: no calf tenderness.  Skin: No rashes.      LABS: All Labs Reviewed:                        13.0   7.86  )-----------( 82       ( 28 Aug 2020 08:23 )             42.3                         12.7   8.62  )-----------( 80       ( 27 Aug 2020 20:05 )             41.4     28 Aug 2020 08:23    143    |  107    |  22     ----------------------------<  91     4.3     |  30     |  1.14   27 Aug 2020 20:05    145    |  110    |  25     ----------------------------<  120    4.7     |  32     |  1.40     Ca    9.0        28 Aug 2020 08:23  Ca    8.3        27 Aug 2020 20:05  Phos  2.6       28 Aug 2020 08:23  Phos  3.4       27 Aug 2020 20:05  Mg     2.1       28 Aug 2020 08:23  Mg     2.2       27 Aug 2020 20:05    TPro  6.5    /  Alb  3.6    /  TBili  3.0    /  DBili  x      /  AST  27     /  ALT  32     /  AlkPhos  148    28 Aug 2020 08:23  TPro  6.0    /  Alb  3.2    /  TBili  2.1    /  DBili  x      /  AST  22     /  ALT  32     /  AlkPhos  178    27 Aug 2020 20:05    PT/INR - ( 28 Aug 2020 08:23 )   PT: 21.4 sec;   INR: 1.89 ratio         PTT - ( 28 Aug 2020 08:23 )  PTT:43.6 sec  CARDIAC MARKERS ( 28 Aug 2020 02:56 )  0.177 ng/mL / x     / x     / x     / x      CARDIAC MARKERS ( 27 Aug 2020 23:58 )  0.165 ng/mL / x     / x     / x     / x      CARDIAC MARKERS ( 27 Aug 2020 20:05 )  0.163 ng/mL / x     / x     / x     / x          Blood Culture:   08-27 @ 20:05  Pro Bnp [id number]        RADIOLOGY/EKG:< from: 12 Lead ECG (08.27.20 @ 19:37) >  Diagnosis Line Ventricular-paced rhythm  Abnormal ECG  When compared with ECG of 13-MAY-2020 21:01,  No significant change was found  Confirmed by Jake Bauman ([id number]) on 8/28/2020 10:31:41 AM    < end of copied text >  < from: Transthoracic Echocardiogram Follow Up (05.12.20 @ 11:41) >  Impression     Summary     The left ventricle cavity is mildly dilated.   Mild concentric left ventricular hypertrophy is present.   Estimated left ventricular ejection fraction is 40-45 %.   The left atrium is moderately dilated.   The right atrium appears mildly dilated.   A device wire is seen in the RV and RA.   A PFO is noted with color doppler.   Normal appearing right ventricle structure and function.   Mild aortic sclerosis is present with normal valvular opening.   Mild (1+) aortic regurgitation is present.   Moderate to severe (3+) mitral regurgitation is present.   Mild mitral annular calcification< from: Cardiac Cath Lab - Adult (08.28.20 @ 14:25) >       < from: Cardiac Cath Lab - Adult (08.28.20 @ 14:25) >  !RV   !72/9 ,15                                                            !  +-----+--------------------------------------------------------------------+    Cardiac Output  +------+---------------------+------------------------+--------------------+  !Method!CO (l/min)           !CI (l/min/m2)           !SV (ml)             !  +------+---------------------+------------------------+--------------------+  !Farzana  !5.9                  !2.8!                    !  +------+---------------------+------------------------+--------------------+     Condition: Post LV    Pressure  +-----+--------------------------------------------------------------------+  !Site !Pressure                                       !  +-----+--------------------------------------------------------------------+  !AO   !173/76 (108)                                                        !  +-----+--------------------------------------------------------------------+  !LV   !172/10 ,23                                                          !  +-----+--------------------------------------------------------------------+     Angiographic Findings     Cardiac Arteries and Lesion Findings    LMCA: Diffuse irregularity.There is mild diffuse disease noted.    LAD: Diffuse irregularity.There is mild diffuse disease noted.    LCx: Diffuse irregularity.There is mild diffuse disease noted.    RCA: Diffuse irregularity.There is mild diffuse disease noted.    Valves  +------+--------+------------------------------------------------+---------+  !Valve !Stenosis!Insufficiency                                   !Comments !  +------+--------+------------------------------------------------+---------+  !Aortic!No      !Not assessed                                    !         !  +------+--------+------------------------------------------------+---------+  !Mitral!No      !No insufficiency                                !         !  +------+--------+------------------------------------------------+---------+    LV function assessed as:Abnormal.  Ejection Fraction  +----------------------------------------------------------------------+---+  !Method                   !EF%!  +----------------------------------------------------------------------+---+  !LV gram                                                               !48 !  +----------------------------------------------------------------------+---+    VA  Ventriculography Findings:  Mildly reduced left ventricular systolic function.     Impression     Diagnostic Conclusions     Non-Obstructive CAD with Abnormal left ventricular function.   Severe pulmonary hypertension.    < end of copied text >

## 2020-08-29 NOTE — PROGRESS NOTE ADULT - SUBJECTIVE AND OBJECTIVE BOX
Chief Complaint: CHF exacerbation     Subjective and objective   Patient is a 75 yo Emirati-speaking M with a PMH HFrEF s/p PPM, CAD, atrial fibrillation on Xarelto, HTN, HLD, thalassemia, BPH, and COPD (not on inhalers) who presents with abdominal and lower extremity swelling. For the past 2-3 weeks, since he ran out of his medications, he has been having abdominal and lower extremity swelling. He also has pain from just below his umbilicus spreading down to his legs, worse when he walks. He describes the pain as an achy, moderate in intensity pain. He feels he has SOB, especially when he is walking and when he is sleeping. He denies CP, wheezing, palpitations, fevers, chills, nausea, vomiting, diarrhea, constipation, dysuria, or hematuria. He does endorse a slight non-productive cough with sore throat for the past week. His last PPM interrogation was 3 months ago and he is due for another interrogation tomorrow.      Medical progress: Doing well. No new complaints. Patient is known to us as patient is very non-complient and is often admitted in the hospital. IV lasix has been changed to PO. Care discussed with Dr. Kowalski. Plan is to discharge patient once euvolumic.   Complaints: no new complaints  State of mind: maintains normal conversation    REVIEW OF SYSTEMS:  All other review of systems is negative unless indicated above    PHYSICAL EXAM:  ICU Vital Signs Last 24 Hrs  T(C): 36.6 (29 Aug 2020 08:56), Max: 36.6 (29 Aug 2020 08:56)  T(F): 97.9 (29 Aug 2020 08:56), Max: 97.9 (29 Aug 2020 08:56)  HR: 60 (29 Aug 2020 08:56) (60 - 60)  BP: 145/65 (29 Aug 2020 08:56) (139/67 - 178/91)  RR: 18 (29 Aug 2020 08:56) (16 - 18)  SpO2: 97% (29 Aug 2020 08:56) (96% - 99%)  Constitutional: NAD, awake and alert, well-developed  HEENT: PERR, EOMI,  No oral cyananosis.  Neck:  supple,  No JVD  Respiratory: Breath sounds are clear bilaterally, No wheezing, rales or rhonchi  Cardiovascular: S1 and S2, regular rate and rhythm, no Murmurs, gallops or rubs  Gastrointestinal: Bowel Sounds present, soft, nontender.   Extremities: 2 + edema  Vascular: 1+ peripheral pulses  Neurological: A/O x 3, no focal deficits  Musculoskeletal: no calf tenderness.  Skin: No rashes.    Labs:                CBC Full  -  ( 29 Aug 2020 08:13 )  WBC Count : 6.18 K/uL  RBC Count : 4.20 M/uL  Hemoglobin : 12.7 g/dL  Hematocrit : 41.4 %  Platelet Count - Automated : 81 K/uL  Mean Cell Volume : 98.6 fl  Mean Cell Hemoglobin : 30.2 pg  Mean Cell Hemoglobin Concentration : 30.7 gm/dL  Auto Neutrophil # : x  Auto Lymphocyte # : x  Auto Monocyte # : x  Auto Eosinophil # : x  Auto Basophil # : x  Auto Neutrophil % : x  Auto Lymphocyte % : x  Auto Monocyte % : x  Auto Eosinophil % : x  Auto Basophil % : x    PT/INR - ( 28 Aug 2020 08:23 )   PT: 21.4 sec;   INR: 1.89 ratio         PTT - ( 28 Aug 2020 08:23 )  PTT:43.6 sec  Urinalysis Basic - ( 28 Aug 2020 02:56 )    Color: Yellow / Appearance: Clear / S.005 / pH: x  Gluc: x / Ketone: Negative  / Bili: Negative / Urobili: Negative mg/dL   Blood: x / Protein: Negative mg/dL / Nitrite: Negative   Leuk Esterase: Negative / RBC: x / WBC x   Sq Epi: x / Non Sq Epi: x / Bacteria: x          144  |  106  |  21  ----------------------------<  88  3.9   |  33<H>  |  1.12    Ca    8.6      29 Aug 2020 08:13  Phos  2.6     -  Mg     2.1     -    TPro  6.0  /  Alb  3.1<L>  /  TBili  2.7<H>  /  DBili  x   /  AST  25  /  ALT  29  /  AlkPhos  142<H>        75 yo Emirati-speaking M with a PMH HFrEF s/p PPM, CAD, atrial fibrillation on Xarelto, HTN, HLD, thalassemia, BPH, and COPD (not on inhalers) who presents with abdominal and lower extremity swelling due to CHF exacerbation.    #  Acute on chronic systolic decompensated CHF   - Compensated no SOB  - patient now s/p [facility] [place] Non obstructive cad with abnormal LV FX EF 48% severe Pulmonary HTN   - ECHO:  Moderate to severe (3+) mitral regurgitation is present.  - continue Entresto  - lasix 40 mg po BID  - coreg 6.25 mg po BID    # elevated bili /  alk phos   - this level of characteristic is often suggestive of ? infiltrative process vs cholestatic  - Sono: Non-specific gallbladder wall thickening. No gallstones are identified. No significant bile duct dilation. this could be seen in a patient with HF as patient has no RUQ pain  - CT: Small volume abdominal and pelvic ascites. Small right pleural effusion. Hepatic steatosis. 1.3 cm indeterminate splenic hypervascular lesion, possibly a hemangioma.    # Uncontrolled Hypertension  - BPs ~ 180s/100s on admission, now 140s  - continue with current managment with antihypertensive as BP is nicely coming down with goal < 140    # Acute Kidney Injury - resolved, creatinine wnl   - Admission creatinine 1.4, baseline 1.0  - Likely cardiorenal from CHF  - Scr within normal level    # Paroxysmal Atrial fibrillation  - Currently in paced rhythm  - [id number] of 3, c/w Xarelto 15 mg QD for A/C    # CAD  - Restart aspirin 81 mg QD and Atorvastatin 20 mg QD  - s/p [facility]  that shows non obstructive CAD and severe pulmonary HTN   - Cardiology consult apprecaited     # BPH  - Restart Tamsulosin 0.4 mg QHS    # Thrombocytopenia  - May be in the setting of congestive hepatopathy  - No evidence of cirrhosis on imaging  - Check INR to evaluate hepatic function  - Check B12 level  - Check blue top platelet Chief Complaint: CHF exacerbation     Subjective and objective   Patient is a 75 yo Cymro-speaking M with a PMH HFrEF s/p PPM, CAD, atrial fibrillation on Xarelto, HTN, HLD, thalassemia, BPH, and COPD (not on inhalers) who presents with abdominal and lower extremity swelling. For the past 2-3 weeks, since he ran out of his medications, he has been having abdominal and lower extremity swelling. He also has pain from just below his umbilicus spreading down to his legs, worse when he walks. He describes the pain as an achy, moderate in intensity pain. He feels he has SOB, especially when he is walking and when he is sleeping. He denies CP, wheezing, palpitations, fevers, chills, nausea, vomiting, diarrhea, constipation, dysuria, or hematuria. He does endorse a slight non-productive cough with sore throat for the past week. His last PPM interrogation was 3 months ago and he is due for another interrogation tomorrow.      Medical progress: Doing well. No new complaints. Patient is known to us as patient is very non-complient and is often admitted in the hospital. Patient will receive extra dose of IV lasix as patient is very fluid overloaded and edematous. Care discussed with Dr. Kowalski.   Complaints: no new complaints  State of mind: maintains normal conversation    REVIEW OF SYSTEMS:  All other review of systems is negative unless indicated above    PHYSICAL EXAM:  ICU Vital Signs Last 24 Hrs  T(C): 36.6 (29 Aug 2020 08:56), Max: 36.6 (29 Aug 2020 08:56)  T(F): 97.9 (29 Aug 2020 08:56), Max: 97.9 (29 Aug 2020 08:56)  HR: 60 (29 Aug 2020 08:56) (60 - 60)  BP: 145/65 (29 Aug 2020 08:56) (139/67 - 178/91)  RR: 18 (29 Aug 2020 08:56) (16 - 18)  SpO2: 97% (29 Aug 2020 08:56) (96% - 99%)  Constitutional: NAD, awake and alert, well-developed, able to tolerate lying down flat  HEENT: PERR, EOMI,  No oral cyananosis.  Neck:  + JVD, + hepatojugular reflex  Respiratory: Breath sounds are clear bilaterally, No wheezing, rales or rhonchi  Cardiovascular: S1 and S2, regular rate and rhythm, no Murmurs, gallops or rubs  Gastrointestinal: Bowel Sounds present, soft, nontender.   Extremities: 3+ LE edema  Vascular: 1+ peripheral pulses  Neurological: A/O x 3, no focal deficits  Musculoskeletal: no calf tenderness.  Skin: No rashes.    Labs:                CBC Full  -  ( 29 Aug 2020 08:13 )  WBC Count : 6.18 K/uL  RBC Count : 4.20 M/uL  Hemoglobin : 12.7 g/dL  Hematocrit : 41.4 %  Platelet Count - Automated : 81 K/uL  Mean Cell Volume : 98.6 fl  Mean Cell Hemoglobin : 30.2 pg  Mean Cell Hemoglobin Concentration : 30.7 gm/dL  Auto Neutrophil # : x  Auto Lymphocyte # : x  Auto Monocyte # : x  Auto Eosinophil # : x  Auto Basophil # : x  Auto Neutrophil % : x  Auto Lymphocyte % : x  Auto Monocyte % : x  Auto Eosinophil % : x  Auto Basophil % : x    PT/INR - ( 28 Aug 2020 08:23 )   PT: 21.4 sec;   INR: 1.89 ratio         PTT - ( 28 Aug 2020 08:23 )  PTT:43.6 sec  Urinalysis Basic - ( 28 Aug 2020 02:56 )    Color: Yellow / Appearance: Clear / S.005 / pH: x  Gluc: x / Ketone: Negative  / Bili: Negative / Urobili: Negative mg/dL   Blood: x / Protein: Negative mg/dL / Nitrite: Negative   Leuk Esterase: Negative / RBC: x / WBC x   Sq Epi: x / Non Sq Epi: x / Bacteria: x          144  |  106  |  21  ----------------------------<  88  3.9   |  33<H>  |  1.12    Ca    8.6      29 Aug 2020 08:13  Phos  2.6     08-28  Mg     2.1     08-    TPro  6.0  /  Alb  3.1<L>  /  TBili  2.7<H>  /  DBili  x   /  AST  25  /  ALT  29  /  AlkPhos  142<H>  08-      75 yo Cymro-speaking M with a PMH HFrEF s/p PPM, CAD, atrial fibrillation on Xarelto, HTN, HLD, thalassemia, BPH, and COPD (not on inhalers) who presents with abdominal and lower extremity swelling due to CHF exacerbation.    #  Acute on chronic systolic decompensated CHF   - Compensated no SOB  - patient now s/p [facility] Non obstructive cad with abnormal LV FX EF 48% severe Pulmonary HTN   - ECHO:  Moderate to severe (3+) mitral regurgitation is present.  - continue Entresto  - lasix 40 mg po BID  - coreg 6.25 mg po BID    # elevated bili /  alk phos   - this level of characteristic is often suggestive of ? infiltrative process vs cholestatic  - Sono: Non-specific gallbladder wall thickening. No gallstones are identified. No significant bile duct dilation. this could be seen in a patient with HF as patient has no RUQ pain  - CT: Small volume abdominal and pelvic ascites. Small right pleural effusion. Hepatic steatosis. 1.3 cm indeterminate splenic hypervascular lesion, possibly a hemangioma.    # Uncontrolled Hypertension  - BPs ~ 180s/100s on admission, now 140s  - continue with current managment with antihypertensive as BP is nicely coming down with goal < 140    # Acute Kidney Injury - resolved, creatinine wnl   - Admission creatinine 1.4, baseline 1.0  - Likely cardiorenal from CHF  - Scr within normal level    # Paroxysmal Atrial fibrillation  - Currently in paced rhythm  - [id number] of 3, c/w Xarelto 15 mg QD for A/C    # CAD  - Restart aspirin 81 mg QD and Atorvastatin 20 mg QD  - s/p [facility]  that shows non obstructive CAD and severe pulmonary HTN   - Cardiology consult apprecaited     # BPH  - Restart Tamsulosin 0.4 mg QHS    # Thrombocytopenia  - May be in the setting of congestive hepatopathy  - No evidence of cirrhosis on imaging  - Check INR to evaluate hepatic function  - Check B12 level  - Check blue top platelet

## 2020-08-29 NOTE — PROGRESS NOTE ADULT - PROBLEM SELECTOR PLAN 1
Problem: Acute on chronic systolic congestive heart failure. Compensated no SOB, S/P [facility] Non obstructive cad with abnormal LV FX EF 48% severe Pulmonary HTN will need RHC continue Entresto. Consider changing lasix to PO.   Recent Echo (May) reveals lower EF than previous and PFO is seen moderate to severe MR Etiology of edema and symptoms could be related to right sided failure secondary to valvulopathy or shunt. Problem: Acute on chronic systolic congestive heart failure. Compensated no SOB, S/P [facility] Non obstructive cad with abnormal LV FX EF 48% severe Pulmonary HTN will need RHC continue Entresto. Consider changing lasix to PO.   Recent Echo (May) reveals lower EF than previous and PFO is seen moderate to severe MR Etiology of edema and symptoms could be related to right sided failure secondary to valvulopathy or shunt. Will consider opt MV evaluation possible SNEHA

## 2020-08-30 LAB
ANION GAP SERPL CALC-SCNC: 7 MMOL/L — SIGNIFICANT CHANGE UP (ref 5–17)
BUN SERPL-MCNC: 20 MG/DL — SIGNIFICANT CHANGE UP (ref 7–23)
CALCIUM SERPL-MCNC: 8.6 MG/DL — SIGNIFICANT CHANGE UP (ref 8.5–10.1)
CHLORIDE SERPL-SCNC: 103 MMOL/L — SIGNIFICANT CHANGE UP (ref 96–108)
CO2 SERPL-SCNC: 31 MMOL/L — SIGNIFICANT CHANGE UP (ref 22–31)
CREAT SERPL-MCNC: 1.02 MG/DL — SIGNIFICANT CHANGE UP (ref 0.5–1.3)
GLUCOSE SERPL-MCNC: 93 MG/DL — SIGNIFICANT CHANGE UP (ref 70–99)
HCT VFR BLD CALC: 42 % — SIGNIFICANT CHANGE UP (ref 39–50)
HGB BLD-MCNC: 13.2 G/DL — SIGNIFICANT CHANGE UP (ref 13–17)
MCHC RBC-ENTMCNC: 30.1 PG — SIGNIFICANT CHANGE UP (ref 27–34)
MCHC RBC-ENTMCNC: 31.4 GM/DL — LOW (ref 32–36)
MCV RBC AUTO: 95.7 FL — SIGNIFICANT CHANGE UP (ref 80–100)
PLATELET # BLD AUTO: 90 K/UL — LOW (ref 150–400)
POTASSIUM SERPL-MCNC: 3.3 MMOL/L — LOW (ref 3.5–5.3)
POTASSIUM SERPL-SCNC: 3.3 MMOL/L — LOW (ref 3.5–5.3)
RBC # BLD: 4.39 M/UL — SIGNIFICANT CHANGE UP (ref 4.2–5.8)
RBC # FLD: 16.8 % — HIGH (ref 10.3–14.5)
SODIUM SERPL-SCNC: 141 MMOL/L — SIGNIFICANT CHANGE UP (ref 135–145)
WBC # BLD: 5.68 K/UL — SIGNIFICANT CHANGE UP (ref 3.8–10.5)
WBC # FLD AUTO: 5.68 K/UL — SIGNIFICANT CHANGE UP (ref 3.8–10.5)

## 2020-08-30 PROCEDURE — 99233 SBSQ HOSP IP/OBS HIGH 50: CPT

## 2020-08-30 RX ORDER — POTASSIUM CHLORIDE 20 MEQ
40 PACKET (EA) ORAL EVERY 4 HOURS
Refills: 0 | Status: COMPLETED | OUTPATIENT
Start: 2020-08-30 | End: 2020-08-30

## 2020-08-30 RX ADMIN — Medication 40 MILLIGRAM(S): at 09:56

## 2020-08-30 RX ADMIN — Medication 40 MILLIGRAM(S): at 17:11

## 2020-08-30 RX ADMIN — Medication 40 MILLIEQUIVALENT(S): at 11:25

## 2020-08-30 RX ADMIN — SODIUM CHLORIDE 3 MILLILITER(S): 9 INJECTION INTRAMUSCULAR; INTRAVENOUS; SUBCUTANEOUS at 22:08

## 2020-08-30 RX ADMIN — SODIUM CHLORIDE 3 MILLILITER(S): 9 INJECTION INTRAMUSCULAR; INTRAVENOUS; SUBCUTANEOUS at 09:53

## 2020-08-30 RX ADMIN — CARVEDILOL PHOSPHATE 6.25 MILLIGRAM(S): 80 CAPSULE, EXTENDED RELEASE ORAL at 22:07

## 2020-08-30 RX ADMIN — SACUBITRIL AND VALSARTAN 1 TABLET(S): 24; 26 TABLET, FILM COATED ORAL at 09:55

## 2020-08-30 RX ADMIN — SACUBITRIL AND VALSARTAN 1 TABLET(S): 24; 26 TABLET, FILM COATED ORAL at 22:07

## 2020-08-30 RX ADMIN — LORATADINE 10 MILLIGRAM(S): 10 TABLET ORAL at 09:56

## 2020-08-30 RX ADMIN — CARVEDILOL PHOSPHATE 6.25 MILLIGRAM(S): 80 CAPSULE, EXTENDED RELEASE ORAL at 09:56

## 2020-08-30 RX ADMIN — RIVAROXABAN 15 MILLIGRAM(S): KIT at 17:11

## 2020-08-30 RX ADMIN — SODIUM CHLORIDE 3 MILLILITER(S): 9 INJECTION INTRAMUSCULAR; INTRAVENOUS; SUBCUTANEOUS at 14:11

## 2020-08-30 RX ADMIN — ATORVASTATIN CALCIUM 20 MILLIGRAM(S): 80 TABLET, FILM COATED ORAL at 22:07

## 2020-08-30 RX ADMIN — Medication 40 MILLIEQUIVALENT(S): at 14:14

## 2020-08-30 RX ADMIN — Medication 81 MILLIGRAM(S): at 09:55

## 2020-08-30 RX ADMIN — TAMSULOSIN HYDROCHLORIDE 0.4 MILLIGRAM(S): 0.4 CAPSULE ORAL at 22:07

## 2020-08-30 NOTE — PROGRESS NOTE ADULT - ASSESSMENT
75 yo Portuguese-speaking M with a PMH HFrEF s/p PPM, CAD, atrial fibrillation on Xarelto, HTN, HLD, thalassemia, BPH, and COPD (not on inhalers) who presents with abdominal and lower extremity swelling due to CHF exacerbation.    #  Acute on chronic systolic decompensated CHF   - Compensated no SOB  - patient now s/p [facility] Non obstructive cad with abnormal LV FX EF 48% severe Pulmonary HTN   - ECHO:  Moderate to severe (3+) mitral regurgitation is present.  - continue Entresto  - lasix 40 mg po BID  - coreg 6.25 mg po BID    # elevated bili /  alk phos   - this level of characteristic is often suggestive of ? infiltrative process vs cholestatic  - Sono: Non-specific gallbladder wall thickening. No gallstones are identified. No significant bile duct dilation. this could be seen in a patient with HF as patient has no RUQ pain  - CT: Small volume abdominal and pelvic ascites. Small right pleural effusion. Hepatic steatosis. 1.3 cm indeterminate splenic hypervascular lesion, possibly a hemangioma.    # Uncontrolled Hypertension  - BPs ~ 180s/100s on admission, now 140s  - continue with current managment with antihypertensive as BP is nicely coming down with goal < 140    # Acute Kidney Injury - resolved, creatinine wnl 8/28  - Admission creatinine 1.4, baseline 1.0  - Likely cardiorenal from CHF  - Scr within normal level    # Paroxysmal Atrial fibrillation  - Currently in paced rhythm  - [id number] of 3, c/w Xarelto 15 mg QD for A/C    # CAD  - Restart aspirin 81 mg QD and Atorvastatin 20 mg QD  - s/p [facility] 8/28 that shows non obstructive CAD and severe pulmonary HTN   - Cardiology consult apprecaited     # BPH  - Restart Tamsulosin 0.4 mg QHS    # Thrombocytopenia  - May be in the setting of congestive hepatopathy  - No evidence of cirrhosis on imaging  - Check INR to evaluate hepatic function  - Check B12 level  - Check blue top [id number] yo Portuguese-speaking M with a PMH HFrEF s/p PPM, CAD, atrial fibrillation on Xarelto, HTN, HLD, thalassemia, BPH, and COPD (not on inhalers) who presents with abdominal and lower extremity swelling due to CHF exacerbation.    #  Acute on chronic systolic decompensated CHF   - Compensated no SOB  - patient now s/p [facility] Non obstructive cad with abnormal LV FX EF 48% severe Pulmonary HTN   - ECHO:  Moderate to severe (3+) mitral regurgitation is present.  - continue Entresto  - lasix 40 mg po BID  - coreg 6.25 mg po BID    # elevated bili /  alk phos   - this level of characteristic is often suggestive of ? infiltrative process vs cholestatic  - Sono: Non-specific gallbladder wall thickening. No gallstones are identified. No significant bile duct dilation. this could be seen in a patient with HF as patient has no RUQ pain  - CT: Small volume abdominal and pelvic ascites. Small right pleural effusion. Hepatic steatosis. 1.3 cm indeterminate splenic hypervascular lesion, possibly a hemangioma.    # Uncontrolled Hypertension  - BPs ~ 180s/100s on admission, now 140s  - continue with current managment with antihypertensive as BP is nicely coming down with goal < 140    # Acute Kidney Injury - resolved, creatinine wnl 8/28  - Admission creatinine 1.4, baseline 1.0  - Likely cardiorenal from CHF  - Scr within normal level    # Paroxysmal Atrial fibrillation  - Currently in paced rhythm  - [id number] of 3, c/w Xarelto 15 mg QD for A/C    # CAD  - Restart aspirin 81 mg QD and Atorvastatin 20 mg QD  - s/p [facility] 8/28 that shows non obstructive CAD and severe pulmonary HTN   - Cardiology consult apprecaited     # BPH  - Restart Tamsulosin 0.4 mg QHS    # Thrombocytopenia  - May be in the setting of congestive hepatopathy  - No evidence of cirrhosis on imaging  - Check INR to evaluate hepatic function  - Check B12 level  - Check blue top platelet 75 yo [place]divian-speaking M with a PMH HFrEF s/p PPM, CAD, atrial fibrillation on Xarelto, HTN, HLD, thalassemia, BPH, and COPD (not on inhalers) who presents with abdominal and lower extremity swelling due to CHF exacerbation.    #  Acute on chronic systolic decompensated CHF Severe Pulm HTN and MR, R HF   - volume status improved   -  s/p [facility] Non obstructive cad with abnormal LV FX EF 48% severe Pulmonary HTN   - ECHO:  Moderate to severe (3+) mitral regurgitation is present.  - continue Entresto  - lasix 40 mg po BID  - coreg 6.25 mg po BID  - Plan for RHC  t0o evaluate MV, will further d/w CArdio     # Elevated LFTs  - Sono: Non-specific gallbladder wall thickening. No gallstones are identified. No significant bile duct dilation. this could be seen in a patient with HF as patient has no RUQ pain  - CT: Small volume abdominal and pelvic ascites. Small right pleural effusion. Hepatic steatosis. 1.3 cm indeterminate splenic hypervascular lesion, possibly a hemangioma.  - will need further evaluation outPt     # Uncontrolled Hypertension  - BPs ~ 180s/100s on admission, now improved   - continue with current management: on carvedilol     # Acute Kidney Injury - resolved, creatinine wnl 8/28  - Admission creatinine 1.4, baseline 1.0  - Likely cardiorenal from CHF  - Scr within normal level    # Paroxysmal Atrial fibrillation  - Currently in paced rhythm  - [id number] of 3, c/w Xarelto 15 mg QD for A/C    # CAD  -c/w  aspirin 81 mg QD and Atorvastatin 20 mg QD  - s/p [facility] 8/28 that shows non obstructive CAD and severe pulmonary HTN   - Cardiology f/u     # BPH  - Restart Tamsulosin 0.4 mg QHS    # Thrombocytopenia  - May be in the setting of congestive hepatopathy  - No evidence of cirrhosis on imaging  - INR elevated but Pt is on Xarelto   monitor     Dispo: d/c planning

## 2020-08-30 NOTE — PROGRESS NOTE ADULT - SUBJECTIVE AND OBJECTIVE BOX
PCP:    REQUESTING PHYSICIAN:    REASON FOR CONSULT:    CHIEF COMPLAINT:    HPI:  73 yo Afghan-speaking M with a PMH HFrEF s/p PPM, CAD, atrial fibrillation on Xarelto, HTN, HLD, thalassemia, BPH, and COPD (not on inhalers) who presents with abdominal and lower extremity swelling. For the past 2-3 weeks, since he ran out of his medications, he has been having abdominal and lower extremity swelling. He also has pain from just below his umbilicus spreading down to his legs, worse when he walks. He describes the pain as an achy, moderate in intensity pain. He feels he has SOB, especially when he is walking and when he is sleeping. He denies CP, wheezing, palpitations, fevers, chills, nausea, vomiting, diarrhea, constipation, dysuria, or hematuria. He does endorse a slight non-productive cough with sore throat for the past week.  Cardiology was requested to evaluate his symptoms and cardiac status. Pt was last seen in our office in 2018. Echo at that time revealed an EF of 55%. Pt reports brief episodes of chest discomfort today but primarily is concerned about his lower extremity edema.     8/29/20 Awake alert feeling well no CP/SOB Tele V Paced   8/30/20 Sitting up in bed comfortable Tele V Paced no complaints       PAST MEDICAL & SURGICAL HISTORY:  Heart failure with reduced ejection fraction  HLD (hyperlipidemia)  COPD (chronic obstructive pulmonary disease): not on inhalers  Arthritis  HTN (hypertension)  Coronary artery disease involving native heart with angina pectoris, unspecified vessel or lesion type  Pacemaker: single chamber meditronic  placed by Dr Tomas  May  of  2016  AF (atrial fibrillation): on Eliquis  Cardiac pacemaker: single chamber meditronic  placed by Dr Tomas  May  of  2016      Allergies    ACE inhibitors (Other)  amiodarone (Other)    Intolerances        SOCIAL HISTORY:    FAMILY HISTORY:  No pertinent family history: of diabetes or heart disease    MEDICATIONS  (STANDING):  aspirin  chewable 81 milliGRAM(s) Oral daily  atorvastatin 20 milliGRAM(s) Oral at bedtime  carvedilol 6.25 milliGRAM(s) Oral every 12 hours  furosemide    Tablet 40 milliGRAM(s) Oral every 12 hours  loratadine 10 milliGRAM(s) Oral daily  rivaroxaban 15 milliGRAM(s) Oral with dinner  sacubitril 49 mG/valsartan 51 mG 1 Tablet(s) Oral two times a day  sodium chloride 0.9% lock flush 3 milliLiter(s) IV Push every 8 hours  tamsulosin 0.4 milliGRAM(s) Oral at bedtime    MEDICATIONS  (PRN):        Vital Signs Last 24 Hrs  T(C): 36.7 (30 Aug 2020 08:27), Max: 36.7 (29 Aug 2020 16:17)  T(F): 98.1 (30 Aug 2020 08:27), Max: 98.1 (30 Aug 2020 08:27)  HR: 60 (30 Aug 2020 08:27) (60 - 60)  BP: 117/61 (30 Aug 2020 08:27) (117/61 - 150/80)  BP(mean): --  RR: 18 (30 Aug 2020 08:27) (18 - 18)  SpO2: 99% (30 Aug 2020 08:27) (97% - 99%))      PHYSICAL EXAM:    Constitutional: NAD, awake and alert, NAD  HEENT: PERR, EOMI,  No oral cyananosis.  Neck:  supple,  No JVD  Respiratory: Breath sounds are clear bilaterally,  Cardiovascular: S1 and S2, regular rate and rhythm,   Gastrointestinal:  soft, nontender.   Extremities: No LE Edema   Vascular: 1+ peripheral pulses  Neurological: A/O x 3, no focal deficits  Musculoskeletal: no calf tenderness.  Skin: No rashes.      LABS: All Labs Reviewed:                        13.0   7.86  )-----------( 82       ( 28 Aug 2020 08:23 )             42.3                         12.7   8.62  )-----------( 80       ( 27 Aug 2020 20:05 )             41.4     28 Aug 2020 08:23    143    |  107    |  22     ----------------------------<  91     4.3     |  30     |  1.14   27 Aug 2020 20:05    145    |  110    |  25     ----------------------------<  120    4.7     |  32     |  1.40     Ca    9.0        28 Aug 2020 08:23  Ca    8.3        27 Aug 2020 20:05  Phos  2.6       28 Aug 2020 08:23  Phos  3.4       27 Aug 2020 20:05  Mg     2.1       28 Aug 2020 08:23  Mg     2.2       27 Aug 2020 20:05    TPro  6.5    /  Alb  3.6    /  TBili  3.0    /  DBili  x      /  AST  27     /  ALT  32     /  AlkPhos  148    28 Aug 2020 08:23  TPro  6.0    /  Alb  3.2    /  TBili  2.1    /  DBili  x      /  AST  22     /  ALT  32     /  AlkPhos  178    27 Aug 2020 20:05    PT/INR - ( 28 Aug 2020 08:23 )   PT: 21.4 sec;   INR: 1.89 ratio         PTT - ( 28 Aug 2020 08:23 )  PTT:43.6 sec  CARDIAC MARKERS ( 28 Aug 2020 02:56 )  0.177 ng/mL / x     / x     / x     / x      CARDIAC MARKERS ( 27 Aug 2020 23:58 )  0.165 ng/mL / x     / x     / x     / x      CARDIAC MARKERS ( 27 Aug 2020 20:05 )  0.163 ng/mL / x     / x     / x     / x          Blood Culture:   08-27 @ 20:05  Pro Bnp [id number]        RADIOLOGY/EKG:< from: 12 Lead ECG (08.27.20 @ 19:37) >  Diagnosis Line Ventricular-paced rhythm  Abnormal ECG  When compared with ECG of 13-MAY-2020 21:01,  No significant change was found  Confirmed by Jake Bauman ([id number]) on 8/28/2020 10:31:41 AM    < end of copied text >  < from: Transthoracic Echocardiogram Follow Up (05.12.20 @ 11:41) >  Impression     Summary     The left ventricle cavity is mildly dilated.   Mild concentric left ventricular hypertrophy is present.   Estimated left ventricular ejection fraction is 40-45 %.   The left atrium is moderately dilated.   The right atrium appears mildly dilated.   A device wire is seen in the RV and RA.   A PFO is noted with color doppler.   Normal appearing right ventricle structure and function.   Mild aortic sclerosis is present with normal valvular opening.   Mild (1+) aortic regurgitation is present.   Moderate to severe (3+) mitral regurgitation is present.   Mild mitral annular calcification< from: Cardiac Cath Lab - Adult (08.28.20 @ 14:25) >       < from: Cardiac Cath Lab - Adult (08.28.20 @ 14:25) >  !RV   !72/9 ,15                                                            !  +-----+--------------------------------------------------------------------+    Cardiac Output  +------+---------------------+------------------------+--------------------+  !Method!CO (l/min)           !CI (l/min/m2)           !SV (ml)             !  +------+---------------------+------------------------+--------------------+  !Farzana  !5.9                  !2.8!                    !  +------+---------------------+------------------------+--------------------+     Condition: Post LV    Pressure  +-----+--------------------------------------------------------------------+  !Site !Pressure                                       !  +-----+--------------------------------------------------------------------+  !AO   !173/76 (108)                                                        !  +-----+--------------------------------------------------------------------+  !LV   !172/10 ,23                                                          !  +-----+--------------------------------------------------------------------+     Angiographic Findings     Cardiac Arteries and Lesion Findings    LMCA: Diffuse irregularity.There is mild diffuse disease noted.    LAD: Diffuse irregularity.There is mild diffuse disease noted.    LCx: Diffuse irregularity.There is mild diffuse disease noted.    RCA: Diffuse irregularity.There is mild diffuse disease noted.    Valves  +------+--------+------------------------------------------------+---------+  !Valve !Stenosis!Insufficiency                                   !Comments !  +------+--------+------------------------------------------------+---------+  !Aortic!No      !Not assessed                                    !         !  +------+--------+------------------------------------------------+---------+  !Mitral!No      !No insufficiency                                !         !  +------+--------+------------------------------------------------+---------+    LV function assessed as:Abnormal.  Ejection Fraction  +----------------------------------------------------------------------+---+  !Method                   !EF%!  +----------------------------------------------------------------------+---+  !LV gram                                                               !48 !  +----------------------------------------------------------------------+---+    VA  Ventriculography Findings:  Mildly reduced left ventricular systolic function.     Impression     Diagnostic Conclusions     Non-Obstructive CAD with Abnormal left ventricular function.   Severe pulmonary hypertension.    < end of copied text >

## 2020-08-30 NOTE — PROGRESS NOTE ADULT - PROBLEM SELECTOR PLAN 1
Acute on chronic systolic congestive heart failure  Compensated no SOB edema improved, S/P LHC Non obstructive cad with abnormal LV FX EF 48% severe Pulmonary HTN will need RHC continue Entresto    Recent Echo (May) reveals lower EF than previous and PFO is seen moderate to severe MR consider opt MV evaluation possible SNEHA   Etiology of edema and symptoms could be related to right sided failure secondary to valvulopathy or shunt ( is non compliant with medications ) Acute on chronic systolic congestive heart failure  Compensated no SOB edema improved, S/P LHC Non obstructive cad with abnormal LV FX EF 48% severe Pulmonary HTN  continue Entresto    Recent Echo (May) reveals lower EF than previous and PFO not significant on cath. severe MR consider opt MV evaluation    Etiology of edema and symptoms probably secondary to PH and non compliance. Encourage compliance, consider opt follow up.

## 2020-08-30 NOTE — PROGRESS NOTE ADULT - SUBJECTIVE AND OBJECTIVE BOX
CC: CHF exacerbation (30 Aug 2020 08:39)    HPI:  73 yo Hebrew-speaking M with a PMH HFrEF s/p PPM, CAD, atrial fibrillation on Xarelto, HTN, HLD, thalassemia, BPH, and COPD (not on inhalers) who presents with abdominal and lower extremity swelling. For the past 2-3 weeks, since he ran out of his medications, he has been having abdominal and lower extremity swelling. He also has pain from just below his umbilicus spreading down to his legs, worse when he walks. He describes the pain as an achy, moderate in intensity pain. He feels he has SOB, especially when he is walking and when he is sleeping. He denies CP, wheezing, palpitations, fevers, chills, nausea, vomiting, diarrhea, constipation, dysuria, or hematuria. He does endorse a slight non-productive cough with sore throat for the past week.  His last PPM interrogation was 3 months ago and he is due for another interrogation tomorrow.    ROS is also positive for some right eye lacrimation without blurry vision or headache.    In the ED, he was given Furosemide 40 mg IV x1 and Hydralazine 5 mg IV x1. On telemetry, he was briefly in V-tach. (28 Aug 2020 01:49)    INTERVAL HPI/OVERNIGHT EVENTS:    Vital Signs Last 24 Hrs  T(C): 36.7 (30 Aug 2020 08:27), Max: 36.7 (29 Aug 2020 16:17)  T(F): 98.1 (30 Aug 2020 08:27), Max: 98.1 (30 Aug 2020 08:27)  HR: 60 (30 Aug 2020 08:27) (60 - 60)  BP: 117/61 (30 Aug 2020 08:27) (117/61 - 150/80)  RR: 18 (30 Aug 2020 08:27) (18 - 18)  SpO2: 99% (30 Aug 2020 08:27) (99% - 99%)            REVIEW OF SYSTEMS:  All other review of systems is negative unless indicated above.      PHYSICAL EXAM:  General: Well developed; well nourished; in no acute distress  Eyes: PERRLA, EOMI; conjunctiva and sclera clear  Head: Normocephalic; atraumatic  ENMT: No nasal discharge; airway clear  Neck: Supple; non tender; no masses  Respiratory: No wheezes, rales or rhonchi  Cardiovascular: Regular rate and rhythm. S1 and S2 Normal; No murmurs, gallops or rubs  Gastrointestinal: Soft non-tender non-distended; Normal bowel sounds  Genitourinary: No  suprapubic  tenderness  Extremities: Normal range of motion, No clubbing, cyanosis or edema  Vascular: Peripheral pulses palpable 2+ bilaterally  Neurological: Alert and oriented x4  Skin: Warm and dry. No acute rash  Lymph Nodes: No acute cervical adenopathy  Musculoskeletal: Normal muscle tone, without deformities  Psychiatric: Cooperative and appropriate          LABS:                         13.2   5.68  )-----------( 90       ( 30 Aug 2020 07:45 )             42.0     30 Aug 2020 07:45    141    |  103    |  20     ----------------------------<  93     3.3     |  31     |  1.02     Ca    8.6        30 Aug 2020 07:45    TPro  6.0    /  Alb  3.1    /  TBili  2.7    /  DBili  x      /  AST  25     /  ALT  29     /  AlkPhos  142    29 Aug 2020 08:13      LIVER FUNCTIONS - ( 29 Aug 2020 08:13 )  Alb: 3.1 g/dL / Pro: 6.0 gm/dL / ALK PHOS: 142 U/L / ALT: 29 U/L / AST: 25 U/L / GGT: x               MEDICATIONS  (STANDING):  aspirin  chewable 81 milliGRAM(s) Oral daily  atorvastatin 20 milliGRAM(s) Oral at bedtime  carvedilol 6.25 milliGRAM(s) Oral every 12 hours  furosemide    Tablet 40 milliGRAM(s) Oral every 12 hours  loratadine 10 milliGRAM(s) Oral daily  potassium chloride    Tablet ER 40 milliEquivalent(s) Oral every 4 hours  rivaroxaban 15 milliGRAM(s) Oral with dinner  sacubitril 49 mG/valsartan 51 mG 1 Tablet(s) Oral two times a day  sodium chloride 0.9% lock flush 3 milliLiter(s) IV Push every 8 hours  tamsulosin 0.4 milliGRAM(s) Oral at bedtime    MEDICATIONS  (PRN):      RADIOLOGY & ADDITIONAL TESTS:    EXAM:  CT ABDOMEN AND PELVIS IC                        PROCEDURE DATE:  08/27/2020      INTERPRETATION:  CLINICAL HISTORY: Abdominal pain. Ascites.      FINDINGS:  The heart is again enlarged. Distal leads of a dual-lead cardiac device are again noted. No pericardial effusion. New small right pleural effusion.    The large and small bowel are normal in caliber without obstruction. There is no free intraperitoneal air or abdominal abscess.  The appendix is normal. There are scattered descending and sigmoid colon diverticula without obvious wall thickening or inflammatory change allowing for the presence of ascites. Stable moderate-sized duodenal diverticulum. Small volume abdominal and pelvic ascites.    The liver is again enlarged and of diffuse lower attenuation than the spleen suggesting fatty infiltration. Small right hepatic lobe cyst, unchanged. The pancreas, gallbladder and adrenal glands are unremarkable aside from trace pericholecystic fluid which is likely related to ascites. There is a 1.3 cm indeterminate hypervascular lesionin the spleen. The kidneys enhance symmetrically without hydronephrosis.    The abdominal aorta is normal in caliber. There is no retroperitoneal, pelvic or inguinal adenopathy.    Disproportionate mild circumferential bladder wall thickening for thedegree of distention. Questionable perivesicular fat stranding. The prostate is again enlarged.    The visualized osseous structures demonstrate no acute abnormality. Stable degenerative changes in the lumbar spine. Mild generalized anasarca.    IMPRESSION:  Circumferential bladder wall thickening suggesting cystitis. Please correlate clinically with urinalysis and urine culture.    Small volume abdominal and pelvic ascites. Small right pleural effusion.    Hepatic steatosis. 1.3 cm indeterminatesplenic hypervascular lesion, possibly a hemangioma. CC: CHF exacerbation (30 Aug 2020 08:39)    HPI:  75 yo Lao-speaking M with a PMH HFrEF s/p PPM, CAD, atrial fibrillation on Xarelto, HTN, HLD, thalassemia, BPH, and COPD (not on inhalers) who presents with abdominal and lower extremity swelling. For the past 2-3 weeks, since he ran out of his medications, he has been having abdominal and lower extremity swelling. He also has pain from just below his umbilicus spreading down to his legs, worse when he walks. He describes the pain as an achy, moderate in intensity pain. He feels he has SOB, especially when he is walking and when he is sleeping. He denies CP, wheezing, palpitations, fevers, chills, nausea, vomiting, diarrhea, constipation, dysuria, or hematuria. He does endorse a slight non-productive cough with sore throat for the past week.  His last PPM interrogation was 3 months ago and he is due for another interrogation tomorrow.    ROS is also positive for some right eye lacrimation without blurry vision or headache.    In the ED, he was given Furosemide 40 mg IV x1 and Hydralazine 5 mg IV x1. On telemetry, he was briefly in V-tach. (28 Aug 2020 01:49)    INTERVAL HPI/ OVERNIGHT EVENTS:  Chart reviewed, Pt was seen and examined, reports improvement of SOB and swelling. POC discussed     Vital Signs Last 24 Hrs  T(C): 36.7 (30 Aug 2020 08:27), Max: 36.7 (29 Aug 2020 16:17)  T(F): 98.1 (30 Aug 2020 08:27), Max: 98.1 (30 Aug 2020 08:27)  HR: 60 (30 Aug 2020 08:27) (60 - 60)  BP: 117/61 (30 Aug 2020 08:27) (117/61 - 150/80)  RR: 18 (30 Aug 2020 08:27) (18 - 18)  SpO2: 99% (30 Aug 2020 08:27) (99% - 99%)        REVIEW OF SYSTEMS:  All other review of systems is negative unless indicated above.      PHYSICAL EXAM:  General: Well developed;; in no acute distress  Eyes: PERRLA, EOMI; conjunctiva and sclera clear  Head: Normocephalic; atraumatic  ENMT: No nasal discharge; airway clear  Neck: Supple; non tender; no masses  Respiratory:  Decreased BS at bases, No wheezes  Cardiovascular: Regular rate and rhythm. S1 and S2 Normal;   Gastrointestinal: Soft non-tender non-distended; Normal bowel sounds  Genitourinary: No  suprapubic  tenderness  Extremities: Normal range of motion, +  edema  Vascular: Peripheral pulses palpable 2+ bilaterally  Neurological: Alert and oriented x3, non focal   Skin: Warm and dry. No acute rash  Lymph Nodes: No acute cervical adenopathy  Musculoskeletal: Normal muscle tone, and strength   Psychiatric: Cooperative and appropriate          LABS:                         13.2   5.68  )-----------( 90       ( 30 Aug 2020 07:45 )             42.0     30 Aug 2020 07:45    141    |  103    |  20     ----------------------------<  93     3.3     |  31     |  1.02     Ca    8.6        30 Aug 2020 07:45    TPro  6.0    /  Alb  3.1    /  TBili  2.7    /  DBili  x      /  AST  25     /  ALT  29     /  AlkPhos  142    29 Aug 2020 08:13      LIVER FUNCTIONS - ( 29 Aug 2020 08:13 )  Alb: 3.1 g/dL / Pro: 6.0 gm/dL / ALK PHOS: 142 U/L / ALT: 29 U/L / AST: 25 U/L / GGT: x               MEDICATIONS  (STANDING):  aspirin  chewable 81 milliGRAM(s) Oral daily  atorvastatin 20 milliGRAM(s) Oral at bedtime  carvedilol 6.25 milliGRAM(s) Oral every 12 hours  furosemide    Tablet 40 milliGRAM(s) Oral every 12 hours  loratadine 10 milliGRAM(s) Oral daily  potassium chloride    Tablet ER 40 milliEquivalent(s) Oral every 4 hours  rivaroxaban 15 milliGRAM(s) Oral with dinner  sacubitril 49 mG/valsartan 51 mG 1 Tablet(s) Oral two times a day  sodium chloride 0.9% lock flush 3 milliLiter(s) IV Push every 8 hours  tamsulosin 0.4 milliGRAM(s) Oral at bedtime    MEDICATIONS  (PRN):      RADIOLOGY & ADDITIONAL TESTS:    EXAM:  CT ABDOMEN AND PELVIS IC                        PROCEDURE DATE:  08/27/2020      INTERPRETATION:  CLINICAL HISTORY: Abdominal pain. Ascites.      FINDINGS:  The heart is again enlarged. Distal leads of a dual-lead cardiac device are again noted. No pericardial effusion. New small right pleural effusion.    The large and small bowel are normal in caliber without obstruction. There is no free intraperitoneal air or abdominal abscess.  The appendix is normal. There are scattered descending and sigmoid colon diverticula without obvious wall thickening or inflammatory change allowing for the presence of ascites. Stable moderate-sized duodenal diverticulum. Small volume abdominal and pelvic ascites.    The liver is again enlarged and of diffuse lower attenuation than the spleen suggesting fatty infiltration. Small right hepatic lobe cyst, unchanged. The pancreas, gallbladder and adrenal glands are unremarkable aside from trace pericholecystic fluid which is likely related to ascites. There is a 1.3 cm indeterminate hypervascular lesionin the spleen. The kidneys enhance symmetrically without hydronephrosis.    The abdominal aorta is normal in caliber. There is no retroperitoneal, pelvic or inguinal adenopathy.    Disproportionate mild circumferential bladder wall thickening for thedegree of distention. Questionable perivesicular fat stranding. The prostate is again enlarged.    The visualized osseous structures demonstrate no acute abnormality. Stable degenerative changes in the lumbar spine. Mild generalized anasarca.    IMPRESSION:  Circumferential bladder wall thickening suggesting cystitis. Please correlate clinically with urinalysis and urine culture.    Small volume abdominal and pelvic ascites. Small right pleural effusion.    Hepatic steatosis. 1.3 cm indeterminatesplenic hypervascular lesion, possibly a hemangioma.

## 2020-08-30 NOTE — PROGRESS NOTE ADULT - PROBLEM SELECTOR PLAN 5
No active cardiac complaints S/P LHC Non obstructive  Continue ASA/Statin/BB No active cardiac complaints S/P LHC, RHC Non obstructive  Continue ASA/Statin/BB

## 2020-08-31 ENCOUNTER — TRANSCRIPTION ENCOUNTER (OUTPATIENT)
Age: 75
End: 2020-08-31

## 2020-08-31 VITALS
OXYGEN SATURATION: 94 % | RESPIRATION RATE: 16 BRPM | TEMPERATURE: 98 F | HEART RATE: 60 BPM | DIASTOLIC BLOOD PRESSURE: 77 MMHG | SYSTOLIC BLOOD PRESSURE: 139 MMHG

## 2020-08-31 LAB
ADD ON TEST-SPECIMEN IN LAB: SIGNIFICANT CHANGE UP
ANION GAP SERPL CALC-SCNC: 4 MMOL/L — LOW (ref 5–17)
BUN SERPL-MCNC: 21 MG/DL — SIGNIFICANT CHANGE UP (ref 7–23)
CALCIUM SERPL-MCNC: 8.5 MG/DL — SIGNIFICANT CHANGE UP (ref 8.5–10.1)
CHLORIDE SERPL-SCNC: 108 MMOL/L — SIGNIFICANT CHANGE UP (ref 96–108)
CO2 SERPL-SCNC: 31 MMOL/L — SIGNIFICANT CHANGE UP (ref 22–31)
CREAT SERPL-MCNC: 0.98 MG/DL — SIGNIFICANT CHANGE UP (ref 0.5–1.3)
GLUCOSE SERPL-MCNC: 92 MG/DL — SIGNIFICANT CHANGE UP (ref 70–99)
MAGNESIUM SERPL-MCNC: 1.7 MG/DL — SIGNIFICANT CHANGE UP (ref 1.6–2.6)
POTASSIUM SERPL-MCNC: 3.9 MMOL/L — SIGNIFICANT CHANGE UP (ref 3.5–5.3)
POTASSIUM SERPL-SCNC: 3.9 MMOL/L — SIGNIFICANT CHANGE UP (ref 3.5–5.3)
SODIUM SERPL-SCNC: 143 MMOL/L — SIGNIFICANT CHANGE UP (ref 135–145)

## 2020-08-31 PROCEDURE — 99239 HOSP IP/OBS DSCHRG MGMT >30: CPT

## 2020-08-31 RX ORDER — FUROSEMIDE 40 MG
1 TABLET ORAL
Qty: 60 | Refills: 0
Start: 2020-08-31 | End: 2020-09-29

## 2020-08-31 RX ORDER — POTASSIUM CHLORIDE 20 MEQ
1 PACKET (EA) ORAL
Qty: 30 | Refills: 0
Start: 2020-08-31 | End: 2020-09-29

## 2020-08-31 RX ORDER — ASPIRIN/CALCIUM CARB/MAGNESIUM 324 MG
1 TABLET ORAL
Qty: 0 | Refills: 0 | DISCHARGE
Start: 2020-08-31

## 2020-08-31 RX ORDER — LORATADINE 10 MG/1
1 TABLET ORAL
Qty: 30 | Refills: 0
Start: 2020-08-31 | End: 2020-09-29

## 2020-08-31 RX ORDER — MAGNESIUM OXIDE 400 MG ORAL TABLET 241.3 MG
400 TABLET ORAL
Refills: 0 | Status: DISCONTINUED | OUTPATIENT
Start: 2020-08-31 | End: 2020-08-31

## 2020-08-31 RX ORDER — ATORVASTATIN CALCIUM 80 MG/1
1 TABLET, FILM COATED ORAL
Qty: 30 | Refills: 0
Start: 2020-08-31 | End: 2020-09-29

## 2020-08-31 RX ORDER — CARVEDILOL PHOSPHATE 80 MG/1
1 CAPSULE, EXTENDED RELEASE ORAL
Qty: 60 | Refills: 0
Start: 2020-08-31 | End: 2020-09-29

## 2020-08-31 RX ORDER — TAMSULOSIN HYDROCHLORIDE 0.4 MG/1
1 CAPSULE ORAL
Qty: 30 | Refills: 0
Start: 2020-08-31 | End: 2020-09-29

## 2020-08-31 RX ORDER — RIVAROXABAN 15 MG-20MG
1 KIT ORAL
Qty: 30 | Refills: 0
Start: 2020-08-31 | End: 2020-09-29

## 2020-08-31 RX ADMIN — SODIUM CHLORIDE 3 MILLILITER(S): 9 INJECTION INTRAMUSCULAR; INTRAVENOUS; SUBCUTANEOUS at 13:34

## 2020-08-31 RX ADMIN — Medication 81 MILLIGRAM(S): at 09:28

## 2020-08-31 RX ADMIN — LORATADINE 10 MILLIGRAM(S): 10 TABLET ORAL at 09:28

## 2020-08-31 RX ADMIN — CARVEDILOL PHOSPHATE 6.25 MILLIGRAM(S): 80 CAPSULE, EXTENDED RELEASE ORAL at 09:28

## 2020-08-31 RX ADMIN — SACUBITRIL AND VALSARTAN 1 TABLET(S): 24; 26 TABLET, FILM COATED ORAL at 09:28

## 2020-08-31 RX ADMIN — SODIUM CHLORIDE 3 MILLILITER(S): 9 INJECTION INTRAMUSCULAR; INTRAVENOUS; SUBCUTANEOUS at 06:18

## 2020-08-31 RX ADMIN — MAGNESIUM OXIDE 400 MG ORAL TABLET 400 MILLIGRAM(S): 241.3 TABLET ORAL at 13:34

## 2020-08-31 RX ADMIN — Medication 40 MILLIGRAM(S): at 09:28

## 2020-08-31 NOTE — DISCHARGE NOTE PROVIDER - CARE PROVIDERS DIRECT ADDRESSES
[email or web address]

## 2020-08-31 NOTE — DISCHARGE NOTE PROVIDER - CARE PROVIDER_API CALL
Dayton Kowalski  CARDIOVASCULAR DISEASE  [address], [address]  [place] [address]  Phone: [phone]  Fax: [phone]  Scheduled Appointment: 09/08/2020    Florentino Fritz  INTERNAL MEDICINE  [address] [place] [address]  Phone: [phone]  Fax: [phone]  Follow Up Time: 1 week

## 2020-08-31 NOTE — DISCHARGE NOTE PROVIDER - HOSPITAL COURSE
75 yo Chinese-speaking M with a PMH HFrEF, AFIB,  s/p PPM, CAD, atrial fibrillation on Xarelto, HTN, HLD, thalassemia, BPH, and COPD (not on inhalers) who presented with abdominal and lower extremity swelling. For the past 2-3 weeks, since he ran out of his medications, he has been having abdominal and lower extremity swelling. He also has pain from just below his umbilicus spreading down to his legs, worse when he walks. He described the pain as an achy, moderate in intensity pain. +SOB with walking and asleep   He No CP, wheezing, palpitations, fevers, chills, nausea, vomiting, diarrhea, constipation, dysuria, or hematuria. He does endorse a slight non-productive cough with sore throat for the past week.     In the ED,  labs with elevated BUN/CR, mildly elevated trop and BNP. CXR with mild congestion. he was given Furosemide 40 mg IV x1 and Hydralazine 5 mg IV x1.     Pt was admitted to the hospital was on IV lasix with good improvement of SOB and edema. Not denies any difficulty breathing. He also had ECHO has mildly decreased EF, S/p LHC, has non obstructive CAD, no stents. found to have  mod- severe MR and Pulm HTN. Recommended to f/u outPt for further evaluation  of MV with RHC.     Today feels well no complains, meds, d/c planning and outPt f/u discussed. phone  used     Vital Signs Last 24 Hrs    T(C): 36.4 (31 Aug 2020 08:43), Max: 36.4 (30 Aug 2020 17:05)    T(F): 97.6 (31 Aug 2020 08:43), Max: 97.6 (31 Aug 2020 08:43)    HR: 60 (31 Aug 2020 08:43) (60 - 60)    BP: 139/77 (31 Aug 2020 08:43) (132/65 - 139/77)    RR: 16 (31 Aug 2020 08:43) (16 - 16)    SpO2: 94% (31 Aug 2020 08:43) (94% - 94%)        75 yo Chinese-speaking M with a PMH HFrEF s/p PPM, CAD, atrial fibrillation on Xarelto, HTN, HLD, thalassemia, BPH, and COPD (not on inhalers) who presents with abdominal and lower extremity swelling due to CHF exacerbation.        #  Acute on chronic systolic decompensated CHF Severe Pulm HTN and MR, R HF     - volume status improved     -  s/p LHC Non obstructive cad with abnormal LV FX EF 48% severe Pulmonary HTN     - ECHO:  Moderate to severe (3+) mitral regurgitation is present.    - continue Entresto    - lasix 40 mg po BID    - coreg 6.25 mg po BID    - Plan for [facility] outPt   to evaluate MV  will further f/u  CArdio         # Elevated LFTs, improved     - likely hepatic congestion 2/2 CHF     - Sono: Non-specific gallbladder wall thickening. No gallstones are identified. No significant bile duct dilation. this could be seen in a patient with HF as patient has no RUQ pain    - CT: Small volume abdominal and pelvic ascites. Small right pleural effusion. Hepatic steatosis. 1.3 cm indeterminate splenic hypervascular lesion, possibly a hemangioma.    - will need further evaluation outPt, f/u with PCP        # Uncontrolled Hypertension, improved     - BPs ~ 180s/100s on admission, now improved     - continue with current management: on carvedilol and intresto         # Acute Kidney Injury - resolved, creatinine wnl now     - Admission creatinine 1.4, baseline 1.0    - Likely cardiorenal from CHF    - monitor outPt         #Chronic Atrial fibrillation, S/p PPM     - S/p PPM interrogation, 99% paced     - [id number] of 3, c/w Xarelto 15 mg QD for A/C        # non obstructive CAD    -c/w  aspirin 81 mg QD and Atorvastatin 20 mg QD    - s/p LHC 8/28 that shows non obstructive CAD and severe pulmonary HTN     - Cardiology f/u         # BPH    - Restart Tamsulosin 0.4 mg QHS        # Thrombocytopenia    - May be in the setting of congestive hepatopathy    - No evidence of cirrhosis on imaging    - INR elevated but Pt is on Xarelto     - f/u with PCP for further monitoring         Dispo: stable for d/c home today         total time 40 min     FAx d/c summary to PCP

## 2020-08-31 NOTE — DISCHARGE NOTE PROVIDER - PROVIDER TOKENS
PROVIDER:[TOKEN:[[id number]],SCHEDULEDAPPT:[09/08/2020]],PROVIDER:[TOKEN:[[id number]],FOLLOWUP:[1 week]]

## 2020-08-31 NOTE — DISCHARGE NOTE NURSING/CASE MANAGEMENT/SOCIAL WORK - PATIENT PORTAL LINK FT
You can access the FollowMyHealth Patient Portal offered by [facility] by registering at the following website: [email or web address]. By joining [facility]’s FollowMyHealth portal, you will also be able to view your health information using other applications (apps) compatible with our system.

## 2020-08-31 NOTE — DISCHARGE NOTE PROVIDER - NSDCCPCAREPLAN_GEN_ALL_CORE_FT
PRINCIPAL DISCHARGE DIAGNOSIS  Diagnosis: Acute on chronic systolic congestive heart failure  Assessment and Plan of Treatment: c/w low salt diet   monitor weight   Call ypur cardiologist if notived legs swelling or gaining weight   C/w lasix twice daily   F/u with cardiologist      SECONDARY DISCHARGE DIAGNOSES  Diagnosis: Mitral valve regurgitation  Assessment and Plan of Treatment: need to follow with cardio for further evaluation       Diagnosis: Liver lesion  Assessment and Plan of Treatment: with elevated liver enzymes. Noted on US ( possible hemangioma)   Need to follow with PCP for liver imaging    Diagnosis: Chronic systolic heart failure  Assessment and Plan of Treatment:

## 2020-08-31 NOTE — DISCHARGE NOTE PROVIDER - NSDCMRMEDTOKEN_GEN_ALL_CORE_FT
aspirin 81 mg oral tablet, chewable: 1 tab(s) orally once a day  atorvastatin 20 mg oral tablet: 1 tab(s) orally once a day (at bedtime)  carvedilol 6.25 mg oral tablet: 1 tab(s) orally every 12 hours  furosemide 40 mg oral tablet: 1 tab(s) orally every 12 hours  K-Tab 20 mEq oral tablet, extended release: 1 tab(s) orally once a day   loratadine 10 mg oral tablet: 1 tab(s) orally once a day  rivaroxaban 20 mg oral tablet: 1 tab(s) orally once a day (before a meal)  sacubitril-valsartan 49 mg-51 mg oral tablet: 1 tab(s) orally 2 times a day MDD:no allergy  tamsulosin 0.4 mg oral capsule: 1 cap(s) orally once a day (at bedtime)

## 2020-09-01 RX ORDER — CARVEDILOL 25 MG/1
25 TABLET, FILM COATED ORAL
Qty: 3 | Refills: 1 | Status: DISCONTINUED | COMMUNITY
Start: 2017-06-07 | End: 2020-09-01

## 2020-09-01 RX ORDER — AMLODIPINE BESYLATE 5 MG/1
5 TABLET ORAL DAILY
Refills: 0 | Status: DISCONTINUED | COMMUNITY
End: 2020-09-01

## 2020-09-01 RX ORDER — PANTOPRAZOLE 40 MG/1
40 TABLET, DELAYED RELEASE ORAL
Qty: 1 | Refills: 1 | Status: DISCONTINUED | COMMUNITY
Start: 2018-08-13 | End: 2020-09-01

## 2020-09-01 RX ORDER — CIPROFLOXACIN HYDROCHLORIDE 500 MG/1
500 TABLET, FILM COATED ORAL
Qty: 20 | Refills: 0 | Status: DISCONTINUED | COMMUNITY
Start: 2020-06-09 | End: 2020-09-01

## 2020-09-01 RX ORDER — BETHANECHOL CHLORIDE 50 MG/1
50 TABLET ORAL
Qty: 180 | Refills: 2 | Status: DISCONTINUED | COMMUNITY
Start: 2020-05-19 | End: 2020-09-01

## 2020-09-01 RX ORDER — FUROSEMIDE 40 MG/1
40 TABLET ORAL DAILY
Qty: 60 | Refills: 0 | Status: DISCONTINUED | COMMUNITY
Start: 2018-10-10 | End: 2020-09-01

## 2020-09-01 RX ORDER — FINASTERIDE 5 MG/1
5 TABLET, FILM COATED ORAL DAILY
Qty: 90 | Refills: 3 | Status: DISCONTINUED | COMMUNITY
Start: 2020-05-19 | End: 2020-09-01

## 2020-09-02 DIAGNOSIS — I11.0 HYPERTENSIVE HEART DISEASE WITH HEART FAILURE: ICD-10-CM

## 2020-09-02 DIAGNOSIS — R18.8 OTHER ASCITES: ICD-10-CM

## 2020-09-02 DIAGNOSIS — I48.0 PAROXYSMAL ATRIAL FIBRILLATION: ICD-10-CM

## 2020-09-02 DIAGNOSIS — D56.9 THALASSEMIA, UNSPECIFIED: ICD-10-CM

## 2020-09-02 DIAGNOSIS — D69.6 THROMBOCYTOPENIA, UNSPECIFIED: ICD-10-CM

## 2020-09-02 DIAGNOSIS — Z79.01 LONG TERM (CURRENT) USE OF ANTICOAGULANTS: ICD-10-CM

## 2020-09-02 DIAGNOSIS — I25.10 ATHEROSCLEROTIC HEART DISEASE OF NATIVE CORONARY ARTERY WITHOUT ANGINA PECTORIS: ICD-10-CM

## 2020-09-02 DIAGNOSIS — Z95.0 PRESENCE OF CARDIAC PACEMAKER: ICD-10-CM

## 2020-09-02 DIAGNOSIS — I50.23 ACUTE ON CHRONIC SYSTOLIC (CONGESTIVE) HEART FAILURE: ICD-10-CM

## 2020-09-02 DIAGNOSIS — N17.9 ACUTE KIDNEY FAILURE, UNSPECIFIED: ICD-10-CM

## 2020-09-02 DIAGNOSIS — J44.9 CHRONIC OBSTRUCTIVE PULMONARY DISEASE, UNSPECIFIED: ICD-10-CM

## 2020-09-02 DIAGNOSIS — E78.5 HYPERLIPIDEMIA, UNSPECIFIED: ICD-10-CM

## 2020-09-02 DIAGNOSIS — I27.20 PULMONARY HYPERTENSION, UNSPECIFIED: ICD-10-CM

## 2020-09-02 DIAGNOSIS — I34.0 NONRHEUMATIC MITRAL (VALVE) INSUFFICIENCY: ICD-10-CM

## 2020-09-02 DIAGNOSIS — N40.0 BENIGN PROSTATIC HYPERPLASIA WITHOUT LOWER URINARY TRACT SYMPTOMS: ICD-10-CM

## 2020-09-08 ENCOUNTER — APPOINTMENT (OUTPATIENT)
Dept: CARDIOLOGY | Facility: CLINIC | Age: 75
End: 2020-09-08

## 2020-10-12 NOTE — ED STATDOCS - CPE ED EYE NORM
Attempted outreach to patient for UNGERLISBETH YANCEY for ED visit on 10/11/2020 for cough; generalized body aches; UTI   COVID19 test results are PENDING per [facility]    Patient had a NEGATIVE COVID19 test on 8/6/2020 through [facility]  UTR at this time no answer  PLAN:  Case closed due to UTR
bilateral normal...

## 2020-10-23 ENCOUNTER — EMERGENCY (EMERGENCY)
Facility: HOSPITAL | Age: 75
LOS: 0 days | Discharge: HOME | End: 2020-10-23
Attending: EMERGENCY MEDICINE
Payer: MEDICARE

## 2020-10-23 VITALS
DIASTOLIC BLOOD PRESSURE: 100 MMHG | SYSTOLIC BLOOD PRESSURE: 155 MMHG | OXYGEN SATURATION: 100 % | RESPIRATION RATE: 17 BRPM | HEART RATE: 58 BPM

## 2020-10-23 VITALS — WEIGHT: 145.06 LBS | HEIGHT: 78 IN

## 2020-10-23 DIAGNOSIS — M25.561 PAIN IN RIGHT KNEE: ICD-10-CM

## 2020-10-23 DIAGNOSIS — Z95.0 PRESENCE OF CARDIAC PACEMAKER: ICD-10-CM

## 2020-10-23 DIAGNOSIS — M25.562 PAIN IN LEFT KNEE: ICD-10-CM

## 2020-10-23 DIAGNOSIS — I48.91 UNSPECIFIED ATRIAL FIBRILLATION: ICD-10-CM

## 2020-10-23 DIAGNOSIS — I50.9 HEART FAILURE, UNSPECIFIED: ICD-10-CM

## 2020-10-23 DIAGNOSIS — I25.10 ATHEROSCLEROTIC HEART DISEASE OF NATIVE CORONARY ARTERY WITHOUT ANGINA PECTORIS: ICD-10-CM

## 2020-10-23 DIAGNOSIS — M19.90 UNSPECIFIED OSTEOARTHRITIS, UNSPECIFIED SITE: ICD-10-CM

## 2020-10-23 DIAGNOSIS — I10 ESSENTIAL (PRIMARY) HYPERTENSION: ICD-10-CM

## 2020-10-23 DIAGNOSIS — Z79.82 LONG TERM (CURRENT) USE OF ASPIRIN: ICD-10-CM

## 2020-10-23 DIAGNOSIS — M17.0 BILATERAL PRIMARY OSTEOARTHRITIS OF KNEE: ICD-10-CM

## 2020-10-23 DIAGNOSIS — E78.5 HYPERLIPIDEMIA, UNSPECIFIED: ICD-10-CM

## 2020-10-23 DIAGNOSIS — Z79.01 LONG TERM (CURRENT) USE OF ANTICOAGULANTS: ICD-10-CM

## 2020-10-23 DIAGNOSIS — Z95.0 PRESENCE OF CARDIAC PACEMAKER: Chronic | ICD-10-CM

## 2020-10-23 DIAGNOSIS — I11.0 HYPERTENSIVE HEART DISEASE WITH HEART FAILURE: ICD-10-CM

## 2020-10-23 DIAGNOSIS — J44.9 CHRONIC OBSTRUCTIVE PULMONARY DISEASE, UNSPECIFIED: ICD-10-CM

## 2020-10-23 DIAGNOSIS — R14.0 ABDOMINAL DISTENSION (GASEOUS): ICD-10-CM

## 2020-10-23 DIAGNOSIS — R79.89 OTHER SPECIFIED ABNORMAL FINDINGS OF BLOOD CHEMISTRY: ICD-10-CM

## 2020-10-23 PROCEDURE — 71045 X-RAY EXAM CHEST 1 VIEW: CPT

## 2020-10-23 PROCEDURE — 71045 X-RAY EXAM CHEST 1 VIEW: CPT | Mod: 26

## 2020-10-23 PROCEDURE — 99284 EMERGENCY DEPT VISIT MOD MDM: CPT

## 2020-10-23 PROCEDURE — 74018 RADEX ABDOMEN 1 VIEW: CPT

## 2020-10-23 PROCEDURE — 74018 RADEX ABDOMEN 1 VIEW: CPT | Mod: 26

## 2020-10-23 PROCEDURE — 99284 EMERGENCY DEPT VISIT MOD MDM: CPT | Mod: 25

## 2020-10-23 PROCEDURE — 73564 X-RAY EXAM KNEE 4 OR MORE: CPT | Mod: 26,50

## 2020-10-23 PROCEDURE — 73564 X-RAY EXAM KNEE 4 OR MORE: CPT | Mod: 50

## 2020-10-23 RX ORDER — ACETAMINOPHEN 500 MG
2 TABLET ORAL
Qty: 40 | Refills: 0
Start: 2020-10-23 | End: 2020-10-27

## 2020-10-23 RX ORDER — ACETAMINOPHEN 500 MG
650 TABLET ORAL ONCE
Refills: 0 | Status: COMPLETED | OUTPATIENT
Start: 2020-10-23 | End: 2020-10-23

## 2020-10-23 RX ORDER — TAMSULOSIN HYDROCHLORIDE 0.4 MG/1
1 CAPSULE ORAL
Qty: 30 | Refills: 0
Start: 2020-10-23 | End: 2020-11-21

## 2020-10-23 RX ADMIN — Medication 650 MILLIGRAM(S): at 14:20

## 2020-10-23 NOTE — ED STATDOCS - NSFOLLOWUPCLINICS_GEN_ALL_ED_FT
[facility]  Family Medicine  [address] [place] [address]  Phone: [phone]  Fax:   Follow Up Time:

## 2020-10-23 NOTE — ED STATDOCS - PATIENT PORTAL LINK FT
You can access the FollowMyHealth Patient Portal offered by [facility] by registering at the following website: [email or web address]. By joining [facility]’s FollowMyHealth portal, you will also be able to view your health information using other applications (apps) compatible with our system.

## 2020-10-23 NOTE — ED STATDOCS - CARE PROVIDER_API CALL
Spencer Maldonado  GASTROENTEROLOGY  [address] [place] [address] [place] [address]  Phone: [phone]  Fax: [phone]  Follow Up Time:

## 2020-10-23 NOTE — ED STATDOCS - NSFOLLOWUPINSTRUCTIONS_ED_ALL_ED_FT
Artritis    Arthritis      “Artritis” es un término que se utiliza comúnmente para referirse al dolor en las articulaciones o a denver enfermedad de las articulaciones. Hay más de 100 tipos de artritis.      ¿Cuáles son las causas?  La causa más frecuente de esta afección es el desgaste de denver articulación. Otras causas son:  •Gota.      •Inflamación de denver articulación.      •Infección en denver articulación.      •Esguinces y otras lesiones cercanas a la articulación.      •Reacción a medicamentos o fármacos, o denver reacción alérgica.      En algunos casos, es posible que la causa se desconozca.      ¿Cuáles son los signos o los síntomas?  El síntoma principal de esta afección es el dolor en la articulación al hacer movimientos. Otros síntomas pueden incluir los siguientes:  •Enrojecimiento, hinchazón o rigidez en la articulación.      •Calor que proviene de la articulación.      •Fiebre.      •Sensación general de enfermedad.        ¿Cómo se diagnostica?  Esta afección puede diagnosticarse mediante un examen físico y estudios; entre ellos, los siguientes:  •Análisis de tom.      •Análisis de orina.      •Estudios de diagnóstico por imágenes, naa radiografías, denver resonancia magnética (RM) o denver exploración por tomografía computarizada (TC).      Algunas veces se extrae líquido de la articulación para analizarlo.      ¿Cómo se trata?  El tratamiento de esta afección puede incluir:  •El tratamiento de la causa si se la conoce.      •Reposo.      •Levantamiento (elevación) de la articulación.      •Aplicar compresas frías o calientes a la articulación.      •Medicamentos para mejorar los síntomas y reducir la inflamación.      •Inyecciones de corticoesteroides, melody naa cortisona, en la articulación para ayudar a reducir el dolor y la inflamación.    Según la causa de la artritis, es posible que deba hacer cambios en reid estilo de latosha para reducir la sobrecarga en la articulación. Los cambios pueden incluir lo siguiente:  •Hacer más ejercicio.      •Bajar de peso.        Siga estas instrucciones en reid casa:    Medicamentos     •Helmetta los medicamentos de venta klarissa y los recetados solamente naa se lo haya indicado el médico.      • No tome aspirina para aliviar el dolor si el médico samuel que la causa del dolor puede ser la gota.      Actividad     •Mantenga la articulación en reposo naa se lo haya indicado el médico. El descanso es importante cuando la enfermedad está activa y la articulación le duele, está hinchada o está rígida.      •Evite las actividades que empeoren el dolor. Es importante equilibrar actividad y reposo.    •Ejercite la articulación periódicamente con ejercicios de amplitud de movimiento naa se lo haya indicado el médico. Pruebe hacer ejercicio de bajo impacto, por ejemplo:  •Natación.      •Gimnasia acuática.      •Andar en bicicleta.      •Caminar.          Control del dolor, la rigidez y la hinchazón                 •Si se lo indican, aplique hielo sobre la articulación.  •Ponga el hielo en denver bolsa plástica.      •Coloque denver toalla entre la piel y la bolsa.      •Coloque el hielo beth 20 minutos, 2 a 3 veces al día.        •Si la articulación está hinchada, levántela (elévela) por encima del nivel del corazón si se lo indicó el médico.      •Si tiene la articulación rígida por la mañana, pruebe lesly denver ducha tibia.    •Si se lo indican, aplique calor en la jessika afectada con la frecuencia que le haya indicado el médico. Use la alec de calor que el médico le recomiende, naa denver compresa de calor húmedo o denver almohadilla térmica. Si tiene diabetes, no aplique calor sin autorización del médico. Para aplicar calor:  •Coloque denver toalla entre la piel y la alec de calor.      •Aplique calor beth 20 a 30 minutos.      •Retire la alec de calor si la piel se pone de color carr brillante. [place] es especialmente importante si no puede sentir dolor, calor o frío. Puede correr un riesgo mayor de sufrir quemaduras.        Indicaciones generales     • No consuma ningún producto que contenga nicotina o tabaco, naa cigarrillos, cigarrillos electrónicos y tabaco de mascar. Si necesita ayuda para dejar de fumar, consulte al médico.      •Concurra a todas las visitas de seguimiento naa se lo haya indicado el médico. [place] es importante.        Comuníquese con un médico si:    •El dolor empeora.      •Tiene fiebre.        Solicite ayuda inmediatamente si:    •Tiene dolor intenso, hinchazón o enrojecimiento en la articulación.      •Comienza a tener hinchazón y dolor en muchas articulaciones.      •Comienza a sentir un dolor intenso en la espalda.      •Tiene la pierna muy débil.      •No puede controlar los intestinos o la vejiga.        Resumen    •“Artritis” es un término que se utiliza comúnmente para referirse al dolor en las articulaciones o a denver enfermedad de las articulaciones. Hay más de 100 tipos de artritis.      •La causa más frecuente de esta afección es el desgaste de denver articulación. Otras causas incluyen gota, inflamación o infección en la articulación, esguinces o alergias.      •Los principales síntomas de esta afección son enrojecimiento, hinchazón o rigidez en la articulación. Otros síntomas incluyen calor, fiebre o sentirse enfermo.      •Esta afección se trata con reposo, elevación, medicamentos y aplicación de compresas frías o calientes.      •Siga las indicaciones del médico acerca de los medicamentos, la actividad, los ejercicios y otros tratamientos para el cuidado en el hogar.      Esta información no tiene naa fin reemplazar el consejo del médico. Asegúrese de hacerle al médico cualquier pregunta que tenga.

## 2020-10-23 NOTE — ED ADULT TRIAGE NOTE - CHIEF COMPLAINT QUOTE
Patient presents to ED complaining of b/l knee pain, elevated blood pressure and states that he cant sleep. Pt states he has been taking prescribed medication for his hypertension but pressure have still been elevated.  Denies chest pain, SOB. Pt ambulatory into triage.

## 2020-10-23 NOTE — ED STATDOCS - OBJECTIVE STATEMENT
76 y/o male with a PMHx of arthritis, CHF, COPD, HLD, HTN, CAD, Afib, s/p PPM, presents to the ED c/o gradual onset b/l knee pain and hypertension. Pt states he has been taking prescribed medication for his hypertension but pressure have still been elevated.  Denies chest pain, SOB. Pt also notes abdominal pain. No other complaints at this time. NKDA. PCP at [facility].

## 2020-10-23 NOTE — ED STATDOCS - CLINICAL SUMMARY MEDICAL DECISION MAKING FREE TEXT BOX
XR, pain control, reassess. XR, pain control, reassess.      Pt. to follow with orthopedics.  Appointment arranged.  Pt. requesting Tamsulosin Rx.   Pt. with elevated LFts, chronic, to follow with GI.  Annel Menon PA-C

## 2020-10-23 NOTE — ED STATDOCS - PMH
AF (atrial fibrillation)  on Eliquis  Arthritis    COPD (chronic obstructive pulmonary disease)  not on inhalers  Coronary artery disease involving native heart with angina pectoris, unspecified vessel or lesion type    Heart failure with reduced ejection fraction    HLD (hyperlipidemia)    HTN (hypertension)    Pacemaker  single chamber meditronic  placed by Dr Tomas  May  of  2016

## 2020-10-23 NOTE — ED STATDOCS - NS_ ATTENDINGSCRIBEDETAILS _ED_A_ED_FT
I, Douglas Ruffin MD,  performed the initial face to face bedside interview with this patient regarding history of present illness, review of symptoms and relevant past medical, social and family history.  I completed an independent physical examination.    The history, relevant review of systems, past medical and surgical history, medical decision making, and physical examination was documented by the scribe in my presence and I attest to the accuracy of the documentation.

## 2020-10-23 NOTE — ED STATDOCS - PROGRESS NOTE DETAILS
76 y/o male with a PMHx of arthritis, CHF, COPD, HLD, HTN, CAD, Afib, s/p PPM, presents to the ED c/o gradual onset b/l knee pain and hypertension.  Pt. taking his prescribed medications.  Pt. states pain with walking.  Annel Menon PA-C 76 y/o male with a PMHx of arthritis, CHF, COPD, HLD, HTN, CAD, Afib, s/p PPM, presents to the ED c/o gradual onset b/l knee pain and hypertension.  Pt. taking his prescribed medications.  Pt. states pain with walking.  Pt. also states abdominal pain and "increasing size".   Annel Menon PA-C Pt. to follow with orthopedics.  Appointment arranged.  Pt. with elevated LFts, chronic, to follow with GI.  Annel Menon PA-C Pt. to follow with orthopedics.  Appointment arranged.  Pt. requesting Tamsulosin Rx.   Pt. with elevated LFts, chronic, to follow with GI.  Annel Menon PA-C Pt. to follow with orthopedics.  Appointment arranged.  Pt. requesting Tamsulosin Rx.   Pt. with elevated LFts, chronic, to follow with GI.  Reviewed with attending.   #[id number] used for instructions.   Annel Menon PA-C

## 2020-10-23 NOTE — ED ADULT NURSE NOTE - OBJECTIVE STATEMENT
patient presents to ed with bilateral knee pain. patient c/o patient presents to ed with bilateral knee pain. patient c/o hypertension but has been taking his prescribed medications.

## 2020-10-26 ENCOUNTER — APPOINTMENT (OUTPATIENT)
Dept: ORTHOPEDIC SURGERY | Facility: CLINIC | Age: 75
End: 2020-10-26
Payer: MEDICARE

## 2020-10-26 DIAGNOSIS — M17.0 BILATERAL PRIMARY OSTEOARTHRITIS OF KNEE: ICD-10-CM

## 2020-10-26 PROCEDURE — 99204 OFFICE O/P NEW MOD 45 MIN: CPT

## 2020-10-26 NOTE — HISTORY OF PRESENT ILLNESS
[de-identified] : 75 year old male presenting with bilateral knee pain. The patient is accompanied by his grandson. The patient’s pain is noted to be a 9/10. The patient cannot attribute their pain to any specific injury, fall, or trauma. The patient c/o swelling that radiates up his legs and to his abdomen as well. He is currently taking no pain medication. The patient is on Xarelto. No other complaints at this time.

## 2020-10-26 NOTE — CONSULT LETTER
[Consult Letter:] : I had the pleasure of evaluating your patient, [unfilled]. [Please see my note below.] : Please see my note below. [Consult Closing:] : Thank you very much for allowing me to participate in the care of this patient.  If you have any questions, please do not hesitate to contact me. [Sincerely,] : Sincerely, [FreeTextEntry3] : Kirby Tucker, DO\par Foot and Ankle Surgery\par

## 2020-10-26 NOTE — PHYSICAL EXAM
[de-identified] : General: Alert and oriented x3. In no acute distress. Pleasant in nature with a normal affect. No apparent respiratory distress.\par \par Bilateral Knee Exam\par Skin: Clean, dry, intact\par Inspection: No obvious malalignment, no masses, +swelling, no effusion\par Pulses: 2+ DP/PT pulses\par ROM: BL Knee 0-100 degrees of flexion. No pain with deep knee flexion.\par Tenderness: No MJLT. No LJLT. No pain over the patella facets. No pain to the quadriceps mechanism.\par Stability: Stable to varus, valgus, lachman testing. Negative anterior/posterior drawer.\par Strength: 5/5 Q/H/TA/GS/EHL, no atrophy\par Neuro: In tact to light touch throughout, DTR's normal\par Additional tests: Negative McMurrays test, Negative patellar grind test.  [de-identified] : X-rays of the bilateral knees obtained from outside facility on 10/23/2020 and reviewed by me today 10/26/2020. Revealed: Mild knee degeneration is slightly greater on the left. Small knee effusions cannot be excluded.

## 2020-10-26 NOTE — DISCUSSION/SUMMARY
[de-identified] : Today I had a lengthy discussion with the patient regarding their bilateral knee pain. I have addressed all the patient's concerns surrounding the pathology of their condition. A referral was provided today for vascular surgery. I recommend that the patient receive a venus Doppler DVT test for the bilateral lower extremities. The DVT test was ordered for the patient in the office today. I recommend the patient undergo a course of physical therapy for the bilateral knees 2-3 times a week for a total of 6-8 weeks. A prescription was given for the physical therapy today. I would like to see the patient back in the office in 6-8 weeks to reassess their condition. The patient understood and verbally agreed to the treatment plan. All of their questions were answered and they were satisfied with the visit. The patient should call the office if they have any questions or experience worsening symptoms.

## 2020-10-27 ENCOUNTER — INPATIENT (INPATIENT)
Facility: HOSPITAL | Age: 75
LOS: 6 days | Discharge: HOME HEALTH | DRG: 292 | End: 2020-11-03
Attending: INTERNAL MEDICINE | Admitting: HOSPITALIST
Payer: MEDICARE

## 2020-10-27 VITALS
DIASTOLIC BLOOD PRESSURE: 95 MMHG | HEART RATE: 88 BPM | SYSTOLIC BLOOD PRESSURE: 155 MMHG | OXYGEN SATURATION: 100 % | TEMPERATURE: 98 F | RESPIRATION RATE: 22 BRPM

## 2020-10-27 DIAGNOSIS — R06.00 DYSPNEA, UNSPECIFIED: ICD-10-CM

## 2020-10-27 DIAGNOSIS — Z95.0 PRESENCE OF CARDIAC PACEMAKER: Chronic | ICD-10-CM

## 2020-10-27 DIAGNOSIS — R09.89 OTHER SPECIFIED SYMPTOMS AND SIGNS INVOLVING THE CIRCULATORY AND RESPIRATORY SYSTEMS: ICD-10-CM

## 2020-10-27 LAB
ALBUMIN SERPL ELPH-MCNC: 3.3 G/DL — SIGNIFICANT CHANGE UP (ref 3.3–5)
ALP SERPL-CCNC: 145 U/L — HIGH (ref 40–120)
ALT FLD-CCNC: 23 U/L — SIGNIFICANT CHANGE UP (ref 12–78)
ANION GAP SERPL CALC-SCNC: 3 MMOL/L — LOW (ref 5–17)
APPEARANCE UR: CLEAR — SIGNIFICANT CHANGE UP
APTT BLD: 34.5 SEC — SIGNIFICANT CHANGE UP (ref 27.5–35.5)
AST SERPL-CCNC: 29 U/L — SIGNIFICANT CHANGE UP (ref 15–37)
BASOPHILS # BLD AUTO: 0.07 K/UL — SIGNIFICANT CHANGE UP (ref 0–0.2)
BASOPHILS NFR BLD AUTO: 0.8 % — SIGNIFICANT CHANGE UP (ref 0–2)
BILIRUB SERPL-MCNC: 2.2 MG/DL — HIGH (ref 0.2–1.2)
BILIRUB UR-MCNC: NEGATIVE — SIGNIFICANT CHANGE UP
BUN SERPL-MCNC: 21 MG/DL — SIGNIFICANT CHANGE UP (ref 7–23)
CALCIUM SERPL-MCNC: 8.7 MG/DL — SIGNIFICANT CHANGE UP (ref 8.5–10.1)
CHLORIDE SERPL-SCNC: 100 MMOL/L — SIGNIFICANT CHANGE UP (ref 96–108)
CO2 SERPL-SCNC: 34 MMOL/L — HIGH (ref 22–31)
COLOR SPEC: YELLOW — SIGNIFICANT CHANGE UP
CREAT SERPL-MCNC: 1.33 MG/DL — HIGH (ref 0.5–1.3)
DIFF PNL FLD: ABNORMAL
EOSINOPHIL # BLD AUTO: 0.12 K/UL — SIGNIFICANT CHANGE UP (ref 0–0.5)
EOSINOPHIL NFR BLD AUTO: 1.4 % — SIGNIFICANT CHANGE UP (ref 0–6)
GLUCOSE SERPL-MCNC: 109 MG/DL — HIGH (ref 70–99)
GLUCOSE UR QL: NEGATIVE MG/DL — SIGNIFICANT CHANGE UP
HCT VFR BLD CALC: 39.2 % — SIGNIFICANT CHANGE UP (ref 39–50)
HGB BLD-MCNC: 11.9 G/DL — LOW (ref 13–17)
IMM GRANULOCYTES NFR BLD AUTO: 0.2 % — SIGNIFICANT CHANGE UP (ref 0–1.5)
INR BLD: 1.34 RATIO — HIGH (ref 0.88–1.16)
KETONES UR-MCNC: NEGATIVE — SIGNIFICANT CHANGE UP
LEUKOCYTE ESTERASE UR-ACNC: NEGATIVE — SIGNIFICANT CHANGE UP
LYMPHOCYTES # BLD AUTO: 1.54 K/UL — SIGNIFICANT CHANGE UP (ref 1–3.3)
LYMPHOCYTES # BLD AUTO: 17.6 % — SIGNIFICANT CHANGE UP (ref 13–44)
MAGNESIUM SERPL-MCNC: 2.3 MG/DL — SIGNIFICANT CHANGE UP (ref 1.6–2.6)
MCHC RBC-ENTMCNC: 29.8 PG — SIGNIFICANT CHANGE UP (ref 27–34)
MCHC RBC-ENTMCNC: 30.4 GM/DL — LOW (ref 32–36)
MCV RBC AUTO: 98 FL — SIGNIFICANT CHANGE UP (ref 80–100)
MONOCYTES # BLD AUTO: 1.25 K/UL — HIGH (ref 0–0.9)
MONOCYTES NFR BLD AUTO: 14.3 % — HIGH (ref 2–14)
NEUTROPHILS # BLD AUTO: 5.77 K/UL — SIGNIFICANT CHANGE UP (ref 1.8–7.4)
NEUTROPHILS NFR BLD AUTO: 65.7 % — SIGNIFICANT CHANGE UP (ref 43–77)
NITRITE UR-MCNC: NEGATIVE — SIGNIFICANT CHANGE UP
NT-PROBNP SERPL-SCNC: 3577 PG/ML — HIGH (ref 0–450)
PH UR: 7 — SIGNIFICANT CHANGE UP (ref 5–8)
PLATELET # BLD AUTO: 155 K/UL — SIGNIFICANT CHANGE UP (ref 150–400)
POTASSIUM SERPL-MCNC: 3.5 MMOL/L — SIGNIFICANT CHANGE UP (ref 3.5–5.3)
POTASSIUM SERPL-SCNC: 3.5 MMOL/L — SIGNIFICANT CHANGE UP (ref 3.5–5.3)
PROT SERPL-MCNC: 6.5 GM/DL — SIGNIFICANT CHANGE UP (ref 6–8.3)
PROT UR-MCNC: 30 MG/DL
PROTHROM AB SERPL-ACNC: 15.3 SEC — HIGH (ref 10.6–13.6)
RBC # BLD: 4 M/UL — LOW (ref 4.2–5.8)
RBC # FLD: 14.7 % — HIGH (ref 10.3–14.5)
SARS-COV-2 RNA SPEC QL NAA+PROBE: SIGNIFICANT CHANGE UP
SODIUM SERPL-SCNC: 137 MMOL/L — SIGNIFICANT CHANGE UP (ref 135–145)
SP GR SPEC: 1 — LOW (ref 1.01–1.02)
TROPONIN I SERPL-MCNC: 0.13 NG/ML — HIGH (ref 0.01–0.04)
TROPONIN I SERPL-MCNC: 0.13 NG/ML — HIGH (ref 0.01–0.04)
UROBILINOGEN FLD QL: NEGATIVE MG/DL — SIGNIFICANT CHANGE UP
WBC # BLD: 8.77 K/UL — SIGNIFICANT CHANGE UP (ref 3.8–10.5)
WBC # FLD AUTO: 8.77 K/UL — SIGNIFICANT CHANGE UP (ref 3.8–10.5)

## 2020-10-27 PROCEDURE — 36415 COLL VENOUS BLD VENIPUNCTURE: CPT

## 2020-10-27 PROCEDURE — 87186 SC STD MICRODIL/AGAR DIL: CPT

## 2020-10-27 PROCEDURE — 84100 ASSAY OF PHOSPHORUS: CPT

## 2020-10-27 PROCEDURE — 97116 GAIT TRAINING THERAPY: CPT | Mod: GP

## 2020-10-27 PROCEDURE — 84155 ASSAY OF PROTEIN SERUM: CPT

## 2020-10-27 PROCEDURE — 87102 FUNGUS ISOLATION CULTURE: CPT

## 2020-10-27 PROCEDURE — 90686 IIV4 VACC NO PRSV 0.5 ML IM: CPT

## 2020-10-27 PROCEDURE — 80076 HEPATIC FUNCTION PANEL: CPT

## 2020-10-27 PROCEDURE — 93005 ELECTROCARDIOGRAM TRACING: CPT

## 2020-10-27 PROCEDURE — 86769 SARS-COV-2 COVID-19 ANTIBODY: CPT

## 2020-10-27 PROCEDURE — 86335 IMMUNFIX E-PHORSIS/URINE/CSF: CPT

## 2020-10-27 PROCEDURE — 83880 ASSAY OF NATRIURETIC PEPTIDE: CPT

## 2020-10-27 PROCEDURE — 85027 COMPLETE CBC AUTOMATED: CPT

## 2020-10-27 PROCEDURE — 80074 ACUTE HEPATITIS PANEL: CPT

## 2020-10-27 PROCEDURE — 84166 PROTEIN E-PHORESIS/URINE/CSF: CPT

## 2020-10-27 PROCEDURE — 82042 OTHER SOURCE ALBUMIN QUAN EA: CPT

## 2020-10-27 PROCEDURE — 87070 CULTURE OTHR SPECIMN AEROBIC: CPT

## 2020-10-27 PROCEDURE — 84156 ASSAY OF PROTEIN URINE: CPT

## 2020-10-27 PROCEDURE — 93970 EXTREMITY STUDY: CPT

## 2020-10-27 PROCEDURE — 82945 GLUCOSE OTHER FLUID: CPT

## 2020-10-27 PROCEDURE — 87040 BLOOD CULTURE FOR BACTERIA: CPT

## 2020-10-27 PROCEDURE — 84165 PROTEIN E-PHORESIS SERUM: CPT

## 2020-10-27 PROCEDURE — 89051 BODY FLUID CELL COUNT: CPT

## 2020-10-27 PROCEDURE — 85730 THROMBOPLASTIN TIME PARTIAL: CPT

## 2020-10-27 PROCEDURE — C1729: CPT

## 2020-10-27 PROCEDURE — 93925 LOWER EXTREMITY STUDY: CPT

## 2020-10-27 PROCEDURE — 74176 CT ABD & PELVIS W/O CONTRAST: CPT | Mod: 26

## 2020-10-27 PROCEDURE — 88305 TISSUE EXAM BY PATHOLOGIST: CPT

## 2020-10-27 PROCEDURE — 49083 ABD PARACENTESIS W/IMAGING: CPT

## 2020-10-27 PROCEDURE — 88108 CYTOPATH CONCENTRATE TECH: CPT

## 2020-10-27 PROCEDURE — 84484 ASSAY OF TROPONIN QUANT: CPT

## 2020-10-27 PROCEDURE — 83735 ASSAY OF MAGNESIUM: CPT

## 2020-10-27 PROCEDURE — 80048 BASIC METABOLIC PNL TOTAL CA: CPT

## 2020-10-27 PROCEDURE — 83615 LACTATE (LD) (LDH) ENZYME: CPT

## 2020-10-27 PROCEDURE — 97163 PT EVAL HIGH COMPLEX 45 MIN: CPT | Mod: GP

## 2020-10-27 PROCEDURE — 87086 URINE CULTURE/COLONY COUNT: CPT

## 2020-10-27 PROCEDURE — 85025 COMPLETE CBC W/AUTO DIFF WBC: CPT

## 2020-10-27 PROCEDURE — 84157 ASSAY OF PROTEIN OTHER: CPT

## 2020-10-27 PROCEDURE — 71045 X-RAY EXAM CHEST 1 VIEW: CPT | Mod: 26

## 2020-10-27 PROCEDURE — 85610 PROTHROMBIN TIME: CPT

## 2020-10-27 PROCEDURE — 81001 URINALYSIS AUTO W/SCOPE: CPT

## 2020-10-27 PROCEDURE — 99497 ADVNCD CARE PLAN 30 MIN: CPT | Mod: 25

## 2020-10-27 PROCEDURE — 80061 LIPID PANEL: CPT

## 2020-10-27 PROCEDURE — 99223 1ST HOSP IP/OBS HIGH 75: CPT

## 2020-10-27 PROCEDURE — 86334 IMMUNOFIX E-PHORESIS SERUM: CPT

## 2020-10-27 PROCEDURE — 87075 CULTR BACTERIA EXCEPT BLOOD: CPT

## 2020-10-27 PROCEDURE — 93010 ELECTROCARDIOGRAM REPORT: CPT

## 2020-10-27 PROCEDURE — 80053 COMPREHEN METABOLIC PANEL: CPT

## 2020-10-27 PROCEDURE — G0008: CPT

## 2020-10-27 RX ORDER — CEFTRIAXONE 500 MG/1
1000 INJECTION, POWDER, FOR SOLUTION INTRAMUSCULAR; INTRAVENOUS EVERY 24 HOURS
Refills: 0 | Status: DISCONTINUED | OUTPATIENT
Start: 2020-10-27 | End: 2020-10-30

## 2020-10-27 RX ORDER — FUROSEMIDE 40 MG
40 TABLET ORAL DAILY
Refills: 0 | Status: DISCONTINUED | OUTPATIENT
Start: 2020-10-27 | End: 2020-10-29

## 2020-10-27 RX ORDER — CARVEDILOL PHOSPHATE 80 MG/1
6.25 CAPSULE, EXTENDED RELEASE ORAL EVERY 12 HOURS
Refills: 0 | Status: DISCONTINUED | OUTPATIENT
Start: 2020-10-27 | End: 2020-11-03

## 2020-10-27 RX ORDER — VANCOMYCIN HCL 1 G
1000 VIAL (EA) INTRAVENOUS EVERY 12 HOURS
Refills: 0 | Status: DISCONTINUED | OUTPATIENT
Start: 2020-10-27 | End: 2020-10-28

## 2020-10-27 RX ORDER — TAMSULOSIN HYDROCHLORIDE 0.4 MG/1
0.4 CAPSULE ORAL AT BEDTIME
Refills: 0 | Status: DISCONTINUED | OUTPATIENT
Start: 2020-10-27 | End: 2020-11-03

## 2020-10-27 RX ORDER — BETHANECHOL CHLORIDE 25 MG
50 TABLET ORAL
Refills: 0 | Status: DISCONTINUED | OUTPATIENT
Start: 2020-10-27 | End: 2020-11-03

## 2020-10-27 RX ORDER — FUROSEMIDE 40 MG
80 TABLET ORAL ONCE
Refills: 0 | Status: COMPLETED | OUTPATIENT
Start: 2020-10-27 | End: 2020-10-27

## 2020-10-27 RX ORDER — ATORVASTATIN CALCIUM 80 MG/1
20 TABLET, FILM COATED ORAL AT BEDTIME
Refills: 0 | Status: DISCONTINUED | OUTPATIENT
Start: 2020-10-27 | End: 2020-11-03

## 2020-10-27 RX ORDER — LORATADINE 10 MG/1
10 TABLET ORAL DAILY
Refills: 0 | Status: DISCONTINUED | OUTPATIENT
Start: 2020-10-27 | End: 2020-11-03

## 2020-10-27 RX ORDER — ASPIRIN/CALCIUM CARB/MAGNESIUM 324 MG
325 TABLET ORAL ONCE
Refills: 0 | Status: COMPLETED | OUTPATIENT
Start: 2020-10-27 | End: 2020-10-27

## 2020-10-27 RX ADMIN — Medication 325 MILLIGRAM(S): at 20:34

## 2020-10-27 RX ADMIN — ATORVASTATIN CALCIUM 20 MILLIGRAM(S): 80 TABLET, FILM COATED ORAL at 23:54

## 2020-10-27 RX ADMIN — Medication 80 MILLIGRAM(S): at 18:37

## 2020-10-27 RX ADMIN — CARVEDILOL PHOSPHATE 6.25 MILLIGRAM(S): 80 CAPSULE, EXTENDED RELEASE ORAL at 23:54

## 2020-10-27 RX ADMIN — TAMSULOSIN HYDROCHLORIDE 0.4 MILLIGRAM(S): 0.4 CAPSULE ORAL at 23:55

## 2020-10-27 NOTE — ED PROVIDER NOTE - CLINICAL SUMMARY MEDICAL DECISION MAKING FREE TEXT BOX
74 y/o M with PMHx of CHF, COPD, HTN, HLD, CAD, Afib on Xarelto, arthritis, and s/p pacemaker placement presents to the ED for exertional dyspnea and swelling to abd, lower legs worsening over past few days. Exam with rales b/l bases, edema to lower abd, and significant 2+ pitting edema b/l LE. Concern for hear failure. Will obtain labs, Lasix, and admit. 76 y/o M with PMHx of CHF, COPD, HTN, HLD, CAD, Afib on Xarelto, arthritis, and s/p pacemaker placement presents to the ED for exertional dyspnea and swelling to abd, lower legs worsening over past few days. Exam with rales b/l bases, edema to lower abd, and significant 2+ pitting edema b/l LE. Concern for heart failure. Will obtain labs, Lasix, and admit.

## 2020-10-27 NOTE — ED STATDOCS - OBJECTIVE STATEMENT
74 y/o male with PMHx of heart failure with reduced ejection fraction, HLD, COPD, CHF on Lasix, arthritis, HTN, CAD, Afib with pacemaker presents to the ED c/o leg pain x2 weeks, associated swelling now radiating to his abd. States he has an appointment with a specialist in x5 day, but he has noticed increasing swelling, and he has had difficulty ambulating. See progress note.

## 2020-10-27 NOTE — ED ADULT NURSE NOTE - NSIMPLEMENTINTERV_GEN_ALL_ED
Implemented All Universal Safety Interventions:  [place] to call system. Call bell, personal items and telephone within reach. Instruct patient to call for assistance. Room bathroom lighting operational. Non-slip footwear when patient is off stretcher. Physically safe environment: no spills, clutter or unnecessary equipment. Stretcher in lowest position, wheels locked, appropriate side rails in place.

## 2020-10-27 NOTE — ED ADULT NURSE REASSESSMENT NOTE - NS ED NURSE REASSESS COMMENT FT1
ID #[id number] used. Pt resting comfortably in NAD at this time. Reports an improvement of symptoms. Denies SOB/Chest pain/abdominal pain. Pt awake and alert with all safety measures in place at this time. Offers no complaints. Educated on plan at this time.

## 2020-10-27 NOTE — ED PROVIDER NOTE - NS_ ATTENDINGSCRIBEDETAILS _ED_A_ED_FT
I, Spencer Woods MD,  performed the initial face to face bedside interview with this patient regarding history of present illness, review of symptoms and relevant past medical, social and family history.  I completed an independent physical examination.  I was the initial provider who evaluated this patient.  The history, relevant review of systems, past medical and surgical history, medical decision making, and physical examination was documented by the scribe in my presence and I attest to the accuracy of the documentation.

## 2020-10-27 NOTE — ED PROVIDER NOTE - NS ED ROS FT
Constitutional: No fever or chills  Eyes: No visual changes  HEENT: No throat pain  CV: No chest pain  Resp: No cough, +LOPEZ  GI: No abd pain, nausea or vomiting, +abd distension   : No dysuria  MSK: +b/l LE pain  Skin: +b/l LE edema   Neuro: No headache

## 2020-10-27 NOTE — H&P ADULT - RS GEN PE MLT RESP DETAILS PC
airway patent/breath sounds equal/good air movement/respirations non-labored/no intercostal retractions/no rhonchi/no wheezes/rales

## 2020-10-27 NOTE — ED STATDOCS - PROGRESS NOTE DETAILS
Nathan NORTON for ED attending, Dr. Erickson: 74 y/o male with PMHx of heart failure with reduced ejection fraction, HLD, COPD, CHF on Lasix, arthritis, HTN, CAD, Afib with pacemaker presents to the ED c/o leg pain x2 weeks, associated swelling now radiating to his abd. States he has an appointment with a specialist in x5 day, but he has noticed increasing swelling, and he has had difficulty ambulating. Pt with severe pitting edema to b/l LE and abd, likely secondary to CHF. Will send pt to main ED for further evaluation.

## 2020-10-27 NOTE — ED PROVIDER NOTE - PHYSICAL EXAMINATION
Constitutional: NAD AAOx3  Eyes: PERRLA EOMI  Head: Normocephalic atraumatic  Mouth: MMM  Cardiac: regular rate   Resp: +rales b/l bases   GI: +edema lower abd   Neuro: CN2-12 intact  Skin: +significant 2+ pitting edema b/l LE Constitutional: mild distress AAOx3  Eyes: PERRLA EOMI  Head: Normocephalic atraumatic  Mouth: MMM  Cardiac: regular rate   Resp: +rales b/l bases   GI: +edema lower abd   Neuro: CN2-12 intact  Skin: +significant 2+ pitting edema b/l LE

## 2020-10-27 NOTE — ED PROVIDER NOTE - PMH
AF (atrial fibrillation)  on Eliquis  Arthritis    COPD (chronic obstructive pulmonary disease)  not on inhalers  Coronary artery disease involving native heart with angina pectoris, unspecified vessel or lesion type    Heart failure with reduced ejection fraction    HLD (hyperlipidemia)    HTN (hypertension)    Pacemaker  single chamber meditronic  placed by Dr Tmoas  May  of  2016

## 2020-10-27 NOTE — ED ADULT NURSE REASSESSMENT NOTE - NS ED NURSE REASSESS COMMENT FT1
Admitting hospitalist at bedside discussing plan of care with patient. Report given to inpatient RN on 3E mildred. VSS at this time

## 2020-10-27 NOTE — H&P ADULT - HISTORY OF PRESENT ILLNESS
74 y/o M w/ PMH of CHF, COPD, HTN, dyslipidemia, CAD, a-fib on xarelto, arthritis, s/p PPM, p/w abdominal pain. Patient states abdominal pain started 1 week ago, and when he walks he also gets dyspnea on exertion. Also c/o LE pain, LLE worse than right. States pain is mostly above the knee. Patient also c/o dysuria and R flank pain. Denies fever, chills, nausea, vomiting, CP, cough, runny nose, diarrhea.     PSH: Denies     Social Hx: Denies x 3    Family Hx: Father and brother - heart disease

## 2020-10-27 NOTE — H&P ADULT - ASSESSMENT
74 y/o M w/ PMH of CHF, COPD, HTN, dyslipidemia, CAD, a-fib on xarelto, arthritis, s/p PPM, p/w abdominal pain    *Dyspnea on exertion 2/2 Acute on Chronic CHF  -S/p Lasix IV   -Cardio consult   -I/Os + Daily weights  -BB   -ACEi held 2/2 possible ELIS   -CT = Small to moderate R sided pleural effusion  -Echo     *Elevated troponins w/ h/o CAD   -Likely 2/2 CHF and renal failure  -Denies CP  -Trend Troponins  -Cardio consult  -ASA  -Tele monitoring    *B/L perinephric stranding / cystitis   -Ceftriaxone   -F/u Urine / Blood Cx    *LE cellulitis vs r/o DVT  -Vanco  -Arterial / venous doppler of LEs     *Moderate Pelvic ascites / Anasarca - Possibly 2/2 CHF  -IR consult for paracentesis to check etiology for ascites   -Hold Anti-platelet for possible procedure tomorrow     *Suspect ELIS   -Creatnine is 1.33 today, and 0.98 on 8/31/20  -I/Os   -Avoid nephrotoxic agents   -Hold potassium supplements     *Small B/L anterior external iliac chain pelvic lymph nodes / Small BL inguinal lymph nodes   -F/u outpatient     *Enlarged prostate  -F/u outpatient w/      *HTN / dyslipidemia / COPD / arthritis / PPM placement  -C/w home meds and f/u outpatient for further management if conditions remain stable during hospitalization     *Advance Directives  -HCP is jolie Gill. Patient wishes to have cardiac resuscitation, but did not provide clear answer regarding intubation / mechanical ventilation. Advance care planning time <30 minutes     *DVT ppx  -On Xarelto - will hold in case of paracentesis tomorrow  76 y/o M w/ PMH of systolic CHF, COPD, HTN, dyslipidemia, CAD, a-fib on xarelto, arthritis, s/p PPM, p/w abdominal pain    *Dyspnea on exertion 2/2 Acute on Chronic systolic CHF  -C/w Lasix IV  -Cardio consult   -I/Os + Daily weights  -BB   -ACEi held 2/2 possible ELIS   -CT = Small to moderate R sided pleural effusion  -Echo 8/28/20 - EF 45%    *Elevated troponins w/ h/o CAD   -Likely 2/2 CHF and renal failure  -Denies CP  -Trend Troponins  -Cardio consult  -S/p ASA in ED   -Tele monitoring    *B/L perinephric stranding / cystitis   -Ceftriaxone   -F/u Urine / Blood Cx    *LE cellulitis vs r/o DVT  -Vanco  -Arterial / venous doppler of LEs     *Moderate Pelvic ascites / Anasarca - Possibly 2/2 CHF  -IR consult for paracentesis to check etiology for ascites and possibly therapeutic reasons   -Hold Anti-platelet for possible procedure tomorrow     *Suspect ELIS   -Creatnine is 1.33 today, and 0.98 on 8/31/20  -I/Os   -Avoid nephrotoxic agents   -Hold potassium supplements   -Trend creatinine     *Small B/L anterior external iliac chain pelvic lymph nodes / Small BL inguinal lymph nodes   -F/u outpatient     *Enlarged prostate  -F/u outpatient w/      *HTN / dyslipidemia / COPD / arthritis / PPM placement  -C/w home meds and f/u outpatient for further management if conditions remain stable during hospitalization     *Advance Directives  -HCP is jolie Gill. Patient wishes to have cardiac resuscitation, but did not provide clear answer regarding intubation / mechanical ventilation. Advance care planning time <30 minutes     *DVT ppx  -On Xarelto - will hold in case of paracentesis tomorrow  76 y/o M w/ PMH of systolic CHF, COPD, HTN, dyslipidemia, CAD, a-fib on xarelto, arthritis, s/p PPM, p/w abdominal pain    *Dyspnea on exertion 2/2 Acute on Chronic systolic CHF  -C/w Lasix IV  -Cardio consult   -I/Os + Daily weights  -BB   -ACEi held 2/2 possible ELIS   -CT = Small to moderate R sided pleural effusion  -Echo 8/28/20 - EF 45%    *Elevated troponins w/ h/o CAD   -Likely 2/2 CHF and renal failure  -Denies CP  -Trend Troponins  -Cardio consult  -S/p ASA in ED   -Tele monitoring    *Pyelonephritis - B/L perinephric stranding / cystitis   -Ceftriaxone   -F/u Urine / Blood Cx    *LE cellulitis vs r/o DVT  -Vanco  -Arterial / venous doppler of LEs     *Moderate Pelvic ascites / Anasarca - Possibly 2/2 CHF  -IR consult for paracentesis to check etiology for ascites and possibly therapeutic reasons   -Hold Anti-platelet for possible procedure tomorrow     *Suspect ELSI   -Creatnine is 1.33 today, and 0.98 on 8/31/20  -I/Os   -Avoid nephrotoxic agents   -Hold potassium supplements   -Trend creatinine     *Small B/L anterior external iliac chain pelvic lymph nodes / Small BL inguinal lymph nodes   -F/u outpatient     *Enlarged prostate  -F/u outpatient w/      *HTN / dyslipidemia / COPD / arthritis / PPM placement  -C/w home meds and f/u outpatient for further management if conditions remain stable during hospitalization     *Advance Directives  -HCP is jolie Gill. Patient wishes to have cardiac resuscitation, but did not provide clear answer regarding intubation / mechanical ventilation. Advance care planning time <30 minutes     *DVT ppx  -On Xarelto - will hold in case of paracentesis tomorrow  76 y/o M w/ PMH of systolic CHF, COPD, HTN, dyslipidemia, CAD, a-fib on xarelto, arthritis, s/p PPM, p/w abdominal pain    *Dyspnea on exertion 2/2 Acute on Chronic systolic CHF  -C/w Lasix IV  -Cardio consult   -I/Os + Daily weights  -BB   -ACEi held 2/2 possible ELIS   -CT = Small to moderate R sided pleural effusion  -Echo 8/28/20 - EF 45%    *Elevated troponins w/ h/o CAD   -Likely 2/2 CHF and renal failure  -Denies CP  -Trend Troponins  -Cardio consult  -S/p ASA in ED   -Tele monitoring    *Pyelonephritis - B/L perinephric stranding / cystitis   -Ceftriaxone   -F/u Urine / Blood Cx    *LE cellulitis vs r/o DVT  -Vanco  -Arterial / venous doppler of LEs     *Moderate Pelvic ascites / Anasarca - Possibly 2/2 CHF  -IR consult for paracentesis to check etiology for ascites and possibly therapeutic reasons   -Hold Anti-platelet for possible procedure tomorrow     *Suspect ELIS   -Creatnine is 1.33 today, and 0.98 on 8/31/20  -I/Os   -Avoid nephrotoxic agents   -Hold potassium supplements   -Trend creatinine     *Small B/L anterior external iliac chain pelvic lymph nodes / Small BL inguinal lymph nodes   -F/u outpatient     *Enlarged prostate  -F/u outpatient w/      *HTN / dyslipidemia / COPD / arthritis / PPM placement  -C/w home meds and f/u outpatient for further management if conditions remain stable during hospitalization     *Advance Directives  -HCP is jolie Gill. Patient wishes to have cardiac resuscitation, but did not provide clear answer regarding intubation / mechanical ventilation. Advance care planning time <30 minutes     *DVT ppx  -On Xarelto - will hold in case of paracentesis tomorrow

## 2020-10-27 NOTE — ED PROVIDER NOTE - OBJECTIVE STATEMENT
76 y/o M with PMHx of CHF, COPD, HTN, HLD, CAD, Afib on Xarelto, arthritis, and s/p pacemaker placement presents to the ED c/o +abd distension with +b/l LE pain and +edema x2 weeks. Also notes increased +LOPEZ. No fever. Allergic to amiodarone and ACE inhibitors. Pt is [place] speaking, [place]  used, ID#: [id number].

## 2020-10-28 ENCOUNTER — RESULT REVIEW (OUTPATIENT)
Age: 75
End: 2020-10-28

## 2020-10-28 DIAGNOSIS — I10 ESSENTIAL (PRIMARY) HYPERTENSION: ICD-10-CM

## 2020-10-28 DIAGNOSIS — I25.119 ATHEROSCLEROTIC HEART DISEASE OF NATIVE CORONARY ARTERY WITH UNSPECIFIED ANGINA PECTORIS: ICD-10-CM

## 2020-10-28 DIAGNOSIS — I48.91 UNSPECIFIED ATRIAL FIBRILLATION: ICD-10-CM

## 2020-10-28 DIAGNOSIS — R06.02 SHORTNESS OF BREATH: ICD-10-CM

## 2020-10-28 LAB
ALBUMIN FLD-MCNC: 2 G/DL — SIGNIFICANT CHANGE UP
ALBUMIN SERPL ELPH-MCNC: 3.2 G/DL — LOW (ref 3.3–5)
ALP SERPL-CCNC: 138 U/L — HIGH (ref 40–120)
ALT FLD-CCNC: 20 U/L — SIGNIFICANT CHANGE UP (ref 12–78)
ANION GAP SERPL CALC-SCNC: 7 MMOL/L — SIGNIFICANT CHANGE UP (ref 5–17)
APTT BLD: 35.7 SEC — HIGH (ref 27.5–35.5)
AST SERPL-CCNC: 29 U/L — SIGNIFICANT CHANGE UP (ref 15–37)
B PERT IGG+IGM PNL SER: ABNORMAL
BASOPHILS # BLD AUTO: 0.06 K/UL — SIGNIFICANT CHANGE UP (ref 0–0.2)
BASOPHILS NFR BLD AUTO: 0.8 % — SIGNIFICANT CHANGE UP (ref 0–2)
BILIRUB SERPL-MCNC: 2.3 MG/DL — HIGH (ref 0.2–1.2)
BUN SERPL-MCNC: 21 MG/DL — SIGNIFICANT CHANGE UP (ref 7–23)
CALCIUM SERPL-MCNC: 8.9 MG/DL — SIGNIFICANT CHANGE UP (ref 8.5–10.1)
CHLORIDE SERPL-SCNC: 102 MMOL/L — SIGNIFICANT CHANGE UP (ref 96–108)
CHOLEST SERPL-MCNC: 79 MG/DL — SIGNIFICANT CHANGE UP
CO2 SERPL-SCNC: 30 MMOL/L — SIGNIFICANT CHANGE UP (ref 22–31)
COLOR FLD: SIGNIFICANT CHANGE UP
CREAT SERPL-MCNC: 1.25 MG/DL — SIGNIFICANT CHANGE UP (ref 0.5–1.3)
EOSINOPHIL # BLD AUTO: 0.1 K/UL — SIGNIFICANT CHANGE UP (ref 0–0.5)
EOSINOPHIL NFR BLD AUTO: 1.4 % — SIGNIFICANT CHANGE UP (ref 0–6)
FLUID INTAKE SUBSTANCE CLASS: SIGNIFICANT CHANGE UP
FLUID SEGMENTED GRANULOCYTES: 8 % — SIGNIFICANT CHANGE UP
GLUCOSE FLD-MCNC: 139 MG/DL — SIGNIFICANT CHANGE UP
GLUCOSE SERPL-MCNC: 82 MG/DL — SIGNIFICANT CHANGE UP (ref 70–99)
GRAM STN FLD: SIGNIFICANT CHANGE UP
HCT VFR BLD CALC: 39 % — SIGNIFICANT CHANGE UP (ref 39–50)
HDLC SERPL-MCNC: 26 MG/DL — LOW
HGB BLD-MCNC: 11.9 G/DL — LOW (ref 13–17)
IMM GRANULOCYTES NFR BLD AUTO: 0.4 % — SIGNIFICANT CHANGE UP (ref 0–1.5)
INR BLD: 1.3 RATIO — HIGH (ref 0.88–1.16)
LDH SERPL L TO P-CCNC: 97 U/L — SIGNIFICANT CHANGE UP
LIPID PNL WITH DIRECT LDL SERPL: 43 MG/DL — SIGNIFICANT CHANGE UP
LYMPHOCYTES # BLD AUTO: 1.03 K/UL — SIGNIFICANT CHANGE UP (ref 1–3.3)
LYMPHOCYTES # BLD AUTO: 14.2 % — SIGNIFICANT CHANGE UP (ref 13–44)
LYMPHOCYTES # FLD: 28 % — SIGNIFICANT CHANGE UP
MAGNESIUM SERPL-MCNC: 2.1 MG/DL — SIGNIFICANT CHANGE UP (ref 1.6–2.6)
MCHC RBC-ENTMCNC: 29.7 PG — SIGNIFICANT CHANGE UP (ref 27–34)
MCHC RBC-ENTMCNC: 30.5 GM/DL — LOW (ref 32–36)
MCV RBC AUTO: 97.3 FL — SIGNIFICANT CHANGE UP (ref 80–100)
MESOTHL CELL # FLD: 5 % — SIGNIFICANT CHANGE UP
MONOCYTES # BLD AUTO: 1.13 K/UL — HIGH (ref 0–0.9)
MONOCYTES NFR BLD AUTO: 15.5 % — HIGH (ref 2–14)
MONOS+MACROS # FLD: 59 % — SIGNIFICANT CHANGE UP
NEUTROPHILS # BLD AUTO: 4.92 K/UL — SIGNIFICANT CHANGE UP (ref 1.8–7.4)
NEUTROPHILS NFR BLD AUTO: 67.7 % — SIGNIFICANT CHANGE UP (ref 43–77)
NON HDL CHOLESTEROL: 53 MG/DL — SIGNIFICANT CHANGE UP
PHOSPHATE SERPL-MCNC: 3.5 MG/DL — SIGNIFICANT CHANGE UP (ref 2.5–4.5)
PLATELET # BLD AUTO: 149 K/UL — LOW (ref 150–400)
POTASSIUM SERPL-MCNC: 4 MMOL/L — SIGNIFICANT CHANGE UP (ref 3.5–5.3)
POTASSIUM SERPL-SCNC: 4 MMOL/L — SIGNIFICANT CHANGE UP (ref 3.5–5.3)
PROT FLD-MCNC: 3.1 G/DL — SIGNIFICANT CHANGE UP
PROT SERPL-MCNC: 6.3 GM/DL — SIGNIFICANT CHANGE UP (ref 6–8.3)
PROTHROM AB SERPL-ACNC: 15 SEC — HIGH (ref 10.6–13.6)
RBC # BLD: 4.01 M/UL — LOW (ref 4.2–5.8)
RBC # FLD: 14.8 % — HIGH (ref 10.3–14.5)
RCV VOL RI: HIGH /UL (ref 0–0)
SARS-COV-2 IGG SERPL QL IA: NEGATIVE — SIGNIFICANT CHANGE UP
SARS-COV-2 IGM SERPL IA-ACNC: <0.1 INDEX — SIGNIFICANT CHANGE UP
SODIUM SERPL-SCNC: 139 MMOL/L — SIGNIFICANT CHANGE UP (ref 135–145)
SPECIMEN SOURCE: SIGNIFICANT CHANGE UP
TOTAL NUCLEATED CELL COUNT, BODY FLUID: 250 /UL — SIGNIFICANT CHANGE UP
TRIGL SERPL-MCNC: 50 MG/DL — SIGNIFICANT CHANGE UP
TUBE TYPE: SIGNIFICANT CHANGE UP
WBC # BLD: 7.27 K/UL — SIGNIFICANT CHANGE UP (ref 3.8–10.5)
WBC # FLD AUTO: 7.27 K/UL — SIGNIFICANT CHANGE UP (ref 3.8–10.5)

## 2020-10-28 PROCEDURE — 88108 CYTOPATH CONCENTRATE TECH: CPT | Mod: 26

## 2020-10-28 PROCEDURE — 99233 SBSQ HOSP IP/OBS HIGH 50: CPT

## 2020-10-28 PROCEDURE — 99223 1ST HOSP IP/OBS HIGH 75: CPT

## 2020-10-28 PROCEDURE — 93925 LOWER EXTREMITY STUDY: CPT | Mod: 26

## 2020-10-28 PROCEDURE — 88305 TISSUE EXAM BY PATHOLOGIST: CPT | Mod: 26

## 2020-10-28 PROCEDURE — 49083 ABD PARACENTESIS W/IMAGING: CPT

## 2020-10-28 PROCEDURE — 93010 ELECTROCARDIOGRAM REPORT: CPT

## 2020-10-28 PROCEDURE — 93970 EXTREMITY STUDY: CPT | Mod: 26

## 2020-10-28 RX ORDER — ACETAMINOPHEN 500 MG
650 TABLET ORAL ONCE
Refills: 0 | Status: COMPLETED | OUTPATIENT
Start: 2020-10-28 | End: 2020-10-28

## 2020-10-28 RX ORDER — RIVAROXABAN 15 MG-20MG
20 KIT ORAL
Refills: 0 | Status: DISCONTINUED | OUTPATIENT
Start: 2020-10-28 | End: 2020-11-03

## 2020-10-28 RX ORDER — ASPIRIN/CALCIUM CARB/MAGNESIUM 324 MG
81 TABLET ORAL DAILY
Refills: 0 | Status: DISCONTINUED | OUTPATIENT
Start: 2020-10-28 | End: 2020-11-03

## 2020-10-28 RX ORDER — SPIRONOLACTONE 25 MG/1
25 TABLET, FILM COATED ORAL DAILY
Refills: 0 | Status: DISCONTINUED | OUTPATIENT
Start: 2020-10-28 | End: 2020-11-03

## 2020-10-28 RX ADMIN — TAMSULOSIN HYDROCHLORIDE 0.4 MILLIGRAM(S): 0.4 CAPSULE ORAL at 21:45

## 2020-10-28 RX ADMIN — Medication 50 MILLIGRAM(S): at 11:04

## 2020-10-28 RX ADMIN — LORATADINE 10 MILLIGRAM(S): 10 TABLET ORAL at 11:05

## 2020-10-28 RX ADMIN — CARVEDILOL PHOSPHATE 6.25 MILLIGRAM(S): 80 CAPSULE, EXTENDED RELEASE ORAL at 11:05

## 2020-10-28 RX ADMIN — ATORVASTATIN CALCIUM 20 MILLIGRAM(S): 80 TABLET, FILM COATED ORAL at 21:45

## 2020-10-28 RX ADMIN — Medication 250 MILLIGRAM(S): at 00:30

## 2020-10-28 RX ADMIN — Medication 250 MILLIGRAM(S): at 11:03

## 2020-10-28 RX ADMIN — CEFTRIAXONE 1000 MILLIGRAM(S): 500 INJECTION, POWDER, FOR SOLUTION INTRAMUSCULAR; INTRAVENOUS at 00:30

## 2020-10-28 RX ADMIN — Medication 81 MILLIGRAM(S): at 17:37

## 2020-10-28 RX ADMIN — RIVAROXABAN 20 MILLIGRAM(S): KIT at 17:37

## 2020-10-28 RX ADMIN — Medication 650 MILLIGRAM(S): at 21:00

## 2020-10-28 RX ADMIN — Medication 650 MILLIGRAM(S): at 22:00

## 2020-10-28 RX ADMIN — Medication 50 MILLIGRAM(S): at 21:45

## 2020-10-28 RX ADMIN — CARVEDILOL PHOSPHATE 6.25 MILLIGRAM(S): 80 CAPSULE, EXTENDED RELEASE ORAL at 21:45

## 2020-10-28 RX ADMIN — SPIRONOLACTONE 25 MILLIGRAM(S): 25 TABLET, FILM COATED ORAL at 17:37

## 2020-10-28 RX ADMIN — Medication 40 MILLIGRAM(S): at 11:05

## 2020-10-28 RX ADMIN — CEFTRIAXONE 1000 MILLIGRAM(S): 500 INJECTION, POWDER, FOR SOLUTION INTRAMUSCULAR; INTRAVENOUS at 21:45

## 2020-10-28 NOTE — PROGRESS NOTE ADULT - PROBLEM SELECTOR PLAN 1
Improved with diuresis and paracentesis. Continue IV Lasix. Pulmonary HTN. Echo suggestion of PFO was not reported on cath 8/20

## 2020-10-28 NOTE — GOALS OF CARE CONVERSATION - ADVANCED CARE PLANNING - CONVERSATION DETAILS
Discussed with patient at bedside. Would like full resuscitation (cardiac/intubation) if needed in the event of a cardiac/respiratory event.     Full Code.

## 2020-10-28 NOTE — PROGRESS NOTE ADULT - SUBJECTIVE AND OBJECTIVE BOX
CHIEF COMPLAINT/DIAGNOSIS: abd pain, LOPEZ    HPI: 74 y/o M w/ PMH of CHF, COPD, HTN, dyslipidemia, CAD, a-fib on xarelto, arthritis, s/p PPM, p/w abdominal pain. Patient states abdominal pain started 1 week ago, and when he walks he also gets dyspnea on exertion. Also c/o LE pain, LLE worse than right. States pain is mostly above the knee. Patient also c/o dysuria and R flank pain. Denies fever, chills, nausea, vomiting, CP, cough, runny nose, diarrhea.     10/28:  #[id number] ~ c/o abdominal tightness - better after para. ble pain - hard to walk. + LOPEZ. no other complaints.  REVIEW OF SYSTEMS:  All other review of systems is negative unless indicated above    PHYSICAL EXAM:  Constitutional: Awake and alert, well-developed  HEENT: Normal Hearing, MMM  Neck: Soft and supple, No LAD, No JVD  Respiratory: Breath sounds are crackles b/l   Cardiovascular: S1 and S2, IRIR, paced. + murmur  Gastrointestinal: Bowel Sounds present, firm, tight. obese  Extremities: b/l  peripheral edema, pitting. anasarca   Vascular: 2+ peripheral pulses  Neurological: A/O x 3, no focal deficits  Musculoskeletal: 5/5 strength b/l upper and lower extremities  Skin: ble redness, swelling    Vital Signs Last 24 Hrs  T(C): 36.3 (28 Oct 2020 08:15), Max: 37.1 (27 Oct 2020 20:31)  T(F): 97.4 (28 Oct 2020 08:15), Max: 98.8 (27 Oct 2020 22:15)  HR: 60 (28 Oct 2020 08:15) (60 - 88)  BP: 154/84 (28 Oct 2020 08:15) (150/85 - 160/90)  BP(mean): 112 (27 Oct 2020 20:31) (112 - 113)  RR: 18 (28 Oct 2020 08:15) (14 - 22)  SpO2: 100% (28 Oct 2020 08:15) (96% - 100%)    LABS: All Labs Reviewed:                        11.9   7.27  )-----------( 149      ( 28 Oct 2020 07:30 )             39.0     10-28    139  |  102  |  21  ----------------------------<  82  4.0   |  30  |  1.25    Ca    8.9      28 Oct 2020 07:30  Phos  3.5     10-28  Mg     2.1     10-28    TPro  6.3  /  Alb  3.2<L>  /  TBili  2.3<H>  /  DBili  x   /  AST  29  /  ALT  20  /  AlkPhos  138<H>  10-28    PT/INR - ( 28 Oct 2020 07:30 )   PT: 15.0 sec;   INR: 1.30 ratio         PTT - ( 28 Oct 2020 07:30 )  PTT:35.7 sec  CARDIAC MARKERS ( 28 Oct 2020 07:30 )  0.130 ng/mL / x     / x     / x     / x      CARDIAC MARKERS ( 27 Oct 2020 20:30 )  0.129 ng/mL / x     / x     / x     / x      CARDIAC MARKERS ( 27 Oct 2020 18:04 )  0.132 ng/mL / x     / x     / x     / x        RADIOLOGY:    < from: CT Abdomen and Pelvis No Cont (10.27.20 @ 18:23) >  IMPRESSION:  Moderate volume of abdominal and pelvic ascites.  Diffuse anasarca.  Small to moderate right-sided pleural effusion.  No bowel obstruction.  < end of copied text >    < from: Xray Chest 1 View- PORTABLE-Urgent (Xray Chest 1 View- PORTABLE-Urgent .) (10.27.20 @ 18:36) >  IMPRESSION: Stable chest without acute finding.  < end of copied text >    ECHOCARDIOGRAM:    < from: TTE Echo Complete w/o Contrast w/ Doppler (08.28.20 @ 21:42) >   Impression     Summary     The mitral valve leaflets appear thickened.   Mild mitral annular calcification is present.   Moderate to severe (3+)mitral regurgitation is present.   The aortic valve is trileaflet with thin pliable leaflets.   Mild to Moderate aortic regurgitation is present.   Normal appearing tricuspid valve structure.   Mild pulmonary hypertension.   Mild (1+) tricuspid valveregurgitation is present.   Estimated left ventricular ejection fraction is 45 %.   The left ventricle cavity is mildly dilated.   The left ventricle is normal in wall thickness.   Mild, diffuse hypokinesis of the left ventricle is present.   The right ventricle is normal in size, wall thickness, wall motion, and   contractility based on TAPSE.   A device wire is seen in the RV and RA.    < end of copied text >    MEDICATIONS  (STANDING):  atorvastatin 20 milliGRAM(s) Oral at bedtime  bethanechol 50 milliGRAM(s) Oral two times a day  carvedilol 6.25 milliGRAM(s) Oral every 12 hours  cefTRIAXone Injectable. 1000 milliGRAM(s) IV Push every 24 hours  furosemide   Injectable 40 milliGRAM(s) IV Push daily  loratadine 10 milliGRAM(s) Oral daily  spironolactone 25 milliGRAM(s) Oral daily  tamsulosin 0.4 milliGRAM(s) Oral at bedtime    MEDICATIONS  (PRN):    TELEMETRY REVIEW:  10/28 - vpaced, underlying afib/flutter rate controlled    ASSESSMENT AND PLAN:    74 y/o M w/ PMH of systolic CHF, COPD, HTN, dyslipidemia, CAD, a-fib on xarelto, arthritis, s/p PPM, p/w abdominal pain    1) Acute on Chronic systolic CHF exacerbation  - tele monitoring. tele review as above. Paced  - Cont. IV Lasix BID, add spirolactone   - daily weights, I&Os   - On BB, allergy to ACE/ARB  - Echo 8/28/20 - EF 45%  - LE swelling - arterial/ venous dopplers pending >    2) Elevated troponins likely demand due to HF exacerbation   - denies CP. trops minimally elevated - flat  - cont ASA 81, statin  - ecg reviewed. no significant ST changes.   - check a1c, lipids  - cardio consult     Pyelonephritis   - f/u Bcx, Ucx  - cont. ceftriaxone.    Moderate Pelvic ascites / Anasarca - Possibly 2/2 CHF  - s/p paracentesis. Cultures sent. calc. SAAG  - check hep, lipds  - GI consult     Elevate sCr, did not meet KIDGO criteria  - monitor on IV diuretics.   - I&Os, daily weights.     Enlarged prostate - Cont. Flomax     Small B/L anterior external iliac chain pelvic lymph nodes / Small BL inguinal lymph nodes   - F/u outpatient     DVT ppx - Xarelto    CHIEF COMPLAINT/DIAGNOSIS: abd pain, LOPEZ    HPI: 76 y/o M w/ PMHx of systolic CHF (EF 45%), COPD - Severe Pulmonary HTN, HTN, dyslipidemia, CAD, a-fib/PPM on xarelto, arthritis p/w abdominal pain. Patient states abdominal pain started 1 week ago, and when he walks he also gets dyspnea on exertion. Also c/o LE pain, LLE worse than right. States pain is mostly above the knee. Patient also c/o dysuria and R flank pain. Denies fever, chills, nausea, vomiting, CP, cough, runny nose, diarrhea.     10/28:  #[id number] ~ c/o abdominal tightness - better after para. ble pain - hard to walk. + LOPEZ. no other complaints.  REVIEW OF SYSTEMS:  All other review of systems is negative unless indicated above    PHYSICAL EXAM:  Constitutional: Awake and alert, well-developed  HEENT: Normal Hearing, MMM  Neck: Soft and supple, No LAD, No JVD  Respiratory: Breath sounds are crackles b/l   Cardiovascular: S1 and S2, IRIR, paced. + murmur  Gastrointestinal: Bowel Sounds present, firm, tight. obese  Extremities: b/l  peripheral edema, pitting. anasarca   Vascular: 2+ peripheral pulses  Neurological: A/O x 3, no focal deficits  Musculoskeletal: 5/5 strength b/l upper and lower extremities  Skin: ble redness, swelling    Vital Signs Last 24 Hrs  T(C): 36.3 (28 Oct 2020 08:15), Max: 37.1 (27 Oct 2020 20:31)  T(F): 97.4 (28 Oct 2020 08:15), Max: 98.8 (27 Oct 2020 22:15)  HR: 60 (28 Oct 2020 08:15) (60 - 88)  BP: 154/84 (28 Oct 2020 08:15) (150/85 - 160/90)  BP(mean): 112 (27 Oct 2020 20:31) (112 - 113)  RR: 18 (28 Oct 2020 08:15) (14 - 22)  SpO2: 100% (28 Oct 2020 08:15) (96% - 100%)    LABS: All Labs Reviewed:                        11.9   7.27  )-----------( 149      ( 28 Oct 2020 07:30 )             39.0     10-28    139  |  102  |  21  ----------------------------<  82  4.0   |  30  |  1.25    Ca    8.9      28 Oct 2020 07:30  Phos  3.5     10-28  Mg     2.1     10-28    TPro  6.3  /  Alb  3.2<L>  /  TBili  2.3<H>  /  DBili  x   /  AST  29  /  ALT  20  /  AlkPhos  138<H>  10-28    PT/INR - ( 28 Oct 2020 07:30 )   PT: 15.0 sec;   INR: 1.30 ratio         PTT - ( 28 Oct 2020 07:30 )  PTT:35.7 sec  CARDIAC MARKERS ( 28 Oct 2020 07:30 )  0.130 ng/mL / x     / x     / x     / x      CARDIAC MARKERS ( 27 Oct 2020 20:30 )  0.129 ng/mL / x     / x     / x     / x      CARDIAC MARKERS ( 27 Oct 2020 18:04 )  0.132 ng/mL / x     / x     / x     / x        RADIOLOGY:    < from: CT Abdomen and Pelvis No Cont (10.27.20 @ 18:23) >  IMPRESSION:  Moderate volume of abdominal and pelvic ascites.  Diffuse anasarca.  Small to moderate right-sided pleural effusion.  No bowel obstruction.  < end of copied text >    < from: Xray Chest 1 View- PORTABLE-Urgent (Xray Chest 1 View- PORTABLE-Urgent .) (10.27.20 @ 18:36) >  IMPRESSION: Stable chest without acute finding.  < end of copied text >    ECHOCARDIOGRAM:    < from: TTE Echo Complete w/o Contrast w/ Doppler (08.28.20 @ 21:42) >   Impression     Summary     The mitral valve leaflets appear thickened.   Mild mitral annular calcification is present.   Moderate to severe (3+)mitral regurgitation is present.   The aortic valve is trileaflet with thin pliable leaflets.   Mild to Moderate aortic regurgitation is present.   Normal appearing tricuspid valve structure.   Mild pulmonary hypertension.   Mild (1+) tricuspid valveregurgitation is present.   Estimated left ventricular ejection fraction is 45 %.   The left ventricle cavity is mildly dilated.   The left ventricle is normal in wall thickness.   Mild, diffuse hypokinesis of the left ventricle is present.   The right ventricle is normal in size, wall thickness, wall motion, and   contractility based on TAPSE.   A device wire is seen in the RV and RA.    < end of copied text >    MEDICATIONS  (STANDING):  atorvastatin 20 milliGRAM(s) Oral at bedtime  bethanechol 50 milliGRAM(s) Oral two times a day  carvedilol 6.25 milliGRAM(s) Oral every 12 hours  cefTRIAXone Injectable. 1000 milliGRAM(s) IV Push every 24 hours  furosemide   Injectable 40 milliGRAM(s) IV Push daily  loratadine 10 milliGRAM(s) Oral daily  spironolactone 25 milliGRAM(s) Oral daily  tamsulosin 0.4 milliGRAM(s) Oral at bedtime    MEDICATIONS  (PRN):    TELEMETRY REVIEW:  10/28 - vpaced, underlying afib/aflutter rate controlled    ASSESSMENT AND PLAN:    76 y/o M w/ PMH of systolic CHF, COPD, HTN, dyslipidemia, CAD, a-fib on xarelto, arthritis, s/p PPM, p/w abdominal pain    1) Acute on Chronic systolic CHF exacerbation  - tele monitoring. tele review as above. Paced  - Cont. IV Lasix BID, add spirolactone   - daily weights, I&Os   - On BB, allergy to ACE/ARB? d/[place] on Entresto last admission  - Echo 8/28/20 - EF 45%  - Cardiac Cath 9/28 ~ w/ CAD, severe pulm HTN  - LE swelling - arterial/ venous dopplers pending >  - Cardio consult     2) Elevated troponins likely demand due to HF exacerbation   - denies CP. trops minimally elevated - flat  - cont ASA 81, statin  - ecg reviewed. no significant ST changes. recently underwent [facility] results above.  - check a1c, lipids  - cardio consult     3) Moderate Pelvic ascites / Anasarca - Possibly 2/2 CHF  - s/p paracentesis 2700cc. Cultures sent. calc. SAAG  - check hep acute panel, lipids  - GI consult     4) Pyelonephritis   - f/u Bcx, Ucx  - cont. ceftriaxone.    5) Elevate sCr, did not meet KIDGO criteria  - monitor on IV diuretics.   - I&Os, daily weights.    6) Chronic Afib | PPM  - Paced. afib. cont. Xarelto, BB     7) Enlarged prostate - Cont. Flomax     8) Small B/L anterior external iliac chain pelvic lymph nodes / Small BL inguinal lymph nodes   - F/u outpatient     9) DVT ppx - Xarelto     Dispo: tele monitoring x24 hours. paracentesis cultures pending. cont. abx for pyelo. IV diuresis for HF.

## 2020-10-28 NOTE — PROGRESS NOTE ADULT - SUBJECTIVE AND OBJECTIVE BOX
PCP:    REQUESTING PHYSICIAN:    REASON FOR CONSULT:    CHIEF COMPLAINT:    HPI:  74 y/o M w/ PMH of CHF, COPD, HTN, dyslipidemia, CAD, a-fib on xarelto, arthritis, s/p PPM, p/w abdominal pain. Patient states abdominal pain started 1 week ago, and when he walks he also gets dyspnea on exertion. Also c/o LE pain, LLE worse than right. States pain is mostly above the knee. Patient also c/o dysuria and R flank pain. Denies fever, chills, nausea, vomiting, CP, cough, runny nose, diarrhea.   Cardiology evaluation for edema and shortness of breath. Pt is s/p paracentesis today of 2.5 L. Pt has a history of non compliance with medical management. Cardiac cath reveals no significant lesions.     PSH: Denies     Social Hx: Denies x 3    Family Hx: Father and brother - heart disease    (27 Oct 2020 22:54)      PAST MEDICAL & SURGICAL HISTORY:  Heart failure with reduced ejection fraction    HLD (hyperlipidemia)    COPD (chronic obstructive pulmonary disease)  not on inhalers    Arthritis    HTN (hypertension)    Coronary artery disease involving native heart with angina pectoris, unspecified vessel or lesion type    Pacemaker  single chamber meditronic  placed by Dr Tomas  May  of  2016    AF (atrial fibrillation)  on Eliquis    Cardiac pacemaker  single chamber meditronic  placed by Dr Tomas  May  of  2016        SOCIAL HISTORY:    FAMILY HISTORY:  No pertinent family history  of diabetes or heart disease        ALLERGIES:  Allergies    ACE inhibitors (Other)  amiodarone (Other)    Intolerances        MEDICATIONS:    MEDICATIONS  (STANDING):  aspirin  chewable 81 milliGRAM(s) Oral daily  atorvastatin 20 milliGRAM(s) Oral at bedtime  bethanechol 50 milliGRAM(s) Oral two times a day  carvedilol 6.25 milliGRAM(s) Oral every 12 hours  cefTRIAXone Injectable. 1000 milliGRAM(s) IV Push every 24 hours  furosemide   Injectable 40 milliGRAM(s) IV Push daily  loratadine 10 milliGRAM(s) Oral daily  rivaroxaban 20 milliGRAM(s) Oral with dinner  spironolactone 25 milliGRAM(s) Oral daily  tamsulosin 0.4 milliGRAM(s) Oral at bedtime    MEDICATIONS  (PRN):      REVIEW OF SYSTEMS:    CONSTITUTIONAL: No weakness, fevers or chills  EYES/ENT: No visual changes;  No vertigo or throat pain   NECK: No pain or stiffness  RESPIRATORY: Shortness of breath  CARDIOVASCULAR: No chest pain or palpitations  GASTROINTESTINAL: Distention of abdomen. Non tender  GENITOURINARY: No dysuria, frequency or hematuria  NEUROLOGICAL: No numbness or weakness  SKIN: No itching, burning, rashes, or lesions   All other review of systems is negative unless indicated above    Vital Signs Last 24 Hrs  T(C): 36.3 (28 Oct 2020 08:15), Max: 37.1 (27 Oct 2020 20:31)  T(F): 97.4 (28 Oct 2020 08:15), Max: 98.8 (27 Oct 2020 22:15)  HR: 60 (28 Oct 2020 08:15) (60 - 88)  BP: 154/84 (28 Oct 2020 08:15) (150/85 - 160/90)  BP(mean): 112 (27 Oct 2020 20:31) (112 - 113)  RR: 18 (28 Oct 2020 08:15) (14 - 22)  SpO2: 100% (28 Oct 2020 08:15) (96% - 100%)Daily Height in cm: 350.52 (27 Oct 2020 17:10)    Daily Weight in k.8 (28 Oct 2020 06:41)I&O's Summary    27 Oct 2020 07:01  -  28 Oct 2020 07:00  --------------------------------------------------------  IN: 0 mL / OUT: 1000 mL / NET: -1000 mL    28 Oct 2020 07:01  -  28 Oct 2020 14:32  --------------------------------------------------------  IN: 0 mL / OUT: 800 mL / NET: -800 mL        PHYSICAL EXAM:    Constitutional: NAD, awake and alert, well-developed  HEENT: PERR, EOMI,  No oral cyananosis.  Neck:  supple,  No JVD  Respiratory: Breath sounds are clear bilaterally, No wheezing, rales or rhonchi  Cardiovascular: S1 and S2, regular rate and rhythm, no Murmurs, gallops or rubs  Gastrointestinal: Distended abdomen  Extremities: Moderate edema bilaterally lower extremity  Vascular: 1+ peripheral pulses  Neurological: A/O x 3, no focal deficits  Musculoskeletal: no calf tenderness.  Skin: No rashes.      LABS: All Labs Reviewed:                        11.9   7.27  )-----------( 149      ( 28 Oct 2020 07:30 )             39.0                         11.9   8.77  )-----------( 155      ( 27 Oct 2020 18:04 )             39.2     28 Oct 2020 07:30    139    |  102    |  21     ----------------------------<  82     4.0     |  30     |  1.25   27 Oct 2020 1804    137    |  100    |  21     ----------------------------<  109    3.5     |  34     |  1.33     Ca    8.9        28 Oct 2020 07:30  Ca    8.7        27 Oct 2020 18:04  Phos  3.5       28 Oct 2020 07:30  Mg     2.1       28 Oct 2020 07:30  Mg     2.3       27 Oct 2020 18:04    TPro  6.3    /  Alb  3.2    /  TBili  2.3    /  DBili  x      /  AST  29     /  ALT  20     /  AlkPhos  138    28 Oct 2020 07:30  TPro  6.5    /  Alb  3.3    /  TBili  2.2    /  DBili  x      /  AST  29     /  ALT  23     /  AlkPhos  145    27 Oct 2020 18:04    PT/INR - ( 28 Oct 2020 07:30 )   PT: 15.0 sec;   INR: 1.30 ratio         PTT - ( 28 Oct 2020 07:30 )  PTT:35.7 sec  CARDIAC MARKERS ( 28 Oct 2020 07:30 )  0.130 ng/mL / x     / x     / x     / x      CARDIAC MARKERS ( 27 Oct 2020 20:30 )  0.129 ng/mL / x     / x     / x     / x      CARDIAC MARKERS ( 27 Oct 2020 18:04 )  0.132 ng/mL / x     / x     / x     / x          Blood Culture:   10-27 @ 18:04  Pro Bnp [id number]        RADIOLOGY/EKG:< from: 12 Lead ECG (10.28.20 @ 07:59) >  Diagnosis Line Ventricular-paced rhythm  Abnormal ECG  When compared with ECG of 27-OCT-2020 18:01,  No significant change was found  Confirmed by SONJA MCKEON MD ([id number]) on 10/28/2020 8:31:28 AM    < end of copied text >        ECHO/CARDIAC CATHTERIZATION/STRESS TEST:  < from: TTE Echo Complete w/o Contrast w/ Doppler (20 @ 21:42) >  Summary     The mitral valve leaflets appear thickened.   Mild mitral annular calcification is present.   Moderate to severe (3+)mitral regurgitation is present.   The aortic valve is trileaflet with thin pliable leaflets.   Mild to Moderate aortic regurgitation is present.   Normal appearing tricuspid valve structure.   Mild pulmonary hypertension.   Mild (1+) tricuspid valveregurgitation is present.   Estimated left ventricular ejection fraction is 45 %.   The left ventricle cavity is mildly dilated.   The left ventricle is normal in wall thickness.   Mild, diffuse hypokinesis of the left ventricle is present.   The right ventricle is normal in size, wall thickness, wall motion, and   contractility based on TAPSE.   A device wire is seen in the RV and RA.     Signature     ----------------------------------------------------------------   Electronically signed by Spencer Mejia MD(Interpreting   physician) on 2020 09:03 AM   ----------------------------------------------------------------    < end of copied text >

## 2020-10-29 DIAGNOSIS — I50.43 ACUTE ON CHRONIC COMBINED SYSTOLIC (CONGESTIVE) AND DIASTOLIC (CONGESTIVE) HEART FAILURE: ICD-10-CM

## 2020-10-29 LAB
ALBUMIN SERPL ELPH-MCNC: 3.2 G/DL — LOW (ref 3.3–5)
ALP SERPL-CCNC: 153 U/L — HIGH (ref 40–120)
ALT FLD-CCNC: 20 U/L — SIGNIFICANT CHANGE UP (ref 12–78)
ANION GAP SERPL CALC-SCNC: 3 MMOL/L — LOW (ref 5–17)
AST SERPL-CCNC: 25 U/L — SIGNIFICANT CHANGE UP (ref 15–37)
BILIRUB DIRECT SERPL-MCNC: 0.8 MG/DL — HIGH (ref 0–0.2)
BILIRUB INDIRECT FLD-MCNC: 0.7 MG/DL — SIGNIFICANT CHANGE UP (ref 0.2–1)
BILIRUB SERPL-MCNC: 1.5 MG/DL — HIGH (ref 0.2–1.2)
BUN SERPL-MCNC: 22 MG/DL — SIGNIFICANT CHANGE UP (ref 7–23)
CALCIUM SERPL-MCNC: 8.6 MG/DL — SIGNIFICANT CHANGE UP (ref 8.5–10.1)
CHLORIDE SERPL-SCNC: 104 MMOL/L — SIGNIFICANT CHANGE UP (ref 96–108)
CO2 SERPL-SCNC: 35 MMOL/L — HIGH (ref 22–31)
CREAT SERPL-MCNC: 1.26 MG/DL — SIGNIFICANT CHANGE UP (ref 0.5–1.3)
CULTURE RESULTS: SIGNIFICANT CHANGE UP
GLUCOSE SERPL-MCNC: 84 MG/DL — SIGNIFICANT CHANGE UP (ref 70–99)
MAGNESIUM SERPL-MCNC: 2.1 MG/DL — SIGNIFICANT CHANGE UP (ref 1.6–2.6)
POTASSIUM SERPL-MCNC: 3.7 MMOL/L — SIGNIFICANT CHANGE UP (ref 3.5–5.3)
POTASSIUM SERPL-SCNC: 3.7 MMOL/L — SIGNIFICANT CHANGE UP (ref 3.5–5.3)
PROT SERPL-MCNC: 6.1 GM/DL — SIGNIFICANT CHANGE UP (ref 6–8.3)
SODIUM SERPL-SCNC: 142 MMOL/L — SIGNIFICANT CHANGE UP (ref 135–145)
SPECIMEN SOURCE: SIGNIFICANT CHANGE UP

## 2020-10-29 PROCEDURE — 99233 SBSQ HOSP IP/OBS HIGH 50: CPT

## 2020-10-29 PROCEDURE — 93010 ELECTROCARDIOGRAM REPORT: CPT

## 2020-10-29 RX ORDER — FUROSEMIDE 40 MG
40 TABLET ORAL
Refills: 0 | Status: DISCONTINUED | OUTPATIENT
Start: 2020-10-29 | End: 2020-10-31

## 2020-10-29 RX ORDER — LOSARTAN POTASSIUM 100 MG/1
25 TABLET, FILM COATED ORAL DAILY
Refills: 0 | Status: DISCONTINUED | OUTPATIENT
Start: 2020-10-29 | End: 2020-10-30

## 2020-10-29 RX ADMIN — CEFTRIAXONE 1000 MILLIGRAM(S): 500 INJECTION, POWDER, FOR SOLUTION INTRAMUSCULAR; INTRAVENOUS at 22:30

## 2020-10-29 RX ADMIN — LOSARTAN POTASSIUM 25 MILLIGRAM(S): 100 TABLET, FILM COATED ORAL at 10:10

## 2020-10-29 RX ADMIN — RIVAROXABAN 20 MILLIGRAM(S): KIT at 17:30

## 2020-10-29 RX ADMIN — CARVEDILOL PHOSPHATE 6.25 MILLIGRAM(S): 80 CAPSULE, EXTENDED RELEASE ORAL at 22:29

## 2020-10-29 RX ADMIN — Medication 40 MILLIGRAM(S): at 17:30

## 2020-10-29 RX ADMIN — CARVEDILOL PHOSPHATE 6.25 MILLIGRAM(S): 80 CAPSULE, EXTENDED RELEASE ORAL at 10:10

## 2020-10-29 RX ADMIN — Medication 40 MILLIGRAM(S): at 10:10

## 2020-10-29 RX ADMIN — ATORVASTATIN CALCIUM 20 MILLIGRAM(S): 80 TABLET, FILM COATED ORAL at 22:29

## 2020-10-29 RX ADMIN — Medication 50 MILLIGRAM(S): at 22:29

## 2020-10-29 RX ADMIN — TAMSULOSIN HYDROCHLORIDE 0.4 MILLIGRAM(S): 0.4 CAPSULE ORAL at 22:29

## 2020-10-29 RX ADMIN — Medication 81 MILLIGRAM(S): at 10:10

## 2020-10-29 RX ADMIN — Medication 50 MILLIGRAM(S): at 10:10

## 2020-10-29 RX ADMIN — SPIRONOLACTONE 25 MILLIGRAM(S): 25 TABLET, FILM COATED ORAL at 10:10

## 2020-10-29 RX ADMIN — LORATADINE 10 MILLIGRAM(S): 10 TABLET ORAL at 10:10

## 2020-10-29 NOTE — DIETITIAN INITIAL EVALUATION ADULT. - PERTINENT MEDS FT
MEDICATIONS  (STANDING):  aspirin  chewable 81 milliGRAM(s) Oral daily  atorvastatin 20 milliGRAM(s) Oral at bedtime  bethanechol 50 milliGRAM(s) Oral two times a day  carvedilol 6.25 milliGRAM(s) Oral every 12 hours  cefTRIAXone Injectable. 1000 milliGRAM(s) IV Push every 24 hours  furosemide   Injectable 40 milliGRAM(s) IV Push two times a day  loratadine 10 milliGRAM(s) Oral daily  losartan 25 milliGRAM(s) Oral daily  rivaroxaban 20 milliGRAM(s) Oral with dinner  spironolactone 25 milliGRAM(s) Oral daily  tamsulosin 0.4 milliGRAM(s) Oral at bedtime    MEDICATIONS  (PRN):

## 2020-10-29 NOTE — DIETITIAN INITIAL EVALUATION ADULT. - PERTINENT LABORATORY DATA
10-29 Na142 mmol/L Glu 84 mg/dL K+ 3.7 mmol/L Cr  1.26 mg/dL BUN 22 mg/dL Phos n/a   Alb 3.2 g/dL<L> PAB n/a

## 2020-10-29 NOTE — PROGRESS NOTE ADULT - SUBJECTIVE AND OBJECTIVE BOX
REASON FOR VISIT: SOB, Edema    HPI:  74 y/o man with a history of atrial fibrillation, non-obstructive CAD, CRT-P, non-ischemic dilated cardiomyopathy with mild to moderate, chronic systolic heart failure, mitral valve insufficiency, HTN, atrial fibrillation, and nonadherence (last seen in our office in 2018 by Dr Palla) admitted on 10/27/20 with abdominal pain; found to have ascites and leg edema; now s/p paracentesis with 2.5L fluid removal.    10/29/20:  Complaining of leg edema and dyspnea.    MEDICATIONS  (STANDING):  aspirin  chewable 81 milliGRAM(s) Oral daily  atorvastatin 20 milliGRAM(s) Oral at bedtime  bethanechol 50 milliGRAM(s) Oral two times a day  carvedilol 6.25 milliGRAM(s) Oral every 12 hours  cefTRIAXone Injectable. 1000 milliGRAM(s) IV Push every 24 hours  furosemide   Injectable 40 milliGRAM(s) IV Push daily  loratadine 10 milliGRAM(s) Oral daily  rivaroxaban 20 milliGRAM(s) Oral with dinner  spironolactone 25 milliGRAM(s) Oral daily  tamsulosin 0.4 milliGRAM(s) Oral at bedtime    Vital Signs Last 24 Hrs  T(C): 36.8 (28 Oct 2020 21:12), Max: 36.8 (28 Oct 2020 21:12)  T(F): 98.2 (28 Oct 2020 21:12), Max: 98.2 (28 Oct 2020 21:12)  HR: 59 (28 Oct 2020 21:12) (59 - 72)  BP: 127/61 (28 Oct 2020 21:12) (127/61 - 156/82)  RR: 18 (28 Oct 2020 21:12) (18 - 18)  SpO2: 95% (28 Oct 2020 21:12) (95% - 100%)    PHYSICAL EXAM:  Constitutional: NAD, awake and alert, overweight  Respiratory: Breath sounds are clear bilaterally, No wheezing, rales or rhonchi  Cardiovascular: S1 and S2, regular rate and rhythm, systolic murmur  Gastrointestinal: Distended abdomen  Extremities: Marked bilateral leg edema (distal legs + thighs)  Skin: No rash.    LABS:   CARDIAC MARKERS ( 28 Oct 2020 07:30 ) 0.130 ng/mL / x     / x     / x     / x      CARDIAC MARKERS ( 27 Oct 2020 20:30 ) 0.129 ng/mL / x     / x     / x     / x      CARDIAC MARKERS ( 27 Oct 2020 18:04 ) 0.132 ng/mL / x     / x     / x     / x                            11.9   7.27  )-----------( 149      ( 28 Oct 2020 07:30 )             39.0     139  |  102  |  21  ----------------------------<  82  4.0   |  30  |  1.25    Ca    8.9      28 Oct 2020 07:30  Phos  3.5     10-28  Mg     2.1     10-28    TPro  6.3  /  Alb  3.2<L>  /  TBili  2.3<H>  /  DBili  x   /  AST  29  /  ALT  20  /  AlkPhos  138<H>  10-28    12 Lead ECG (10.28.20 @ 07:59):  Ventricular-paced rhythm    TTE Echo Complete w/o Contrast w/ Doppler (08.28.20 @ 21:42):   Moderate to severe (3+)mitral regurgitation.   Mild to Moderate aortic regurgitation.   Mild pulmonary hypertension.   Mild (1+) tricuspid valve regurgitation is present. Estimated left ventricular ejection fraction is 45 %.   The left ventricle cavity is mildly dilated.   The left ventricle is normal in wall thickness. Mild, diffuse hypokinesis of the left ventricle is present.   The right ventricle is normal in size, wall thickness, wall motion, and contractility based on TAPSE.   A device wire is seen in the RV and RA.    Cardiac Cath Lab - Adult (08.28.20 @ 14:25):  Non-Obstructive CAD with Abnormal left ventricular function.  Severe pulmonary hypertension.  Mildly reduced left ventricular systolic function.

## 2020-10-29 NOTE — PROGRESS NOTE ADULT - PROBLEM SELECTOR PLAN 1
Dyspnea and edema likely related to severe pulmonary HTN; continue to diurese; would increase furosemide to 40mg IV BID; check SPO2 with ambulation to see if supplemental O2 would be helpful.

## 2020-10-29 NOTE — DIETITIAN INITIAL EVALUATION ADULT. - OTHER INFO
76 y/o M w/ PMH of CHF, COPD, HTN, dyslipidemia, CAD, a-fib on xarelto, arthritis, s/p PPM, p/w abdominal pain. Patient states abdominal pain started 1 week ago, and when he walks he also gets dyspnea on exertion. Also c/o LE pain, LLE worse than right. States pain is mostly above the knee. Patient also c/o dysuria and R flank pain. Denies fever, chills, nausea, vomiting, CP, cough, runny nose, diarrhea.     Pt seen for heart failure education and high risk criteria. Pt states he has a good appetite and eats plain food. Pt had good understanding of NA intake, Fluid and daily weights. Pt states he has a niece at home who prepares meals for him and does not cook with a lot of salt at home. Pt also states he does not drink much fluid and keeps track, pt did report drinking low sugar juice but difficult to clarify what he meant (Reviewing A1C when available). Discussed daily weights and pt agreeable to calling MD when needed (Unsure pt's compliance, but pt appeared to have good knowledge). Pt states appetite good, no issues chewing or swallowing. Denies n/v/d/c.

## 2020-10-29 NOTE — PROGRESS NOTE ADULT - SUBJECTIVE AND OBJECTIVE BOX
CHIEF COMPLAINT/DIAGNOSIS: abd pain, LOPEZ    HPI: 74 y/o M w/ PMHx of systolic CHF (EF 45%), COPD - Severe Pulmonary HTN, HTN, dyslipidemia, CAD, a-fib/PPM on xarelto, arthritis p/w abdominal pain. Patient states abdominal pain started 1 week ago, and when he walks he also gets dyspnea on exertion. Also c/o LE pain, LLE worse than right. States pain is mostly above the knee. Patient also c/o dysuria and R flank pain. Denies fever, chills, nausea, vomiting, CP, cough, runny nose, diarrhea.     10/28:  #[id number] ~ c/o abdominal tightness - better after para. ble pain - hard to walk. + LOPEZ. no other complaints.  10/29:  #[id number] ~ reports improvement with leg swelling and breathing. no other complaints    REVIEW OF SYSTEMS:  All other review of systems is negative unless indicated above    PHYSICAL EXAM:  Constitutional: Awake and alert, well-developed  HEENT: Normal Hearing, MMM  Neck: Soft and supple, No LAD, + JVD  Respiratory: Breath sounds are crackles b/l   Cardiovascular: S1 and S2, IRIR, paced. + murmur  Gastrointestinal: Bowel Sounds present, firm, tight. obese  Extremities: b/l  peripheral edema, pitting. anasarca   Vascular: 2+ peripheral pulses  Neurological: A/O x 3, no focal deficits  Musculoskeletal: 5/5 strength b/l upper and lower extremities  Skin: ble redness, swelling    Vital Signs Last 24 Hrs  T(C): 36.8 (29 Oct 2020 08:31), Max: 36.8 (28 Oct 2020 21:12)  T(F): 98.2 (29 Oct 2020 08:31), Max: 98.2 (28 Oct 2020 21:12)  HR: 60 (29 Oct 2020 08:31) (59 - 72)  BP: 140/83 (29 Oct 2020 08:31) (127/61 - 156/82)  BP(mean): --  RR: 18 (29 Oct 2020 08:31) (18 - 18)  SpO2: 100% (29 Oct 2020 08:31) (95% - 100%)    LABS: All Labs Reviewed:                        11.9   7.27  )-----------( 149      ( 28 Oct 2020 07:30 )             39.0     10-29    142  |  104  |  22  ----------------------------<  84  3.7   |  35<H>  |  1.26    Ca    8.6      29 Oct 2020 07:05  Phos  3.5     10-28  Mg     2.1     10-29    TPro  6.1  /  Alb  3.2<L>  /  TBili  1.5<H>  /  DBili  0.8<H>  /  AST  25  /  ALT  20  /  AlkPhos  153<H>  10-29    PT/INR - ( 28 Oct 2020 07:30 )   PT: 15.0 sec;   INR: 1.30 ratio         PTT - ( 28 Oct 2020 07:30 )  PTT:35.7 sec  CARDIAC MARKERS ( 28 Oct 2020 07:30 )  0.130 ng/mL / x     / x     / x     / x      CARDIAC MARKERS ( 27 Oct 2020 20:30 )  0.129 ng/mL / x     / x     / x     / x      CARDIAC MARKERS ( 27 Oct 2020 18:04 )  0.132 ng/mL / x     / x     / x     / x        RADIOLOGY:  < from: CT Abdomen and Pelvis No Cont (10.27.20 @ 18:23) >  IMPRESSION:  Moderate volume of abdominal and pelvic ascites.  Diffuse anasarca.  Small to moderate right-sided pleural effusion.  No bowel obstruction.  < end of copied text >    < from: Xray Chest 1 View- PORTABLE-Urgent (Xray Chest 1 View- PORTABLE-Urgent .) (10.27.20 @ 18:36) >  IMPRESSION: Stable chest without acute finding.  < end of copied text >    ECHOCARDIOGRAM:    < from: TTE Echo Complete w/o Contrast w/ Doppler (08.28.20 @ 21:42) >   Impression     Summary     The mitral valve leaflets appear thickened.   Mild mitral annular calcification is present.   Moderate to severe (3+)mitral regurgitation is present.   The aortic valve is trileaflet with thin pliable leaflets.   Mild to Moderate aortic regurgitation is present.   Normal appearing tricuspid valve structure.   Mild pulmonary hypertension.   Mild (1+) tricuspid valveregurgitation is present.   Estimated left ventricular ejection fraction is 45 %.   The left ventricle cavity is mildly dilated.   The left ventricle is normal in wall thickness.   Mild, diffuse hypokinesis of the left ventricle is present.   The right ventricle is normal in size, wall thickness, wall motion, and   contractility based on TAPSE.   A device wire is seen in the RV and RA.    < end of copied text >    MEDICATIONS  (STANDING):  aspirin  chewable 81 milliGRAM(s) Oral daily  atorvastatin 20 milliGRAM(s) Oral at bedtime  bethanechol 50 milliGRAM(s) Oral two times a day  carvedilol 6.25 milliGRAM(s) Oral every 12 hours  cefTRIAXone Injectable. 1000 milliGRAM(s) IV Push every 24 hours  furosemide   Injectable 40 milliGRAM(s) IV Push two times a day  loratadine 10 milliGRAM(s) Oral daily  losartan 25 milliGRAM(s) Oral daily  rivaroxaban 20 milliGRAM(s) Oral with dinner  spironolactone 25 milliGRAM(s) Oral daily  tamsulosin 0.4 milliGRAM(s) Oral at bedtime    MEDICATIONS  (PRN):    TELEMETRY REVIEW:  10/29 - vpaced, underlying afib/aflutter rate controlled    ASSESSMENT AND PLAN:    74 y/o M w/ PMH of systolic CHF, COPD, HTN, dyslipidemia, CAD, a-fib on xarelto, arthritis, s/p PPM, p/w abdominal pain    1) Acute on Chronic systolic CHF exacerbation  - tele monitoring. tele review as above. Paced  - Cont. IV Lasix, spirolactone   - daily weights, I&Os   - On BB, allergy to ACE/ARB? d/[place] on Entresto last admission ~ start ARB patient reports no allergies   - Echo 8/28/20 - EF 45%  - Cardiac Cath 9/28 ~ w/ CAD, severe pulm HTN  - LE swelling - arterial/ venous dopplers pending > negative  - Cardio consult appreciated  10/29 ~ increase Lasix to IV BID, add losartan daily.     2) Elevated troponins likely demand due to HF exacerbation   - denies CP. trops minimally elevated - flat  - cont ASA 81, statin  - ecg reviewed. no significant ST changes. recently underwent [facility] results above.  - cardio consult     3) Moderate Pelvic ascites / Anasarca - Possibly 2/2 CHF, suspected portal HTN  - s/p paracentesis 2700cc. Cultures sent.  SAAG = 1.2  - check hep acute panel, lipids ~ wnl  - GI consult     4) Pyelonephritis   - f/u Bcx NGTD, Ucx +(pending sensitivities)  - cont. ceftriaxone.    5) Elevate sCr, did not meet KIDGO criteria  - monitor on IV diuretics.   - I&Os, daily weights.    6) Chronic Afib | PPM  - Paced. afib. cont. Xarelto, BB     7) Enlarged prostate - Cont. Flomax     8) Small B/L anterior external iliac chain pelvic lymph nodes / Small BL inguinal lymph nodes   - F/u outpatient     9) DVT ppx - Xarelto     Dispo: cont. abx for pyelo. IV diuresis for HF. d/c tele.    CHIEF COMPLAINT/DIAGNOSIS: abd pain, LOPEZ    HPI: 74 y/o M w/ PMHx of systolic CHF (EF 45%), COPD - Severe Pulmonary HTN, HTN, dyslipidemia, CAD, a-fib/PPM on xarelto, arthritis p/w abdominal pain. Patient states abdominal pain started 1 week ago, and when he walks he also gets dyspnea on exertion. Also c/o LE pain, LLE worse than right. States pain is mostly above the knee. Patient also c/o dysuria and R flank pain. Denies fever, chills, nausea, vomiting, CP, cough, runny nose, diarrhea.     10/28:  #[id number] ~ c/o abdominal tightness - better after para. ble pain - hard to walk. + LOPEZ. no other complaints.  10/29:  #[id number] ~ reports improvement with leg swelling and breathing. no other complaints    REVIEW OF SYSTEMS:  All other review of systems is negative unless indicated above    PHYSICAL EXAM:  Constitutional: Awake and alert, well-developed  HEENT: Normal Hearing, MMM  Neck: Soft and supple, No LAD, + JVD  Respiratory: Breath sounds are crackles b/l   Cardiovascular: S1 and S2, IRIR, paced. + murmur  Gastrointestinal: Bowel Sounds present, firm, tight. obese  Extremities: b/l  peripheral edema, pitting. anasarca   Vascular: 2+ peripheral pulses  Neurological: A/O x 3, no focal deficits  Musculoskeletal: 5/5 strength b/l upper and lower extremities  Skin: ble redness, swelling    Vital Signs Last 24 Hrs  T(C): 36.8 (29 Oct 2020 08:31), Max: 36.8 (28 Oct 2020 21:12)  T(F): 98.2 (29 Oct 2020 08:31), Max: 98.2 (28 Oct 2020 21:12)  HR: 60 (29 Oct 2020 08:31) (59 - 72)  BP: 140/83 (29 Oct 2020 08:31) (127/61 - 156/82)  BP(mean): --  RR: 18 (29 Oct 2020 08:31) (18 - 18)  SpO2: 100% (29 Oct 2020 08:31) (95% - 100%)    LABS: All Labs Reviewed:                        11.9   7.27  )-----------( 149      ( 28 Oct 2020 07:30 )             39.0     10-29    142  |  104  |  22  ----------------------------<  84  3.7   |  35<H>  |  1.26    Ca    8.6      29 Oct 2020 07:05  Phos  3.5     10-28  Mg     2.1     10-29    TPro  6.1  /  Alb  3.2<L>  /  TBili  1.5<H>  /  DBili  0.8<H>  /  AST  25  /  ALT  20  /  AlkPhos  153<H>  10-29    PT/INR - ( 28 Oct 2020 07:30 )   PT: 15.0 sec;   INR: 1.30 ratio         PTT - ( 28 Oct 2020 07:30 )  PTT:35.7 sec  CARDIAC MARKERS ( 28 Oct 2020 07:30 )  0.130 ng/mL / x     / x     / x     / x      CARDIAC MARKERS ( 27 Oct 2020 20:30 )  0.129 ng/mL / x     / x     / x     / x      CARDIAC MARKERS ( 27 Oct 2020 18:04 )  0.132 ng/mL / x     / x     / x     / x        RADIOLOGY:  < from: CT Abdomen and Pelvis No Cont (10.27.20 @ 18:23) >  IMPRESSION:  Moderate volume of abdominal and pelvic ascites.  Diffuse anasarca.  Small to moderate right-sided pleural effusion.  No bowel obstruction.  < end of copied text >    < from: Xray Chest 1 View- PORTABLE-Urgent (Xray Chest 1 View- PORTABLE-Urgent .) (10.27.20 @ 18:36) >  IMPRESSION: Stable chest without acute finding.  < end of copied text >    ECHOCARDIOGRAM:    < from: TTE Echo Complete w/o Contrast w/ Doppler (20 @ 21:42) >   Impression     Summary     The mitral valve leaflets appear thickened.   Mild mitral annular calcification is present.   Moderate to severe (3+)mitral regurgitation is present.   The aortic valve is trileaflet with thin pliable leaflets.   Mild to Moderate aortic regurgitation is present.   Normal appearing tricuspid valve structure.   Mild pulmonary hypertension.   Mild (1+) tricuspid valveregurgitation is present.   Estimated left ventricular ejection fraction is 45 %.   The left ventricle cavity is mildly dilated.   The left ventricle is normal in wall thickness.   Mild, diffuse hypokinesis of the left ventricle is present.   The right ventricle is normal in size, wall thickness, wall motion, and   contractility based on TAPSE.   A device wire is seen in the RV and RA.    < end of copied text >    MEDICATIONS  (STANDING):  aspirin  chewable 81 milliGRAM(s) Oral daily  atorvastatin 20 milliGRAM(s) Oral at bedtime  bethanechol 50 milliGRAM(s) Oral two times a day  carvedilol 6.25 milliGRAM(s) Oral every 12 hours  cefTRIAXone Injectable. 1000 milliGRAM(s) IV Push every 24 hours  furosemide   Injectable 40 milliGRAM(s) IV Push two times a day  loratadine 10 milliGRAM(s) Oral daily  losartan 25 milliGRAM(s) Oral daily  rivaroxaban 20 milliGRAM(s) Oral with dinner  spironolactone 25 milliGRAM(s) Oral daily  tamsulosin 0.4 milliGRAM(s) Oral at bedtime    MEDICATIONS  (PRN):    TELEMETRY REVIEW:  10/29 - vpaced, underlying afib/aflutter rate controlled    ASSESSMENT AND PLAN:    74 y/o M w/ PMH of systolic CHF, COPD, HTN, dyslipidemia, CAD, a-fib on xarelto, arthritis, s/p PPM, p/w abdominal pain    1) Acute on Chronic systolic CHF exacerbation  - tele monitoring. tele review as above. Paced  - Cont. IV Lasix, spirolactone   - daily weights, I&Os   - On BB, allergy to ACE/ARB? d/[place] on Entresto last admission ~ start ARB patient reports no allergies   - Echo 20 - EF 45%  - Cardiac Cath  ~ w/ CAD, severe pulm HTN  - LE swelling - arterial/ venous dopplers pending > negative  - Cardio consult appreciated  10/29 ~ increase Lasix to IV BID, add losartan daily.     2) Elevated troponins likely demand due to HF exacerbation   - denies CP. trops minimally elevated - flat  - cont ASA 81, statin  - ecg reviewed. no significant ST changes. recently underwent [facility] results above.  - cardio consult     3) Moderate Pelvic ascites / Anasarca - Possibly 2/2 CHF, suspected portal HTN  - s/p paracentesis 2700cc. Cultures sent.  SAAG = 1.2  - check hep acute panel, lipids ~ wnl  - GI consult     4) Pyelonephritis   - f/u Bcx NGTD, Ucx +(pending sensitivities)  - cont. ceftriaxone.    5) Elevate sCr, did not meet KIDGO criteria  - monitor on IV diuretics.   - I&Os, daily weights.    6) Chronic Afib | PPM  - Paced. afib. cont. Xarelto, BB     7) Enlarged prostate - Cont. Flomax     8) Small B/L anterior external iliac chain pelvic lymph nodes / Small BL inguinal lymph nodes   - F/u outpatient     9) DVT ppx - Xarelto     Dispo: cont. abx for pyelo. IV diuresis for HF. d/c [facility] HEART FAILURE CHECKLIST:    [ ] Congestive Heart Failure                  [ ] Acute                                                [X] Acute on Chronic     [ ] Chronic  [ ] Diastolic (HFpEF)  [ ] Systolic (HFrEF)  [X ] Combined Systolic & Diastolic      [ ] ACC/AHA Stage: _____     [ ] NYHA Class: 3  -----------------------------------------------------------------  ECHOCARDIOGRAM:  < from: TTE Echo Complete w/o Contrast w/ Doppler (20 @ 21:42) >   Summary     The mitral valve leaflets appear thickened.   Mild mitral annular calcification is present.   Moderate to severe (3+)mitral regurgitation is present.   The aortic valve is trileaflet with thin pliable leaflets.   Mild to Moderate aortic regurgitation is present.   Normal appearing tricuspid valve structure.   Mild pulmonary hypertension.   Mild (1+) tricuspid valveregurgitation is present.   Estimated left ventricular ejection fraction is 45 %.   The left ventricle cavity is mildly dilated.   The left ventricle is normal in wall thickness.   Mild, diffuse hypokinesis of the left ventricle is present.   The right ventricle is normal in size, wall thickness, wall motion, and   contractility based on TAPSE.   A device wire is seen in the RV and RA.    < end of copied text >  -----------------------------------------------------------------  MEDICATIONS:  aspirin  chewable 81 milliGRAM(s) Oral daily  atorvastatin 20 milliGRAM(s) Oral at bedtime  bethanechol 50 milliGRAM(s) Oral two times a day  carvedilol 6.25 milliGRAM(s) Oral every 12 hours  cefTRIAXone Injectable. 1000 milliGRAM(s) IV Push every 24 hours  furosemide   Injectable 40 milliGRAM(s) IV Push two times a day  loratadine 10 milliGRAM(s) Oral daily  losartan 25 milliGRAM(s) Oral daily  rivaroxaban 20 milliGRAM(s) Oral with dinner  spironolactone 25 milliGRAM(s) Oral daily  tamsulosin 0.4 milliGRAM(s) Oral at bedtime      VITALS:  T(C): 36.8 (10-29-20 @ 08:31), Max: 36.8 (10-28-20 @ 21:12)  HR: 60 (10-29-20 @ 08:31) (59 - 60)  BP: 140/83 (10-29-20 @ 08:31) (127/61 - 146/81)  RR: 18 (10-29-20 @ 08:31) (18 - 18)  SpO2: 100% (10-29-20 @ 08:31) (95% - 100%)    HEIGHT/WEIGHT:  Daily     Daily Weight in k.7 (29 Oct 2020 06:45)  DRY WEIGHT:    I&O's Summary    28 Oct 2020 07:01  -  29 Oct 2020 07:00  --------------------------------------------------------  IN: 0 mL / OUT: 1150 mL / NET: -1150 mL    Labs:                        11.9   7.27  )-----------( 149      ( 28 Oct 2020 07:30 )             39.0     10-29    142  |  104  |  22  ----------------------------<  84  3.7   |  35<H>  |  1.26    Ca    8.6      29 Oct 2020 07:05  Phos  3.5     10-28  Mg     2.1     10-29    TPro  6.1  /  Alb  3.2<L>  /  TBili  1.5<H>  /  DBili  0.8<H>  /  AST  25  /  ALT  20  /  AlkPhos  153<H>  10-29    PT/INR - ( 28 Oct 2020 07:30 )   PT: 15.0 sec;   INR: 1.30 ratio         PTT - ( 28 Oct 2020 07:30 )  PTT:35.7 sec  CARDIAC MARKERS ( 28 Oct 2020 07:30 )  0.130 ng/mL / x     / x     / x     / x      CARDIAC MARKERS ( 27 Oct 2020 20:30 )  0.129 ng/mL / x     / x     / x     / x      CARDIAC MARKERS ( 27 Oct 2020 18:04 )  0.132 ng/mL / x     / x     / x     / x          Serum Pro-Brain Natriuretic Peptide: 3577 pg/mL (10-27 @ 18:04)    ------------------------------------------------------------------  [ ] ELIS                                                    [ ] CKD I  [ ] ATN                                                  [ ] CKD II  [ ] Other: _____                                  [X ] CKD III                                                                [ ] CKD IV                                                                [ ] CKD V                                                                [ ] ESRD  -----------------------------------------------------------------  Abnormal Nutritional Status:  [ ] Malnutrtion (see nutrition note)  [ ] Underweight: BMI < 19  [ ] Morbid Obeisty: BMI >/ 40  [ ] Cachexia  [ ] Other   ------------------------------------------------------------------  DIETARY RESTRICTIONS: DASH  FLUID RESTRICTION: 1500ml  ------------------------------------------------------------------  GDMT:  [X] ACE/ARB  [ ] ARNI IF EF <40%  [X] BB  [X] ALDOSTERONE BLOCKER   [X] LOOP DIURETICS   --------------------------------------------------------------------  CONSULTS:  [X ] CARDIOLOGY  [ ] HF SPECIALIST   [X ] NUTRITION  [ ] PALLIATIVE CARE  [ ] CASE MANAGEMENT D/C ASSESSMENT

## 2020-10-30 LAB
-  AMIKACIN: SIGNIFICANT CHANGE UP
-  AMOXICILLIN/CLAVULANIC ACID: SIGNIFICANT CHANGE UP
-  AMPICILLIN/SULBACTAM: SIGNIFICANT CHANGE UP
-  AMPICILLIN: SIGNIFICANT CHANGE UP
-  AZTREONAM: SIGNIFICANT CHANGE UP
-  CEFAZOLIN: SIGNIFICANT CHANGE UP
-  CEFEPIME: SIGNIFICANT CHANGE UP
-  CEFOXITIN: SIGNIFICANT CHANGE UP
-  CEFTRIAXONE: SIGNIFICANT CHANGE UP
-  CIPROFLOXACIN: SIGNIFICANT CHANGE UP
-  ERTAPENEM: SIGNIFICANT CHANGE UP
-  GENTAMICIN: SIGNIFICANT CHANGE UP
-  IMIPENEM: SIGNIFICANT CHANGE UP
-  LEVOFLOXACIN: SIGNIFICANT CHANGE UP
-  MEROPENEM: SIGNIFICANT CHANGE UP
-  NITROFURANTOIN: SIGNIFICANT CHANGE UP
-  PIPERACILLIN/TAZOBACTAM: SIGNIFICANT CHANGE UP
-  TIGECYCLINE: SIGNIFICANT CHANGE UP
-  TOBRAMYCIN: SIGNIFICANT CHANGE UP
-  TRIMETHOPRIM/SULFAMETHOXAZOLE: SIGNIFICANT CHANGE UP
ANION GAP SERPL CALC-SCNC: 5 MMOL/L — SIGNIFICANT CHANGE UP (ref 5–17)
BUN SERPL-MCNC: 22 MG/DL — SIGNIFICANT CHANGE UP (ref 7–23)
CALCIUM SERPL-MCNC: 8.7 MG/DL — SIGNIFICANT CHANGE UP (ref 8.5–10.1)
CHLORIDE SERPL-SCNC: 103 MMOL/L — SIGNIFICANT CHANGE UP (ref 96–108)
CO2 SERPL-SCNC: 34 MMOL/L — HIGH (ref 22–31)
CREAT SERPL-MCNC: 1.2 MG/DL — SIGNIFICANT CHANGE UP (ref 0.5–1.3)
CREATININE, URINE RESULT: 11 MG/DL — SIGNIFICANT CHANGE UP
CULTURE RESULTS: SIGNIFICANT CHANGE UP
GLUCOSE SERPL-MCNC: 88 MG/DL — SIGNIFICANT CHANGE UP (ref 70–99)
HCT VFR BLD CALC: 38.7 % — LOW (ref 39–50)
HGB BLD-MCNC: 11.7 G/DL — LOW (ref 13–17)
MCHC RBC-ENTMCNC: 29.8 PG — SIGNIFICANT CHANGE UP (ref 27–34)
MCHC RBC-ENTMCNC: 30.2 GM/DL — LOW (ref 32–36)
MCV RBC AUTO: 98.5 FL — SIGNIFICANT CHANGE UP (ref 80–100)
METHOD TYPE: SIGNIFICANT CHANGE UP
ORGANISM # SPEC MICROSCOPIC CNT: SIGNIFICANT CHANGE UP
ORGANISM # SPEC MICROSCOPIC CNT: SIGNIFICANT CHANGE UP
PLATELET # BLD AUTO: 136 K/UL — LOW (ref 150–400)
POTASSIUM SERPL-MCNC: 3.9 MMOL/L — SIGNIFICANT CHANGE UP (ref 3.5–5.3)
POTASSIUM SERPL-SCNC: 3.9 MMOL/L — SIGNIFICANT CHANGE UP (ref 3.5–5.3)
PROT ?TM UR-MCNC: <4 MG/DL — SIGNIFICANT CHANGE UP (ref 0–12)
PROT ?TM UR-MCNC: <4 MG/DL — SIGNIFICANT CHANGE UP (ref 0–12)
PROT SERPL-MCNC: 5.9 G/DL — LOW (ref 6–8.3)
PROT SERPL-MCNC: 5.9 G/DL — LOW (ref 6–8.3)
RBC # BLD: 3.93 M/UL — LOW (ref 4.2–5.8)
RBC # FLD: 14.7 % — HIGH (ref 10.3–14.5)
SODIUM SERPL-SCNC: 142 MMOL/L — SIGNIFICANT CHANGE UP (ref 135–145)
SPECIMEN SOURCE: SIGNIFICANT CHANGE UP
WBC # BLD: 6.64 K/UL — SIGNIFICANT CHANGE UP (ref 3.8–10.5)
WBC # FLD AUTO: 6.64 K/UL — SIGNIFICANT CHANGE UP (ref 3.8–10.5)

## 2020-10-30 PROCEDURE — 99232 SBSQ HOSP IP/OBS MODERATE 35: CPT

## 2020-10-30 PROCEDURE — 99233 SBSQ HOSP IP/OBS HIGH 50: CPT

## 2020-10-30 RX ORDER — LISINOPRIL 2.5 MG/1
5 TABLET ORAL DAILY
Refills: 0 | Status: DISCONTINUED | OUTPATIENT
Start: 2020-10-30 | End: 2020-11-02

## 2020-10-30 RX ORDER — MEROPENEM 1 G/30ML
1000 INJECTION INTRAVENOUS EVERY 8 HOURS
Refills: 0 | Status: DISCONTINUED | OUTPATIENT
Start: 2020-10-30 | End: 2020-11-03

## 2020-10-30 RX ORDER — MEROPENEM 1 G/30ML
1000 INJECTION INTRAVENOUS ONCE
Refills: 0 | Status: COMPLETED | OUTPATIENT
Start: 2020-10-30 | End: 2020-10-30

## 2020-10-30 RX ORDER — MEROPENEM 1 G/30ML
INJECTION INTRAVENOUS
Refills: 0 | Status: DISCONTINUED | OUTPATIENT
Start: 2020-10-30 | End: 2020-11-03

## 2020-10-30 RX ADMIN — LISINOPRIL 5 MILLIGRAM(S): 2.5 TABLET ORAL at 15:04

## 2020-10-30 RX ADMIN — LORATADINE 10 MILLIGRAM(S): 10 TABLET ORAL at 11:06

## 2020-10-30 RX ADMIN — CARVEDILOL PHOSPHATE 6.25 MILLIGRAM(S): 80 CAPSULE, EXTENDED RELEASE ORAL at 21:24

## 2020-10-30 RX ADMIN — ATORVASTATIN CALCIUM 20 MILLIGRAM(S): 80 TABLET, FILM COATED ORAL at 21:24

## 2020-10-30 RX ADMIN — TAMSULOSIN HYDROCHLORIDE 0.4 MILLIGRAM(S): 0.4 CAPSULE ORAL at 21:24

## 2020-10-30 RX ADMIN — RIVAROXABAN 20 MILLIGRAM(S): KIT at 16:49

## 2020-10-30 RX ADMIN — Medication 40 MILLIGRAM(S): at 11:06

## 2020-10-30 RX ADMIN — Medication 50 MILLIGRAM(S): at 11:06

## 2020-10-30 RX ADMIN — Medication 81 MILLIGRAM(S): at 11:06

## 2020-10-30 RX ADMIN — Medication 50 MILLIGRAM(S): at 21:24

## 2020-10-30 RX ADMIN — SPIRONOLACTONE 25 MILLIGRAM(S): 25 TABLET, FILM COATED ORAL at 11:07

## 2020-10-30 RX ADMIN — MEROPENEM 100 MILLIGRAM(S): 1 INJECTION INTRAVENOUS at 15:04

## 2020-10-30 RX ADMIN — CARVEDILOL PHOSPHATE 6.25 MILLIGRAM(S): 80 CAPSULE, EXTENDED RELEASE ORAL at 11:06

## 2020-10-30 RX ADMIN — Medication 40 MILLIGRAM(S): at 17:20

## 2020-10-30 RX ADMIN — MEROPENEM 100 MILLIGRAM(S): 1 INJECTION INTRAVENOUS at 21:24

## 2020-10-30 NOTE — CONSULT NOTE ADULT - SUBJECTIVE AND OBJECTIVE BOX
Patient is a 75y old  Male who presents with a chief complaint of abdominal pain (29 Oct 2020 16:57)    HPI:  76 y/o M w/ PMH of CHF, COPD, HTN, dyslipidemia, CAD, a-fib on xarelto, arthritis, s/p PPM, p/w abdominal pain. Patient states abdominal pain started 1 week ago, and when he walks he also gets dyspnea on exertion. Also c/o LE pain, LLE worse than right. States pain is mostly above the knee. Patient also c/o dysuria and R flank pain. Denies fever, chills, nausea, vomiting, CP, cough, runny nose, diarrhea. here UA positive, CT abd/pelvis with ascites likely d/t CHF, urine cx growing ESBL Ecoli started on meropenem.       PMH: as above  PSH: as above  Meds: per reconciliation sheet, noted below  MEDICATIONS  (STANDING):  aspirin  chewable 81 milliGRAM(s) Oral daily  atorvastatin 20 milliGRAM(s) Oral at bedtime  bethanechol 50 milliGRAM(s) Oral two times a day  carvedilol 6.25 milliGRAM(s) Oral every 12 hours  furosemide   Injectable 40 milliGRAM(s) IV Push two times a day  lisinopril 5 milliGRAM(s) Oral daily  loratadine 10 milliGRAM(s) Oral daily  meropenem  IVPB 1000 milliGRAM(s) IV Intermittent once  meropenem  IVPB 1000 milliGRAM(s) IV Intermittent every 8 hours  meropenem  IVPB      rivaroxaban 20 milliGRAM(s) Oral with dinner  spironolactone 25 milliGRAM(s) Oral daily  tamsulosin 0.4 milliGRAM(s) Oral at bedtime    MEDICATIONS  (PRN):    Allergies    ACE inhibitors (Other)  amiodarone (Other)    Intolerances      Social: no smoking, no alcohol, no illegal drugs; no recent travel, no exposure to TB  FAMILY HISTORY:  No pertinent family history  of diabetes or heart disease       no history of premature cardiovascular disease in first degree relatives    ROS: the patient denies fever, no chills, no HA, no dizziness, no sore throat, no blurry vision, no CP, no palpitations, no SOB, no cough, no diarrhea, no N/V, no dysuria, no leg pain, no claudication, no rash, no joint aches, no rectal pain or bleeding, no night sweats  All other systems reviewed and are negative    Vital Signs Last 24 Hrs  T(C): 36.7 (30 Oct 2020 09:12), Max: 36.7 (29 Oct 2020 20:20)  T(F): 98 (30 Oct 2020 09:12), Max: 98.1 (29 Oct 2020 20:20)  HR: 60 (30 Oct 2020 09:12) (60 - 60)  BP: 144/67 (30 Oct 2020 09:12) (144/67 - 153/78)  BP(mean): --  RR: 18 (30 Oct 2020 09:12) (18 - 18)  SpO2: 99% (30 Oct 2020 09:12) (95% - 99%)  Daily     Daily Weight in k.7 (30 Oct 2020 06:49)    PE:  Constitutional: NAD  HEENT: NC/AT, EOMI, PERRLA, conjunctivae clear; ears and nose atraumatic; pharynx benign  Neck: supple; thyroid not palpable  Back: no tenderness  Respiratory: respiratory effort normal; clear to auscultation  Cardiovascular: S1S2 regular, no murmurs  Abdomen: soft, not tender, not distended, positive BS; liver and spleen WNL  Genitourinary: no suprapubic tenderness  Lymphatic: no LN palpable  Musculoskeletal: no muscle tenderness, no joint swelling or tenderness  Extremities: no pedal edema  Neurological/ Psychiatric: moving all extremities  Skin: no rashes; no palpable lesions    Labs: all available labs reviewed                        11.7   6.64  )-----------( 136      ( 30 Oct 2020 07:30 )             38.7     10-30    142  |  103  |  22  ----------------------------<  88  3.9   |  34<H>  |  1.20    Ca    8.7      30 Oct 2020 07:30  Mg     2.1     10-    TPro  5.9<L>  /  Alb  x   /  TBili  x   /  DBili  x   /  AST  x   /  ALT  x   /  AlkPhos  x   10-29     LIVER FUNCTIONS - ( 29 Oct 2020 18:42 )  Alb: x     / Pro: 5.9 g/dL / ALK PHOS: x     / ALT: x     / AST: x     / GGT: x           culCulture - Body Fluid with Gram Stain (10.28.20 @ 10:38)   Gram Stain:   polymorphonuclear leukocytes seen   No organisms seen   by cytocentrifuge   Specimen Source: Ascites Fl   Culture Results:   No growth Culture - Urine (10.27.20 @ 20:30)   - Amikacin: S <=16   - Amoxicillin/Clavulanic Acid: S <=8/4   - Ampicillin: R >16 These ampicillin results predict results for amoxicillin   - Ampicillin/Sulbactam: S 8/4 Enterobacter, Citrobacter, and Serratia may develop resistance during prolonged therapy (3-4 days)   - Aztreonam: R 16   - Cefazolin: R >16 ([id number]) For uncomplicated UTI with K. pneumoniae, E. coli, or P. mirablis: JONAH <=16 is sensitive and JONAH >=32 is resistant. This also predicts results for oral agents cefaclor, cefdinir, cefpodoxime, cefprozil, cefuroxime axetil, cephalexin and locarbef for uncomplicated UTI. Note that some isolates may be susceptible to these agents while testing resistant to cefazolin.   - Cefepime: R 8   - Cefoxitin: S <=8   - Ceftriaxone: R >32 Enterobacter, Citrobacter, and Serratia may develop resistance during prolonged therapy   - Ciprofloxacin: R >2   - Ertapenem: S <=0.5   - Gentamicin: S <=2   - Imipenem: S <=1   - Levofloxacin: R >4   - Meropenem: S <=1   - Nitrofurantoin: S <=32 Should not be used to treat pyelonephritis   - Piperacillin/Tazobactam: S <=8   - Tigecycline: S <=2   - Tobramycin: S <=2   - Trimethoprim/Sulfamethoxazole: R >2/38   Specimen Source: .Urine None   Culture Results:   >100,000 CFU/ml Escherichia coli ESBL   Organism Identification: Escherichia coli ESBL   Organism: Escherichia coli ESBL   Method Type: JONAH Culture - Blood (10.28.20 @ 00:42)   Specimen Source: .Blood None   Culture Results:   No growth to date.     Radiology: all available radiological tests reviewed    EXAM:  XR CHEST PORTABLE URGENT 1V                            PROCEDURE DATE:  10/27/2020          INTERPRETATION:  AP chest on 2020 at 6:18 PM. Patient is short of breath.    Gross heart enlargement and left-sided pacemaker again noted. Micra pacemaker again noted.    Lungs remain clear.    Chest is similar to  of this year.    IMPRESSION: Stable chest without acute finding.    < end of copied text >  < from: US Duplex Arterial Lower Ext Compl, Bilateral (10.28.20 @ 13:39) >  EXAM:  US DPLX LWR EXT ARTS COMPL BI                            PROCEDURE DATE:  10/28/2020          INTERPRETATION:  US LOWER EXTREMITY ARTERIAL DUPLEX COMPLETE BILATERAL    CLINICAL INDICATION:  Atherosclerosis of native arteries of the right andleft lower extremity with claudication    COMPARISON: None    Real-time sonography of the bilateral lower extremity arterial system was performed using a high-resolution linear array transducer and including color and spectral Doppler.    There is plaque in the lower extremity arteries.. No soft tissue abnormalities are demonstrated in either leg. Flow phase patterns and peak systolic velocity measurements (in cm/s) were observed as follows:    RIGHT:  CFA: Triphasic; 125  Proximal SFA: Triphasic; 99  Mid SFA: Triphasic; 132  Distal SFA: Triphasic;  99  Popliteal: Triphasic;  64  Anterior tibial: Triphasic; 112  Posterior tibial: Triphasic; 103  Peroneal: Triphasic;  62  Dorsalis pedis: Triphasic;  80    LEFT:  CFA: Triphasic; 108  Proximal SFA: Triphasic; 108  Mid SFA: Triphasic; 98  Distal SFA: Triphasic; 56  Popliteal: Triphasic;  75  Anterior tibial: Triphasic; 124  Posterior tibial: Triphasic; 129  Peroneal: Triphasic; 72  Dorsalis pedis: Triphasic;  94    IMPRESSION:    No significant stenosis or occlusion in either leg.    Atherosclerosis of the native arteries of the right and left lower extremities is present.    < end of copied text >  < from: CT Abdomen and Pelvis No Cont (10.27.20 @ 18:23) >    EXAM:  CT ABDOMEN AND PELVIS                            PROCEDURE DATE:  10/27/2020          INTERPRETATION:  CLINICAL INFORMATION: Abdominal distention and bilateral lower extremity edema.    COMPARISON: CT scan abdomen pelvis 2020.    PROCEDURE:  CT of the Abdomen and Pelvis was performed without intravenous contrast.  Intravenous contrast: None.  Oral contrast: None.  Sagittal and coronal reformats were performed.    FINDINGS:    LOWER CHEST: Cardiac pacemaker leads.  Cardiomegaly.  Small to moderate right-sided pleural effusion.  Small calcified granulomas right middle and left lingula lobes.    The evaluation of solid organ parenchyma is limited without intravenous contrast.    LIVER: Within normal limits.  BILE DUCTS: Normal caliber.  GALLBLADDER: Within normal limits.  SPLEEN: Within normal limits.  PANCREAS: Within normal limits.  ADRENALS: Within normal limits.  KIDNEYS/URETERS:  No hydroureteronephrosis or obstructing ureteral calculus.  Bilateral perinephric stranding.    BLADDER: Mild circumferential bladder wall thickening with mild perivesical stranding, correlate for cystitis.  REPRODUCTIVE ORGANS: Enlarged prostate with broad-based impression upon the urinary bladder.    BOWEL: Evaluation the gastrointestinal tract is limited without distention.  Colonic diverticulosis, without CT evidence of diverticulitis.  No bowel obstruction.  Appendix normal.  PERITONEUM: There is a moderate volume of abdominal and pelvic ascites.    VESSELS: Mild atherosclerotic calcification of the abdominal aorta, which is normal in caliber.  Prominent pelvic collaterals vessels, particularly on the right.  RETROPERITONEUM/LYMPH NODES: Small bilateral anterior external iliac chain pelvic lymph nodes.  Small bilateral inguinal lymph nodes.  ABDOMINAL WALL: Subcutaneous edema/anasarca.    BONES: Degenerative changes spine.    IMPRESSION:    Moderate volume of abdominal and pelvic ascites.  Diffuse anasarca.    Small to moderate right-sided pleural effusion.    No bowel obstruction.    Other findings as discussed above.    < end of copied text >    Advanced directives addressed: full resuscitation
HPI:  76 y/o M w/ PMH of CHF, COPD, HTN, dyslipidemia, CAD, a-fib on xarelto, arthritis, s/p PPM, p/w abdominal pain. Patient states abdominal pain started 1 week ago, and when he walks he also gets dyspnea on exertion. Also c/o LE pain, LLE worse than right. States pain is mostly above the knee. Patient also c/o dysuria and R flank pain. Denies fever, chills, nausea, vomiting, CP, cough, runny nose, diarrhea.     PSH: Denies     Social Hx: Denies x 3    Family Hx: Father and brother - heart disease    (27 Oct 2020 22:54)      PAST MEDICAL & SURGICAL HISTORY:  Heart failure with reduced ejection fraction    HLD (hyperlipidemia)    COPD (chronic obstructive pulmonary disease)  not on inhalers    Arthritis    HTN (hypertension)    Coronary artery disease involving native heart with angina pectoris, unspecified vessel or lesion type    Pacemaker  single chamber meditronic  placed by Dr Tomas  May  of  2016    AF (atrial fibrillation)  on Eliquis    Cardiac pacemaker  single chamber meditronic  placed by Dr Tomas  May  of  2016        MEDICATIONS  (STANDING):  aspirin  chewable 81 milliGRAM(s) Oral daily  atorvastatin 20 milliGRAM(s) Oral at bedtime  bethanechol 50 milliGRAM(s) Oral two times a day  carvedilol 6.25 milliGRAM(s) Oral every 12 hours  cefTRIAXone Injectable. 1000 milliGRAM(s) IV Push every 24 hours  furosemide   Injectable 40 milliGRAM(s) IV Push two times a day  loratadine 10 milliGRAM(s) Oral daily  losartan 25 milliGRAM(s) Oral daily  rivaroxaban 20 milliGRAM(s) Oral with dinner  spironolactone 25 milliGRAM(s) Oral daily  tamsulosin 0.4 milliGRAM(s) Oral at bedtime    MEDICATIONS  (PRN):      Allergies    ACE inhibitors (Other)  amiodarone (Other)    Intolerances        SOCIAL HISTORY:    FAMILY HISTORY:  No pertinent family history  of diabetes or heart disease     Non-contributory    REVIEW OF SYSTEMS      General:	    Respiratory and Thorax:  	  Cardiovascular:	    Gastrointestinal:	    Musculoskeletal:	   Vital Signs Last 24 Hrs  T(C): 36.8 (29 Oct 2020 08:31), Max: 36.8 (28 Oct 2020 21:12)  T(F): 98.2 (29 Oct 2020 08:31), Max: 98.2 (28 Oct 2020 21:12)  HR: 60 (29 Oct 2020 08:31) (59 - 60)  BP: 140/83 (29 Oct 2020 08:31) (127/61 - 146/81)  BP(mean): --  RR: 18 (29 Oct 2020 08:31) (18 - 18)  SpO2: 100% (29 Oct 2020 08:31) (95% - 100%)    HEENT :No Pallor.No icterus. EOMI,PERLAA  Chest : Clear to Auscultation  CVS : S1S2 Normal.No murmurs.  Abdomen: Soft.Non tender .Ascites.Normal bowel sounds.No Organomegaly.  CNS: Alert.Oriented to Time,Place and Person.No focal deficit.  EXT: Normal Range of motion.No pitting edema.    LABS:                        11.9   7.27  )-----------( 149      ( 28 Oct 2020 07:30 )             39.0     10-29    142  |  104  |  22  ----------------------------<  84  3.7   |  35<H>  |  1.26    Ca    8.6      29 Oct 2020 07:05  Phos  3.5     10-28  Mg     2.1     10-29    TPro  6.1  /  Alb  3.2<L>  /  TBili  1.5<H>  /  DBili  0.8<H>  /  AST  25  /  ALT  20  /  AlkPhos  153<H>  10-29    PT/INR - ( 28 Oct 2020 07:30 )   PT: 15.0 sec;   INR: 1.30 ratio         PTT - ( 28 Oct 2020 07:30 )  PTT:35.7 sec  LIVER FUNCTIONS - ( 29 Oct 2020 07:05 )  Alb: 3.2 g/dL / Pro: 6.1 gm/dL / ALK PHOS: 153 U/L / ALT: 20 U/L / AST: 25 U/L / GGT: x             RADIOLOGY & ADDITIONAL STUDIES:

## 2020-10-30 NOTE — CONSULT NOTE ADULT - ASSESSMENT
76 y/o M w/ PMH of CHF, COPD, HTN, dyslipidemia, CAD, a-fib on xarelto, arthritis, s/p PPM, p/w abdominal pain. Patient states abdominal pain started 1 week ago, and when he walks he also gets dyspnea on exertion. Also c/o LE pain, LLE worse than right. States pain is mostly above the knee. Patient also c/o dysuria and R flank pain. Denies fever, chills, nausea, vomiting, CP, cough, runny nose, diarrhea. here UA positive, CT abd/pelvis with ascites likely d/t CHF, urine cx growing ESBL Ecoli started on meropenem.       1. ESBL Ecoli complicated cystitis  - agree with IV meropenem  - plan for 3-5 day course  - monitor temps  - tolerating abx well so far; no side effects noted  - reason for abx use and side effects reviewed with patient  - supportive care  - fu cbc    2. other issues - care per medicine 
Ascites  CHF  Normal appearance of Liver on CT  Mildly elevated PT  Ascites/Anasarca likely due to CHF  REC  Continue Diuresis  Supportive care

## 2020-10-30 NOTE — PROGRESS NOTE ADULT - SUBJECTIVE AND OBJECTIVE BOX
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    10/29/20:  Complaining of leg edema and dyspnea.  10/30/20: improvement in dyspnea, still w/ LE edema.    MEDICATIONS:  OUTPATIENT  Home Medications:  aspirin 81 mg oral tablet, chewable: 1 tab(s) orally once a day  ***entered from previous admission- unsure if pt is taking*** (27 Oct 2020 23:00)  bethanechol 50 mg oral tablet: 1 tab(s) orally 2 times a day (27 Oct 2020 23:00)      INPATIENT  MEDICATIONS  (STANDING):  aspirin  chewable 81 milliGRAM(s) Oral daily  atorvastatin 20 milliGRAM(s) Oral at bedtime  bethanechol 50 milliGRAM(s) Oral two times a day  carvedilol 6.25 milliGRAM(s) Oral every 12 hours  furosemide   Injectable 40 milliGRAM(s) IV Push two times a day  lisinopril 5 milliGRAM(s) Oral daily  loratadine 10 milliGRAM(s) Oral daily  meropenem  IVPB 1000 milliGRAM(s) IV Intermittent once  meropenem  IVPB 1000 milliGRAM(s) IV Intermittent every 8 hours  meropenem  IVPB      rivaroxaban 20 milliGRAM(s) Oral with dinner  spironolactone 25 milliGRAM(s) Oral daily  tamsulosin 0.4 milliGRAM(s) Oral at bedtime    MEDICATIONS  (PRN):    Vital Signs Last 24 Hrs  T(C): 36.7 (30 Oct 2020 09:12), Max: 36.7 (29 Oct 2020 20:20)  T(F): 98 (30 Oct 2020 09:12), Max: 98.1 (29 Oct 2020 20:20)  HR: 60 (30 Oct 2020 09:12) (60 - 60)  BP: 144/67 (30 Oct 2020 09:12) (144/67 - 153/78)  BP(mean): --  RR: 18 (30 Oct 2020 09:12) (18 - 18)  SpO2: 99% (30 Oct 2020 09:12) (95% - 99%)Daily     Daily Weight in k.7 (30 Oct 2020 06:49)I&O's Summary    29 Oct 2020 07:01  -  30 Oct 2020 07:00  --------------------------------------------------------  IN: 0 mL / OUT: 500 mL / NET: -500 mL      PHYSICAL EXAM:  Constitutional: NAD, awake and alert, overweight  Respiratory: Breath sounds are clear bilaterally, No wheezing, rales or rhonchi  Cardiovascular: S1 and S2, regular rate and rhythm, systolic murmur  Gastrointestinal: Distended abdomen  Extremities: Marked bilateral leg edema (distal legs + thighs)  Skin: No rash    LABS: All Labs Reviewed:                        11.7   6.64  )-----------( 136      ( 30 Oct 2020 07:30 )             38.7                         11.9   7.27  )-----------( 149      ( 28 Oct 2020 07:30 )             39.0                         11.9   8.77  )-----------( 155      ( 27 Oct 2020 18:04 )             39.2     30 Oct 2020 07:30    142    |  103    |  22     ----------------------------<  88     3.9     |  34     |  1.20   29 Oct 2020 07:05    142    |  104    |  22     ----------------------------<  84     3.7     |  35     |  1.26   28 Oct 2020 07:30    139    |  102    |  21     ----------------------------<  82     4.0     |  30     |  1.25     Ca    8.7        30 Oct 2020 07:30  Ca    8.6        29 Oct 2020 07:05  Ca    8.9        28 Oct 2020 07:30  Phos  3.5       28 Oct 2020 07:30  Mg     2.1       29 Oct 2020 07:05  Mg     2.1       28 Oct 2020 07:30  Mg     2.3       27 Oct 2020 18:04    TPro  5.9    /  Alb  x      /  TBili  x      /  DBili  x      /  AST  x      /  ALT  x      /  AlkPhos  x      29 Oct 2020 18:42  TPro  6.1    /  Alb  3.2    /  TBili  1.5    /  DBili  0.8    /  AST  25     /  ALT  20     /  AlkPhos  153    29 Oct 2020 07:05  TPro  6.3    /  Alb  3.2    /  TBili  2.3    /  DBili  x      /  AST  29     /  ALT  20     /  AlkPhos  138    28 Oct 2020 07:30      12 Lead ECG (10.28.20 @ 07:59):  Ventricular-paced rhythm    TTE Echo Complete w/o Contrast w/ Doppler (20 @ 21:42):   Moderate to severe (3+)mitral regurgitation.   Mild to Moderate aortic regurgitation.   Mild pulmonary hypertension.   Mild (1+) tricuspid valve regurgitation is present. Estimated left ventricular ejection fraction is 45 %.   The left ventricle cavity is mildly dilated.   The left ventricle is normal in wall thickness. Mild, diffuse hypokinesis of the left ventricle is present.   The right ventricle is normal in size, wall thickness, wall motion, and contractility based on TAPSE.   A device wire is seen in the RV and RA.    Cardiac Cath Lab - Adult (20 @ 14:25):  Non-Obstructive CAD with Abnormal left ventricular function.    Severe pulmonary hypertension.  Mildly reduced left ventricular systolic function.

## 2020-10-30 NOTE — PROGRESS NOTE ADULT - SUBJECTIVE AND OBJECTIVE BOX
CHIEF COMPLAINT/DIAGNOSIS: abd pain, LOPEZ    HPI: 74 y/o M w/ PMHx of systolic CHF (EF 45%), COPD - Severe Pulmonary HTN, HTN, dyslipidemia, CAD, a-fib/PPM on xarelto, arthritis p/w abdominal pain. Patient states abdominal pain started 1 week ago, and when he walks he also gets dyspnea on exertion. Also c/o LE pain, LLE worse than right. States pain is mostly above the knee. Patient also c/o dysuria and R flank pain. Denies fever, chills, nausea, vomiting, CP, cough, runny nose, diarrhea.     10/28:  #[id number] ~ c/o abdominal tightness - better after para. ble pain - hard to walk. + LOPEZ. no other complaints.  10/29:  #[id number] ~ reports improvement with leg swelling and breathing. no other complaints  10/30: #[id number] - still with leg swelling, less. patient advised about diet/medication compliance and f/u. no other issues or c/o    REVIEW OF SYSTEMS:  All other review of systems is negative unless indicated above    PHYSICAL EXAM:  Constitutional: Awake and alert, well-developed  HEENT: Normal Hearing, MMM  Neck: Soft and supple, No LAD, + JVD  Respiratory: Breath sounds are crackles b/l   Cardiovascular: S1 and S2, IRIR, paced. + murmur  Gastrointestinal: Bowel Sounds present, firm, tight. obese  Extremities: b/l  peripheral edema, pitting. anasarca   Vascular: 2+ peripheral pulses  Neurological: A/O x 3, no focal deficits  Musculoskeletal: 5/5 strength b/l upper and lower extremities  Skin: ble redness, swelling -- persistent     Vital Signs Last 24 Hrs  T(C): 36.7 (30 Oct 2020 09:12), Max: 36.7 (29 Oct 2020 20:20)  T(F): 98 (30 Oct 2020 09:12), Max: 98.1 (29 Oct 2020 20:20)  HR: 60 (30 Oct 2020 09:12) (60 - 60)  BP: 144/67 (30 Oct 2020 09:12) (144/67 - 153/78)  BP(mean): --  RR: 18 (30 Oct 2020 09:12) (18 - 18)  SpO2: 99% (30 Oct 2020 09:12) (95% - 99%)    LABS: All Labs Reviewed:                        11.7   6.64  )-----------( 136      ( 30 Oct 2020 07:30 )             38.7     10-30    142  |  103  |  22  ----------------------------<  88  3.9   |  34<H>  |  1.20    Ca    8.7      30 Oct 2020 07:30  Mg     2.1     10-29    TPro  5.9<L>  /  Alb  x   /  TBili  x   /  DBili  x   /  AST  x   /  ALT  x   /  AlkPhos  x   10-29      RADIOLOGY:  < from: CT Abdomen and Pelvis No Cont (10.27.20 @ 18:23) >  IMPRESSION:  Moderate volume of abdominal and pelvic ascites.  Diffuse anasarca.  Small to moderate right-sided pleural effusion.  No bowel obstruction.  < end of copied text >    < from: Xray Chest 1 View- PORTABLE-Urgent (Xray Chest 1 View- PORTABLE-Urgent .) (10.27.20 @ 18:36) >  IMPRESSION: Stable chest without acute finding.  < end of copied text >    ECHOCARDIOGRAM:    < from: TTE Echo Complete w/o Contrast w/ Doppler (08.28.20 @ 21:42) >   Impression     Summary     The mitral valve leaflets appear thickened.   Mild mitral annular calcification is present.   Moderate to severe (3+)mitral regurgitation is present.   The aortic valve is trileaflet with thin pliable leaflets.   Mild to Moderate aortic regurgitation is present.   Normal appearing tricuspid valve structure.   Mild pulmonary hypertension.   Mild (1+) tricuspid valveregurgitation is present.   Estimated left ventricular ejection fraction is 45 %.   The left ventricle cavity is mildly dilated.   The left ventricle is normal in wall thickness.   Mild, diffuse hypokinesis of the left ventricle is present.   The right ventricle is normal in size, wall thickness, wall motion, and   contractility based on TAPSE.   A device wire is seen in the RV and RA.    < end of copied text >    MEDICATIONS  (STANDING):  aspirin  chewable 81 milliGRAM(s) Oral daily  atorvastatin 20 milliGRAM(s) Oral at bedtime  bethanechol 50 milliGRAM(s) Oral two times a day  carvedilol 6.25 milliGRAM(s) Oral every 12 hours  furosemide   Injectable 40 milliGRAM(s) IV Push two times a day  lisinopril 5 milliGRAM(s) Oral daily  loratadine 10 milliGRAM(s) Oral daily  meropenem  IVPB 1000 milliGRAM(s) IV Intermittent once  meropenem  IVPB 1000 milliGRAM(s) IV Intermittent every 8 hours  meropenem  IVPB      rivaroxaban 20 milliGRAM(s) Oral with dinner  spironolactone 25 milliGRAM(s) Oral daily  tamsulosin 0.4 milliGRAM(s) Oral at bedtime    MEDICATIONS  (PRN):    TELEMETRY REVIEW:  10/30 - vpaced, underlying afib/aflutter rate controlled    ASSESSMENT AND PLAN:    74 y/o M w/ PMH of systolic CHF, COPD, HTN, dyslipidemia, CAD, a-fib on xarelto, arthritis, s/p PPM, p/w abdominal pain    1) Acute on Chronic systolic/diastolic CHF exacerbation  - tele monitoring. tele review as above. Paced  - Cont. IV Lasix, spirolactone   - daily weights, I&Os   - On BB, allergy to ACE/ARB? d/[place] on Entresto last admission ~ start ARB patient reports no allergies   - Echo 8/28/20 - EF 45%  - Cardiac Cath 9/28 ~ w/ CAD, severe pulm HTN  - LE swelling - arterial/ venous dopplers pending > negative  - Cardio consult appreciated  10/29 ~ increase Lasix to IV BID, add losartan daily.   10/30 ~ d/w cardio switch ARB to ACEi    2) Elevated troponins likely demand due to HF exacerbation   - denies CP. trops minimally elevated - flat  - cont ASA 81, statin  - ecg reviewed. no significant ST changes. recently underwent [facility] results above.  - cardio consult     3) Moderate Pelvic ascites / Anasarca - Possibly 2/2 CHF, suspected portal HTN  - s/p paracentesis 2700cc. Cultures sent.  SAAG = 1.2  - check hep acute panel, lipids ~ wnl  - GI consult appreciated     4) Pyelonephritis | ESBL UTI   - f/u Bcx NGTD, Ucx +(esbl)  - d/c ceftriaxone, switch to meropenum  - ID consult, isolation     5) Elevate sCr, did not meet KIDGO criteria - stable  - monitor on IV diuretics.   - I&Os, daily weights.    6) Chronic Afib | PPM  - Paced. afib. cont. Xarelto, BB     7) Enlarged prostate - Cont. Flomax     8) Small B/L anterior external iliac chain pelvic lymph nodes / Small BL inguinal lymph nodes   - F/u outpatient     9) DVT ppx - Xarelto     Dispo: cont. abx for pyelo, ESBL uti. on meropenum. ID consult. IV diuresis for HF. d/c tele.  CHIEF COMPLAINT/DIAGNOSIS: abd pain, LOPEZ    HPI: 76 y/o M w/ PMHx of systolic CHF (EF 45%), COPD - Severe Pulmonary HTN, HTN, dyslipidemia, CAD, a-fib/PPM on xarelto, arthritis p/w abdominal pain. Patient states abdominal pain started 1 week ago, and when he walks he also gets dyspnea on exertion. Also c/o LE pain, LLE worse than right. States pain is mostly above the knee. Patient also c/o dysuria and R flank pain. Denies fever, chills, nausea, vomiting, CP, cough, runny nose, diarrhea.     10/28:  #[id number] ~ c/o abdominal tightness - better after para. ble pain - hard to walk. + LOPEZ. no other complaints.  10/29:  #[id number] ~ reports improvement with leg swelling and breathing. no other complaints  10/30: #[id number] - still with leg swelling, less. patient advised about diet/medication compliance and f/u. no other issues or c/o. significant wgt loss noted 5kg    REVIEW OF SYSTEMS:  All other review of systems is negative unless indicated above    PHYSICAL EXAM:  Constitutional: Awake and alert, well-developed  HEENT: Normal Hearing, MMM  Neck: Soft and supple, No LAD, + JVD  Respiratory: Breath sounds are crackles b/l   Cardiovascular: S1 and S2, IRIR, paced. + murmur  Gastrointestinal: Bowel Sounds present, firm, tight. obese  Extremities: b/l  peripheral edema, pitting. anasarca   Vascular: 2+ peripheral pulses  Neurological: A/O x 3, no focal deficits  Musculoskeletal: 5/5 strength b/l upper and lower extremities  Skin: ble redness, swelling -- persistent     Vital Signs Last 24 Hrs  T(C): 36.7 (30 Oct 2020 09:12), Max: 36.7 (29 Oct 2020 20:20)  T(F): 98 (30 Oct 2020 09:12), Max: 98.1 (29 Oct 2020 20:20)  HR: 60 (30 Oct 2020 09:12) (60 - 60)  BP: 144/67 (30 Oct 2020 09:12) (144/67 - 153/78)  BP(mean): --  RR: 18 (30 Oct 2020 09:12) (18 - 18)  SpO2: 99% (30 Oct 2020 09:12) (95% - 99%)    LABS: All Labs Reviewed:                        11.7   6.64  )-----------( 136      ( 30 Oct 2020 07:30 )             38.7     10-30    142  |  103  |  22  ----------------------------<  88  3.9   |  34<H>  |  1.20    Ca    8.7      30 Oct 2020 07:30  Mg     2.1     10-29    TPro  5.9<L>  /  Alb  x   /  TBili  x   /  DBili  x   /  AST  x   /  ALT  x   /  AlkPhos  x   10-29      RADIOLOGY:  < from: CT Abdomen and Pelvis No Cont (10.27.20 @ 18:23) >  IMPRESSION:  Moderate volume of abdominal and pelvic ascites.  Diffuse anasarca.  Small to moderate right-sided pleural effusion.  No bowel obstruction.  < end of copied text >    < from: Xray Chest 1 View- PORTABLE-Urgent (Xray Chest 1 View- PORTABLE-Urgent .) (10.27.20 @ 18:36) >  IMPRESSION: Stable chest without acute finding.  < end of copied text >    ECHOCARDIOGRAM:    < from: TTE Echo Complete w/o Contrast w/ Doppler (08.28.20 @ 21:42) >   Impression     Summary     The mitral valve leaflets appear thickened.   Mild mitral annular calcification is present.   Moderate to severe (3+)mitral regurgitation is present.   The aortic valve is trileaflet with thin pliable leaflets.   Mild to Moderate aortic regurgitation is present.   Normal appearing tricuspid valve structure.   Mild pulmonary hypertension.   Mild (1+) tricuspid valveregurgitation is present.   Estimated left ventricular ejection fraction is 45 %.   The left ventricle cavity is mildly dilated.   The left ventricle is normal in wall thickness.   Mild, diffuse hypokinesis of the left ventricle is present.   The right ventricle is normal in size, wall thickness, wall motion, and   contractility based on TAPSE.   A device wire is seen in the RV and RA.    < end of copied text >    MEDICATIONS  (STANDING):  aspirin  chewable 81 milliGRAM(s) Oral daily  atorvastatin 20 milliGRAM(s) Oral at bedtime  bethanechol 50 milliGRAM(s) Oral two times a day  carvedilol 6.25 milliGRAM(s) Oral every 12 hours  furosemide   Injectable 40 milliGRAM(s) IV Push two times a day  lisinopril 5 milliGRAM(s) Oral daily  loratadine 10 milliGRAM(s) Oral daily  meropenem  IVPB 1000 milliGRAM(s) IV Intermittent once  meropenem  IVPB 1000 milliGRAM(s) IV Intermittent every 8 hours  meropenem  IVPB      rivaroxaban 20 milliGRAM(s) Oral with dinner  spironolactone 25 milliGRAM(s) Oral daily  tamsulosin 0.4 milliGRAM(s) Oral at bedtime    MEDICATIONS  (PRN):    TELEMETRY REVIEW:  10/30 - vpaced, underlying afib/aflutter rate controlled    ASSESSMENT AND PLAN:    76 y/o M w/ PMH of systolic CHF, COPD, HTN, dyslipidemia, CAD, a-fib on xarelto, arthritis, s/p PPM, p/w abdominal pain    1) Acute on Chronic systolic/diastolic CHF exacerbation  - tele monitoring. tele review as above. Paced  - Cont. IV Lasix, spirolactone   - daily weights, I&Os   - On BB, allergy to ACE/ARB? d/[place] on Entresto last admission ~ start ARB patient reports no allergies   - Echo 8/28/20 - EF 45%  - Cardiac Cath 9/28 ~ w/ CAD, severe pulm HTN  - LE swelling - arterial/ venous dopplers pending > negative  - Cardio consult appreciated  10/29 ~ increase Lasix to IV BID, add losartan daily.   10/30 ~ d/w cardio switch ARB to ACEi    2) Elevated troponins likely demand due to HF exacerbation   - denies CP. trops minimally elevated - flat  - cont ASA 81, statin  - ecg reviewed. no significant ST changes. recently underwent [facility] results above.  - cardio consult     3) Moderate Pelvic ascites / Anasarca - Possibly 2/2 CHF, suspected portal HTN  - s/p paracentesis 2700cc. Cultures sent.  SAAG = 1.2  - check hep acute panel, lipids ~ wnl  - GI consult appreciated     4) Pyelonephritis | ESBL UTI   - f/u Bcx NGTD, Ucx +(esbl)  - d/c ceftriaxone, switch to meropenum  - ID consult, isolation     5) Elevate sCr, did not meet KIDGO criteria - stable  - monitor on IV diuretics.   - I&Os, daily weights.    6) Chronic Afib | PPM  - Paced. afib. cont. Xarelto, BB     7) Enlarged prostate - Cont. Flomax     8) Small B/L anterior external iliac chain pelvic lymph nodes / Small BL inguinal lymph nodes   - F/u outpatient     9) DVT ppx - Xarelto     Dispo: cont. abx for pyelo, ESBL uti. on meropenum. ID consult. IV diuresis for HF. d/c tele.

## 2020-10-30 NOTE — PROGRESS NOTE ADULT - PROBLEM SELECTOR PLAN 1
Dyspnea and edema likely related to HFpEF exacerbation; continue to diurese; cont. furosemide to 40mg IV BID; rosalia started today, provider to RN order entered to check SPO2 w/ ambulation.

## 2020-10-31 LAB
ANION GAP SERPL CALC-SCNC: 4 MMOL/L — LOW (ref 5–17)
BUN SERPL-MCNC: 21 MG/DL — SIGNIFICANT CHANGE UP (ref 7–23)
CALCIUM SERPL-MCNC: 8.9 MG/DL — SIGNIFICANT CHANGE UP (ref 8.5–10.1)
CHLORIDE SERPL-SCNC: 103 MMOL/L — SIGNIFICANT CHANGE UP (ref 96–108)
CO2 SERPL-SCNC: 34 MMOL/L — HIGH (ref 22–31)
CREAT SERPL-MCNC: 1.23 MG/DL — SIGNIFICANT CHANGE UP (ref 0.5–1.3)
GLUCOSE SERPL-MCNC: 85 MG/DL — SIGNIFICANT CHANGE UP (ref 70–99)
HCT VFR BLD CALC: 37.8 % — LOW (ref 39–50)
HGB BLD-MCNC: 11.5 G/DL — LOW (ref 13–17)
MCHC RBC-ENTMCNC: 29.8 PG — SIGNIFICANT CHANGE UP (ref 27–34)
MCHC RBC-ENTMCNC: 30.4 GM/DL — LOW (ref 32–36)
MCV RBC AUTO: 97.9 FL — SIGNIFICANT CHANGE UP (ref 80–100)
NT-PROBNP SERPL-SCNC: 1811 PG/ML — HIGH (ref 0–450)
PLATELET # BLD AUTO: 132 K/UL — LOW (ref 150–400)
POTASSIUM SERPL-MCNC: 3.8 MMOL/L — SIGNIFICANT CHANGE UP (ref 3.5–5.3)
POTASSIUM SERPL-SCNC: 3.8 MMOL/L — SIGNIFICANT CHANGE UP (ref 3.5–5.3)
RBC # BLD: 3.86 M/UL — LOW (ref 4.2–5.8)
RBC # FLD: 15 % — HIGH (ref 10.3–14.5)
SODIUM SERPL-SCNC: 141 MMOL/L — SIGNIFICANT CHANGE UP (ref 135–145)
WBC # BLD: 6.6 K/UL — SIGNIFICANT CHANGE UP (ref 3.8–10.5)
WBC # FLD AUTO: 6.6 K/UL — SIGNIFICANT CHANGE UP (ref 3.8–10.5)

## 2020-10-31 PROCEDURE — 99232 SBSQ HOSP IP/OBS MODERATE 35: CPT

## 2020-10-31 PROCEDURE — 99233 SBSQ HOSP IP/OBS HIGH 50: CPT

## 2020-10-31 RX ORDER — FUROSEMIDE 40 MG
40 TABLET ORAL THREE TIMES A DAY
Refills: 0 | Status: DISCONTINUED | OUTPATIENT
Start: 2020-10-31 | End: 2020-11-01

## 2020-10-31 RX ORDER — ACETAMINOPHEN 500 MG
650 TABLET ORAL EVERY 6 HOURS
Refills: 0 | Status: DISCONTINUED | OUTPATIENT
Start: 2020-10-31 | End: 2020-11-03

## 2020-10-31 RX ADMIN — CARVEDILOL PHOSPHATE 6.25 MILLIGRAM(S): 80 CAPSULE, EXTENDED RELEASE ORAL at 10:37

## 2020-10-31 RX ADMIN — Medication 40 MILLIGRAM(S): at 14:21

## 2020-10-31 RX ADMIN — Medication 81 MILLIGRAM(S): at 10:30

## 2020-10-31 RX ADMIN — MEROPENEM 100 MILLIGRAM(S): 1 INJECTION INTRAVENOUS at 21:35

## 2020-10-31 RX ADMIN — MEROPENEM 100 MILLIGRAM(S): 1 INJECTION INTRAVENOUS at 05:19

## 2020-10-31 RX ADMIN — MEROPENEM 100 MILLIGRAM(S): 1 INJECTION INTRAVENOUS at 13:23

## 2020-10-31 RX ADMIN — LORATADINE 10 MILLIGRAM(S): 10 TABLET ORAL at 10:31

## 2020-10-31 RX ADMIN — Medication 40 MILLIGRAM(S): at 21:34

## 2020-10-31 RX ADMIN — CARVEDILOL PHOSPHATE 6.25 MILLIGRAM(S): 80 CAPSULE, EXTENDED RELEASE ORAL at 21:35

## 2020-10-31 RX ADMIN — TAMSULOSIN HYDROCHLORIDE 0.4 MILLIGRAM(S): 0.4 CAPSULE ORAL at 21:35

## 2020-10-31 RX ADMIN — Medication 50 MILLIGRAM(S): at 21:35

## 2020-10-31 RX ADMIN — Medication 650 MILLIGRAM(S): at 22:35

## 2020-10-31 RX ADMIN — Medication 40 MILLIGRAM(S): at 10:37

## 2020-10-31 RX ADMIN — RIVAROXABAN 20 MILLIGRAM(S): KIT at 17:49

## 2020-10-31 RX ADMIN — Medication 50 MILLIGRAM(S): at 10:30

## 2020-10-31 RX ADMIN — SPIRONOLACTONE 25 MILLIGRAM(S): 25 TABLET, FILM COATED ORAL at 10:37

## 2020-10-31 RX ADMIN — LISINOPRIL 5 MILLIGRAM(S): 2.5 TABLET ORAL at 10:37

## 2020-10-31 RX ADMIN — Medication 650 MILLIGRAM(S): at 21:36

## 2020-10-31 RX ADMIN — ATORVASTATIN CALCIUM 20 MILLIGRAM(S): 80 TABLET, FILM COATED ORAL at 21:35

## 2020-10-31 NOTE — PROGRESS NOTE ADULT - PROBLEM SELECTOR PLAN 1
Persistently edematous despite lasix 40 IV BID, increase to TID & will consider 80 BID tommorrow.  cont. rosalia, lisinpril, coreg.  O2 sats w/ ambulation.    Dyspnea and edema likely related to HFpEF exacerbation; continue to diurese; cont. furosemide to 40mg IV BID; rosalia started today, provider to RN order entered to check SPO2 w/ ambulation. Persistently edematous despite lasix 40 IV BID, increase to TID & will consider 80 BID tommorrow.  cont. rosalia, lisinpril, coreg.  O2 sats w/ ambulation.

## 2020-10-31 NOTE — PROGRESS NOTE ADULT - ASSESSMENT
76 y/o M w/ PMH of CHF, COPD, HTN, dyslipidemia, CAD, a-fib on xarelto, arthritis, s/p PPM, p/w abdominal pain. Patient states abdominal pain started 1 week ago, and when he walks he also gets dyspnea on exertion. Also c/o LE pain, LLE worse than right. States pain is mostly above the knee. Patient also c/o dysuria and R flank pain. Denies fever, chills, nausea, vomiting, CP, cough, runny nose, diarrhea. here UA positive, CT abd/pelvis with ascites likely d/t CHF, urine cx growing ESBL Ecoli started on meropenem.     1. ESBL Ecoli complicated cystitis  - on IV meropenem #2-3  - plan for 3-5 day course  - monitor temps  - tolerating abx well so far; no side effects noted  - reason for abx use and side effects reviewed with patient  - supportive care  - fu cbc    2. other issues - care per medicine

## 2020-10-31 NOTE — PROGRESS NOTE ADULT - SUBJECTIVE AND OBJECTIVE BOX
seen and examined at bedside w nursing     feels well     le edema improved   no chest pain   no flank pain   no fc     ros all others are neg     Vital Signs Last 24 Hrs  T(C): 36.6 (31 Oct 2020 08:16), Max: 36.9 (30 Oct 2020 19:53)  T(F): 97.8 (31 Oct 2020 08:16), Max: 98.4 (30 Oct 2020 19:53)  HR: 62 (31 Oct 2020 08:16) (60 - 62)  BP: 121/68 (31 Oct 2020 08:16) (121/68 - 136/64)  BP(mean): --  RR: 18 (31 Oct 2020 08:16) (18 - 18)  SpO2: 98% (31 Oct 2020 08:16) (98% - 98%)                        11.5   6.60  )-----------( 132      ( 31 Oct 2020 07:39 )             37.8     10-31    141  |  103  |  21  ----------------------------<  85  3.8   |  34<H>  |  1.23    Ca    8.9      31 Oct 2020 07:39    TPro  5.9<L>  /  Alb  x   /  TBili  x   /  DBili  x   /  AST  x   /  ALT  x   /  AlkPhos  x   10-29    Home Medications:  aspirin 81 mg oral tablet, chewable: 1 tab(s) orally once a day  ***entered from previous admission- unsure if pt is taking*** (27 Oct 2020 23:00)  bethanechol 50 mg oral tablet: 1 tab(s) orally 2 times a day (27 Oct 2020 23:00)      MEDICATIONS  (STANDING):  aspirin  chewable 81 milliGRAM(s) Oral daily  atorvastatin 20 milliGRAM(s) Oral at bedtime  bethanechol 50 milliGRAM(s) Oral two times a day  carvedilol 6.25 milliGRAM(s) Oral every 12 hours  furosemide   Injectable 40 milliGRAM(s) IV Push two times a day  lisinopril 5 milliGRAM(s) Oral daily  loratadine 10 milliGRAM(s) Oral daily  meropenem  IVPB 1000 milliGRAM(s) IV Intermittent every 8 hours  meropenem  IVPB      rivaroxaban 20 milliGRAM(s) Oral with dinner  spironolactone 25 milliGRAM(s) Oral daily  tamsulosin 0.4 milliGRAM(s) Oral at bedtime    MEDICATIONS  (PRN):  acetaminophen   Tablet .. 650 milliGRAM(s) Oral every 6 hours PRN Mild Pain (1 - 3)

## 2020-10-31 NOTE — PROGRESS NOTE ADULT - ASSESSMENT
76 y/o M w/ PMH of systolic CHF, COPD, essential HTN, dyslipidemia, CAD, a-fib on xarelto, arthritis, s/p PPM, p/w abdominal pain    1) PAF Acute decompensated NYSA IV ACC Stage C Chronic systolic/diastolic CHF exacerbation PPM, PHTN  improving   - Cont. IV Lasix per cards   io uo and regular weights   continue current cardiac regimen   DOAC   monitor lytes     2) Elevated troponins likely demand due to HF exacerbation no chest pain   - cont ASA 81, statin  - cardio consult noted as above, assist appreciated defer sanchez to cards team     3) Moderate Pelvic ascites / Anasarca (no sbp clinically)   - s/p paracentesis 2700cc. Cultures sent.  SAAG = 1.2  - GI consult appreciated most likely to HF     4) Pyelonephritis | ESBL UTI not septic   - f/u Bcx NGTD, Ucx +(esbl)  - continue meropenum  - ID consulted, isolation   check blood cx       5) Elevate sCr, did not meet KIDGO criteria - stable  - monitor on IV diuretics.   - I&Os, daily weights.    6) Enlarged prostate - Cont. Flomax     7) Small B/L anterior external iliac chain pelvic lymph nodes / Small BL inguinal lymph nodes   - F/u outpatient     8) DVT ppx - Xarelto     reviewed with patient plan of care

## 2020-10-31 NOTE — PROGRESS NOTE ADULT - SUBJECTIVE AND OBJECTIVE BOX
Date of service: 10-31-20 @ 14:54    pt seen and examined  feels better  sitting up in bed  urinating without difficulty  afebrile    ROS: no fever or chills; denies dizziness, no HA, no SOB or cough, no abdominal pain, no diarrhea or constipation; no dysuria, no urinary frequency, no legs pain, no rashes    MEDICATIONS  (STANDING):  aspirin  chewable 81 milliGRAM(s) Oral daily  atorvastatin 20 milliGRAM(s) Oral at bedtime  bethanechol 50 milliGRAM(s) Oral two times a day  carvedilol 6.25 milliGRAM(s) Oral every 12 hours  furosemide   Injectable 40 milliGRAM(s) IV Push three times a day  lisinopril 5 milliGRAM(s) Oral daily  loratadine 10 milliGRAM(s) Oral daily  meropenem  IVPB 1000 milliGRAM(s) IV Intermittent every 8 hours  meropenem  IVPB      rivaroxaban 20 milliGRAM(s) Oral with dinner  spironolactone 25 milliGRAM(s) Oral daily  tamsulosin 0.4 milliGRAM(s) Oral at bedtime    MEDICATIONS  (PRN):  acetaminophen   Tablet .. 650 milliGRAM(s) Oral every 6 hours PRN Mild Pain (1 - 3)      Vital Signs Last 24 Hrs  T(C): 36.6 (31 Oct 2020 08:16), Max: 36.9 (30 Oct 2020 19:53)  T(F): 97.8 (31 Oct 2020 08:16), Max: 98.4 (30 Oct 2020 19:53)  HR: 60 (31 Oct 2020 14:15) (60 - 62)  BP: 141/73 (31 Oct 2020 14:15) (121/68 - 141/73)  BP(mean): --  RR: 18 (31 Oct 2020 08:16) (18 - 18)  SpO2: 95% (31 Oct 2020 14:15) (95% - 98%)          PE:  Constitutional: NAD  HEENT: NC/AT, EOMI, PERRLA, conjunctivae clear; ears and nose atraumatic; pharynx benign  Neck: supple; thyroid not palpable  Back: no tenderness  Respiratory: respiratory effort normal; clear to auscultation  Cardiovascular: S1S2 regular, no murmurs  Abdomen: soft, not tender, not distended, positive BS; liver and spleen WNL  Genitourinary: no suprapubic tenderness  Lymphatic: no LN palpable  Musculoskeletal: no muscle tenderness, no joint swelling or tenderness  Extremities: no pedal edema  Neurological/ Psychiatric: moving all extremities  Skin: no rashes; no palpable lesions    Labs: all available labs reviewed                                   11.5   6.60  )-----------( 132      ( 31 Oct 2020 07:39 )             37.8     10-31    141  |  103  |  21  ----------------------------<  85  3.8   |  34<H>  |  1.23    Ca    8.9      31 Oct 2020 07:39    TPro  5.9<L>  /  Alb  x   /  TBili  x   /  DBili  x   /  AST  x   /  ALT  x   /  AlkPhos  x   10-29    culCulture - Body Fluid with Gram Stain (10.28.20 @ 10:38)   Gram Stain:   polymorphonuclear leukocytes seen   No organisms seen   by cytocentrifuge   Specimen Source: Ascites Fl   Culture Results:   No growth Culture - Urine (10.27.20 @ 20:30)   - Amikacin: S <=16   - Amoxicillin/Clavulanic Acid: S <=8/4   - Ampicillin: R >16 These ampicillin results predict results for amoxicillin   - Ampicillin/Sulbactam: S 8/4 Enterobacter, Citrobacter, and Serratia may develop resistance during prolonged therapy (3-4 days)   - Aztreonam: R 16   - Cefazolin: R >16 ([id number]) For uncomplicated UTI with K. pneumoniae, E. coli, or P. mirablis: JONAH <=16 is sensitive and JONAH >=32 is resistant. This also predicts results for oral agents cefaclor, cefdinir, cefpodoxime, cefprozil, cefuroxime axetil, cephalexin and locarbef for uncomplicated UTI. Note that some isolates may be susceptible to these agents while testing resistant to cefazolin.   - Cefepime: R 8   - Cefoxitin: S <=8   - Ceftriaxone: R >32 Enterobacter, Citrobacter, and Serratia may develop resistance during prolonged therapy   - Ciprofloxacin: R >2   - Ertapenem: S <=0.5   - Gentamicin: S <=2   - Imipenem: S <=1   - Levofloxacin: R >4   - Meropenem: S <=1   - Nitrofurantoin: S <=32 Should not be used to treat pyelonephritis   - Piperacillin/Tazobactam: S <=8   - Tigecycline: S <=2   - Tobramycin: S <=2   - Trimethoprim/Sulfamethoxazole: R >2/38   Specimen Source: .Urine None   Culture Results:   >100,000 CFU/ml Escherichia coli ESBL   Organism Identification: Escherichia coli ESBL   Organism: Escherichia coli ESBL   Method Type: JONAH Culture - Blood (10.28.20 @ 00:42)   Specimen Source: .Blood None   Culture Results:   No growth to date.     Radiology: all available radiological tests reviewed    EXAM:  XR CHEST PORTABLE URGENT 1V                            PROCEDURE DATE:  10/27/2020          INTERPRETATION:  AP chest on October 27, 2020 at 6:18 PM. Patient is short of breath.    Gross heart enlargement and left-sided pacemaker again noted. Micra pacemaker again noted.    Lungs remain clear.    Chest is similar to October 23 of this year.    IMPRESSION: Stable chest without acute finding.    < end of copied text >  < from: US Duplex Arterial Lower Ext Compl, Bilateral (10.28.20 @ 13:39) >  EXAM:  US DPLX LWR EXT ARTS COMPL BI                            PROCEDURE DATE:  10/28/2020          INTERPRETATION:  US LOWER EXTREMITY ARTERIAL DUPLEX COMPLETE BILATERAL    CLINICAL INDICATION:  Atherosclerosis of native arteries of the right andleft lower extremity with claudication    COMPARISON: None    Real-time sonography of the bilateral lower extremity arterial system was performed using a high-resolution linear array transducer and including color and spectral Doppler.    There is plaque in the lower extremity arteries.. No soft tissue abnormalities are demonstrated in either leg. Flow phase patterns and peak systolic velocity measurements (in cm/s) were observed as follows:    RIGHT:  CFA: Triphasic; 125  Proximal SFA: Triphasic; 99  Mid SFA: Triphasic; 132  Distal SFA: Triphasic;  99  Popliteal: Triphasic;  64  Anterior tibial: Triphasic; 112  Posterior tibial: Triphasic; 103  Peroneal: Triphasic;  62  Dorsalis pedis: Triphasic;  80    LEFT:  CFA: Triphasic; 108  Proximal SFA: Triphasic; 108  Mid SFA: Triphasic; 98  Distal SFA: Triphasic; 56  Popliteal: Triphasic;  75  Anterior tibial: Triphasic; 124  Posterior tibial: Triphasic; 129  Peroneal: Triphasic; 72  Dorsalis pedis: Triphasic;  94    IMPRESSION:    No significant stenosis or occlusion in either leg.    Atherosclerosis of the native arteries of the right and left lower extremities is present.    < end of copied text >  < from: CT Abdomen and Pelvis No Cont (10.27.20 @ 18:23) >    EXAM:  CT ABDOMEN AND PELVIS                            PROCEDURE DATE:  10/27/2020          INTERPRETATION:  CLINICAL INFORMATION: Abdominal distention and bilateral lower extremity edema.    COMPARISON: CT scan abdomen pelvis 8/27/2020.    PROCEDURE:  CT of the Abdomen and Pelvis was performed without intravenous contrast.  Intravenous contrast: None.  Oral contrast: None.  Sagittal and coronal reformats were performed.    FINDINGS:    LOWER CHEST: Cardiac pacemaker leads.  Cardiomegaly.  Small to moderate right-sided pleural effusion.  Small calcified granulomas right middle and left lingula lobes.    The evaluation of solid organ parenchyma is limited without intravenous contrast.    LIVER: Within normal limits.  BILE DUCTS: Normal caliber.  GALLBLADDER: Within normal limits.  SPLEEN: Within normal limits.  PANCREAS: Within normal limits.  ADRENALS: Within normal limits.  KIDNEYS/URETERS:  No hydroureteronephrosis or obstructing ureteral calculus.  Bilateral perinephric stranding.    BLADDER: Mild circumferential bladder wall thickening with mild perivesical stranding, correlate for cystitis.  REPRODUCTIVE ORGANS: Enlarged prostate with broad-based impression upon the urinary bladder.    BOWEL: Evaluation the gastrointestinal tract is limited without distention.  Colonic diverticulosis, without CT evidence of diverticulitis.  No bowel obstruction.  Appendix normal.  PERITONEUM: There is a moderate volume of abdominal and pelvic ascites.    VESSELS: Mild atherosclerotic calcification of the abdominal aorta, which is normal in caliber.  Prominent pelvic collaterals vessels, particularly on the right.  RETROPERITONEUM/LYMPH NODES: Small bilateral anterior external iliac chain pelvic lymph nodes.  Small bilateral inguinal lymph nodes.  ABDOMINAL WALL: Subcutaneous edema/anasarca.    BONES: Degenerative changes spine.    IMPRESSION:    Moderate volume of abdominal and pelvic ascites.  Diffuse anasarca.    Small to moderate right-sided pleural effusion.    No bowel obstruction.    Other findings as discussed above.    < end of copied text >    Advanced directives addressed: full resuscitation

## 2020-10-31 NOTE — PROGRESS NOTE ADULT - SUBJECTIVE AND OBJECTIVE BOX
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    10/29/20:  Complaining of leg edema and dyspnea.  10/30/20: improvement in dyspnea, still w/ LE edema.    MEDICATIONS:  OUTPATIENT  Home Medications:  aspirin 81 mg oral tablet, chewable: 1 tab(s) orally once a day  ***entered from previous admission- unsure if pt is taking*** (27 Oct 2020 23:00)  bethanechol 50 mg oral tablet: 1 tab(s) orally 2 times a day (27 Oct 2020 23:00)  -  INPATIENT  MEDICATIONS  (STANDING):  aspirin  chewable 81 milliGRAM(s) Oral daily  atorvastatin 20 milliGRAM(s) Oral at bedtime  bethanechol 50 milliGRAM(s) Oral two times a day  carvedilol 6.25 milliGRAM(s) Oral every 12 hours  furosemide   Injectable 40 milliGRAM(s) IV Push two times a day  lisinopril 5 milliGRAM(s) Oral daily  loratadine 10 milliGRAM(s) Oral daily  meropenem  IVPB 1000 milliGRAM(s) IV Intermittent every 8 hours  meropenem  IVPB      rivaroxaban 20 milliGRAM(s) Oral with dinner  spironolactone 25 milliGRAM(s) Oral daily  tamsulosin 0.4 milliGRAM(s) Oral at bedtime    MEDICATIONS  (PRN):  acetaminophen   Tablet .. 650 milliGRAM(s) Oral every 6 hours PRN Mild Pain (1 - 3)    Vital Signs Last 24 Hrs  T(C): 36.6 (31 Oct 2020 08:16), Max: 36.9 (30 Oct 2020 19:53)  T(F): 97.8 (31 Oct 2020 08:16), Max: 98.4 (30 Oct 2020 19:53)  HR: 62 (31 Oct 2020 08:16) (60 - 62)  BP: 121/68 (31 Oct 2020 08:16) (121/68 - 136/64)  BP(mean): --  RR: 18 (31 Oct 2020 08:16) (18 - 18)  SpO2: 98% (31 Oct 2020 08:16) (98% - 98%)Daily     Daily Weight in k.9 (31 Oct 2020 06:18)I&O's Summary      31 Oct 2020 08:01  -  31 Oct 2020 13:50  --------------------------------------------------------  IN: 0 mL / OUT: 1200 mL / NET: -1200 mL      PHYSICAL EXAM:  Constitutional: NAD, awake and alert, overweight  Respiratory: Breath sounds are clear bilaterally, No wheezing, rales or rhonchi  Cardiovascular: S1 and S2, regular rate and rhythm, systolic murmur no jVP  Gastrointestinal: Distended abdomen  Extremities: Marked bilateral leg edema (distal legs + thighs) minimal improvement c/w yesterday.  Skin: No rash    LABS: All Labs Reviewed:                        11.5   6.60  )-----------( 132      ( 31 Oct 2020 07:39 )             37.8                         11.7   6.64  )-----------( 136      ( 30 Oct 2020 07:30 )             38.7     31 Oct 2020 07:39    141    |  103    |  21     ----------------------------<  85     3.8     |  34     |  1.23   30 Oct 2020 07:30    142    |  103    |  22     ----------------------------<  88     3.9     |  34     |  1.20   29 Oct 2020 07:05    142    |  104    |  22     ----------------------------<  84     3.7     |  35     |  1.26     Ca    8.9        31 Oct 2020 07:39  Ca    8.7        30 Oct 2020 07:30  Ca    8.6        29 Oct 2020 07:05  Mg     2.1       29 Oct 2020 07:05    TPro  5.9    /  Alb  x      /  TBili  x      /  DBili  x      /  AST  x      /  ALT  x      /  AlkPhos  x      29 Oct 2020 18:42  TPro  6.1    /  Alb  3.2    /  TBili  1.5    /  DBili  0.8    /  AST  25     /  ALT  20     /  AlkPhos  153    29 Oct 2020 07:05    10-31 @ 07:39  Pro Bnp 1811      12 Lead ECG (10.28.20 @ 07:59):  Ventricular-paced rhythm    TTE Echo Complete w/o Contrast w/ Doppler (20 @ 21:42):   Moderate to severe (3+)mitral regurgitation.   Mild to Moderate aortic regurgitation.   Mild pulmonary hypertension.   Mild (1+) tricuspid valve regurgitation is present. Estimated left ventricular ejection fraction is 45 %.   The left ventricle cavity is mildly dilated.   The left ventricle is normal in wall thickness. Mild, diffuse hypokinesis of the left ventricle is present.   The right ventricle is normal in size, wall thickness, wall motion, and contractility based on TAPSE.   A device wire is seen in the RV and RA.    Cardiac Cath Lab - Adult (20 @ 14:25):  Non-Obstructive CAD with Abnormal left ventricular function.    Severe pulmonary hypertension.  Mildly reduced left ventricular systolic function.    ==============================

## 2020-11-01 ENCOUNTER — OUTPATIENT (OUTPATIENT)
Dept: OUTPATIENT SERVICES | Facility: HOSPITAL | Age: 75
LOS: 1 days | End: 2020-11-01
Payer: MEDICARE

## 2020-11-01 ENCOUNTER — OUTPATIENT (OUTPATIENT)
Dept: OUTPATIENT SERVICES | Facility: HOSPITAL | Age: 75
LOS: 1 days | End: 2020-11-01

## 2020-11-01 DIAGNOSIS — Z95.0 PRESENCE OF CARDIAC PACEMAKER: Chronic | ICD-10-CM

## 2020-11-01 LAB
ANION GAP SERPL CALC-SCNC: 4 MMOL/L — LOW (ref 5–17)
BUN SERPL-MCNC: 23 MG/DL — SIGNIFICANT CHANGE UP (ref 7–23)
CALCIUM SERPL-MCNC: 9.3 MG/DL — SIGNIFICANT CHANGE UP (ref 8.5–10.1)
CHLORIDE SERPL-SCNC: 102 MMOL/L — SIGNIFICANT CHANGE UP (ref 96–108)
CO2 SERPL-SCNC: 34 MMOL/L — HIGH (ref 22–31)
CREAT SERPL-MCNC: 1.33 MG/DL — HIGH (ref 0.5–1.3)
GLUCOSE SERPL-MCNC: 92 MG/DL — SIGNIFICANT CHANGE UP (ref 70–99)
HCT VFR BLD CALC: 40 % — SIGNIFICANT CHANGE UP (ref 39–50)
HGB BLD-MCNC: 11.9 G/DL — LOW (ref 13–17)
MCHC RBC-ENTMCNC: 29.3 PG — SIGNIFICANT CHANGE UP (ref 27–34)
MCHC RBC-ENTMCNC: 29.8 GM/DL — LOW (ref 32–36)
MCV RBC AUTO: 98.5 FL — SIGNIFICANT CHANGE UP (ref 80–100)
PLATELET # BLD AUTO: 148 K/UL — LOW (ref 150–400)
POTASSIUM SERPL-MCNC: 4.5 MMOL/L — SIGNIFICANT CHANGE UP (ref 3.5–5.3)
POTASSIUM SERPL-SCNC: 4.5 MMOL/L — SIGNIFICANT CHANGE UP (ref 3.5–5.3)
RBC # BLD: 4.06 M/UL — LOW (ref 4.2–5.8)
RBC # FLD: 14.8 % — HIGH (ref 10.3–14.5)
SODIUM SERPL-SCNC: 140 MMOL/L — SIGNIFICANT CHANGE UP (ref 135–145)
WBC # BLD: 7.44 K/UL — SIGNIFICANT CHANGE UP (ref 3.8–10.5)
WBC # FLD AUTO: 7.44 K/UL — SIGNIFICANT CHANGE UP (ref 3.8–10.5)

## 2020-11-01 PROCEDURE — 99232 SBSQ HOSP IP/OBS MODERATE 35: CPT

## 2020-11-01 PROCEDURE — 99233 SBSQ HOSP IP/OBS HIGH 50: CPT

## 2020-11-01 RX ORDER — FUROSEMIDE 40 MG
80 TABLET ORAL
Refills: 0 | Status: DISCONTINUED | OUTPATIENT
Start: 2020-11-01 | End: 2020-11-03

## 2020-11-01 RX ADMIN — SPIRONOLACTONE 25 MILLIGRAM(S): 25 TABLET, FILM COATED ORAL at 10:30

## 2020-11-01 RX ADMIN — Medication 650 MILLIGRAM(S): at 22:00

## 2020-11-01 RX ADMIN — Medication 50 MILLIGRAM(S): at 21:02

## 2020-11-01 RX ADMIN — CARVEDILOL PHOSPHATE 6.25 MILLIGRAM(S): 80 CAPSULE, EXTENDED RELEASE ORAL at 10:29

## 2020-11-01 RX ADMIN — Medication 650 MILLIGRAM(S): at 21:02

## 2020-11-01 RX ADMIN — ATORVASTATIN CALCIUM 20 MILLIGRAM(S): 80 TABLET, FILM COATED ORAL at 21:02

## 2020-11-01 RX ADMIN — TAMSULOSIN HYDROCHLORIDE 0.4 MILLIGRAM(S): 0.4 CAPSULE ORAL at 21:03

## 2020-11-01 RX ADMIN — Medication 40 MILLIGRAM(S): at 05:40

## 2020-11-01 RX ADMIN — LORATADINE 10 MILLIGRAM(S): 10 TABLET ORAL at 10:30

## 2020-11-01 RX ADMIN — CARVEDILOL PHOSPHATE 6.25 MILLIGRAM(S): 80 CAPSULE, EXTENDED RELEASE ORAL at 21:02

## 2020-11-01 RX ADMIN — Medication 40 MILLIGRAM(S): at 13:25

## 2020-11-01 RX ADMIN — Medication 81 MILLIGRAM(S): at 10:29

## 2020-11-01 RX ADMIN — RIVAROXABAN 20 MILLIGRAM(S): KIT at 17:55

## 2020-11-01 RX ADMIN — Medication 50 MILLIGRAM(S): at 10:29

## 2020-11-01 RX ADMIN — MEROPENEM 100 MILLIGRAM(S): 1 INJECTION INTRAVENOUS at 13:25

## 2020-11-01 RX ADMIN — MEROPENEM 100 MILLIGRAM(S): 1 INJECTION INTRAVENOUS at 21:01

## 2020-11-01 RX ADMIN — MEROPENEM 100 MILLIGRAM(S): 1 INJECTION INTRAVENOUS at 05:40

## 2020-11-01 RX ADMIN — LISINOPRIL 5 MILLIGRAM(S): 2.5 TABLET ORAL at 10:30

## 2020-11-01 RX ADMIN — Medication 80 MILLIGRAM(S): at 17:55

## 2020-11-01 NOTE — PROGRESS NOTE ADULT - ASSESSMENT
76 y/o M w/ PMH of CAD, a-fib on xarelto systolic CHF s/p PPM,  PHTN COPD, essential HTN, dyslipidemia, arthritis, p/w abdominal pain    1) PAF Acute decompensated NYSA IV ACC Stage C Chronic systolic/diastolic CHF exacerbation PPM, PHTN  improving diuretic responsive   - Cont. IV Lasix per cards   io uo and regular weights   continue current cardiac regimen   DOAC   monitor lytes   PT OT     2) Elevated troponins likely demand due to HF exacerbation no chest pain   - cont ASA 81, statin  - cardio consult noted as above, assist appreciated defer sanchez to cards team     3) Moderate Pelvic ascites / Anasarca (no sbp clinically)   - s/p paracentesis 2700cc. Cultures sent.  SAAG = 1.2  - GI consult appreciated most likely to HF     4) Pyelonephritis | ESBL UTI not septic   - f/u Bcx NGTD, Ucx +(esbl) 3-5 d per ID team   - continue meropenum  blood cx ntd     5) Elevate sCr cr 1.3   - monitor on IV diuretics.   - I&Os, daily weights.    6) Enlarged prostate - Cont. Flomax     7) Small B/L anterior external iliac chain pelvic lymph nodes / Small BL inguinal lymph nodes ro malignancy   - F/u outpatient   consider onc to see while inpatient     8) DVT ppx - Xarelto     reviewed with patient plan of care  EST DC 3 Days    74 y/o M w/ PMH of CAD, a-fib on xarelto systolic CHF s/p PPM,  PHTN COPD, essential HTN, dyslipidemia, arthritis, p/w abdominal pain    1) PAF Acute decompensated NYSA IV ACC Stage C Chronic systolic/diastolic CHF exacerbation PPM, PHTN  improving diuretic responsive   - Cont. IV Lasix per cards   io uo and regular weights   continue current cardiac regimen   DOAC   monitor lytes   PT OT     2) Elevated troponins likely demand due to HF exacerbation no chest pain   - cont ASA 81, statin  - cardio consult noted as above, assist appreciated defer sanchez to cards team     3) Moderate Pelvic ascites / Anasarca (no sbp clinically) (( (bi lower abdo pain improving) no guarding, no rebound))  - s/p paracentesis 2700cc. Cultures sent.  SAAG = 1.2  - GI consult appreciated most likely to HF     4) Pyelonephritis | ESBL UTI not septic   - f/u Bcx NGTD, Ucx +(esbl) 3-5 d per ID team   - continue meropenum  blood cx ntd     5) Elevate sCr cr 1.3   - monitor on IV diuretics.   - I&Os, daily weights.    6) Enlarged prostate - Cont. Flomax   op urology     7) Small B/L anterior external iliac chain pelvic lymph nodes / Small BL inguinal lymph nodes ro malignancy   - F/u outpatient   consider onc to see while inpatient     8) DVT ppx - Xarelto     reviewed with patient plan of care  EST DC 3 Days

## 2020-11-01 NOTE — PROGRESS NOTE ADULT - SUBJECTIVE AND OBJECTIVE BOX
seen and examined at bedside     le edema much better bi   still has edema bi le     no chest pain   no sob     ros all others are neg     ICU Vital Signs Last 24 Hrs  T(C): 36.4 (01 Nov 2020 10:15), Max: 36.4 (01 Nov 2020 05:35)  T(F): 97.6 (01 Nov 2020 10:15), Max: 97.6 (01 Nov 2020 10:15)  HR: 62 (01 Nov 2020 10:15) (60 - 62)  BP: 128/74 (01 Nov 2020 10:15) (107/56 - 141/73)  RR: 18 (01 Nov 2020 10:15) (17 - 19)  SpO2: 96% (01 Nov 2020 10:15) (95% - 100%)    MEDICATIONS  (STANDING):  aspirin  chewable 81 milliGRAM(s) Oral daily  atorvastatin 20 milliGRAM(s) Oral at bedtime  bethanechol 50 milliGRAM(s) Oral two times a day  carvedilol 6.25 milliGRAM(s) Oral every 12 hours  furosemide   Injectable 80 milliGRAM(s) IV Push two times a day  lisinopril 5 milliGRAM(s) Oral daily  loratadine 10 milliGRAM(s) Oral daily  meropenem  IVPB 1000 milliGRAM(s) IV Intermittent every 8 hours  meropenem  IVPB      rivaroxaban 20 milliGRAM(s) Oral with dinner  spironolactone 25 milliGRAM(s) Oral daily  tamsulosin 0.4 milliGRAM(s) Oral at bedtime    MEDICATIONS  (PRN):  acetaminophen   Tablet .. 650 milliGRAM(s) Oral every 6 hours PRN Mild Pain (1 - 3)    Home Medications:  aspirin 81 mg oral tablet, chewable: 1 tab(s) orally once a day  ***entered from previous admission- unsure if pt is taking*** (27 Oct 2020 23:00)  bethanechol 50 mg oral tablet: 1 tab(s) orally 2 times a day (27 Oct 2020 23:00)                          11.9   7.44  )-----------( 148      ( 01 Nov 2020 07:20 )             40.0       11-01    140  |  102  |  23  ----------------------------<  92  4.5   |  34<H>  |  1.33<H>    Ca    9.3      01 Nov 2020 07:20

## 2020-11-01 NOTE — PROGRESS NOTE ADULT - PROBLEM SELECTOR PLAN 1
Persistently edematous despite lasix 40  TID & increase to 80 BID cont. rosalia, lisinpril, coreg.  O2 sats w/ ambulation WNL yesterday.

## 2020-11-01 NOTE — PROGRESS NOTE ADULT - SUBJECTIVE AND OBJECTIVE BOX
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    10/29/20:  Complaining of leg edema and dyspnea.  10/30/20: improvement in dyspnea, still w/ LE edema.  10/31/20: LE edema.  20: no dyspnea, ambulating around halls w/o desat.   swelling improved in LE.    MEDICATIONS:  OUTPATIENT  Home Medications:  aspirin 81 mg oral tablet, chewable: 1 tab(s) orally once a day  ***entered from previous admission- unsure if pt is taking*** (27 Oct 2020 23:00)  bethanechol 50 mg oral tablet: 1 tab(s) orally 2 times a day (27 Oct 2020 23:00)    INPATIENT  MEDICATIONS  (STANDING):  aspirin  chewable 81 milliGRAM(s) Oral daily  atorvastatin 20 milliGRAM(s) Oral at bedtime  bethanechol 50 milliGRAM(s) Oral two times a day  carvedilol 6.25 milliGRAM(s) Oral every 12 hours  furosemide   Injectable 40 milliGRAM(s) IV Push three times a day  lisinopril 5 milliGRAM(s) Oral daily  loratadine 10 milliGRAM(s) Oral daily  meropenem  IVPB 1000 milliGRAM(s) IV Intermittent every 8 hours  meropenem  IVPB      rivaroxaban 20 milliGRAM(s) Oral with dinner  spironolactone 25 milliGRAM(s) Oral daily  tamsulosin 0.4 milliGRAM(s) Oral at bedtime    MEDICATIONS  (PRN):  acetaminophen   Tablet .. 650 milliGRAM(s) Oral every 6 hours PRN Mild Pain (1 - 3)    Vital Signs Last 24 Hrs  T(C): 36.4 (2020 10:15), Max: 36.4 (2020 05:35)  T(F): 97.6 (2020 10:15), Max: 97.6 (2020 10:15)  HR: 62 (2020 10:15) (60 - 62)  BP: 128/74 (2020 10:15) (107/56 - 141/73)  BP(mean): --  RR: 18 (2020 10:15) (17 - 19)  SpO2: 96% (2020 10:15) (95% - 100%)Daily     Daily Weight in k.3 (2020 10:15)I&O's Summary    31 Oct 2020 08:01  -  2020 07:00  --------------------------------------------------------  IN: 0 mL / OUT: 4200 mL / NET: -4200 mL    2020 07:01  -  2020 13:29  --------------------------------------------------------  IN: 0 mL / OUT: 1200 mL / NET: -1200 mL      PHYSICAL EXAM:    Constitutional: NAD, awake and alert,   Respiratory: Breath sounds are clear bilaterally, No wheezing, rales or rhonchi  Cardiovascular: S1 and S2, regular rate and rhythm, systolic murmur no jVP  Gastrointestinal: Distended abdomen  Extremities: bilat. LE edema, less in thighs today.  Skin: No rash      LABS: All Labs Reviewed:                        11.9   7.44  )-----------( 148      ( 2020 07:20 )             40.0                         11.5   6.60  )-----------( 132      ( 31 Oct 2020 07:39 )             37.8                         11.7   6.64  )-----------( 136      ( 30 Oct 2020 07:30 )             38.7     2020 07:20    140    |  102    |  23     ----------------------------<  92     4.5     |  34     |  1.33   31 Oct 2020 07:39    141    |  103    |  21     ----------------------------<  85     3.8     |  34     |  1.23   30 Oct 2020 07:30    142    |  103    |  22     ----------------------------<  88     3.9     |  34     |  1.20     Ca    9.3        2020 07:20  Ca    8.9        31 Oct 2020 07:39  Ca    8.7        30 Oct 2020 07:30    TPro  5.9    /  Alb  x      /  TBili  x      /  DBili  x      /  AST  x      /  ALT  x      /  AlkPhos  x      29 Oct 2020 18:42    10-31 @ 07:39  Pro Bnp 1811    12 Lead ECG (10.28.20 @ 07:59):  Ventricular-paced rhythm    TTE Echo Complete w/o Contrast w/ Doppler (20 @ 21:42):   Moderate to severe (3+)mitral regurgitation.   Mild to Moderate aortic regurgitation.   Mild pulmonary hypertension.   Mild (1+) tricuspid valve regurgitation is present. Estimated left ventricular ejection fraction is 45 %.   The left ventricle cavity is mildly dilated.   The left ventricle is normal in wall thickness. Mild, diffuse hypokinesis of the left ventricle is present.   The right ventricle is normal in size, wall thickness, wall motion, and contractility based on TAPSE.   A device wire is seen in the RV and RA.    Cardiac Cath Lab - Adult (20 @ 14:25):  Non-Obstructive CAD with Abnormal left ventricular function.    Severe pulmonary hypertension.  Mildly reduced left ventricular systolic function.

## 2020-11-02 ENCOUNTER — APPOINTMENT (OUTPATIENT)
Dept: ULTRASOUND IMAGING | Facility: CLINIC | Age: 75
End: 2020-11-02

## 2020-11-02 ENCOUNTER — TRANSCRIPTION ENCOUNTER (OUTPATIENT)
Age: 75
End: 2020-11-02

## 2020-11-02 LAB
ANION GAP SERPL CALC-SCNC: 8 MMOL/L — SIGNIFICANT CHANGE UP (ref 5–17)
BUN SERPL-MCNC: 24 MG/DL — HIGH (ref 7–23)
CALCIUM SERPL-MCNC: 8.6 MG/DL — SIGNIFICANT CHANGE UP (ref 8.5–10.1)
CHLORIDE SERPL-SCNC: 103 MMOL/L — SIGNIFICANT CHANGE UP (ref 96–108)
CO2 SERPL-SCNC: 30 MMOL/L — SIGNIFICANT CHANGE UP (ref 22–31)
CREAT SERPL-MCNC: 1.28 MG/DL — SIGNIFICANT CHANGE UP (ref 0.5–1.3)
CULTURE RESULTS: SIGNIFICANT CHANGE UP
CULTURE RESULTS: SIGNIFICANT CHANGE UP
GLUCOSE SERPL-MCNC: 90 MG/DL — SIGNIFICANT CHANGE UP (ref 70–99)
HCT VFR BLD CALC: 35.3 % — LOW (ref 39–50)
HGB BLD-MCNC: 10.8 G/DL — LOW (ref 13–17)
MCHC RBC-ENTMCNC: 29.4 PG — SIGNIFICANT CHANGE UP (ref 27–34)
MCHC RBC-ENTMCNC: 30.6 GM/DL — LOW (ref 32–36)
MCV RBC AUTO: 96.2 FL — SIGNIFICANT CHANGE UP (ref 80–100)
PLATELET # BLD AUTO: 132 K/UL — LOW (ref 150–400)
POTASSIUM SERPL-MCNC: 4 MMOL/L — SIGNIFICANT CHANGE UP (ref 3.5–5.3)
POTASSIUM SERPL-SCNC: 4 MMOL/L — SIGNIFICANT CHANGE UP (ref 3.5–5.3)
RBC # BLD: 3.67 M/UL — LOW (ref 4.2–5.8)
RBC # FLD: 14.6 % — HIGH (ref 10.3–14.5)
SODIUM SERPL-SCNC: 141 MMOL/L — SIGNIFICANT CHANGE UP (ref 135–145)
SPECIMEN SOURCE: SIGNIFICANT CHANGE UP
SPECIMEN SOURCE: SIGNIFICANT CHANGE UP
WBC # BLD: 6.73 K/UL — SIGNIFICANT CHANGE UP (ref 3.8–10.5)
WBC # FLD AUTO: 6.73 K/UL — SIGNIFICANT CHANGE UP (ref 3.8–10.5)

## 2020-11-02 PROCEDURE — 99232 SBSQ HOSP IP/OBS MODERATE 35: CPT

## 2020-11-02 PROCEDURE — 99233 SBSQ HOSP IP/OBS HIGH 50: CPT

## 2020-11-02 RX ORDER — LISINOPRIL 2.5 MG/1
10 TABLET ORAL DAILY
Refills: 0 | Status: DISCONTINUED | OUTPATIENT
Start: 2020-11-02 | End: 2020-11-03

## 2020-11-02 RX ORDER — LISINOPRIL 2.5 MG/1
5 TABLET ORAL ONCE
Refills: 0 | Status: DISCONTINUED | OUTPATIENT
Start: 2020-11-02 | End: 2020-11-02

## 2020-11-02 RX ADMIN — CARVEDILOL PHOSPHATE 6.25 MILLIGRAM(S): 80 CAPSULE, EXTENDED RELEASE ORAL at 22:12

## 2020-11-02 RX ADMIN — SPIRONOLACTONE 25 MILLIGRAM(S): 25 TABLET, FILM COATED ORAL at 10:03

## 2020-11-02 RX ADMIN — MEROPENEM 100 MILLIGRAM(S): 1 INJECTION INTRAVENOUS at 22:12

## 2020-11-02 RX ADMIN — RIVAROXABAN 20 MILLIGRAM(S): KIT at 17:37

## 2020-11-02 RX ADMIN — Medication 50 MILLIGRAM(S): at 10:02

## 2020-11-02 RX ADMIN — ATORVASTATIN CALCIUM 20 MILLIGRAM(S): 80 TABLET, FILM COATED ORAL at 22:12

## 2020-11-02 RX ADMIN — Medication 80 MILLIGRAM(S): at 10:03

## 2020-11-02 RX ADMIN — LISINOPRIL 10 MILLIGRAM(S): 2.5 TABLET ORAL at 10:04

## 2020-11-02 RX ADMIN — MEROPENEM 100 MILLIGRAM(S): 1 INJECTION INTRAVENOUS at 13:27

## 2020-11-02 RX ADMIN — CARVEDILOL PHOSPHATE 6.25 MILLIGRAM(S): 80 CAPSULE, EXTENDED RELEASE ORAL at 10:02

## 2020-11-02 RX ADMIN — TAMSULOSIN HYDROCHLORIDE 0.4 MILLIGRAM(S): 0.4 CAPSULE ORAL at 22:12

## 2020-11-02 RX ADMIN — Medication 81 MILLIGRAM(S): at 10:01

## 2020-11-02 RX ADMIN — MEROPENEM 100 MILLIGRAM(S): 1 INJECTION INTRAVENOUS at 06:15

## 2020-11-02 RX ADMIN — LORATADINE 10 MILLIGRAM(S): 10 TABLET ORAL at 10:04

## 2020-11-02 RX ADMIN — Medication 50 MILLIGRAM(S): at 22:12

## 2020-11-02 RX ADMIN — Medication 80 MILLIGRAM(S): at 17:37

## 2020-11-02 NOTE — PROGRESS NOTE ADULT - SUBJECTIVE AND OBJECTIVE BOX
CHIEF COMPLAINT/DIAGNOSIS: abdominal pain, LOPEZ    HPI: 74 y/o M w/ PMHx of systolic CHF (EF 45%), COPD - Severe Pulmonary HTN, HTN, dyslipidemia, CAD, a-fib/PPM on xarelto, arthritis p/w abdominal pain. Patient states abdominal pain started 1 week ago, and when he walks he also gets dyspnea on exertion. Also c/o LE pain, LLE worse than right. States pain is mostly above the knee. Patient also c/o dysuria and R flank pain. Denies fever, chills, nausea, vomiting, CP, cough, runny nose, diarrhea.     11/2: feeling much better. states legs are better. denies sob, lopez.     REVIEW OF SYSTEMS:  All other review of systems is negative unless indicated above    PHYSICAL EXAM:  Constitutional: Awake and alert, well-developed  HEENT: Normal Hearing, MMM  Neck: Soft and supple, No LAD, + JVD  Respiratory: Breath sounds are clear b/l   Cardiovascular: S1 and S2, IRIR, paced. + murmur  Gastrointestinal: Bowel Sounds present, firm, tight. obese  Extremities: b/l  peripheral edema, pitting. anasarca -- improved.  Vascular: 2+ peripheral pulses  Neurological: A/O x 3, no focal deficits  Musculoskeletal: 5/5 strength b/l upper and lower extremities  Skin: ble redness, swelling -- improved.     Vital Signs Last 24 Hrs  T(C): 36.6 (02 Nov 2020 08:23), Max: 36.6 (02 Nov 2020 08:23)  T(F): 97.8 (02 Nov 2020 08:23), Max: 97.8 (02 Nov 2020 08:23)  HR: 60 (02 Nov 2020 08:23) (60 - 60)  BP: 131/67 (02 Nov 2020 08:23) (131/67 - 142/75)  BP(mean): --  RR: 18 (02 Nov 2020 08:23) (17 - 18)  SpO2: 99% (02 Nov 2020 08:23) (99% - 99%) - room air    LABS: All Labs Reviewed:                        10.8   6.73  )-----------( 132      ( 02 Nov 2020 06:41 )             35.3     11-02    141  |  103  |  24<H>  ----------------------------<  90  4.0   |  30  |  1.28    Ca    8.6      02 Nov 2020 06:41    RADIOLOGY:  < from: CT Abdomen and Pelvis No Cont (10.27.20 @ 18:23) >  IMPRESSION:  Moderate volume of abdominal and pelvic ascites.  Diffuse anasarca.  Small to moderate right-sided pleural effusion.  No bowel obstruction.  < end of copied text >    < from: Xray Chest 1 View- PORTABLE-Urgent (Xray Chest 1 View- PORTABLE-Urgent .) (10.27.20 @ 18:36) >  IMPRESSION: Stable chest without acute finding.  < end of copied text >    ECHOCARDIOGRAM:    < from: TTE Echo Complete w/o Contrast w/ Doppler (08.28.20 @ 21:42) >   Impression     Summary     The mitral valve leaflets appear thickened.   Mild mitral annular calcification is present.   Moderate to severe (3+)mitral regurgitation is present.   The aortic valve is trileaflet with thin pliable leaflets.   Mild to Moderate aortic regurgitation is present.   Normal appearing tricuspid valve structure.   Mild pulmonary hypertension.   Mild (1+) tricuspid valveregurgitation is present.   Estimated left ventricular ejection fraction is 45 %.   The left ventricle cavity is mildly dilated.   The left ventricle is normal in wall thickness.   Mild, diffuse hypokinesis of the left ventricle is present.   The right ventricle is normal in size, wall thickness, wall motion, and   contractility based on TAPSE.   A device wire is seen in the RV and RA.    < end of copied text >    MEDICATIONS  (STANDING):  aspirin  chewable 81 milliGRAM(s) Oral daily  atorvastatin 20 milliGRAM(s) Oral at bedtime  bethanechol 50 milliGRAM(s) Oral two times a day  carvedilol 6.25 milliGRAM(s) Oral every 12 hours  furosemide   Injectable 80 milliGRAM(s) IV Push two times a day  lisinopril 10 milliGRAM(s) Oral daily  loratadine 10 milliGRAM(s) Oral daily  meropenem  IVPB 1000 milliGRAM(s) IV Intermittent every 8 hours  meropenem  IVPB      rivaroxaban 20 milliGRAM(s) Oral with dinner  spironolactone 25 milliGRAM(s) Oral daily  tamsulosin 0.4 milliGRAM(s) Oral at bedtime    MEDICATIONS  (PRN):  acetaminophen   Tablet .. 650 milliGRAM(s) Oral every 6 hours PRN Mild Pain (1 - 3)      ASSESSMENT AND PLAN:    74 y/o M w/ PMH of systolic CHF, COPD, HTN, dyslipidemia, CAD, a-fib on xarelto, arthritis, s/p PPM, p/w abdominal pain    1) Acute on Chronic systolic/diastolic CHF exacerbation  - Cont. IV Lasix, spirolactone, ACEi, BB  - daily weights, I&Os -- > 40lb weight loss noted.  - Echo 8/28/20 - EF 45%  - Cardiac Cath 9/28 ~ w/ CAD, severe pulm HTN  - LE swelling - arterial/ venous dopplers pending > negative  - Cardio consult appreciated    2) Elevated troponins likely demand due to HF exacerbation   - denies CP. trops minimally elevated - flat  - cont ASA 81, statin  - ecg reviewed. no significant ST changes. recently underwent LHC results above.  - cardio consult     3) Moderate Pelvic ascites / Anasarca due to CHF exacerbation  - s/p paracentesis 2700cc. Cultures sent.  SAAG = 1.2  - check hep acute panel, lipids ~ wnl  - GI consult appreciated -- most likely r/t CHF    4) Pyelonephritis | ESBL UTI   - f/u Bcx NGTD, Ucx +(esbl)  - d/c ceftriaxone, switch to meropenum day # 4/5  - ID consult, isolation     5) Elevate sCr, did not meet KIDGO criteria - stable  - monitor on IV diuretics.   - I&Os, daily weights.    6) Chronic Afib | PPM  - Paced. afib. cont. Xarelto, BB     7) Enlarged prostate - Cont. Flomax     8) Small B/L anterior external iliac chain pelvic lymph nodes / Small BL inguinal lymph nodes   - F/u outpatient     9) DVT ppx - Xarelto     Dispo: d/c tm. day 4/5 of IV meropenum. will switch to Lasix 60mg PO BID tm. f/u HF clinic.

## 2020-11-02 NOTE — DISCHARGE NOTE PROVIDER - CARE PROVIDER_API CALL
Abelino Mccoy)  Cardiology  [address] [place] [address]  Phone: [phone]  Fax: [phone]  Follow Up Time:

## 2020-11-02 NOTE — DISCHARGE NOTE PROVIDER - INSTRUCTIONS
Low Sodium, Low Cholesterol. Fluid restriction 1500mL per day  Choose foods that are low in salt - examples include: fresh meats, poultry, fish, eggs, milk and yogurt. Avoid packaged/processed foods and excessive table salt use. Too much fluid can make your heart failure worse. Have 4 to 6 cups of liquid per day (coffee, tea, soups, water) Keep in mind your liquid intake when eating foods that are liquid at room temperature.

## 2020-11-02 NOTE — DISCHARGE NOTE PROVIDER - NSDCCPCAREPLAN_GEN_ALL_CORE_FT
PRINCIPAL DISCHARGE DIAGNOSIS  Diagnosis: HF (heart failure)  Assessment and Plan of Treatment: You were found to have an acute exacerbation of your heart failure. Heart failure is a condition in which the heart can't pump enough blood to meet the body's needs. The weakening of the heart's pumping ability causes blood and fluid to back up into the lungs resulting in the buildup of fluid in the feet, ankles and legs -- called edema, tiredness and shortness of breath.   - Continue to monitor your weights at home daily.   - Maintain a low sodium diet and a fluid restriction of 1500mL per day  - Continue to take Lasix 60mg twice a day (sent to pharmacy), spirolaconte 25mg once a day (new medication, sent to pharmacy)   - Follow up with your cardiologist Dr. Villanueva in 1 to 2 week after discharge for further management - Call to make an appointment  - Continue to follow your CHF Zone Chart in your CHF packet accordingly  - Your discharge weight is 185 lbs.   **Monitor for the following signs/symptoms: Unrelieved shortness of breath or shortness of breath at rest, unrelieved chest pain or tightness, wheezing, weight gain of 5lbs or more pounds in 1 week or 2-3lbs in 24hours, confusion, dizziness or lightheadedness. If you experience any of these signs/ symptoms please alert your primary care provider/cardiologist or return to the ED if your symptoms are severe**      SECONDARY DISCHARGE DIAGNOSES  Diagnosis: Acute UTI  Assessment and Plan of Treatment: You were found to have a urinary tract infection (UTI) is caused by bacteria that get inside your urinary tract. Your urinary tract includes your kidneys, ureters, bladder, and urethra.   - You completed your course of IV antibiotics  - Continue to stay hydrated.   **Monitor for the following signs/symptoms: Fever > 101, chills, pain or burning with urination, foul smelling or cloudy urine, confusion, weakness or fatigue. If you experience these signs/symptoms please alert your primary care provider or if your signs/symptoms are severe please return to the ED**     PRINCIPAL DISCHARGE DIAGNOSIS  Diagnosis: HF (heart failure)  Assessment and Plan of Treatment: You were found to have an acute exacerbation of your heart failure. Heart failure is a condition in which the heart can't pump enough blood to meet the body's needs. The weakening of the heart's pumping ability causes blood and fluid to back up into the lungs resulting in the buildup of fluid in the feet, ankles and legs -- called edema, tiredness and shortness of breath.   - Continue to monitor your weights at home daily.   - Maintain a low sodium diet and a fluid restriction of 1500mL per day  - Continue to take Lasix 60mg twice a day (sent to pharmacy), spirolaconte 25mg once a day (new medication, sent to pharmacy) and lisinopril 10mg once a day (new medication, sent to pharmacy)  - Follow up with your cardiologist Dr. Villanueva in 1 to 2 week after discharge for further management - Call to make an appointment  - Continue to follow your CHF Zone Chart in your CHF packet accordingly  - Your discharge weight is 185 lbs.   **Monitor for the following signs/symptoms: Unrelieved shortness of breath or shortness of breath at rest, unrelieved chest pain or tightness, wheezing, weight gain of 5lbs or more pounds in 1 week or 2-3lbs in 24hours, confusion, dizziness or lightheadedness. If you experience any of these signs/ symptoms please alert your primary care provider/cardiologist or return to the ED if your symptoms are severe**      SECONDARY DISCHARGE DIAGNOSES  Diagnosis: Acute UTI  Assessment and Plan of Treatment: You were found to have a urinary tract infection (UTI) is caused by bacteria that get inside your urinary tract. Your urinary tract includes your kidneys, ureters, bladder, and urethra.   - You completed your course of IV antibiotics  - Continue to stay hydrated.   **Monitor for the following signs/symptoms: Fever > 101, chills, pain or burning with urination, foul smelling or cloudy urine, confusion, weakness or fatigue. If you experience these signs/symptoms please alert your primary care provider or if your signs/symptoms are severe please return to the ED**     PRINCIPAL DISCHARGE DIAGNOSIS  Diagnosis: HF (heart failure)  Assessment and Plan of Treatment: You were found to have an acute exacerbation of your heart failure. Heart failure is a condition in which the heart can't pump enough blood to meet the body's needs. The weakening of the heart's pumping ability causes blood and fluid to back up into the lungs resulting in the buildup of fluid in the feet, ankles and legs -- called edema, tiredness and shortness of breath.   - Continue to monitor your weights at home daily.   - Maintain a low sodium diet and a fluid restriction of 1500mL per day  - Continue to take Lasix 60mg twice a day (sent to pharmacy), spirolactone 25mg once a day (new medication, sent to pharmacy) and lisinopril 10mg once a day (new medication, sent to pharmacy)  - Follow up with your cardiologist Dr. Villanueva in 1 to 2 week after discharge for further management - Call to make an appointment  - Continue to follow your CHF Zone Chart in your CHF packet accordingly  - Your discharge weight is 185 lbs.   **Monitor for the following signs/symptoms: Unrelieved shortness of breath or shortness of breath at rest, unrelieved chest pain or tightness, wheezing, weight gain of 5lbs or more pounds in 1 week or 2-3lbs in 24hours, confusion, dizziness or lightheadedness. If you experience any of these signs/ symptoms please alert your primary care provider/cardiologist or return to the ED if your symptoms are severe**      SECONDARY DISCHARGE DIAGNOSES  Diagnosis: Acute UTI  Assessment and Plan of Treatment: You were found to have a urinary tract infection (UTI) is caused by bacteria that get inside your urinary tract. Your urinary tract includes your kidneys, ureters, bladder, and urethra.   - You completed your course of IV antibiotics  - Continue to stay hydrated.   **Monitor for the following signs/symptoms: Fever > 101, chills, pain or burning with urination, foul smelling or cloudy urine, confusion, weakness or fatigue. If you experience these signs/symptoms please alert your primary care provider or if your signs/symptoms are severe please return to the ED**     PRINCIPAL DISCHARGE DIAGNOSIS  Diagnosis: HF (heart failure)  Assessment and Plan of Treatment: You were found to have an acute exacerbation of your heart failure. Heart failure is a condition in which the heart can't pump enough blood to meet the body's needs. The weakening of the heart's pumping ability causes blood and fluid to back up into the lungs resulting in the buildup of fluid in the feet, ankles and legs -- called edema, tiredness and shortness of breath.   - Continue to monitor your weights at home daily.   - Maintain a low sodium diet and a fluid restriction of 1500mL per day  - Continue to take Lasix 60mg twice a day (sent to pharmacy), spirolactone 25mg once a day (new medication, sent to pharmacy) and lisinopril 10mg once a day (new medication, sent to pharmacy)  - Follow up with your cardiologist Dr. Villanueva in 1 to 2 week after discharge for further management - Call to make an appointment  - Continue to follow your CHF Zone Chart in your CHF packet accordingly  - Your discharge weight is 185 lbs.   **Monitor for the following signs/symptoms: Unrelieved shortness of breath or shortness of breath at rest, unrelieved chest pain or tightness, wheezing, weight gain of 5lbs or more pounds in 1 week or 2-3lbs in 24hours, confusion, dizziness or lightheadedness. If you experience any of these signs/ symptoms please alert your primary care provider/cardiologist or return to the ED if your symptoms are severe**      SECONDARY DISCHARGE DIAGNOSES  Diagnosis: Acute UTI  Assessment and Plan of Treatment: You were found to have a urinary tract infection (UTI) is caused by bacteria that get inside your urinary tract. Your urinary tract includes your kidneys, ureters, bladder, and urethra.   - You completed your course of IV antibiotics  - Continue to stay hydrated.   **Monitor for the following signs/symptoms: Fever > 101, chills, pain or burning with urination, foul smelling or cloudy urine, confusion, weakness or fatigue. If you experience these signs/symptoms please alert your primary care provider or if your signs/symptoms are severe please return to the ED**    Diagnosis: Abnormal finding on CT scan  Assessment and Plan of Treatment: - You were found to have Small B/L anterior external iliac chain pelvic lymph nodes / Small BL inguinal lymph nodes. Follow upw ith your primary care provider for further management.

## 2020-11-02 NOTE — PROGRESS NOTE ADULT - SUBJECTIVE AND OBJECTIVE BOX
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    10/29/20:  Complaining of leg edema and dyspnea.  10/30/20: improvement in dyspnea, still w/ LE edema.  10/31/20: LE edema.  20: no dyspnea, ambulating around halls w/o desat.   swelling improved in LE.  : pt notes significant reduction in LE edema.  feels much better.    MEDICATIONS:  OUTPATIENT  Home Medications:  aspirin 81 mg oral tablet, chewable: 1 tab(s) orally once a day  ***entered from previous admission- unsure if pt is taking*** (27 Oct 2020 23:00)  bethanechol 50 mg oral tablet: 1 tab(s) orally 2 times a day (27 Oct 2020 23:00)    INPATIENT  MEDICATIONS  (STANDING):  aspirin  chewable 81 milliGRAM(s) Oral daily  atorvastatin 20 milliGRAM(s) Oral at bedtime  bethanechol 50 milliGRAM(s) Oral two times a day  carvedilol 6.25 milliGRAM(s) Oral every 12 hours  furosemide   Injectable 80 milliGRAM(s) IV Push two times a day  lisinopril 5 milliGRAM(s) Oral daily  loratadine 10 milliGRAM(s) Oral daily  meropenem  IVPB 1000 milliGRAM(s) IV Intermittent every 8 hours  meropenem  IVPB      rivaroxaban 20 milliGRAM(s) Oral with dinner  spironolactone 25 milliGRAM(s) Oral daily  tamsulosin 0.4 milliGRAM(s) Oral at bedtime    MEDICATIONS  (PRN):  acetaminophen   Tablet .. 650 milliGRAM(s) Oral every 6 hours PRN Mild Pain (1 - 3)      Vital Signs Last 24 Hrs  T(C): 36.5 (2020 20:45), Max: 36.5 (2020 20:45)  T(F): 97.7 (2020 20:45), Max: 97.7 (2020 20:45)  HR: 60 (2020 20:45) (60 - 62)  BP: 136/64 (2020 20:45) (128/74 - 142/75)  BP(mean): --  RR: 17 (2020 20:45) (17 - 18)  SpO2: 99% (2020 20:45) (96% - 99%)Daily     Daily Weight in k.9 (2020 06:58)I&O's Summary    2020 07:01  -  2020 07:00  --------------------------------------------------------  IN: 0 mL / OUT: 5000 mL / NET: -5000 mL      PHYSICAL EXAM:    Constitutional: NAD, awake and alert,   Respiratory: Breath sounds are clear bilaterally, No wheezing, rales or rhonchi  Cardiovascular: S1 and S2, regular rate, systolic murmur no jVP  Gastrointestinal: decreased distension in abdomen  Extremities: bilat. LE edema, less in thighs today.  Skin: No rash    LABS: All Labs Reviewed:                        10.8   6.73  )-----------( 132      ( 2020 06:41 )             35.3                         11.9   7.44  )-----------( 148      ( 2020 07:20 )             40.0                         11.5   6.60  )-----------( 132      ( 31 Oct 2020 07:39 )             37.8     2020 06:41    141    |  103    |  24     ----------------------------<  90     4.0     |  30     |  1.28   2020 07:20    140    |  102    |  23     ----------------------------<  92     4.5     |  34     |  1.33   31 Oct 2020 07:39    141    |  103    |  21     ----------------------------<  85     3.8     |  34     |  1.23     Ca    8.6        2020 06:41  Ca    9.3        2020 07:20  Ca    8.9        31 Oct 2020 07:39      10-31 @ 07:39  Pro Bnp 1811    ==============================  12 Lead ECG (10.28.20 @ 07:59):  Ventricular-paced rhythm  ==============================  TTE Echo Complete w/o Contrast w/ Doppler (20 @ 21:42):   Moderate to severe (3+)mitral regurgitation.   Mild to Moderate aortic regurgitation.   Mild pulmonary hypertension.   Mild (1+) tricuspid valve regurgitation is present. Estimated left ventricular ejection fraction is 45 %.   The left ventricle cavity is mildly dilated.   The left ventricle is normal in wall thickness. Mild, diffuse hypokinesis of the left ventricle is present.   The right ventricle is normal in size, wall thickness, wall motion, and contractility based on TAPSE.   A device wire is seen in the RV and RA.  ==============================  Cardiac Cath Lab - Adult (20 @ 14:25):  Non-Obstructive CAD with Abnormal left ventricular function.    Severe pulmonary hypertension.  Mildly reduced left ventricular systolic function.  ==============================  PM interrogation note Aug '20:  Afib w/ 99% V pacing, no significant events, normal function.  ==============================

## 2020-11-02 NOTE — DISCHARGE NOTE PROVIDER - HOSPITAL COURSE
Uncontrolled bolus/spillover in kip-pharynx/Uncontrolled bolus/spillover in hypopharynx/moderate spillage to the valleculae and min to pyriforms CHIEF COMPLAINT/DIAGNOSIS: abdominal pain, LOPEZ    HPI: 74 y/o M w/ PMHx of systolic CHF (EF 45%), COPD - Severe Pulmonary HTN, HTN, dyslipidemia, CAD, a-fib/PPM on xarelto, arthritis p/w abdominal pain. Patient states abdominal pain started 1 week ago, and when he walks he also gets dyspnea on exertion. Also c/o LE pain, LLE worse than right. States pain is mostly above the knee. Patient also c/o dysuria and R flank pain. Denies fever, chills, nausea, vomiting, CP, cough, runny nose, diarrhea.     ASSESSMENT AND PLAN:    74 y/o M w/ PMH of systolic CHF, COPD, HTN, dyslipidemia, CAD, a-fib on xarelto, arthritis, s/p PPM, p/w abdominal pain    1) Acute on Chronic systolic/diastolic CHF exacerbation  - Cont. IV Lasix, spirolactone, ACEi, BB  - daily weights, I&Os -- > 40lb weight loss noted.  - Echo 8/28/20 - EF 45%  - Cardiac Cath 9/28 ~ w/ CAD, severe pulm HTN  - LE swelling - arterial/ venous dopplers pending > negative  - Cardio consult appreciated    2) Elevated troponins likely demand due to HF exacerbation   - denies CP. trops minimally elevated - flat  - cont ASA 81, statin  - ecg reviewed. no significant ST changes. recently underwent [facility] results above.  - cardio consult     3) Moderate Pelvic ascites / Anasarca due to CHF exacerbation  - s/p paracentesis 2700cc. Cultures sent.  SAAG = 1.2  - check hep acute panel, lipids ~ wnl  - GI consult appreciated -- most likely r/t CHF    4) Pyelonephritis | ESBL UTI   - f/u Bcx NGTD, Ucx +(esbl)  - d/c ceftriaxone, switch to meropenum day # 4/5  - ID consult, isolation     5) Elevate sCr, did not meet KIDGO criteria - stable  - monitor on IV diuretics.   - I&Os, daily weights.    6) Chronic Afib | PPM  - Paced. afib. cont. Xarelto, BB     7) Enlarged prostate - Cont. Flomax     8) Small B/L anterior external iliac chain pelvic lymph nodes / Small BL inguinal lymph nodes   - F/u outpatient     9) DVT ppx - Xarelto     Dispo: discharge to home in stable condition    Final diagnosis, treatment plan, and follow-up recommendations were discussed and explained to the patient. The patient was given an opportunity to ask questions concerning the diagnosis and treatment plan. The patient acknowledged understanding of the diagnosis, treatment, and follow-up recommendations. The patient was advised to seek urgent care upon discharge if worsening symptoms develop prior to scheduled follow-up. Time spent on discharge included time with the patient, and also coordinating discharge care as outlined below.    Total time spent: 50 min   CHIEF COMPLAINT/DIAGNOSIS: abdominal pain, LOPEZ    HPI: 74 y/o M w/ PMHx of systolic CHF (EF 45%), COPD - Severe Pulmonary HTN, HTN, dyslipidemia, CAD, a-fib/PPM on xarelto, arthritis p/w abdominal pain. Patient states abdominal pain started 1 week ago, and when he walks he also gets dyspnea on exertion. Also c/o LE pain, LLE worse than right. States pain is mostly above the knee. Patient also c/o dysuria and R flank pain. Denies fever, chills, nausea, vomiting, CP, cough, runny nose, diarrhea.     ASSESSMENT AND PLAN:    74 y/o M w/ PMH of systolic CHF, COPD, HTN, dyslipidemia, CAD, a-fib on xarelto, arthritis, s/p PPM, p/w abdominal pain    1) Acute on Chronic systolic/diastolic CHF exacerbation  - Cont. IV Lasix, spirolactone, ACEi, BB  - daily weights, I&Os -- > 40lb weight loss noted.  - Echo 8/28/20 - EF 45%  - Cardiac Cath 9/28 ~ w/ CAD, severe pulm HTN  - LE swelling - arterial/ venous dopplers pending > negative  - Cardio consult appreciated    2) Elevated troponins likely demand due to HF exacerbation   - denies CP. trops minimally elevated - flat  - cont ASA 81, statin  - ecg reviewed. no significant ST changes. recently underwent [facility] results above.  - cardio consult     3) Moderate Pelvic ascites / Anasarca due to CHF exacerbation  - s/p paracentesis 2700cc. Cultures sent.  SAAG = 1.2  - check hep acute panel, lipids ~ wnl  - GI consult appreciated -- most likely r/t CHF    4) Pyelonephritis | ESBL UTI   - f/u Bcx NGTD, Ucx +(esbl)  - d/c ceftriaxone, switch to meropenum day # 5/5 - complete  - ID consult, isolation     5) Elevate sCr, did not meet KIDGO criteria - stable  - monitor on IV diuretics.   - I&Os, daily weights.    6) Chronic Afib | PPM  - Paced. afib. cont. Xarelto, BB     7) Enlarged prostate - Cont. Flomax     8) Small B/L anterior external iliac chain pelvic lymph nodes / Small BL inguinal lymph nodes   - F/u outpatient     9) DVT ppx - Xarelto     Dispo: discharge to home in stable condition    Final diagnosis, treatment plan, and follow-up recommendations were discussed and explained to the patient. The patient was given an opportunity to ask questions concerning the diagnosis and treatment plan. The patient acknowledged understanding of the diagnosis, treatment, and follow-up recommendations. The patient was advised to seek urgent care upon discharge if worsening symptoms develop prior to scheduled follow-up. Time spent on discharge included time with the patient, and also coordinating discharge care as outlined below.    Total time spent: 50 min   CHIEF COMPLAINT/DIAGNOSIS: abdominal pain, LOPEZ    HPI: 74 y/o M w/ PMHx of systolic CHF (EF 45%), COPD - Severe Pulmonary HTN, HTN, dyslipidemia, CAD, a-fib/PPM on xarelto, arthritis p/w abdominal pain. Patient states abdominal pain started 1 week ago, and when he walks he also gets dyspnea on exertion. Also c/o LE pain, LLE worse than right. States pain is mostly above the knee. Patient also c/o dysuria and R flank pain. Denies fever, chills, nausea, vomiting, CP, cough, runny nose, diarrhea.     ASSESSMENT AND PLAN:    74 y/o M w/ PMH of systolic CHF, COPD, HTN, dyslipidemia, CAD, a-fib on xarelto, arthritis, s/p PPM, p/w abdominal pain    1) Acute on Chronic systolic/diastolic CHF exacerbation  - s/p IV Lasix, spirolactone, ACEi, BB  - daily weights, I&Os -- > 40lb weight loss noted.  - Echo 8/28/20 - EF 45%  - Cardiac Cath 9/28 ~ w/ CAD, severe pulm HTN  - LE swelling - arterial/ venous dopplers pending > negative  - Cardio consult appreciated - cont Lasix 60mg BID PO    2) Elevated troponins likely demand due to HF exacerbation   - denies CP. trops minimally elevated - flat  - cont ASA 81, statin  - ecg reviewed. no significant ST changes. recently underwent [facility] results above.  - cardio consult     3) Moderate Pelvic ascites / Anasarca due to CHF exacerbation  - s/p paracentesis 2700cc. Cultures sent.  SAAG = 1.2  - check hep acute panel, lipids ~ wnl  - GI consult appreciated -- most likely r/t CHF    4) Pyelonephritis | ESBL UTI   - f/u Bcx NGTD, Ucx +(esbl)  - d/c ceftriaxone, switch to meropenum day # 5/5 -- complete  - ID consult, isolation     5) Elevate sCr, did not meet KIDGO criteria - stable  - monitor on IV diuretics.   - I&Os, daily weights.    6) Chronic Afib | PPM  - Paced. afib. cont. Xarelto, BB     7) Enlarged prostate - Cont. Flomax     8) Small B/L anterior external iliac chain pelvic lymph nodes / Small BL inguinal lymph nodes   - F/u outpatient     9) DVT ppx - Xarelto     Dispo: discharge to home in stable condition. switch to Lasix 60mg PO BID tm. f/u HF clinic.    Final diagnosis, treatment plan, and follow-up recommendations were discussed and explained to the patient. The patient was given an opportunity to ask questions concerning the diagnosis and treatment plan. The patient acknowledged understanding of the diagnosis, treatment, and follow-up recommendations. The patient was advised to seek urgent care upon discharge if worsening symptoms develop prior to scheduled follow-up. Time spent on discharge included time with the patient, and also coordinating discharge care as outlined below.    Total time spent: 50 min

## 2020-11-02 NOTE — DISCHARGE NOTE PROVIDER - NSDCMRMEDTOKEN_GEN_ALL_CORE_FT
aspirin 81 mg oral tablet, chewable: 1 tab(s) orally once a day  atorvastatin 20 mg oral tablet: 1 tab(s) orally once a day (at bedtime)  bethanechol 50 mg oral tablet: 1 tab(s) orally 2 times a day  carvedilol 6.25 mg oral tablet: 1 tab(s) orally every 12 hours  K-Tab 20 mEq oral tablet, extended release: 1 tab(s) orally once a day   loratadine 10 mg oral tablet: 1 tab(s) orally once a day  rivaroxaban 20 mg oral tablet: 1 tab(s) orally once a day (before a meal)  tamsulosin 0.4 mg oral capsule: 1 cap(s) orally once a day    aspirin 81 mg oral tablet, chewable: 1 tab(s) orally once a day  atorvastatin 20 mg oral tablet: 1 tab(s) orally once a day (at bedtime)  bethanechol 50 mg oral tablet: 1 tab(s) orally 2 times a day  carvedilol 6.25 mg oral tablet: 1 tab(s) orally every 12 hours  K-Tab 20 mEq oral tablet, extended release: 1 tab(s) orally once a day   Lasix 20 mg oral tablet: 3 tab(s) orally 2 times a day  = 60mg   lisinopril 10 mg oral tablet: 1 tab(s) orally once a day  loratadine 10 mg oral tablet: 1 tab(s) orally once a day  rivaroxaban 20 mg oral tablet: 1 tab(s) orally once a day (with dinner)   spironolactone 25 mg oral tablet: 1 tab(s) orally once a day  tamsulosin 0.4 mg oral capsule: 1 cap(s) orally once a day

## 2020-11-02 NOTE — PHYSICAL THERAPY INITIAL EVALUATION ADULT - REHAB POTENTIAL, PT EVAL
Pt independent with amb, transfers, and bed mobility and is not a skilled PT candidate. Pt can amb with floor care, D/C PT.

## 2020-11-02 NOTE — DISCHARGE NOTE PROVIDER - NSDCCPTREATMENT_GEN_ALL_CORE_FT
PRINCIPAL PROCEDURE  Procedure: Daily weight  Findings and Treatment: Continue to weigh yourself once a day. Managing your weight is very important!. Keep a log of your weight in a notebook. In you experience an increase of 3 or more pounds in 1 to 2 days or 5 or more pounds in a week call your cardiologist or primary care provider.      SECONDARY PROCEDURE  Procedure: Provide education packet about heart healthy diet  Findings and Treatment: Choose foods that are low in salt - examples include: fresh meats, poultry, fish, eggs, milk and yogurt. Avoid packaged/processed foods and excessive table salt use. Too much fluid can make your heart failure worse. Have 4 to 6 cups of liquid per day (coffee, tea, soups, water) Keep in mind your liquid intake when eating foods that are liquid at room temperature.     PRINCIPAL PROCEDURE  Procedure: Daily weight  Findings and Treatment: Continue to weigh yourself once a day. Managing your weight is very important! Keep a log of your weight in a notebook. In you experience an increase of 3 or more pounds in 1 to 2 days or 5 or more pounds in a week call your cardiologist or primary care provider.      SECONDARY PROCEDURE  Procedure: Provide education packet about heart healthy diet  Findings and Treatment: Choose foods that are low in salt - examples include: fresh meats, poultry, fish, eggs, milk and yogurt. Avoid packaged/processed foods and excessive table salt use. Too much fluid can make your heart failure worse. Have 4 to 6 cups of liquid per day (coffee, tea, soups, water) Keep in mind your liquid intake when eating foods that are liquid at room temperature.

## 2020-11-02 NOTE — PROGRESS NOTE ADULT - SUBJECTIVE AND OBJECTIVE BOX
Date of service: 11-02-20 @ 13:30    pt seen and examined  feels better  urinating without difficulty  no complaints  afebrile    ROS: no fever or chills; denies dizziness, no HA, no SOB or cough, no abdominal pain, no diarrhea or constipation; no dysuria, no urinary frequency, no legs pain, no rashes    MEDICATIONS  (STANDING):  aspirin  chewable 81 milliGRAM(s) Oral daily  atorvastatin 20 milliGRAM(s) Oral at bedtime  bethanechol 50 milliGRAM(s) Oral two times a day  carvedilol 6.25 milliGRAM(s) Oral every 12 hours  furosemide   Injectable 80 milliGRAM(s) IV Push two times a day  lisinopril 10 milliGRAM(s) Oral daily  loratadine 10 milliGRAM(s) Oral daily  meropenem  IVPB 1000 milliGRAM(s) IV Intermittent every 8 hours  meropenem  IVPB      rivaroxaban 20 milliGRAM(s) Oral with dinner  spironolactone 25 milliGRAM(s) Oral daily  tamsulosin 0.4 milliGRAM(s) Oral at bedtime    Vital Signs Last 24 Hrs  T(C): 36.6 (02 Nov 2020 08:23), Max: 36.6 (02 Nov 2020 08:23)  T(F): 97.8 (02 Nov 2020 08:23), Max: 97.8 (02 Nov 2020 08:23)  HR: 60 (02 Nov 2020 08:23) (60 - 60)  BP: 131/67 (02 Nov 2020 08:23) (131/67 - 136/64)  BP(mean): --  RR: 18 (02 Nov 2020 08:23) (17 - 18)  SpO2: 99% (02 Nov 2020 08:23) (99% - 99%)      PE:  Constitutional: NAD  HEENT: NC/AT, EOMI, PERRLA, conjunctivae clear; ears and nose atraumatic; pharynx benign  Neck: supple; thyroid not palpable  Back: no tenderness  Respiratory: respiratory effort normal; clear to auscultation  Cardiovascular: S1S2 regular, no murmurs  Abdomen: soft, not tender, not distended, positive BS; liver and spleen WNL  Genitourinary: no suprapubic tenderness  Lymphatic: no LN palpable  Musculoskeletal: no muscle tenderness, no joint swelling or tenderness  Extremities: no pedal edema  Neurological/ Psychiatric: moving all extremities  Skin: no rashes; no palpable lesions    Labs: all available labs reviewed                                   10.8   6.73  )-----------( 132      ( 02 Nov 2020 06:41 )             35.3     11-02    141  |  103  |  24<H>  ----------------------------<  90  4.0   |  30  |  1.28    Ca    8.6      02 Nov 2020 06:41        Culture - Body Fluid with Gram Stain (10.28.20 @ 10:38)   Gram Stain:   polymorphonuclear leukocytes seen   No organisms seen   by cytocentrifuge   Specimen Source: Ascites Fl   Culture Results:   No growth Culture - Urine (10.27.20 @ 20:30)   - Amikacin: S <=16   - Amoxicillin/Clavulanic Acid: S <=8/4   - Ampicillin: R >16 These ampicillin results predict results for amoxicillin   - Ampicillin/Sulbactam: S 8/4 Enterobacter, Citrobacter, and Serratia may develop resistance during prolonged therapy (3-4 days)   - Aztreonam: R 16   - Cefazolin: R >16 (MIC_CL_COM_ENTERIC_CEFAZU) For uncomplicated UTI with K. pneumoniae, E. coli, or P. mirablis: JONAH <=16 is sensitive and JONAH >=32 is resistant. This also predicts results for oral agents cefaclor, cefdinir, cefpodoxime, cefprozil, cefuroxime axetil, cephalexin and locarbef for uncomplicated UTI. Note that some isolates may be susceptible to these agents while testing resistant to cefazolin.   - Cefepime: R 8   - Cefoxitin: S <=8   - Ceftriaxone: R >32 Enterobacter, Citrobacter, and Serratia may develop resistance during prolonged therapy   - Ciprofloxacin: R >2   - Ertapenem: S <=0.5   - Gentamicin: S <=2   - Imipenem: S <=1   - Levofloxacin: R >4   - Meropenem: S <=1   - Nitrofurantoin: S <=32 Should not be used to treat pyelonephritis   - Piperacillin/Tazobactam: S <=8   - Tigecycline: S <=2   - Tobramycin: S <=2   - Trimethoprim/Sulfamethoxazole: R >2/38   Specimen Source: .Urine None   Culture Results:   >100,000 CFU/ml Escherichia coli ESBL   Organism Identification: Escherichia coli ESBL   Organism: Escherichia coli ESBL   Method Type: JONAH Culture - Blood (10.28.20 @ 00:42)   Specimen Source: .Blood None   Culture Results:   No growth to date.     Radiology: all available radiological tests reviewed    EXAM:  XR CHEST PORTABLE URGENT 1V                            PROCEDURE DATE:  10/27/2020          INTERPRETATION:  AP chest on October 27, 2020 at 6:18 PM. Patient is short of breath.    Gross heart enlargement and left-sided pacemaker again noted. Micra pacemaker again noted.    Lungs remain clear.    Chest is similar to October 23 of this year.    IMPRESSION: Stable chest without acute finding.    < end of copied text >  < from: US Duplex Arterial Lower Ext Compl, Bilateral (10.28.20 @ 13:39) >  EXAM:  US DPLX LWR EXT ARTS COMPL BI                            PROCEDURE DATE:  10/28/2020          INTERPRETATION:  US LOWER EXTREMITY ARTERIAL DUPLEX COMPLETE BILATERAL    CLINICAL INDICATION:  Atherosclerosis of native arteries of the right andleft lower extremity with claudication    COMPARISON: None    Real-time sonography of the bilateral lower extremity arterial system was performed using a high-resolution linear array transducer and including color and spectral Doppler.    There is plaque in the lower extremity arteries.. No soft tissue abnormalities are demonstrated in either leg. Flow phase patterns and peak systolic velocity measurements (in cm/s) were observed as follows:    RIGHT:  CFA: Triphasic; 125  Proximal SFA: Triphasic; 99  Mid SFA: Triphasic; 132  Distal SFA: Triphasic;  99  Popliteal: Triphasic;  64  Anterior tibial: Triphasic; 112  Posterior tibial: Triphasic; 103  Peroneal: Triphasic;  62  Dorsalis pedis: Triphasic;  80    LEFT:  CFA: Triphasic; 108  Proximal SFA: Triphasic; 108  Mid SFA: Triphasic; 98  Distal SFA: Triphasic; 56  Popliteal: Triphasic;  75  Anterior tibial: Triphasic; 124  Posterior tibial: Triphasic; 129  Peroneal: Triphasic; 72  Dorsalis pedis: Triphasic;  94    IMPRESSION:    No significant stenosis or occlusion in either leg.    Atherosclerosis of the native arteries of the right and left lower extremities is present.    < end of copied text >  < from: CT Abdomen and Pelvis No Cont (10.27.20 @ 18:23) >    EXAM:  CT ABDOMEN AND PELVIS                            PROCEDURE DATE:  10/27/2020          INTERPRETATION:  CLINICAL INFORMATION: Abdominal distention and bilateral lower extremity edema.    COMPARISON: CT scan abdomen pelvis 8/27/2020.    PROCEDURE:  CT of the Abdomen and Pelvis was performed without intravenous contrast.  Intravenous contrast: None.  Oral contrast: None.  Sagittal and coronal reformats were performed.    FINDINGS:    LOWER CHEST: Cardiac pacemaker leads.  Cardiomegaly.  Small to moderate right-sided pleural effusion.  Small calcified granulomas right middle and left lingula lobes.    The evaluation of solid organ parenchyma is limited without intravenous contrast.    LIVER: Within normal limits.  BILE DUCTS: Normal caliber.  GALLBLADDER: Within normal limits.  SPLEEN: Within normal limits.  PANCREAS: Within normal limits.  ADRENALS: Within normal limits.  KIDNEYS/URETERS:  No hydroureteronephrosis or obstructing ureteral calculus.  Bilateral perinephric stranding.    BLADDER: Mild circumferential bladder wall thickening with mild perivesical stranding, correlate for cystitis.  REPRODUCTIVE ORGANS: Enlarged prostate with broad-based impression upon the urinary bladder.    BOWEL: Evaluation the gastrointestinal tract is limited without distention.  Colonic diverticulosis, without CT evidence of diverticulitis.  No bowel obstruction.  Appendix normal.  PERITONEUM: There is a moderate volume of abdominal and pelvic ascites.    VESSELS: Mild atherosclerotic calcification of the abdominal aorta, which is normal in caliber.  Prominent pelvic collaterals vessels, particularly on the right.  RETROPERITONEUM/LYMPH NODES: Small bilateral anterior external iliac chain pelvic lymph nodes.  Small bilateral inguinal lymph nodes.  ABDOMINAL WALL: Subcutaneous edema/anasarca.    BONES: Degenerative changes spine.    IMPRESSION:    Moderate volume of abdominal and pelvic ascites.  Diffuse anasarca.    Small to moderate right-sided pleural effusion.    No bowel obstruction.    Other findings as discussed above.    < end of copied text >    Advanced directives addressed: full resuscitation

## 2020-11-02 NOTE — PHYSICAL THERAPY INITIAL EVALUATION ADULT - DIAGNOSIS, PT EVAL
Abd pain, LOPEZ, LLE pain, right flank pain, chronic systolic/diastoic CHF ex, elevated troponins, mod pelvic ascites, pyelonephritis. Abd pain, LOPEZ, LLE pain, right flank pain, chronic systolic/diastoic CHF ex, elevated troponins, mod pelvic ascites, pyelonephritis, ESBL in urine.

## 2020-11-02 NOTE — DISCHARGE NOTE PROVIDER - NSDCFUSCHEDAPPT_GEN_ALL_CORE_FT
VIRGILIO SHELLEY ; 11/02/2020 ; NPP Rad  [place]  VIRGILIO SHELLEY ; 11/09/2020 ; NPP Cardio [address] VIRGILIO SHELLEY ; 11/09/2020 ; NPP Cardio [address]

## 2020-11-02 NOTE — PHYSICAL THERAPY INITIAL EVALUATION ADULT - PLANNED THERAPY INTERVENTIONS, PT EVAL
Pt left in bed with all observation section equipment/material intact and bed alarm activated. See above for details. NO goals set, D/C PT. Eval, amb, transfers x 15'.

## 2020-11-02 NOTE — PROGRESS NOTE ADULT - ASSESSMENT
76 y/o M w/ PMH of CHF, COPD, HTN, dyslipidemia, CAD, a-fib on xarelto, arthritis, s/p PPM, p/w abdominal pain. Patient states abdominal pain started 1 week ago, and when he walks he also gets dyspnea on exertion. Also c/o LE pain, LLE worse than right. States pain is mostly above the knee. Patient also c/o dysuria and R flank pain. Denies fever, chills, nausea, vomiting, CP, cough, runny nose, diarrhea. here UA positive, CT abd/pelvis with ascites likely d/t CHF, urine cx growing ESBL Ecoli started on meropenem.     1. ESBL Ecoli complicated cystitis  - on IV meropenem #4-5  - plan for 5 day course   - monitor temps  - tolerating abx well so far; no side effects noted  - reason for abx use and side effects reviewed with patient  - supportive care  - fu cbc    2. other issues - care per medicine

## 2020-11-02 NOTE — PROGRESS NOTE ADULT - PROBLEM SELECTOR PLAN 1
Edema improved w/ lasix 80 IV BID, cont. at current dose for 24 hrs then change to po. cont. rosalia, coreg. increase lisinopril to 10 mg daily, Add KCl 40 meQ given high dose lasix.  Possible d/c tommorrow.

## 2020-11-02 NOTE — PROGRESS NOTE ADULT - ASSESSMENT
74 y/o M w/ PMH of CAD, a-fib on xarelto systolic CHF s/p PPM,  PHTN COPD, essential HTN, dyslipidemia, arthritis, p/w abdominal pain    1) PAF Acute decompensated NYSA IV ACC Stage C Chronic systolic/diastolic CHF exacerbation PPM, PHTN  improving diuretic responsive   - Cont. IV Lasix per cards   io uo and regular weights   continue current cardiac regimen   DOAC   monitor lytes   PT OT     2) Elevated troponins likely demand due to HF exacerbation no chest pain   - cont ASA 81, statin  - cardio consult noted as above, assist appreciated defer sanchez to cards team     3) Moderate Pelvic ascites / Anasarca (no sbp clinically) (( (bi lower abdo pain improving) no guarding, no rebound))  - s/p paracentesis 2700cc. Cultures sent.  SAAG = 1.2  - GI consult appreciated most likely to HF     4) Pyelonephritis | ESBL UTI not septic   - f/u Bcx NGTD, Ucx +(esbl) 3-5 d per ID team   - continue meropenum  blood cx ntd     5) Elevate sCr cr 1.3   - monitor on IV diuretics.   - I&Os, daily weights.    6) Enlarged prostate - Cont. Flomax   op urology     7) Small B/L anterior external iliac chain pelvic lymph nodes / Small BL inguinal lymph nodes ro malignancy   - F/u outpatient   consider onc to see while inpatient     8) DVT ppx - Xarelto     reviewed with patient plan of care  EST DC 3 Days

## 2020-11-02 NOTE — PHYSICAL THERAPY INITIAL EVALUATION ADULT - PERTINENT HX OF CURRENT PROBLEM, REHAB EVAL
Pt admitted to  secondary to abd pain, LOPEZ, LLE pain, right flank pain. Troponins: 10/27- 0.132, 0.129, 10/28- 0.130. COVID 19: neg

## 2020-11-03 ENCOUNTER — TRANSCRIPTION ENCOUNTER (OUTPATIENT)
Age: 75
End: 2020-11-03

## 2020-11-03 VITALS
RESPIRATION RATE: 18 BRPM | TEMPERATURE: 98 F | OXYGEN SATURATION: 99 % | HEART RATE: 60 BPM | SYSTOLIC BLOOD PRESSURE: 103 MMHG | DIASTOLIC BLOOD PRESSURE: 52 MMHG

## 2020-11-03 DIAGNOSIS — I50.43 ACUTE ON CHRONIC COMBINED SYSTOLIC (CONGESTIVE) AND DIASTOLIC (CONGESTIVE) HEART FAILURE: ICD-10-CM

## 2020-11-03 LAB
% ALBUMIN: 58.9 % — SIGNIFICANT CHANGE UP
% ALPHA 1: 6.9 % — SIGNIFICANT CHANGE UP
% ALPHA 2: 10.2 % — SIGNIFICANT CHANGE UP
% BETA: 12.6 % — SIGNIFICANT CHANGE UP
% GAMMA: 11.4 % — SIGNIFICANT CHANGE UP
ALBUMIN SERPL ELPH-MCNC: 3.2 G/DL — LOW (ref 3.3–5)
ALBUMIN SERPL ELPH-MCNC: 3.5 G/DL — LOW (ref 3.6–5.5)
ALBUMIN/GLOB SERPL ELPH: 1.5 RATIO — SIGNIFICANT CHANGE UP
ALP SERPL-CCNC: 189 U/L — HIGH (ref 40–120)
ALPHA1 GLOB SERPL ELPH-MCNC: 0.4 G/DL — SIGNIFICANT CHANGE UP (ref 0.1–0.4)
ALPHA2 GLOB SERPL ELPH-MCNC: 0.6 G/DL — SIGNIFICANT CHANGE UP (ref 0.5–1)
ALT FLD-CCNC: 26 U/L — SIGNIFICANT CHANGE UP (ref 12–78)
ANION GAP SERPL CALC-SCNC: 2 MMOL/L — LOW (ref 5–17)
AST SERPL-CCNC: 28 U/L — SIGNIFICANT CHANGE UP (ref 15–37)
B-GLOBULIN SERPL ELPH-MCNC: 0.7 G/DL — SIGNIFICANT CHANGE UP (ref 0.5–1)
BILIRUB SERPL-MCNC: 1.3 MG/DL — HIGH (ref 0.2–1.2)
BUN SERPL-MCNC: 29 MG/DL — HIGH (ref 7–23)
CALCIUM SERPL-MCNC: 9 MG/DL — SIGNIFICANT CHANGE UP (ref 8.5–10.1)
CHLORIDE SERPL-SCNC: 105 MMOL/L — SIGNIFICANT CHANGE UP (ref 96–108)
CO2 SERPL-SCNC: 33 MMOL/L — HIGH (ref 22–31)
CREAT SERPL-MCNC: 1.24 MG/DL — SIGNIFICANT CHANGE UP (ref 0.5–1.3)
GAMMA GLOBULIN: 0.7 G/DL — SIGNIFICANT CHANGE UP (ref 0.6–1.6)
GLUCOSE SERPL-MCNC: 93 MG/DL — SIGNIFICANT CHANGE UP (ref 70–99)
INTERPRETATION SERPL IFE-IMP: SIGNIFICANT CHANGE UP
POTASSIUM SERPL-MCNC: 4.2 MMOL/L — SIGNIFICANT CHANGE UP (ref 3.5–5.3)
POTASSIUM SERPL-SCNC: 4.2 MMOL/L — SIGNIFICANT CHANGE UP (ref 3.5–5.3)
PROT PATTERN SERPL ELPH-IMP: SIGNIFICANT CHANGE UP
PROT SERPL-MCNC: 6.7 GM/DL — SIGNIFICANT CHANGE UP (ref 6–8.3)
SODIUM SERPL-SCNC: 140 MMOL/L — SIGNIFICANT CHANGE UP (ref 135–145)

## 2020-11-03 PROCEDURE — 99239 HOSP IP/OBS DSCHRG MGMT >30: CPT

## 2020-11-03 PROCEDURE — 99233 SBSQ HOSP IP/OBS HIGH 50: CPT

## 2020-11-03 RX ORDER — POTASSIUM CHLORIDE 20 MEQ
1 PACKET (EA) ORAL
Qty: 30 | Refills: 0
Start: 2020-11-03 | End: 2020-12-02

## 2020-11-03 RX ORDER — LISINOPRIL 2.5 MG/1
1 TABLET ORAL
Qty: 30 | Refills: 0
Start: 2020-11-03 | End: 2020-12-02

## 2020-11-03 RX ORDER — SPIRONOLACTONE 25 MG/1
1 TABLET, FILM COATED ORAL
Qty: 30 | Refills: 0
Start: 2020-11-03 | End: 2020-12-02

## 2020-11-03 RX ORDER — TAMSULOSIN HYDROCHLORIDE 0.4 MG/1
1 CAPSULE ORAL
Qty: 30 | Refills: 1
Start: 2020-11-03 | End: 2021-01-01

## 2020-11-03 RX ORDER — INFLUENZA VIRUS VACCINE 15; 15; 15; 15 UG/.5ML; UG/.5ML; UG/.5ML; UG/.5ML
0.5 SUSPENSION INTRAMUSCULAR ONCE
Refills: 0 | Status: COMPLETED | OUTPATIENT
Start: 2020-11-03 | End: 2020-11-03

## 2020-11-03 RX ORDER — RIVAROXABAN 15 MG-20MG
1 KIT ORAL
Qty: 30 | Refills: 0
Start: 2020-11-03 | End: 2020-12-02

## 2020-11-03 RX ORDER — CARVEDILOL PHOSPHATE 80 MG/1
1 CAPSULE, EXTENDED RELEASE ORAL
Qty: 60 | Refills: 0
Start: 2020-11-03 | End: 2020-12-02

## 2020-11-03 RX ORDER — FUROSEMIDE 40 MG
3 TABLET ORAL
Qty: 180 | Refills: 0
Start: 2020-11-03 | End: 2020-12-02

## 2020-11-03 RX ORDER — BETHANECHOL CHLORIDE 25 MG
1 TABLET ORAL
Qty: 60 | Refills: 0
Start: 2020-11-03 | End: 2020-12-02

## 2020-11-03 RX ADMIN — Medication 81 MILLIGRAM(S): at 11:10

## 2020-11-03 RX ADMIN — LISINOPRIL 10 MILLIGRAM(S): 2.5 TABLET ORAL at 11:10

## 2020-11-03 RX ADMIN — CARVEDILOL PHOSPHATE 6.25 MILLIGRAM(S): 80 CAPSULE, EXTENDED RELEASE ORAL at 11:10

## 2020-11-03 RX ADMIN — SPIRONOLACTONE 25 MILLIGRAM(S): 25 TABLET, FILM COATED ORAL at 11:10

## 2020-11-03 RX ADMIN — INFLUENZA VIRUS VACCINE 0.5 MILLILITER(S): 15; 15; 15; 15 SUSPENSION INTRAMUSCULAR at 13:38

## 2020-11-03 RX ADMIN — Medication 50 MILLIGRAM(S): at 11:10

## 2020-11-03 RX ADMIN — Medication 80 MILLIGRAM(S): at 11:11

## 2020-11-03 RX ADMIN — MEROPENEM 100 MILLIGRAM(S): 1 INJECTION INTRAVENOUS at 05:57

## 2020-11-03 RX ADMIN — LORATADINE 10 MILLIGRAM(S): 10 TABLET ORAL at 11:10

## 2020-11-03 NOTE — PROGRESS NOTE ADULT - SUBJECTIVE AND OBJECTIVE BOX
Date of service: 11-03-20 @ 12:04    pt seen and examined  feels better  no complaints  afebrile    ROS: no fever or chills; denies dizziness, no HA, no SOB or cough, no abdominal pain, no diarrhea or constipation; no dysuria, no urinary frequency, no legs pain, no rashes      MEDICATIONS  (STANDING):  aspirin  chewable 81 milliGRAM(s) Oral daily  atorvastatin 20 milliGRAM(s) Oral at bedtime  bethanechol 50 milliGRAM(s) Oral two times a day  carvedilol 6.25 milliGRAM(s) Oral every 12 hours  furosemide   Injectable 80 milliGRAM(s) IV Push two times a day  lisinopril 10 milliGRAM(s) Oral daily  loratadine 10 milliGRAM(s) Oral daily  meropenem  IVPB 1000 milliGRAM(s) IV Intermittent every 8 hours  meropenem  IVPB      rivaroxaban 20 milliGRAM(s) Oral with dinner  spironolactone 25 milliGRAM(s) Oral daily  tamsulosin 0.4 milliGRAM(s) Oral at bedtime      Vital Signs Last 24 Hrs  T(C): 36.5 (03 Nov 2020 08:44), Max: 36.8 (02 Nov 2020 20:16)  T(F): 97.7 (03 Nov 2020 08:44), Max: 98.3 (02 Nov 2020 20:16)  HR: 61 (03 Nov 2020 08:44) (60 - 61)  BP: 128/65 (03 Nov 2020 08:44) (128/65 - 143/65)  BP(mean): --  RR: 18 (03 Nov 2020 08:44) (18 - 18)  SpO2: 100% (03 Nov 2020 08:44) (100% - 100%)            PE:  Constitutional: NAD  HEENT: NC/AT, EOMI, PERRLA, conjunctivae clear; ears and nose atraumatic; pharynx benign  Neck: supple; thyroid not palpable  Back: no tenderness  Respiratory: respiratory effort normal; clear to auscultation  Cardiovascular: S1S2 regular, no murmurs  Abdomen: soft, not tender, not distended, positive BS; liver and spleen WNL  Genitourinary: no suprapubic tenderness  Lymphatic: no LN palpable  Musculoskeletal: no muscle tenderness, no joint swelling or tenderness  Extremities: no pedal edema  Neurological/ Psychiatric: moving all extremities  Skin: no rashes; no palpable lesions    Labs: all available labs reviewed                                              10.8   6.73  )-----------( 132      ( 02 Nov 2020 06:41 )             35.3     11-03    140  |  105  |  29<H>  ----------------------------<  93  4.2   |  33<H>  |  1.24    Ca    9.0      03 Nov 2020 07:27    TPro  6.7  /  Alb  3.2<L>  /  TBili  1.3<H>  /  DBili  x   /  AST  28  /  ALT  26  /  AlkPhos  189<H>  11-03          Culture - Body Fluid with Gram Stain (10.28.20 @ 10:38)   Gram Stain:   polymorphonuclear leukocytes seen   No organisms seen   by cytocentrifuge   Specimen Source: Ascites Fl   Culture Results:   No growth Culture - Urine (10.27.20 @ 20:30)   - Amikacin: S <=16   - Amoxicillin/Clavulanic Acid: S <=8/4   - Ampicillin: R >16 These ampicillin results predict results for amoxicillin   - Ampicillin/Sulbactam: S 8/4 Enterobacter, Citrobacter, and Serratia may develop resistance during prolonged therapy (3-4 days)   - Aztreonam: R 16   - Cefazolin: R >16 ([id number]) For uncomplicated UTI with K. pneumoniae, E. coli, or P. mirablis: JONAH <=16 is sensitive and JONAH >=32 is resistant. This also predicts results for oral agents cefaclor, cefdinir, cefpodoxime, cefprozil, cefuroxime axetil, cephalexin and locarbef for uncomplicated UTI. Note that some isolates may be susceptible to these agents while testing resistant to cefazolin.   - Cefepime: R 8   - Cefoxitin: S <=8   - Ceftriaxone: R >32 Enterobacter, Citrobacter, and Serratia may develop resistance during prolonged therapy   - Ciprofloxacin: R >2   - Ertapenem: S <=0.5   - Gentamicin: S <=2   - Imipenem: S <=1   - Levofloxacin: R >4   - Meropenem: S <=1   - Nitrofurantoin: S <=32 Should not be used to treat pyelonephritis   - Piperacillin/Tazobactam: S <=8   - Tigecycline: S <=2   - Tobramycin: S <=2   - Trimethoprim/Sulfamethoxazole: R >2/38   Specimen Source: .Urine None   Culture Results:   >100,000 CFU/ml Escherichia coli ESBL   Organism Identification: Escherichia coli ESBL   Organism: Escherichia coli ESBL   Method Type: JONAH Culture - Blood (10.28.20 @ 00:42)   Specimen Source: .Blood None   Culture Results:   No growth to date.     Radiology: all available radiological tests reviewed    EXAM:  XR CHEST PORTABLE URGENT 1V                            PROCEDURE DATE:  10/27/2020          INTERPRETATION:  AP chest on October 27, 2020 at 6:18 PM. Patient is short of breath.    Gross heart enlargement and left-sided pacemaker again noted. Micra pacemaker again noted.    Lungs remain clear.    Chest is similar to October 23 of this year.    IMPRESSION: Stable chest without acute finding.    < end of copied text >  < from: US Duplex Arterial Lower Ext Compl, Bilateral (10.28.20 @ 13:39) >  EXAM:  US DPLX LWR EXT ARTS COMPL BI                            PROCEDURE DATE:  10/28/2020          INTERPRETATION:  US LOWER EXTREMITY ARTERIAL DUPLEX COMPLETE BILATERAL    CLINICAL INDICATION:  Atherosclerosis of native arteries of the right andleft lower extremity with claudication    COMPARISON: None    Real-time sonography of the bilateral lower extremity arterial system was performed using a high-resolution linear array transducer and including color and spectral Doppler.    There is plaque in the lower extremity arteries.. No soft tissue abnormalities are demonstrated in either leg. Flow phase patterns and peak systolic velocity measurements (in cm/s) were observed as follows:    RIGHT:  CFA: Triphasic; 125  Proximal SFA: Triphasic; 99  Mid SFA: Triphasic; 132  Distal SFA: Triphasic;  99  Popliteal: Triphasic;  64  Anterior tibial: Triphasic; 112  Posterior tibial: Triphasic; 103  Peroneal: Triphasic;  62  Dorsalis pedis: Triphasic;  80    LEFT:  CFA: Triphasic; 108  Proximal SFA: Triphasic; 108  Mid SFA: Triphasic; 98  Distal SFA: Triphasic; 56  Popliteal: Triphasic;  75  Anterior tibial: Triphasic; 124  Posterior tibial: Triphasic; 129  Peroneal: Triphasic; 72  Dorsalis pedis: Triphasic;  94    IMPRESSION:    No significant stenosis or occlusion in either leg.    Atherosclerosis of the native arteries of the right and left lower extremities is present.    < end of copied text >  < from: CT Abdomen and Pelvis No Cont (10.27.20 @ 18:23) >    EXAM:  CT ABDOMEN AND PELVIS                            PROCEDURE DATE:  10/27/2020          INTERPRETATION:  CLINICAL INFORMATION: Abdominal distention and bilateral lower extremity edema.    COMPARISON: CT scan abdomen pelvis 8/27/2020.    PROCEDURE:  CT of the Abdomen and Pelvis was performed without intravenous contrast.  Intravenous contrast: None.  Oral contrast: None.  Sagittal and coronal reformats were performed.    FINDINGS:    LOWER CHEST: Cardiac pacemaker leads.  Cardiomegaly.  Small to moderate right-sided pleural effusion.  Small calcified granulomas right middle and left lingula lobes.    The evaluation of solid organ parenchyma is limited without intravenous contrast.    LIVER: Within normal limits.  BILE DUCTS: Normal caliber.  GALLBLADDER: Within normal limits.  SPLEEN: Within normal limits.  PANCREAS: Within normal limits.  ADRENALS: Within normal limits.  KIDNEYS/URETERS:  No hydroureteronephrosis or obstructing ureteral calculus.  Bilateral perinephric stranding.    BLADDER: Mild circumferential bladder wall thickening with mild perivesical stranding, correlate for cystitis.  REPRODUCTIVE ORGANS: Enlarged prostate with broad-based impression upon the urinary bladder.    BOWEL: Evaluation the gastrointestinal tract is limited without distention.  Colonic diverticulosis, without CT evidence of diverticulitis.  No bowel obstruction.  Appendix normal.  PERITONEUM: There is a moderate volume of abdominal and pelvic ascites.    VESSELS: Mild atherosclerotic calcification of the abdominal aorta, which is normal in caliber.  Prominent pelvic collaterals vessels, particularly on the right.  RETROPERITONEUM/LYMPH NODES: Small bilateral anterior external iliac chain pelvic lymph nodes.  Small bilateral inguinal lymph nodes.  ABDOMINAL WALL: Subcutaneous edema/anasarca.    BONES: Degenerative changes spine.    IMPRESSION:    Moderate volume of abdominal and pelvic ascites.  Diffuse anasarca.    Small to moderate right-sided pleural effusion.    No bowel obstruction.    Other findings as discussed above.    < end of copied text >    Advanced directives addressed: full resuscitation

## 2020-11-03 NOTE — PROGRESS NOTE ADULT - PROBLEM SELECTOR PLAN 4
Cath in August 2020 revealed the absence of obstructive CAD.
No significant CAD. Recent cath.
Cath in August 2020 revealed the absence of obstructive CAD.

## 2020-11-03 NOTE — PROGRESS NOTE ADULT - PROBLEM SELECTOR PLAN 1
Edema improved w/ lasix 80 IV BID, change lasix to 60 mg po BID as OP.  . cont. rosalia, coreg. cont. lisinopril to 10 mg daily, Cont. KCl 40 meQ given high dose lasix.  OK to d/c from cardiology standpoint w/ FU in cardiology clinic in 1 week.

## 2020-11-03 NOTE — PROGRESS NOTE ADULT - ASSESSMENT
PHYSICAL EXAM:  GENERAL: NAD, well-developed  HEAD:  Atraumatic, Normocephalic  EYES: EOMI, PERRLA, conjunctiva and sclera clear  NECK: Supple, No JVD  CHEST/LUNG: Clear to auscultation bilaterally  HEART: Regular rate and rhythm;   ABDOMEN: Soft, Nontender, Nondistended; Bowel sounds present  EXTREMITIES:  2+ Peripheral Pulses, +2 BLLE pitting edema (resolving)  PSYCH: AAOx3  NEUROLOGY: non-focal  SKIN: No rashes or lesions Above listed  information was sent to the admitting office

## 2020-11-03 NOTE — PROGRESS NOTE ADULT - SUBJECTIVE AND OBJECTIVE BOX
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    10/29/20:  Complaining of leg edema and dyspnea.  10/30/20: improvement in dyspnea, still w/ LE edema.  10/31/20: LE edema.  11/1/20: no dyspnea, ambulating around halls w/o desat.   swelling improved in LE.  11/2/'20: pt notes significant reduction in LE edema.  feels much better.  11/3/'20: LE edema currently at baseline  no dyspea.    MEDICATIONS:  OUTPATIENT:  Home Medications:  aspirin 81 mg oral tablet, chewable: 1 tab(s) orally once a day (02 Nov 2020 14:41)  bethanechol 50 mg oral tablet: 1 tab(s) orally 2 times a day (27 Oct 2020 23:00)    INPATIENT:  MEDICATIONS  (STANDING):  aspirin  chewable 81 milliGRAM(s) Oral daily  atorvastatin 20 milliGRAM(s) Oral at bedtime  bethanechol 50 milliGRAM(s) Oral two times a day  carvedilol 6.25 milliGRAM(s) Oral every 12 hours  furosemide   Injectable 80 milliGRAM(s) IV Push two times a day  lisinopril 10 milliGRAM(s) Oral daily  loratadine 10 milliGRAM(s) Oral daily  meropenem  IVPB 1000 milliGRAM(s) IV Intermittent every 8 hours  meropenem  IVPB      rivaroxaban 20 milliGRAM(s) Oral with dinner  spironolactone 25 milliGRAM(s) Oral daily  tamsulosin 0.4 milliGRAM(s) Oral at bedtime    MEDICATIONS  (PRN):  acetaminophen   Tablet .. 650 milliGRAM(s) Oral every 6 hours PRN Mild Pain (1 - 3)    MEDICATIONS  (PRN):  acetaminophen   Tablet .. 650 milliGRAM(s) Oral every 6 hours PRN Mild Pain (1 - 3)      Vital Signs Last 24 Hrs  T(C): 36.5 (03 Nov 2020 08:44), Max: 36.8 (02 Nov 2020 20:16)  T(F): 97.7 (03 Nov 2020 08:44), Max: 98.3 (02 Nov 2020 20:16)  HR: 61 (03 Nov 2020 08:44) (60 - 61)  BP: 128/65 (03 Nov 2020 08:44) (128/65 - 143/65)  BP(mean): --  RR: 18 (03 Nov 2020 08:44) (18 - 18)  SpO2: 100% (03 Nov 2020 08:44) (100% - 100%)    I&O's Summary    02 Nov 2020 07:01  -  03 Nov 2020 07:00  --------------------------------------------------------  IN: 0 mL / OUT: 3500 mL / NET: -3500 mL      PHYSICAL EXAM:    Constitutional: NAD, awake and alert,   Respiratory: Breath sounds are clear bilaterally, No wheezing, rales or rhonchi  Cardiovascular: S1 and S2, regular rate, systolic murmur no jVP  Gastrointestinal: decreased distension in abdomen  Extremities: minimal LE edema today.  Skin: No rash      LABS: All Labs Reviewed:                        10.8   6.73  )-----------( 132      ( 02 Nov 2020 06:41 )             35.3                         11.9   7.44  )-----------( 148      ( 01 Nov 2020 07:20 )             40.0     03 Nov 2020 07:27    140    |  105    |  29     ----------------------------<  93     4.2     |  33     |  1.24   02 Nov 2020 06:41    141    |  103    |  24     ----------------------------<  90     4.0     |  30     |  1.28   01 Nov 2020 07:20    140    |  102    |  23     ----------------------------<  92     4.5     |  34     |  1.33     Ca    9.0        03 Nov 2020 07:27  Ca    8.6        02 Nov 2020 06:41  Ca    9.3        01 Nov 2020 07:20    TPro  6.7    /  Alb  3.2    /  TBili  1.3    /  DBili  x      /  AST  28     /  ALT  26     /  AlkPhos  189    03 Nov 2020 07:27    ==============================  12 Lead ECG (10.28.20 @ 07:59):  Ventricular-paced rhythm  ==============================  TTE Echo Complete w/o Contrast w/ Doppler (08.28.20 @ 21:42):   Moderate to severe (3+)mitral regurgitation.   Mild to Moderate aortic regurgitation.   Mild pulmonary hypertension.   Mild (1+) tricuspid valve regurgitation is present. Estimated left ventricular ejection fraction is 45 %.   The left ventricle cavity is mildly dilated.   The left ventricle is normal in wall thickness. Mild, diffuse hypokinesis of the left ventricle is present.   The right ventricle is normal in size, wall thickness, wall motion, and contractility based on TAPSE.   A device wire is seen in the RV and RA.  ==============================  Cardiac Cath Lab - Adult (08.28.20 @ 14:25):  Non-Obstructive CAD with Abnormal left ventricular function.    Severe pulmonary hypertension.  Mildly reduced left ventricular systolic function.  ==============================  PM interrogation note Aug '20:  Afib w/ 99% V pacing, no significant events, normal function.  ==============================

## 2020-11-03 NOTE — PROGRESS NOTE ADULT - PROVIDER SPECIALTY LIST ADULT
Cardiology
Hospitalist
Infectious Disease
Hospitalist
Hospitalist
Cardiology
Heart Failure
Cardiology
Hospitalist

## 2020-11-03 NOTE — PROGRESS NOTE ADULT - PROBLEM SELECTOR PLAN 1
CHF education done with patient    Education provided including:  Signs and symptoms of CHF exacerbation   Daily Weights  What to do if symptoms worsen  How, when, and why to take medication  lifestyle changes  limiting sodium intake to 1500mg-2000mg daily  when to contact HCP  Ejection Fraction 45%    Recommendations:  1. Change Lisinopril to Valsartan for possible transition to Entresto  2. Change lasix 60mg PO BID to torsemide 60mg PO BID  3. Consider addition of nitrate  4. f/u with CHF clinic on Monday 11/9 at 1pm    Post d/c follow up appointment to be made by unit clerk when medically stable

## 2020-11-03 NOTE — PROGRESS NOTE ADULT - PROBLEM SELECTOR PROBLEM 5
Acute on chronic combined systolic (congestive) and diastolic (congestive) heart failure

## 2020-11-03 NOTE — DISCHARGE NOTE NURSING/CASE MANAGEMENT/SOCIAL WORK - PATIENT PORTAL LINK FT
You can access the FollowMyHealth Patient Portal offered by Health system by registering at the following website: [email or web address]. By joining [facility]’s FollowMyHealth portal, you will also be able to view your health information using other applications (apps) compatible with our system.

## 2020-11-03 NOTE — PROGRESS NOTE ADULT - SUBJECTIVE AND OBJECTIVE BOX
CHIEF COMPLAINT/DIAGNOSIS: abdominal pain, LOPEZ    HPI: 76 y/o M w/ PMHx of systolic CHF (EF 45%), COPD - Severe Pulmonary HTN, HTN, dyslipidemia, CAD, a-fib/PPM on xarelto, arthritis p/w abdominal pain. Patient states abdominal pain started 1 week ago, and when he walks he also gets dyspnea on exertion. Also c/o LE pain, LLE worse than right. States pain is mostly above the knee. Patient also c/o dysuria and R flank pain. Denies fever, chills, nausea, vomiting, CP, cough, runny nose, diarrhea.     11/3: feeling much better. states legs are better. denies sob, lopez. happy for discharge.    REVIEW OF SYSTEMS:  All other review of systems is negative unless indicated above    PHYSICAL EXAM:  Constitutional: Awake and alert, well-developed  HEENT: Normal Hearing, MMM  Neck: Soft and supple, No LAD, + JVD  Respiratory: Breath sounds are clear b/l   Cardiovascular: S1 and S2, IRIR, paced. + murmur  Gastrointestinal: Bowel Sounds present, firm, tight. obese  Extremities: b/l  peripheral edema, pitting. anasarca -- improved.  Vascular: 2+ peripheral pulses  Neurological: A/O x 3, no focal deficits  Musculoskeletal: 5/5 strength b/l upper and lower extremities  Skin: ble redness, swelling -- improved.     Vital Signs Last 24 Hrs  T(C): 36.6 (02 Nov 2020 08:23), Max: 36.6 (02 Nov 2020 08:23)  T(F): 97.8 (02 Nov 2020 08:23), Max: 97.8 (02 Nov 2020 08:23)  HR: 60 (02 Nov 2020 08:23) (60 - 60)  BP: 131/67 (02 Nov 2020 08:23) (131/67 - 142/75)  BP(mean): --  RR: 18 (02 Nov 2020 08:23) (17 - 18)  SpO2: 99% (02 Nov 2020 08:23) (99% - 99%) - room air    LABS: All Labs Reviewed:    11-03    140  |  105  |  29<H>  ----------------------------<  93  4.2   |  33<H>  |  1.24    Ca    9.0      03 Nov 2020 07:27    TPro  6.7  /  Alb  3.2<L>  /  TBili  1.3<H>  /  DBili  x   /  AST  28  /  ALT  26  /  AlkPhos  189<H>  11-03    RADIOLOGY:  < from: CT Abdomen and Pelvis No Cont (10.27.20 @ 18:23) >  IMPRESSION:  Moderate volume of abdominal and pelvic ascites.  Diffuse anasarca.  Small to moderate right-sided pleural effusion.  No bowel obstruction.  < end of copied text >    < from: Xray Chest 1 View- PORTABLE-Urgent (Xray Chest 1 View- PORTABLE-Urgent .) (10.27.20 @ 18:36) >  IMPRESSION: Stable chest without acute finding.  < end of copied text >    ECHOCARDIOGRAM:    < from: TTE Echo Complete w/o Contrast w/ Doppler (08.28.20 @ 21:42) >   Impression     Summary     The mitral valve leaflets appear thickened.   Mild mitral annular calcification is present.   Moderate to severe (3+)mitral regurgitation is present.   The aortic valve is trileaflet with thin pliable leaflets.   Mild to Moderate aortic regurgitation is present.   Normal appearing tricuspid valve structure.   Mild pulmonary hypertension.   Mild (1+) tricuspid valveregurgitation is present.   Estimated left ventricular ejection fraction is 45 %.   The left ventricle cavity is mildly dilated.   The left ventricle is normal in wall thickness.   Mild, diffuse hypokinesis of the left ventricle is present.   The right ventricle is normal in size, wall thickness, wall motion, and   contractility based on TAPSE.   A device wire is seen in the RV and RA.    < end of copied text >    MEDICATIONS  (STANDING):  aspirin  chewable 81 milliGRAM(s) Oral daily  atorvastatin 20 milliGRAM(s) Oral at bedtime  bethanechol 50 milliGRAM(s) Oral two times a day  carvedilol 6.25 milliGRAM(s) Oral every 12 hours  furosemide   Injectable 80 milliGRAM(s) IV Push two times a day  lisinopril 10 milliGRAM(s) Oral daily  loratadine 10 milliGRAM(s) Oral daily  meropenem  IVPB 1000 milliGRAM(s) IV Intermittent every 8 hours  meropenem  IVPB      rivaroxaban 20 milliGRAM(s) Oral with dinner  spironolactone 25 milliGRAM(s) Oral daily  tamsulosin 0.4 milliGRAM(s) Oral at bedtime    MEDICATIONS  (PRN):  acetaminophen   Tablet .. 650 milliGRAM(s) Oral every 6 hours PRN Mild Pain (1 - 3)        ASSESSMENT AND PLAN:    76 y/o M w/ PMH of systolic CHF, COPD, HTN, dyslipidemia, CAD, a-fib on xarelto, arthritis, s/p PPM, p/w abdominal pain    1) Acute on Chronic systolic/diastolic CHF exacerbation  - Cont. IV Lasix, spirolactone, ACEi, BB  - daily weights, I&Os -- > 40lb weight loss noted.  - Echo 8/28/20 - EF 45%  - Cardiac Cath 9/28 ~ w/ CAD, severe pulm HTN  - LE swelling - arterial/ venous dopplers pending > negative  - Cardio consult appreciated    2) Elevated troponins likely demand due to HF exacerbation   - denies CP. trops minimally elevated - flat  - cont ASA 81, statin  - ecg reviewed. no significant ST changes. recently underwent LHC results above.  - cardio consult     3) Moderate Pelvic ascites / Anasarca due to CHF exacerbation  - s/p paracentesis 2700cc. Cultures sent.  SAAG = 1.2  - check hep acute panel, lipids ~ wnl  - GI consult appreciated -- most likely r/t CHF    4) Pyelonephritis | ESBL UTI   - f/u Bcx NGTD, Ucx +(esbl)  - d/c ceftriaxone, switch to meropenum day # 4/5  - ID consult, isolation     5) Elevate sCr, did not meet KIDGO criteria - stable  - monitor on IV diuretics.   - I&Os, daily weights.    6) Chronic Afib | PPM  - Paced. afib. cont. Xarelto, BB     7) Enlarged prostate - Cont. Flomax     8) Small B/L anterior external iliac chain pelvic lymph nodes / Small BL inguinal lymph nodes   - F/u outpatient     9) DVT ppx - Xarelto     Dispo: discharge to home in stable condition. switch to Lasix 60mg PO BID tm. f/u HF clinic.     CHIEF COMPLAINT/DIAGNOSIS: abdominal pain, LOPEZ    HPI: 76 y/o M w/ PMHx of systolic CHF (EF 45%), COPD - Severe Pulmonary HTN, HTN, dyslipidemia, CAD, a-fib/PPM on xarelto, arthritis p/w abdominal pain. Patient states abdominal pain started 1 week ago, and when he walks he also gets dyspnea on exertion. Also c/o LE pain, LLE worse than right. States pain is mostly above the knee. Patient also c/o dysuria and R flank pain. Denies fever, chills, nausea, vomiting, CP, cough, runny nose, diarrhea.     11/3: feeling much better. states legs are better. denies sob, lopez. happy for discharge.    REVIEW OF SYSTEMS:  All other review of systems is negative unless indicated above    PHYSICAL EXAM:  Constitutional: Awake and alert, well-developed  HEENT: Normal Hearing, MMM  Neck: Soft and supple, No LAD, + JVD  Respiratory: Breath sounds are clear b/l   Cardiovascular: S1 and S2, IRIR, paced. + murmur  Gastrointestinal: Bowel Sounds present, firm, tight. obese  Extremities: b/l  peripheral edema, pitting. anasarca -- improved.  Vascular: 2+ peripheral pulses  Neurological: A/O x 3, no focal deficits  Musculoskeletal: 5/5 strength b/l upper and lower extremities  Skin: ble redness, swelling -- improved.     Vital Signs Last 24 Hrs  T(C): 36.6 (02 Nov 2020 08:23), Max: 36.6 (02 Nov 2020 08:23)  T(F): 97.8 (02 Nov 2020 08:23), Max: 97.8 (02 Nov 2020 08:23)  HR: 60 (02 Nov 2020 08:23) (60 - 60)  BP: 131/67 (02 Nov 2020 08:23) (131/67 - 142/75)  BP(mean): --  RR: 18 (02 Nov 2020 08:23) (17 - 18)  SpO2: 99% (02 Nov 2020 08:23) (99% - 99%) - room air    LABS: All Labs Reviewed:    11-03    140  |  105  |  29<H>  ----------------------------<  93  4.2   |  33<H>  |  1.24    Ca    9.0      03 Nov 2020 07:27    TPro  6.7  /  Alb  3.2<L>  /  TBili  1.3<H>  /  DBili  x   /  AST  28  /  ALT  26  /  AlkPhos  189<H>  11-03    RADIOLOGY:  < from: CT Abdomen and Pelvis No Cont (10.27.20 @ 18:23) >  IMPRESSION:  Moderate volume of abdominal and pelvic ascites.  Diffuse anasarca.  Small to moderate right-sided pleural effusion.  No bowel obstruction.  < end of copied text >    < from: Xray Chest 1 View- PORTABLE-Urgent (Xray Chest 1 View- PORTABLE-Urgent .) (10.27.20 @ 18:36) >  IMPRESSION: Stable chest without acute finding.  < end of copied text >    ECHOCARDIOGRAM:    < from: TTE Echo Complete w/o Contrast w/ Doppler (08.28.20 @ 21:42) >   Impression     Summary     The mitral valve leaflets appear thickened.   Mild mitral annular calcification is present.   Moderate to severe (3+)mitral regurgitation is present.   The aortic valve is trileaflet with thin pliable leaflets.   Mild to Moderate aortic regurgitation is present.   Normal appearing tricuspid valve structure.   Mild pulmonary hypertension.   Mild (1+) tricuspid valveregurgitation is present.   Estimated left ventricular ejection fraction is 45 %.   The left ventricle cavity is mildly dilated.   The left ventricle is normal in wall thickness.   Mild, diffuse hypokinesis of the left ventricle is present.   The right ventricle is normal in size, wall thickness, wall motion, and   contractility based on TAPSE.   A device wire is seen in the RV and RA.    < end of copied text >    MEDICATIONS  (STANDING):  aspirin  chewable 81 milliGRAM(s) Oral daily  atorvastatin 20 milliGRAM(s) Oral at bedtime  bethanechol 50 milliGRAM(s) Oral two times a day  carvedilol 6.25 milliGRAM(s) Oral every 12 hours  furosemide   Injectable 80 milliGRAM(s) IV Push two times a day  lisinopril 10 milliGRAM(s) Oral daily  loratadine 10 milliGRAM(s) Oral daily  meropenem  IVPB 1000 milliGRAM(s) IV Intermittent every 8 hours  meropenem  IVPB      rivaroxaban 20 milliGRAM(s) Oral with dinner  spironolactone 25 milliGRAM(s) Oral daily  tamsulosin 0.4 milliGRAM(s) Oral at bedtime    MEDICATIONS  (PRN):  acetaminophen   Tablet .. 650 milliGRAM(s) Oral every 6 hours PRN Mild Pain (1 - 3)        ASSESSMENT AND PLAN:    76 y/o M w/ PMH of systolic CHF, COPD, HTN, dyslipidemia, CAD, a-fib on xarelto, arthritis, s/p PPM, p/w abdominal pain    1) Acute on Chronic systolic/diastolic CHF exacerbation  - Cont. IV Lasix, spirolactone, ACEi, BB  - daily weights, I&Os -- > 40lb weight loss noted.  - Echo 8/28/20 - EF 45%  - Cardiac Cath 9/28 ~ w/ CAD, severe pulm HTN  - LE swelling - arterial/ venous dopplers pending > negative  - Cardio consult appreciated    2) Elevated troponins likely demand due to HF exacerbation   - denies CP. trops minimally elevated - flat  - cont ASA 81, statin  - ecg reviewed. no significant ST changes. recently underwent LHC results above.  - cardio consult     3) Moderate Pelvic ascites / Anasarca due to CHF exacerbation  - s/p paracentesis 2700cc. Cultures sent.  SAAG = 1.2  - check hep acute panel, lipids ~ wnl  - GI consult appreciated -- most likely r/t CHF    4) Pyelonephritis | ESBL UTI   - f/u Bcx NGTD, Ucx +(esbl)  - d/c ceftriaxone, switch to meropenum day # 5/5 -- complete  - ID consult, isolation     5) Elevate sCr, did not meet KIDGO criteria - stable  - monitor on IV diuretics.   - I&Os, daily weights.    6) Chronic Afib | PPM  - Paced. afib. cont. Xarelto, BB     7) Enlarged prostate - Cont. Flomax     8) Small B/L anterior external iliac chain pelvic lymph nodes / Small BL inguinal lymph nodes   - F/u outpatient     9) DVT ppx - Xarelto     Dispo: discharge to home in stable condition. switch to Lasix 60mg PO BID tm. f/u HF clinic.     CHIEF COMPLAINT/DIAGNOSIS: abdominal pain, LOPEZ    HPI: 74 y/o M w/ PMHx of systolic CHF (EF 45%), COPD - Severe Pulmonary HTN, HTN, dyslipidemia, CAD, a-fib/PPM on xarelto, arthritis p/w abdominal pain. Patient states abdominal pain started 1 week ago, and when he walks he also gets dyspnea on exertion. Also c/o LE pain, LLE worse than right. States pain is mostly above the knee. Patient also c/o dysuria and R flank pain. Denies fever, chills, nausea, vomiting, CP, cough, runny nose, diarrhea.     11/3: feeling much better. states legs are better. denies sob, lopez. happy for discharge.    REVIEW OF SYSTEMS:  All other review of systems is negative unless indicated above    PHYSICAL EXAM:  Constitutional: Awake and alert, well-developed  HEENT: Normal Hearing, MMM  Neck: Soft and supple, No LAD, no JVD  Respiratory: Breath sounds are clear b/l   Cardiovascular: S1 and S2, IRIR, paced. + murmur  Gastrointestinal: Bowel Sounds present, firm, tight. obese  Extremities: b/l  peripheral edema, pitting. anasarca -- improved.  Vascular: 2+ peripheral pulses  Neurological: A/O x 3, no focal deficits  Musculoskeletal: 5/5 strength b/l upper and lower extremities  Skin: ble redness, swelling -- improved.     Vital Signs Last 24 Hrs  T(C): 36.6 (02 Nov 2020 08:23), Max: 36.6 (02 Nov 2020 08:23)  T(F): 97.8 (02 Nov 2020 08:23), Max: 97.8 (02 Nov 2020 08:23)  HR: 60 (02 Nov 2020 08:23) (60 - 60)  BP: 131/67 (02 Nov 2020 08:23) (131/67 - 142/75)  BP(mean): --  RR: 18 (02 Nov 2020 08:23) (17 - 18)  SpO2: 99% (02 Nov 2020 08:23) (99% - 99%) - room air    LABS: All Labs Reviewed:    11-03    140  |  105  |  29<H>  ----------------------------<  93  4.2   |  33<H>  |  1.24    Ca    9.0      03 Nov 2020 07:27    TPro  6.7  /  Alb  3.2<L>  /  TBili  1.3<H>  /  DBili  x   /  AST  28  /  ALT  26  /  AlkPhos  189<H>  11-03    RADIOLOGY:  < from: CT Abdomen and Pelvis No Cont (10.27.20 @ 18:23) >  IMPRESSION:  Moderate volume of abdominal and pelvic ascites.  Diffuse anasarca.  Small to moderate right-sided pleural effusion.  No bowel obstruction.  < end of copied text >    < from: Xray Chest 1 View- PORTABLE-Urgent (Xray Chest 1 View- PORTABLE-Urgent .) (10.27.20 @ 18:36) >  IMPRESSION: Stable chest without acute finding.  < end of copied text >    ECHOCARDIOGRAM:    < from: TTE Echo Complete w/o Contrast w/ Doppler (08.28.20 @ 21:42) >   Impression     Summary     The mitral valve leaflets appear thickened.   Mild mitral annular calcification is present.   Moderate to severe (3+)mitral regurgitation is present.   The aortic valve is trileaflet with thin pliable leaflets.   Mild to Moderate aortic regurgitation is present.   Normal appearing tricuspid valve structure.   Mild pulmonary hypertension.   Mild (1+) tricuspid valveregurgitation is present.   Estimated left ventricular ejection fraction is 45 %.   The left ventricle cavity is mildly dilated.   The left ventricle is normal in wall thickness.   Mild, diffuse hypokinesis of the left ventricle is present.   The right ventricle is normal in size, wall thickness, wall motion, and   contractility based on TAPSE.   A device wire is seen in the RV and RA.    < end of copied text >    MEDICATIONS  (STANDING):  aspirin  chewable 81 milliGRAM(s) Oral daily  atorvastatin 20 milliGRAM(s) Oral at bedtime  bethanechol 50 milliGRAM(s) Oral two times a day  carvedilol 6.25 milliGRAM(s) Oral every 12 hours  furosemide   Injectable 80 milliGRAM(s) IV Push two times a day  lisinopril 10 milliGRAM(s) Oral daily  loratadine 10 milliGRAM(s) Oral daily  meropenem  IVPB 1000 milliGRAM(s) IV Intermittent every 8 hours  meropenem  IVPB      rivaroxaban 20 milliGRAM(s) Oral with dinner  spironolactone 25 milliGRAM(s) Oral daily  tamsulosin 0.4 milliGRAM(s) Oral at bedtime    MEDICATIONS  (PRN):  acetaminophen   Tablet .. 650 milliGRAM(s) Oral every 6 hours PRN Mild Pain (1 - 3)        ASSESSMENT AND PLAN:    74 y/o M w/ PMH of systolic CHF, COPD, HTN, dyslipidemia, CAD, a-fib on xarelto, arthritis, s/p PPM, p/w abdominal pain    1) Acute on Chronic systolic/diastolic CHF exacerbation  - s/p IV Lasix, spirolactone, ACEi, BB  - daily weights, I&Os -- > 40lb weight loss noted.  - Echo 8/28/20 - EF 45%  - Cardiac Cath 9/28 ~ w/ CAD, severe pulm HTN  - LE swelling - arterial/ venous dopplers pending > negative  - Cardio consult appreciated - cont Lasix 60mg BID PO    2) Elevated troponins likely demand due to HF exacerbation   - denies CP. trops minimally elevated - flat  - cont ASA 81, statin  - ecg reviewed. no significant ST changes. recently underwent [facility] results above.  - cardio consult     3) Moderate Pelvic ascites / Anasarca due to CHF exacerbation  - s/p paracentesis 2700cc. Cultures sent.  SAAG = 1.2  - check hep acute panel, lipids ~ wnl  - GI consult appreciated -- most likely r/t CHF    4) Pyelonephritis | ESBL UTI   - f/u Bcx NGTD, Ucx +(esbl)  - d/c ceftriaxone, switch to meropenum day # 5/5 -- complete  - ID consult, isolation     5) Elevate sCr, did not meet KIDGO criteria - stable  - monitor on IV diuretics.   - I&Os, daily weights.    6) Chronic Afib | PPM  - Paced. afib. cont. Xarelto, BB     7) Enlarged prostate - Cont. Flomax     8) Small B/L anterior external iliac chain pelvic lymph nodes / Small BL inguinal lymph nodes   - F/u outpatient     9) DVT ppx - Xarelto     Dispo: discharge to home in stable condition. switch to Lasix 60mg PO BID tm. f/u HF clinic.

## 2020-11-03 NOTE — PROGRESS NOTE ADULT - SUBJECTIVE AND OBJECTIVE BOX
Patient is a 75y old  Male who presents with a chief complaint of abdominal pain (03 Nov 2020 12:03)    HPI:  74 y/o M w/ PMH of CHF, COPD, HTN, dyslipidemia, CAD, a-fib on xarelto, arthritis, s/p PPM, p/w abdominal pain. Patient states abdominal pain started 1 week ago, and when he walks he also gets dyspnea on exertion. Also c/o LE pain, LLE worse than right. States pain is mostly above the knee. Patient also c/o dysuria and R flank pain. Denies fever, chills, nausea, vomiting, CP, cough, runny nose, diarrhea.     11/3/20- pt denies any cp/sob.  c/o BLLE edema from calves to thighs, but states it has improved significantly since admission.  Had increasing SOB over the past week that has also improved significantly over the past week.  Probable DC today with close f/u with cardiology.  Pt does not have a primary care doctor, or a cardiologist he sees regularly.  Plan is to see him in CHF clinic on Monday at 1PM.        Patient is a 75y old  Male who presents with a chief complaint of abdominal pain (03 Nov 2020 12:03)    HPI:  74 y/o M w/ PMH of CHF, COPD, HTN, dyslipidemia, CAD, a-fib on xarelto, arthritis, s/p PPM, p/w abdominal pain. Patient states abdominal pain started 1 week ago, and when he walks he also gets dyspnea on exertion. Also c/o LE pain, LLE worse than right. States pain is mostly above the knee. Patient also c/o dysuria and R flank pain. Denies fever, chills, nausea, vomiting, CP, cough, runny nose, diarrhea.     11/3/20- pt denies any cp/sob.  c/o BLLE edema from calves to thighs, but states it has improved significantly since admission.  Had increasing SOB over the past week that has also improved significantly over the past week.  Probable DC today with close f/u with cardiology.  Pt does not have a primary care doctor, or a cardiologist he sees regularly.  Plan is to see him in CHF clinic on Monday at 1PM.  Scale given to patient, and instructed to weigh himself daily and bring weight log to clinic on Monday.

## 2020-11-03 NOTE — PROGRESS NOTE ADULT - ATTENDING COMMENTS
Abelino Mccoy M.D.  Cardiology, [facility]  Cell: [phone]  Offices:    ([facility] Office)  [phone] ([facility] Office)
Agree with above. Patient requires adequate home dose of diuretic given he remains slightly overloaded peripherally, despite centrally is euvolemic. Consider torsemide 40 mg daily. Would also increase afterload block with uptitration of ACEI or switch to ARB. Should be seen in heart failure clinic as he remains high risk for recurrent hospitlization.
Abelino Mccoy M.D.  Cardiology, [facility]  Cell: [phone]  Offices:    ([facility] Office)  [phone] ([facility] Office)
Abelino Mccoy M.D.  Cardiology, [facility]  Cell: [phone]  Office:   [phone] ([facility] Office)  [phone] ([facility] Office)
Abelino Mccoy M.D.  Cardiology, [facility]  Cell: [phone]  Offices:    ([facility] Office)  [phone] ([facility] Office)
Abelino Mccoy M.D.  Cardiology, [facility]  Cell: [phone]  Offices:    ([facility] Office)  [phone] ([facility] Office)

## 2020-11-03 NOTE — PROGRESS NOTE ADULT - ASSESSMENT
76 y/o M w/ PMH of CHF, COPD, HTN, dyslipidemia, CAD, a-fib on xarelto, arthritis, s/p PPM, p/w abdominal pain. Patient states abdominal pain started 1 week ago, and when he walks he also gets dyspnea on exertion. Also c/o LE pain, LLE worse than right. States pain is mostly above the knee. Patient also c/o dysuria and R flank pain. Denies fever, chills, nausea, vomiting, CP, cough, runny nose, diarrhea. here UA positive, CT abd/pelvis with ascites likely d/t CHF, urine cx growing ESBL Ecoli started on meropenem.     1. ESBL Ecoli complicated cystitis  - on IV meropenem #5  - plan for 5 day course   - monitor temps  - tolerating abx well so far; no side effects noted  - reason for abx use and side effects reviewed with patient  - supportive care  - fu cbc    2. other issues - care per medicine

## 2020-11-03 NOTE — PROGRESS NOTE ADULT - PROBLEM SELECTOR PLAN 5
see "dyspnea" for plan.
Mildly decreased LVEF; needs diuresis.
see "dyspnea" for plan.
see "dyspnea" for plan.

## 2020-11-03 NOTE — PROGRESS NOTE ADULT - PROBLEM SELECTOR PROBLEM 1
Shortness of breath
Acute on chronic combined systolic and diastolic heart failure
Shortness of breath

## 2020-11-03 NOTE — PROGRESS NOTE ADULT - PROBLEM SELECTOR PLAN 2
Continue anticoagulation
Paced. Continue anticoagulation
Continue anticoagulation

## 2020-11-04 LAB
% GAMMA, URINE: 19.8 % — SIGNIFICANT CHANGE UP
ALBUMIN 24H MFR UR ELPH: 22.7 % — SIGNIFICANT CHANGE UP
ALPHA1 GLOB 24H MFR UR ELPH: 29.6 % — SIGNIFICANT CHANGE UP
ALPHA2 GLOB 24H MFR UR ELPH: 18.3 % — SIGNIFICANT CHANGE UP
B-GLOBULIN 24H MFR UR ELPH: 9.6 % — SIGNIFICANT CHANGE UP
INTERPRETATION 24H UR IFE-IMP: SIGNIFICANT CHANGE UP
INTERPRETATION 24H UR IFE-IMP: SIGNIFICANT CHANGE UP
M PROTEIN 24H UR ELPH-MRATE: SIGNIFICANT CHANGE UP
PROT ?TM UR-MCNC: <4 MG/DL — SIGNIFICANT CHANGE UP (ref 0–12)
PROT PATTERN 24H UR ELPH-IMP: SIGNIFICANT CHANGE UP
TOTAL VOLUME - 24 HOUR: SIGNIFICANT CHANGE UP ML
URINE CREATININE CALCULATION: SIGNIFICANT CHANGE UP G/24 H (ref 1–2)

## 2020-11-04 RX ORDER — POTASSIUM CHLORIDE 1500 MG/1
20 TABLET, FILM COATED, EXTENDED RELEASE ORAL
Qty: 30 | Refills: 0 | Status: DISCONTINUED | COMMUNITY
Start: 2020-08-31 | End: 2020-11-04

## 2020-11-04 RX ORDER — RIVAROXABAN 15 MG/1
15 TABLET, FILM COATED ORAL
Qty: 30 | Refills: 0 | Status: DISCONTINUED | COMMUNITY
Start: 2020-05-13 | End: 2020-11-04

## 2020-11-04 RX ORDER — SACUBITRIL AND VALSARTAN 49; 51 MG/1; MG/1
49-51 TABLET, FILM COATED ORAL TWICE DAILY
Qty: 60 | Refills: 0 | Status: DISCONTINUED | COMMUNITY
Start: 2017-07-17 | End: 2020-11-04

## 2020-11-04 RX ORDER — FUROSEMIDE 40 MG/1
40 TABLET ORAL TWICE DAILY
Refills: 0 | Status: DISCONTINUED | COMMUNITY
Start: 2020-09-01 | End: 2020-11-04

## 2020-11-04 RX ORDER — CEPHALEXIN 500 MG/1
500 CAPSULE ORAL
Qty: 10 | Refills: 0 | Status: DISCONTINUED | COMMUNITY
Start: 2020-05-11 | End: 2020-11-04

## 2020-11-04 RX ORDER — FUROSEMIDE 20 MG/1
20 TABLET ORAL
Qty: 30 | Refills: 0 | Status: DISCONTINUED | COMMUNITY
Start: 2020-06-19 | End: 2020-11-04

## 2020-11-04 RX ORDER — LORATADINE 5 MG/5 ML
10 SOLUTION, ORAL ORAL DAILY
Refills: 0 | Status: DISCONTINUED | COMMUNITY
Start: 2020-09-01 | End: 2020-11-04

## 2020-11-05 ENCOUNTER — NON-APPOINTMENT (OUTPATIENT)
Age: 75
End: 2020-11-05

## 2020-11-06 DIAGNOSIS — I27.20 PULMONARY HYPERTENSION, UNSPECIFIED: ICD-10-CM

## 2020-11-06 DIAGNOSIS — I25.10 ATHEROSCLEROTIC HEART DISEASE OF NATIVE CORONARY ARTERY WITHOUT ANGINA PECTORIS: ICD-10-CM

## 2020-11-06 DIAGNOSIS — J44.9 CHRONIC OBSTRUCTIVE PULMONARY DISEASE, UNSPECIFIED: ICD-10-CM

## 2020-11-06 DIAGNOSIS — R18.8 OTHER ASCITES: ICD-10-CM

## 2020-11-06 DIAGNOSIS — I50.43 ACUTE ON CHRONIC COMBINED SYSTOLIC (CONGESTIVE) AND DIASTOLIC (CONGESTIVE) HEART FAILURE: ICD-10-CM

## 2020-11-06 DIAGNOSIS — I11.0 HYPERTENSIVE HEART DISEASE WITH HEART FAILURE: ICD-10-CM

## 2020-11-06 DIAGNOSIS — Z95.0 PRESENCE OF CARDIAC PACEMAKER: ICD-10-CM

## 2020-11-06 DIAGNOSIS — I42.8 OTHER CARDIOMYOPATHIES: ICD-10-CM

## 2020-11-06 DIAGNOSIS — N30.90 CYSTITIS, UNSPECIFIED WITHOUT HEMATURIA: ICD-10-CM

## 2020-11-06 DIAGNOSIS — M19.90 UNSPECIFIED OSTEOARTHRITIS, UNSPECIFIED SITE: ICD-10-CM

## 2020-11-06 DIAGNOSIS — I48.20 CHRONIC ATRIAL FIBRILLATION, UNSPECIFIED: ICD-10-CM

## 2020-11-06 DIAGNOSIS — N12 TUBULO-INTERSTITIAL NEPHRITIS, NOT SPECIFIED AS ACUTE OR CHRONIC: ICD-10-CM

## 2020-11-06 DIAGNOSIS — I24.8 OTHER FORMS OF ACUTE ISCHEMIC HEART DISEASE: ICD-10-CM

## 2020-11-06 DIAGNOSIS — E78.5 HYPERLIPIDEMIA, UNSPECIFIED: ICD-10-CM

## 2020-11-06 DIAGNOSIS — N40.0 BENIGN PROSTATIC HYPERPLASIA WITHOUT LOWER URINARY TRACT SYMPTOMS: ICD-10-CM

## 2020-11-06 DIAGNOSIS — Z16.12 EXTENDED SPECTRUM BETA LACTAMASE (ESBL) RESISTANCE: ICD-10-CM

## 2020-11-06 DIAGNOSIS — B96.20 UNSPECIFIED ESCHERICHIA COLI [E. COLI] AS THE CAUSE OF DISEASES CLASSIFIED ELSEWHERE: ICD-10-CM

## 2020-11-09 ENCOUNTER — APPOINTMENT (OUTPATIENT)
Dept: CARDIOLOGY | Facility: CLINIC | Age: 75
End: 2020-11-09
Payer: MEDICARE

## 2020-11-09 VITALS
DIASTOLIC BLOOD PRESSURE: 60 MMHG | WEIGHT: 174 LBS | SYSTOLIC BLOOD PRESSURE: 112 MMHG | OXYGEN SATURATION: 100 % | BODY MASS INDEX: 27.31 KG/M2 | HEART RATE: 61 BPM | HEIGHT: 67 IN

## 2020-11-09 PROCEDURE — 93000 ELECTROCARDIOGRAM COMPLETE: CPT

## 2020-11-09 PROCEDURE — 99215 OFFICE O/P EST HI 40 MIN: CPT

## 2020-11-09 RX ORDER — FUROSEMIDE 20 MG/1
20 TABLET ORAL TWICE DAILY
Qty: 180 | Refills: 0 | Status: DISCONTINUED | COMMUNITY
Start: 2020-11-04 | End: 2020-11-09

## 2020-11-09 NOTE — HISTORY OF PRESENT ILLNESS
[Currently Experiencing] : currently [Edema] : edema [Beta Blockers] : beta blockers [Diuretics] : diuretics [ACE Inhibitors / ARBs] : ACE inhibitors / Angiotensin Receptor Blockers [Anticoagulation] : anticoagulation [Low Salt Diet] : low salt diet [Exercise] : exercise [Good Compliance] : good compliance with treatment [FreeTextEntry1] : 74yo M post dc on 11/3 from  for acute on chronic CHF exacerbation.  Presents to the CHF clinic for post DC f/u.  Overall, pt states he feels much better, denies any CP/SOB/LOPEZ/PND.  Dry weight at discharge was 173.8 He has been monitoring his weight at home which has been between 175-177 lbs since dc, and has been avoiding salt and limiting fluids.  Currently able to walk 'long distances' without stopping, has no limitations on activity.  Upon assessment he does have 1+ BLLE pitting edema, but upper leg edema has resolved with outpatient PO diuresis. Plan to change lasix to torsemide for increased diruesis, and outpatient lab work to check kidney function.  I will call him with results and discuss follow up appt.  He is scheduled at the [facility] on Friday for a medicine visit.

## 2020-11-09 NOTE — DISCUSSION/SUMMARY
[Congestive Heart Failure] : congestive heart failure [Improving] : improving [Fluid Restriction] : fluid restriction [Sodium Restriction] : sodium restriction [Patient] : the patient [___ Week(s)] : [unfilled] week(s) [With Me] : with me

## 2020-11-09 NOTE — PHYSICAL EXAM
[General Appearance - Well Developed] : well developed [Normal Appearance] : normal appearance [Well Groomed] : well groomed [General Appearance - Well Nourished] : well nourished [No Deformities] : no deformities [General Appearance - In No Acute Distress] : no acute distress [Bowel Sounds] : normal bowel sounds [Abdomen Soft] : soft

## 2020-11-09 NOTE — CARDIOLOGY SUMMARY
[LVEF ___%] : LVEF [unfilled]% [Mild] : mild pulmonary hypertension [Enlarged] : enlarged LA size [Severe] : severe mitral regurgitation [___] : [unfilled]

## 2020-11-10 ENCOUNTER — NON-APPOINTMENT (OUTPATIENT)
Age: 75
End: 2020-11-10

## 2020-11-10 ENCOUNTER — LABORATORY RESULT (OUTPATIENT)
Age: 75
End: 2020-11-10

## 2020-11-12 ENCOUNTER — NON-APPOINTMENT (OUTPATIENT)
Age: 75
End: 2020-11-12

## 2020-11-12 RX ORDER — POTASSIUM CHLORIDE 1500 MG/1
20 TABLET, EXTENDED RELEASE ORAL DAILY
Qty: 90 | Refills: 1 | Status: DISCONTINUED | COMMUNITY
Start: 1900-01-01 | End: 2020-11-12

## 2020-11-12 RX ORDER — SPIRONOLACTONE 25 MG/1
25 TABLET ORAL DAILY
Refills: 0 | Status: DISCONTINUED | COMMUNITY
Start: 2020-11-04 | End: 2020-11-12

## 2020-11-13 ENCOUNTER — NON-APPOINTMENT (OUTPATIENT)
Age: 75
End: 2020-11-13

## 2020-11-16 ENCOUNTER — NON-APPOINTMENT (OUTPATIENT)
Age: 75
End: 2020-11-16

## 2020-11-23 ENCOUNTER — NON-APPOINTMENT (OUTPATIENT)
Age: 75
End: 2020-11-23

## 2020-11-23 DIAGNOSIS — Z71.89 OTHER SPECIFIED COUNSELING: ICD-10-CM

## 2020-11-24 ENCOUNTER — NON-APPOINTMENT (OUTPATIENT)
Age: 75
End: 2020-11-24

## 2020-11-25 DIAGNOSIS — Z71.89 OTHER SPECIFIED COUNSELING: ICD-10-CM

## 2020-11-28 LAB
CULTURE RESULTS: SIGNIFICANT CHANGE UP
SPECIMEN SOURCE: SIGNIFICANT CHANGE UP

## 2020-12-03 ENCOUNTER — NON-APPOINTMENT (OUTPATIENT)
Age: 75
End: 2020-12-03

## 2020-12-21 NOTE — DIETITIAN INITIAL EVALUATION ADULT. - NAME AND PHONE
Requests Zyprexa.states he will be all right if he gets his meds.denies SI at this time.proceded to put his hand down his pants, move it around and groan. No eye contact. Told to take his hand out of his pants and have a seat in lobby. Immediate compliance and cooperation  
Alek Menjivar; [phone]
The patient is a 50y Male complaining of elbow pain/injury.

## 2021-02-10 ENCOUNTER — APPOINTMENT (OUTPATIENT)
Dept: ELECTROPHYSIOLOGY | Facility: CLINIC | Age: 76
End: 2021-02-10
Payer: MEDICARE

## 2021-02-10 VITALS
SYSTOLIC BLOOD PRESSURE: 160 MMHG | HEIGHT: 67 IN | RESPIRATION RATE: 14 BRPM | OXYGEN SATURATION: 98 % | DIASTOLIC BLOOD PRESSURE: 70 MMHG | WEIGHT: 174 LBS | HEART RATE: 60 BPM | BODY MASS INDEX: 27.31 KG/M2

## 2021-02-10 PROCEDURE — 93279 PRGRMG DEV EVAL PM/LDLS PM: CPT

## 2021-02-12 ENCOUNTER — NON-APPOINTMENT (OUTPATIENT)
Age: 76
End: 2021-02-12

## 2021-03-10 NOTE — ASU PREOP CHECKLIST - LOOSE TEETH
Patient withdrawn to room  Refused to come down to the dining room for dinner  Pleasant with staff  Cooperative with medications  Able to make needs known  Denies SI, HI, hallucinations  Anxiety and depression elevated  Ativan 0 5mg given with some relief  Will continue to monitor  Q 7 minute checks maintained  no

## 2021-04-01 NOTE — PRE-OP CHECKLIST - ADVANCE DIRECTIVE ADDRESSED/READDRESSED
Pt here with pain to his right ankle and left shoulder hx of gout. Feels similar in his ankle and never had issues to his shoulder.   Pt denies injury
done
done/ciro

## 2021-05-12 ENCOUNTER — APPOINTMENT (OUTPATIENT)
Dept: ELECTROPHYSIOLOGY | Facility: CLINIC | Age: 76
End: 2021-05-12
Payer: MEDICARE

## 2021-05-12 ENCOUNTER — NON-APPOINTMENT (OUTPATIENT)
Age: 76
End: 2021-05-12

## 2021-05-12 VITALS
SYSTOLIC BLOOD PRESSURE: 171 MMHG | RESPIRATION RATE: 14 BRPM | DIASTOLIC BLOOD PRESSURE: 84 MMHG | HEIGHT: 67 IN | WEIGHT: 174 LBS | OXYGEN SATURATION: 98 % | BODY MASS INDEX: 27.31 KG/M2 | HEART RATE: 60 BPM

## 2021-05-12 PROCEDURE — 93279 PRGRMG DEV EVAL PM/LDLS PM: CPT

## 2021-07-12 NOTE — PHYSICAL THERAPY INITIAL EVALUATION ADULT - STANDING BALANCE: STATIC
ER Physician History and Physical    HPI:  A 37-year-old female presents with questions about ECT treatment.  Patient states that she has had issues with depression anxiety and has been hospitalized for it but does not believe she was ever given an official diagnosis.  Patient states that she is not on any medications or getting any therapy.  Patient states that her mother has a history of depression as well.  Patient states she has here because she is inquiring about ECT treatment and \"wants to get ahead of it\".  Denies any current suicidal ideation.  Patient does state that things are overwhelming right now    I was wearing a mask during the entire encounter with this patient and gloves while examining the patient.    PCP: \"a clinic\"  Specialists: no  Blood thinners: no meds      Review of Systems:  General: No fever  Skin: No rash  Eye: No recent vision problems  ENMT: No sore throat  Respiratory: No shortness of breath  CV: No chest pain  GI: No abdominal pain  : No dysuria  MSK: No joint pain  Neurologic: No headache      No Known Allergies    Past Medical History:   Diagnosis Date   • Schizoaffective disorder, bipolar type (CMS/[facility])        Past Surgical History:   Procedure Laterality Date   • NO PAST SURGERIES         No family history on file.    Social History     Tobacco Use   • Smoking status: Current Every Day Smoker   • Smokeless tobacco: Never Used   Vaping Use   • Vaping Use: never used   Substance Use Topics   • Alcohol use: Never   • Drug use: Never       Physical Exam:  Patient Vitals for the past 24 hrs:   BP Temp Temp src Pulse Resp SpO2 Height Weight   07/11/21 2007 104/78 98.6 °F (37 °C) Tympanic 78 14 99 % 5' (1.524 m) 72.6 kg (160 lb)       Pulse Ox is 99% on Room Air which is normal for this patient    General: Alert, no acute distress  Skin: Warm, dry  Head: Normocephalic atraumatic  Eye: PERRL, EOMI  ENMT: Oral mucosa moist  Neck: Supple, trachea midline  Cardiovascular: Regular rate,  rhythm, S1, S2  Respiratory: Lungs CTA, non-labored respirations, symmetrical expansion  GI: Soft, nontender, nondistended  MSK: Normal ROM, no swelling, no deformity  Neuro: AAO x4, no focal neuro deficits  Psychiatric: depressed mood, not suicidal.      Medical Decision Making:    Patient presents with depression not suicidal.  Requesting to talk to .  Labs are unremarkable.  Patient was evaluated by Dina.  Does not need inpatient treatment was given referrals for outpatient treatment.  Per patient's request she was given referrals to places that do  ECT therapy             Clinical Impression:  ED Diagnosis   1. Depression, unspecified depression type           Discharge 7/11/2021 10:53 PM  Estrellita Contreras discharge to home/self care.          Felipe Burch MD   7/11/2021 8:35 PM     This note may have been created using voice dictation technology and may include inadvertent errors.     Felipe Burch MD  07/11/21 [id number]     good balance

## 2021-08-17 ENCOUNTER — APPOINTMENT (OUTPATIENT)
Dept: ELECTROPHYSIOLOGY | Facility: CLINIC | Age: 76
End: 2021-08-17
Payer: MEDICARE

## 2021-08-17 VITALS
OXYGEN SATURATION: 99 % | HEIGHT: 67 IN | HEART RATE: 60 BPM | DIASTOLIC BLOOD PRESSURE: 91 MMHG | BODY MASS INDEX: 27.31 KG/M2 | SYSTOLIC BLOOD PRESSURE: 153 MMHG | WEIGHT: 174 LBS

## 2021-08-17 PROCEDURE — 93279 PRGRMG DEV EVAL PM/LDLS PM: CPT

## 2021-11-03 NOTE — DISCHARGE NOTE ADULT - ADMISSION DATE +STARTOFVISITDATE
Patient Education     Fever in Children     A fever is a natural reaction of the body to an illness, such as infections from viruses or bacteria. In most cases, the fever itself isn't harmful. It actually helps the body fight infections. A fever does not need to be treated unless your child is uncomfortable and looks or acts sick. How your child looks and feels are often more important than the level of the fever.  If your child has a fever, check his or her temperature as needed. Don't use a glass thermometer that contains mercury. They can be dangerous if the glass breaks and the mercury spills out. Always use a digital thermometer when checking your child’s temperature. The way you use it will depend on your child's age. Ask your child’s healthcare provider for more information about how to use a thermometer on your child. General guidelines are:  · The American Academy of Pediatrics advises that rectal temperatures are most accurate for children younger than 3 years. Accuracy is very important because babies must be seen right away by a healthcare provider if they have a fever. Be sure to use a rectal thermometer correctly. A rectal thermometer may accidentally poke a hole in (perforate) the rectum. It may also pass on germs from the stool. Always follow the product maker’s directions for proper use. If you don’t feel comfortable taking a rectal temperature, use another method. When you talk with your child’s healthcare provider, tell him or her which method you used to take your child’s temperature.  · For toddlers, take the temperature under the armpit (axillary).  · For children old enough to hold a thermometer in the mouth (usually around 4 or 5 years of age), take the temperature in the mouth (oral).  · For children age 6 months and older, you can use an ear (tympanic) thermometer.  · A forehead (temporal artery) thermometer may be used in babies and children of any age. This is a better way to screen for  fever than an armpit temperature.  Comfort care for fevers  If your child has a fever, here are some things you can do to help him or her feel better:  · Give fluids to replace those lost through sweating with fever. Water is best, but low-sodium broths or soups, diluted fruit juice, or frozen juice bars can be used for older children. Talk with your healthcare provider about a plan. For an infant, breastmilk or formula is fine and all that is usually needed.  · If your child has discomfort from the fever, check with your healthcare provider to see if you can use ibuprofen or acetaminophen to help reduce the fever. The correct dose for these medicines depends on your child's weight. Don’t use ibuprofen in children younger than 6 months old. Never give aspirin to a child under age 18. It could cause a rare but serious condition called Reye syndrome.  · Make sure your child gets lots of rest.  · Dress your child lightly and change clothes often if he or she sweats a lot. Use only enough covers on the bed for your child to be comfortable.  Facts about fevers  Fever facts include the following:  · Exercise, eating, excitement, and hot or cold drinks can all affect your child’s temperature.  · A child’s reaction to fever can vary. Your child may feel fine with a high fever, or feel miserable with a slight fever.  · If your child is active and alert, and is eating and drinking, you don't need to give fever medicine.  · Temperatures are naturally lower between midnight and early morning and higher between late afternoon and early evening.  When to call your child's healthcare provider  Call the healthcare provider’s office if your otherwise healthy child has any of the signs or symptoms below:  · Fever (see Fever and children, below)  · A seizure caused by the fever  · Rapid breathing or shortness of breath  · A stiff neck or headache  · Trouble swallowing  · Signs of dehydration. These include severe thirst, dark yellow  urine, infrequent urination, dull or sunken eyes, dry skin, and dry or cracked lips  · Your child still doesn’t look right to you, even after taking a nonaspirin pain reliever  Fever and children  Use a digital thermometer to check your child’s temperature. Don’t use a mercury thermometer. There are different kinds of digital thermometers. They include ones for the mouth, ear, forehead (temporal), rectum, or armpit. Ear temperatures aren’t accurate before 6 months of age. Don’t take an oral temperature until your child is at least 4 years old.  Use a rectal thermometer with care. It may accidentally poke a hole in the rectum. It may pass on germs from the stool. Follow the product maker’s directions for correct use. If you don’t feel OK using a rectal thermometer, use another type. When you talk to your child’s healthcare provider, tell him or her which type you used.  Below are guidelines to know if your child has a fever. Your child’s healthcare provider may give you different numbers for your child.  A baby under 3 months old:  · First, ask your child’s healthcare provider how you should take the temperature.  · Rectal or forehead: 100.4°F (38°C) or higher  · Armpit: 99°F (37.2°C) or higher  A child age 3 months to 36 months (3 years):   · Rectal, forehead, or ear: 102°F (38.9°C) or higher  · Armpit: 101°F (38.3°C) or higher  Call the healthcare provider in these cases:   · Repeated temperature of 104°F (40°C) or higher  · Fever that lasts more than 24 hours in a child under age 2  · Fever that lasts for 3 days in a child age 2 or older     [email or web address] last reviewed this educational content on 10/1/2019  © 5146-1590 [employer]. All rights reserved. This information is not intended as a substitute for professional medical care. Always follow your healthcare professional's instructions.           Patient Education     Reducing the Risk for Middle Ear Infections  Most children have had at least one  middle ear infection by age 2. Treatment may depend on whether the problem is acute or chronic. It also depends on how often it comes back and how long it lasts.      Good handwashing can help your child prevent ear infections.   Reducing risk factors  Some behaviors or things raise your child’s risk for an ear infection. Reducing these risks can be helpful at any point in treatment. Here are some tips:   · Make sure your child knows how to wash his or her hands the right way. This includes washing hands often with soap and water. Your child can use a hand  when needed.  · If your child goes to group , he or she has a greater risk of getting colds or the flu. These may then lead to an ear infection. Help prevent these illnesses by teaching your child to wash his or her hands often.  · Also keep your child away from crowds during cold and flu season.  · Tell your child to stay away from secondhand smoke and other irritants. Don’t let anyone smoke in your home.  · If your child has nasal allergies, do your best to control dust, mold, mildew, and pet hair and dander in the house.  · If food allergies are a problem, identify the food that triggers the reaction. Help your child stay away from it.  · Make sure your child is up-to-date on all vaccines.  In your child's first year of life, you can also reduce the risk of ear infections by:   · Breastfeeding for at least 3 months  · Not giving your child a pacifier after 6 months of age  Watching and waiting  If your child is diagnosed with an ear infection, the healthcare provider may prescribe antibiotics right away or suggest a period of “watchful waiting.” This means not filling the prescription right away. Instead, you will first try medicines to ease your child’s symptoms, such as those for pain or a fever. You’ll then wait to see if your child gets better.   If your child doesn't get better within a few days or develops new symptoms, such as a fever or  vomiting, antibiotics will often be started. Whether or not your child's healthcare provider prescribes antibiotics right away or advises a period of watchful waiting depends on your child's age and risk factors.   [employer] last reviewed this educational content on 2020 © 2000-2020 [employer]. All rights reserved. This information is not intended as a substitute for professional medical care. Always follow your healthcare professional's instructions.           Patient Education     Treating Viral Respiratory Illness in Children  Viral respiratory illnesses include colds, the flu, and RSV (respiratory syncytial virus). Treatment focuses on relieving your child’s symptoms and ensuring that the infection doesn't get worse. Antibiotics are not effective against viruses. Antiviral medicines may be used for the flu in some cases. Always see your child’s healthcare provider if your child has trouble breathing.     Helping your child feel better  · Give your child plenty of fluids, such as water or apple juice.  · Make sure your child gets plenty of rest.  · Keep your infant’s nose clear. Use a rubber bulb suction device to remove mucus as needed. Don't be aggressive when suctioning. This may cause more swelling and discomfort.  · Raise the head of your child's bed slightly to make breathing easier.  · Run a cool-mist humidifier or vaporizer in your child’s room to keep the air moist and nasal passages clear.  · Don't let anyone smoke near your child.  · Treat your child’s fever with acetaminophen. In infants 6 months or older, you may use ibuprofen instead to help reduce the fever. Never give aspirin to a child under age 18. It could cause a rare but serious condition called Reye syndrome.    When to seek medical care  Most children get over colds and flu on their own in time, with rest and care from you. Call your child's healthcare provider or seek medical care right away if your child:   · Has a  fever of 100.4°F (38°C) in a baby younger than 3 months  · Has a repeated fever of 104°F (40°C) or higher  · Has nausea or vomiting, or can’t keep even small amounts of liquid down  · Hasn’t urinated for 6 hours or more, or has dark or strong-smelling urine  · Has a harsh cough, a cough that doesn't get better, wheezing, or trouble breathing  · Has flaring of the nostrils while breathing  · Has retractions, which is when the skin pulls in between the ribs, with breathing  · Has bad or increasing pain  · Develops a skin rash  · Is very tired or lethargic  · Develops a blue color to the skin around the lips or on the fingers or toes  [employer] last reviewed this educational content on 2020  © 1233-3960 [employer]. All rights reserved. This information is not intended as a substitute for professional medical care. Always follow your healthcare professional's instructions.            Statement Selected

## 2021-11-05 NOTE — PROGRESS NOTE ADULT - SKIN/BREAST
PDMP reviewed; no aberrant behavior identified, prescription authorized.
Refill request from [facility] for the following:    diazePAM (VALIUM) 5 MG tablet 30 tablet 1 3/1/2021     Sig: Take one half to one tablet every 6-8 hours as needed for anxiety. Last seen 3-1-21 for [facility]  Pending 5-2-22 for [facility] as requested?
negative
negative

## 2021-11-16 ENCOUNTER — APPOINTMENT (OUTPATIENT)
Dept: ELECTROPHYSIOLOGY | Facility: CLINIC | Age: 76
End: 2021-11-16

## 2021-11-25 ENCOUNTER — EMERGENCY (EMERGENCY)
Facility: HOSPITAL | Age: 76
LOS: 0 days | Discharge: HOME | End: 2021-11-26
Attending: EMERGENCY MEDICINE
Payer: MEDICARE

## 2021-11-25 VITALS
WEIGHT: 169.98 LBS | HEART RATE: 58 BPM | TEMPERATURE: 98 F | DIASTOLIC BLOOD PRESSURE: 75 MMHG | RESPIRATION RATE: 18 BRPM | SYSTOLIC BLOOD PRESSURE: 151 MMHG | HEIGHT: 78 IN | OXYGEN SATURATION: 94 %

## 2021-11-25 DIAGNOSIS — J44.9 CHRONIC OBSTRUCTIVE PULMONARY DISEASE, UNSPECIFIED: ICD-10-CM

## 2021-11-25 DIAGNOSIS — Z79.82 LONG TERM (CURRENT) USE OF ASPIRIN: ICD-10-CM

## 2021-11-25 DIAGNOSIS — Z79.01 LONG TERM (CURRENT) USE OF ANTICOAGULANTS: ICD-10-CM

## 2021-11-25 DIAGNOSIS — I25.10 ATHEROSCLEROTIC HEART DISEASE OF NATIVE CORONARY ARTERY WITHOUT ANGINA PECTORIS: ICD-10-CM

## 2021-11-25 DIAGNOSIS — I50.9 HEART FAILURE, UNSPECIFIED: ICD-10-CM

## 2021-11-25 DIAGNOSIS — Z88.8 ALLERGY STATUS TO OTHER DRUGS, MEDICAMENTS AND BIOLOGICAL SUBSTANCES: ICD-10-CM

## 2021-11-25 DIAGNOSIS — M19.90 UNSPECIFIED OSTEOARTHRITIS, UNSPECIFIED SITE: ICD-10-CM

## 2021-11-25 DIAGNOSIS — Z95.0 PRESENCE OF CARDIAC PACEMAKER: Chronic | ICD-10-CM

## 2021-11-25 DIAGNOSIS — I48.91 UNSPECIFIED ATRIAL FIBRILLATION: ICD-10-CM

## 2021-11-25 DIAGNOSIS — I11.0 HYPERTENSIVE HEART DISEASE WITH HEART FAILURE: ICD-10-CM

## 2021-11-25 DIAGNOSIS — Z95.0 PRESENCE OF CARDIAC PACEMAKER: ICD-10-CM

## 2021-11-25 DIAGNOSIS — E78.5 HYPERLIPIDEMIA, UNSPECIFIED: ICD-10-CM

## 2021-11-25 DIAGNOSIS — R33.9 RETENTION OF URINE, UNSPECIFIED: ICD-10-CM

## 2021-11-25 DIAGNOSIS — R33.8 OTHER RETENTION OF URINE: ICD-10-CM

## 2021-11-25 PROCEDURE — 87086 URINE CULTURE/COLONY COUNT: CPT

## 2021-11-25 PROCEDURE — 83690 ASSAY OF LIPASE: CPT

## 2021-11-25 PROCEDURE — 36415 COLL VENOUS BLD VENIPUNCTURE: CPT

## 2021-11-25 PROCEDURE — 87186 SC STD MICRODIL/AGAR DIL: CPT

## 2021-11-25 PROCEDURE — 85025 COMPLETE CBC W/AUTO DIFF WBC: CPT

## 2021-11-25 PROCEDURE — 99284 EMERGENCY DEPT VISIT MOD MDM: CPT

## 2021-11-25 PROCEDURE — 51702 INSERT TEMP BLADDER CATH: CPT

## 2021-11-25 PROCEDURE — 81003 URINALYSIS AUTO W/O SCOPE: CPT

## 2021-11-25 PROCEDURE — 83605 ASSAY OF LACTIC ACID: CPT

## 2021-11-25 PROCEDURE — 99283 EMERGENCY DEPT VISIT LOW MDM: CPT | Mod: 25

## 2021-11-25 PROCEDURE — 80053 COMPREHEN METABOLIC PANEL: CPT

## 2021-11-25 RX ORDER — OXYCODONE AND ACETAMINOPHEN 5; 325 MG/1; MG/1
2 TABLET ORAL ONCE
Refills: 0 | Status: DISCONTINUED | OUTPATIENT
Start: 2021-11-25 | End: 2021-11-25

## 2021-11-25 RX ADMIN — OXYCODONE AND ACETAMINOPHEN 2 TABLET(S): 5; 325 TABLET ORAL at 23:48

## 2021-11-25 NOTE — ED ADULT TRIAGE NOTE - CHIEF COMPLAINT QUOTE
patient unable to urinate since 1pm today. patient has a history of urinary retention.  pt denies fevers/chills

## 2021-11-25 NOTE — ED PROVIDER NOTE - PATIENT PORTAL LINK FT
You can access the FollowMyHealth Patient Portal offered by [facility] by registering at the following website: [email or web address]. By joining [employer]’s FollowMyHealth portal, you will also be able to view your health information using other applications (apps) compatible with our system.

## 2021-11-25 NOTE — ED PROVIDER NOTE - NSFOLLOWUPINSTRUCTIONS_ED_ALL_ED_FT
SEE A UROLOGIST                                                                                                  Retención urinaria aguda en los hombres    Acute Urinary Retention, Male       La retención urinaria aguda es [place] afección en la cual la persona no puede orinar. [place] puede durar poco o mucho tiempo. Si no se trata, puede resultar en daño renal u otras complicaciones graves.      ¿Cuáles son las causas?  Esta afección puede ser causada por lo siguiente:  •Obstrucción o estrechamiento del tubo que drena la vejiga (uretra). [place] puede estar causado por [place] cirugía o problemas en órganos cercanos, naa la glándula prostática, que pueden presionar o apretar la uretra.      •Problemas con los nervios de la vejiga. [place] puede estar causado por enfermedades tales naa esclerosis múltiple, o por lesiones en la médula taylor.      •Ciertos medicamentos.      •Tumores en el área de la pelvis, la vejiga o la uretra.      •Diabetes.      •Trastornos cognitivos degenerativos, naa delirios o demencia.      •Infección de vejiga o de las vías urinarias.      •Estreñimiento.      •Tom en la orina (hematuria).      •Daño en la uretra o la vejiga.       •Trastornos psicológicos (psicógenos). [place] persona puede aguantar la orina por un traumatismo o porque no quiere usar el baño.        ¿Qué incrementa el riesgo?    Es más probable que esta afección se desarrolle en hombres mayores. A medida que los hombres envejecen, la próstata se puede agrandar y comenzar a presionar o apretar la vejiga o la uretra.      ¿Cuáles son los signos o los síntomas?  Los síntomas de esta afección incluyen los siguientes:  •Dificultad para orinar.      •Dolor en la parte baja del abdomen.      Generalmente, los síntomas se desarrollan lentamente a lo adán de un período de tiempo.      ¿Cómo se diagnostica?  Esta afección se diagnostica en función de un examen físico y los antecedentes médicos. También pueden hacerle otros estudios, por ejemplo:  •[place] ecografía de la vejiga, del riñón o de ambos.      •Análisis de tom.      •Análisis de orina.      •Puede que se necesiten exámenes adicionales, tales naa resonancia magnética (RM), y pruebas funcionales del riñón o de la vejiga.        ¿Cómo se trata?  El tratamiento de esta afección puede incluir lo siguiente:  •Medicamentos.      •Colocar un tubo james estéril (catéter) en la vejiga para drenar la orina del cuerpo. [place] recibe el nombre de catéter urinario permanente. Luego de que se inserte, el catéter se mantiene en reid lugar con un pequeño balón que se llena con agua estéril. La orina se drena desde el catéter hasta la bolsa de recolección fuera del cuerpo.      •Terapia conductual.      •Tratamiento para cualquier afección preexistente.      •De ser necesario, puede recibir tratamiento en el hospital para problemas de la función renal o para tratar otras complicaciones.        Siga estas indicaciones en reid casa:    •[place] los medicamentos de venta klarissa y los recetados solamente naa se lo haya indicado el médico. Evite determinados medicamentos, tales naa descongestivos, antihistamínicos y algunos medicamentos recetados. No tome medicamentos a menos que lo haya autorizado el médico.      •Si le indicaron que use un catéter urinario permanente, cuídelo naa se lo haya indicado el médico.      •[place] suficiente líquido para mantener la orina candace o de color amarillo pálido.      •Si le recetaron un antibiótico, tómelo naa se lo haya indicado el médico. No deje de lesly los antibióticos aunque comience a sentirse mejor.      • No consuma ningún producto que contenga nicotina o tabaco, naa cigarrillos y cigarrillos electrónicos. Si necesita ayuda para dejar de fumar, consulte al médico.      •Controle si hay cambios en los síntomas. Informe al médico acerca de cualquier cambio.      •Si así se le indica, controle reid presión arterial en casa. Informe cualquier cambio en reid norma naa se lo haya indicado el médico.      •Concurra a todas las visitas de control naa se lo haya indicado el médico. [place] es importante.        Comuníquese con un médico si:    •Tiene contracciones de vejiga incómodas que no puede controlar (espasmos) o pérdida de orina beth los espasmos.        Solicite ayuda de inmediato si:    •Tiene escalofríos o fiebre.      •Observa tom en la orina.    •Tiene un catéter y:  •El catéter moses de drenar orina.      •El catéter se sale del lugar.          Resumen    •La retención urinaria aguda es [place] afección en la cual la persona no puede orinar. Si no se trata, puede resultar en daño renal u otras complicaciones graves.      •La causa de esta afección puede ser [place] próstata agrandada. A medida que los hombres envejecen, la glándula prostática se puede agrandar y comenzar a presionar o apretar la vejiga o la uretra.      •El tratamiento de esta afección puede incluir medicamentos y el uso de un catéter urinario permanente.      •Controle si hay cambios en los síntomas. Informe al médico acerca de cualquier cambio.      Esta información no tiene naa fin reemplazar el consejo del médico. Asegúrese de hacerle al médico cualquier pregunta que tenga.      Document Revised: 07/09/2018 Document Reviewed: 07/09/2018    Elsevier Patient Education © 2021 Else[employer] SEE A UROLOGIST AND YOUR PRIMARY CARE DOCTOR.                                                                                                  Retención urinaria aguda en los hombres    Acute Urinary Retention, Male       La retención urinaria aguda es [place] afección en la cual la persona no puede orinar. [place] puede durar poco o mucho tiempo. Si no se trata, puede resultar en daño renal u otras complicaciones graves.      ¿Cuáles son las causas?  Esta afección puede ser causada por lo siguiente:  •Obstrucción o estrechamiento del tubo que drena la vejiga (uretra). [place] puede estar causado por [place] cirugía o problemas en órganos cercanos, naa la glándula prostática, que pueden presionar o apretar la uretra.      •Problemas con los nervios de la vejiga. [place] puede estar causado por enfermedades tales naa esclerosis múltiple, o por lesiones en la médula taylor.      •Ciertos medicamentos.      •Tumores en el área de la pelvis, la vejiga o la uretra.      •Diabetes.      •Trastornos cognitivos degenerativos, naa delirios o demencia.      •Infección de vejiga o de las vías urinarias.      •Estreñimiento.      •Tom en la orina (hematuria).      •Daño en la uretra o la vejiga.       •Trastornos psicológicos (psicógenos). [place] persona puede aguantar la orina por un traumatismo o porque no quiere usar el baño.        ¿Qué incrementa el riesgo?    Es más probable que esta afección se desarrolle en hombres mayores. A medida que los hombres envejecen, la próstata se puede agrandar y comenzar a presionar o apretar la vejiga o la uretra.      ¿Cuáles son los signos o los síntomas?  Los síntomas de esta afección incluyen los siguientes:  •Dificultad para orinar.      •Dolor en la parte baja del abdomen.      Generalmente, los síntomas se desarrollan lentamente a lo adán de un período de tiempo.      ¿Cómo se diagnostica?  Esta afección se diagnostica en función de un examen físico y los antecedentes médicos. También pueden hacerle otros estudios, por ejemplo:  •[place] ecografía de la vejiga, del riñón o de ambos.      •Análisis de tom.      •Análisis de orina.      •Puede que se necesiten exámenes adicionales, tales naa resonancia magnética (RM), y pruebas funcionales del riñón o de la vejiga.        ¿Cómo se trata?  El tratamiento de esta afección puede incluir lo siguiente:  •Medicamentos.      •Colocar un tubo james estéril (catéter) en la vejiga para drenar la orina del cuerpo. [place] recibe el nombre de catéter urinario permanente. Luego de que se inserte, el catéter se mantiene en reid lugar con un pequeño balón que se llena con agua estéril. La orina se drena desde el catéter hasta la bolsa de recolección fuera del cuerpo.      •Terapia conductual.      •Tratamiento para cualquier afección preexistente.      •De ser necesario, puede recibir tratamiento en el hospital para problemas de la función renal o para tratar otras complicaciones.        Siga estas indicaciones en reid casa:    •[place] los medicamentos de venta klarissa y los recetados solamente naa se lo haya indicado el médico. [place] determinados medicamentos, tales naa descongestivos, antihistamínicos y algunos medicamentos recetados. No tome medicamentos a menos que lo haya autorizado el médico.      •Si le indicaron que use un catéter urinario permanente, cuídelo naa se lo haya indicado el médico.      •[place] suficiente líquido para mantener la orina candace o de color amarillo pálido.      •Si le recetaron un antibiótico, tómelo naa se lo haya indicado el médico. No deje de lesly los antibióticos aunque comience a sentirse mejor.      • No consuma ningún producto que contenga nicotina o tabaco, naa cigarrillos y cigarrillos electrónicos. Si necesita ayuda para dejar de fumar, consulte al médico.      •Controle si hay cambios en los síntomas. Informe al médico acerca de cualquier cambio.      •Si así se le indica, controle reid presión arterial en casa. Informe cualquier cambio en reid norma naa se lo haya indicado el médico.      •Concurra a todas las visitas de control naa se lo haya indicado el médico. [place] es importante.        Comuníquese con un médico si:    •Tiene contracciones de vejiga incómodas que no puede controlar (espasmos) o pérdida de orina beth los espasmos.        Solicite ayuda de inmediato si:    •Tiene escalofríos o fiebre.      •Observa tom en la orina.    •Tiene un catéter y:  •El catéter moses de drenar orina.      •El catéter se sale del lugar.          Resumen    •La retención urinaria aguda es [place] afección en la cual la persona no puede orinar. Si no se trata, puede resultar en daño renal u otras complicaciones graves.      •La causa de esta afección puede ser [place] próstata agrandada. A medida que los hombres envejecen, la glándula prostática se puede agrandar y comenzar a presionar o apretar la vejiga o la uretra.      •El tratamiento de esta afección puede incluir medicamentos y el uso de un catéter urinario permanente.      •Controle si hay cambios en los síntomas. Informe al médico acerca de cualquier cambio.      Esta información no tiene naa fin reemplazar el consejo del médico. Asegúrese de hacerle al médico cualquier pregunta que tenga.      Document Revised: 07/09/2018 Document Reviewed: 07/09/2018    Elsevier Patient Education © 2021 Elsevier Inc.

## 2021-11-25 NOTE — ED PROVIDER NOTE - CARE PROVIDER_API CALL
Kuldeep Castillo)  Urology  [address] [facility], [address] [place] [address]  Phone: [phone]  Fax: [phone]  Follow Up Time:

## 2021-11-25 NOTE — ED PROVIDER NOTE - OBJECTIVE STATEMENT
Pt. is a 75 yo M with PMHx of systolic CHF (EF 45%), COPD - Severe Pulmonary HTN, HTN, dyslipidemia, CAD, a-fib/PPM, arthritis, hx of urinary retention 1 year ago with ELIS and ESBL UTI presents with difficulty urinating over the past 10 hours.  Patient states he is able to urinate only drips.  Now with lower abdominal pain, pressure, and distension.  Denies fever, chills, back pain, nausea, vomiting, or diarrhea.  Normal urination yesterday; denies hematuria.

## 2021-11-25 NOTE — ED STATDOCS - PROGRESS NOTE DETAILS
Pertinent HPI/PMH/PSH/FHx/SHx and Review of Systems contained within  HPI:  patient unable to urinate since 1pm today. patient has a history of urinary retention needing seo cathetar & admission due to es & ESBL uti. will send to main as pt needs labs  PMH/PSH relevant for: systolic CHF (EF 45%), COPD - Severe Pulmonary HTN, HTN, dyslipidemia, CAD, a-fib/PPM on xarelto, arthritis, ESBL UTI   ROS negative for: fever, Chest pain, SOB, Nausea, vomiting, diarrhea,     FamilyHx and SocialHx not otherwise contributory

## 2021-11-26 VITALS
HEART RATE: 62 BPM | OXYGEN SATURATION: 94 % | DIASTOLIC BLOOD PRESSURE: 69 MMHG | TEMPERATURE: 98 F | RESPIRATION RATE: 18 BRPM | SYSTOLIC BLOOD PRESSURE: 148 MMHG

## 2021-11-26 LAB
ALBUMIN SERPL ELPH-MCNC: 3.8 G/DL — SIGNIFICANT CHANGE UP (ref 3.3–5)
ALP SERPL-CCNC: 151 U/L — HIGH (ref 40–120)
ALT FLD-CCNC: 24 U/L — SIGNIFICANT CHANGE UP (ref 12–78)
ANION GAP SERPL CALC-SCNC: 7 MMOL/L — SIGNIFICANT CHANGE UP (ref 5–17)
APPEARANCE UR: CLEAR — SIGNIFICANT CHANGE UP
AST SERPL-CCNC: 31 U/L — SIGNIFICANT CHANGE UP (ref 15–37)
BASOPHILS # BLD AUTO: 0.07 K/UL — SIGNIFICANT CHANGE UP (ref 0–0.2)
BASOPHILS NFR BLD AUTO: 0.9 % — SIGNIFICANT CHANGE UP (ref 0–2)
BILIRUB SERPL-MCNC: 1.9 MG/DL — HIGH (ref 0.2–1.2)
BILIRUB UR-MCNC: NEGATIVE — SIGNIFICANT CHANGE UP
BUN SERPL-MCNC: 26 MG/DL — HIGH (ref 7–23)
CALCIUM SERPL-MCNC: 9 MG/DL — SIGNIFICANT CHANGE UP (ref 8.5–10.1)
CHLORIDE SERPL-SCNC: 104 MMOL/L — SIGNIFICANT CHANGE UP (ref 96–108)
CO2 SERPL-SCNC: 26 MMOL/L — SIGNIFICANT CHANGE UP (ref 22–31)
COLOR SPEC: YELLOW — SIGNIFICANT CHANGE UP
CREAT SERPL-MCNC: 1.42 MG/DL — HIGH (ref 0.5–1.3)
DIFF PNL FLD: NEGATIVE — SIGNIFICANT CHANGE UP
EOSINOPHIL # BLD AUTO: 0.01 K/UL — SIGNIFICANT CHANGE UP (ref 0–0.5)
EOSINOPHIL NFR BLD AUTO: 0.1 % — SIGNIFICANT CHANGE UP (ref 0–6)
GLUCOSE SERPL-MCNC: 114 MG/DL — HIGH (ref 70–99)
GLUCOSE UR QL: NEGATIVE MG/DL — SIGNIFICANT CHANGE UP
HCT VFR BLD CALC: 42.7 % — SIGNIFICANT CHANGE UP (ref 39–50)
HGB BLD-MCNC: 12.9 G/DL — LOW (ref 13–17)
IMM GRANULOCYTES NFR BLD AUTO: 0.4 % — SIGNIFICANT CHANGE UP (ref 0–1.5)
KETONES UR-MCNC: NEGATIVE — SIGNIFICANT CHANGE UP
LACTATE SERPL-SCNC: 1.8 MMOL/L — SIGNIFICANT CHANGE UP (ref 0.7–2)
LEUKOCYTE ESTERASE UR-ACNC: NEGATIVE — SIGNIFICANT CHANGE UP
LIDOCAIN IGE QN: 75 U/L — SIGNIFICANT CHANGE UP (ref 73–393)
LYMPHOCYTES # BLD AUTO: 0.81 K/UL — LOW (ref 1–3.3)
LYMPHOCYTES # BLD AUTO: 10.5 % — LOW (ref 13–44)
MCHC RBC-ENTMCNC: 29.6 PG — SIGNIFICANT CHANGE UP (ref 27–34)
MCHC RBC-ENTMCNC: 30.2 GM/DL — LOW (ref 32–36)
MCV RBC AUTO: 97.9 FL — SIGNIFICANT CHANGE UP (ref 80–100)
MONOCYTES # BLD AUTO: 0.66 K/UL — SIGNIFICANT CHANGE UP (ref 0–0.9)
MONOCYTES NFR BLD AUTO: 8.5 % — SIGNIFICANT CHANGE UP (ref 2–14)
NEUTROPHILS # BLD AUTO: 6.17 K/UL — SIGNIFICANT CHANGE UP (ref 1.8–7.4)
NEUTROPHILS NFR BLD AUTO: 79.6 % — HIGH (ref 43–77)
NITRITE UR-MCNC: NEGATIVE — SIGNIFICANT CHANGE UP
PH UR: 5 — SIGNIFICANT CHANGE UP (ref 5–8)
PLATELET # BLD AUTO: 143 K/UL — LOW (ref 150–400)
POTASSIUM SERPL-MCNC: 5.2 MMOL/L — SIGNIFICANT CHANGE UP (ref 3.5–5.3)
POTASSIUM SERPL-SCNC: 5.2 MMOL/L — SIGNIFICANT CHANGE UP (ref 3.5–5.3)
PROT SERPL-MCNC: 7.1 GM/DL — SIGNIFICANT CHANGE UP (ref 6–8.3)
PROT UR-MCNC: NEGATIVE MG/DL — SIGNIFICANT CHANGE UP
RBC # BLD: 4.36 M/UL — SIGNIFICANT CHANGE UP (ref 4.2–5.8)
RBC # FLD: 15 % — HIGH (ref 10.3–14.5)
SODIUM SERPL-SCNC: 137 MMOL/L — SIGNIFICANT CHANGE UP (ref 135–145)
SP GR SPEC: 1.01 — SIGNIFICANT CHANGE UP (ref 1.01–1.02)
UROBILINOGEN FLD QL: NEGATIVE MG/DL — SIGNIFICANT CHANGE UP
WBC # BLD: 7.75 K/UL — SIGNIFICANT CHANGE UP (ref 3.8–10.5)
WBC # FLD AUTO: 7.75 K/UL — SIGNIFICANT CHANGE UP (ref 3.8–10.5)

## 2021-11-26 NOTE — ED ADULT NURSE NOTE - OBJECTIVE STATEMENT
Pt presents to ED for not able to urinate since 1pm today. Pt has history of urinary retention and prostatitis. Pt denies fever/chills, diarrhea/nausea/vomiting. Pt endorses suprapubic pain. Denies blood in urine. Pt states he is dribbling urine as well.

## 2021-11-30 NOTE — ED POST DISCHARGE NOTE - DETAILS
[id number]. Left message to call ED. -Will Gupta PA-C Using  [phone], discussed results of the UC. Pt states he still has some discomfort which improved with motrin. Will send rx for augmentin since UC shows sensitivity to amoxicillin. Strict return precautions given. -Osvaldo Negro PA-C

## 2021-12-01 PROCEDURE — G9005: CPT

## 2021-12-03 ENCOUNTER — APPOINTMENT (OUTPATIENT)
Dept: UROLOGY | Facility: CLINIC | Age: 76
End: 2021-12-03
Payer: MEDICARE

## 2021-12-03 VITALS — HEART RATE: 80 BPM | DIASTOLIC BLOOD PRESSURE: 89 MMHG | SYSTOLIC BLOOD PRESSURE: 159 MMHG | OXYGEN SATURATION: 97 %

## 2021-12-03 PROCEDURE — 99214 OFFICE O/P EST MOD 30 MIN: CPT

## 2021-12-03 NOTE — END OF VISIT
[FreeTextEntry3] : The patient will start bethanechol twice daily and tamsulosin 2 tablets daily we will follow urodynamics in 2 weeks

## 2021-12-03 NOTE — PHYSICAL EXAM
[General Appearance - Well Developed] : well developed [General Appearance - Well Nourished] : well nourished [Normal Appearance] : normal appearance [Well Groomed] : well groomed [General Appearance - In No Acute Distress] : no acute distress [Abdomen Soft] : soft [Abdomen Tenderness] : non-tender [Costovertebral Angle Tenderness] : no ~M costovertebral angle tenderness [Urethral Meatus] : meatus normal [Urinary Bladder Findings] : the bladder was normal on palpation [Scrotum] : the scrotum was normal [Testes Mass (___cm)] : there were no testicular masses [No Prostate Nodules] : no prostate nodules [FreeTextEntry1] : Prostate is small to moderate in size and asymmetric with the right side being somewhat larger. [Edema] : no peripheral edema [] : no respiratory distress [Respiration, Rhythm And Depth] : normal respiratory rhythm and effort [Exaggerated Use Of Accessory Muscles For Inspiration] : no accessory muscle use [Oriented To Time, Place, And Person] : oriented to person, place, and time [Affect] : the affect was normal [Mood] : the mood was normal [Not Anxious] : not anxious [Normal Station and Gait] : the gait and station were normal for the patient's age [No Focal Deficits] : no focal deficits [No Palpable Adenopathy] : no palpable adenopathy

## 2021-12-16 ENCOUNTER — APPOINTMENT (OUTPATIENT)
Dept: UROLOGY | Facility: CLINIC | Age: 76
End: 2021-12-16
Payer: MEDICARE

## 2021-12-16 VITALS
BODY MASS INDEX: 27.31 KG/M2 | HEART RATE: 65 BPM | HEIGHT: 67 IN | DIASTOLIC BLOOD PRESSURE: 86 MMHG | WEIGHT: 174 LBS | OXYGEN SATURATION: 95 % | SYSTOLIC BLOOD PRESSURE: 150 MMHG

## 2021-12-16 DIAGNOSIS — R33.9 RETENTION OF URINE, UNSPECIFIED: ICD-10-CM

## 2021-12-16 PROCEDURE — 51797 INTRAABDOMINAL PRESSURE TEST: CPT

## 2021-12-16 PROCEDURE — 51741 ELECTRO-UROFLOWMETRY FIRST: CPT

## 2021-12-16 PROCEDURE — 51784 ANAL/URINARY MUSCLE STUDY: CPT

## 2021-12-16 PROCEDURE — 51729 CYSTOMETROGRAM W/VP&UP: CPT

## 2021-12-18 PROBLEM — R33.9 BLADDER RETENTION: Status: ACTIVE | Noted: 2020-05-19

## 2021-12-20 ENCOUNTER — APPOINTMENT (OUTPATIENT)
Dept: CARDIOLOGY | Facility: CLINIC | Age: 76
End: 2021-12-20

## 2022-01-18 ENCOUNTER — APPOINTMENT (OUTPATIENT)
Dept: UROLOGY | Facility: CLINIC | Age: 77
End: 2022-01-18

## 2022-02-18 ENCOUNTER — OUTPATIENT (OUTPATIENT)
Dept: OUTPATIENT SERVICES | Facility: HOSPITAL | Age: 77
LOS: 1 days | End: 2022-02-18
Payer: MEDICARE

## 2022-02-18 ENCOUNTER — APPOINTMENT (OUTPATIENT)
Dept: RADIOLOGY | Facility: CLINIC | Age: 77
End: 2022-02-18
Payer: MEDICARE

## 2022-02-18 DIAGNOSIS — Z95.0 PRESENCE OF CARDIAC PACEMAKER: Chronic | ICD-10-CM

## 2022-02-18 DIAGNOSIS — Z79.01 LONG TERM (CURRENT) USE OF ANTICOAGULANTS: ICD-10-CM

## 2022-02-18 PROCEDURE — 73562 X-RAY EXAM OF KNEE 3: CPT | Mod: 26,50

## 2022-02-18 PROCEDURE — 73562 X-RAY EXAM OF KNEE 3: CPT

## 2022-03-08 ENCOUNTER — APPOINTMENT (OUTPATIENT)
Dept: CARDIOLOGY | Facility: CLINIC | Age: 77
End: 2022-03-08
Payer: MEDICARE

## 2022-03-08 ENCOUNTER — NON-APPOINTMENT (OUTPATIENT)
Age: 77
End: 2022-03-08

## 2022-03-08 VITALS
BODY MASS INDEX: 33.9 KG/M2 | WEIGHT: 216 LBS | HEIGHT: 67 IN | HEART RATE: 76 BPM | OXYGEN SATURATION: 98 % | SYSTOLIC BLOOD PRESSURE: 126 MMHG | DIASTOLIC BLOOD PRESSURE: 72 MMHG

## 2022-03-08 PROCEDURE — 93000 ELECTROCARDIOGRAM COMPLETE: CPT

## 2022-03-08 PROCEDURE — 99215 OFFICE O/P EST HI 40 MIN: CPT

## 2022-03-08 NOTE — REVIEW OF SYSTEMS
[Dyspnea on exertion] : dyspnea during exertion [Lower Ext Edema] : lower extremity edema [Urinary Frequency] : urinary frequency [Chills] : no chills [Blurry Vision] : no blurred vision [Earache] : no earache [Chest Discomfort] : no chest discomfort [Leg Claudication] : no intermittent leg claudication [Cough] : no cough [Wheezing] : no wheezing [Abdominal Pain] : no abdominal pain [Change in Appetite] : no change in appetite

## 2022-03-08 NOTE — REASON FOR VISIT
[Symptom and Test Evaluation] : symptom and test evaluation [Cardiac Failure] : cardiac failure [Arrhythmia/ECG Abnorrmalities] : arrhythmia/ECG abnormalities [Structural Heart and Valve Disease] : structural heart and valve disease [Hyperlipidemia] : hyperlipidemia [Hypertension] : hypertension [Source: ______] : History obtained from [unfilled]

## 2022-03-08 NOTE — HISTORY OF PRESENT ILLNESS
[FreeTextEntry1] : patient with  hx of chronic atrial fibrillation , s/p ablation , with PPM placement , chronic systolic heart failure  who came for follow up along case  manager  Mj Reynoso , patient having increased LE edema , does have chronic LOPEZ,  patient  sleeps one pillow without orthopnea , patient  abdomen is swollen ,  patient is not compliant  to diet restriction , eats out side food , patient denies any chest pain . Patient does not have full insight in to his problem , not sure what medications he is taking , though his medications lists are different \par  \par patient is on xarelto ,  taking lasix 40 mg po daily , torsemide 60 mg daily , \par \par patients blood pressure is controlled  , his medication were reviewed    patient had blood work with primary showed normal renal function k 5.0  , had PPM interrogated on augu 2021 \par \par his follow up echo improvement in EF , MR severity , pulmonary hypertension improved

## 2022-03-08 NOTE — ASSESSMENT
[FreeTextEntry1] : \par \par Patient with chronic systolic heart failure  appears compensated   :cardiomyopathy  non obstructive coronary angiogram  . mild left ventricular systolic dysfunction  continue current medication ,  continue coreg . patient is takin\par \par \par Chronic atrial fibrillation s/p ablation PPM ( replaced two times as patient had complications ) now has single chamber VVI pacemaker . continue periodic interrogation with EP and continue anticoagulation  xarelto \par \par \par Cardiac Murmur   mitral regurgitation was worse in 2020 echo   , mild  TR mild  pulmonary hypertension ( secondary )  continue his current medication , will obtain echo follow up \par \par MIld COPD  : continue inhaler PRN , \par \par \par explained to patient in detail  via  who is also  ,   total time spent , in reviewing his chart , medication > 70 minutes \par \par  will give me the list of the medications , what he has at home , after checking the patient medication bottles at home to\par \par

## 2022-03-08 NOTE — CARDIOLOGY SUMMARY
[de-identified] : 3/8/22 ventricular paced rhythm  [de-identified] : 8/28/20  EF 45 % moderate to severe MR , Mild to moderate AI , mild TR mild pulmonary hypertension normal RV function   IVC dilated decreased respiratory variation  [de-identified] : 5/19/2017  [employer] VVI  [de-identified] : aug 28  2020   all coronaries showed minimal luminal irregularities  EF 48%

## 2022-03-08 NOTE — PHYSICAL EXAM
[Well Developed] : well developed [Well Nourished] : well nourished [Normal Conjunctiva] : normal conjunctiva [5th Left ICS - MCL] : palpated at the 5th LICS in the midclavicular line [Diffuse] : diffuse [No Precordial Heave] : no precordial heave was noted [Normal Rate] : normal [Rhythm Regular] : regular [Normal S1] : normal S1 [Normal S2] : normal S2 [III] : a grade 3 [___ +] : bilateral [unfilled]U+ pitting edema to the ankles [Rt] : varicose veins of the right leg noted [Lt] : varicose veins of the left leg noted [2+] : left 2+ [1+] : left 1+ [Normal] : clear lung fields, good air entry, no respiratory distress [Soft] : abdomen soft [Normal Bowel Sounds] : normal bowel sounds [Normal Gait] : normal gait [Diminished Pedal Pulses ___] : diminished pedal pulses [unfilled] [Edema ___] : edema [unfilled] [Venous stasis] : venous stasis [Venous varicosities] : venous varicosities [No Rash] : no rash [Alert and Oriented] : alert and oriented [Right Carotid Bruit] : no bruit heard over the right carotid [Left Carotid Bruit] : no bruit heard over the left carotid [Right Femoral Bruit] : no bruit heard over the right femoral artery [de-identified] : elevated JVD [de-identified] : distended , abdominal wall edema  umbilical hernia

## 2022-03-09 RX ORDER — LORATADINE 10 MG/1
10 TABLET ORAL
Qty: 30 | Refills: 0 | Status: DISCONTINUED | COMMUNITY
Start: 2020-08-31 | End: 2022-03-09

## 2022-03-09 RX ORDER — FINASTERIDE 5 MG/1
5 TABLET, FILM COATED ORAL DAILY
Qty: 90 | Refills: 1 | Status: COMPLETED | COMMUNITY
End: 2022-03-09

## 2022-03-21 ENCOUNTER — APPOINTMENT (OUTPATIENT)
Dept: CARDIOLOGY | Facility: CLINIC | Age: 77
End: 2022-03-21

## 2022-03-24 ENCOUNTER — INPATIENT (INPATIENT)
Facility: HOSPITAL | Age: 77
LOS: 5 days | Discharge: HOME | DRG: 291 | End: 2022-03-30
Attending: STUDENT IN AN ORGANIZED HEALTH CARE EDUCATION/TRAINING PROGRAM | Admitting: HOSPITALIST
Payer: MEDICARE

## 2022-03-24 VITALS — WEIGHT: 188.05 LBS | HEIGHT: 78 IN

## 2022-03-24 DIAGNOSIS — Z95.0 PRESENCE OF CARDIAC PACEMAKER: Chronic | ICD-10-CM

## 2022-03-24 DIAGNOSIS — I50.9 HEART FAILURE, UNSPECIFIED: ICD-10-CM

## 2022-03-24 LAB
ALBUMIN SERPL ELPH-MCNC: 3.1 G/DL — LOW (ref 3.3–5)
ALP SERPL-CCNC: 176 U/L — HIGH (ref 40–120)
ALT FLD-CCNC: 25 U/L — SIGNIFICANT CHANGE UP (ref 12–78)
ANION GAP SERPL CALC-SCNC: 3 MMOL/L — LOW (ref 5–17)
APPEARANCE UR: CLEAR — SIGNIFICANT CHANGE UP
APTT BLD: 37.9 SEC — HIGH (ref 27.5–35.5)
AST SERPL-CCNC: 31 U/L — SIGNIFICANT CHANGE UP (ref 15–37)
BASOPHILS # BLD AUTO: 0.07 K/UL — SIGNIFICANT CHANGE UP (ref 0–0.2)
BASOPHILS NFR BLD AUTO: 1 % — SIGNIFICANT CHANGE UP (ref 0–2)
BILIRUB SERPL-MCNC: 1.5 MG/DL — HIGH (ref 0.2–1.2)
BILIRUB UR-MCNC: NEGATIVE — SIGNIFICANT CHANGE UP
BUN SERPL-MCNC: 18 MG/DL — SIGNIFICANT CHANGE UP (ref 7–23)
CALCIUM SERPL-MCNC: 8.5 MG/DL — SIGNIFICANT CHANGE UP (ref 8.5–10.1)
CHLORIDE SERPL-SCNC: 108 MMOL/L — SIGNIFICANT CHANGE UP (ref 96–108)
CO2 SERPL-SCNC: 30 MMOL/L — SIGNIFICANT CHANGE UP (ref 22–31)
COLOR SPEC: YELLOW — SIGNIFICANT CHANGE UP
CREAT SERPL-MCNC: 1.1 MG/DL — SIGNIFICANT CHANGE UP (ref 0.5–1.3)
DIFF PNL FLD: NEGATIVE — SIGNIFICANT CHANGE UP
EGFR: 69 ML/MIN/1.73M2 — SIGNIFICANT CHANGE UP
EOSINOPHIL # BLD AUTO: 0.11 K/UL — SIGNIFICANT CHANGE UP (ref 0–0.5)
EOSINOPHIL NFR BLD AUTO: 1.5 % — SIGNIFICANT CHANGE UP (ref 0–6)
GLUCOSE SERPL-MCNC: 105 MG/DL — HIGH (ref 70–99)
GLUCOSE UR QL: NEGATIVE — SIGNIFICANT CHANGE UP
HCT VFR BLD CALC: 41.8 % — SIGNIFICANT CHANGE UP (ref 39–50)
HGB BLD-MCNC: 12.6 G/DL — LOW (ref 13–17)
IMM GRANULOCYTES NFR BLD AUTO: 0.3 % — SIGNIFICANT CHANGE UP (ref 0–1.5)
INR BLD: 1.22 RATIO — HIGH (ref 0.88–1.16)
KETONES UR-MCNC: NEGATIVE — SIGNIFICANT CHANGE UP
LEUKOCYTE ESTERASE UR-ACNC: NEGATIVE — SIGNIFICANT CHANGE UP
LYMPHOCYTES # BLD AUTO: 1.17 K/UL — SIGNIFICANT CHANGE UP (ref 1–3.3)
LYMPHOCYTES # BLD AUTO: 16 % — SIGNIFICANT CHANGE UP (ref 13–44)
MCHC RBC-ENTMCNC: 29.5 PG — SIGNIFICANT CHANGE UP (ref 27–34)
MCHC RBC-ENTMCNC: 30.1 GM/DL — LOW (ref 32–36)
MCV RBC AUTO: 97.9 FL — SIGNIFICANT CHANGE UP (ref 80–100)
MONOCYTES # BLD AUTO: 0.91 K/UL — HIGH (ref 0–0.9)
MONOCYTES NFR BLD AUTO: 12.4 % — SIGNIFICANT CHANGE UP (ref 2–14)
NEUTROPHILS # BLD AUTO: 5.03 K/UL — SIGNIFICANT CHANGE UP (ref 1.8–7.4)
NEUTROPHILS NFR BLD AUTO: 68.8 % — SIGNIFICANT CHANGE UP (ref 43–77)
NITRITE UR-MCNC: NEGATIVE — SIGNIFICANT CHANGE UP
NT-PROBNP SERPL-SCNC: 3143 PG/ML — HIGH (ref 0–450)
PH UR: 7 — SIGNIFICANT CHANGE UP (ref 5–8)
PLATELET # BLD AUTO: 165 K/UL — SIGNIFICANT CHANGE UP (ref 150–400)
POTASSIUM SERPL-MCNC: 4.3 MMOL/L — SIGNIFICANT CHANGE UP (ref 3.5–5.3)
POTASSIUM SERPL-SCNC: 4.3 MMOL/L — SIGNIFICANT CHANGE UP (ref 3.5–5.3)
PROT SERPL-MCNC: 6.7 GM/DL — SIGNIFICANT CHANGE UP (ref 6–8.3)
PROT UR-MCNC: NEGATIVE — SIGNIFICANT CHANGE UP
PROTHROM AB SERPL-ACNC: 14.2 SEC — HIGH (ref 10.5–13.4)
RBC # BLD: 4.27 M/UL — SIGNIFICANT CHANGE UP (ref 4.2–5.8)
RBC # FLD: 17.4 % — HIGH (ref 10.3–14.5)
SODIUM SERPL-SCNC: 141 MMOL/L — SIGNIFICANT CHANGE UP (ref 135–145)
SP GR SPEC: 1.01 — SIGNIFICANT CHANGE UP (ref 1.01–1.02)
TROPONIN I, HIGH SENSITIVITY RESULT: 185.45 NG/L — HIGH
TROPONIN I, HIGH SENSITIVITY RESULT: 198.24 NG/L — HIGH
UROBILINOGEN FLD QL: NEGATIVE — SIGNIFICANT CHANGE UP
WBC # BLD: 7.31 K/UL — SIGNIFICANT CHANGE UP (ref 3.8–10.5)
WBC # FLD AUTO: 7.31 K/UL — SIGNIFICANT CHANGE UP (ref 3.8–10.5)

## 2022-03-24 PROCEDURE — 93306 TTE W/DOPPLER COMPLETE: CPT

## 2022-03-24 PROCEDURE — 85027 COMPLETE CBC AUTOMATED: CPT

## 2022-03-24 PROCEDURE — 80048 BASIC METABOLIC PNL TOTAL CA: CPT

## 2022-03-24 PROCEDURE — 99285 EMERGENCY DEPT VISIT HI MDM: CPT

## 2022-03-24 PROCEDURE — 36415 COLL VENOUS BLD VENIPUNCTURE: CPT

## 2022-03-24 PROCEDURE — 80053 COMPREHEN METABOLIC PANEL: CPT

## 2022-03-24 PROCEDURE — 93005 ELECTROCARDIOGRAM TRACING: CPT

## 2022-03-24 PROCEDURE — 87086 URINE CULTURE/COLONY COUNT: CPT

## 2022-03-24 PROCEDURE — 83880 ASSAY OF NATRIURETIC PEPTIDE: CPT

## 2022-03-24 PROCEDURE — 81003 URINALYSIS AUTO W/O SCOPE: CPT

## 2022-03-24 PROCEDURE — 94618 PULMONARY STRESS TESTING: CPT

## 2022-03-24 PROCEDURE — 76705 ECHO EXAM OF ABDOMEN: CPT | Mod: 26

## 2022-03-24 PROCEDURE — 99223 1ST HOSP IP/OBS HIGH 75: CPT

## 2022-03-24 PROCEDURE — 76705 ECHO EXAM OF ABDOMEN: CPT

## 2022-03-24 PROCEDURE — 93010 ELECTROCARDIOGRAM REPORT: CPT

## 2022-03-24 PROCEDURE — 71045 X-RAY EXAM CHEST 1 VIEW: CPT | Mod: 26

## 2022-03-24 PROCEDURE — 94640 AIRWAY INHALATION TREATMENT: CPT

## 2022-03-24 PROCEDURE — 84484 ASSAY OF TROPONIN QUANT: CPT

## 2022-03-24 PROCEDURE — 94760 N-INVAS EAR/PLS OXIMETRY 1: CPT

## 2022-03-24 RX ORDER — BUDESONIDE AND FORMOTEROL FUMARATE DIHYDRATE 160; 4.5 UG/1; UG/1
2 AEROSOL RESPIRATORY (INHALATION)
Refills: 0 | Status: DISCONTINUED | OUTPATIENT
Start: 2022-03-24 | End: 2022-03-30

## 2022-03-24 RX ORDER — FUROSEMIDE 40 MG
80 TABLET ORAL ONCE
Refills: 0 | Status: COMPLETED | OUTPATIENT
Start: 2022-03-24 | End: 2022-03-24

## 2022-03-24 RX ORDER — SPIRONOLACTONE 25 MG/1
25 TABLET, FILM COATED ORAL DAILY
Refills: 0 | Status: DISCONTINUED | OUTPATIENT
Start: 2022-03-24 | End: 2022-03-30

## 2022-03-24 RX ORDER — ATORVASTATIN CALCIUM 80 MG/1
20 TABLET, FILM COATED ORAL AT BEDTIME
Refills: 0 | Status: DISCONTINUED | OUTPATIENT
Start: 2022-03-24 | End: 2022-03-30

## 2022-03-24 RX ORDER — ALBUTEROL 90 UG/1
2 AEROSOL, METERED ORAL EVERY 6 HOURS
Refills: 0 | Status: DISCONTINUED | OUTPATIENT
Start: 2022-03-24 | End: 2022-03-30

## 2022-03-24 RX ORDER — RIVAROXABAN 15 MG-20MG
20 KIT ORAL
Refills: 0 | Status: DISCONTINUED | OUTPATIENT
Start: 2022-03-24 | End: 2022-03-30

## 2022-03-24 RX ORDER — PANTOPRAZOLE SODIUM 20 MG/1
40 TABLET, DELAYED RELEASE ORAL
Refills: 0 | Status: DISCONTINUED | OUTPATIENT
Start: 2022-03-24 | End: 2022-03-30

## 2022-03-24 RX ORDER — ASPIRIN/CALCIUM CARB/MAGNESIUM 324 MG
324 TABLET ORAL ONCE
Refills: 0 | Status: COMPLETED | OUTPATIENT
Start: 2022-03-24 | End: 2022-03-24

## 2022-03-24 RX ORDER — TAMSULOSIN HYDROCHLORIDE 0.4 MG/1
0.4 CAPSULE ORAL AT BEDTIME
Refills: 0 | Status: DISCONTINUED | OUTPATIENT
Start: 2022-03-24 | End: 2022-03-30

## 2022-03-24 RX ORDER — ACETAMINOPHEN 500 MG
650 TABLET ORAL EVERY 6 HOURS
Refills: 0 | Status: DISCONTINUED | OUTPATIENT
Start: 2022-03-24 | End: 2022-03-30

## 2022-03-24 RX ORDER — FINASTERIDE 5 MG/1
5 TABLET, FILM COATED ORAL DAILY
Refills: 0 | Status: DISCONTINUED | OUTPATIENT
Start: 2022-03-24 | End: 2022-03-30

## 2022-03-24 RX ORDER — ASPIRIN/CALCIUM CARB/MAGNESIUM 324 MG
81 TABLET ORAL DAILY
Refills: 0 | Status: DISCONTINUED | OUTPATIENT
Start: 2022-03-24 | End: 2022-03-30

## 2022-03-24 RX ORDER — FUROSEMIDE 40 MG
40 TABLET ORAL EVERY 12 HOURS
Refills: 0 | Status: DISCONTINUED | OUTPATIENT
Start: 2022-03-24 | End: 2022-03-29

## 2022-03-24 RX ORDER — CARVEDILOL PHOSPHATE 80 MG/1
6.25 CAPSULE, EXTENDED RELEASE ORAL EVERY 12 HOURS
Refills: 0 | Status: DISCONTINUED | OUTPATIENT
Start: 2022-03-24 | End: 2022-03-30

## 2022-03-24 RX ADMIN — TAMSULOSIN HYDROCHLORIDE 0.4 MILLIGRAM(S): 0.4 CAPSULE ORAL at 21:02

## 2022-03-24 RX ADMIN — Medication 324 MILLIGRAM(S): at 16:45

## 2022-03-24 RX ADMIN — CARVEDILOL PHOSPHATE 6.25 MILLIGRAM(S): 80 CAPSULE, EXTENDED RELEASE ORAL at 21:09

## 2022-03-24 RX ADMIN — Medication 40 MILLIGRAM(S): at 21:09

## 2022-03-24 RX ADMIN — Medication 80 MILLIGRAM(S): at 16:45

## 2022-03-24 RX ADMIN — SPIRONOLACTONE 25 MILLIGRAM(S): 25 TABLET, FILM COATED ORAL at 21:02

## 2022-03-24 RX ADMIN — ATORVASTATIN CALCIUM 20 MILLIGRAM(S): 80 TABLET, FILM COATED ORAL at 21:02

## 2022-03-24 NOTE — H&P ADULT - HISTORY OF PRESENT ILLNESS
76 year old male with PMHx of systolic CHF (EF 45%), anasarca, COPD - Severe Pulmonary HTN, HTN, dyslipidemia, CAD, a-fib/PPM on xarelto, arthritis, ESBL UTI c/o abdominal distention and bilateral LE swelling x 2 weeks. Was seen at [facility][note 3] and asked to start a new regimen to combat his symptoms however he endorses symptoms have not improved and have actually gotten worse. He also endorses some mild chest discomfort that is present intermittently Non radiating. No PND. No hemoptysis. No cough. No exertional component. But feels somewhat sob when he is exerting himself. Most recent cath 8/2020: non obstructive CAD with e/o severe pulmonary HTN and severe mitral regurg. EF45%    ED course: TNI: 198. EKG: V paced. o2 100%. BNP 3100/ CXR: negative. REc'd 80 IV lasix, ASA and tylenol      PAST MEDICAL/SURGICAL/FAMILY/SOCIAL HISTORY:    Past Medical, Past Surgical, and Family History:  PAST MEDICAL HISTORY:  AF (atrial fibrillation) on Eliquis    Arthritis     COPD (chronic obstructive pulmonary disease) not on inhalers    Coronary artery disease involving native heart with angina pectoris, unspecified vessel or lesion type     Heart failure with reduced ejection fraction     HLD (hyperlipidemia)     HTN (hypertension)     Pacemaker single chamber meditronic  placed by Dr Tomas  May  of  2016.     PAST SURGICAL HISTORY:  Cardiac pacemaker single chamber meditronic  placed by Dr Tomas  May  of  2016.     FAMILY HISTORY:  No pertinent family history, of diabetes or heart disease.    social: neg x 3    ALLERGIES AND HOME MEDICATIONS:   Allergies:        Allergies:  	amiodarone: Drug, Other, Originally Entered as [Anaphylaxis] reaction to [amiodarone]  	ACE inhibitors: Drug Category, Other, Originally Entered as [Angioedema] reaction to [ACE Inhibitors]

## 2022-03-24 NOTE — H&P ADULT - NSHPLABSRESULTS_GEN_ALL_CORE
LABS: All Labs Reviewed:                        12.6   7.31  )-----------( 165      ( 24 Mar 2022 15:09 )             41.8     03-24    141  |  108  |  18  ----------------------------<  105<H>  4.3   |  30  |  1.10    Ca    8.5      24 Mar 2022 15:09    TPro  6.7  /  Alb  3.1<L>  /  TBili  1.5<H>  /  DBili  x   /  AST  31  /  ALT  25  /  AlkPhos  176<H>  03-24    PT/INR - ( 24 Mar 2022 15:09 )   PT: 14.2 sec;   INR: 1.22 ratio         PTT - ( 24 Mar 2022 15:09 )  PTT:37.9 sec          Blood Culture:         EKG : paced  < from: Xray Chest 1 View-PORTABLE IMMEDIATE (Xray Chest 1 View-PORTABLE IMMEDIATE .) (03.24.22 @ 15:41) >    IMPRESSION: Unchanged chest as above.    --- End of Report ---    < end of copied text >

## 2022-03-24 NOTE — ED ADULT NURSE REASSESSMENT NOTE - NS ED NURSE REASSESS COMMENT FT1
Pt received AAOx3, VSS, denies pain.  # [id number] used. Meds given without issues. Nsg will cont to monitor.

## 2022-03-24 NOTE — PATIENT PROFILE ADULT - FALL HARM RISK - HARM RISK INTERVENTIONS

## 2022-03-24 NOTE — H&P ADULT - ASSESSMENT
76 year old male with PMHx of systolic CHF (EF 45%), anasarca, COPD - Severe Pulmonary HTN, HTN, dyslipidemia, CAD, a-fib/PPM on xarelto, arthritis, ESBL UTI c/o abdominal distention and bilateral LE swelling x 2 weeks. Was seen at [facility] [place] and asked to start a new regimen to combat his symptoms however he endorses symptoms have not improved and have actually gotten worse. He also endorses some mild chest discomfort that is present intermittently Non radiating. No PND. No hemoptysis. No cough. No exertional component. But feels somewhat sob when he is exerting himself. Most recent cath 8/2020: non obstructive CAD with e/o severe pulmonary HTN and severe mitral regurg. EF45%    ED course: TNI: 198. EKG: V paced. o2 100%. BNP 3100/ CXR: negative. REc'd 80 IV lasix, ASA and tylenol        Admit to telemetry      #Mild acute HFrEF  - admit  - IV diuresis  - Strict I/Os  - Cont GDMT - pt cannot start ACE-I d/t angioedema  - Obtain Abd sono -> eval for ascites  - Daily weights  - Low Na diet/Fluid restict      #Mild elevation in troponin  - Suspect demand in setting of acute CH  - Trend to peak  - Cont ASA  - Cardio consult  - Follow up echo to eval for any e/o WMA  - EP to interrogate device      #Chronic Afib/HTN/HLD  - resume xarelto/BB  - statin      #COPD/Severe PHTN  - stable  - DAISY/LABA  - Defer to cardiology for indication of RHC and treatment of ?primary vs secondary pHTN        DVT: Xarelto             76 year old male with PMHx of systolic CHF (EF 45%), anasarca, COPD - Severe Pulmonary HTN, HTN, dyslipidemia, CAD, a-fib/PPM on xarelto, arthritis, ESBL UTI c/o abdominal distention and bilateral LE swelling x 2 weeks. Was seen at [facility] and asked to start a new regimen to combat his symptoms however he endorses symptoms have not improved and have actually gotten worse. He also endorses some mild chest discomfort that is present intermittently Non radiating. No PND. No hemoptysis. No cough. No exertional component. But feels somewhat sob when he is exerting himself. Most recent cath 8/2020: non obstructive CAD with e/o severe pulmonary HTN and severe mitral regurg. EF45%    ED course: TNI: 198. EKG: V paced. o2 100%. BNP 3100/ CXR: negative. REc'd 80 IV lasix, ASA and tylenol        Admit to telemetry      #Mild acute HFrEF  - admit  - IV diuresis  - Strict I/Os  - Cont GDMT - pt cannot start ACE-I d/t angioedema  - Obtain Abd sono -> eval for ascites  - Daily weights  - Low Na diet/Fluid restict      #Mild elevation in troponin  - Suspect demand in setting of acute CH  - Trend to peak  - Cont ASA  - Cardio consult  - Follow up echo to eval for any e/o WMA  - EP to interrogate device      #Chronic Afib/HTN/HLD  - resume xarelto/BB  - statin      #COPD/Severe PHTN  - stable  - DAISY/LABA  - Defer to cardiology for indication of RHC and treatment of ?primary vs secondary pHTN      #chronic elevation in Alk phos  - outpatient w/u    DVT: Xarelto    Pt appears non adherent to follow up visits per chart review. Must be educated on diet, medication and follow up adherence

## 2022-03-24 NOTE — ED STATDOCS - PROGRESS NOTE DETAILS
Nathan Bethea : 76 M hx systolic CHF (EF 45%), COPD - Severe Pulmonary HTN, HTN, dyslipidemia, CAD, a-fib/PPM on xarelto, arthritis, ESBL UTI  here c/o abdominal distension and B/L LE edema.  Will send pt to main ED for further evaluation.

## 2022-03-24 NOTE — H&P ADULT - NSHPPHYSICALEXAM_GEN_ALL_CORE
ICU Vital Signs Last 24 Hrs  T(C): 36.8 (24 Mar 2022 14:20), Max: 36.8 (24 Mar 2022 14:20)  T(F): 98.3 (24 Mar 2022 14:20), Max: 98.3 (24 Mar 2022 14:20)  HR: 57 (24 Mar 2022 14:20) (57 - 57)  BP: 157/91 (24 Mar 2022 14:20) (157/91 - 157/91)  BP(mean): 111 (24 Mar 2022 14:20) (111 - 111)  ABP: --  ABP(mean): --  RR: 18 (24 Mar 2022 14:20) (18 - 18)  SpO2: 100% (24 Mar 2022 14:20) (100% - 100%)      PHYSICAL EXAM:    Constitutional: NAD, awake and alert  HEENT: PERR, EOMI, Normal Hearing, MMM  Neck: Soft and supple, No LAD, No JVD  Respiratory: Breath sounds are clear bilaterally, No wheezing, rales or rhonchi  Cardiovascular: S1 and S2, regular rate and rhythm, no Murmurs, gallops or rubs  Gastrointestinal: Bowel Sounds present, soft, nontender, nondistended, no guarding, no rebound  Extremities: +LE edema  Vascular: 2+ peripheral pulses  Neurological: A/O x 3, no focal deficits  Musculoskeletal: 5/5 strength b/l upper and lower extremities  Skin: No rashes

## 2022-03-24 NOTE — ED ADULT NURSE NOTE - OBJECTIVE STATEMENT
Pt comes to the ED complaining of generalized weakness. Pt states that two weeks ago he was seen at the [facility] for bilateral lower extremity edema. Pt states that he was given medication (unsure as to what it was) but that the swelling has not gotten any better. Pt complains of shortness of breath and chest discomfort with walking and he is only able to walk short distances without having to stop and rest.

## 2022-03-24 NOTE — PATIENT PROFILE ADULT - DATE OF LAST VACCINATION
Consultation - Nephrology   Parth Serrano 79 y o  male MRN: [id number]  Unit/Bed#: -69 Encounter: [id number]    Referring PHYSICIAN: Julianne Boogie [address]:  Acute kidney injury on CKD    DATE OF CONSULTATION:  January 16, 2022    ADMISSION DIAGNOSIS: DULCE (acute kidney injury) ([facility] )     [address] [facility]     Patient with history of bladder cancer who also obstructive uropathy came to the hospital with decreased urine output through the Fortune catheter and was found to acute kidney injury    HPI     Patient known history of CKD who also history of bladder cancer which is being manage medically  Patient will being on chemotherapy as per Oncology not    Patient does have bilateral hydronephrosis and had a Fortune catheter  Patient also history of diastolic heart failure and coronary artery disease requiring CABG  He was brought in the hospital with shortness of breath and possible volume overload status with decreased urine output    In emergency room he also got diuretic  Eventually he started making quite a bit of urine    When I saw him today in room was feeling much better  Diuresing quite well    Denies any acute complaint    No chest pain no palpitation or shortness of breath    No nausea no vomiting    According the patient patient does need stent in   his ureter he was told by urologist    PAST MEDICAL HISTORY     Past Medical History:   Diagnosis Date    A-fib [facility])     Arthritis     Benign prostatic hyperplasia without lower urinary tract symptoms     without Urinary Obstruction    Bladder cancer ([place] [address] )     CAD (coronary artery disease)     Cancer (HCC)     Chronic obstructive lung disease ([place] [address] )     Coronary arteriosclerosis     Depression     Emphysema lung (HCC)     GERD (gastroesophageal reflux disease)     Hyperlipidemia     Hypertension     Irregular heart beat     Myocardial infarction (HCC)     Psoriasis     Requires supplemental oxygen     at bedtime during high humid days only    Stroke [facility])     TIA 1/2018       PAST SURGICAL HISTORY     Past Surgical History:   Procedure Laterality Date    COLONOSCOPY      CORONARY ANGIOPLASTY  02/03/2001    PTCA of RCA    CORONARY ARTERY BYPASS GRAFT  02/07/2001    x4- [place]    HERNIA REPAIR      IR PORT PLACEMENT  1/14/2022    VT BRONCHOSCOPY,DIAGNOSTIC Left 1/7/2019    Procedure: Lissett Scott;  Surgeon: Neville Chance MD;  Location: [facility]; Service: Pulmonary    VT CYSTOURETHROSCOPY,FULGUR <0 5 CM LESN N/A 11/30/2021    Procedure: TRANSURETHRAL RESECTION OF BLADDER TUMOR (TURBT) with "large";  Surgeon: Cris Sow MD;  Location[facility];  Service: Urology    VT CYSTOURETHROSCOPY,URETER CATHETER Bilateral 11/30/2021    Procedure: Victoria Christmas;  Surgeon: Cris Sow MD;  [facility];  Service: Urology    VT Arn Ogren INCIS Left 2/20/2018    Procedure: ENDARTERECTOMY ARTERY CAROTID WITH PATCH ANGIOPLASTY;  Surgeon: Joanna Herr MD;  [facility];  Service: Vascular    TRANSURETHRAL RESECTION OF PROSTATE         ALLERGIES     Allergies   Allergen Reactions    Penicillins Swelling and Itching       SOCIAL HISTORY     Social History     Substance and Sexual Activity   Alcohol Use Yes    Comment: special occasions only wine  Social History     Substance and Sexual Activity   Drug Use No     Social History     Tobacco Use   Smoking Status Former Smoker    Years: 1 00    Types: Cigarettes   Smokeless Tobacco Never Used   Tobacco Comment    few cigarettes when playing cards          FAMILY HISTORY     Family History   Problem Relation Age of Onset    Lung cancer Mother     Cancer Mother     Other Father         sepsis       CURRENT MEDICATIONS       Current Facility-Administered Medications:     aspirin chewable tablet 81 mg, 81 mg, Oral, Daily, Kaden Moore MD, 81 mg at 01/16/22 0912    atorvastatin (LIPITOR) tablet 40 mg, 40 mg, Oral, Daily, Kaden Moore MD, 40 mg at 01/16/22 0912    ceftriaxone (ROCEPHIN) 1 g/50 mL in dextrose IVPB, 1,000 mg, Intravenous, Q24H, Paula Chapa MD    diltiazem (CARDIZEM CD) 24 hr capsule 240 mg, 240 mg, Oral, Daily, Paula Chapa MD, 240 mg at 01/16/22 0912    heparin (porcine) 25,000 units in D5W 250 mL infusion (premix), 3-20 Units/kg/hr (Order-Specific), Intravenous, Titrated, [employer], PA-GISELLA, Last Rate: 9 6 mL/hr at 01/16/22 1017, 12 Units/kg/hr at 01/16/22 1017    HYDROmorphone (DILAUDID) injection 0 5 mg, 0 5 mg, Intravenous, Q4H PRN, Paula Chapa MD    oxyCODONE (ROXICODONE) IR tablet 5 mg, 5 mg, Oral, Q6H PRN, 5 mg at 01/16/22 0912 **OR** oxyCODONE (ROXICODONE) IR tablet 2 5 mg, 2 5 mg, Oral, Q6H PRN, Paula Chapa MD    pantoprazole (PROTONIX) EC tablet 40 mg, 40 mg, Oral, Early Morning, Paula Chapa MD, 40 mg at 01/16/22 5891    REVIEW OF SYSTEMS     Review of Systems   Constitutional: Negative for activity change and fatigue  HENT: Negative for congestion and ear discharge  Eyes: Negative for photophobia and pain  Respiratory: Positive for shortness of breath  Negative for apnea and choking  Cardiovascular: Negative for chest pain and palpitations  Gastrointestinal: Negative for abdominal distention and blood in stool  Endocrine: Negative for heat intolerance and polyphagia  Genitourinary: Positive for decreased urine volume and difficulty urinating  Negative for flank pain and urgency  Musculoskeletal: Negative for neck pain and neck stiffness  Skin: Negative for color change and wound  Allergic/Immunologic: Negative for food allergies and immunocompromised state  Neurological: Negative for seizures and facial asymmetry  Hematological: Negative for adenopathy  Does not bruise/bleed easily  Psychiatric/Behavioral: Negative for self-injury and suicidal ideas         LAB RESULTS        Results from last 7 days   Lab Units 01/16/22  0927 01/16/22  0052 01/15/22  2015 01/15/22  1045   WBC Thousand/uL 8 27  --  7 68 9 14   HEMOGLOBIN g/dL 9 8*  --  9 3* 9 4*   HEMATOCRIT % 33 7*  --  31 6* 32 3*   PLATELETS Thousands/uL 298  --  189 267   POTASSIUM mmol/L 4 1 4 0 4 9 4 9   CHLORIDE mmol/L 100 100 95* 99*   CO2 mmol/L 30 25 23 25   BUN mg/dL 39* 45* 47* 46*   CREATININE mg/dL 5 88* 7 16* 8 01* 8 05*   EGFR ml/min/1 73sq m 8 6 5 5   CALCIUM mg/dL 8 8 8 0* 8 2* 8 1*   MAGNESIUM mg/dL 2 4  --   --   --        I have personally reviewed the old medical records and patient's previously known baseline creatinine level is ~ 1 6    RADIOLOGY RESULTS     Results for orders placed during the hospital encounter of 10/07/18    XR chest 1 view portable    Narrative  CHEST    INDICATION:   sob  COMPARISON:  Chest x-ray dated 1/13/2018    EXAM PERFORMED/VIEWS:  XR CHEST PORTABLE      FINDINGS:    No pneumothorax is seen  Some biapical pleural/parenchymal scarring is again seen, worse on the left  Paucity of lung markings in the bilateral upper lung fields is noted, suspicious for emphysematous changes  There is a large dense ill-defined opacity in the left lower lung field, suspicious for infection in the appropriate clinical setting  Lungs otherwise are grossly clear  Median sternotomy wires and metallic mediastinal clips are redemonstrated  The cardiac and mediastinal contours are stable in appearance  Visualized bones appear intact  Impression  Superimposed on chronic pleural/parenchymal and emphysematous changes, there is a large dense ill-defined opacity in the left lower lung field, suspicious for infection in the appropriate clinical setting  Correlation with the patient's symptoms and  laboratory values recommended  Other nonacute findings as above  Workstation performed: [id number]    Results for orders placed during the hospital encounter of 01/15/22    XR chest 2 views    Narrative  CHEST    INDICATION:   sob      COMPARISON:  December 30, 2021 CT from January 15, 2022    EXAM PERFORMED/VIEWS:  XR CHEST PA & LATERAL  Images: 3    FINDINGS:  Right-sided central venous line with tip in the SVC    Heart and mediastinum remain unchanged  Bilateral apical scarring and retraction of the nani as seen on the previous study  No new consolidation seen  Bilateral effusion seen with pleural thickening as seen on the previous study  Hiatal hernia seen  Left base is obscured, unchanged due to prominent cardiophrenic angle fat  Postsurgical changes from prior median sternotomy    Impression  No new consolidation  Blunting of the both CP angle due to small effusion as seen on the recent CT  Hiatal hernia        Workstation performed: [id number]    Results for orders placed during the hospital encounter of 07/07/20    CT chest without contrast    Narrative  CT CHEST WITHOUT IV CONTRAST    INDICATION:   R93 89: Abnormal findings on diagnostic imaging of other specified body structures  COMPARISON:  None  TECHNIQUE: CT examination of the chest was performed without intravenous contrast   Axial, sagittal, and coronal 2D reformatted images were created from the source data and submitted for interpretation  Radiation dose length product (DLP) for this visit:  246 mGy-cm   This examination, like all CT scans performed in the [facility], was performed utilizing techniques to minimize radiation dose exposure, including the use of iterative  reconstruction and automated exposure control  FINDINGS:    LUNGS: Biapical fibrotic changes are noted within the left greater than right with the traction located changes, elevation of the left hilum and elevation of the left diaphragm  PLEURA:  Left apical pleural thickening noted    Focal air-containing area was noted in the left apex which is showing progressive there is optional   The amount of the left apical air is decreased as compared to previous study from December 6, 2019  Emphysema seen  No new consolidation seen    HEART/GREAT VESSELS:  Coronary artery calcification seen        MEDIASTINUM AND JOSE ARMANDO:  Large hiatal hernia seen    CHEST WALL AND LOWER NECK:   Unremarkable  VISUALIZED STRUCTURES IN THE UPPER ABDOMEN:  Spleen, adrenal glands appear unremarkable  Left renal cyst seen  Cholelithiasis seen  A rounded hypodensity seen in the left hepatic lobe at the junction of the right hepatic lobe, stable suggest cyst  Rounded hypodensity seen in the segment 5, stable    OSSEOUS STRUCTURES:  No acute fracture or destructive osseous lesion  Postsurgical changes from prior median sternotomy    Impression  Progressive is resorption of air in the left apex  Residual pleural thickening persists    Bilateral upper lobe fibrotic changes left greater than right with elevation of the hilum    No new consolidation    Emphysema    Large hiatal hernia  Cholelithiasis      Workstation performed: [id number]    Results for orders placed during the hospital encounter of 12/29/21    CT abdomen pelvis w wo contrast    Narrative  CT ABDOMEN AND PELVIS WITH AND WITHOUT IV CONTRAST    INDICATION:   Urinary retention  Urinary retention  pt states he has hx of bladder cancer, sharp pains in bladder that bring him to his knees, last urinated today, believes he is retaining urine    Patient is an 67-year-old male with a history of bladder cancer, atrial fibrillation on Coumadin, GERD, hyperlipidemia, hypertension, CABG, TURP that presents to the emergency department with improving sharp and shooting nonradiating lower abdominal pain  for 1 day  Patient has associated symptoms of decreased urinary stream beginning with the current ED presentation of lower abdominal pain symptoms  Patient states that he is following up with Heme-Onc later today for information session on  chemotherapy infusion  Patient denies palliative factors with provocative factors of pressure to lower abdomen  Patient denies not effective treatment    Patient denies fevers, chills, nausea, vomiting, diarrhea, and constipation  Patient's recent fall  or recent trauma  Patient denies sick contacts or recent travel  Patient denies chest pain and shortness of breath  The patient's entire past medical history was obtained directly from either the attending emergency room physicians notes and/or the resident/physician's assistant notes in [address] [place] [address]  The information was copied and pasted directly from [facility]  Upon further review of the patient's chart in [facility], the patient is status post cystoscopy and transurethral resection of bladder tumor November 30, 2021  COMPARISON:  Several prior studies, most recent of which is a noncontrast CT abdomen pelvis December 18, 2021 and CT renal study September 24, 2021    TECHNIQUE: Initial CT of the abdomen and pelvis was performed without intravenous contrast   Subsequent dynamic CT evaluation of the abdomen and pelvis was performed after the administration of intravenous contrast in both nephrographic and delayed  phases after the administration of intravenous contrast   Additional 15 minute delayed images of the urinary bladder were also obtained  Axial, sagittal, and coronal 2D reformatted images were created from the source data and submitted for  interpretation  Radiation dose length product (DLP) for this visit:  1096 mGy-cm   This examination, like all CT scans performed in the [facility], was performed utilizing techniques to minimize radiation dose exposure, including the use of iterative  reconstruction and automated exposure control  IV Contrast:  85 mL of iodixanol (VISIPAQUE)  Enteric Contrast:  Enteric contrast was not administered  FINDINGS:  ABDOMEN  RIGHT KIDNEY AND URETER:  There are subcentimeter low-density lesions, too small to accurately characterize, however likely cysts  No solid renal mass  No detectable urothelial mass      There is mild hydroureteronephrosis, up to the level of the urinary bladder, new from the prior study  There is delayed excretion of contrast material into the collecting system  No urinary tract calculi  No perinephric collection  LEFT KIDNEY AND URETER:  There are subcentimeter low-density lesions, too small to accurately characterize, however likely represent cysts  Exophytic simple cyst in the posterior interpolar region  No solid renal mass  Delayed excretion of contrast material into the collecting system, limiting evaluation for urothelial lesions  There is moderate to severe hydroureteronephrosis, up to the level of the urinary bladder  Moderate perinephric and periureteric inflammation, up to the level of the urinary bladder, increased from the prior study  No urinary tract calculi  No perinephric collection  URINARY BLADDER:  Severely distended  The urinary bladder extends above the level of the iliac crests  Inadequately evaluated due to lack of excreted contrast material into the urinary bladder  There are postoperative changes along the left lateral aspect of the urinary bladder  Moderate and increasing inflammatory changes are present surrounding the urinary bladder, predominantly along the left lateral and posterolateral margins  There is a  bladder diverticulum along the posterior, left lateral margin of the urinary bladder  No calculi  LOWER CHEST: Mild emphysematous changes  No clinically significant abnormality identified in the visualized lower chest         LIVER/BILIARY TREE:  One or more simple appearing hepatic cysts are again identified and are similar  One or more subcentimeter sharply circumscribed low-density hepatic lesion(s) are noted, too small to accurately characterize, but statistically most likely to represent subcentimeter hepatic cysts  No suspicious solid hepatic lesion is identified  Hepatic contours are normal   No biliary dilatation        GALLBLADDER:  There are gallstone(s) within the gallbladder, without pericholecystic inflammatory changes  SPLEEN:  Unremarkable  PANCREAS:  Unremarkable  ADRENAL GLANDS:  Unremarkable  STOMACH AND BOWEL:  Evaluation of the GI tract limited due to lack of oral contrast material     Stomach incompletely evaluated  Large hiatal hernia/partially intrathoracic stomach  Fecalization of small bowel contents, compatible with delayed small bowel transit  No evidence of small bowel obstruction  The colon is segmentally distended with feces  Normal fecal burden in the colon  ABDOMINOPELVIC CAVITY:  No ascites  No free intraperitoneal air  Again identified are subcentimeter para-aortic, retroperitoneal, mesenteric and pelvic lymph nodes  No pathologic lymphadenopathy based on CT criteria  VESSELS:  Atherosclerotic changes are present in the abdominal aorta and its branch vessels  Fusiform ectasia of the infrarenal abdominal aorta, extending into the common iliac bifurcation, similar  PELVIS  REPRODUCTIVE ORGANS:  Unremarkable for patient's age  APPENDIX: No findings to suggest appendicitis  ABDOMINAL WALL/INGUINAL REGIONS:  Prior laparoscopic bilateral inguinal hernia repair  There is a recurrent, large left inguinal hernia containing fat  OSSEOUS STRUCTURES:  There are age appropriate degenerative changes  No acute fracture or destructive osseous lesion  Impression  1  Severely distended urinary bladder, extending above the iliac crests  Postoperative changes along the left lateral and posterior, left lateral wall of the urinary bladder  2   Mild right-sided hydroureteronephrosis and moderate to severe left-sided hydroureteronephrosis as described above  There is delayed excretion into the collecting systems bilaterally, likely related to vesicoureteral reflux from severely distended  urinary bladder  There is worsening left-sided perinephric and periureteric inflammation      3  Additional, stable incidental findings as described above  Recommend follow-up urology consultation  The study was marked in [facility] for immediate notification  Workstation performed: [id number]    Results for orders placed during the hospital encounter of 01/15/22    CT abdomen pelvis wo contrast    Narrative  CT ABDOMEN AND PELVIS WITHOUT IV CONTRAST    INDICATION:   Bladder-neck obstruction  looking for obstruction in the urinary tract causing DULCE  pt has archibald and bladder ca  COMPARISON:  12/29/2021  TECHNIQUE:  CT examination of the abdomen and pelvis was performed without intravenous contrast   Axial, sagittal, and coronal 2D reformatted images were created from the source data and submitted for interpretation  Radiation dose length product (DLP) for this visit:  674 mGy-cm   This examination, like all CT scans performed in the [facility], was performed utilizing techniques to minimize radiation dose exposure, including the use of iterative  reconstruction and automated exposure control  Enteric contrast was not administered  FINDINGS:    ABDOMEN    LOWER CHEST:  Small bilateral pleural effusions, new since the prior study  Large hiatal hernia  LIVER/BILIARY TREE:  Stable tiny hepatic cysts and subcentimeter hepatic hypodensities too small to accurately characterize  No suspicious solid hepatic lesion is identified  Hepatic contours are normal   No biliary dilatation  GALLBLADDER:  There are gallstone(s) within the gallbladder, without pericholecystic inflammatory changes  SPLEEN:  Unremarkable  PANCREAS:  Unremarkable  ADRENAL GLANDS:  Unremarkable  KIDNEYS/URETERS:  No significant change in moderate left and mild right hydroureteronephrosis up to level of the bladder  There is increased bilateral perinephric and periureteric inflammation when compared to the prior study  Bilateral ureteral wall  thickening noted    Stable left renal exophytic cyst     STOMACH AND BOWEL:  Fecalization of small bowel contents consistent with slow transit  No bowel obstruction  APPENDIX:  No findings to suggest appendicitis  ABDOMINOPELVIC CAVITY:  No ascites  No pneumoperitoneum  Stable subcentimeter mesenteric, retroperitoneal, and pelvic lymph nodes  VESSELS:  Atherosclerotic changes are present  Fusiform ectasia of the infrarenal abdominal aorta extending to the common iliac bifurcation as before  PELVIS    REPRODUCTIVE ORGANS:  Unremarkable for patient's age  URINARY BLADDER:  Decompressed with a Fortune catheter in place  Small volume of nondependent intravesical air  Postoperative changes with contour deformity along the posterior left lateral wall again seen  There is now diffuse bladder wall thickening  with extensive perivesical fat stranding  ABDOMINAL WALL/INGUINAL REGIONS:  Surgical changes of prior bilateral inguinal hernia repair  Recurrent prominent fat-containing left inguinal hernia is reidentified  OSSEOUS STRUCTURES:  No acute fracture or destructive osseous lesion  Impression  1  Decompressed urinary bladder with Fortune catheter in place  There is now diffuse wall thickening of the urinary bladder with extensive perivesical fat stranding compatible with cystitis  Small volume of nondependent intravesical air may be related to  instrumentation or infection  2   No significant change in moderate left and mild right hydroureteronephrosis  There is increased perinephric and periureteric inflammation bilaterally, likely on the basis of infection  3   Large hiatal hernia  4   New small bilateral pleural effusions  The study was marked in [facility] for immediate notification  Workstation performed: [id number]    No results found for this or any previous visit        OBJECTIVE     Current Weight: Weight - Scale: 84 7 kg (186 lb 11 7 oz)  Vitals:    01/16/22 0829   BP: 116/58   Pulse: 83   Resp: 17   Temp: 98 3 °F (36 8 °C)   SpO2: 94%       Intake/Output Summary (Last 24 hours) at 1/16/2022 1031  Last data filed at 1/16/2022 0520  Gross per 24 hour   Intake 259 44 ml   Output 4665 ml   Net -4405 56 ml       PHYSICAL EXAMINATION     Physical Exam  Constitutional:       General: He is not in acute distress  Appearance: He is well-developed  HENT:      Head: Normocephalic and atraumatic  Mouth/Throat:      Mouth: Mucous membranes are moist    Eyes:      General: No scleral icterus  Conjunctiva/sclera: Conjunctivae normal    Neck:      Vascular: No JVD  Cardiovascular:      Rate and Rhythm: Normal rate  Heart sounds: Murmur heard  Pulmonary:      Effort: Pulmonary effort is normal  No respiratory distress  Breath sounds: No wheezing  Abdominal:      General: Bowel sounds are normal  There is no distension  Palpations: Abdomen is soft  Tenderness: There is no abdominal tenderness  Musculoskeletal:         General: No swelling  Normal range of motion  Cervical back: Normal range of motion and neck supple  No rigidity  Skin:     General: Skin is warm  Findings: No rash  Neurological:      General: No focal deficit present  Mental Status: He is alert and oriented to person, place, and time  Psychiatric:         Behavior: Behavior normal           PLAN / RECOMMENDATIONS      Acute kidney injury:  Likely because of obstructive uropathy as patient does have bilateral hydronephrosis  Etiology of hydronephrosis seems to be bladder tumor  And patient will require some sort of stent or percutaneous nephrostomy tube  Being monitored by urologist   At present he is diuresing quite well she will continue to monitor    Obstructive uropathy:  Likely secondary to bladder tumor    Volume overload status:  Much better with symptomatic coli and clinical examination  Will continue monitor without diuretic as he is doing much better    Bladder tumor:   Will be on chemotherapy and being monitored by oncologist as well as urologist    Coronary artery disease:  Status post bypass and present seems to be asymptomatic    Will continue to follow patient with you    Thank you for the consultation to participate in patient's care  I have personally discussed my plan with the referring physician  Alok Sarah MD  Nephrology  1/16/2022        Portions of the record may have been created with voice recognition software  Occasional wrong word or "sound a like" substitutions may have occurred due to the inherent limitations of voice recognition software  Read the chart carefully and recognize, using context, where substitutions have occurred  20-Jan-2022

## 2022-03-24 NOTE — ED PROVIDER NOTE - OBJECTIVE STATEMENT
76 year old male with PMHx of systolic CHF (EF 45%), anasarca, COPD - Severe Pulmonary HTN, HTN, dyslipidemia, CAD, a-fib/PPM on xarelto, arthritis, ESBL UTI c/o abdominal distention and bilateral LE swelling x 2 weeks. Pt reports that he was seen by the [facility] and was told to take some pill (unknown name) for the distention and LE swelling, but pt has not seen any improvement. Pt also c/o minor chest pain and SOB. Denies fever, n/v/d. No other injuries or complaints.

## 2022-03-24 NOTE — ED ADULT TRIAGE NOTE - CHIEF COMPLAINT QUOTE
Patient presents in ED with difficulty walking x two weeks. Patient was seen at [facility] two weeks ago for knee pain. Patient also complains of abdominal pain.

## 2022-03-24 NOTE — H&P ADULT - NSHPREVIEWOFSYSTEMS_GEN_ALL_CORE
REVIEW OF SYSTEMS:    CONSTITUTIONAL: No weakness, fevers or chills  EYES/ENT: No visual changes;  No vertigo or throat pain   NECK: No pain or stiffness  RESPIRATORY: +shortness of breath  CARDIOVASCULAR: +chest discomfort  GASTROINTESTINAL: +abd distention  GENITOURINARY: No dysuria, frequency or hematuria  NEUROLOGICAL: No numbness or weakness  SKIN: No itching, burning, rashes, or lesions . + LE edema  All other review of systems is negative unless indicated above

## 2022-03-24 NOTE — ED ADULT NURSE NOTE - CHIEF COMPLAINT QUOTE
Patient presents in ED with difficulty walking x two weeks. Patient was seen at [facility] two weeks ago for knee pain. Patient also complains of abdominal pain.

## 2022-03-25 DIAGNOSIS — I48.21 PERMANENT ATRIAL FIBRILLATION: ICD-10-CM

## 2022-03-25 DIAGNOSIS — I50.23 ACUTE ON CHRONIC SYSTOLIC (CONGESTIVE) HEART FAILURE: ICD-10-CM

## 2022-03-25 DIAGNOSIS — I34.0 NONRHEUMATIC MITRAL (VALVE) INSUFFICIENCY: ICD-10-CM

## 2022-03-25 LAB — TROPONIN I, HIGH SENSITIVITY RESULT: 193.98 NG/L — HIGH

## 2022-03-25 PROCEDURE — 93279 PRGRMG DEV EVAL PM/LDLS PM: CPT | Mod: 26

## 2022-03-25 PROCEDURE — 99223 1ST HOSP IP/OBS HIGH 75: CPT

## 2022-03-25 PROCEDURE — 93306 TTE W/DOPPLER COMPLETE: CPT | Mod: 26

## 2022-03-25 PROCEDURE — 99232 SBSQ HOSP IP/OBS MODERATE 35: CPT

## 2022-03-25 RX ORDER — LISINOPRIL 2.5 MG/1
10 TABLET ORAL DAILY
Refills: 0 | Status: DISCONTINUED | OUTPATIENT
Start: 2022-03-25 | End: 2022-03-30

## 2022-03-25 RX ADMIN — LISINOPRIL 10 MILLIGRAM(S): 2.5 TABLET ORAL at 15:26

## 2022-03-25 RX ADMIN — TAMSULOSIN HYDROCHLORIDE 0.4 MILLIGRAM(S): 0.4 CAPSULE ORAL at 21:24

## 2022-03-25 RX ADMIN — Medication 81 MILLIGRAM(S): at 10:15

## 2022-03-25 RX ADMIN — CARVEDILOL PHOSPHATE 6.25 MILLIGRAM(S): 80 CAPSULE, EXTENDED RELEASE ORAL at 10:14

## 2022-03-25 RX ADMIN — BUDESONIDE AND FORMOTEROL FUMARATE DIHYDRATE 2 PUFF(S): 160; 4.5 AEROSOL RESPIRATORY (INHALATION) at 09:38

## 2022-03-25 RX ADMIN — RIVAROXABAN 20 MILLIGRAM(S): KIT at 17:27

## 2022-03-25 RX ADMIN — ATORVASTATIN CALCIUM 20 MILLIGRAM(S): 80 TABLET, FILM COATED ORAL at 21:23

## 2022-03-25 RX ADMIN — SPIRONOLACTONE 25 MILLIGRAM(S): 25 TABLET, FILM COATED ORAL at 10:19

## 2022-03-25 RX ADMIN — FINASTERIDE 5 MILLIGRAM(S): 5 TABLET, FILM COATED ORAL at 10:14

## 2022-03-25 RX ADMIN — PANTOPRAZOLE SODIUM 40 MILLIGRAM(S): 20 TABLET, DELAYED RELEASE ORAL at 05:36

## 2022-03-25 RX ADMIN — Medication 40 MILLIGRAM(S): at 10:15

## 2022-03-25 RX ADMIN — Medication 40 MILLIGRAM(S): at 17:26

## 2022-03-25 RX ADMIN — CARVEDILOL PHOSPHATE 6.25 MILLIGRAM(S): 80 CAPSULE, EXTENDED RELEASE ORAL at 21:23

## 2022-03-25 NOTE — CONSULT NOTE ADULT - PROBLEM SELECTOR RECOMMENDATION 9
Mild exacerbation of mostly chronic systolic HF.  Continue to diurese with IV furosemide.  Entresto has been unaffordable -- he has been taking and tolerating lisinopril (I confirmed with his outpatient cardiologist, Dr Palla); continue Coreg.

## 2022-03-25 NOTE — PROGRESS NOTE ADULT - SUBJECTIVE AND OBJECTIVE BOX
CC: Leg edema  History of Present Illness:   76 year old male with PMHx of systolic CHF (EF 45%), anasarca, COPD - Severe Pulmonary HTN, HTN, dyslipidemia, CAD, a-fib/PPM on xarelto, arthritis, ESBL UTI c/o abdominal distention and bilateral LE swelling x 2 weeks. Was seen at [facility] and asked to start a new regimen to combat his symptoms however he endorses symptoms have not improved and have actually gotten worse. He also endorses some mild chest discomfort that is present intermittently Non radiating. No PND. No hemoptysis. No cough. No exertional component. But feels somewhat sob when he is exerting himself. Most recent cath 2020: non obstructive CAD with e/o severe pulmonary HTN and severe mitral regurg. EF45%.  ED course: TNI: 198. EKG: V paced. o2 100%. BNP 3100/ CXR: negative. REc'd 80 IV lasix, ASA and tylenol.    3/25: Pt offers no complaints. Diuresing well.  No fever, chills, n, v, sob, cp.    REVIEW OF SYSTEMS: All other review of systems is negative unless indicated above.    Physical Exam:   Vital Signs Last 24 Hrs  T(C): 36.3 (25 Mar 2022 08:49), Max: 36.6 (24 Mar 2022 18:46)  T(F): 97.4 (25 Mar 2022 08:49), Max: 97.8 (24 Mar 2022 18:46)  HR: 60 (25 Mar 2022 09:40) (59 - 60)  BP: 143/76 (25 Mar 2022 08:49) (124/95 - 160/70)  BP(mean): 107 (24 Mar 2022 18:46) (107 - 107)  RR: 18 (25 Mar 2022 08:49) (18 - 20)  SpO2: 95% (25 Mar 2022 08:49) (95% - 100%)      PHYSICAL EXAM:    Constitutional: NAD, awake and alert  HEENT: PERR, EOMI, Normal Hearing, MMM  Neck: Soft and supple, No LAD, No JVD  Respiratory: Breath sounds are clear bilaterally, No wheezing, rales or rhonchi  Cardiovascular: S1 and S2, regular rate and rhythm, no Murmurs, gallops or rubs  Gastrointestinal: Bowel Sounds present, soft, nontender, nondistended, no guarding, no rebound  Extremities: +LE edema  Vascular: 2+ peripheral pulses  Neurological: A/O x 3, no focal deficits  Musculoskeletal: 5/5 strength b/l upper and lower extremities  Skin: No rashes    MEDICATIONS  (STANDING):  aspirin  chewable 81 milliGRAM(s) Oral daily  atorvastatin 20 milliGRAM(s) Oral at bedtime  budesonide  80 MICROgram(s)/formoterol 4.5 MICROgram(s) Inhaler 2 Puff(s) Inhalation two times a day  carvedilol 6.25 milliGRAM(s) Oral every 12 hours  finasteride 5 milliGRAM(s) Oral daily  furosemide   Injectable 40 milliGRAM(s) IV Push every 12 hours  lisinopril 10 milliGRAM(s) Oral daily  pantoprazole    Tablet 40 milliGRAM(s) Oral before breakfast  rivaroxaban 20 milliGRAM(s) Oral with dinner  spironolactone 25 milliGRAM(s) Oral daily  tamsulosin 0.4 milliGRAM(s) Oral at bedtime    MEDICATIONS  (PRN):  acetaminophen     Tablet .. 650 milliGRAM(s) Oral every 6 hours PRN Mild Pain (1 - 3)  ALBUTerol    90 MICROgram(s) HFA Inhaler 2 Puff(s) Inhalation every 6 hours PRN Bronchospasm                                12.6   7.31  )-----------( 165      ( 24 Mar 2022 15:09 )             41.8     03-24    141  |  108  |  18  ----------------------------<  105<H>  4.3   |  30  |  1.10    Ca    8.5      24 Mar 2022 15:09    TPro  6.7  /  Alb  3.1<L>  /  TBili  1.5<H>  /  DBili  x   /  AST  31  /  ALT  25  /  AlkPhos  176<H>  03-24    CAPILLARY BLOOD GLUCOSE        LIVER FUNCTIONS - ( 24 Mar 2022 15:09 )  Alb: 3.1 g/dL / Pro: 6.7 gm/dL / ALK PHOS: 176 U/L / ALT: 25 U/L / AST: 31 U/L / GGT: x           PT/INR - ( 24 Mar 2022 15:09 )   PT: 14.2 sec;   INR: 1.22 ratio         PTT - ( 24 Mar 2022 15:09 )  PTT:37.9 sec  Urinalysis Basic - ( 24 Mar 2022 21:00 )    Color: Yellow / Appearance: Clear / S.010 / pH: x  Gluc: x / Ketone: Negative  / Bili: Negative / Urobili: Negative   Blood: x / Protein: Negative / Nitrite: Negative   Leuk Esterase: Negative / RBC: x / WBC x   Sq Epi: x / Non Sq Epi: x / Bacteria: x        Assessment and Plan:  76 year old male with PMHx of systolic CHF (EF 45%), anasarca, COPD - Severe Pulmonary HTN, HTN, dyslipidemia, CAD, a-fib/PPM on xarelto, arthritis, ESBL UTI c/o abdominal distention and worsening bilateral LE swelling x 2 weeks.     Was seen at [facility] and asked to start a new regimen to combat his symptoms however he endorses symptoms have not improved and have actually gotten worse. He also endorses some mild chest discomfort that is present intermittently Non radiating. No PND. No hemoptysis. No cough. No exertional component. But feels somewhat sob when he is exerting himself. Most recent cath 2020: non obstructive CAD with e/o severe pulmonary HTN and severe mitral regurg. EF45%    ED course: TNI: 198. EKG: V paced. o2 100%. BNP 3100/ CXR: negative. REc'd 80 IV lasix, ASA and tylenol      Acute on chronic combined systolic and diastolic CHF / valvulopathy / congestive hepatopathy w/ ascites:   -c/w IV diuresis  -continue tele monitoring  -f/u official echo report  -cardio following  - Strict I/Os  - Cont GDMT - pt cannot start ACE-I d/t angioedema  - cannot afford entresto   - Low Na diet/Fluid restict      #Mild elevation in troponin: Due to demand in setting of acute CH  - no chest pain  - Cont ASA      #Chronic Afib/HTN/HLD  - resume xarelto/BB  - statin  -PPM interrogated chronic afib v paced      #COPD/Severe PHTN:  - DAISY/LABA      DVT: Xarelto    Pt appears non adherent to follow up visits per chart review. Must be educated on diet, medication and follow up adherence

## 2022-03-25 NOTE — PROCEDURE NOTE - INTERROGATION NOTE: COMMENTS
Normal functioning single chamber PPM with battery longevity 6.5years. Pt is pacer dependent with V-pacing 99.7%. Stored data revealed 7 beats of NSVT on 11/29/21. Mode changed to VVIR 60/130.

## 2022-03-25 NOTE — PHARMACOTHERAPY INTERVENTION NOTE - COMMENTS
Medication reconciliation completed.  Reviewed Medication list and confirmed med allergies with patient; confirmed with Dr. [facility]x.  Pt has medication from outside the [place], Sertal Compuesto (Propinox HCl 10mg - Lysine Clonixinate 125mg)
Recommended restarting lisinopril as patient tolerated it at home

## 2022-03-26 DIAGNOSIS — I27.20 PULMONARY HYPERTENSION, UNSPECIFIED: ICD-10-CM

## 2022-03-26 LAB
ALBUMIN SERPL ELPH-MCNC: 3.1 G/DL — LOW (ref 3.3–5)
ALP SERPL-CCNC: 168 U/L — HIGH (ref 40–120)
ALT FLD-CCNC: 23 U/L — SIGNIFICANT CHANGE UP (ref 12–78)
ANION GAP SERPL CALC-SCNC: 4 MMOL/L — LOW (ref 5–17)
AST SERPL-CCNC: 26 U/L — SIGNIFICANT CHANGE UP (ref 15–37)
BILIRUB SERPL-MCNC: 1.4 MG/DL — HIGH (ref 0.2–1.2)
BUN SERPL-MCNC: 20 MG/DL — SIGNIFICANT CHANGE UP (ref 7–23)
CALCIUM SERPL-MCNC: 8.8 MG/DL — SIGNIFICANT CHANGE UP (ref 8.5–10.1)
CHLORIDE SERPL-SCNC: 102 MMOL/L — SIGNIFICANT CHANGE UP (ref 96–108)
CO2 SERPL-SCNC: 33 MMOL/L — HIGH (ref 22–31)
CREAT SERPL-MCNC: 1.11 MG/DL — SIGNIFICANT CHANGE UP (ref 0.5–1.3)
CULTURE RESULTS: SIGNIFICANT CHANGE UP
EGFR: 68 ML/MIN/1.73M2 — SIGNIFICANT CHANGE UP
GLUCOSE SERPL-MCNC: 105 MG/DL — HIGH (ref 70–99)
NT-PROBNP SERPL-SCNC: 1957 PG/ML — HIGH (ref 0–450)
POTASSIUM SERPL-MCNC: 3.8 MMOL/L — SIGNIFICANT CHANGE UP (ref 3.5–5.3)
POTASSIUM SERPL-SCNC: 3.8 MMOL/L — SIGNIFICANT CHANGE UP (ref 3.5–5.3)
PROT SERPL-MCNC: 6.8 GM/DL — SIGNIFICANT CHANGE UP (ref 6–8.3)
SODIUM SERPL-SCNC: 139 MMOL/L — SIGNIFICANT CHANGE UP (ref 135–145)
SPECIMEN SOURCE: SIGNIFICANT CHANGE UP

## 2022-03-26 PROCEDURE — 99233 SBSQ HOSP IP/OBS HIGH 50: CPT

## 2022-03-26 PROCEDURE — 93010 ELECTROCARDIOGRAM REPORT: CPT

## 2022-03-26 RX ADMIN — ATORVASTATIN CALCIUM 20 MILLIGRAM(S): 80 TABLET, FILM COATED ORAL at 21:29

## 2022-03-26 RX ADMIN — Medication 81 MILLIGRAM(S): at 10:23

## 2022-03-26 RX ADMIN — BUDESONIDE AND FORMOTEROL FUMARATE DIHYDRATE 2 PUFF(S): 160; 4.5 AEROSOL RESPIRATORY (INHALATION) at 21:33

## 2022-03-26 RX ADMIN — SPIRONOLACTONE 25 MILLIGRAM(S): 25 TABLET, FILM COATED ORAL at 10:22

## 2022-03-26 RX ADMIN — BUDESONIDE AND FORMOTEROL FUMARATE DIHYDRATE 2 PUFF(S): 160; 4.5 AEROSOL RESPIRATORY (INHALATION) at 09:07

## 2022-03-26 RX ADMIN — CARVEDILOL PHOSPHATE 6.25 MILLIGRAM(S): 80 CAPSULE, EXTENDED RELEASE ORAL at 10:23

## 2022-03-26 RX ADMIN — TAMSULOSIN HYDROCHLORIDE 0.4 MILLIGRAM(S): 0.4 CAPSULE ORAL at 21:29

## 2022-03-26 RX ADMIN — LISINOPRIL 10 MILLIGRAM(S): 2.5 TABLET ORAL at 10:22

## 2022-03-26 RX ADMIN — FINASTERIDE 5 MILLIGRAM(S): 5 TABLET, FILM COATED ORAL at 10:23

## 2022-03-26 RX ADMIN — RIVAROXABAN 20 MILLIGRAM(S): KIT at 17:56

## 2022-03-26 RX ADMIN — CARVEDILOL PHOSPHATE 6.25 MILLIGRAM(S): 80 CAPSULE, EXTENDED RELEASE ORAL at 21:28

## 2022-03-26 RX ADMIN — Medication 40 MILLIGRAM(S): at 10:23

## 2022-03-26 RX ADMIN — PANTOPRAZOLE SODIUM 40 MILLIGRAM(S): 20 TABLET, DELAYED RELEASE ORAL at 06:18

## 2022-03-26 RX ADMIN — Medication 40 MILLIGRAM(S): at 17:56

## 2022-03-26 NOTE — PROGRESS NOTE ADULT - SUBJECTIVE AND OBJECTIVE BOX
CC: Leg edema  History of Present Illness:   76 year old male with PMHx of systolic CHF (EF 45%), anasarca, COPD - Severe Pulmonary HTN, HTN, dyslipidemia, CAD, a-fib/PPM on xarelto, arthritis, ESBL UTI c/o abdominal distention and bilateral LE swelling x 2 weeks. Was seen at [facility] and asked to start a new regimen to combat his symptoms however he endorses symptoms have not improved and have actually gotten worse. He also endorses some mild chest discomfort that is present intermittently Non radiating. No PND. No hemoptysis. No cough. No exertional component. But feels somewhat sob when he is exerting himself. Most recent cath 2020: non obstructive CAD with e/o severe pulmonary HTN and severe mitral regurg. EF45%.  ED course: TNI: 198. EKG: V paced. o2 100%. BNP 3100/ CXR: negative. REc'd 80 IV lasix, ASA and tylenol.    3/25:Leg swelling improved w diuresis. Denies sob.    REVIEW OF SYSTEMS: All other review of systems is negative unless indicated above.    Physical Exam:   Vital Signs Last 24 Hrs  T(C): 36.3 (25 Mar 2022 08:49), Max: 36.6 (24 Mar 2022 18:46)  T(F): 97.4 (25 Mar 2022 08:49), Max: 97.8 (24 Mar 2022 18:46)  HR: 60 (25 Mar 2022 09:40) (59 - 60)  BP: 143/76 (25 Mar 2022 08:49) (124/95 - 160/70)  BP(mean): 107 (24 Mar 2022 18:46) (107 - 107)  RR: 18 (25 Mar 2022 08:49) (18 - 20)  SpO2: 95% (25 Mar 2022 08:49) (95% - 100%)      PHYSICAL EXAM:    Constitutional: NAD, awake and alert  HEENT: PERR, EOMI, Normal Hearing, MMM  Neck: Soft and supple, No LAD, No JVD  Respiratory: Breath sounds are clear bilaterally, No wheezing, rales or rhonchi  Cardiovascular: S1 and S2, regular rate and rhythm, no Murmurs, gallops or rubs  Gastrointestinal: Bowel Sounds present, soft, nontender, nondistended, no guarding, no rebound  Extremities: +LE edema  Vascular: 2+ peripheral pulses  Neurological: A/O x 3, no focal deficits  Musculoskeletal: 5/5 strength b/l upper and lower extremities  Skin: No rashes    MEDICATIONS  (STANDING):  aspirin  chewable 81 milliGRAM(s) Oral daily  atorvastatin 20 milliGRAM(s) Oral at bedtime  budesonide  80 MICROgram(s)/formoterol 4.5 MICROgram(s) Inhaler 2 Puff(s) Inhalation two times a day  carvedilol 6.25 milliGRAM(s) Oral every 12 hours  finasteride 5 milliGRAM(s) Oral daily  furosemide   Injectable 40 milliGRAM(s) IV Push every 12 hours  lisinopril 10 milliGRAM(s) Oral daily  pantoprazole    Tablet 40 milliGRAM(s) Oral before breakfast  rivaroxaban 20 milliGRAM(s) Oral with dinner  spironolactone 25 milliGRAM(s) Oral daily  tamsulosin 0.4 milliGRAM(s) Oral at bedtime    MEDICATIONS  (PRN):  acetaminophen     Tablet .. 650 milliGRAM(s) Oral every 6 hours PRN Mild Pain (1 - 3)  ALBUTerol    90 MICROgram(s) HFA Inhaler 2 Puff(s) Inhalation every 6 hours PRN Bronchospasm                                12.6   7.31  )-----------( 165      ( 24 Mar 2022 15:09 )             41.8        139  |  102  |  20  ----------------------------<  105<H>  3.8   |  33<H>  |  1.11    Ca    8.8      26 Mar 2022 08:53    TPro  6.8  /  Alb  3.1<L>  /  TBili  1.4<H>  /  DBili  x   /  AST  26  /  ALT  23  /  AlkPhos  168<H>  03-26          PT/INR - ( 24 Mar 2022 15:09 )   PT: 14.2 sec;   INR: 1.22 ratio         PTT - ( 24 Mar 2022 15:09 )  PTT:37.9 sec  Urinalysis Basic - ( 24 Mar 2022 21:00 )    Color: Yellow / Appearance: Clear / S.010 / pH: x  Gluc: x / Ketone: Negative  / Bili: Negative / Urobili: Negative   Blood: x / Protein: Negative / Nitrite: Negative   Leuk Esterase: Negative / RBC: x / WBC x   Sq Epi: x / Non Sq Epi: x / Bacteria: x        Assessment and Plan:  76 year old male with PMHx of systolic CHF (EF 45%), anasarca, COPD - Severe Pulmonary HTN, HTN, dyslipidemia, CAD, a-fib/PPM on xarelto, arthritis, ESBL UTI c/o abdominal distention and worsening bilateral LE swelling x 2 weeks.     Was seen at [facility] and asked to start a new regimen to combat his symptoms however he endorses symptoms have not improved and have actually gotten worse. He also endorses some mild chest discomfort that is present intermittently Non radiating. No PND. No hemoptysis. No cough. No exertional component. But feels somewhat sob when he is exerting himself. Most recent cath 2020: non obstructive CAD with e/o severe pulmonary HTN and severe mitral regurg. EF45%    ED course: TNI: 198. EKG: V paced. o2 100%. BNP 3100/ CXR: negative. REc'd 80 IV lasix, ASA and tylenol      Acute on chronic combined systolic and diastolic CHF / valvulopathy / congestive hepatopathy w/ ascites:   -c/w IV diuresis  -continue tele monitoring  -f/u official echo report  -cardio following  - Strict I/Os  - Cont GDMT - pt cannot start ACE-I d/t angioedema  - cannot afford entresto   - Low Na diet/Fluid restict      #Mild elevation in troponin: Due to demand in setting of acute CH  - no chest pain  - Cont ASA      #Chronic Afib/HTN/HLD  - resume xarelto/BB  - statin  -PPM interrogated chronic afib v paced      #COPD/Severe PHTN:  - DAISY/LABA      DVT: Xarelto    labs in am           CC: Leg edema  History of Present Illness:   76 year old male with PMHx of systolic CHF (EF 45%), anasarca, COPD - Severe Pulmonary HTN, HTN, dyslipidemia, CAD, a-fib/PPM on xarelto, arthritis, ESBL UTI c/o abdominal distention and bilateral LE swelling x 2 weeks. Was seen at [facility] and asked to start a new regimen to combat his symptoms however he endorses symptoms have not improved and have actually gotten worse. He also endorses some mild chest discomfort that is present intermittently Non radiating. No PND. No hemoptysis. No cough. No exertional component. But feels somewhat sob when he is exerting himself. Most recent cath 2020: non obstructive CAD with e/o severe pulmonary HTN and severe mitral regurg. EF45%.  ED course: TNI: 198. EKG: V paced. o2 100%. BNP 3100/ CXR: negative. REc'd 80 IV lasix, ASA and tylenol.    3/25:Leg swelling improved w diuresis. Denies sob.    REVIEW OF SYSTEMS: All other review of systems is negative unless indicated above.    Physical Exam:   Vital Signs Last 24 Hrs  T(C): 36.3 (25 Mar 2022 08:49), Max: 36.6 (24 Mar 2022 18:46)  T(F): 97.4 (25 Mar 2022 08:49), Max: 97.8 (24 Mar 2022 18:46)  HR: 60 (25 Mar 2022 09:40) (59 - 60)  BP: 143/76 (25 Mar 2022 08:49) (124/95 - 160/70)  BP(mean): 107 (24 Mar 2022 18:46) (107 - 107)  RR: 18 (25 Mar 2022 08:49) (18 - 20)  SpO2: 95% (25 Mar 2022 08:49) (95% - 100%)      PHYSICAL EXAM:    Constitutional: NAD, awake and alert  HEENT: PERR, EOMI, Normal Hearing, MMM  Neck: Soft and supple, No LAD, No JVD  Respiratory: Breath sounds are clear bilaterally, No wheezing, rales or rhonchi  Cardiovascular: S1 and S2, regular rate and rhythm, no Murmurs, gallops or rubs  Gastrointestinal: Bowel Sounds present, soft, nontender, nondistended, no guarding, no rebound  Extremities: +LE edema  Vascular: 2+ peripheral pulses  Neurological: A/O x 3, no focal deficits  Musculoskeletal: 5/5 strength b/l upper and lower extremities  Skin: No rashes    MEDICATIONS  (STANDING):  aspirin  chewable 81 milliGRAM(s) Oral daily  atorvastatin 20 milliGRAM(s) Oral at bedtime  budesonide  80 MICROgram(s)/formoterol 4.5 MICROgram(s) Inhaler 2 Puff(s) Inhalation two times a day  carvedilol 6.25 milliGRAM(s) Oral every 12 hours  finasteride 5 milliGRAM(s) Oral daily  furosemide   Injectable 40 milliGRAM(s) IV Push every 12 hours  lisinopril 10 milliGRAM(s) Oral daily  pantoprazole    Tablet 40 milliGRAM(s) Oral before breakfast  rivaroxaban 20 milliGRAM(s) Oral with dinner  spironolactone 25 milliGRAM(s) Oral daily  tamsulosin 0.4 milliGRAM(s) Oral at bedtime    MEDICATIONS  (PRN):  acetaminophen     Tablet .. 650 milliGRAM(s) Oral every 6 hours PRN Mild Pain (1 - 3)  ALBUTerol    90 MICROgram(s) HFA Inhaler 2 Puff(s) Inhalation every 6 hours PRN Bronchospasm                                12.6   7.31  )-----------( 165      ( 24 Mar 2022 15:09 )             41.8        139  |  102  |  20  ----------------------------<  105<H>  3.8   |  33<H>  |  1.11    Ca    8.8      26 Mar 2022 08:53    TPro  6.8  /  Alb  3.1<L>  /  TBili  1.4<H>  /  DBili  x   /  AST  26  /  ALT  23  /  AlkPhos  168<H>  03-26          PT/INR - ( 24 Mar 2022 15:09 )   PT: 14.2 sec;   INR: 1.22 ratio         PTT - ( 24 Mar 2022 15:09 )  PTT:37.9 sec  Urinalysis Basic - ( 24 Mar 2022 21:00 )    Color: Yellow / Appearance: Clear / S.010 / pH: x  Gluc: x / Ketone: Negative  / Bili: Negative / Urobili: Negative   Blood: x / Protein: Negative / Nitrite: Negative   Leuk Esterase: Negative / RBC: x / WBC x   Sq Epi: x / Non Sq Epi: x / Bacteria: x        Assessment and Plan:  76 year old male with PMHx of systolic CHF (EF 45%), anasarca, COPD - Severe Pulmonary HTN, HTN, dyslipidemia, CAD, a-fib/PPM on xarelto, arthritis, ESBL UTI c/o abdominal distention and worsening bilateral LE swelling x 2 weeks.     Was seen at [facility] and asked to start a new regimen to combat his symptoms however he endorses symptoms have not improved and have actually gotten worse. He also endorses some mild chest discomfort that is present intermittently Non radiating. No PND. No hemoptysis. No cough. No exertional component. But feels somewhat sob when he is exerting himself. Most recent cath 2020: non obstructive CAD with e/o severe pulmonary HTN and severe mitral regurg. EF45%    ED course: TNI: 198. EKG: V paced. o2 100%. BNP 3100/ CXR: negative. REc'd 80 IV lasix, ASA and tylenol      Acute on chronic combined systolic and diastolic CHF / valvulopathy / congestive hepatopathy w/ ascites:   -c/w IV diuresis  -continue tele monitoring  -f/u official echo report  -cardio following  - Strict I/Os  - Cont GDMT - pt cannot start ACE-I d/t angioedema  - cannot afford entresto   - Low Na diet/Fluid restict  - BNP improved      #Mild elevation in troponin: Due to demand in setting of acute CH  - no chest pain  - Cont ASA      #Chronic Afib/HTN/HLD  - resume xarelto/BB  - statin  -PPM interrogated chronic afib v paced      #COPD/Severe PHTN:  - DAISY/LABA      DVT: Xarelto    labs in am

## 2022-03-26 NOTE — PROGRESS NOTE ADULT - SUBJECTIVE AND OBJECTIVE BOX
REASON FOR VISIT:  CHF, Edema    HPI:  76 year old man with a history of permanent atrial fibrillation, non-obstructive CAD, CRT-P, nonischemic cardiomyopathy, mitral regurgitation, HTN, pulmonary hypertension, COPD, anasarca who presented to the ED with complaints of bilateral leg edema associated with difficulty ambulating; suboptimal outpatient adherence has been suspected.    3/26/22:  Overall feeling better; no dyspnea; persistent leg edema/discomfort    MEDICATIONS  (STANDING):  aspirin  chewable 81 milliGRAM(s) Oral daily  atorvastatin 20 milliGRAM(s) Oral at bedtime  budesonide  80 MICROgram(s)/formoterol 4.5 MICROgram(s) Inhaler 2 Puff(s) Inhalation two times a day  carvedilol 6.25 milliGRAM(s) Oral every 12 hours  finasteride 5 milliGRAM(s) Oral daily  furosemide   Injectable 40 milliGRAM(s) IV Push every 12 hours  lisinopril 10 milliGRAM(s) Oral daily  pantoprazole    Tablet 40 milliGRAM(s) Oral before breakfast  rivaroxaban 20 milliGRAM(s) Oral with dinner  spironolactone 25 milliGRAM(s) Oral daily  tamsulosin 0.4 milliGRAM(s) Oral at bedtime    Vital Signs Last 24 Hrs  T(C): 36.7 (26 Mar 2022 08:59), Max: 36.7 (26 Mar 2022 08:59)  T(F): 98 (26 Mar 2022 08:59), Max: 98 (26 Mar 2022 08:59)  HR: 60 (26 Mar 2022 08:59) (60 - 65)  BP: 140/90 (26 Mar 2022 08:59) (123/63 - 140/90)  RR: 18 (26 Mar 2022 08:59) (18 - 18)  SpO2: 97% (26 Mar 2022 08:59) (93% - 97%)    PHYSICAL EXAM:  Constitutional: NAD, awake and alert, supine/flat in bed and breathing comfortably  HEENT:  No oral cyanosis.  Pulmonary: Non-labored, breath sounds are clear bilaterally, No wheezing, rales or rhonchi  Cardiovascular: Regular (paced on tele), systolic murmur  Gastrointestinal: Bowel Sounds present, soft, nontender.   Lymph: + leg edema (chronic-appearing).     LABS:                 12.6   7.31  )-----------( 165      ( 24 Mar 2022 15:09 )             41.8     141  |  108  |  18  ----------------------------<  105<H>  4.3   |  30  |  1.10    Ca    8.5      24 Mar 2022 15:09    TPro  6.7  /  Alb  3.1<L>  /  TBili  1.5<H>  /  DBili  x   /  AST  31  /  ALT  25  /  AlkPhos  176<H>  03-24    TroponinI hsT: <-193.98, <-185.45, <-198.24      12 Lead ECG (03.24.22 @ 14:21):  Ventricular-paced rhythm with occasional Premature ventricular complexes 62/min.  Wide QRS rhythm.  When compared with ECG of 29-OCT-2020 06:57, Similar to prior ekg. Premature ventricular complexes are now Present    US Abdomen Limited (03.24.22 @ 19:32):  Moderate volume ascites. Right pleural effusion.    Xray Chest 1 View-PORTABLE IMMEDIATE (Xray Chest 1 View-PORTABLE IMMEDIATE .) (03.24.22 @ 15:41):  Gross heart enlargement, left-sided pacemaker, and Micra pacemaker maker again noted.  This slight left perihilar infiltrate again noted.  Chest is similar to October 27, 2020.    TTE Echo Complete w/o Contrast w/ Doppler (08.28.20 @ 21:42):   Moderate to severe (3+)mitral regurgitation.   Mild to Moderate aortic regurgitation.   Mild pulmonary hypertension.   Mild (1+) tricuspid valveregurgitation is present.   Estimated left ventricular ejection fraction is 45 %. The left ventricle cavity is mildly dilated. The left ventricle is normal in wall thickness. Mild, diffuse hypokinesis of the left ventricle is present.   The right ventricle is normal in size, wall thickness, wall motion, and contractility based on TAPSE.   A device wire is seen in the RV and RA.    TTE Echo Complete w/o Contrast w/ Doppler (03.25.22 @ 12:36):   Mild to Moderate mitral regurgitation.   Mild aortic sclerosis is present with normal valvular opening. Mild (1+) aortic regurgitation is present.   Severe (4+) tricuspid valve regurgitation.   Severe pulmonary hypertension.   The left atrium is severely dilated.   The left ventricle size is normal.   The apex appears hypokinetic. paradoxical septal motion Estimated left ventricular ejection fraction is 45-50 %.   IVC is dilated and not collapsing with inspiration.   A PFO vs ASD is noted with left to right and right to left shunt.   Ascites is noted.

## 2022-03-27 PROCEDURE — 99232 SBSQ HOSP IP/OBS MODERATE 35: CPT

## 2022-03-27 RX ADMIN — RIVAROXABAN 20 MILLIGRAM(S): KIT at 17:45

## 2022-03-27 RX ADMIN — SPIRONOLACTONE 25 MILLIGRAM(S): 25 TABLET, FILM COATED ORAL at 09:45

## 2022-03-27 RX ADMIN — Medication 40 MILLIGRAM(S): at 09:45

## 2022-03-27 RX ADMIN — PANTOPRAZOLE SODIUM 40 MILLIGRAM(S): 20 TABLET, DELAYED RELEASE ORAL at 05:35

## 2022-03-27 RX ADMIN — Medication 81 MILLIGRAM(S): at 09:44

## 2022-03-27 RX ADMIN — CARVEDILOL PHOSPHATE 6.25 MILLIGRAM(S): 80 CAPSULE, EXTENDED RELEASE ORAL at 09:44

## 2022-03-27 RX ADMIN — FINASTERIDE 5 MILLIGRAM(S): 5 TABLET, FILM COATED ORAL at 09:44

## 2022-03-27 RX ADMIN — BUDESONIDE AND FORMOTEROL FUMARATE DIHYDRATE 2 PUFF(S): 160; 4.5 AEROSOL RESPIRATORY (INHALATION) at 21:16

## 2022-03-27 RX ADMIN — Medication 40 MILLIGRAM(S): at 17:45

## 2022-03-27 RX ADMIN — CARVEDILOL PHOSPHATE 6.25 MILLIGRAM(S): 80 CAPSULE, EXTENDED RELEASE ORAL at 21:03

## 2022-03-27 RX ADMIN — BUDESONIDE AND FORMOTEROL FUMARATE DIHYDRATE 2 PUFF(S): 160; 4.5 AEROSOL RESPIRATORY (INHALATION) at 10:11

## 2022-03-27 RX ADMIN — LISINOPRIL 10 MILLIGRAM(S): 2.5 TABLET ORAL at 09:44

## 2022-03-27 RX ADMIN — ATORVASTATIN CALCIUM 20 MILLIGRAM(S): 80 TABLET, FILM COATED ORAL at 21:03

## 2022-03-27 RX ADMIN — TAMSULOSIN HYDROCHLORIDE 0.4 MILLIGRAM(S): 0.4 CAPSULE ORAL at 21:03

## 2022-03-27 NOTE — PROGRESS NOTE ADULT - ASSESSMENT
76 year old male with PMHx of systolic CHF (EF 45%), anasarca, COPD - Severe Pulmonary HTN, HTN, dyslipidemia, CAD, a-fib/PPM on xarelto, arthritis, ESBL UT admitted for:     Acute on chronic combined systolic and diastolic CHF / R sided HF/ valvulopathy /  Severe Pulm HTN/congestive hepatopathy w/ ascites:   - BNP elevated, but trending down   -c/w IV diuresis, aldactone   - Cont GDMT - pt cannot start ACE-I d/t angioedema  - cannot afford entresto   -cardio following  - Strict I/Os  - Low Na diet/Fluid restriction   - ECHO reviewed: EF 45-50%, +1 MR, +4TR, Severe Pulm HTN, ASD vs PFO. Will need to d/w cardio  ASD  and shunt   - repeat abd sono to reevaluate ascites   - Needs cardio f/u     #Mild elevation in troponin: Due to demand in setting of acute HF   - no chest pain  - Cont ASA, carvedilol       #Chronic Afib. S/p CRT-P  -c/w xarelto/BB  - statin  -PPM interrogated chronic afib v paced      #COPD/Severe PHTN:  - DAISY/LABA      DVT: Xarelto    Dispo; Check pulse ox on ambulation, C/w IV lasix, labs in am

## 2022-03-27 NOTE — PROGRESS NOTE ADULT - SUBJECTIVE AND OBJECTIVE BOX
CC: LE edema/Abd distention (27 Mar 2022 11:10)    HPI: 76 year old male with PMHx of systolic CHF (EF 45%), anasarca, COPD - Severe Pulmonary HTN, HTN, dyslipidemia, CAD, a-fib/PPM on xarelto, arthritis, ESBL UTI c/o abdominal distention and bilateral LE swelling x 2 weeks. Was seen at [facility] and asked to start a new regimen to combat his symptoms however he endorses symptoms have not improved and have actually gotten worse. He also endorses some mild chest discomfort that is present intermittently Non radiating. No PND. No hemoptysis. No cough. No exertional component. But feels somewhat sob when he is exerting himself. Most recent cath 8/2020: non obstructive CAD with e/o severe pulmonary HTN and severe mitral regurg. EF45%    ED course: TNI: 198. EKG: V paced. o2 100%. BNP 3100/ CXR: negative. REc'd 80 IV lasix, ASA and tylenol      INTERVAL HPI/ OVERNIGHT EVENTS: chart reviewed, Pt was seen and examined, reports breathing and leg edema better, still some abd distention and fatigue with ambulation which is not new but got worse lately     Vital Signs Last 24 Hrs  T(C): 36.3 (27 Mar 2022 15:57), Max: 36.7 (27 Mar 2022 04:11)  T(F): 97.3 (27 Mar 2022 15:57), Max: 98.1 (27 Mar 2022 04:11)  HR: 66 (27 Mar 2022 17:42) (62 - 71)  BP: 130/71 (27 Mar 2022 17:42) (109/51 - 134/82)  BP(mean): 89 (27 Mar 2022 17:42) (89 - 89)  RR: 17 (27 Mar 2022 15:57) (17 - 19)  SpO2: 95% (27 Mar 2022 15:57) (93% - 99%)      REVIEW OF SYSTEMS:  All other review of systems is negative unless indicated above.      PHYSICAL EXAM:  General: Well developed;  in no acute distress, on RA  Eyes: PERRLA, EOMI; conjunctiva and sclera clear  Head: Normocephalic; atraumatic  ENMT: No nasal discharge; airway clear  Neck: Supple; non tender; no masses  Respiratory: Decreased BS at bases, No wheezes, rales or rhonchi  Cardiovascular: Regular rate and rhythm. S1 and S2 Normal  Gastrointestinal: Soft non-tender, distended; Normal bowel sounds  Genitourinary: No  suprapubic  tenderness  Extremities: Normal range of motion, min  edema  Vascular: Peripheral pulses palpable 2+ bilaterally, b/l le varicosities   Neurological: Alert and oriented x4  Musculoskeletal: Normal muscle tone and strength   Psychiatric: Cooperative and appropriate      LABS:     26 Mar 2022 08:53    139    |  102    |  20     ----------------------------<  105    3.8     |  33     |  1.11     Ca    8.8        26 Mar 2022 08:53    TPro  6.8    /  Alb  3.1    /  TBili  1.4    /  DBili  x      /  AST  26     /  ALT  23     /  AlkPhos  168    26 Mar 2022 08:53    LIVER FUNCTIONS - ( 26 Mar 2022 08:53 )  Alb: 3.1 g/dL / Pro: 6.8 gm/dL / ALK PHOS: 168 U/L / ALT: 23 U/L / AST: 26 U/L / GGT: x               MEDICATIONS  (STANDING):  aspirin  chewable 81 milliGRAM(s) Oral daily  atorvastatin 20 milliGRAM(s) Oral at bedtime  budesonide  80 MICROgram(s)/formoterol 4.5 MICROgram(s) Inhaler 2 Puff(s) Inhalation two times a day  carvedilol 6.25 milliGRAM(s) Oral every 12 hours  finasteride 5 milliGRAM(s) Oral daily  furosemide   Injectable 40 milliGRAM(s) IV Push every 12 hours  lisinopril 10 milliGRAM(s) Oral daily  pantoprazole    Tablet 40 milliGRAM(s) Oral before breakfast  rivaroxaban 20 milliGRAM(s) Oral with dinner  spironolactone 25 milliGRAM(s) Oral daily  tamsulosin 0.4 milliGRAM(s) Oral at bedtime    MEDICATIONS  (PRN):  acetaminophen     Tablet .. 650 milliGRAM(s) Oral every 6 hours PRN Mild Pain (1 - 3)  ALBUTerol    90 MICROgram(s) HFA Inhaler 2 Puff(s) Inhalation every 6 hours PRN Bronchospasm      RADIOLOGY & ADDITIONAL TESTS:    ACC: [id number] EXAM:  ECHO TTE WO CON COMP W DOPP                        PROCEDURE DATE:  03/25/2022        < from: TTE Echo Complete w/o Contrast w/ Doppler (03.25.22 @ 12:36) >   Summary     Mild mitral annular calcification is present.   The mitral valve leaflets appear thickened.   Mild to Moderate mitral regurgitation is present.   Mild aortic sclerosis is present with normal valvular opening.   Mild (1+) aortic regurgitation is present.   The tricuspid valve leaflets are thin and pliable; valve motion is  normal.   Severe (4+) tricuspid valve regurgitation is present.   Severe pulmonary hypertension.   The left atrium is severely dilated.   The left ventricle size is normal.   The apex appears hypokinetic. paradoxical septal motion   Estimated left ventricular ejection fraction is 45-50 %.   IVC is dilated and not collapsing with inspiration.   A PFO vs ASD is noted with left to right and right to left shunt.   Ascites is noted.

## 2022-03-27 NOTE — PROGRESS NOTE ADULT - SUBJECTIVE AND OBJECTIVE BOX
REASON FOR VISIT:  CHF, Edema    HPI:  76 year old man with a history of permanent atrial fibrillation, non-obstructive CAD, CRT-P, nonischemic cardiomyopathy, mitral regurgitation, HTN, pulmonary hypertension, COPD, anasarca who presented to the ED with complaints of bilateral leg edema associated with difficulty ambulating; suboptimal outpatient adherence has been suspected.    3/26/22:  Overall feeling better; no dyspnea; persistent leg edema/discomfort  3/27/22:  Turkish speaking only (  ID# [id number]) pt is thankful for all of the care he has received Denies SOB, Chest pain. Confirmed that Pt is not allergic to any medications, currently on ACE without adverse reactions.  Continues to c/o LE pain and edema with difficulty ambulating. Pt has follow up appointment in three months at the [facility]. No adverse events on tele overnight.     MEDICATIONS  (STANDING):  aspirin  chewable 81 milliGRAM(s) Oral daily  atorvastatin 20 milliGRAM(s) Oral at bedtime  budesonide  80 MICROgram(s)/formoterol 4.5 MICROgram(s) Inhaler 2 Puff(s) Inhalation two times a day  carvedilol 6.25 milliGRAM(s) Oral every 12 hours  finasteride 5 milliGRAM(s) Oral daily  furosemide   Injectable 40 milliGRAM(s) IV Push every 12 hours  lisinopril 10 milliGRAM(s) Oral daily  pantoprazole    Tablet 40 milliGRAM(s) Oral before breakfast  rivaroxaban 20 milliGRAM(s) Oral with dinner  spironolactone 25 milliGRAM(s) Oral daily  tamsulosin 0.4 milliGRAM(s) Oral at bedtime    REVIEW OF SYSTEMS:  CONSTITUTIONAL: No fever, weight loss, chills, shakes, or fatigue  EYES: No eye pain, visual disturbances, or discharge  ENMT:  No difficulty hearing, tinnitus, vertigo; No sinus or throat pain  NECK: No pain or stiffness  RESPIRATORY: No cough, wheezing, hemoptysis, or shortness of breath  CARDIOVASCULAR: No chest pain, dyspnea, palpitations, dizziness, syncope, paroxysmal nocturnal dyspnea, orthopnea, or arm or leg swelling  GASTROINTESTINAL: No abdominal  or epigastric pain, nausea, vomiting, hematemesis, diarrhea, constipation, melena or bright red blood.  GENITOURINARY: No dysuria, nocturia, hematuria, or urinary incontinence  NEUROLOGICAL: No headaches, memory loss, slurred speech, limb weakness, loss of strength, numbness, or tremors  SKIN: No itching, burning, rashes, or lesions   MUSCULOSKELETAL: + B/L LE edema and pain  PSYCHIATRIC: No depression, anxiety, or difficulty sleeping    Vital Signs Last 24 Hrs  T(C): 36.5 (27 Mar 2022 08:54), Max: 36.8 (26 Mar 2022 16:53)  T(F): 97.7 (27 Mar 2022 08:54), Max: 98.2 (26 Mar 2022 16:53)  HR: 62 (27 Mar 2022 08:54) (62 - 64)  BP: 121/62 (27 Mar 2022 08:54) (111/54 - 134/82)  BP(mean): --  RR: 18 (27 Mar 2022 08:54) (18 - 19)  SpO2: 93% (27 Mar 2022 08:54) (93% - 100%)    PHYSICAL EXAM  GENERAL: NAD, AAOx3  HEAD:  Atraumatic, Normocephalic  EYES: EOMI, PERRLA, conjunctiva and sclera clear  NECK: Supple, No JVD, No LAD  CHEST/LUNG: Clear to auscultation bilaterally; No wheeze  HEART: s1 s2 Regular rate and rhythm; No murmurs, rubs, or gallops  ABDOMEN: Soft, Nontender, Nondistended; Bowel sounds present X 4 quadrants   EXTREMITIES: B/L LE edema, appears chronic in nature. Pink/healing ulcer on RLE   Psych: normal affect and behavior, calm and cooperative     LABS: All Labs Reviewed:    03-26    139  |  102  |  20  ----------------------------<  105<H>  3.8   |  33<H>  |  1.11    Ca    8.8      26 Mar 2022 08:53    TPro  6.8  /  Alb  3.1<L>  /  TBili  1.4<H>  /  DBili  x   /  AST  26  /  ALT  23  /  AlkPhos  168<H>  03-26  TroponinI hsT: <-193.98, <-185.45, <-198.24      Radiology:  12 Lead ECG (03.24.22 @ 14:21):  Ventricular-paced rhythm with occasional Premature ventricular complexes 62/min.  Wide QRS rhythm.  When compared with ECG of 29-OCT-2020 06:57, Similar to prior ekg. Premature ventricular complexes are now Present    US Abdomen Limited (03.24.22 @ 19:32):  Moderate volume ascites. Right pleural effusion.    Xray Chest 1 View-PORTABLE IMMEDIATE (Xray Chest 1 View-PORTABLE IMMEDIATE .) (03.24.22 @ 15:41):  Gross heart enlargement, left-sided pacemaker, and Micra pacemaker maker again noted.  This slight left perihilar infiltrate again noted.  Chest is similar to October 27, 2020.    TTE Echo Complete w/o Contrast w/ Doppler (08.28.20 @ 21:42):   Moderate to severe (3+)mitral regurgitation.   Mild to Moderate aortic regurgitation.   Mild pulmonary hypertension.   Mild (1+) tricuspid valveregurgitation is present.   Estimated left ventricular ejection fraction is 45 %. The left ventricle cavity is mildly dilated. The left ventricle is normal in wall thickness. Mild, diffuse hypokinesis of the left ventricle is present.   The right ventricle is normal in size, wall thickness, wall motion, and contractility based on TAPSE.   A device wire is seen in the RV and RA.    TTE Echo Complete w/o Contrast w/ Doppler (03.25.22 @ 12:36):   Mild to Moderate mitral regurgitation.   Mild aortic sclerosis is present with normal valvular opening. Mild (1+) aortic regurgitation is present.   Severe (4+) tricuspid valve regurgitation.   Severe pulmonary hypertension.   The left atrium is severely dilated.   The left ventricle size is normal.   The apex appears hypokinetic. paradoxical septal motion Estimated left ventricular ejection fraction is 45-50 %.   IVC is dilated and not collapsing with inspiration.   A PFO vs ASD is noted with left to right and right to left shunt.   Ascites is noted.     REASON FOR VISIT:  CHF, Edema    HPI:  76 year old man with a history of permanent atrial fibrillation, non-obstructive CAD, CRT-P, nonischemic cardiomyopathy, mitral regurgitation, HTN, pulmonary hypertension, COPD, anasarca who presented to the ED with complaints of bilateral leg edema associated with difficulty ambulating; suboptimal outpatient adherence has been suspected.    3/26/22:  Overall feeling better; no dyspnea; persistent leg edema/discomfort  3/27/22:  Romanian speaking only (  ID# [id number]) pt is thankful for all of the care he has received Denies SOB, Chest pain. Confirmed that Pt is not allergic to any medications, currently on ACE without adverse reactions.  Continues to c/o LE pain and edema with difficulty ambulating.     MEDICATIONS  (STANDING):  aspirin  chewable 81 milliGRAM(s) Oral daily  atorvastatin 20 milliGRAM(s) Oral at bedtime  budesonide  80 MICROgram(s)/formoterol 4.5 MICROgram(s) Inhaler 2 Puff(s) Inhalation two times a day  carvedilol 6.25 milliGRAM(s) Oral every 12 hours  finasteride 5 milliGRAM(s) Oral daily  furosemide   Injectable 40 milliGRAM(s) IV Push every 12 hours  lisinopril 10 milliGRAM(s) Oral daily  pantoprazole    Tablet 40 milliGRAM(s) Oral before breakfast  rivaroxaban 20 milliGRAM(s) Oral with dinner  spironolactone 25 milliGRAM(s) Oral daily  tamsulosin 0.4 milliGRAM(s) Oral at bedtime    Vital Signs Last 24 Hrs  T(C): 36.5 (27 Mar 2022 08:54), Max: 36.8 (26 Mar 2022 16:53)  T(F): 97.7 (27 Mar 2022 08:54), Max: 98.2 (26 Mar 2022 16:53)  HR: 62 (27 Mar 2022 08:54) (62 - 64)  BP: 121/62 (27 Mar 2022 08:54) (111/54 - 134/82)  RR: 18 (27 Mar 2022 08:54) (18 - 19)  SpO2: 93% (27 Mar 2022 08:54) (93% - 100%)    PHYSICAL EXAM  GENERAL: NAD, AAOx3  HEAD:  Atraumatic, Normocephalic  EYES: EOMI, PERRLA, conjunctiva and sclera clear  NECK: Supple, No JVD, No LAD  CHEST/LUNG: Clear to auscultation bilaterally; No wheeze  HEART: s1 s2 Regular rate and rhythm; No murmurs, rubs, or gallops  ABDOMEN: Soft, Nontender, Nondistended; Bowel sounds present X 4 quadrants   EXTREMITIES: B/L LE edema, appears chronic in nature. Pink/healing ulcer on RLE   Psych: normal affect and behavior, calm and cooperative     LABS: All Labs Reviewed:    03-26    139  |  102  |  20  ----------------------------<  105<H>  3.8   |  33<H>  |  1.11    Ca    8.8      26 Mar 2022 08:53    TPro  6.8  /  Alb  3.1<L>  /  TBili  1.4<H>  /  DBili  x   /  AST  26  /  ALT  23  /  AlkPhos  168<H>  03-26  TroponinI hsT: <-193.98, <-185.45, <-198.24      Radiology:  12 Lead ECG (03.24.22 @ 14:21):  Ventricular-paced rhythm with occasional Premature ventricular complexes 62/min.  Wide QRS rhythm.  When compared with ECG of 29-OCT-2020 06:57, Similar to prior ekg. Premature ventricular complexes are now Present    US Abdomen Limited (03.24.22 @ 19:32):  Moderate volume ascites. Right pleural effusion.    Xray Chest 1 View-PORTABLE IMMEDIATE (Xray Chest 1 View-PORTABLE IMMEDIATE .) (03.24.22 @ 15:41):  Gross heart enlargement, left-sided pacemaker, and Micra pacemaker maker again noted.  This slight left perihilar infiltrate again noted.  Chest is similar to October 27, 2020.    TTE Echo Complete w/o Contrast w/ Doppler (08.28.20 @ 21:42):   Moderate to severe (3+)mitral regurgitation.   Mild to Moderate aortic regurgitation.   Mild pulmonary hypertension.   Mild (1+) tricuspid valveregurgitation is present.   Estimated left ventricular ejection fraction is 45 %. The left ventricle cavity is mildly dilated. The left ventricle is normal in wall thickness. Mild, diffuse hypokinesis of the left ventricle is present.   The right ventricle is normal in size, wall thickness, wall motion, and contractility based on TAPSE.   A device wire is seen in the RV and RA.    TTE Echo Complete w/o Contrast w/ Doppler (03.25.22 @ 12:36):   Mild to Moderate mitral regurgitation.   Mild aortic sclerosis is present with normal valvular opening. Mild (1+) aortic regurgitation is present.   Severe (4+) tricuspid valve regurgitation.   Severe pulmonary hypertension.   The left atrium is severely dilated.   The left ventricle size is normal.   The apex appears hypokinetic. paradoxical septal motion Estimated left ventricular ejection fraction is 45-50 %.   IVC is dilated and not collapsing with inspiration.   A PFO vs ASD is noted with left to right and right to left shunt.   Ascites is noted.

## 2022-03-28 ENCOUNTER — TRANSCRIPTION ENCOUNTER (OUTPATIENT)
Age: 77
End: 2022-03-28

## 2022-03-28 DIAGNOSIS — R93.1 ABNORMAL FINDINGS ON DIAGNOSTIC IMAGING OF HEART AND CORONARY CIRCULATION: ICD-10-CM

## 2022-03-28 LAB
ANION GAP SERPL CALC-SCNC: 3 MMOL/L — LOW (ref 5–17)
BUN SERPL-MCNC: 30 MG/DL — HIGH (ref 7–23)
CALCIUM SERPL-MCNC: 9.6 MG/DL — SIGNIFICANT CHANGE UP (ref 8.5–10.1)
CHLORIDE SERPL-SCNC: 101 MMOL/L — SIGNIFICANT CHANGE UP (ref 96–108)
CO2 SERPL-SCNC: 35 MMOL/L — HIGH (ref 22–31)
CREAT SERPL-MCNC: 1.22 MG/DL — SIGNIFICANT CHANGE UP (ref 0.5–1.3)
EGFR: 61 ML/MIN/1.73M2 — SIGNIFICANT CHANGE UP
GLUCOSE SERPL-MCNC: 135 MG/DL — HIGH (ref 70–99)
HCT VFR BLD CALC: 43.5 % — SIGNIFICANT CHANGE UP (ref 39–50)
HGB BLD-MCNC: 13.4 G/DL — SIGNIFICANT CHANGE UP (ref 13–17)
MCHC RBC-ENTMCNC: 29.6 PG — SIGNIFICANT CHANGE UP (ref 27–34)
MCHC RBC-ENTMCNC: 30.8 GM/DL — LOW (ref 32–36)
MCV RBC AUTO: 96.2 FL — SIGNIFICANT CHANGE UP (ref 80–100)
PLATELET # BLD AUTO: 161 K/UL — SIGNIFICANT CHANGE UP (ref 150–400)
POTASSIUM SERPL-MCNC: 4.4 MMOL/L — SIGNIFICANT CHANGE UP (ref 3.5–5.3)
POTASSIUM SERPL-SCNC: 4.4 MMOL/L — SIGNIFICANT CHANGE UP (ref 3.5–5.3)
RBC # BLD: 4.52 M/UL — SIGNIFICANT CHANGE UP (ref 4.2–5.8)
RBC # FLD: 16.8 % — HIGH (ref 10.3–14.5)
SODIUM SERPL-SCNC: 139 MMOL/L — SIGNIFICANT CHANGE UP (ref 135–145)
WBC # BLD: 6.64 K/UL — SIGNIFICANT CHANGE UP (ref 3.8–10.5)
WBC # FLD AUTO: 6.64 K/UL — SIGNIFICANT CHANGE UP (ref 3.8–10.5)

## 2022-03-28 PROCEDURE — 99497 ADVNCD CARE PLAN 30 MIN: CPT | Mod: 25

## 2022-03-28 PROCEDURE — 99231 SBSQ HOSP IP/OBS SF/LOW 25: CPT

## 2022-03-28 PROCEDURE — 99232 SBSQ HOSP IP/OBS MODERATE 35: CPT

## 2022-03-28 PROCEDURE — 99223 1ST HOSP IP/OBS HIGH 75: CPT | Mod: 25

## 2022-03-28 RX ORDER — BUMETANIDE 0.25 MG/ML
1 INJECTION INTRAMUSCULAR; INTRAVENOUS EVERY 12 HOURS
Refills: 0 | Status: DISCONTINUED | OUTPATIENT
Start: 2022-03-28 | End: 2022-03-29

## 2022-03-28 RX ADMIN — Medication 40 MILLIGRAM(S): at 09:42

## 2022-03-28 RX ADMIN — CARVEDILOL PHOSPHATE 6.25 MILLIGRAM(S): 80 CAPSULE, EXTENDED RELEASE ORAL at 09:42

## 2022-03-28 RX ADMIN — TAMSULOSIN HYDROCHLORIDE 0.4 MILLIGRAM(S): 0.4 CAPSULE ORAL at 21:32

## 2022-03-28 RX ADMIN — SPIRONOLACTONE 25 MILLIGRAM(S): 25 TABLET, FILM COATED ORAL at 09:42

## 2022-03-28 RX ADMIN — FINASTERIDE 5 MILLIGRAM(S): 5 TABLET, FILM COATED ORAL at 09:42

## 2022-03-28 RX ADMIN — LISINOPRIL 10 MILLIGRAM(S): 2.5 TABLET ORAL at 09:44

## 2022-03-28 RX ADMIN — BUDESONIDE AND FORMOTEROL FUMARATE DIHYDRATE 2 PUFF(S): 160; 4.5 AEROSOL RESPIRATORY (INHALATION) at 09:08

## 2022-03-28 RX ADMIN — Medication 81 MILLIGRAM(S): at 09:42

## 2022-03-28 RX ADMIN — BUDESONIDE AND FORMOTEROL FUMARATE DIHYDRATE 2 PUFF(S): 160; 4.5 AEROSOL RESPIRATORY (INHALATION) at 20:32

## 2022-03-28 RX ADMIN — RIVAROXABAN 20 MILLIGRAM(S): KIT at 17:28

## 2022-03-28 RX ADMIN — CARVEDILOL PHOSPHATE 6.25 MILLIGRAM(S): 80 CAPSULE, EXTENDED RELEASE ORAL at 21:32

## 2022-03-28 RX ADMIN — ATORVASTATIN CALCIUM 20 MILLIGRAM(S): 80 TABLET, FILM COATED ORAL at 21:33

## 2022-03-28 RX ADMIN — BUMETANIDE 1 MILLIGRAM(S): 0.25 INJECTION INTRAMUSCULAR; INTRAVENOUS at 17:28

## 2022-03-28 RX ADMIN — PANTOPRAZOLE SODIUM 40 MILLIGRAM(S): 20 TABLET, DELAYED RELEASE ORAL at 06:04

## 2022-03-28 RX ADMIN — Medication 40 MILLIGRAM(S): at 17:28

## 2022-03-28 NOTE — DISCHARGE NOTE NURSING/CASE MANAGEMENT/SOCIAL WORK - NSDCVIVACCINE_GEN_ALL_CORE_FT
influenza, injectable, quadrivalent, preservative free; 03-Nov-2020 13:38; Marcy Bartlett (RN); [employer]; [id number] (Exp. Date: 30-Jun-2021); IntraMuscular; Deltoid Left.; 0.5 milliLiter(s); VIS (VIS Published: 15-Aug-2019, VIS Presented: 03-Nov-2020);

## 2022-03-28 NOTE — DISCHARGE NOTE NURSING/CASE MANAGEMENT/SOCIAL WORK - NSDCFUADDAPPT_GEN_ALL_CORE_FT
Follow-up appointment made with Dr. Palla Spoke with Dianne    Day: Monday  Date: April 4, 2022  Time: 11:15AM Follow-up appointment made with Dr. Palla Spoke with Dianne  Day: Monday  Date: April 4, 2022  Time: 11:15AM    MARIA R appt 4/6/2022 @ 10:40 with Dr. Quick. Please arrive 10:30.

## 2022-03-28 NOTE — CONSULT NOTE ADULT - TREATMENT GUIDELINE COMMENT
c/w current interventions, no limits at this time, and dc home when deemed medically stable by primary team.     * * Spent minutes reviewing advanced care planning, including advanced directives, and dispo options

## 2022-03-28 NOTE — CONSULT NOTE ADULT - SUBJECTIVE AND OBJECTIVE BOX
CHIEF COMPLAINT: Patient is a 76y old  Male who presents with a chief complaint of LE edema/Abd distention (24 Mar 2022 18:38)    HPI:  76 year old man with a history of permanent atrial fibrillation, non-obstructive CAD, CRT-P, nonischemic cardiomyopathy, mitral regurgitation, HTN, pulmonary hypertension, COPD, anasarca who presented to the ED with complaints of bilateral leg edema associated with difficulty ambulating.  He saw Dr Palla in the office on 3/8/2022 -- at that time he was disorganized and unclear as to which Rx he was taking (Dr. Palla spoke to his  for clarification but there was concern for suboptimal adherence).  He describes chronic dyspnea and edema -- modestly worse recently.      PAST MEDICAL HISTORY:  AF (atrial fibrillation) on Eliquis  Arthritis   COPD (chronic obstructive pulmonary disease) not on inhalers  Coronary artery disease involving native heart with angina pectoris, unspecified vessel or lesion type   Heart failure with reduced ejection fraction   HLD (hyperlipidemia)   HTN (hypertension)   Pacemaker single chamber meditronic  placed by Dr Tomas  May  of  2016.     PAST SURGICAL HISTORY:  Cardiac pacemaker single chamber meditronic  placed by Dr Tomas  May  of  2016.     FAMILY HISTORY:  No pertinent family history, of diabetes or heart disease.    SOCIAL HISTORY:  No alcohol or tobacco    MEDICATIONS  (STANDING):  aspirin  chewable 81 milliGRAM(s) Oral daily  atorvastatin 20 milliGRAM(s) Oral at bedtime  budesonide  80 MICROgram(s)/formoterol 4.5 MICROgram(s) Inhaler 2 Puff(s) Inhalation two times a day  carvedilol 6.25 milliGRAM(s) Oral every 12 hours  finasteride 5 milliGRAM(s) Oral daily  furosemide   Injectable 40 milliGRAM(s) IV Push every 12 hours  pantoprazole    Tablet 40 milliGRAM(s) Oral before breakfast  rivaroxaban 20 milliGRAM(s) Oral with dinner  spironolactone 25 milliGRAM(s) Oral daily  tamsulosin 0.4 milliGRAM(s) Oral at bedtime    MEDICATIONS  (PRN):  acetaminophen     Tablet .. 650 milliGRAM(s) Oral every 6 hours PRN Mild Pain (1 - 3)  ALBUTerol    90 MICROgram(s) HFA Inhaler 2 Puff(s) Inhalation every 6 hours PRN Bronchospasm    Allergies:    ACE inhibitors (Other)  amiodarone (Other)    REVIEW OF SYSTEMS:  CONSTITUTIONAL: No fevers or chills  Eyes: No visual changes  NECK: No pain or stiffness  RESPIRATORY:  + shortness of breath  CARDIOVASCULAR: No  palpitations.  Leg pain and edema (mild)  GASTROINTESTINAL: No abdominal pain. + bloating  GENITOURINARY: No dysuria, frequency or hematuria  NEUROLOGICAL: No numbness.  SKIN: No itching or rash  All other review of systems is negative unless indicated above    VITAL SIGNS:   Vital Signs Last 24 Hrs  T(C): 36.3 (25 Mar 2022 08:49), Max: 36.8 (24 Mar 2022 14:20)  T(F): 97.4 (25 Mar 2022 08:49), Max: 98.3 (24 Mar 2022 14:20)  HR: 60 (25 Mar 2022 09:40) (57 - 60)  BP: 143/76 (25 Mar 2022 08:49) (124/95 - 160/70)  BP(mean): 107 (24 Mar 2022 18:46) (107 - 111)  RR: 18 (25 Mar 2022 08:49) (18 - 20)  SpO2: 95% (25 Mar 2022 08:49) (95% - 100%)    PHYSICAL EXAM:  Constitutional: NAD, awake and alert, seated in bedside chair  HEENT:  No oral cyanosis.  Pulmonary: Non-labored, breath sounds are clear bilaterally, No wheezing, rales or rhonchi  Cardiovascular: Regular (paced on tele), systolic murmur  Gastrointestinal: Bowel Sounds present, soft, nontender.   Lymph: + leg edema (chronic-appearing). No cervical lymphadenopathy.  Neurological: Alert, no focal deficits  Skin: No rashes.  Psych:  Mood & affect appropriate    LABS:                   12.6   7.31  )-----------( 165      ( 24 Mar 2022 15:09 )             41.8     141    |  108    |  18     ----------------------------<  105    4.3     |  30     |  1.10     Ca    8.5        24 Mar 2022 15:09    TPro  6.7    /  Alb  3.1    /  TBili  1.5    /  DBili  x      /  AST  31     /  ALT  25     /  AlkPhos  176    24 Mar 2022 15:09    PT/INR - ( 24 Mar 2022 15:09 )   PT: 14.2 sec;   INR: 1.22 ratio    PTT - ( 24 Mar 2022 15:09 )  PTT:37.9 sec    Pro Bnp 2802    12 Lead ECG (03.24.22 @ 14:21):  Ventricular-paced rhythm with occasional Premature ventricular complexes 62/min.  Wide QRS rhythm.  When compared with ECG of 29-OCT-2020 06:57, Similar to prior ekg. Premature ventricular complexes are now Present    US Abdomen Limited (03.24.22 @ 19:32):  Moderate volume ascites. Right pleural effusion.    Xray Chest 1 View-PORTABLE IMMEDIATE (Xray Chest 1 View-PORTABLE IMMEDIATE .) (03.24.22 @ 15:41):  Gross heart enlargement, left-sided pacemaker, and Micra pacemaker maker again noted.    This slight left perihilar infiltrate again noted.    Chest is similar to October 27, 2020.    TTE Echo Complete w/o Contrast w/ Doppler (08.28.20 @ 21:42):   Moderate to severe (3+)mitral regurgitation.   Mild to Moderate aortic regurgitation.   Mild pulmonary hypertension.   Mild (1+) tricuspid valveregurgitation is present.   Estimated left ventricular ejection fraction is 45 %. The left ventricle cavity is mildly dilated. The left ventricle is normal in wall thickness. Mild, diffuse hypokinesis of the left ventricle is present.   The right ventricle is normal in size, wall thickness, wall motion, and contractility based on TAPSE.   A device wire is seen in the RV and RA.  
  HPI: Mr. Pichardo is a 76y old Male with hx of systolic CHF (EF 45%), anasarca, COPD - Severe Pulmonary HTN, HTN, dyslipidemia, CAD, a-fib/PPM on xarelto, arthritis, ESBL UTI c/o abdominal distention and bilateral LE swelling x 2 weeks despite new regimen starting at [facility]. Sx also a/w LOPEZ and mild chest discomfort. Found here to have imaging c/w Acute on chronic CHF exacerbation with ascites from congestive hepatopathy. Palliative Care consulted to assist with establishing GOC.     Met Mr. Pichardo this afternoon, and conversed via [employer]  ID # [id number]. Pt was open to talking. He denied any pain or dyspnea, feeling much improved since admission. He noted walking for O2 assessment and his O2 level remaining stable. He notes plan for repeat imaging tomorrow and hopes to go home soon. He was open to talking about his understanding of his disease and his wishes as noted in GOC section below.     PAIN: ( )Yes   (x )No    DYSPNEA: ( ) Yes  (x ) No- resolved    PAST MEDICAL & SURGICAL HISTORY:  AF (atrial fibrillation)  on Eliquis    Pacemaker  single chamber meditronic  placed by Dr Tomas  May  of  2016    Coronary artery disease involving native heart with angina pectoris, unspecified vessel or lesion type    HTN (hypertension)    Arthritis    COPD (chronic obstructive pulmonary disease)  not on inhalers    HLD (hyperlipidemia)    Heart failure with reduced ejection fraction    Cardiac pacemaker  single chamber meditronic  placed by Dr Tomas  May  of  2016        SOCIAL HX: originally from [place]    Hx opiate tolerance ( )YES  (x )NO    Baseline ADLs  (Prior to Admission)  (x ) Independent   ( )Dependent    FAMILY HISTORY:  No pertinent family history  of diabetes or heart disease        Review of Systems:    Anxiety- no  Depression- no, manuel in [place]  Constipation- no, BM yesterday, normal  Diarrhea- no  Nausea- no  Vomiting- no  Anorexia- no, good appetite  Weight Loss- no  Cough- no  Fatigue- no  Weakness- no  Delirium- no    All other systems reviewed and negative      PHYSICAL EXAM:    Vital Signs Last 24 Hrs  T(C): 36.6 (28 Mar 2022 09:10), Max: 36.6 (28 Mar 2022 09:10)  T(F): 97.8 (28 Mar 2022 09:10), Max: 97.8 (28 Mar 2022 09:10)  HR: 70 (28 Mar 2022 09:10) (62 - 71)  BP: 127/76 (28 Mar 2022 09:10) (109/51 - 130/71)  BP(mean): 114 (27 Mar 2022 20:44) (89 - 114)  RR: 18 (28 Mar 2022 09:10) (17 - 18)  SpO2: 96% (28 Mar 2022 09:10) (94% - 96%)  Daily     Daily Weight in k.3 (28 Mar 2022 06:35)    PPSV2:  50 %    General: Older man lying in bed, pleasant, smiling, NAD  Mental Status: AOx4  HEENT: mmm, + temporal wasting  Lungs: clear bl  Cardiac: + s1 s2 rrr  GI: soft, mild distention, nt., + bs  : voids  MSK/skin: moves all extremities, viracose veins, otherwise no edema  Neuro: no focal deficits      LABS:                        13.4   6.64  )-----------( 161      ( 28 Mar 2022 09:13 )             43.5         139  |  101  |  30<H>  ----------------------------<  135<H>  4.4   |  35<H>  |  1.22    Ca    9.6      28 Mar 2022 09:13        Albumin: Albumin, Serum: 3.1 g/dL ( @ 08:53)      Allergies    ACE inhibitors (Other)  amiodarone (Other)    Intolerances      MEDICATIONS  (STANDING):  aspirin  chewable 81 milliGRAM(s) Oral daily  atorvastatin 20 milliGRAM(s) Oral at bedtime  budesonide  80 MICROgram(s)/formoterol 4.5 MICROgram(s) Inhaler 2 Puff(s) Inhalation two times a day  buMETAnide 1 milliGRAM(s) Oral every 12 hours  carvedilol 6.25 milliGRAM(s) Oral every 12 hours  finasteride 5 milliGRAM(s) Oral daily  furosemide   Injectable 40 milliGRAM(s) IV Push every 12 hours  lisinopril 10 milliGRAM(s) Oral daily  pantoprazole    Tablet 40 milliGRAM(s) Oral before breakfast  rivaroxaban 20 milliGRAM(s) Oral with dinner  spironolactone 25 milliGRAM(s) Oral daily  tamsulosin 0.4 milliGRAM(s) Oral at bedtime    MEDICATIONS  (PRN):  acetaminophen     Tablet .. 650 milliGRAM(s) Oral every 6 hours PRN Mild Pain (1 - 3)  ALBUTerol    90 MICROgram(s) HFA Inhaler 2 Puff(s) Inhalation every 6 hours PRN Bronchospasm      RADIOLOGY/ADDITIONAL STUDIES:    ACC: [id number] EXAM:  US ABDOMEN LIMITED                        PROCEDURE DATE:  2022      IMPRESSION:  Moderate volume ascites.    Right pleural effusion.    ACC: [id number] EXAM:  XR CHEST PORTABLE IMMED 1V                        PROCEDURE DATE:  2022      IMPRESSION: Unchanged chest as above.    ACC: [id number] EXAM:  ECHO TTE WO CON COMP W DOPP                        PROCEDURE DATE:  2022       Summary     Mild mitral annular calcification is present.   The mitral valve leaflets appear thickened.   Mild to Moderate mitral regurgitation is present.   Mild aortic sclerosis is present with normal valvular opening.   Mild (1+) aortic regurgitation is present.   The tricuspid valve leaflets are thin and pliable; valve motion is  normal.   Severe (4+) tricuspid valve regurgitation is present.   Severe pulmonary hypertension.   The left atrium is severely dilated.   The left ventricle size is normal.   The apex appears hypokinetic. paradoxical septal motion   Estimated left ventricular ejection fraction is 45-50 %.   IVC is dilated and not collapsing with inspiration.   A PFO vs ASD is noted with left to right and right to left shunt.   Ascites is noted.     Signature     ----------------------------------------------------------------   Electronically signed by Venugopal Palla, MD(Interpreting   physician) on 2022 06:14 PM   ----------------------------------------------------------------

## 2022-03-28 NOTE — PROGRESS NOTE ADULT - SUBJECTIVE AND OBJECTIVE BOX
REASON FOR VISIT:  CHF, Edema    HPI:  76 year old man with a history of permanent atrial fibrillation, non-obstructive CAD, CRT-P, nonischemic cardiomyopathy, mitral regurgitation, HTN, pulmonary hypertension, COPD, anasarca who presented to the ED with complaints of bilateral leg edema associated with difficulty ambulating; suboptimal outpatient adherence has been suspected.    3/26/22:  Overall feeling better; no dyspnea; persistent leg edema/discomfort  3/27/22:  Telugu speaking only (  ID# [id number]) pt is thankful for all of the care he has received Denies SOB, Chest pain. Confirmed that Pt is not allergic to any medications, currently on ACE without adverse reactions.  Continues to c/o LE pain and edema with difficulty ambulating.   3/28/'22: no complaints ambulating in [place] w/o difficulty.      MEDICATIONS:  OUTPATIENT  Home Medications:  Activated Charcoal 260 mg oral capsule: 2 cap(s) orally 3 times a day, As Needed (24 Mar 2022 18:17)  finasteride 5 mg oral tablet: 1 tab(s) orally once a day (24 Mar 2022 18:17)  omeprazole 20 mg oral delayed release capsule: 1 cap(s) orally once a day (24 Mar 2022 18:17)  sertal compound: 1 tab(s) orally once a day, As Needed  ***Medication from [place], Propinox HCl 10mg - Lysine Clonixinate 125mg*** (24 Mar 2022 18:17)  tamsulosin 0.4 mg oral capsule: 1 cap(s) orally 2 times a day (24 Mar 2022 18:17)      INPATIENT  MEDICATIONS  (STANDING):  aspirin  chewable 81 milliGRAM(s) Oral daily  atorvastatin 20 milliGRAM(s) Oral at bedtime  budesonide  80 MICROgram(s)/formoterol 4.5 MICROgram(s) Inhaler 2 Puff(s) Inhalation two times a day  buMETAnide 1 milliGRAM(s) Oral every 12 hours  carvedilol 6.25 milliGRAM(s) Oral every 12 hours  finasteride 5 milliGRAM(s) Oral daily  furosemide   Injectable 40 milliGRAM(s) IV Push every 12 hours  lisinopril 10 milliGRAM(s) Oral daily  pantoprazole    Tablet 40 milliGRAM(s) Oral before breakfast  rivaroxaban 20 milliGRAM(s) Oral with dinner  spironolactone 25 milliGRAM(s) Oral daily  tamsulosin 0.4 milliGRAM(s) Oral at bedtime    MEDICATIONS  (PRN):  acetaminophen     Tablet .. 650 milliGRAM(s) Oral every 6 hours PRN Mild Pain (1 - 3)  ALBUTerol    90 MICROgram(s) HFA Inhaler 2 Puff(s) Inhalation every 6 hours PRN Bronchospasm          Vital Signs Last 24 Hrs  T(C): 36.2 (28 Mar 2022 17:02), Max: 36.6 (28 Mar 2022 09:10)  T(F): 97.2 (28 Mar 2022 17:02), Max: 97.8 (28 Mar 2022 09:10)  HR: 60 (28 Mar 2022 17:02) (60 - 70)  BP: 102/60 (28 Mar 2022 17:02) (102/60 - 127/76)  BP(mean): 114 (27 Mar 2022 20:44) (114 - 114)  RR: 18 (28 Mar 2022 17:02) (17 - 18)  SpO2: 96% (28 Mar 2022 17:02) (94% - 96%)Daily     Daily Weight in k.3 (28 Mar 2022 06:35)I&O's Summary      I&O's Detail      I&O's Summary      PHYSICAL EXAM:    GENERAL: NAD, AAOx3  HEAD:  Atraumatic, Normocephalic  EYES: EOMI, PERRLA, conjunctiva and sclera clear  NECK: Supple, No JVD, No LAD  CHEST/LUNG: Clear to auscultation bilaterally; No wheeze  HEART: s1 s2 Regular rate and rhythm; No murmurs, rubs, or gallops  ABDOMEN: Soft, Nontender, Nondistended; Bowel sounds present X 4 quadrants   EXTREMITIES: B/L LE edema, appears chronic in nature. Pink/healing ulcer on RLE   Psych: normal affect and behavior, calm and cooperative       ===============================  ===============================  LABS:                         13.4   6.64  )-----------( 161      ( 28 Mar 2022 09:13 )             43.5     28 Mar 2022 09:13    139    |  101    |  30     ----------------------------<  135    4.4     |  35     |  1.22   26 Mar 2022 08:53    139    |  102    |  20     ----------------------------<  105    3.8     |  33     |  1.11     Ca    9.6        28 Mar 2022 09:13  Ca    8.8        26 Mar 2022 08:53    TPro  6.8    /  Alb  3.1    /  TBili  1.4    /  DBili  x      /  AST  26     /  ALT  23     /  AlkPhos  168    26 Mar 2022 08:53      ===============================  ===============================  CARDIAC BIOMARKERS:  BNP  Serum Pro-Brain Natriuretic Peptide: 1957 pg/mL *H* [0 - 450] (22 @ 08:53)  Serum Pro-Brain Natriuretic Peptide: 2802 pg/mL *H* [0 - 450] (22 @ 20:41)  Serum Pro-Brain Natriuretic Peptide: 3143 pg/mL *H* [0 - 450] (22 @ 15:09)  Serum Pro-Brain Natriuretic Peptide: 1811 pg/mL *H* [0 - 450] (10-31-20 @ 07:39)  Serum Pro-Brain Natriuretic Peptide: 3577 pg/mL *H* [0 - 450] (10-27-20 @ 18:04)  Serum Pro-Brain Natriuretic Peptide: 4927 pg/mL *H* [0 - 450] (20 @ 20:05)  Serum Pro-Brain Natriuretic Peptide: 5785 pg/mL *H* [0 - 450] (20 @ 15:58)  Serum Pro-Brain Natriuretic Peptide: 5786 pg/mL *H* [0 - 125] (17 @ 06:01)  Serum Pro-Brain Natriuretic Peptide: 5932 pg/mL *H* [0 - 125] (17 @ 02:49)  Serum Pro-Brain Natriuretic Peptide: 7251 pg/mL *H* [0 - 125] (17 @ 17:08)  Serum Pro-Brain Natriuretic Peptide: 3352 pg/mL *H* [0 - 300] (17 @ 19:38)  Serum Pro-Brain Natriuretic Peptide: 4180 pg/mL *H* [0 - 125] (17 @ 06:06)  Serum Pro-Brain Natriuretic Peptide: 6941 pg/mL *H* [0 - 125] (17 @ 05:16)  Serum Pro-Brain Natriuretic Peptide: 2462 pg/mL *H* [0 - 125] (04-15-17 @ 06:27)  Serum Pro-Brain Natriuretic Peptide: 2387 pg/mL *H* [0 - 125] (17 @ 23:46)  Serum Pro-Brain Natriuretic Peptide: 2949 pg/mL *H* [0 - 125] (17 @ 17:24)      TROPONIN  Troponin I, High Sensitivity Result: 193.98 ng/L *H* (22 @ 08:19)  Troponin I, High Sensitivity Result: 185.45 ng/L *H* (22 @ 20:41)  Troponin I, High Sensitivity Result: 198.24 ng/L *H* (22 @ 15:09)  Troponin I, Serum: 0.130 ng/mL *H* [0.015 - 0.045] (10-28-20 @ 07:30)  Troponin I, Serum: 0.129 ng/mL *H* [0.015 - 0.045] (10-27-20 @ 20:30)  Troponin I, Serum: 0.132 ng/mL *H* [0.015 - 0.045] (10-27-20 @ 18:04)  Troponin I, Serum: 0.177 ng/mL *H* [0.015 - 0.045] (20 @ 02:56)  Troponin I, Serum: 0.165 ng/mL *H* [0.015 - 0.045] (20 @ 23:58)  Troponin I, Serum: 0.163 ng/mL *H* [0.015 - 0.045] (20 @ 20:05)  Troponin I, Serum: 0.210 ng/mL *H* [0.015 - 0.045] (20 @ 21:37)  Troponin I, Serum: 0.197 ng/mL *H* [0.015 - 0.045] (20 @ 07:59)  Troponin I, Serum: 0.202 ng/mL *H* [0.015 - 0.045] (20 @ 19:33)  Troponin I, Serum: 0.181 ng/mL *H* [0.015 - 0.045] (20 @ 15:58)  Troponin I, Serum: 0.248 ng/mL *H* [0.015 - 0.045] (17 @ 02:49)  Troponin I, Serum: 0.234 ng/mL *H* [0.015 - 0.045] (17 @ 17:08)  Troponin T, Serum: <0.01 ng/mL [0.00 - 0.06] (17 @ 03:56)  Troponin I, Serum: 0.650 ng/mL *H* [0.015 - 0.045] (17 @ 07:56)  Troponin I, Serum: 0.427 ng/mL *H* [0.015 - 0.045] (17 @ 06:06)  Troponin I, Serum: 0.416 ng/mL *H* [0.015 - 0.045] (17 @ 09:09)  Troponin I, Serum: 0.400 ng/mL *H* [0.015 - 0.045] (17 @ 05:16)  Troponin I, Serum: 0.450 ng/mL *H* [0.015 - 0.045] (17 @ 09:29)  Troponin I, Serum: 0.465 ng/mL *H* [0.015 - 0.045] (17 @ 06:10)  Troponin I, Serum: 0.442 ng/mL *H* [0.015 - 0.045] (17 @ 00:25)  Troponin I, Serum: 0.436 ng/mL *H* [0.015 - 0.045] (17 @ 19:24)  Troponin I, Serum: 0.422 ng/mL *H* [0.015 - 0.045] (17 @ 17:24)    ===============================  ===============================  Radiology:  12 Lead ECG (22 @ 14:21):  Ventricular-paced rhythm with occasional Premature ventricular complexes 62/min.  Wide QRS rhythm.  When compared with ECG of 29-OCT-2020 06:57, Similar to prior ekg. Premature ventricular complexes are now Present    US Abdomen Limited (22 @ 19:32):  Moderate volume ascites. Right pleural effusion.    Xray Chest 1 View-PORTABLE IMMEDIATE (Xray Chest 1 View-PORTABLE IMMEDIATE .) (22 @ 15:41):  Gross heart enlargement, left-sided pacemaker, and Micra pacemaker maker again noted.  This slight left perihilar infiltrate again noted.  Chest is similar to 2020.    TTE Echo Complete w/o Contrast w/ Doppler (20 @ 21:42):   Moderate to severe (3+)mitral regurgitation.   Mild to Moderate aortic regurgitation.   Mild pulmonary hypertension.   Mild (1+) tricuspid valveregurgitation is present.   Estimated left ventricular ejection fraction is 45 %. The left ventricle cavity is mildly dilated. The left ventricle is normal in wall thickness. Mild, diffuse hypokinesis of the left ventricle is present.   The right ventricle is normal in size, wall thickness, wall motion, and contractility based on TAPSE.   A device wire is seen in the RV and RA.    TTE Echo Complete w/o Contrast w/ Doppler (22 @ 12:36):   Mild to Moderate mitral regurgitation.   Mild aortic sclerosis is present with normal valvular opening. Mild (1+) aortic regurgitation is present.   Severe (4+) tricuspid valve regurgitation.   Severe pulmonary hypertension.   The left atrium is severely dilated.   The left ventricle size is normal.   The apex appears hypokinetic. paradoxical septal motion Estimated left ventricular ejection fraction is 45-50 %.   IVC is dilated and not collapsing with inspiration.   A PFO vs ASD is noted with left to right and right to left shunt.   Ascites is noted.        ===============================    Abelino Mccoy M.D.  Cardiology, [facility]  Cell: [phone]  Offices:    ([facility] Office)  [phone] ([facility] Office)

## 2022-03-28 NOTE — DISCHARGE NOTE NURSING/CASE MANAGEMENT/SOCIAL WORK - PATIENT PORTAL LINK FT
You can access the FollowMyHealth Patient Portal offered by [facility] by registering at the following website: [email or web address]. By joining [employer]’s FollowMyHealth portal, you will also be able to view your health information using other applications (apps) compatible with our system.

## 2022-03-28 NOTE — CONSULT NOTE ADULT - CONVERSATION DETAILS
Met with Mr. Pichardo to discuss his medical issues and wishes for his care. We conversed via [employer]  for [place]. He is originally from [place]. He lives with a long-time friend of 20 years named Isrrael. However, when asked about the emergency contact on file he denied knowing a Bridget Carlson, and did not have a number for Isrrael- though he promised to ask him for his information and give it to the nurses so it can be on file. He is independent with all ADLs at home and is hopeful to return there tomorrow.     When asked to explain what was happening with his body, he said that he ran out of medications, but the doctors treated him properly here and now he is better. He was unable to give details on what went wrong, just that his legs swelled and he had too much fluid in his body. However, when offered, he was open to explanation and teach-back. Explained that the swelling in his legs, abdomen, and extra fluid in his lungs were all from CHF, with decreased power of the heart. Discussed this in terms of an overloaded piping system and he was able to understand. We also discussed how these sx get fixed with medications, but his underlying CHF remains, with likelihood of similar sx recurring. He verbalized understanding that taking his medications was immensely important and that even if he did so perfectly this could still happen because his CHF is chronic. If it does, he would like to return to the hospital in hopes of further treatment and stabilization.     Explained that we share his enthusiasm for feeling better, but while we have a plan for best-case (current), we also must have a back up plan for if he deteriorates instead of improves. He understood that this was also why he had to get Isrrael's phone number at some point. He explained that he does not have any direct relatives here, sister and brother still live in [place]. We discussed advanced directives like code status and he would like to be full code- explaining that he wants the doctors to do all they can and ultimate decision on his outcome is in God's hands.     We took time to do some teach-back to ensure he understood his illness and he confirmed that if he had to explain this to his loved ones he felt he understood. Furthermore, he noted intent to be honest with them about his "weak heart", knowing that clinicians will help him if ever he needed this. For now, he is very happy to be improved, denies any complaints, and is looking forward to dc as soon as is possible. Wished him all the best and let him know that we are here if ever he is unsure about what he wants or just needs support. Shared above with Dr. Mathew. Given sx managed, GOC clear, and pending dc will sign off.

## 2022-03-28 NOTE — PROGRESS NOTE ADULT - SUBJECTIVE AND OBJECTIVE BOX
CC: LE edema/Abd distention (27 Mar 2022 11:10)    HPI: 76 year old male with PMHx of systolic CHF (EF 45%), anasarca, COPD - Severe Pulmonary HTN, HTN, dyslipidemia, CAD, a-fib/PPM on xarelto, arthritis, ESBL UTI c/o abdominal distention and bilateral LE swelling x 2 weeks. Was seen at [facility] and asked to start a new regimen to combat his symptoms however he endorses symptoms have not improved and have actually gotten worse. He also endorses some mild chest discomfort that is present intermittently Non radiating. No PND. No hemoptysis. No cough. No exertional component. But feels somewhat sob when he is exerting himself. Most recent cath 8/2020: non obstructive CAD with e/o severe pulmonary HTN and severe mitral regurg. EF45%     Medical progress: Denies any HA, CP, SOB. No fevers, chills or shakes.   Complaints: no new complaints   Care discussed with Dr. Ochoa -  will transition patient to PO Bumex    REVIEW OF SYSTEMS:  All other review of systems is negative unless indicated above.    PHYSICAL EXAM:  T(C): 36.6 (28 Mar 2022 09:10), Max: 36.6 (28 Mar 2022 09:10)  T(F): 97.8 (28 Mar 2022 09:10), Max: 97.8 (28 Mar 2022 09:10)  HR: 70 (28 Mar 2022 09:10) (62 - 71)  BP: 127/76 (28 Mar 2022 09:10) (109/51 - 130/71)  BP(mean): 114 (27 Mar 2022 20:44) (89 - 114)  RR: 18 (28 Mar 2022 09:10) (17 - 18)  SpO2: 96% (28 Mar 2022 09:10) (94% - 98%)  General: Well developed;  in no acute distress, on RA  Eyes: PERRLA, EOMI; conjunctiva and sclera clear  Head: Normocephalic; atraumatic  ENMT: No nasal discharge; airway clear  Neck: Supple; non tender; no masses  Respiratory: Decreased BS at bases, No wheezes, rales or rhonchi  Cardiovascular: Regular rate and rhythm. S1 and S2 Normal  Gastrointestinal: Soft non-tender, distended; Normal bowel sounds  Genitourinary: No  suprapubic  tenderness  Extremities: Normal range of motion, min  edema  Vascular: Peripheral pulses palpable 2+ bilaterally, b/l le varicosities   Neurological: Alert and oriented x4  Musculoskeletal: Normal muscle tone and strength   Psychiatric: Cooperative and appropriate      LABS:     CBC Full  -  ( 28 Mar 2022 09:13 )  WBC Count : 6.64 K/uL  RBC Count : 4.52 M/uL  Hemoglobin : 13.4 g/dL  Hematocrit : 43.5 %  Platelet Count - Automated : 161 K/uL    139  |  101  |  30<H>  ----------------------------<  135<H>  4.4   |  35<H>  |  1.22    Ca    9.6      28 Mar 2022 09:13      76 year old male with PMHx of systolic CHF (EF 45%), anasarca, COPD - Severe Pulmonary HTN, HTN, dyslipidemia, CAD, a-fib/PPM on xarelto, arthritis, ESBL UT admitted for:     # Acute on chronic combined systolic and diastolic CHF / R sided HF/ valvulopathy /  Severe Pulm HTN / congestive hepatopathy w/ ascites:   - started on oral bumex 1 mg po BID  - on GDT  - current weight 190 lbs  - patient has a documented allergies at home on ACEI  documented in a chart -  but patient takes ACEI at home - without any AE  - cannot afford entresto   - Strict I/Os  - Low Na diet/Fluid restriction   - ECHO reviewed: EF 45-50%, +1 MR, +4TR, Severe Pulm HTN, ASD vs PFO. Will need to d/w cardio  ASD  and shunt   - repeat abd sono to reevaluate ascites   - we need patient to have a close cardiology follow up    # Mild elevation in troponin: Due to demand in setting of acute HF   - no chest pain  - Cont ASA, carvedilol     # Chronic Afib. S/p CRT-P  -c/w xarelto/BB  - statin  -PPM interrogated chronic afib v paced    #COPD/Severe PHTN:  - DAISY/LABA      DVT: Xarelto

## 2022-03-28 NOTE — CONSULT NOTE ADULT - ASSESSMENT
76y old Male with hx of systolic CHF (EF 45%), anasarca, COPD - Severe Pulmonary HTN, HTN, dyslipidemia, CAD, a-fib/PPM on xarelto, arthritis, ESBL UTI c/o abdominal distention and bilateral LE swelling x 2 weeks despite new regimen starting at [facility]. Sx also a/w LOPEZ and mild chest discomfort. Found here to have imaging c/w Acute on chronic CHF exacerbation with ascites from congestive hepatopathy. Palliative Care consulted to assist with establishing GOC.     1) Acute on chronic CHF  - mixed systolic and diastolic  - also with severe pulmonary HTN contributing  - cardio notes appreciated- HF, transition from IV to po lasix, improved MR on echo  - assessed for need for O2, sats 97 with exertion  - c/w monitoring    2) Congestive hepatopathy  - ascites likely due to this  - US abd showed mod ascites  - plan to repeat  - pt feels this is improved since admission and denies discomfort    3) Debility  - assessed for O2, breathing well without it  - care coordination note appreciated- pt noted to live alone, but pt lives with friend from [place], also emergency contact incorrect as per pt. Friend's name is Isrrael  - PT     4) Prognosis  - guarded  - CHF with hx of recurrent admission in the past as per Dr. Mathew, risk of this remains persistent, though pt much improved on this admission.     4) GOC/Advanced Directives  - pt has capacity for decision making  - no HCP on file, would need friend's information or family in [place]  - full code, confirmed  - GOC discussion held- pt understands illness, glad for improvement, plans to go home with no limits at this time when deemed medically stable to do so. See GOC note above for further details.     * Given sx managed, GOC clear, and dc underway will sign off as discussed with Dr. Mathew*    Thank you for including us in Mr. Pichardo's care. Will continue to follow with you.    Jett Barrera MD  Palliative Care Attending 76y old Male with hx of systolic CHF (EF 45%), anasarca, COPD - Severe Pulmonary HTN, HTN, dyslipidemia, CAD, a-fib/PPM on xarelto, arthritis, ESBL UTI c/o abdominal distention and bilateral LE swelling x 2 weeks despite new regimen starting at [facility]. Sx also a/w LOPEZ and mild chest discomfort. Found here to have imaging c/w Acute on chronic CHF exacerbation with ascites from congestive hepatopathy. Palliative Care consulted to assist with establishing GOC.     1) Acute on chronic CHF  - mixed systolic and diastolic  - also with severe pulmonary HTN contributing  - cardio notes appreciated- HF, transition from IV to po lasix, improved MR on echo  - assessed for need for O2, sats 97 with exertion  - c/w monitoring    2) Congestive hepatopathy  - ascites likely due to this  - US abd showed mod ascites  - plan to repeat  - pt feels this is improved since admission and denies discomfort    3) Debility  - assessed for O2, breathing well without it  - care coordination note appreciated- pt noted to live alone, but pt lives with friend from [place], also emergency contact incorrect as per pt. Friend's name is Isrrael  - PT     4) Prognosis  - guarded  - CHF with hx of recurrent admission in the past as per Dr. Mathew, risk of this remains persistent, though pt much improved on this admission.     5) GOC/Advanced Directives  - pt has capacity for decision making  - no HCP on file, would need friend's information or family in [place]  - full code, confirmed  - GOC discussion held- pt understands illness, glad for improvement, plans to go home with no limits at this time when deemed medically stable to do so. See GOC note above for further details.     * Given sx managed, GOC clear, and dc underway will sign off as discussed with Dr. Mathew*    Thank you for including us in Mr. Pichardo's care. Will continue to follow with you.    Jett Barrera MD  Palliative Care Attending

## 2022-03-28 NOTE — PROGRESS NOTE ADULT - SUBJECTIVE AND OBJECTIVE BOX
REASON FOR VISIT:  CHF, Edema    HPI:  76 year old man with a history of permanent atrial fibrillation, non-obstructive CAD, CRT-P, nonischemic cardiomyopathy, mitral regurgitation, HTN, pulmonary hypertension, COPD, anasarca who presented to the ED with complaints of bilateral leg edema associated with difficulty ambulating; suboptimal outpatient adherence has been suspected.    3/26/22:  Overall feeling better; no dyspnea; persistent leg edema/discomfort  3/27/22:  Azeri speaking only (  ID# [id number]) pt is thankful for all of the care he has received Denies SOB, Chest pain. Confirmed that Pt is not allergic to any medications, currently on ACE without adverse reactions.  Continues to c/o LE pain and edema with difficulty ambulating.   3/28/'22: no complaints ambulating in [place] w/o difficulty.    MEDICATIONS:  OUTPATIENT  Home Medications:  Activated Charcoal 260 mg oral capsule: 2 cap(s) orally 3 times a day, As Needed (24 Mar 2022 18:17)  finasteride 5 mg oral tablet: 1 tab(s) orally once a day (24 Mar 2022 18:17)  omeprazole 20 mg oral delayed release capsule: 1 cap(s) orally once a day (24 Mar 2022 18:17)  sertal compound: 1 tab(s) orally once a day, As Needed  ***Medication from the [place], Propinox HCl 10mg - Lysine Clonixinate 125mg*** (24 Mar 2022 18:17)  tamsulosin 0.4 mg oral capsule: 1 cap(s) orally 2 times a day (24 Mar 2022 18:17)      INPATIENT  MEDICATIONS  (STANDING):  aspirin  chewable 81 milliGRAM(s) Oral daily  atorvastatin 20 milliGRAM(s) Oral at bedtime  budesonide  80 MICROgram(s)/formoterol 4.5 MICROgram(s) Inhaler 2 Puff(s) Inhalation two times a day  buMETAnide 1 milliGRAM(s) Oral every 12 hours  carvedilol 6.25 milliGRAM(s) Oral every 12 hours  finasteride 5 milliGRAM(s) Oral daily  furosemide   Injectable 40 milliGRAM(s) IV Push every 12 hours  lisinopril 10 milliGRAM(s) Oral daily  pantoprazole    Tablet 40 milliGRAM(s) Oral before breakfast  rivaroxaban 20 milliGRAM(s) Oral with dinner  spironolactone 25 milliGRAM(s) Oral daily  tamsulosin 0.4 milliGRAM(s) Oral at bedtime    MEDICATIONS  (PRN):  acetaminophen     Tablet .. 650 milliGRAM(s) Oral every 6 hours PRN Mild Pain (1 - 3)  ALBUTerol    90 MICROgram(s) HFA Inhaler 2 Puff(s) Inhalation every 6 hours PRN Bronchospasm          Vital Signs Last 24 Hrs  T(C): 36.2 (28 Mar 2022 17:02), Max: 36.6 (28 Mar 2022 09:10)  T(F): 97.2 (28 Mar 2022 17:02), Max: 97.8 (28 Mar 2022 09:10)  HR: 60 (28 Mar 2022 17:02) (60 - 70)  BP: 102/60 (28 Mar 2022 17:02) (102/60 - 127/76)  BP(mean): 114 (27 Mar 2022 20:44) (114 - 114)  RR: 18 (28 Mar 2022 17:02) (17 - 18)  SpO2: 96% (28 Mar 2022 17:02) (94% - 96%)Daily     Daily Weight in k.3 (28 Mar 2022 06:35)I&O's Summary      I&O's Detail      I&O's Summary      PHYSICAL EXAM  GENERAL: NAD, AAOx3  HEAD:  Atraumatic, Normocephalic  EYES: EOMI, PERRLA, conjunctiva and sclera clear  NECK: Supple, No JVD, No LAD  CHEST/LUNG: Clear to auscultation bilaterally; No wheeze  HEART: s1 s2 Regular rate and rhythm; No murmurs, rubs, or gallops  ABDOMEN: Soft, Nontender, Nondistended; Bowel sounds present X 4 quadrants   EXTREMITIES: B/L LE edema, appears chronic in nature. Pink/healing ulcer on RLE   Psych: normal affect and behavior, calm and cooperative       ===============================  ===============================  LABS:                         13.4   6.64  )-----------( 161      ( 28 Mar 2022 09:13 )             43.5     28 Mar 2022 09:13    139    |  101    |  30     ----------------------------<  135    4.4     |  35     |  1.22   26 Mar 2022 08:53    139    |  102    |  20     ----------------------------<  105    3.8     |  33     |  1.11     Ca    9.6        28 Mar 2022 09:13  Ca    8.8        26 Mar 2022 08:53    TPro  6.8    /  Alb  3.1    /  TBili  1.4    /  DBili  x      /  AST  26     /  ALT  23     /  AlkPhos  168    26 Mar 2022 08:53      ===============================  ===============================  CARDIAC BIOMARKERS:  BNP  Serum Pro-Brain Natriuretic Peptide: 1957 pg/mL *H* [0 - 450] (22 @ 08:53)  Serum Pro-Brain Natriuretic Peptide: 2802 pg/mL *H* [0 - 450] (22 @ 20:41)  Serum Pro-Brain Natriuretic Peptide: 3143 pg/mL *H* [0 - 450] (22 @ 15:09)  Serum Pro-Brain Natriuretic Peptide: 1811 pg/mL *H* [0 - 450] (10-31-20 @ 07:39)  Serum Pro-Brain Natriuretic Peptide: 3577 pg/mL *H* [0 - 450] (10-27-20 @ 18:04)  Serum Pro-Brain Natriuretic Peptide: 4927 pg/mL *H* [0 - 450] (20 @ 20:05)  Serum Pro-Brain Natriuretic Peptide: 5785 pg/mL *H* [0 - 450] (20 @ 15:58)  Serum Pro-Brain Natriuretic Peptide: 5786 pg/mL *H* [0 - 125] (17 @ 06:01)  Serum Pro-Brain Natriuretic Peptide: 5932 pg/mL *H* [0 - 125] (17 @ 02:49)  Serum Pro-Brain Natriuretic Peptide: 7251 pg/mL *H* [0 - 125] (17 @ 17:08)  Serum Pro-Brain Natriuretic Peptide: 3352 pg/mL *H* [0 - 300] (17 @ 19:38)  Serum Pro-Brain Natriuretic Peptide: 4180 pg/mL *H* [0 - 125] (17 @ 06:06)  Serum Pro-Brain Natriuretic Peptide: 6941 pg/mL *H* [0 - 125] (17 @ 05:16)  Serum Pro-Brain Natriuretic Peptide: 2462 pg/mL *H* [0 - 125] (04-15-17 @ 06:27)  Serum Pro-Brain Natriuretic Peptide: 2387 pg/mL *H* [0 - 125] (17 @ 23:46)  Serum Pro-Brain Natriuretic Peptide: 2949 pg/mL *H* [0 - 125] (17 @ 17:24)      TROPONIN  Troponin I, High Sensitivity Result: 193.98 ng/L *H* (22 @ 08:19)  Troponin I, High Sensitivity Result: 185.45 ng/L *H* (22 @ 20:41)  Troponin I, High Sensitivity Result: 198.24 ng/L *H* (22 @ 15:09)  Troponin I, Serum: 0.130 ng/mL *H* [0.015 - 0.045] (10-28-20 @ 07:30)  Troponin I, Serum: 0.129 ng/mL *H* [0.015 - 0.045] (10-27-20 @ 20:30)  Troponin I, Serum: 0.132 ng/mL *H* [0.015 - 0.045] (10-27-20 @ 18:04)  Troponin I, Serum: 0.177 ng/mL *H* [0.015 - 0.045] (20 @ 02:56)  Troponin I, Serum: 0.165 ng/mL *H* [0.015 - 0.045] (20 @ 23:58)  Troponin I, Serum: 0.163 ng/mL *H* [0.015 - 0.045] (20 @ 20:05)  Troponin I, Serum: 0.210 ng/mL *H* [0.015 - 0.045] (20 @ 21:37)  Troponin I, Serum: 0.197 ng/mL *H* [0.015 - 0.045] (20 @ 07:59)  Troponin I, Serum: 0.202 ng/mL *H* [0.015 - 0.045] (20 @ 19:33)  Troponin I, Serum: 0.181 ng/mL *H* [0.015 - 0.045] (20 @ 15:58)  Troponin I, Serum: 0.248 ng/mL *H* [0.015 - 0.045] (17 @ 02:49)  Troponin I, Serum: 0.234 ng/mL *H* [0.015 - 0.045] (17 @ 17:08)  Troponin T, Serum: <0.01 ng/mL [0.00 - 0.06] (17 @ 03:56)  Troponin I, Serum: 0.650 ng/mL *H* [0.015 - 0.045] (17 @ 07:56)  Troponin I, Serum: 0.427 ng/mL *H* [0.015 - 0.045] (17 @ 06:06)  Troponin I, Serum: 0.416 ng/mL *H* [0.015 - 0.045] (17 @ 09:09)  Troponin I, Serum: 0.400 ng/mL *H* [0.015 - 0.045] (17 @ 05:16)  Troponin I, Serum: 0.450 ng/mL *H* [0.015 - 0.045] (17 @ 09:29)  Troponin I, Serum: 0.465 ng/mL *H* [0.015 - 0.045] (17 @ 06:10)  Troponin I, Serum: 0.442 ng/mL *H* [0.015 - 0.045] (17 @ 00:25)  Troponin I, Serum: 0.436 ng/mL *H* [0.015 - 0.045] (17 @ 19:24)  Troponin I, Serum: 0.422 ng/mL *H* [0.015 - 0.045] (17 @ 17:24)    ===============================  ===============================  BLOOD CULTURES:    Blood Culture: Organism --  Gram Stain Blood -- Gram Stain --  Specimen Source Clean Catch Clean Catch (Midstream)  Culture-Blood --    Organism --  Gram Stain Blood -- Gram Stain --  Specimen Source .Blood None  Culture-Blood --       @ 08:53  Pro Bnp       ===============================  ===============================  Radiology:  12 Lead ECG (22 @ 14:21):  Ventricular-paced rhythm with occasional Premature ventricular complexes 62/min.  Wide QRS rhythm.  When compared with ECG of 29-OCT-2020 06:57, Similar to prior ekg. Premature ventricular complexes are now Present    US Abdomen Limited (22 @ 19:32):  Moderate volume ascites. Right pleural effusion.    Xray Chest 1 View-PORTABLE IMMEDIATE (Xray Chest 1 View-PORTABLE IMMEDIATE .) (22 @ 15:41):  Gross heart enlargement, left-sided pacemaker, and Micra pacemaker maker again noted.  This slight left perihilar infiltrate again noted.  Chest is similar to 2020.    TTE Echo Complete w/o Contrast w/ Doppler (20 @ 21:42):   Moderate to severe (3+)mitral regurgitation.   Mild to Moderate aortic regurgitation.   Mild pulmonary hypertension.   Mild (1+) tricuspid valveregurgitation is present.   Estimated left ventricular ejection fraction is 45 %. The left ventricle cavity is mildly dilated. The left ventricle is normal in wall thickness. Mild, diffuse hypokinesis of the left ventricle is present.   The right ventricle is normal in size, wall thickness, wall motion, and contractility based on TAPSE.   A device wire is seen in the RV and RA.    TTE Echo Complete w/o Contrast w/ Doppler (22 @ 12:36):   Mild to Moderate mitral regurgitation.   Mild aortic sclerosis is present with normal valvular opening. Mild (1+) aortic regurgitation is present.   Severe (4+) tricuspid valve regurgitation.   Severe pulmonary hypertension.   The left atrium is severely dilated.   The left ventricle size is normal.   The apex appears hypokinetic. paradoxical septal motion Estimated left ventricular ejection fraction is 45-50 %.   IVC is dilated and not collapsing with inspiration.   A PFO vs ASD is noted with left to right and right to left shunt.   Ascites is noted.      ===============================    Abelino Mccoy M.D.  Cardiology, [facility]  Cell: [phone]  Offices:    ([facility] Office)  [phone] ([facility] Office)

## 2022-03-29 ENCOUNTER — TRANSCRIPTION ENCOUNTER (OUTPATIENT)
Age: 77
End: 2022-03-29

## 2022-03-29 LAB
ALBUMIN SERPL ELPH-MCNC: 3.4 G/DL — SIGNIFICANT CHANGE UP (ref 3.3–5)
ALP SERPL-CCNC: 195 U/L — HIGH (ref 40–120)
ALT FLD-CCNC: 28 U/L — SIGNIFICANT CHANGE UP (ref 12–78)
ANION GAP SERPL CALC-SCNC: 4 MMOL/L — LOW (ref 5–17)
AST SERPL-CCNC: 35 U/L — SIGNIFICANT CHANGE UP (ref 15–37)
BILIRUB SERPL-MCNC: 1.1 MG/DL — SIGNIFICANT CHANGE UP (ref 0.2–1.2)
BUN SERPL-MCNC: 34 MG/DL — HIGH (ref 7–23)
CALCIUM SERPL-MCNC: 9.1 MG/DL — SIGNIFICANT CHANGE UP (ref 8.5–10.1)
CHLORIDE SERPL-SCNC: 102 MMOL/L — SIGNIFICANT CHANGE UP (ref 96–108)
CO2 SERPL-SCNC: 34 MMOL/L — HIGH (ref 22–31)
CREAT SERPL-MCNC: 1.21 MG/DL — SIGNIFICANT CHANGE UP (ref 0.5–1.3)
CULTURE RESULTS: SIGNIFICANT CHANGE UP
CULTURE RESULTS: SIGNIFICANT CHANGE UP
EGFR: 62 ML/MIN/1.73M2 — SIGNIFICANT CHANGE UP
GLUCOSE SERPL-MCNC: 105 MG/DL — HIGH (ref 70–99)
HCT VFR BLD CALC: 43.1 % — SIGNIFICANT CHANGE UP (ref 39–50)
HGB BLD-MCNC: 13.4 G/DL — SIGNIFICANT CHANGE UP (ref 13–17)
MCHC RBC-ENTMCNC: 29.3 PG — SIGNIFICANT CHANGE UP (ref 27–34)
MCHC RBC-ENTMCNC: 31.1 GM/DL — LOW (ref 32–36)
MCV RBC AUTO: 94.1 FL — SIGNIFICANT CHANGE UP (ref 80–100)
PLATELET # BLD AUTO: 168 K/UL — SIGNIFICANT CHANGE UP (ref 150–400)
POTASSIUM SERPL-MCNC: 4.1 MMOL/L — SIGNIFICANT CHANGE UP (ref 3.5–5.3)
POTASSIUM SERPL-SCNC: 4.1 MMOL/L — SIGNIFICANT CHANGE UP (ref 3.5–5.3)
PROT SERPL-MCNC: 7.3 GM/DL — SIGNIFICANT CHANGE UP (ref 6–8.3)
RBC # BLD: 4.58 M/UL — SIGNIFICANT CHANGE UP (ref 4.2–5.8)
RBC # FLD: 16.5 % — HIGH (ref 10.3–14.5)
SODIUM SERPL-SCNC: 140 MMOL/L — SIGNIFICANT CHANGE UP (ref 135–145)
SPECIMEN SOURCE: SIGNIFICANT CHANGE UP
SPECIMEN SOURCE: SIGNIFICANT CHANGE UP
WBC # BLD: 6.53 K/UL — SIGNIFICANT CHANGE UP (ref 3.8–10.5)
WBC # FLD AUTO: 6.53 K/UL — SIGNIFICANT CHANGE UP (ref 3.8–10.5)

## 2022-03-29 PROCEDURE — 99232 SBSQ HOSP IP/OBS MODERATE 35: CPT

## 2022-03-29 RX ORDER — SODIUM CHLORIDE 9 MG/ML
500 INJECTION INTRAMUSCULAR; INTRAVENOUS; SUBCUTANEOUS ONCE
Refills: 0 | Status: COMPLETED | OUTPATIENT
Start: 2022-03-29 | End: 2022-03-29

## 2022-03-29 RX ORDER — ALBUTEROL 90 UG/1
2 AEROSOL, METERED ORAL
Qty: 240 | Refills: 0
Start: 2022-03-29 | End: 2022-04-27

## 2022-03-29 RX ORDER — FUROSEMIDE 40 MG
40 TABLET ORAL DAILY
Refills: 0 | Status: DISCONTINUED | OUTPATIENT
Start: 2022-03-29 | End: 2022-03-29

## 2022-03-29 RX ORDER — BUDESONIDE AND FORMOTEROL FUMARATE DIHYDRATE 160; 4.5 UG/1; UG/1
1 AEROSOL RESPIRATORY (INHALATION)
Qty: 60 | Refills: 0
Start: 2022-03-29 | End: 2022-04-27

## 2022-03-29 RX ORDER — BUMETANIDE 0.25 MG/ML
1 INJECTION INTRAMUSCULAR; INTRAVENOUS
Qty: 60 | Refills: 0
Start: 2022-03-29 | End: 2022-04-27

## 2022-03-29 RX ORDER — SPIRONOLACTONE 25 MG/1
1 TABLET, FILM COATED ORAL
Qty: 30 | Refills: 0
Start: 2022-03-29 | End: 2022-04-27

## 2022-03-29 RX ADMIN — BUMETANIDE 1 MILLIGRAM(S): 0.25 INJECTION INTRAMUSCULAR; INTRAVENOUS at 09:18

## 2022-03-29 RX ADMIN — FINASTERIDE 5 MILLIGRAM(S): 5 TABLET, FILM COATED ORAL at 09:19

## 2022-03-29 RX ADMIN — CARVEDILOL PHOSPHATE 6.25 MILLIGRAM(S): 80 CAPSULE, EXTENDED RELEASE ORAL at 09:18

## 2022-03-29 RX ADMIN — ATORVASTATIN CALCIUM 20 MILLIGRAM(S): 80 TABLET, FILM COATED ORAL at 22:45

## 2022-03-29 RX ADMIN — Medication 81 MILLIGRAM(S): at 09:18

## 2022-03-29 RX ADMIN — RIVAROXABAN 20 MILLIGRAM(S): KIT at 16:48

## 2022-03-29 RX ADMIN — TAMSULOSIN HYDROCHLORIDE 0.4 MILLIGRAM(S): 0.4 CAPSULE ORAL at 22:45

## 2022-03-29 RX ADMIN — LISINOPRIL 10 MILLIGRAM(S): 2.5 TABLET ORAL at 09:18

## 2022-03-29 RX ADMIN — Medication 40 MILLIGRAM(S): at 09:17

## 2022-03-29 RX ADMIN — SODIUM CHLORIDE 500 MILLILITER(S): 9 INJECTION INTRAMUSCULAR; INTRAVENOUS; SUBCUTANEOUS at 15:34

## 2022-03-29 RX ADMIN — CARVEDILOL PHOSPHATE 6.25 MILLIGRAM(S): 80 CAPSULE, EXTENDED RELEASE ORAL at 22:45

## 2022-03-29 RX ADMIN — SPIRONOLACTONE 25 MILLIGRAM(S): 25 TABLET, FILM COATED ORAL at 09:19

## 2022-03-29 RX ADMIN — BUDESONIDE AND FORMOTEROL FUMARATE DIHYDRATE 2 PUFF(S): 160; 4.5 AEROSOL RESPIRATORY (INHALATION) at 09:12

## 2022-03-29 RX ADMIN — PANTOPRAZOLE SODIUM 40 MILLIGRAM(S): 20 TABLET, DELAYED RELEASE ORAL at 06:21

## 2022-03-29 NOTE — DISCHARGE NOTE PROVIDER - CARE PROVIDER_API CALL
Palla, Venugopal R (MD)  Cardiovascular Disease; Internal Medicine  [address], [place] [id number]  Phone: [phone]  Fax: [phone]  Follow Up Time:     Roscoe Quick)  Family Medicine  [address] [place] [address]  Phone: [phone]  Fax: [phone]  Follow Up Time:

## 2022-03-29 NOTE — DISCHARGE NOTE PROVIDER - NSDCMRMEDTOKEN_GEN_ALL_CORE_FT
Activated Charcoal 260 mg oral capsule: 2 cap(s) orally 3 times a day, As Needed  atorvastatin 20 mg oral tablet: 1 tab(s) orally once a day (at bedtime)  carvedilol 6.25 mg oral tablet: 1 tab(s) orally every 12 hours  finasteride 5 mg oral tablet: 1 tab(s) orally once a day  lisinopril 10 mg oral tablet: 1 tab(s) orally once a day  omeprazole 20 mg oral delayed release capsule: 1 cap(s) orally once a day  rivaroxaban 20 mg oral tablet: 1 tab(s) orally once a day (with dinner)   sertal compound: 1 tab(s) orally once a day, As Needed  ***Medication from the [place], Propinox HCl 10mg - Lysine Clonixinate 125mg***  tamsulosin 0.4 mg oral capsule: 1 cap(s) orally 2 times a day   albuterol 90 mcg/inh inhalation aerosol: 2 puff(s) inhaled every 6 hours, As needed, Bronchospasm  atorvastatin 20 mg oral tablet: 1 tab(s) orally once a day (at bedtime)  budesonide-formoterol 80 mcg-4.5 mcg/inh inhalation aerosol: 1 application inhaled 2 times a day   bumetanide 1 mg oral tablet: 1 tab(s) orally every 12 hours  carvedilol 6.25 mg oral tablet: 1 tab(s) orally every 12 hours  finasteride 5 mg oral tablet: 1 tab(s) orally once a day  lisinopril 10 mg oral tablet: 1 tab(s) orally once a day  omeprazole 20 mg oral delayed release capsule: 1 cap(s) orally once a day  rivaroxaban 20 mg oral tablet: 1 tab(s) orally once a day (with dinner)   sertal compound: 1 tab(s) orally once a day, As Needed  ***Medication from the [place], Propinox HCl 10mg - Lysine Clonixinate 125mg***  spironolactone 25 mg oral tablet: 1 tab(s) orally once a day  tamsulosin 0.4 mg oral capsule: 1 cap(s) orally 2 times a day   albuterol 90 mcg/inh inhalation aerosol: 2 puff(s) inhaled every 6 hours, As needed, Bronchospasm  atorvastatin 20 mg oral tablet: 1 tab(s) orally once a day (at bedtime)  budesonide-formoterol 80 mcg-4.5 mcg/inh inhalation aerosol: 1 application inhaled 2 times a day   carvedilol 6.25 mg oral tablet: 1 tab(s) orally every 12 hours  finasteride 5 mg oral tablet: 1 tab(s) orally once a day  furosemide 40 mg oral tablet: 1 tab(s) orally 2 times a day   lisinopril 10 mg oral tablet: 1 tab(s) orally once a day  omeprazole 20 mg oral delayed release capsule: 1 cap(s) orally once a day  rivaroxaban 20 mg oral tablet: 1 tab(s) orally once a day (with dinner)   sertal compound: 1 tab(s) orally once a day, As Needed  ***Medication from the [place], Propinox HCl 10mg - Lysine Clonixinate 125mg***  spironolactone 25 mg oral tablet: 1 tab(s) orally once a day  tamsulosin 0.4 mg oral capsule: 1 cap(s) orally 2 times a day

## 2022-03-29 NOTE — DISCHARGE NOTE PROVIDER - NSDCFUADDAPPT_GEN_ALL_CORE_FT
Follow-up appointment made with Dr. Palla Spoke with Dianne  Day: Monday  Date: April 4, 2022  Time: 11:15AM Follow-up appointment made with Dr. Palla Spoke with Dianne  Day: Monday  Date: April 4, 2022  Time: 11:15AM    Follow- appointment with Wolfgang Benitez   Date: April 6, 2022  Time: 10:40

## 2022-03-29 NOTE — DISCHARGE NOTE PROVIDER - NSDCCPCAREPLAN_GEN_ALL_CORE_FT
PRINCIPAL DISCHARGE DIAGNOSIS  Diagnosis: CHF exacerbation  Assessment and Plan of Treatment: # Acute on chronic combined systolic and diastolic CHF / R sided HF/ valvulopathy /  Severe Pulm HTN / congestive hepatopathy w/ ascites  - started on oral bumex 1 mg po BID  - on GDT  - current weight 190 lbs  - patient has a documented allergies at home on ACEI  documented in a chart -  but patient takes ACEI at home - without any AE  - cannot afford entresto   - place monitor daily weights  - ECHO reviewed: EF 45-50%, +1 MR, +4TR, Severe Pulm HTN, ASD vs PFO. Will need to d/w cardio  ASD  and shunt   - we need patient to have a close cardiology follow up      SECONDARY DISCHARGE DIAGNOSES  Diagnosis: Elevated troponin  Assessment and Plan of Treatment:      PRINCIPAL DISCHARGE DIAGNOSIS  Diagnosis: CHF exacerbation  Assessment and Plan of Treatment: # Acute on chronic combined systolic and diastolic CHF / R sided HF/ valvulopathy /  Severe Pulm HTN / congestive hepatopathy w/ ascites  - Bumex 1 mg orally 2 times daily  - discharge weight 167  - WHAT IS HEART FAILURE? Heart failure (HF) is a condition in which the heart cannot pump enough blood to meet the body’s needs. The weakening of the heart’s pumping ability results in the buildup of fluid in the feet, ankles and legs resulting in edema, tiredness, and shortness of breath.  THINGS TO DO: (1) Weigh yourself daily –  keep a log for you cardiologist (2) Maintain a heart healthy diet and limit dietary sodium (3) Drink liquids as directed – you may need to limit the amount of liquid you drink to prevent fluid buildup (4) Maintain a healthy weight (5) Stay active with exercise  MONITOR THESE SIGNS AND SYMPTOMS: (1) Worsening shortness of breath at rest or at night (2) worsening leg swelling (3) Abdominal pain or swelling (4) Chest pain or palpitations (5) Weight gain of 5lbs or more in 1 week or 2-3lbs in 24hours. If you experience any of these, DO alert your primary care provider, or return to the Emergency Department if you feel very sick.         SECONDARY DISCHARGE DIAGNOSES  Diagnosis: Elevated troponin  Assessment and Plan of Treatment: this is most likely secondary to demand ischemia     PRINCIPAL DISCHARGE DIAGNOSIS  Diagnosis: CHF exacerbation  Assessment and Plan of Treatment: # Acute on chronic combined systolic and diastolic CHF / R sided HF/ valvulopathy /  Severe Pulm HTN / congestive hepatopathy w/ ascites  - lasix 40 mg orally 2 times daily  - discharge weight 167  - please restart lasix tomorrow.   - WHAT IS HEART FAILURE? Heart failure (HF) is a condition in which the heart cannot pump enough blood to meet the body’s needs. The weakening of the heart’s pumping ability results in the buildup of fluid in the feet, ankles and legs resulting in edema, tiredness, and shortness of breath.  THINGS TO DO: (1) Weigh yourself daily –  keep a log for you cardiologist (2) Maintain a heart healthy diet and limit dietary sodium (3) Drink liquids as directed – you may need to limit the amount of liquid you drink to prevent fluid buildup (4) Maintain a healthy weight (5) Stay active with exercise  MONITOR THESE SIGNS AND SYMPTOMS: (1) Worsening shortness of breath at rest or at night (2) worsening leg swelling (3) Abdominal pain or swelling (4) Chest pain or palpitations (5) Weight gain of 5lbs or more in 1 week or 2-3lbs in 24hours. If you experience any of these, DO alert your primary care provider, or return to the Emergency Department if you feel very sick.         SECONDARY DISCHARGE DIAGNOSES  Diagnosis: Elevated troponin  Assessment and Plan of Treatment: this is most likely secondary to demand ischemia

## 2022-03-29 NOTE — DISCHARGE NOTE PROVIDER - CARE PROVIDERS DIRECT ADDRESSES
,[email or web address]

## 2022-03-29 NOTE — PROGRESS NOTE ADULT - SUBJECTIVE AND OBJECTIVE BOX
REASON FOR VISIT:  CHF, Edema    HPI:  76 year old man with a history of permanent atrial fibrillation, non-obstructive CAD, CRT-P, nonischemic cardiomyopathy, mitral regurgitation, HTN, pulmonary hypertension, COPD, anasarca who presented to the ED with complaints of bilateral leg edema associated with difficulty ambulating; suboptimal outpatient adherence has been suspected.    3/26/22:  Overall feeling better; no dyspnea; persistent leg edema/discomfort  3/27/22:  Welsh speaking only (  ID# [id number]) pt is thankful for all of the care he has received Denies SOB, Chest pain. Confirmed that Pt is not allergic to any medications, currently on ACE without adverse reactions.  Continues to c/o LE pain and edema with difficulty ambulating.   3/28/'22: no complaints ambulating in [place] w/o difficulty.  3/29/22: Patient without complaints of chest pain or shortness of breath.  Notes improvement in LE edema      MEDICATIONS:  OUTPATIENT  Home Medications:  Activated Charcoal 260 mg oral capsule: 2 cap(s) orally 3 times a day, As Needed (24 Mar 2022 18:17)  finasteride 5 mg oral tablet: 1 tab(s) orally once a day (24 Mar 2022 18:17)  omeprazole 20 mg oral delayed release capsule: 1 cap(s) orally once a day (24 Mar 2022 18:17)  sertal compound: 1 tab(s) orally once a day, As Needed  ***Medication from the [place], Propinox HCl 10mg - Lysine Clonixinate 125mg*** (24 Mar 2022 18:17)  tamsulosin 0.4 mg oral capsule: 1 cap(s) orally 2 times a day (24 Mar 2022 18:17)    MEDICATIONS  (STANDING):  aspirin  chewable 81 milliGRAM(s) Oral daily  atorvastatin 20 milliGRAM(s) Oral at bedtime  budesonide  80 MICROgram(s)/formoterol 4.5 MICROgram(s) Inhaler 2 Puff(s) Inhalation two times a day  buMETAnide 1 milliGRAM(s) Oral every 12 hours  carvedilol 6.25 milliGRAM(s) Oral every 12 hours  finasteride 5 milliGRAM(s) Oral daily  lisinopril 10 milliGRAM(s) Oral daily  pantoprazole    Tablet 40 milliGRAM(s) Oral before breakfast  rivaroxaban 20 milliGRAM(s) Oral with dinner  spironolactone 25 milliGRAM(s) Oral daily  tamsulosin 0.4 milliGRAM(s) Oral at bedtime    MEDICATIONS  (PRN):  acetaminophen     Tablet .. 650 milliGRAM(s) Oral every 6 hours PRN Mild Pain (1 - 3)  ALBUTerol    90 MICROgram(s) HFA Inhaler 2 Puff(s) Inhalation every 6 hours PRN Bronchospasm    Vital Signs Last 24 Hrs  T(C): 36.9 (29 Mar 2022 08:54), Max: 36.9 (29 Mar 2022 08:54)  T(F): 98.5 (29 Mar 2022 08:54), Max: 98.5 (29 Mar 2022 08:54)  HR: 76 (29 Mar 2022 09:14) (60 - 76)  BP: 103/61 (29 Mar 2022 09:14) (92/59 - 103/61)  BP(mean): --  RR: 18 (29 Mar 2022 08:54) (18 - 18)  SpO2: 94% (29 Mar 2022 08:54) (94% - 96%)  I&O's Detail      PHYSICAL EXAM:    GENERAL: NAD, A&Ox3, [place] citizen speaking  HEAD:  Atraumatic, Normocephalic  EYES: EOMI, PERRLA, conjunctiva and sclera clear  NECK: Supple, No JVD, No LAD  CHEST/LUNG: Clear to auscultation bilaterally; No wheeze  HEART: s1 s2 Regular rate and rhythm; No murmurs, rubs, or gallops  ABDOMEN: Soft, Nontender, Nondistended; Bowel sounds present X 4 quadrants   EXTREMITIES: B/L LE edema, appears chronic in nature. Pink/healing ulcer on RLE   Psych: normal affect and behavior, calm and cooperative     Tele: Vpaced       ===============================  ===============================  LABS:                           13.4   6.53  )-----------( 168      ( 29 Mar 2022 08:03 )             43.1                           13.4   6.64  )-----------( 161      ( 28 Mar 2022 09:13 )             43.5     03-29    140  |  102  |  34<H>  ----------------------------<  105<H>  4.1   |  34<H>  |  1.21    Ca    9.1      29 Mar 2022 08:03    TPro  7.3  /  Alb  3.4  /  TBili  1.1  /  DBili  x   /  AST  35  /  ALT  28  /  AlkPhos  195<H>  03-29      28 Mar 2022 09:13    139    |  101    |  30     ----------------------------<  135    4.4     |  35     |  1.22   26 Mar 2022 08:53    139    |  102    |  20     ----------------------------<  105    3.8     |  33     |  1.11     Ca    9.6        28 Mar 2022 09:13  Ca    8.8        26 Mar 2022 08:53    TPro  6.8    /  Alb  3.1    /  TBili  1.4    /  DBili  x      /  AST  26     /  ALT  23     /  AlkPhos  168    26 Mar 2022 08:53      ===============================  ===============================  CARDIAC BIOMARKERS:  BNP  Serum Pro-Brain Natriuretic Peptide: 1957 pg/mL *H* [0 - 450] (03-26-22 @ 08:53)  Serum Pro-Brain Natriuretic Peptide: 2802 pg/mL *H* [0 - 450] (03-24-22 @ 20:41)  Serum Pro-Brain Natriuretic Peptide: 3143 pg/mL *H* [0 - 450] (03-24-22 @ 15:09)  Serum Pro-Brain Natriuretic Peptide: 1811 pg/mL *H* [0 - 450] (10-31-20 @ 07:39)  Serum Pro-Brain Natriuretic Peptide: 3577 pg/mL *H* [0 - 450] (10-27-20 @ 18:04)  Serum Pro-Brain Natriuretic Peptide: 4927 pg/mL *H* [0 - 450] (08-27-20 @ 20:05)  Serum Pro-Brain Natriuretic Peptide: 5785 pg/mL *H* [0 - 450] (05-11-20 @ 15:58)  Serum Pro-Brain Natriuretic Peptide: 5786 pg/mL *H* [0 - 125] (06-22-17 @ 06:01)  Serum Pro-Brain Natriuretic Peptide: 5932 pg/mL *H* [0 - 125] (06-22-17 @ 02:49)  Serum Pro-Brain Natriuretic Peptide: 7251 pg/mL *H* [0 - 125] (06-21-17 @ 17:08)  Serum Pro-Brain Natriuretic Peptide: 3352 pg/mL *H* [0 - 300] (05-09-17 @ 19:38)  Serum Pro-Brain Natriuretic Peptide: 4180 pg/mL *H* [0 - 125] (05-02-17 @ 06:06)  Serum Pro-Brain Natriuretic Peptide: 6941 pg/mL *H* [0 - 125] (05-01-17 @ 05:16)  Serum Pro-Brain Natriuretic Peptide: 2462 pg/mL *H* [0 - 125] (04-15-17 @ 06:27)  Serum Pro-Brain Natriuretic Peptide: 2387 pg/mL *H* [0 - 125] (04-14-17 @ 23:46)  Serum Pro-Brain Natriuretic Peptide: 2949 pg/mL *H* [0 - 125] (04-14-17 @ 17:24)      TROPONIN  Troponin I, High Sensitivity Result: 193.98 ng/L *H* (03-25-22 @ 08:19)  Troponin I, High Sensitivity Result: 185.45 ng/L *H* (03-24-22 @ 20:41)  Troponin I, High Sensitivity Result: 198.24 ng/L *H* (03-24-22 @ 15:09)  Troponin I, Serum: 0.130 ng/mL *H* [0.015 - 0.045] (10-28-20 @ 07:30)  Troponin I, Serum: 0.129 ng/mL *H* [0.015 - 0.045] (10-27-20 @ 20:30)  Troponin I, Serum: 0.132 ng/mL *H* [0.015 - 0.045] (10-27-20 @ 18:04)  Troponin I, Serum: 0.177 ng/mL *H* [0.015 - 0.045] (08-28-20 @ 02:56)  Troponin I, Serum: 0.165 ng/mL *H* [0.015 - 0.045] (08-27-20 @ 23:58)  Troponin I, Serum: 0.163 ng/mL *H* [0.015 - 0.045] (08-27-20 @ 20:05)  Troponin I, Serum: 0.210 ng/mL *H* [0.015 - 0.045] (05-13-20 @ 21:37)  Troponin I, Serum: 0.197 ng/mL *H* [0.015 - 0.045] (05-12-20 @ 07:59)  Troponin I, Serum: 0.202 ng/mL *H* [0.015 - 0.045] (05-11-20 @ 19:33)  Troponin I, Serum: 0.181 ng/mL *H* [0.015 - 0.045] (05-11-20 @ 15:58)  Troponin I, Serum: 0.248 ng/mL *H* [0.015 - 0.045] (06-22-17 @ 02:49)  Troponin I, Serum: 0.234 ng/mL *H* [0.015 - 0.045] (06-21-17 @ 17:08)  Troponin T, Serum: <0.01 ng/mL [0.00 - 0.06] (05-12-17 @ 03:56)  Troponin I, Serum: 0.650 ng/mL *H* [0.015 - 0.045] (05-09-17 @ 07:56)  Troponin I, Serum: 0.427 ng/mL *H* [0.015 - 0.045] (05-02-17 @ 06:06)  Troponin I, Serum: 0.416 ng/mL *H* [0.015 - 0.045] (05-01-17 @ 09:09)  Troponin I, Serum: 0.400 ng/mL *H* [0.015 - 0.045] (05-01-17 @ 05:16)  Troponin I, Serum: 0.450 ng/mL *H* [0.015 - 0.045] (04-17-17 @ 09:29)  Troponin I, Serum: 0.465 ng/mL *H* [0.015 - 0.045] (04-17-17 @ 06:10)  Troponin I, Serum: 0.442 ng/mL *H* [0.015 - 0.045] (04-17-17 @ 00:25)  Troponin I, Serum: 0.436 ng/mL *H* [0.015 - 0.045] (04-14-17 @ 19:24)  Troponin I, Serum: 0.422 ng/mL *H* [0.015 - 0.045] (04-14-17 @ 17:24)    ===============================  ===============================  Radiology:  12 Lead ECG (03.24.22 @ 14:21):  Ventricular-paced rhythm with occasional Premature ventricular complexes 62/min.  Wide QRS rhythm.  When compared with ECG of 29-OCT-2020 06:57, Similar to prior ekg. Premature ventricular complexes are now Present    US Abdomen Limited (03.24.22 @ 19:32):  Moderate volume ascites. Right pleural effusion.    Xray Chest 1 View-PORTABLE IMMEDIATE (Xray Chest 1 View-PORTABLE IMMEDIATE .) (03.24.22 @ 15:41):  Gross heart enlargement, left-sided pacemaker, and Micra pacemaker maker again noted.  This slight left perihilar infiltrate again noted.  Chest is similar to October 27, 2020.    TTE Echo Complete w/o Contrast w/ Doppler (08.28.20 @ 21:42):   Moderate to severe (3+)mitral regurgitation.   Mild to Moderate aortic regurgitation.   Mild pulmonary hypertension.   Mild (1+) tricuspid valveregurgitation is present.   Estimated left ventricular ejection fraction is 45 %. The left ventricle cavity is mildly dilated. The left ventricle is normal in wall thickness. Mild, diffuse hypokinesis of the left ventricle is present.   The right ventricle is normal in size, wall thickness, wall motion, and contractility based on TAPSE.   A device wire is seen in the RV and RA.    TTE Echo Complete w/o Contrast w/ Doppler (03.25.22 @ 12:36):   Mild to Moderate mitral regurgitation.   Mild aortic sclerosis is present with normal valvular opening. Mild (1+) aortic regurgitation is present.   Severe (4+) tricuspid valve regurgitation.   Severe pulmonary hypertension.   The left atrium is severely dilated.   The left ventricle size is normal.   The apex appears hypokinetic. paradoxical septal motion Estimated left ventricular ejection fraction is 45-50 %.   IVC is dilated and not collapsing with inspiration.   A PFO vs ASD is noted with left to right and right to left shunt.   Ascites is noted.        ===============================

## 2022-03-29 NOTE — DISCHARGE NOTE PROVIDER - HOSPITAL COURSE
CC: LE edema/Abd distention (27 Mar 2022 11:10)    HPI: 76 year old male with PMHx of systolic CHF (EF 45%), anasarca, COPD - Severe Pulmonary HTN, HTN, dyslipidemia, CAD, a-fib/PPM on xarelto, arthritis, ESBL UTI c/o abdominal distention and bilateral LE swelling x 2 weeks. Was seen at [facility] [place]note 2] and asked to start a new regimen to combat his symptoms however he endorses symptoms have not improved and have actually gotten worse. He also endorses some mild chest discomfort that is present intermittently Non radiating. No PND. No hemoptysis. No cough. No exertional component. But feels somewhat sob when he is exerting himself. Most recent cath 8/2020: non obstructive CAD with e/o severe pulmonary HTN and severe mitral regurg. EF45%     Medical progress: Denies any HA, CP, SOB. No fevers, chills or shakes. Patient requires no O2 support. Ambulating without any difficulties.   Complaints: no new complaints     Physical Exam:   GENERAL APPEARANCE:  NAD, hemodynamically stable  T(C): 36.9 (03-29-22 @ 08:54), Max: 36.9 (03-29-22 @ 08:54)  HR: 68 (03-29-22 @ 08:54) (60 - 68)  BP: 92/59 (03-29-22 @ 08:54) (92/59 - 102/60)  RR: 18 (03-29-22 @ 08:54) (18 - 18)  HEENT:  Head is normocephalic    Skin:  Warm and dry without any rash   NECK:  Supple without lymphadenopathy.   HEART:  Regular rate and rhythm. normal S1 and S2, No M/R/G  LUNGS:  Good ins/exp effort, no W/R/R/C  ABDOMEN:  Soft, nontender, nondistended with good bowel sounds heard  EXTREMITIES:  Without cyanosis, clubbing or edema.   NEUROLOGICAL:  Gross nonfocal       # Acute on chronic combined systolic and diastolic CHF / R sided HF/ valvulopathy /  Severe Pulm HTN / congestive hepatopathy w/ ascites:   - started on oral bumex 1 mg po BID  - on GDT  - current weight 190 lbs  - patient has a documented allergies at home on ACEI  documented in a chart -  but patient takes ACEI at home - without any AE  - cannot afford entresto   - Strict I/Os  - Low Na diet/Fluid restriction   - ECHO reviewed: EF 45-50%, +1 MR, +4TR, Severe Pulm HTN, ASD vs PFO. Will need to d/w cardio  ASD  and shunt   - repeat abd sono to reevaluate ascites   - we need patient to have a close cardiology follow up    # Mild elevation in troponin: Due to demand in setting of acute HF   - no chest pain  - Cont ASA, carvedilol     # Chronic Afib. S/p CRT-P  -c/w xarelto/BB  - statin  -PPM interrogated chronic afib v paced    #COPD/Severe PHTN:  - DAISY/LABA      DVT: Xarelto     CC: LE edema/Abd distention (27 Mar 2022 11:10)    HPI: 76 year old male with PMHx of systolic CHF (EF 45%), anasarca, COPD - Severe Pulmonary HTN, HTN, dyslipidemia, CAD, a-fib/PPM on xarelto, arthritis, ESBL UTI c/o abdominal distention and bilateral LE swelling x 2 weeks. Was seen at [facility] and asked to start a new regimen to combat his symptoms however he endorses symptoms have not improved and have actually gotten worse. He also endorses some mild chest discomfort that is present intermittently Non radiating. No PND. No hemoptysis. No cough. No exertional component. But feels somewhat sob when he is exerting himself. Most recent cath 8/2020: non obstructive CAD with e/o severe pulmonary HTN and severe mitral regurg. EF45%.     Medical progress: Denies any HA, CP, SOB. No fevers, chills or shakes. Patient requires no O2 support. Ambulating without any difficulties. Patient wants to go home. Patient is having hard time measuring himself for his weight.  care discussed with Dr. Palla's team /  Pallaitive care team /  Pharmacy.   Complaints: no new complaints     Physical Exam:   GENERAL APPEARANCE:  NAD, hemodynamically stable  T(C): 36.9 (03-29-22 @ 08:54), Max: 36.9 (03-29-22 @ 08:54)  HR: 68 (03-29-22 @ 08:54) (60 - 68)  BP: 92/59 (03-29-22 @ 08:54) (92/59 - 102/60)  RR: 18 (03-29-22 @ 08:54) (18 - 18)  HEENT:  Head is normocephalic    Skin:  Warm and dry without any rash   NECK:  Supple without lymphadenopathy.   HEART:  Regular rate and rhythm. normal S1 and S2, No M/R/G  LUNGS:  Good ins/exp effort, no W/R/R/C  ABDOMEN:  Soft, nontender, nondistended with good bowel sounds heard  EXTREMITIES:  Without cyanosis, clubbing or edema.   NEUROLOGICAL:  Gross nonfocal               # Chronic Afib. S/p CRT-P  -c/w xarelto/BB  - statin  -PPM interrogated chronic afib v paced    #COPD/Severe PHTN:  - DAISY/LABA      DVT: Xarelto     CC: LE edema/Abd distention (27 Mar 2022 11:10)    HPI: 76 year old male with PMHx of systolic CHF (EF 45%), anasarca, COPD - Severe Pulmonary HTN, HTN, dyslipidemia, CAD, a-fib/PPM on xarelto, arthritis, ESBL UTI c/o abdominal distention and bilateral LE swelling x 2 weeks. Was seen at [facility] and asked to start a new regimen to combat his symptoms however he endorses symptoms have not improved and have actually gotten worse. He also endorses some mild chest discomfort that is present intermittently Non radiating. No PND. No hemoptysis. No cough. No exertional component. But feels somewhat sob when he is exerting himself. Most recent cath 8/2020: non obstructive CAD with e/o severe pulmonary HTN and severe mitral regurg. EF45%.     Medical progress: Denies any HA, CP, SOB. No fevers, chills or shakes. Patient requires no O2 support. Ambulating without any difficulties. Patient wants to go home. Patient is having hard time measuring himself for his weight.  care discussed with Dr. Palla's team /  Pallaitive care team /  Pharmacy.   Complaints: no new complaints       Patient was planned to be discharged today - patient might have been overdiuresed causing patient to become orthostatic. Will monitor patient in the hospital one more day.     Physical Exam:   GENERAL APPEARANCE:  NAD, hemodynamically stable  T(C): 36.9 (03-29-22 @ 08:54), Max: 36.9 (03-29-22 @ 08:54)  HR: 68 (03-29-22 @ 08:54) (60 - 68)  BP: 92/59 (03-29-22 @ 08:54) (92/59 - 102/60)  RR: 18 (03-29-22 @ 08:54) (18 - 18)  HEENT:  Head is normocephalic    Skin:  Warm and dry without any rash   NECK:  Supple without lymphadenopathy.   HEART:  Regular rate and rhythm. normal S1 and S2, No M/R/G  LUNGS:  Good ins/exp effort, no W/R/R/C  ABDOMEN:  Soft, nontender, nondistended with good bowel sounds heard  EXTREMITIES:  Without cyanosis, clubbing or edema.   NEUROLOGICAL:  Gross nonfocal               # Chronic Afib. S/p CRT-P  -c/w xarelto/BB  - statin  -PPM interrogated chronic afib v paced    #COPD/Severe PHTN:  - DAISY/LABA      DVT: Xarelto     CC: LE edema/Abd distention (27 Mar 2022 11:10)    HPI: 76 year old male with PMHx of systolic CHF (EF 45%), anasarca, COPD - Severe Pulmonary HTN, HTN, dyslipidemia, CAD, a-fib/PPM on xarelto, arthritis, ESBL UTI c/o abdominal distention and bilateral LE swelling x 2 weeks. Was seen at [facility] and asked to start a new regimen to combat his symptoms however he endorses symptoms have not improved and have actually gotten worse. He also endorses some mild chest discomfort that is present intermittently Non radiating. No PND. No hemoptysis. No cough. No exertional component. But feels somewhat sob when he is exerting himself. Most recent cath 8/2020: non obstructive CAD with e/o severe pulmonary HTN and severe mitral regurg. EF45%.     Medical progress: Denies any HA, CP, SOB. No fevers, chills or shakes. Patient requires no O2 support. Ambulating without any difficulties. Patient wants to go home. Patient is having hard time measuring himself for his weight.  care discussed with Dr. Palla's team /  Pallaitive care team /  Pharmacy.   Complaints: no new complaints       Patient was planned to be discharged today - patient might have been overdiuresed causing patient to become orthostatic. Will monitor patient in the hospital one more day. WIll give 500 cc of fluids    Physical Exam:   GENERAL APPEARANCE:  NAD, hemodynamically stable  T(C): 36.9 (03-29-22 @ 08:54), Max: 36.9 (03-29-22 @ 08:54)  HR: 68 (03-29-22 @ 08:54) (60 - 68)  BP: 92/59 (03-29-22 @ 08:54) (92/59 - 102/60)  RR: 18 (03-29-22 @ 08:54) (18 - 18)  HEENT:  Head is normocephalic    Skin:  Warm and dry without any rash   NECK:  Supple without lymphadenopathy.   HEART:  Regular rate and rhythm. normal S1 and S2, No M/R/G  LUNGS:  Good ins/exp effort, no W/R/R/C  ABDOMEN:  Soft, nontender, nondistended with good bowel sounds heard  EXTREMITIES:  Without cyanosis, clubbing or edema.   NEUROLOGICAL:  Gross nonfocal               # Chronic Afib. S/p CRT-P  -c/w xarelto/BB  - statin  -PPM interrogated chronic afib v paced    #COPD/Severe PHTN:  - DAISY/LABA      DVT: Xarelto     CC: LE edema/Abd distention (27 Mar 2022 11:10)    HPI: 76 year old male with PMHx of systolic CHF (EF 45%), anasarca, COPD - Severe Pulmonary HTN, HTN, dyslipidemia, CAD, a-fib/PPM on xarelto, arthritis, ESBL UTI c/o abdominal distention and bilateral LE swelling x 2 weeks. Was seen at [facility] [place] and asked to start a new regimen to combat his symptoms however he endorses symptoms have not improved and have actually gotten worse. He also endorses some mild chest discomfort that is present intermittently Non radiating. No PND. No hemoptysis. No cough. No exertional component. But feels somewhat sob when he is exerting himself. Most recent cath 8/2020: non obstructive CAD with e/o severe pulmonary HTN and severe mitral regurg. EF45%.     Medical progress: Patient denies any HA, CP, SOB. No fevers, chills or shakes. patient continues to have orthostatics -  but patient is fully   Complaints: no new complaints       Patient was planned to be discharged today - patient might have been overdiuresed causing patient to become orthostatic. Will monitor patient in the hospital one more day. WIll give 500 cc of fluids    Physical Exam:   GENERAL APPEARANCE:  NAD, hemodynamically stable  T(C): 36.3 (30 Mar 2022 08:39), Max: 36.5 (29 Mar 2022 15:46)  T(F): 97.3 (30 Mar 2022 08:39), Max: 97.7 (29 Mar 2022 15:46)  HR: 80 (30 Mar 2022 10:22) (61 - 80)  BP: 117/72 (30 Mar 2022 10:22) (90/52 - 119/65)  RR: 18 (30 Mar 2022 08:39) (18 - 18)  SpO2: 98% (30 Mar 2022 08:39) (97% - 98%)  HEENT:  Head is normocephalic    Skin:  Warm and dry without any rash   NECK:  Supple without lymphadenopathy.   HEART:  Regular rate and rhythm. normal S1 and S2, No M/R/G  LUNGS:  Good ins/exp effort, no W/R/R/C  ABDOMEN:  Soft, nontender, nondistended with good bowel sounds heard  EXTREMITIES:  Without cyanosis, clubbing or edema.   NEUROLOGICAL:  Gross nonfocal        CC: LE edema/Abd distention (27 Mar 2022 11:10)    HPI: 76 year old male with PMHx of systolic CHF (EF 45%), anasarca, COPD - Severe Pulmonary HTN, HTN, dyslipidemia, CAD, a-fib/PPM on xarelto, arthritis, ESBL UTI c/o abdominal distention and bilateral LE swelling x 2 weeks. Was seen at [facility] and asked to start a new regimen to combat his symptoms however he endorses symptoms have not improved and have actually gotten worse. He also endorses some mild chest discomfort that is present intermittently Non radiating. No PND. No hemoptysis. No cough. No exertional component. But feels somewhat sob when he is exerting himself. Most recent cath 8/2020: non obstructive CAD with e/o severe pulmonary HTN and severe mitral regurg. EF45%.     Medical progress: Patient denies any HA, CP, SOB. No fevers, chills or shakes. patient continues to have orthostatics -  but patient is fully   Complaints: no new complaints       Physical Exam:   GENERAL APPEARANCE:  NAD, hemodynamically stable  T(C): 36.3 (30 Mar 2022 08:39), Max: 36.5 (29 Mar 2022 15:46)  T(F): 97.3 (30 Mar 2022 08:39), Max: 97.7 (29 Mar 2022 15:46)  HR: 80 (30 Mar 2022 10:22) (61 - 80)  BP: 117/72 (30 Mar 2022 10:22) (90/52 - 119/65)  RR: 18 (30 Mar 2022 08:39) (18 - 18)  SpO2: 98% (30 Mar 2022 08:39) (97% - 98%)  HEENT:  Head is normocephalic    Skin:  Warm and dry without any rash   NECK:  Supple without lymphadenopathy.   HEART:  Regular rate and rhythm. normal S1 and S2, No M/R/G  LUNGS:  Good ins/exp effort, no W/R/R/C  ABDOMEN:  Soft, nontender, nondistended with good bowel sounds heard  EXTREMITIES:  Without cyanosis, clubbing or edema.   NEUROLOGICAL:  Gross nonfocal

## 2022-03-30 VITALS — DIASTOLIC BLOOD PRESSURE: 72 MMHG | HEART RATE: 80 BPM | SYSTOLIC BLOOD PRESSURE: 117 MMHG

## 2022-03-30 PROCEDURE — 99232 SBSQ HOSP IP/OBS MODERATE 35: CPT

## 2022-03-30 PROCEDURE — 99238 HOSP IP/OBS DSCHRG MGMT 30/<: CPT

## 2022-03-30 RX ORDER — FUROSEMIDE 40 MG
1 TABLET ORAL
Qty: 60 | Refills: 0
Start: 2022-03-30 | End: 2022-04-28

## 2022-03-30 RX ADMIN — BUDESONIDE AND FORMOTEROL FUMARATE DIHYDRATE 2 PUFF(S): 160; 4.5 AEROSOL RESPIRATORY (INHALATION) at 09:44

## 2022-03-30 RX ADMIN — LISINOPRIL 10 MILLIGRAM(S): 2.5 TABLET ORAL at 10:36

## 2022-03-30 RX ADMIN — PANTOPRAZOLE SODIUM 40 MILLIGRAM(S): 20 TABLET, DELAYED RELEASE ORAL at 06:04

## 2022-03-30 RX ADMIN — FINASTERIDE 5 MILLIGRAM(S): 5 TABLET, FILM COATED ORAL at 10:36

## 2022-03-30 RX ADMIN — SPIRONOLACTONE 25 MILLIGRAM(S): 25 TABLET, FILM COATED ORAL at 10:35

## 2022-03-30 RX ADMIN — Medication 81 MILLIGRAM(S): at 10:36

## 2022-03-30 RX ADMIN — CARVEDILOL PHOSPHATE 6.25 MILLIGRAM(S): 80 CAPSULE, EXTENDED RELEASE ORAL at 10:37

## 2022-03-30 NOTE — PROGRESS NOTE ADULT - PROBLEM SELECTOR PROBLEM 3
Arsen Berry  Admission Date: 7/6/2021             Daily Progress Note: 7/10/2021  79 y old WM s/p CABG x 4 on 7/6 with Dr Ascencion Bunch. Extubated on 7/7 and O2 weaned off. Pt states he was told many years ago that he had COPD and was placed on spiriva. He states it was stopped because he had no improvement in his symptoms. He mentions he has a history of exposure to asbestos and had a workup about 10 yrs ago. He states the workup went on for 1 1/2 years. He has a hard time remembering a series of events, but states the workup started because of a possible abnormal finding on imaging. He denies any changes in his symptoms since then. Subjective:     Awake in bed, RA sats, 93%, states cough has increased this am, occassionally productive. A. Fib/bradycardic during the night now NSR.  CXR completed today- small bilateral pleural effusions with no visible pneumothorax      Current Facility-Administered Medications   Medication Dose Route Frequency    bisacodyL (DULCOLAX) suppository 10 mg  10 mg Rectal DAILY PRN    primidone (MYSOLINE) tablet 250 mg  250 mg Oral DAILY    tamsulosin (FLOMAX) capsule 0.4 mg  0.4 mg Oral DAILY    melatonin tablet 10 mg  10 mg Oral QHS    furosemide (LASIX) tablet 20 mg  20 mg Oral DAILY    magnesium hydroxide (MILK OF MAGNESIA) 400 mg/5 mL oral suspension 30 mL  30 mL Oral DAILY PRN    alum-mag hydroxide-simeth (MYLANTA) oral suspension 30 mL  30 mL Oral Q4H PRN    ondansetron (ZOFRAN) injection 4 mg  4 mg IntraVENous Q4H PRN    magnesium oxide (MAG-OX) tablet 400 mg  400 mg Oral TID PRN    magnesium oxide (MAG-OX) tablet 400 mg  400 mg Oral QID PRN    potassium chloride (KLOR-CON) tablet 10 mEq  10 mEq Oral DAILY    potassium chloride (K-DUR, KLOR-CON) SR tablet 20 mEq  20 mEq Oral BID PRN    potassium chloride (K-DUR, KLOR-CON) SR tablet 40 mEq  40 mEq Oral BID PRN    senna-docusate (PERICOLACE) 8.6-50 mg per tablet 2 Tablet  2 Tablet
Oral Q12H    metoprolol tartrate (LOPRESSOR) tablet 25 mg  25 mg Oral Q12H    amiodarone (CORDARONE) tablet 200 mg  200 mg Oral Q12H    famotidine (PEPCID) tablet 20 mg  20 mg Oral Q12H    traMADoL (ULTRAM) tablet 50 mg  50 mg Oral Q6H PRN    oxyCODONE-acetaminophen (PERCOCET) 5-325 mg per tablet 1 Tablet  1 Tablet Oral Q4H PRN    alcohol 62% (NOZIN) nasal  1 Ampule  1 Ampule Topical Q12H    sodium chloride (NS) flush 5-40 mL  5-40 mL IntraVENous Q8H    sodium chloride (NS) flush 5-40 mL  5-40 mL IntraVENous PRN    acetaminophen (TYLENOL) tablet 650 mg  650 mg Oral Q4H PRN    aspirin chewable tablet 81 mg  81 mg Oral DAILY       Review of Systems    Constitutional: negative for fever, chills, sweats  Cardiovascular: negative for syncope, edema  Gastrointestinal:  negative for dysphagia, reflux, vomiting, diarrhea, abdominal pain, or melena  Neurologic:  negative for focal weakness, numbness, headache    Objective:     Vitals:    07/10/21 0440 07/10/21 0441 07/10/21 0634 07/10/21 0716   BP: (!) 151/70  (!) 113/57 119/65   Pulse: 60  67 68   Resp:    16   Temp:    98 °F (36.7 °C)   SpO2:    93%   Weight:  171 lb 3.2 oz (77.7 kg)     Height:             Intake/Output Summary (Last 24 hours) at 7/10/2021 0915  Last data filed at 7/10/2021 0817  Gross per 24 hour   Intake 600 ml   Output 1550 ml   Net -950 ml       Physical Exam:   Constitution:  the patient is well developed and in no acute distress  Respiratory: BBS clear  Cardiovascular:  RRR without M,G,R  Gastrointestinal: soft and non-tender; with positive bowel sounds. Musculoskeletal: warm without cyanosis. There is no lower extremity edema.   Skin:  no jaundice or rashes, surgical cardiac wound intact no drainage noted  Neurologic: no gross neuro deficits     Psychiatric:  alert and oriented    CXR: 2/10/2021            LAB  Recent Labs     07/09/21  1552 07/09/21  1050 07/09/21  0710 07/08/21  2104 07/08/21  1603   GLUCPOC 136* 134* 124* 124*
135*      Recent Labs     07/10/21  0355 07/09/21  0323 07/08/21  0541   WBC 10.6 10.5 12.4*   HGB 8.8* 8.9* 9.8*   HCT 26.4* 27.1* 29.5*    179 180     Recent Labs     07/10/21  0355 07/09/21  0323 07/08/21  0541   NA  --  137 139   K 4.1 3.9 4.1   CL  --  105 107   CO2  --  28 28   GLU  --  129* 136*   BUN  --  13 10   CREA  --  0.76* 0.75*   MG 2.4 2.2 2.3   CA  --  8.1* 7.9*         Assessment:  (Medical Decision Making)     Hospital Problems  Date Reviewed: 7/6/2021        Codes Class Noted POA    Postprocedural pneumothorax ICD-10-CM: J95.811  ICD-9-CM: 512.1  7/9/2021 Unknown        CAD (coronary artery disease) ICD-10-CM: I25.10  ICD-9-CM: 414.00  7/6/2021 Unknown        Atherosclerosis of native coronary artery of native heart with unstable angina pectoris (HCC) ICD-10-CM: I25.110  ICD-9-CM: 414.01, 411.1  7/6/2021 Unknown        * (Principal) S/P CABG x 4 ICD-10-CM: Z95.1  ICD-9-CM: V45.81  7/6/2021 Unknown              Plan:  (Medical Decision Making)     --CXR today to follow up left small apical pneumothorax  --agrees to notify nurse if cough worsens  --continue PT/OT  --Case Management-- plans to discharge home with [facility]  --Afib and bradycardic during the night HR 46 currently Lopressor 25 bid/amiodarone    More than 50% of the time documented was spent in face-to-face contact with the patient and in the care of the patient on the floor/unit where the patient is located. Wilfred Junior NP        Lungs: clear  Heart:  RRR with no Murmur/Rubs/Gallops    Additional Comments:  Small PTX has resolved on cXR, on RA, nothing else to add, will sign off, call if needed    I have spoken with and examined the patient. I agree with the above assessment and plan as documented.     Arpit Tracey MD
Permanent atrial fibrillation

## 2022-03-30 NOTE — PROGRESS NOTE ADULT - PROBLEM SELECTOR PLAN 2
Severe; recommend assessment of SPO2 with ambulation to determine need for supplemental O2
Severe; recommend assessment of SPO2 with ambulation to determine need for supplemental O2
O2 w/ ambulation 97% indicating no need for home O2.
Stable O2 sat RA 97%
O2 w/ ambulation 97% indicating no need for home O2.

## 2022-03-30 NOTE — PROGRESS NOTE ADULT - NS ATTEND AMEND GEN_ALL_CORE FT
Chronic heart disease (CHF, pulmonary HTN) - admitted with modest worsening of chronic edema; better with IV diuresis; can probably change to oral Lasix in 24 hours; HTN has improved.  * I discussed with Dr Zimmerman; outpatient f/u with Dr Palla upon discharge
Patient with non ischemic cardiomyopathy acute systolic heart failure , non compliant to medication and diet ,  who is clinically improved with IV diuresis , now low normal range sbp ,   continue current medication ,  follow up as OP     discussed with dr ledesma
Discussed plan of care with NP. Agree with plan of care. Amendments made to documentation as necessary

## 2022-03-30 NOTE — PROGRESS NOTE ADULT - PROBLEM SELECTOR PLAN 5
Asked to comment on possible ASD on echo on 3/25.  Report states: "A PFO vs ASD is noted with left to right and right to left shunt."  Possible intracardiac shunt evaluated w/ cath Aug '20, no significant shunt found.  Pt likely has PFO.  No indication for further evaluation or treatment for this lesion.
possible ASD on echo on 3/25.  Report states: "A PFO vs ASD is noted with left to right and right to left shunt."  Possible intracardiac shunt evaluated w/ cath Aug '20, no significant shunt found.  Pt likely has PFO.  No indication for further evaluation or treatment for this lesion.
Asked to comment on possible ASD on echo on 3/25.  Report states: "A PFO vs ASD is noted with left to right and right to left shunt."  Possible intracardiac shunt evaluated w/ cath Aug '20, no significant shunt found.  Pt likely has PFO.  No indication for further evaluation or treatment for this lesion.

## 2022-03-30 NOTE — PROGRESS NOTE ADULT - REASON FOR ADMISSION
LE edema/Abd distention

## 2022-03-30 NOTE — PROGRESS NOTE ADULT - SUBJECTIVE AND OBJECTIVE BOX
REASON FOR VISIT:  CHF, Edema    HPI:  76 year old man with a history of permanent atrial fibrillation, non-obstructive CAD, CRT-P, nonischemic cardiomyopathy, mitral regurgitation, HTN, pulmonary hypertension, COPD, anasarca who presented to the ED with complaints of bilateral leg edema associated with difficulty ambulating; suboptimal outpatient adherence has been suspected.    3/26/22:  Overall feeling better; no dyspnea; persistent leg edema/discomfort  3/27/22:  Portuguese speaking only (  ID# [id number]) pt is thankful for all of the care he has received Denies SOB, Chest pain. Confirmed that Pt is not allergic to any medications, currently on ACE without adverse reactions.  Continues to c/o LE pain and edema with difficulty ambulating.   3/28/'22: no complaints ambulating in [place] w/o difficulty.  3/29/22: Patient without complaints of chest pain or shortness of breath.  Notes improvement in LE edema  3/30/22: Awake alert feeling well no CP/SOB wishes to go home      MEDICATIONS:  OUTPATIENT  Home Medications:  Activated Charcoal 260 mg oral capsule: 2 cap(s) orally 3 times a day, As Needed (24 Mar 2022 18:17)  finasteride 5 mg oral tablet: 1 tab(s) orally once a day (24 Mar 2022 18:17)  omeprazole 20 mg oral delayed release capsule: 1 cap(s) orally once a day (24 Mar 2022 18:17)  sertal compound: 1 tab(s) orally once a day, As Needed  ***Medication from [place], Propinox HCl 10mg - Lysine Clonixinate 125mg*** (24 Mar 2022 18:17)  tamsulosin 0.4 mg oral capsule: 1 cap(s) orally 2 times a day (24 Mar 2022 18:17)    MEDICATIONS  (STANDING):  aspirin  chewable 81 milliGRAM(s) Oral daily  atorvastatin 20 milliGRAM(s) Oral at bedtime  budesonide  80 MICROgram(s)/formoterol 4.5 MICROgram(s) Inhaler 2 Puff(s) Inhalation two times a day  carvedilol 6.25 milliGRAM(s) Oral every 12 hours  finasteride 5 milliGRAM(s) Oral daily  lisinopril 10 milliGRAM(s) Oral daily  pantoprazole    Tablet 40 milliGRAM(s) Oral before breakfast  rivaroxaban 20 milliGRAM(s) Oral with dinner  spironolactone 25 milliGRAM(s) Oral daily  tamsulosin 0.4 milliGRAM(s) Oral at bedtime    MEDICATIONS  (PRN):  acetaminophen     Tablet .. 650 milliGRAM(s) Oral every 6 hours PRN Mild Pain (1 - 3)  ALBUTerol    90 MICROgram(s) HFA Inhaler 2 Puff(s) Inhalation every 6 hours PRN Bronchospasm      Vital Signs Last 24 Hrs  T(C): 36.3 (30 Mar 2022 08:39), Max: 36.5 (29 Mar 2022 15:46)  T(F): 97.3 (30 Mar 2022 08:39), Max: 97.7 (29 Mar 2022 15:46)  HR: 62 (30 Mar 2022 09:44) (61 - 71)  BP: 117/72 (30 Mar 2022 10:22) (90/52 - 119/65)  BP(mean): --  RR: 18 (30 Mar 2022 08:39) (18 - 18)  SpO2: 98% (30 Mar 2022 08:39) (97% - 98%)  Vital Signs Last 24 Hrs  T(C): 36.3 (03-30-22 @ 08:39), Max: 36.5 (03-29-22 @ 15:46)  T(F): 97.3 (03-30-22 @ 08:39), Max: 97.7 (03-29-22 @ 15:46)  HR: 80 (03-30-22 @ 10:22) (61 - 80)  BP: 117/72 (03-30-22 @ 10:22) (90/52 - 119/65)  BP(mean): --  RR: 18 (03-30-22 @ 08:39) (18 - 18)  SpO2: 98% (03-30-22 @ 08:39) (97% - 98%)    Orthostatic VS  03-30-22 @ 06:43  Lying BP: 116/65 HR: 62  Sitting BP: 112/66 HR: 80  Standing BP: 94/60 HR: 62  Site: upper right arm  Mode: electronic  Orthostatic VS          PHYSICAL EXAM:    GENERAL: NAD, A&Ox3, [place] speaking  HEAD:  Atraumatic, Normocephalic  EYES: EOMI, PERRLA, conjunctiva and sclera clear  NECK: Supple, No JVD  CHEST/LUNG: Clear to auscultation bilaterally  HEART: S1S2 RRR  ABDOMEN: Soft, Nontender  EXTREMITIES: No LE edema   Psych: normal affect and behavior, calm and cooperative     Tele: Vpaced       ===============================  ===============================  LABS:                           13.4   6.53  )-----------( 168      ( 29 Mar 2022 08:03 )             43.1                           13.4   6.64  )-----------( 161      ( 28 Mar 2022 09:13 )             43.5     03-29    140  |  102  |  34<H>  ----------------------------<  105<H>  4.1   |  34<H>  |  1.21    Ca    9.1      29 Mar 2022 08:03    TPro  7.3  /  Alb  3.4  /  TBili  1.1  /  DBili  x   /  AST  35  /  ALT  28  /  AlkPhos  195<H>  03-29      28 Mar 2022 09:13    139    |  101    |  30     ----------------------------<  135    4.4     |  35     |  1.22   26 Mar 2022 08:53    139    |  102    |  20     ----------------------------<  105    3.8     |  33     |  1.11     Ca    9.6        28 Mar 2022 09:13  Ca    8.8        26 Mar 2022 08:53    TPro  6.8    /  Alb  3.1    /  TBili  1.4    /  DBili  x      /  AST  26     /  ALT  23     /  AlkPhos  168    26 Mar 2022 08:53      ===============================  ===============================  CARDIAC BIOMARKERS:  BNP  Serum Pro-Brain Natriuretic Peptide: 1957 pg/mL *H* [0 - 450] (03-26-22 @ 08:53)  Serum Pro-Brain Natriuretic Peptide: 2802 pg/mL *H* [0 - 450] (03-24-22 @ 20:41)  Serum Pro-Brain Natriuretic Peptide: 3143 pg/mL *H* [0 - 450] (03-24-22 @ 15:09)  Serum Pro-Brain Natriuretic Peptide: 1811 pg/mL *H* [0 - 450] (10-31-20 @ 07:39)  Serum Pro-Brain Natriuretic Peptide: 3577 pg/mL *H* [0 - 450] (10-27-20 @ 18:04)  Serum Pro-Brain Natriuretic Peptide: 4927 pg/mL *H* [0 - 450] (08-27-20 @ 20:05)  Serum Pro-Brain Natriuretic Peptide: 5785 pg/mL *H* [0 - 450] (05-11-20 @ 15:58)  Serum Pro-Brain Natriuretic Peptide: 5786 pg/mL *H* [0 - 125] (06-22-17 @ 06:01)  Serum Pro-Brain Natriuretic Peptide: 5932 pg/mL *H* [0 - 125] (06-22-17 @ 02:49)  Serum Pro-Brain Natriuretic Peptide: 7251 pg/mL *H* [0 - 125] (06-21-17 @ 17:08)  Serum Pro-Brain Natriuretic Peptide: 3352 pg/mL *H* [0 - 300] (05-09-17 @ 19:38)  Serum Pro-Brain Natriuretic Peptide: 4180 pg/mL *H* [0 - 125] (05-02-17 @ 06:06)  Serum Pro-Brain Natriuretic Peptide: 6941 pg/mL *H* [0 - 125] (05-01-17 @ 05:16)  Serum Pro-Brain Natriuretic Peptide: 2462 pg/mL *H* [0 - 125] (04-15-17 @ 06:27)  Serum Pro-Brain Natriuretic Peptide: 2387 pg/mL *H* [0 - 125] (04-14-17 @ 23:46)  Serum Pro-Brain Natriuretic Peptide: 2949 pg/mL *H* [0 - 125] (04-14-17 @ 17:24)      TROPONIN  Troponin I, High Sensitivity Result: 193.98 ng/L *H* (03-25-22 @ 08:19)  Troponin I, High Sensitivity Result: 185.45 ng/L *H* (03-24-22 @ 20:41)  Troponin I, High Sensitivity Result: 198.24 ng/L *H* (03-24-22 @ 15:09)  Troponin I, Serum: 0.130 ng/mL *H* [0.015 - 0.045] (10-28-20 @ 07:30)  Troponin I, Serum: 0.129 ng/mL *H* [0.015 - 0.045] (10-27-20 @ 20:30)  Troponin I, Serum: 0.132 ng/mL *H* [0.015 - 0.045] (10-27-20 @ 18:04)  Troponin I, Serum: 0.177 ng/mL *H* [0.015 - 0.045] (08-28-20 @ 02:56)  Troponin I, Serum: 0.165 ng/mL *H* [0.015 - 0.045] (08-27-20 @ 23:58)  Troponin I, Serum: 0.163 ng/mL *H* [0.015 - 0.045] (08-27-20 @ 20:05)  Troponin I, Serum: 0.210 ng/mL *H* [0.015 - 0.045] (05-13-20 @ 21:37)  Troponin I, Serum: 0.197 ng/mL *H* [0.015 - 0.045] (05-12-20 @ 07:59)  Troponin I, Serum: 0.202 ng/mL *H* [0.015 - 0.045] (05-11-20 @ 19:33)  Troponin I, Serum: 0.181 ng/mL *H* [0.015 - 0.045] (05-11-20 @ 15:58)  Troponin I, Serum: 0.248 ng/mL *H* [0.015 - 0.045] (06-22-17 @ 02:49)  Troponin I, Serum: 0.234 ng/mL *H* [0.015 - 0.045] (06-21-17 @ 17:08)  Troponin T, Serum: <0.01 ng/mL [0.00 - 0.06] (05-12-17 @ 03:56)  Troponin I, Serum: 0.650 ng/mL *H* [0.015 - 0.045] (05-09-17 @ 07:56)  Troponin I, Serum: 0.427 ng/mL *H* [0.015 - 0.045] (05-02-17 @ 06:06)  Troponin I, Serum: 0.416 ng/mL *H* [0.015 - 0.045] (05-01-17 @ 09:09)  Troponin I, Serum: 0.400 ng/mL *H* [0.015 - 0.045] (05-01-17 @ 05:16)  Troponin I, Serum: 0.450 ng/mL *H* [0.015 - 0.045] (04-17-17 @ 09:29)  Troponin I, Serum: 0.465 ng/mL *H* [0.015 - 0.045] (04-17-17 @ 06:10)  Troponin I, Serum: 0.442 ng/mL *H* [0.015 - 0.045] (04-17-17 @ 00:25)  Troponin I, Serum: 0.436 ng/mL *H* [0.015 - 0.045] (04-14-17 @ 19:24)  Troponin I, Serum: 0.422 ng/mL *H* [0.015 - 0.045] (04-14-17 @ 17:24)    ===============================  ===============================  Radiology:  12 Lead ECG (03.24.22 @ 14:21):  Ventricular-paced rhythm with occasional Premature ventricular complexes 62/min.  Wide QRS rhythm.  When compared with ECG of 29-OCT-2020 06:57, Similar to prior ekg. Premature ventricular complexes are now Present    US Abdomen Limited (03.24.22 @ 19:32):  Moderate volume ascites. Right pleural effusion.    Xray Chest 1 View-PORTABLE IMMEDIATE (Xray Chest 1 View-PORTABLE IMMEDIATE .) (03.24.22 @ 15:41):  Gross heart enlargement, left-sided pacemaker, and Micra pacemaker maker again noted.  This slight left perihilar infiltrate again noted.  Chest is similar to October 27, 2020.    TTE Echo Complete w/o Contrast w/ Doppler (08.28.20 @ 21:42):   Moderate to severe (3+)mitral regurgitation.   Mild to Moderate aortic regurgitation.   Mild pulmonary hypertension.   Mild (1+) tricuspid valve regurgitation is present.   Estimated left ventricular ejection fraction is 45 %. The left ventricle cavity is mildly dilated. The left ventricle is normal in wall thickness. Mild, diffuse hypokinesis of the left ventricle is present.   The right ventricle is normal in size, wall thickness, wall motion, and contractility based on TAPSE.   A device wire is seen in the RV and RA.    TTE Echo Complete w/o Contrast w/ Doppler (03.25.22 @ 12:36):   Mild to Moderate mitral regurgitation.   Mild aortic sclerosis is present with normal valvular opening. Mild (1+) aortic regurgitation is present.   Severe (4+) tricuspid valve regurgitation.   Severe pulmonary hypertension.   The left atrium is severely dilated.   The left ventricle size is normal.   The apex appears hypokinetic. paradoxical septal motion Estimated left ventricular ejection fraction is 45-50 %.   IVC is dilated and not collapsing with inspiration.   A PFO vs ASD is noted with left to right and right to left shunt.   Ascites is noted.        ===============================

## 2022-03-30 NOTE — PROGRESS NOTE ADULT - PROBLEM SELECTOR PLAN 1
Continue IV Lasix, d/w hospitalist, OK to change to equivalent dose of bumex.
Continue IV Lasix
Mild LV systolic dysfunction + RV pressure/volume overload; continue IV diuresis with Lasix.
Clinically improved/stable no SOB; Mild orthostasis possibly 2/2 diuresing ( received fluid bolus ) compression stocking; OK for discharges with OP follow up; Continue Ace/BB/Diuretic;  continue daily weights and low sodium diet
Patient appears comfortable, Significant weight loss overnight,  No complaints of cp or shortness of breath; continue bumex, coreg, ACEI; unable to afford entresto; recommend daily weights and low sodium diet

## 2022-03-30 NOTE — PROGRESS NOTE ADULT - NS ATTEND OPT1 GEN_ALL_CORE
I attest my time as attending is greater than 50% of the total combined time spent on qualifying patient care activities by the PA/NP and attending.
I attest my time as attending is greater than 50% of the total combined time spent on qualifying patient care activities by the PA/NP and attending.
I independently performed the documented:

## 2022-03-30 NOTE — PROGRESS NOTE ADULT - PROBLEM SELECTOR PLAN 3
Stable; continue Xarelto.
tele AF V-paced continue oral AC

## 2022-03-30 NOTE — PROGRESS NOTE ADULT - PROBLEM SELECTOR PLAN 4
Improved -- previously moderate to severe (now mild to moderate).
Continue to monitor with periodic Echocardiogram
Improved -- previously moderate to severe (now mild to moderate).

## 2022-03-30 NOTE — PROGRESS NOTE ADULT - PROVIDER SPECIALTY LIST ADULT
Cardiology
Hospitalist
Cardiology

## 2022-04-02 DIAGNOSIS — I25.10 ATHEROSCLEROTIC HEART DISEASE OF NATIVE CORONARY ARTERY WITHOUT ANGINA PECTORIS: ICD-10-CM

## 2022-04-02 DIAGNOSIS — R18.8 OTHER ASCITES: ICD-10-CM

## 2022-04-02 DIAGNOSIS — J44.9 CHRONIC OBSTRUCTIVE PULMONARY DISEASE, UNSPECIFIED: ICD-10-CM

## 2022-04-02 DIAGNOSIS — I24.8 OTHER FORMS OF ACUTE ISCHEMIC HEART DISEASE: ICD-10-CM

## 2022-04-02 DIAGNOSIS — Z79.01 LONG TERM (CURRENT) USE OF ANTICOAGULANTS: ICD-10-CM

## 2022-04-02 DIAGNOSIS — Z20.822 CONTACT WITH AND (SUSPECTED) EXPOSURE TO COVID-19: ICD-10-CM

## 2022-04-02 DIAGNOSIS — I50.43 ACUTE ON CHRONIC COMBINED SYSTOLIC (CONGESTIVE) AND DIASTOLIC (CONGESTIVE) HEART FAILURE: ICD-10-CM

## 2022-04-02 DIAGNOSIS — I34.0 NONRHEUMATIC MITRAL (VALVE) INSUFFICIENCY: ICD-10-CM

## 2022-04-02 DIAGNOSIS — I42.8 OTHER CARDIOMYOPATHIES: ICD-10-CM

## 2022-04-02 DIAGNOSIS — Z95.0 PRESENCE OF CARDIAC PACEMAKER: ICD-10-CM

## 2022-04-02 DIAGNOSIS — K76.1 CHRONIC PASSIVE CONGESTION OF LIVER: ICD-10-CM

## 2022-04-02 DIAGNOSIS — Z79.899 OTHER LONG TERM (CURRENT) DRUG THERAPY: ICD-10-CM

## 2022-04-02 DIAGNOSIS — I11.0 HYPERTENSIVE HEART DISEASE WITH HEART FAILURE: ICD-10-CM

## 2022-04-02 DIAGNOSIS — E78.5 HYPERLIPIDEMIA, UNSPECIFIED: ICD-10-CM

## 2022-04-02 DIAGNOSIS — I48.20 CHRONIC ATRIAL FIBRILLATION, UNSPECIFIED: ICD-10-CM

## 2022-04-02 DIAGNOSIS — I27.20 PULMONARY HYPERTENSION, UNSPECIFIED: ICD-10-CM

## 2022-04-03 ENCOUNTER — RESULT CHARGE (OUTPATIENT)
Age: 77
End: 2022-04-03

## 2022-04-04 ENCOUNTER — APPOINTMENT (OUTPATIENT)
Dept: CARDIOLOGY | Facility: CLINIC | Age: 77
End: 2022-04-04
Payer: MEDICARE

## 2022-04-04 VITALS
HEART RATE: 71 BPM | OXYGEN SATURATION: 96 % | DIASTOLIC BLOOD PRESSURE: 70 MMHG | BODY MASS INDEX: 27.31 KG/M2 | HEIGHT: 67 IN | SYSTOLIC BLOOD PRESSURE: 134 MMHG | WEIGHT: 174 LBS

## 2022-04-04 PROCEDURE — 99215 OFFICE O/P EST HI 40 MIN: CPT

## 2022-04-04 PROCEDURE — 93000 ELECTROCARDIOGRAM COMPLETE: CPT

## 2022-04-04 RX ORDER — TORSEMIDE 20 MG/1
20 TABLET ORAL
Refills: 0 | Status: DISCONTINUED | COMMUNITY
Start: 2020-11-09 | End: 2022-04-04

## 2022-04-04 RX ORDER — OMEPRAZOLE 20 MG/1
20 CAPSULE, DELAYED RELEASE ORAL
Qty: 90 | Refills: 1 | Status: ACTIVE | COMMUNITY
Start: 2022-04-04

## 2022-04-04 RX ORDER — POTASSIUM CHLORIDE 1500 MG/1
20 TABLET, FILM COATED, EXTENDED RELEASE ORAL
Refills: 0 | Status: DISCONTINUED | COMMUNITY
End: 2022-04-04

## 2022-04-04 NOTE — REVIEW OF SYSTEMS
[Negative] : Heme/Lymph [Chills] : no chills [Blurry Vision] : no blurred vision [Earache] : no earache [Leg Claudication] : no intermittent leg claudication [Cough] : no cough [Wheezing] : no wheezing [Abdominal Pain] : no abdominal pain [Change in Appetite] : no change in appetite

## 2022-04-04 NOTE — CARDIOLOGY SUMMARY
[de-identified] : 4/4/22 ventricular paced rhythm  [de-identified] : 3/25/22 ( [facility] )  EF 45 % apical wall hypokinesis ,  mild to moderate MR ,severe LAE ,  Mild  AI , severe  TR severe ,pulmonary hypertension normal RV function   IVC dilated decreased respiratory variation  [de-identified] : 5/19/2017  [employer] VVI   MRI compatible  ADVISA  MRI [id number] [de-identified] : aug 28  2020   all coronaries showed minimal luminal irregularities  EF 48%

## 2022-04-04 NOTE — PHYSICAL EXAM
[Non Tender] : non-tender [No Edema] : no edema [Normal] : moves all extremities, no focal deficits, normal speech [Right Carotid Bruit] : no bruit heard over the right carotid [Left Carotid Bruit] : no bruit heard over the left carotid [Right Femoral Bruit] : no bruit heard over the right femoral artery

## 2022-04-04 NOTE — CARDIOLOGY SUMMARY
[de-identified] : 4/4/22 ventricular paced rhythm  [de-identified] : 3/25/22 ( [facility] )  EF 45 % apical wall hypokinesis ,  mild to moderate MR ,severe LAE ,  Mild  AI , severe  TR severe ,pulmonary hypertension normal RV function   IVC dilated decreased respiratory variation  [de-identified] : 5/19/2017  [employer] VVI   MRI compatible  ADVISA  MRI [id number] [de-identified] : aug 28  2020   all coronaries showed minimal luminal irregularities  EF 48%

## 2022-04-04 NOTE — END OF VISIT
[>50% of the face to face encounter time was spent on counseling and/or coordination of care for ___] : Greater than 50% of the face to face encounter time was spent on counseling and/or coordination of care for [unfilled] [FreeTextEntry3] : Patient was examined , discussed , chart reviewed , including hospital  records imaging  , agree with assessment and plan

## 2022-04-04 NOTE — ASSESSMENT
[FreeTextEntry1] : S/P hospital discharge Acute on chronic systolic congestive heart failure; appears stable compensated no SOB/Weight stable.  Advised strict adherence to daily medications which were discussed and reviewed in detail today with patient and ; he knows to maintain daily weight and low sodium diet \par OV one week, explained about different medications one by one , \par Labs ordered\par \par Pulmonary HTN: Stable O2 sat RA 96% \par \par AF: S/P ablation PPM . continue periodic interrogation with EP  Continue oral AC/BB\par \par Mitral Regurgitation; Continue to monitor with periodic Echocardiogram.\par \par MIld COPD  : continue inhaler PRN , \par \par OV one week \par \par Plan DW Patient//Dr Palla \par

## 2022-04-11 ENCOUNTER — APPOINTMENT (OUTPATIENT)
Dept: CARDIOLOGY | Facility: CLINIC | Age: 77
End: 2022-04-11
Payer: MEDICARE

## 2022-04-18 ENCOUNTER — APPOINTMENT (OUTPATIENT)
Dept: CARDIOLOGY | Facility: CLINIC | Age: 77
End: 2022-04-18
Payer: MEDICARE

## 2022-04-25 ENCOUNTER — APPOINTMENT (OUTPATIENT)
Dept: ULTRASOUND IMAGING | Facility: CLINIC | Age: 77
End: 2022-04-25
Payer: MEDICARE

## 2022-04-25 ENCOUNTER — OUTPATIENT (OUTPATIENT)
Dept: OUTPATIENT SERVICES | Facility: HOSPITAL | Age: 77
LOS: 1 days | End: 2022-04-25
Payer: COMMERCIAL

## 2022-04-25 DIAGNOSIS — Z95.0 PRESENCE OF CARDIAC PACEMAKER: Chronic | ICD-10-CM

## 2022-04-25 DIAGNOSIS — J44.9 CHRONIC OBSTRUCTIVE PULMONARY DISEASE, UNSPECIFIED: ICD-10-CM

## 2022-04-25 DIAGNOSIS — I50.9 HEART FAILURE, UNSPECIFIED: ICD-10-CM

## 2022-04-25 PROCEDURE — 76700 US EXAM ABDOM COMPLETE: CPT

## 2022-04-25 PROCEDURE — 76700 US EXAM ABDOM COMPLETE: CPT | Mod: 26

## 2022-04-25 NOTE — ED ADULT NURSE NOTE - NS PRO AD NO ADVANCE DIRECTIVE
IMPRESSION and RECOMMENDATIONS:  These findings were discussed with the patient.    -- Glaucoma suspect both eyes - a splinter hemorrhage at the superior pole of the optic nerve was identified 10/2021 and was photo documented. The hemorrhage was resolved on exam 1/2022 and there was him will will will will and will will will will and will no noticeable change on OCT of the optic nerve at this time.  -- Discussed diagnostic criteria for glaucoma. The presence of a splinter hemorrhage increases the risk of developing a visual field defect and change in the OCT consistent with glaucoma. Family history of glaucoma also increases his risk.  -- OCT optic nerve PERFORMED to assess for change associated with the splinter hemorrhage which would support the diagnosis of glaucoma.  -- Repeat Man visual field is planned and would consider C24-2 with C10-2 to look for a small scotoma. Consider a 24-2C when available.  -- Continue close observation  .  -- Choroidal nevus left eye - flat with even coloration along the inferior temporal arcade measures 2.5 disc diameters. Photo documented 10/2021  -- Reviewed monitoring schedule for choroidal nevi of every six months after initial identification then annually. Risk of conversion to melanoma is approximately 1/9000 per year.    Existing diagnoses not addressed at today's visit:  -- Early nuclear sclerotic cataract both eyes - not visually significant.    -- Myopic astigmatism with presbyopia both eyes    -- Reviewed the habits of good ocular health and safety including the use of sunglasses for ultraviolet protection and safety eyewear when appropriate.    RTC 6 months complete eye exam, OCT - glaucoma suspect, sooner as needed any problems.    Heidi Cowart MD      CHIEF COMPLAINT:   Chief Complaint   Patient presents with   • Glaucoma   • Office Visit     COVID vaccine - 2 doses    EYE MEDICATIONS:  None     HISTORY OF PRESENT ILLNESS:   4/25/2022 - Established patient  returns today for 3 months IOP check, HVF C24-2 to evaluate glaucoma suspect. Patient denies any changes in vision since he was seen with us a few months back. No concerns.     1/26/2022 - Patient is a 63-year-old male last seen by Dr. Lambert in October who returns for an IOP check with repeat HVF C24-2 and OCT optic nerve to assess for glaucoma damage after a splinter hemorrhage was found on the left optic nerve in October. He gets a lot of floaters when he is working out in the gym but this has subsided quite a bit.  HVF 24-2 will have to be scheduled for another date as the machine is down at this time.     10/7/2021 - Dr. Lambert - Jaime Delaney is a 63 year old male with a family history of glaucoma and macular degneration who presents today for eye exam as a glaucoma suspect.  Last eye exam was 8/27/2021 with Dr. Waterman for a burst blood vessel. Patient states it has been \"several years\" since he has had a complete eye exam. The patient denies having any new floaters, loss of side vision or flashes. Patient wears glasses and is happy with them.  Is not interested in a new prescription today.  Primarily needs glasses for distance and night vision.  Will sometimes read without them. NO BLOOD thinners, denies ASA.  NSAIDs.  Sleeps on back      Alert and oriented x3          M&A normal  Last:    CEE  10/7/2021         Glasses   Less than 1 yr years old   OCT 10/7/2021   HVF 10/7/2021      EYE REVIEW OF SYSTEMS:  DRY   No     IRRITATION  No     ITCH   No     PAIN   No     RED   No     PHOTOPHOBIA No     FLOATERS  Yes -no changes; not new             PHOTOPSIA            No    NEURO REVIEW OF SYSTEMS:   DIPLOPIA  No  DIZZINESS  No  HEADACHE  No  NUMBNESS  No  TINGLING  No  SEIZURES  No    REVIEW OF SYSTEMS:  Positive - None; Negative - systemic, ENT, cardiovascular, respiratory, gastrointestinal, genitourinary, musculoskeletal, dermatologic, hematologic, psychiatric      EXAMINATION:  See Ophthalmologic Exam  section      OPTICAL COHERENCE TOMOGRAPHY: OPTIC NERVE - Spectral Domain  1/26/2022  RIGHT EYE:  Signal strength  8/10  Retinal nerve fiber layer thickness: 89 µm, normal tracing,   Optic nerve head analysis: Rim area 0.99 mm², disc area 1.57 mm².  Average cup-to-disc ratio 0.60, vertical ratio 0.66, cup volume 0.166 mm³.  LEFT EYE:   Signal strength  7/10  Retinal nerve fiber layer thickness: 80 µm, borderline tracing, low normal superior peaks which are lower than the right eye with subtle thinning of the nerve fiber layer.  Optic nerve head analysis:  Rim area 0.79 mm², disc area 1.82 mm².  Average cup-to-disc ratio 0.76, vertical ratio 0.77, cup volume 0.387 mm³.  Impression:  Larger optic nerve on the left with large cup and thin rim, lower superior peaks on the left compared to the right. Stable in comparison to OCT image 10/2021. Both are consistent with greater concern for an inferior visual field defect in the left eye which was not found on visual field testing 10/2021.    Heidi Cowart MD      [facility] VISUAL FIELD TEST:  C24-2 MAG - Fast  4/25/2022   RIGHT EYE: 0/0 fixation loss, 0% false-positive, OFF% false-negative, 0.15 dB mean deviation, 1.47 dB pattern standard deviation, visual field index 99% - reliable field, normal with a single point of minimal suppression immediately adjacent to the blind spot  LEFT EYE:  0/0 fixation loss, 2% false-positive, OFF% false-negative, -0.13 dB mean deviation, 1.40 dB pattern standard deviation, visual field index 99% - reliable field, normal  Impression:  Normal visual field both eyes. No evidence of visual field abnormality to correlate with the OCT or localized to location of the optic nerve hemorrhage previously seen.    Heidi Cowart MD       PAST OCULAR HISTORY:  Glaucoma   Macular Degeneration.     FAMILY OCULAR HISTORY: Glaucoma: yes, Father, possibly paternal grandparents    Retinal detachment: no    Macular degeneration:  yes, Father     Amblyopia (lazy eye), strabismus (ET, XT, etc.): no    Eye surgery: yes, father and brother had corrective eye surgeries    Other eye diseases: no    PAST MEDICAL HISTORY:  No past medical history on file.    PAST SURGICAL HISTORY:  Past Surgical History:   Procedure Laterality Date   • Hernia repair         FAMILY MEDICAL HISTORY:  Family History   Problem Relation Age of Onset   • High blood pressure Mother    • High cholesterol Father    • Vision Loss Father    • Glaucoma Father    • Macular degeneration Father    • Cancer Sister    • Other Brother    • Glaucoma Paternal Grandmother        SOCIAL HISTORY:  Social History     Socioeconomic History   • Marital status: Single     Spouse name: Not on file   • Number of children: Not on file   • Years of education: Not on file   • Highest education level: Not on file   Occupational History   • Not on file   Tobacco Use   • Smoking status: Never Smoker   • Smokeless tobacco: Never Used   Vaping Use   • Vaping Use: never used   Substance and Sexual Activity   • Alcohol use: Yes     Alcohol/week: 1.0 standard drink     Types: 1 Glasses of wine per week     Comment: weeky   • Drug use: Never   • Sexual activity: Not Currently   Other Topics Concern   • Not on file   Social History Narrative   • Not on file     Social Determinants of Health     Financial Resource Strain: Not on file   Food Insecurity: Not on file   Transportation Needs: Not on file   Physical Activity: Not on file   Stress: Not on file   Social Connections: Not on file   Intimate Partner Violence: Not At Risk   • Social Determinants: Intimate Partner Violence Past Fear: No   • Social Determinants: Intimate Partner Violence Current Fear: No       ALLERGIES:  No Known Allergies      Heidi Cowart MD   No

## 2022-05-02 ENCOUNTER — APPOINTMENT (OUTPATIENT)
Dept: CARDIOLOGY | Facility: CLINIC | Age: 77
End: 2022-05-02
Payer: MEDICARE

## 2022-05-02 ENCOUNTER — NON-APPOINTMENT (OUTPATIENT)
Age: 77
End: 2022-05-02

## 2022-05-02 VITALS
HEART RATE: 70 BPM | SYSTOLIC BLOOD PRESSURE: 120 MMHG | BODY MASS INDEX: 28.72 KG/M2 | WEIGHT: 183 LBS | DIASTOLIC BLOOD PRESSURE: 74 MMHG | HEIGHT: 67 IN | RESPIRATION RATE: 16 BRPM | OXYGEN SATURATION: 97 % | TEMPERATURE: 98.7 F

## 2022-05-02 PROCEDURE — 99214 OFFICE O/P EST MOD 30 MIN: CPT

## 2022-05-02 PROCEDURE — 93000 ELECTROCARDIOGRAM COMPLETE: CPT

## 2022-05-02 NOTE — REVIEW OF SYSTEMS
[Chills] : no chills [Blurry Vision] : no blurred vision [Earache] : no earache [Leg Claudication] : no intermittent leg claudication [Cough] : no cough [Wheezing] : no wheezing [Abdominal Pain] : no abdominal pain [Change in Appetite] : no change in appetite

## 2022-05-02 NOTE — ASSESSMENT
[FreeTextEntry1] : S/P hospital discharge Acute on chronic systolic congestive heart failure; appears stable compensated no SOB/Weight stable.  Advised strict adherence to daily medications which were discussed and reviewed in detail today with patient and ; he knows to maintain daily weight and low sodium diet  continue lasix 40 mg po BID ,  spiranolactone 25 mg po daily  coreg  , will monitor his weight   3  weeks follow up \par \par Pulmonary HTN: Stable O2 sat RA 96% \par \par AF: S/P ablation PPM . continue periodic interrogation with EP  Continue oral AC/BB\par \par Mitral Regurgitation; Continue to monitor with periodic Echocardiogram.\par \par MIld COPD  : continue inhaler  he is not taking budesonide inhaler , \par \par follow up after 3 weeks

## 2022-05-02 NOTE — PHYSICAL EXAM
[Right Carotid Bruit] : no bruit heard over the right carotid [Left Carotid Bruit] : no bruit heard over the left carotid [Right Femoral Bruit] : no bruit heard over the right femoral artery

## 2022-05-02 NOTE — HISTORY OF PRESENT ILLNESS
[FreeTextEntry1] : 77 YO male PMH:  permanent atrial fibrillation SP ablation/PPM,  chronic systolic heart failure, non-obstructive CAD, CRT-P, nonischemic cardiomyopathy, mitral regurgitation, HTN, pulmonary hypertension, COPD here today post hospital follow up admitted with Acute on chronic systolic congestive heart failure/weight gain/LE Edema suspect due to suboptimal outpatient adherence to medications and dietary noncompliance .\par \par \par \par Currently feeling well no CP/SOB  , Patient blood pressure is controlled , his lipids are controlled.   Patient is ambulatory , Patient has multiple medications at home ,who brought all of his medication , reviewed one by one \par explained to patient via  \par \par All medications discussed in detail ( With  ) \par \par EKG to asses or arrhythmia    patient appears to have no insight in to his medical conditions \par \par his weight has gone up by 9 pounds  blood pressure is well controlled \par \par \par \par \par

## 2022-05-02 NOTE — CARDIOLOGY SUMMARY
[de-identified] : 5/2/22  ventricular paced rhythm  [de-identified] : 3/25/22 ( [facility] )  EF 45 % apical wall hypokinesis ,  mild to moderate MR ,severe LAE ,  Mild  AI , severe  TR severe ,pulmonary hypertension normal RV function   IVC dilated decreased respiratory variation  [de-identified] : 5/19/2017  [employer] VVI   MRI compatible  ADVISA  MRI [id number] [de-identified] : aug 28  2020   all coronaries showed minimal luminal irregularities  EF 48%

## 2022-05-02 NOTE — REASON FOR VISIT
[[employer] ] : provided by [employer]   [Time Spent: ____ minutes] : Total time spent using  services: [unfilled] minutes. The patient's primary language is not English thus required  services. [Interpreters_IDNumber] : [id number] [Interpreters_FullName] : Chelle

## 2022-05-23 ENCOUNTER — APPOINTMENT (OUTPATIENT)
Dept: CARDIOLOGY | Facility: CLINIC | Age: 77
End: 2022-05-23

## 2022-06-06 ENCOUNTER — APPOINTMENT (OUTPATIENT)
Dept: CARDIOLOGY | Facility: CLINIC | Age: 77
End: 2022-06-06

## 2022-07-18 ENCOUNTER — APPOINTMENT (OUTPATIENT)
Dept: CARDIOLOGY | Facility: CLINIC | Age: 77
End: 2022-07-18

## 2022-08-11 NOTE — PHYSICAL THERAPY INITIAL EVALUATION ADULT - GENERAL OBSERVATIONS, REHAB EVAL
ASSESSMENT AND PLAN  1. Medicare annual wellness visit, subsequent  Reviewed  - ANNUAL WELLNESS VISIT SUBSEQUENT VISIT W PPS    2. Need for hepatitis C screening test    - HEPATITIS C ANTIBODY WITH REFLEX; Future    3. Hyperlipidemia, unspecified hyperlipidemia type  Statin management is well-tolerated, continue rosuvastatin at 5 mg daily.  Monitoring lab work acceptable  - OFFICE OR OTHER OUTPT VISIT EST PT 20 TO 29 MINS LOW MDM LVL 3    4. Acquired hypothyroidism  Free T4 free T3 and TSH indicate that her thyroid replacement is where it should be with armor thyroid.  Not surprisingly the T3 is somewhat high as a result of the medication, T4 within normal limits and TSH at 0.9, no change in in dosing recommended  - OFFICE OR OTHER OUTPT VISIT EST PT 20 TO 29 MINS LOW MDM LVL 3    5. Sciatica of right side  She really has not improved in therapy.  She would like to have pain relief.  Gabapentin is started at 100 mg twice daily with the option of increasing to 200 mg twice daily in the 1st 2-4 weeks with further upward titration as tolerated/needed.  - OFFICE OR OTHER OUTPT VISIT EST PT 20 TO 29 MINS LOW MDM LVL 3      CHIEF COMPLAINT  Chief Complaint   Patient presents with   • Medicare Wellness Visit     Subsequent   • Office Visit           LABS      PAST MEDICAL HISTORY  Past Medical History:   Diagnosis Date   • Allergic rhinitis    • Anxiety and depression    • Colon polyp    • Disorder of bone and cartilage, unspecified 2011   • Disorder of eye movements    • Hyperlipidemia    • IBS (irritable bowel syndrome)    • Unspecified hypothyroidism        SOCIAL HISTORY  Social History     Tobacco Use   • Smoking status: Former Smoker     Packs/day: 1.00     Years: 7.00     Pack years: 7.00     Types: Cigarettes     Start date: 1962     Quit date: 1969     Years since quittin.6   • Smokeless tobacco: Never Used   Vaping Use   • Vaping Use: never used   Substance Use Topics   • Alcohol use: No      Alcohol/week: 0.0 standard drinks   • Drug use: No       MEDICATIONS  Outpatient Medications Marked as Taking for the 8/11/22 encounter (Office Visit) with Glenn Negron MD   Medication Sig Dispense Refill   • clonazePAM (KlonoPIN) 1 MG tablet Take 1 tablet by mouth 3 times daily as needed for Anxiety. 90 tablet 2   • sertraline (ZOLOFT) 100 MG tablet Take 2 tablets by mouth daily. (Patient taking differently: Take 150 mg by mouth daily.) 60 tablet 3   • traZODone (DESYREL) 50 MG tablet Take 1-2 tablets by mouth nightly. 60 tablet 3   • thyroid ([place] Thyroid) 60 MG tablet Take 1 tablet by mouth daily. 90 tablet 2   • rosuvastatin (CRESTOR) 5 MG tablet Take 1 tablet by mouth daily. 90 tablet 3       HISTORY OF PRESENT ILLNESS  She is here today for followup of Medicare wellness.  For problem focused discussion see assessment/plan.    REVIEW OF SYSTEMS      PHYSICAL EXAM  This is a very nice 75-year-old   Visit Vitals  /79 (BP Location: LUE - Left upper extremity, Patient Position: Sitting, Cuff Size: Regular)   Pulse 82   Resp 16   Ht 5' 6\" (1.676 m)   Wt 70.9 kg (156 lb 4.8 oz)   BMI 25.23 kg/m²   Lungs are clear throughout   Heart regular rate and rhythm without murmur   Abdomen is relatively lean soft and nontender   There is no lower extremity edema whatsoever         Pt found sitting OOB in chair with tele monitor and bed alarm activated. Pt with no c/o pain.

## 2022-08-18 ENCOUNTER — OUTPATIENT (OUTPATIENT)
Dept: OUTPATIENT SERVICES | Facility: HOSPITAL | Age: 77
LOS: 1 days | End: 2022-08-18
Payer: MEDICARE

## 2022-08-18 DIAGNOSIS — I83.813 VARICOSE VEINS OF BILATERAL LOWER EXTREMITIES WITH PAIN: ICD-10-CM

## 2022-08-18 DIAGNOSIS — Z95.0 PRESENCE OF CARDIAC PACEMAKER: Chronic | ICD-10-CM

## 2022-08-18 DIAGNOSIS — L89.891 PRESSURE ULCER OF OTHER SITE, STAGE 1: ICD-10-CM

## 2022-08-18 PROCEDURE — 93922 UPR/L XTREMITY ART 2 LEVELS: CPT

## 2022-08-18 PROCEDURE — 99204 OFFICE O/P NEW MOD 45 MIN: CPT

## 2022-08-22 ENCOUNTER — NON-APPOINTMENT (OUTPATIENT)
Age: 77
End: 2022-08-22

## 2022-08-22 ENCOUNTER — APPOINTMENT (OUTPATIENT)
Dept: CARDIOLOGY | Facility: CLINIC | Age: 77
End: 2022-08-22

## 2022-08-22 VITALS
OXYGEN SATURATION: 97 % | HEIGHT: 67 IN | DIASTOLIC BLOOD PRESSURE: 84 MMHG | SYSTOLIC BLOOD PRESSURE: 140 MMHG | HEART RATE: 64 BPM | BODY MASS INDEX: 28.72 KG/M2 | WEIGHT: 183 LBS

## 2022-08-22 PROCEDURE — 99214 OFFICE O/P EST MOD 30 MIN: CPT | Mod: 25

## 2022-08-22 PROCEDURE — 93000 ELECTROCARDIOGRAM COMPLETE: CPT

## 2022-08-22 NOTE — CARDIOLOGY SUMMARY
[de-identified] : 8/22/22   ventricular paced rhythm  [de-identified] : 3/25/22 ( [facility] )  EF 45 % apical wall hypokinesis ,  mild to moderate MR ,severe LAE ,  Mild  AI , severe  TR severe ,pulmonary hypertension normal RV function   IVC dilated decreased respiratory variation  [de-identified] : 5/19/2017  [employer] VVI   MRI compatible  ADVISA  MRI [id number] [de-identified] : aug 28  2020   all coronaries showed minimal luminal irregularities  EF 48%

## 2022-08-22 NOTE — ASSESSMENT
[FreeTextEntry1] : S/P hospital discharge Acute on chronic systolic congestive heart failure; appears stable compensated no SOB/Weight stable.  Advised strict adherence to daily medications which were discussed and reviewed in detail today with patient and ; he knows to maintain daily weight and low sodium diet  continue lasix 40 mg po BID ,  spiranolactone 25 mg po daily  coreg  , will monitor his weight  2  months \par \par \par \par Pulmonary HTN: Stable O2 sat RA 96% \par \par AF: S/P ablation PPM . continue periodic interrogation with EP  Continue oral AC/BB\par \par Mitral Regurgitation; Continue to monitor with periodic Echocardiogram.\par \par MIld COPD  : continue inhaler  he is not taking budesonide inhaler , \par \par follow up after 2 months

## 2022-08-22 NOTE — PHYSICAL EXAM
[Right Carotid Bruit] : no bruit heard over the right carotid [Left Carotid Bruit] : no bruit heard over the left carotid [Right Femoral Bruit] : no bruit heard over the right femoral artery [Normal Radial B/L] : normal radial B/L

## 2022-08-22 NOTE — HISTORY OF PRESENT ILLNESS
[FreeTextEntry1] : 77 YO male PMH:  permanent atrial fibrillation SP ablation/PPM,  chronic systolic heart failure, non-obstructive CAD, CRT-P, nonischemic cardiomyopathy, mitral regurgitation, HTN, pulmonary hypertension, COPD  chronic systolic congestive heart failure/weight gain/LE Edema suspect due to suboptimal outpatient adherence to medications and dietary noncompliance came for follow up says he is doing well , physically active , taking medication regularly , \par \par \par \par  Patient blood pressure is controlled , today is mild elevated  as he did not take his AM medication , his lipids are controlled.   Patient is ambulatory , Patient has multiple medications at home ,who brought all of his medication , reviewed one by one \par explained to patient via  AM who helped in interpretor \par \par All medications discussed in detail \par \par EKG to asses or arrhythmia    patient appears to have no insight in to his medical conditions \par \par his weight is stable , \par \par \par \par \par

## 2022-08-23 ENCOUNTER — OUTPATIENT (OUTPATIENT)
Dept: OUTPATIENT SERVICES | Facility: HOSPITAL | Age: 77
LOS: 1 days | End: 2022-08-23
Payer: MEDICARE

## 2022-08-23 DIAGNOSIS — I83.813 VARICOSE VEINS OF BILATERAL LOWER EXTREMITIES WITH PAIN: ICD-10-CM

## 2022-08-23 DIAGNOSIS — Z95.0 PRESENCE OF CARDIAC PACEMAKER: Chronic | ICD-10-CM

## 2022-08-23 PROCEDURE — 99212 OFFICE O/P EST SF 10 MIN: CPT

## 2022-08-23 PROCEDURE — 93923 UPR/LXTR ART STDY 3+ LVLS: CPT

## 2022-08-24 PROCEDURE — 93923 UPR/LXTR ART STDY 3+ LVLS: CPT | Mod: 26

## 2022-09-01 ENCOUNTER — APPOINTMENT (OUTPATIENT)
Dept: VASCULAR SURGERY | Facility: CLINIC | Age: 77
End: 2022-09-01

## 2022-09-01 VITALS
OXYGEN SATURATION: 98 % | SYSTOLIC BLOOD PRESSURE: 148 MMHG | WEIGHT: 183 LBS | BODY MASS INDEX: 28.72 KG/M2 | HEART RATE: 68 BPM | HEIGHT: 67 IN | DIASTOLIC BLOOD PRESSURE: 84 MMHG

## 2022-09-01 DIAGNOSIS — R60.0 LOCALIZED EDEMA: ICD-10-CM

## 2022-09-01 PROCEDURE — 99203 OFFICE O/P NEW LOW 30 MIN: CPT

## 2022-09-01 PROCEDURE — 93970 EXTREMITY STUDY: CPT

## 2022-09-21 ENCOUNTER — APPOINTMENT (OUTPATIENT)
Dept: GASTROENTEROLOGY | Facility: CLINIC | Age: 77
End: 2022-09-21

## 2022-09-21 VITALS
DIASTOLIC BLOOD PRESSURE: 98 MMHG | BODY MASS INDEX: 28.72 KG/M2 | SYSTOLIC BLOOD PRESSURE: 172 MMHG | HEIGHT: 67 IN | HEART RATE: 69 BPM | WEIGHT: 183 LBS

## 2022-09-21 DIAGNOSIS — R10.84 GENERALIZED ABDOMINAL PAIN: ICD-10-CM

## 2022-09-21 LAB
ALBUMIN SERPL ELPH-MCNC: 4.4 G/DL
ALP BLD-CCNC: 120 U/L
ALT SERPL-CCNC: 26 U/L
AST SERPL-CCNC: 24 U/L
BILIRUB DIRECT SERPL-MCNC: 0.3 MG/DL
BILIRUB INDIRECT SERPL-MCNC: 0.7 MG/DL
BILIRUB SERPL-MCNC: 1 MG/DL
GGT SERPL-CCNC: 53 U/L
PROT SERPL-MCNC: 6.6 G/DL

## 2022-09-21 PROCEDURE — 99204 OFFICE O/P NEW MOD 45 MIN: CPT

## 2022-09-21 NOTE — REVIEW OF SYSTEMS
[Abdominal Pain] : abdominal pain [Negative] : Heme/Lymph [Vomiting] : no vomiting [Constipation] : no constipation [Diarrhea] : no diarrhea [Heartburn] : no heartburn [Melena (black stool)] : no melena [Bleeding] : no bleeding [Fecal Incontinence (soiling)] : no fecal incontinence [Bloating (gassiness)] : no bloating

## 2022-09-21 NOTE — PHYSICAL EXAM
[Alert] : alert [No Acute Distress] : no acute distress [Sclera] : the sclera and conjunctiva were normal [Normal Appearance] : the appearance of the neck was normal [No Respiratory Distress] : no respiratory distress [Respiration, Rhythm And Depth] : normal respiratory rhythm and effort [Heart Rate And Rhythm] : heart rate was normal and rhythm regular [Bowel Sounds] : normal bowel sounds [Abdomen Tenderness] : non-tender [Abdomen Soft] : soft [Normal Color / Pigmentation] : normal skin color and pigmentation [Oriented To Time, Place, And Person] : oriented to person, place, and time [No Clubbing, Cyanosis] : no clubbing or cyanosis of the fingernails [Motor Tone] : muscle strength and tone were normal [de-identified] : distended per body habitus

## 2022-09-21 NOTE — ADDENDUM
[FreeTextEntry1] : I was present with NP during the history and exam. I saw and examined the patient, discussed the case with the NP and agree with the findings and plan as documented in the NP’s note. Briefly, 76 year old man with elevated alk hpos and liver cyst, chronic abdominal pain. Spoke to him with  as well as staff member. Patient does not want egd/colon, r/b/a explained at length. MRI for liver cyst and elevated alk phos. Repeat lft's.

## 2022-09-21 NOTE — ASSESSMENT
[FreeTextEntry1] : Plan:\par For longstanding abdominal pain recommend EGD and colonoscopy. Risks versus benefits as well as instructions reviewed with  at length. Pt declines, states "I just want to know why I have abdominal pain". Stressed importance of endoscopic evaluation to ensure no polyps or masses as well as rule out PUD or BE. Pt declines at this time. For slightly elevated alkaline phosphatase, recommend repeat LFTS to reassess as well as MR of abdomen with liver protocol to further evaluate liver cyst observed on US. Pt given instructions on obtaining labs and imaging at length, provided phone number to schedule imaging. Pt expressed understanding of plan, all questions answered, will again recommend procedures once MR result is available. Seen and discussed with Dr. Juan.

## 2022-09-21 NOTE — REASON FOR VISIT
[Initial Evaluation] : an initial evaluation [[employer] ] : provided by [employer]   [Time Spent: ____ minutes] : Total time spent using  services: [unfilled] minutes. The patient's primary language is not English thus required  services. [Interpreters_IDNumber] : [id number] [Interpreters_FullName] : Cosme [TWNoteComboBox1] : Montenegrin

## 2022-10-10 PROBLEM — R60.0 BILATERAL LEG EDEMA: Status: ACTIVE | Noted: 2020-10-26

## 2022-10-10 NOTE — PHYSICAL EXAM
[Normal Rate and Rhythm] : normal rate and rhythm [2+] : left 2+ [Ankle Swelling (On Exam)] : present [Ankle Swelling On The Right] : of the right ankle [Varicose Veins Of Lower Extremities] : present [Ankle Swelling On The Left] : moderate [Varicose Veins Of The Right Leg] : of the right leg [] : of the right leg [Ankle Swelling Bilaterally] : severe [Abdomen Tenderness] : ~T ~M No abdominal tenderness [No Rash or Lesion] : No rash or lesion [Alert] : alert [Oriented to Person] : oriented to person [Oriented to Place] : oriented to place [Oriented to Time] : oriented to time [Calm] : calm [de-identified] : NAD [de-identified] : supple, no masses [de-identified] : unlabored breathing [FreeTextEntry1] : 1cm x 1cm ulcer over anteromedial RLE [de-identified] : FROM of all 4 extremities\par

## 2022-10-10 NOTE — HISTORY OF PRESENT ILLNESS
[FreeTextEntry1] : 77 yo M with history of HTN, hypercholesterolemia, CAD with pacemaker, and varicose veins presenting with RLE venous stasis ulcer, discomfort, heaviness, skin discoloration, swelling, fatigue, and large varicose veins while sitting and standing for the past month.  Denies history of DVT or SVT or trauma. Pt has worn compression stockings for past 6 months and elevated legs without improvement. Denies recent weight gain. No history of hypercoagulable disorder. Denies claudication or rest pain or ulcers. Pt exercises regularly with moderate walking. Denies chest pain, SOB, dyspnea on exertion, or orthopnea.\par

## 2022-10-10 NOTE — ASSESSMENT
[FreeTextEntry1] : 75 yo M with history of HTN, hypercholesterolemia, CAD with pacemaker, and varicose veins presenting with RLE venous stasis ulcer, discomfort, heaviness, skin discoloration, swelling, fatigue, and large varicose veins while sitting and standing for the past month.\par \par Venous duplex demonstrates RLE GSV reflux >2 sec with multiple incompetent perforators and tributaries, no DVT or SVT [Arterial/Venous Disease] : arterial/venous disease [Foot care/Footwear] : foot care/footwear

## 2022-10-13 ENCOUNTER — APPOINTMENT (OUTPATIENT)
Dept: VASCULAR SURGERY | Facility: CLINIC | Age: 77
End: 2022-10-13

## 2022-10-13 VITALS
WEIGHT: 185 LBS | BODY MASS INDEX: 29.03 KG/M2 | SYSTOLIC BLOOD PRESSURE: 129 MMHG | HEIGHT: 67 IN | DIASTOLIC BLOOD PRESSURE: 74 MMHG | OXYGEN SATURATION: 98 % | HEART RATE: 75 BPM

## 2022-10-13 PROCEDURE — 36475 ENDOVENOUS RF 1ST VEIN: CPT | Mod: RT

## 2022-10-13 NOTE — PROCEDURE
[FreeTextEntry1] : R SAMANTHA RFA [FreeTextEntry2] : varicose veins with pain and inflammation, venous insufficiency [FreeTextEntry3] : see scanned note\par \par 21cm of R GSV treated with  7 cycles of RF\par \par No complications

## 2022-10-20 ENCOUNTER — APPOINTMENT (OUTPATIENT)
Dept: VASCULAR SURGERY | Facility: CLINIC | Age: 77
End: 2022-10-20

## 2022-10-20 ENCOUNTER — APPOINTMENT (OUTPATIENT)
Dept: UROLOGY | Facility: CLINIC | Age: 77
End: 2022-10-20

## 2022-10-20 VITALS
OXYGEN SATURATION: 96 % | HEART RATE: 79 BPM | WEIGHT: 185 LBS | BODY MASS INDEX: 29.03 KG/M2 | HEIGHT: 67 IN | SYSTOLIC BLOOD PRESSURE: 134 MMHG | DIASTOLIC BLOOD PRESSURE: 79 MMHG

## 2022-10-20 PROCEDURE — 93971 EXTREMITY STUDY: CPT

## 2022-10-20 PROCEDURE — 99214 OFFICE O/P EST MOD 30 MIN: CPT

## 2022-10-20 NOTE — HISTORY OF PRESENT ILLNESS
[FreeTextEntry1] : 76 year old man, patient of Dr Walden, seen 10/20/2022 for follow up. He had urinary retention when he held his dual therapy of flomax and finasteride. Now back on medications and feels he is voiding freely. No acute complaints. He has history of elevated PSA, has not repeated recently.

## 2022-10-20 NOTE — ASSESSMENT
[FreeTextEntry1] : 77 yo M with BPH with LUTS, well controlled on dual therapy. WIll continue. \par For elevated PSA, will repeat now. \par RTO in 6 months with Dr Walden for routine follow up.

## 2022-10-20 NOTE — PHYSICAL EXAM
[General Appearance - Well Developed] : well developed [General Appearance - Well Nourished] : well nourished [Normal Appearance] : normal appearance [Well Groomed] : well groomed [General Appearance - In No Acute Distress] : no acute distress [Abdomen Soft] : soft [Abdomen Tenderness] : non-tender [Costovertebral Angle Tenderness] : no ~M costovertebral angle tenderness [Urethral Meatus] : meatus normal [Urinary Bladder Findings] : the bladder was normal on palpation [Scrotum] : the scrotum was normal [Testes Mass (___cm)] : there were no testicular masses [No Prostate Nodules] : no prostate nodules [Prostate Size ___ gm] : prostate size [unfilled] gm [Edema] : no peripheral edema [] : no respiratory distress [Respiration, Rhythm And Depth] : normal respiratory rhythm and effort [Oriented To Time, Place, And Person] : oriented to person, place, and time [Exaggerated Use Of Accessory Muscles For Inspiration] : no accessory muscle use [Affect] : the affect was normal [Mood] : the mood was normal [Not Anxious] : not anxious [Normal Station and Gait] : the gait and station were normal for the patient's age [No Focal Deficits] : no focal deficits [No Palpable Adenopathy] : no palpable adenopathy

## 2022-10-20 NOTE — REASON FOR VISIT
[[employer] ] : provided by [employer]   [Follow-up Visit ___] : a follow-up visit  for [unfilled] [Interpreters_IDNumber] : [id number] [Interpreters_FullName] : Jazmin [[id number]] : Malaysian

## 2022-10-21 LAB
PSA FREE FLD-MCNC: 20 %
PSA FREE SERPL-MCNC: 0.67 NG/ML
PSA SERPL-MCNC: 3.43 NG/ML

## 2022-10-24 ENCOUNTER — NON-APPOINTMENT (OUTPATIENT)
Age: 77
End: 2022-10-24

## 2022-10-24 ENCOUNTER — APPOINTMENT (OUTPATIENT)
Dept: CARDIOLOGY | Facility: CLINIC | Age: 77
End: 2022-10-24

## 2022-10-24 VITALS
OXYGEN SATURATION: 98 % | HEIGHT: 67 IN | SYSTOLIC BLOOD PRESSURE: 130 MMHG | DIASTOLIC BLOOD PRESSURE: 80 MMHG | HEART RATE: 65 BPM | WEIGHT: 183.25 LBS | BODY MASS INDEX: 28.76 KG/M2

## 2022-10-24 DIAGNOSIS — M19.90 UNSPECIFIED OSTEOARTHRITIS, UNSPECIFIED SITE: ICD-10-CM

## 2022-10-24 DIAGNOSIS — Z00.00 ENCOUNTER FOR GENERAL ADULT MEDICAL EXAMINATION W/OUT ABNORMAL FINDINGS: ICD-10-CM

## 2022-10-24 PROCEDURE — 99214 OFFICE O/P EST MOD 30 MIN: CPT | Mod: 25

## 2022-10-24 PROCEDURE — 93000 ELECTROCARDIOGRAM COMPLETE: CPT

## 2022-10-24 RX ORDER — ATORVASTATIN CALCIUM 20 MG/1
20 TABLET, FILM COATED ORAL DAILY
Qty: 90 | Refills: 1 | Status: ACTIVE | COMMUNITY
Start: 1900-01-01 | End: 1900-01-01

## 2022-10-24 NOTE — PHYSICAL EXAM
[Well Developed] : well developed [Well Nourished] : well nourished [Normal Conjunctiva] : normal conjunctiva [5th Left ICS - MCL] : palpated at the 5th LICS in the midclavicular line [Diffuse] : diffuse [No Precordial Heave] : no precordial heave was noted [Normal Rate] : normal [Rhythm Regular] : regular [Normal S1] : normal S1 [Normal S2] : normal S2 [III] : a grade 3 [___ +] : bilateral [unfilled]U+ pitting edema to the ankles [Rt] : varicose veins of the right leg noted [Lt] : varicose veins of the left leg noted [2+] : left 2+ [1+] : left 1+ [Soft] : abdomen soft [Non Tender] : non-tender [Normal Bowel Sounds] : normal bowel sounds [Normal Gait] : normal gait [No Edema] : no edema [Normal Radial B/L] : normal radial B/L [No Rash] : no rash [Normal] : moves all extremities, no focal deficits, normal speech [Alert and Oriented] : alert and oriented [Right Carotid Bruit] : no bruit heard over the right carotid [Left Carotid Bruit] : no bruit heard over the left carotid [Right Femoral Bruit] : no bruit heard over the right femoral artery

## 2022-10-24 NOTE — ASSESSMENT
[FreeTextEntry1] :  chronic systolic congestive heart failure; appears stable compensated no SOB/Weight stable.  Advised strict adherence to daily medications which were discussed and reviewed in detail today with patient and ; he knows to maintain daily weight and low sodium diet  continue lasix 40 mg po BID ,  spironolactone 25 mg po daily , coreg  , will monitor his weight  follow up  3  months \par \par Pulmonary HTN: Stable O2 sat RA 96% \par \par AF: S/P ablation PPM . continue periodic interrogation with EP  Continue oral AC/BB\par \par Mitral Regurgitation; Continue to monitor with periodic Echocardiogram.\par \par MIld COPD  : continue inhaler  he is not taking budesonide inhaler , \par \par follow up after 3  months

## 2022-10-24 NOTE — REVIEW OF SYSTEMS
[Urinary Frequency] : urinary frequency [Negative] : Heme/Lymph [Chills] : no chills [Blurry Vision] : no blurred vision [Earache] : no earache [Leg Claudication] : no intermittent leg claudication [Cough] : no cough [Wheezing] : no wheezing [Abdominal Pain] : no abdominal pain [Change in Appetite] : no change in appetite

## 2022-10-24 NOTE — REASON FOR VISIT
[Symptom and Test Evaluation] : symptom and test evaluation [Cardiac Failure] : cardiac failure [Arrhythmia/ECG Abnorrmalities] : arrhythmia/ECG abnormalities [Structural Heart and Valve Disease] : structural heart and valve disease [Hyperlipidemia] : hyperlipidemia [Hypertension] : hypertension [Source: ______] : History obtained from [unfilled] [[employer] ] : provided by [employer]   [Time Spent: ____ minutes] : Total time spent using  services: [unfilled] minutes. The patient's primary language is not English thus required  services. [Interpreters_IDNumber] : [id number] [Interpreters_FullName] : ramo olivarez

## 2022-10-24 NOTE — CARDIOLOGY SUMMARY
[de-identified] : 8/22/22   ventricular paced rhythm  [de-identified] : 3/25/22 ( [facility] )  EF 45 % apical wall hypokinesis ,  mild to moderate MR ,severe LAE ,  Mild  AI , severe  TR severe ,pulmonary hypertension normal RV function   IVC dilated decreased respiratory variation  [de-identified] : 5/19/2017  [employer] VVI   MRI compatible  ADVISA  MRI [id number] [de-identified] : aug 28  2020   all coronaries showed minimal luminal irregularities  EF 48%

## 2022-10-24 NOTE — HISTORY OF PRESENT ILLNESS
[FreeTextEntry1] : Patient with hx  permanent atrial fibrillation SP ablation/PPM,  chronic systolic heart failure, non-obstructive CAD, CRT-P, nonischemic cardiomyopathy, mitral regurgitation, HTN, pulmonary hypertension, COPD  chronic systolic congestive heart failure came for follow up says he is doing well \par \par  Patient blood pressure is controlled , today is mild elevated  as he did not take his AM medication , his lipids are controlled.   Patient is ambulatory , Patient has multiple medications at home ,who brought all of his medication , reviewed one by one \par explained to patient via  AM who helped in  \par \par All medications discussed in detail \par \par EKG to asses or arrhythmia    patient appears to have no insight in to his medical conditions \par \par his weight is stable , \par \par \par \par \par

## 2022-10-27 ENCOUNTER — APPOINTMENT (OUTPATIENT)
Dept: VASCULAR SURGERY | Facility: CLINIC | Age: 77
End: 2022-10-27

## 2022-10-27 VITALS
DIASTOLIC BLOOD PRESSURE: 72 MMHG | HEART RATE: 85 BPM | SYSTOLIC BLOOD PRESSURE: 128 MMHG | BODY MASS INDEX: 28.72 KG/M2 | WEIGHT: 183 LBS | OXYGEN SATURATION: 96 % | HEIGHT: 67 IN

## 2022-10-27 DIAGNOSIS — I87.2 VENOUS INSUFFICIENCY (CHRONIC) (PERIPHERAL): ICD-10-CM

## 2022-10-27 DIAGNOSIS — I83.11 VARICOSE VEINS OF RIGHT LOWER EXTREMITY WITH INFLAMMATION: ICD-10-CM

## 2022-10-27 DIAGNOSIS — I83.12 VARICOSE VEINS OF RIGHT LOWER EXTREMITY WITH INFLAMMATION: ICD-10-CM

## 2022-10-27 DIAGNOSIS — L97.919 VARICOSE VEINS OF RIGHT LOWER EXTREMITY WITH ULCER OF UNSPECIFIED SITE: ICD-10-CM

## 2022-10-27 DIAGNOSIS — I83.019 VARICOSE VEINS OF RIGHT LOWER EXTREMITY WITH ULCER OF UNSPECIFIED SITE: ICD-10-CM

## 2022-10-27 PROCEDURE — 99214 OFFICE O/P EST MOD 30 MIN: CPT

## 2022-10-27 NOTE — ASSESSMENT
[FreeTextEntry1] : 75 yo M with history of HTN, hypercholesterolemia, CAD with pacemaker, and varicose veins presenting with RLE venous stasis ulcer, discomfort, heaviness, skin discoloration, swelling, fatigue, and large varicose veins while sitting and standing for the past month s/p RLE GSV RFA\par Doing well. Ulcer has healed\par C/o itching and dry skin, but patient does not apply lotion to the legs [Arterial/Venous Disease] : arterial/venous disease [Foot care/Footwear] : foot care/footwear

## 2022-10-27 NOTE — PHYSICAL EXAM
[Normal Rate and Rhythm] : normal rate and rhythm [2+] : left 2+ [Ankle Swelling (On Exam)] : present [Ankle Swelling On The Right] : of the right ankle [Varicose Veins Of Lower Extremities] : present [Varicose Veins Of The Right Leg] : of the right leg [Ankle Swelling On The Left] : moderate [] : of the right leg [Ankle Swelling Bilaterally] : severe [Abdomen Tenderness] : ~T ~M No abdominal tenderness [No Rash or Lesion] : No rash or lesion [Alert] : alert [Oriented to Person] : oriented to person [Oriented to Place] : oriented to place [Oriented to Time] : oriented to time [Calm] : calm [de-identified] : NAD [de-identified] : supple, no masses [de-identified] : unlabored breathing [FreeTextEntry1] : healed ulcer [de-identified] : FROM of all 4 extremities\par

## 2022-10-27 NOTE — HISTORY OF PRESENT ILLNESS
[FreeTextEntry1] : 75 yo M with history of HTN, hypercholesterolemia, CAD with pacemaker, and varicose veins presenting with RLE venous stasis ulcer, discomfort, heaviness, skin discoloration, swelling, fatigue, and large varicose veins while sitting and standing for the past month.  Denies history of DVT or SVT or trauma. Pt has worn compression stockings for past 6 months and elevated legs without improvement. Denies recent weight gain. No history of hypercoagulable disorder. Denies claudication or rest pain or ulcers. Pt exercises regularly with moderate walking. Denies chest pain, SOB, dyspnea on exertion, or orthopnea.\par  [de-identified] : Patient s/p RLE GSV RFA\par Doing well\par C/o itching and dry skin, but patient does not apply lotion to the legs.\par RLE ulcer has healed

## 2022-11-04 ENCOUNTER — APPOINTMENT (OUTPATIENT)
Dept: ELECTROPHYSIOLOGY | Facility: CLINIC | Age: 77
End: 2022-11-04

## 2022-11-04 ENCOUNTER — NON-APPOINTMENT (OUTPATIENT)
Age: 77
End: 2022-11-04

## 2022-11-04 PROCEDURE — 93279 PRGRMG DEV EVAL PM/LDLS PM: CPT

## 2022-11-09 ENCOUNTER — APPOINTMENT (OUTPATIENT)
Dept: DERMATOLOGY | Facility: CLINIC | Age: 77
End: 2022-11-09

## 2022-11-09 ENCOUNTER — NON-APPOINTMENT (OUTPATIENT)
Age: 77
End: 2022-11-09

## 2022-11-09 PROCEDURE — 99204 OFFICE O/P NEW MOD 45 MIN: CPT

## 2022-11-09 NOTE — HISTORY OF PRESENT ILLNESS
[de-identified] : Pt. c/o itching on legs, abdomen; \par x few months;  using calamine; \par discussed via  (GP)\par

## 2022-11-09 NOTE — PHYSICAL EXAM
[FreeTextEntry3] : Erythematous scaling patches with inflammation and xerosis;\par over both legs, lower abdomen; \par

## 2022-11-09 NOTE — ASSESSMENT
[FreeTextEntry1] : Atopic/asteatotic dermatitis; \par \par Therapeutic options and their risks and benefits; along with multiple diagnostic possibilities were discussed at length; risks and benefits of further study were discussed;\par \par TAC 0.1 cream BID x 2 weeks, then Aveeno lotion maintenance \par \par f/u 6-8 wks to recheck

## 2022-11-10 ENCOUNTER — RESULT REVIEW (OUTPATIENT)
Age: 77
End: 2022-11-10

## 2022-11-10 ENCOUNTER — OUTPATIENT (OUTPATIENT)
Dept: OUTPATIENT SERVICES | Facility: HOSPITAL | Age: 77
LOS: 1 days | End: 2022-11-10
Payer: MEDICARE

## 2022-11-10 ENCOUNTER — NON-APPOINTMENT (OUTPATIENT)
Age: 77
End: 2022-11-10

## 2022-11-10 DIAGNOSIS — Z95.0 PRESENCE OF CARDIAC PACEMAKER: Chronic | ICD-10-CM

## 2022-11-10 DIAGNOSIS — R79.89 OTHER SPECIFIED ABNORMAL FINDINGS OF BLOOD CHEMISTRY: ICD-10-CM

## 2022-11-10 PROCEDURE — 71046 X-RAY EXAM CHEST 2 VIEWS: CPT

## 2022-11-10 PROCEDURE — 74183 MRI ABD W/O CNTR FLWD CNTR: CPT

## 2022-11-10 PROCEDURE — 71046 X-RAY EXAM CHEST 2 VIEWS: CPT | Mod: 26

## 2022-11-10 PROCEDURE — A9579: CPT

## 2022-11-10 PROCEDURE — 74183 MRI ABD W/O CNTR FLWD CNTR: CPT | Mod: 26

## 2022-11-11 DIAGNOSIS — R79.89 OTHER SPECIFIED ABNORMAL FINDINGS OF BLOOD CHEMISTRY: ICD-10-CM

## 2022-11-21 ENCOUNTER — NON-APPOINTMENT (OUTPATIENT)
Age: 77
End: 2022-11-21

## 2022-12-12 NOTE — DISCHARGE NOTE ADULT - ADMISSION DATE +STARTOFVISITDATE
Statement Selected Patient had two syncopal episodes this morning and behind the right knee stiffness.. (Pt had flu vaccine 11/14 and Covid 11/30)

## 2022-12-23 ENCOUNTER — APPOINTMENT (OUTPATIENT)
Dept: DERMATOLOGY | Facility: CLINIC | Age: 77
End: 2022-12-23

## 2022-12-23 PROCEDURE — 99213 OFFICE O/P EST LOW 20 MIN: CPT

## 2022-12-23 NOTE — ASSESSMENT
[FreeTextEntry1] : Atopic/asteatotic dermatitis; \par \par Therapeutic options and their risks and benefits; along with multiple diagnostic possibilities were discussed at length; risks and benefits of further study were discussed;\par \par continue TAC 0.1 cream prn, Aveeno lotion maintenance \par \par f/u Fall;  sooner prn

## 2022-12-23 NOTE — HISTORY OF PRESENT ILLNESS
[de-identified] : Pt. c/o itching on legs, abdomen; \par x few months;  using calamine; \par markedly improved on TAC cream;  \par discussed via  (GP)\par

## 2023-01-10 NOTE — ADDENDUM
Called, spoke to patient and gave results and recommendations/instructions     [FreeTextEntry1] : I, Danyel Ramirez, acted solely as a scribe for Dr. Kirby Tucker on this date 10/26/2020 .\par All medical record entries made by the Scribe were at my, Dr. Kirby Tucker, direction and personally dictated by me on 10/26/2020 . I have reviewed the chart and agree that the record accurately reflects my personal performance of the history, physical exam, assessment and plan. I have also personally directed, reviewed, and agreed with the chart.

## 2023-01-19 NOTE — ED POST DISCHARGE NOTE - REASON FOR FOLLOW-UP
Culture Follow-up Bi-Rhombic Flap Text: The defect edges were debeveled with a #15 scalpel blade.  Given the location of the defect and the proximity to free margins a bi-rhombic flap was deemed most appropriate.  Using a sterile surgical marker, an appropriate rhombic flap was drawn incorporating the defect. The area thus outlined was incised deep to adipose tissue with a #15 scalpel blade.  The skin margins were undermined to an appropriate distance in all directions utilizing iris scissors.

## 2023-01-23 ENCOUNTER — APPOINTMENT (OUTPATIENT)
Dept: CARDIOLOGY | Facility: CLINIC | Age: 78
End: 2023-01-23
Payer: MEDICARE

## 2023-01-23 ENCOUNTER — NON-APPOINTMENT (OUTPATIENT)
Age: 78
End: 2023-01-23

## 2023-01-23 VITALS
HEIGHT: 67 IN | SYSTOLIC BLOOD PRESSURE: 160 MMHG | OXYGEN SATURATION: 95 % | HEART RATE: 72 BPM | WEIGHT: 190 LBS | BODY MASS INDEX: 29.82 KG/M2 | DIASTOLIC BLOOD PRESSURE: 90 MMHG

## 2023-01-23 PROCEDURE — 93000 ELECTROCARDIOGRAM COMPLETE: CPT

## 2023-01-23 PROCEDURE — 99214 OFFICE O/P EST MOD 30 MIN: CPT

## 2023-01-23 RX ORDER — BETHANECHOL CHLORIDE 50 MG/1
50 TABLET ORAL
Qty: 60 | Refills: 4 | Status: DISCONTINUED | COMMUNITY
Start: 2021-12-03 | End: 2023-01-23

## 2023-01-23 NOTE — ASSESSMENT
[FreeTextEntry1] : chronic systolic congestive heart failure; appears euvolemic no SOB/PND/Orthopnea/Edema has gained some weight which I have advised weight loss and to monitor daily weight, low sodium diet\par Advised strict adherence to daily medications and ; he knows to maintain daily weight\par continue lasix/spironolactone/ coreg \par \par HTN: pressure mildly elevated today but has gained some weight and has not been complaint with low sodium diet  have discussed in detail recommendations to follow a strict low sodium diet he will loose the weight gained and continue daily weights \par \par Pulmonary HTN: Stable O2 sat RA 95% \par \par AF: S/P ablation PPM EP management  Continue oral AC/BB\par \par Mitral Regurgitation; Continue to monitor with periodic Echocardiogram.\par \par MIld COPD: Pulmonary management \par \par follow up after 1 month with F/U Echo \par \par Plan DW patient and Dr Palla

## 2023-01-23 NOTE — END OF VISIT
[Time Spent: ___ minutes] : I have spent [unfilled] minutes of time on the encounter. [FreeTextEntry3] : patient was seen with NP agree with assessment and plan

## 2023-01-23 NOTE — PHYSICAL EXAM
[Well Developed] : well developed [Well Nourished] : well nourished [Normal Conjunctiva] : normal conjunctiva [5th Left ICS - MCL] : palpated at the 5th LICS in the midclavicular line [Diffuse] : diffuse [No Precordial Heave] : no precordial heave was noted [Normal Rate] : normal [Rhythm Regular] : regular [Normal S1] : normal S1 [Normal S2] : normal S2 [III] : a grade 3 [___ +] : bilateral [unfilled]U+ pitting edema to the ankles [Rt] : varicose veins of the right leg noted [Lt] : varicose veins of the left leg noted [2+] : left 2+ [1+] : left 1+ [Soft] : abdomen soft [Non Tender] : non-tender [Normal Gait] : normal gait [No Edema] : no edema [Normal Radial B/L] : normal radial B/L [No Rash] : no rash [Alert and Oriented] : alert and oriented [No Acute Distress] : no acute distress [Normal S1, S2] : normal S1, S2 [Normal] : clear lung fields, good air entry, no respiratory distress [Right Carotid Bruit] : no bruit heard over the right carotid [Left Carotid Bruit] : no bruit heard over the left carotid [Right Femoral Bruit] : no bruit heard over the right femoral artery

## 2023-01-23 NOTE — CARDIOLOGY SUMMARY
[de-identified] : 8/22/22   ventricular paced rhythm  [de-identified] : 3/25/22 ( [facility] )  EF 45 % apical wall hypokinesis ,  mild to moderate MR ,severe LAE ,  Mild  AI , severe  TR severe ,pulmonary hypertension normal RV function   IVC dilated decreased respiratory variation  [de-identified] : 5/19/2017  [employer] VVI   MRI compatible  ADVISA  MRI [id number] [de-identified] : aug 28  2020   all coronaries showed minimal luminal irregularities  EF 48%

## 2023-01-23 NOTE — REASON FOR VISIT
[Symptom and Test Evaluation] : symptom and test evaluation [Cardiac Failure] : cardiac failure [Arrhythmia/ECG Abnorrmalities] : arrhythmia/ECG abnormalities [Structural Heart and Valve Disease] : structural heart and valve disease [Hyperlipidemia] : hyperlipidemia [Hypertension] : hypertension [Source: ______] : History obtained from [unfilled] [[employer] ] : provided by [employer]   [Interpreters_IDNumber] : [id number] [Interpreters_FullName] : ramo olivarez

## 2023-01-23 NOTE — HISTORY OF PRESENT ILLNESS
[FreeTextEntry1] : Patient seen today with assistance of  PMH:   permanent atrial fibrillation SP ablation/PPM,  chronic systolic heart failure, non-obstructive CAD, CRT-P, nonischemic cardiomyopathy, mitral regurgitation, HTN, pulmonary hypertension, COPD  chronic systolic congestive heart failure here today in routine cardiac follow up\par \par Claims he is feeling well no complaints offered no CP/SOB \par 7 LB weight gain since last visit\par pressure mildly elevated today but has gained some weight and has not been complaint with low sodium diet \par \par All medications discussed in detail \par \par EKG to asses or arrhythmia    patient appears to have no insight in to his medical conditions \par \par his weight is stable , \par \par \par \par \par

## 2023-01-25 ENCOUNTER — APPOINTMENT (OUTPATIENT)
Dept: GASTROENTEROLOGY | Facility: CLINIC | Age: 78
End: 2023-01-25
Payer: MEDICARE

## 2023-01-25 VITALS
RESPIRATION RATE: 16 BRPM | BODY MASS INDEX: 29.03 KG/M2 | HEIGHT: 67 IN | WEIGHT: 185 LBS | DIASTOLIC BLOOD PRESSURE: 89 MMHG | SYSTOLIC BLOOD PRESSURE: 152 MMHG | OXYGEN SATURATION: 98 % | HEART RATE: 70 BPM | TEMPERATURE: 98.7 F

## 2023-01-25 DIAGNOSIS — R79.89 OTHER SPECIFIED ABNORMAL FINDINGS OF BLOOD CHEMISTRY: ICD-10-CM

## 2023-01-25 DIAGNOSIS — Z71.9 COUNSELING, UNSPECIFIED: ICD-10-CM

## 2023-01-25 PROCEDURE — 99205 OFFICE O/P NEW HI 60 MIN: CPT

## 2023-01-25 RX ORDER — PREDNISONE 20 MG/1
20 TABLET ORAL
Qty: 10 | Refills: 0 | Status: DISCONTINUED | COMMUNITY
Start: 2022-11-03 | End: 2023-01-25

## 2023-01-25 RX ORDER — ALBUTEROL SULFATE 90 UG/1
108 (90 BASE) INHALANT RESPIRATORY (INHALATION) 4 TIMES DAILY
Refills: 0 | Status: DISCONTINUED | COMMUNITY
End: 2023-01-25

## 2023-02-03 ENCOUNTER — APPOINTMENT (OUTPATIENT)
Dept: CARDIOLOGY | Facility: CLINIC | Age: 78
End: 2023-02-03
Payer: MEDICARE

## 2023-02-03 PROCEDURE — 93306 TTE W/DOPPLER COMPLETE: CPT

## 2023-02-06 ENCOUNTER — NON-APPOINTMENT (OUTPATIENT)
Age: 78
End: 2023-02-06

## 2023-02-13 ENCOUNTER — LABORATORY RESULT (OUTPATIENT)
Age: 78
End: 2023-02-13

## 2023-02-14 ENCOUNTER — NON-APPOINTMENT (OUTPATIENT)
Age: 78
End: 2023-02-14

## 2023-02-14 ENCOUNTER — APPOINTMENT (OUTPATIENT)
Dept: ELECTROPHYSIOLOGY | Facility: CLINIC | Age: 78
End: 2023-02-14
Payer: MEDICARE

## 2023-02-14 VITALS
SYSTOLIC BLOOD PRESSURE: 174 MMHG | WEIGHT: 180 LBS | HEART RATE: 63 BPM | HEIGHT: 67 IN | DIASTOLIC BLOOD PRESSURE: 93 MMHG | BODY MASS INDEX: 28.25 KG/M2 | OXYGEN SATURATION: 100 %

## 2023-02-14 PROCEDURE — 93280 PM DEVICE PROGR EVAL DUAL: CPT

## 2023-02-16 ENCOUNTER — APPOINTMENT (OUTPATIENT)
Dept: CARDIOLOGY | Facility: CLINIC | Age: 78
End: 2023-02-16

## 2023-02-20 ENCOUNTER — APPOINTMENT (OUTPATIENT)
Dept: CARDIOLOGY | Facility: CLINIC | Age: 78
End: 2023-02-20
Payer: MEDICARE

## 2023-02-20 VITALS
BODY MASS INDEX: 29.35 KG/M2 | DIASTOLIC BLOOD PRESSURE: 90 MMHG | HEART RATE: 71 BPM | WEIGHT: 187 LBS | HEIGHT: 67 IN | OXYGEN SATURATION: 98 % | SYSTOLIC BLOOD PRESSURE: 146 MMHG

## 2023-02-20 PROCEDURE — 99214 OFFICE O/P EST MOD 30 MIN: CPT

## 2023-02-20 RX ORDER — FINASTERIDE 5 MG/1
5 TABLET, FILM COATED ORAL DAILY
Qty: 90 | Refills: 1 | Status: DISCONTINUED | COMMUNITY
Start: 2021-12-03 | End: 2023-02-20

## 2023-02-20 NOTE — CARDIOLOGY SUMMARY
[de-identified] : 1/23/23   ventricular paced rhythm  [de-identified] :  2/3/23 50-55% LVH , KAI moderate MR , mild to moderate AI , Moderate to severe TR  moderate PHTN  59 BPM  [de-identified] : 5/19/2017  [employer] VVI   MRI compatible  ADVISA  MRI [id number] [de-identified] : aug 28  2020   all coronaries showed minimal luminal irregularities  EF 48%

## 2023-02-20 NOTE — PHYSICAL EXAM
[Well Developed] : well developed [Well Nourished] : well nourished [Normal Rate] : normal [Rhythm Regular] : regular [Normal S1] : normal S1 [Normal S2] : normal S2 [Soft] : abdomen soft [Non Tender] : non-tender [Normal Gait] : normal gait [No Edema] : no edema [Normal] : moves all extremities, no focal deficits, normal speech [Alert and Oriented] : alert and oriented [Normal S1, S2] : normal S1, S2 [Right Carotid Bruit] : no bruit heard over the right carotid [Left Carotid Bruit] : no bruit heard over the left carotid [Right Femoral Bruit] : no bruit heard over the right femoral artery

## 2023-02-20 NOTE — REASON FOR VISIT
[Symptom and Test Evaluation] : symptom and test evaluation [Cardiac Failure] : cardiac failure [Arrhythmia/ECG Abnorrmalities] : arrhythmia/ECG abnormalities [Structural Heart and Valve Disease] : structural heart and valve disease [Hyperlipidemia] : hyperlipidemia [Hypertension] : hypertension [Source: ______] : History obtained from [unfilled] [[employer] ] : provided by [employer]   [Interpreters_IDNumber] : [id number] [Interpreters_FullName] : ramo olivarez

## 2023-02-20 NOTE — HISTORY OF PRESENT ILLNESS
[FreeTextEntry1] : 78 y/o male PMH:  permanent atrial fibrillation SP ablation/PPM,  chronic systolic heart failure, non-obstructive CAD, CRT-P, nonischemic cardiomyopathy, mitral regurgitation, HTN, pulmonary hypertension, COPD  chronic systolic congestive heart failure here today for BP check as upon prior visit his pressure was mildly elevated  as he did not take his AM medication. In reviewing all medications which were discussed in detail with  he has been inconsistent with taking his spironolactone  \par \par EKG to asses or arrhythmia \par patient appears to have no insight in to his medical conditions/medications despite a lengthy conversation with myself and  \par \par Patient AICD interrogation on 2/14/23  showing normal function \par  \par \par \par \par \par

## 2023-02-20 NOTE — ASSESSMENT
[FreeTextEntry1] : Here today for BP check as upon prior visit his pressure was mildly elevated as he did not take his AM medication. Today In reviewing all medications which were discussed in detail with  he has been inconsistent with taking his spironolactone, \par H/O Chronic sys HF and appear compensated However, weight has increased; Have discussed in great detail to maintain daily spironolactone with daily weight, he denies SOB/PND/Orthopnea/LE Edema is exercising daily W/O limitations/symptoms   Advised strict adherence to daily medications which were discussed and reviewed in detail today with patient and ; he knows to maintain daily weight and low sodium diet  continue lasix 40 mg po BID ,  spironolactone 25 mg po daily , coreg  , will monitor his weight  follow up 2 weeks \par \par Pulmonary HTN: secondary to  copd and systolic heart failure   : stable  on follow up , Stable O2 sat RA 98% \par \par AF: S/P ablation PPM . continue periodic interrogation with EP  Continue oral AC/BB\par \par  Valvular heart disease  Moderate Mitral Regurgitation, Mild to moderate AI , moderate to severe TR ; Continue to monitor with periodic Echocardiogram.\par \par MIld COPD  : continue inhaler  he is not taking budesonide inhaler , \par \par Above plan was discussed with patient VIA

## 2023-02-20 NOTE — END OF VISIT
[Time Spent: ___ minutes] : I have spent [unfilled] minutes of time on the encounter. [FreeTextEntry3] : Patient was seen , with NP agree with assessment and plan ,

## 2023-03-06 ENCOUNTER — APPOINTMENT (OUTPATIENT)
Dept: CARDIOLOGY | Facility: CLINIC | Age: 78
End: 2023-03-06
Payer: MEDICARE

## 2023-03-06 VITALS
DIASTOLIC BLOOD PRESSURE: 90 MMHG | HEIGHT: 67 IN | WEIGHT: 189.38 LBS | HEART RATE: 70 BPM | BODY MASS INDEX: 29.72 KG/M2 | TEMPERATURE: 97.4 F | OXYGEN SATURATION: 97 % | SYSTOLIC BLOOD PRESSURE: 164 MMHG

## 2023-03-06 VITALS
DIASTOLIC BLOOD PRESSURE: 90 MMHG | TEMPERATURE: 98 F | RESPIRATION RATE: 16 BRPM | HEART RATE: 70 BPM | OXYGEN SATURATION: 97 % | WEIGHT: 189 LBS | SYSTOLIC BLOOD PRESSURE: 160 MMHG | HEIGHT: 67 IN | BODY MASS INDEX: 29.66 KG/M2

## 2023-03-06 PROCEDURE — 99214 OFFICE O/P EST MOD 30 MIN: CPT

## 2023-03-06 NOTE — CARDIOLOGY SUMMARY
[de-identified] : 8/22/22   ventricular paced rhythm  [de-identified] : 3/25/22 ( [facility] )  EF 45 % apical wall hypokinesis ,  mild to moderate MR ,severe LAE ,  Mild  AI , severe  TR severe ,pulmonary hypertension normal RV function   IVC dilated decreased respiratory variation  [de-identified] : 5/19/2017  [employer] VVI   MRI compatible  ADVISA  MRI [id number] [de-identified] : aug 28  2020   all coronaries showed minimal luminal irregularities  EF 48%

## 2023-03-06 NOTE — PHYSICAL EXAM
[Well Developed] : well developed [Well Nourished] : well nourished [Normal Conjunctiva] : normal conjunctiva [No Acute Distress] : no acute distress [Normal S1, S2] : normal S1, S2 [5th Left ICS - MCL] : palpated at the 5th LICS in the midclavicular line [Diffuse] : diffuse [No Precordial Heave] : no precordial heave was noted [Normal Rate] : normal [Rhythm Regular] : regular [Normal S1] : normal S1 [Normal S2] : normal S2 [III] : a grade 3 [___ +] : bilateral [unfilled]U+ pitting edema to the ankles [Rt] : varicose veins of the right leg noted [Lt] : varicose veins of the left leg noted [2+] : left 2+ [1+] : left 1+ [Soft] : abdomen soft [Non Tender] : non-tender [Normal Gait] : normal gait [No Edema] : no edema [No Rash] : no rash [Normal Radial B/L] : normal radial B/L [Normal] : moves all extremities, no focal deficits, normal speech [Alert and Oriented] : alert and oriented [Right Carotid Bruit] : no bruit heard over the right carotid [Left Carotid Bruit] : no bruit heard over the left carotid [Right Femoral Bruit] : no bruit heard over the right femoral artery

## 2023-03-06 NOTE — ASSESSMENT
[FreeTextEntry1] : chronic systolic congestive heart failure; appears euvolemic no SOB/PND/Orthopnea/Edema has gained some weight which I have advised weight loss and to monitor daily weight, low sodium diet\par Advised strict adherence to daily medications and ; he knows to maintain daily weight\par continue lasix 40 bid /spironolactone 25 mg daily / coreg 6.25 mg po BID \par \par HTN: pressure mildly elevated today but has gained some weight and has not been complaint with low sodium diet  have discussed in detail recommendations to follow a strict low sodium diet he will loose the weight gained and continue daily weights , will increase lisinopril to 20 mg po dailyh \par \par Pulmonary HTN: Stable O2 sat RA 95% \par \par AF: S/P ablation PPM EP management  Continue oral AC/BB\par \par Mitral Regurgitation; Continue to monitor with periodic Echocardiogram.\par \par MIld COPD: Pulmonary management \par \par \par follow up after 3 weeks

## 2023-03-06 NOTE — HISTORY OF PRESENT ILLNESS
[FreeTextEntry1] : Patient seen today with assistance of  PMH:   permanent atrial fibrillation SP ablation/PPM,  chronic systolic heart failure, non-obstructive CAD, CRT-P, nonischemic cardiomyopathy, mitral regurgitation, HTN, pulmonary hypertension, COPD  chronic systolic congestive heart failure here today in routine cardiac follow up\par \par Patient says he is feeling fine ,\par \par All medications discussed in details   patient appears to have no insight in to his medical conditions  his blood pressure is elevated , \par \par his weight is stable , \par \par PM was interrogated feb 2023 \par \par \par \par \par

## 2023-03-06 NOTE — REASON FOR VISIT
[Symptom and Test Evaluation] : symptom and test evaluation [Cardiac Failure] : cardiac failure [Arrhythmia/ECG Abnorrmalities] : arrhythmia/ECG abnormalities [Structural Heart and Valve Disease] : structural heart and valve disease [Hyperlipidemia] : hyperlipidemia [Hypertension] : hypertension [Source: ______] : History obtained from [unfilled] [[employer] ] : provided by [employer]   [Time Spent: ____ minutes] : Total time spent using  services: [unfilled] minutes. The patient's primary language is not English thus required  services. [Interpreters_IDNumber] : [id number] [Interpreters_FullName] : Arvin

## 2023-03-07 ENCOUNTER — APPOINTMENT (OUTPATIENT)
Dept: GASTROENTEROLOGY | Facility: CLINIC | Age: 78
End: 2023-03-07
Payer: MEDICARE

## 2023-03-07 VITALS
HEART RATE: 74 BPM | DIASTOLIC BLOOD PRESSURE: 86 MMHG | RESPIRATION RATE: 18 BRPM | OXYGEN SATURATION: 98 % | BODY MASS INDEX: 28.88 KG/M2 | WEIGHT: 184 LBS | HEIGHT: 67 IN | SYSTOLIC BLOOD PRESSURE: 160 MMHG | TEMPERATURE: 98.1 F

## 2023-03-07 DIAGNOSIS — K76.1 CHRONIC PASSIVE CONGESTION OF LIVER: ICD-10-CM

## 2023-03-07 PROCEDURE — 99214 OFFICE O/P EST MOD 30 MIN: CPT

## 2023-03-08 LAB
A1AT PHENOTYP SERPL-IMP: NORMAL
A1AT SERPL-MCNC: 161 MG/DL
A1AT SERPL-MCNC: 164 MG/DL
AFP-TM SERPL-MCNC: 2.5 NG/ML
ALBUMIN SERPL ELPH-MCNC: 4.6 G/DL
ALP BLD-CCNC: 121 U/L
ALT SERPL-CCNC: 34 U/L
ANA PAT FLD IF-IMP: ABNORMAL
ANA SER IF-ACNC: ABNORMAL
ANION GAP SERPL CALC-SCNC: 15 MMOL/L
AST SERPL-CCNC: 29 U/L
BASOPHILS # BLD AUTO: 0.06 K/UL
BASOPHILS NFR BLD AUTO: 0.7 %
BILIRUB INDIRECT SERPL-MCNC: 1 MG/DL
BILIRUB SERPL-MCNC: 1.6 MG/DL
BUN SERPL-MCNC: 20 MG/DL
CALCIUM SERPL-MCNC: 9.5 MG/DL
CERULOPLASMIN SERPL-MCNC: 27 MG/DL
CHLORIDE SERPL-SCNC: 103 MMOL/L
CO2 SERPL-SCNC: 24 MMOL/L
CREAT SERPL-MCNC: 1.1 MG/DL
DEPRECATED KAPPA LC FREE/LAMBDA SER: 1.73 RATIO
EGFR: 69 ML/MIN/1.73M2
EOSINOPHIL # BLD AUTO: 0.15 K/UL
EOSINOPHIL NFR BLD AUTO: 1.8 %
FERRITIN SERPL-MCNC: 120 NG/ML
GLUCOSE SERPL-MCNC: 107 MG/DL
HBV CORE IGG+IGM SER QL: NONREACTIVE
HCT VFR BLD CALC: 46 %
HGB BLD-MCNC: 14.4 G/DL
IGA SER QL IEP: 239 MG/DL
IGG SER QL IEP: 1184 MG/DL
IGM SER QL IEP: 74 MG/DL
IMM GRANULOCYTES NFR BLD AUTO: 0.2 %
INR PPP: 1.07 RATIO
IRON SATN MFR SERPL: 28 %
IRON SERPL-MCNC: 97 UG/DL
KAPPA LC CSF-MCNC: 2.11 MG/DL
KAPPA LC SERPL-MCNC: 3.66 MG/DL
LKM AB SER QL IF: <20.1 UNITS
LYMPHOCYTES # BLD AUTO: 1.49 K/UL
LYMPHOCYTES NFR BLD AUTO: 18.1 %
MAN DIFF?: NORMAL
MCHC RBC-ENTMCNC: 31.3 GM/DL
MCHC RBC-ENTMCNC: 32.3 PG
MCV RBC AUTO: 103.1 FL
MITOCHONDRIA AB SER IF-ACNC: NORMAL
MONOCYTES # BLD AUTO: 0.77 K/UL
MONOCYTES NFR BLD AUTO: 9.4 %
NEUTROPHILS # BLD AUTO: 5.74 K/UL
NEUTROPHILS NFR BLD AUTO: 69.8 %
NT-PROBNP SERPL-MCNC: 3919 PG/ML
PLATELET # BLD AUTO: 134 K/UL
POTASSIUM SERPL-SCNC: 4.5 MMOL/L
PROT SERPL-MCNC: 6.9 G/DL
PT BLD: 12.6 SEC
RBC # BLD: 4.46 M/UL
RBC # FLD: 13.8 %
SMOOTH MUSCLE AB SER QL IF: NORMAL
SODIUM SERPL-SCNC: 141 MMOL/L
SOLUBLE LIVER IGG SER IA-ACNC: 1.4
TIBC SERPL-MCNC: 346 UG/DL
TSH SERPL-ACNC: 1.16 UIU/ML
UIBC SERPL-MCNC: 249 UG/DL
WBC # FLD AUTO: 8.23 K/UL

## 2023-03-28 NOTE — HISTORY OF PRESENT ILLNESS
[de-identified] : 78 y/o with cardiac history and recent admission for CHF exacerbation presents for evaluation of elevated LFT's.\par Denies tattoos. Denies OTC or herbal supplements. Denies fatigue, malaise, arthralgias, myalgias, pruritus, recent infection, abdominal pain or distension, jaundice, hematemesis, hematochezia, dark urine, confusion, unintentional weight loss or gain. Denies family history of sudden death or late trimester miscarriages.\par Alcohol consumption.Denies sig history\par Family history/ Liver disease:  No pertinent family history in first degree relatives.\par See admission details below.\par \par Physical exam:\par Gen: A&Ox3, NAD\par HEENT: normal outer ears & nose, PERRL, mucus membranes moist, anicteric, no lymphadenopathy\par CVS: regular rate and rhythm, no murmur\par Pulm: vesicular breath sounds\par Abdomen: normal bowel sounds, soft, nontender, no hepatosplenomegaly\par Legs: no edema, no clubbing\par Musculoskeletal: normal gait\par Skin: no rash\par Neuro: no asterixis, EOMI\par Psych: normal affect\par \par \par \par  [de-identified] : \par \par \par    \par    \par    \par    \par    \par    \par    \par  \par \par \par \par \par \par \par \par \par        \par        \par        \par        \par        \par        \par        \par        \par        \par        \par        \par        \par        \par        \par        \par        \par        \par        \par        \par        \par        \par        \par  \par \par \par \par \par \par \par \par \par \par \par    \par    \par    \par    \par    \par    \par    \par    \par    \par    \par    \par    \par    \par    \par    \par    \par    \par    \par    \par  \par \par \par \par \par \par \par \par \par \par \par \par    \par    \par  \par      \par    \par \par                                                                              \par      \par Discharge Note Provider\par      \par \par \par     Status  Complete: Signed  Document Date  03- 09:09\par     Facility  [facility]  Encounter Date  03- 17:19\par     Clinician  Matt Mathew  Last Update Date  03- 09:09\par     Doc Type    [id number]  Finalized Date  03- 12:37\par      \par \par    \par Wrap Text: Off  \par Hospital Course:\par Discharge Date    29-Mar-2022\par Admission Date    24-Mar-2022 17:19\par Reason for Admission    LE edema/Abd distention\par Hospital Course   \par  \par CC: LE edema/Abd distention (27 Mar 2022 11:10)\par  \par HPI: 76 year old male with PMHx of systolic CHF (EF 45%), anasarca, COPD -\par Severe Pulmonary HTN, HTN, dyslipidemia, CAD, a-fib/PPM on xarelto, arthritis,\par ESBL UTI c/o abdominal distention and bilateral LE swelling x 2 weeks. Was seen\par at [facility] and asked to start a new regimen to combat his symptoms however\par he endorses symptoms have not improved and have actually gotten worse. He also\par endorses some mild chest discomfort that is present intermittently Non\par radiating. No PND. No hemoptysis. No cough. No exertional component. But feels\par somewhat sob when he is exerting himself. Most recent cath 8/2020: non\par obstructive CAD with e/o severe pulmonary HTN and severe mitral regurg. EF45%.\par  \par Medical progress: Patient denies any HA, CP, SOB. No fevers, chills or shakes.\par patient continues to have orthostatics -  but patient is fully\par Complaints: no new complaints\par  \par  \par Physical Exam:\par GENERAL APPEARANCE:  NAD, hemodynamically stable\par T(C): 36.3 (30 Mar 2022 08:39), Max: 36.5 (29 Mar 2022 15:46)\par T(F): 97.3 (30 Mar 2022 08:39), Max: 97.7 (29 Mar 2022 15:46)\par HR: 80 (30 Mar 2022 10:22) (61 - 80)\par BP: 117/72 (30 Mar 2022 10:22) (90/52 - 119/65)\par RR: 18 (30 Mar 2022 08:39) (18 - 18)\par SpO2: 98% (30 Mar 2022 08:39) (97% - 98%)\par HEENT:  Head is normocephalic\par Skin:  Warm and dry without any rash\par NECK:  Supple without lymphadenopathy.\par HEART:  Regular rate and rhythm. normal S1 and S2, No M/R/G\par LUNGS:  Good ins/exp effort, no W/R/R/C\par ABDOMEN:  Soft, nontender, nondistended with good bowel sounds heard\par EXTREMITIES:  Without cyanosis, clubbing or edema.\par NEUROLOGICAL:  Gross nonfocal\par  \par  \par Med Reconciliation:\par Medication Reconciliation Status    Admission Reconciliation is Completed\par Discharge Reconciliation is Completed\par Discharge Medications   albuterol 90 mcg/inh inhalation aerosol: 2 puff(s)\par inhaled every 6 hours, As needed, Bronchospasm\par atorvastatin 20 mg oral tablet: 1 tab(s) orally once a day (at bedtime)\par budesonide-formoterol 80 mcg-4.5 mcg/inh inhalation aerosol: 1 application\par inhaled 2 times a day\par carvedilol 6.25 mg oral tablet: 1 tab(s) orally every 12 hours\par finasteride 5 mg oral tablet: 1 tab(s) orally once a day\par furosemide 40 mg oral tablet: 1 tab(s) orally 2 times a day\par lisinopril 10 mg oral tablet: 1 tab(s) orally once a day\par omeprazole 20 mg oral delayed release capsule: 1 cap(s) orally once a day\par rivaroxaban 20 mg oral tablet: 1 tab(s) orally once a day (with dinner)\par sertal compound: 1 tab(s) orally once a day, As Needed\par ***Medication from the [place], Propinox HCl 10mg - Lysine\par Clonixinate 125mg***\par spironolactone 25 mg oral tablet: 1 tab(s) orally once a day\par tamsulosin 0.4 mg oral capsule: 1 cap(s) orally 2 times a day\par ,\par ,\par  \par Care Plan/Procedures:\par Discharge Diagnoses, Assessment and Plan of Treatment PRINCIPAL DISCHARGE\par DIAGNOSIS\par Diagnosis: CHF exacerbation\par Assessment and Plan of Treatment: # Acute on chronic combined systolic and\par diastolic CHF / R sided HF/ valvulopathy /  Severe Pulm HTN / congestive\par hepatopathy w/ ascites\par - lasix 40 mg orally 2 times daily\par - discharge weight 167

## 2023-03-28 NOTE — ASSESSMENT
[FreeTextEntry1] : 78 y/o with cardiac history and recent admission for CHF exacerbation presents for evaluation of\par  elevated LFT's.\par Likely Congestive Hepatopathy vs DILI.\par Labs ordered to r/o various etiologies of Liver diseasee.\par F/u with labs.

## 2023-04-01 NOTE — PROGRESS NOTE ADULT - CONSTITUTIONAL
detailed exam
detailed exam
Information: Selecting Yes will display possible errors in your note based on the variables you have selected. This validation is only offered as a suggestion for you. PLEASE NOTE THAT THE VALIDATION TEXT WILL BE REMOVED WHEN YOU FINALIZE YOUR NOTE. IF YOU WANT TO FAX A PRELIMINARY NOTE YOU WILL NEED TO TOGGLE THIS TO 'NO' IF YOU DO NOT WANT IT IN YOUR FAXED NOTE.

## 2023-04-04 ENCOUNTER — APPOINTMENT (OUTPATIENT)
Dept: CARDIOLOGY | Facility: CLINIC | Age: 78
End: 2023-04-04
Payer: MEDICARE

## 2023-04-10 NOTE — HISTORY OF PRESENT ILLNESS
[de-identified] : 76 y/o with cardiac history and recent admission for CHF exacerbation presents for evaluation of elevated LFT's.\par \par 3/7/23 : Presents for f/u\par Denies tattoos. Denies OTC or herbal supplements. Denies fatigue, malaise, arthralgias, myalgias, pruritus, recent infection, abdominal pain or distension, jaundice, hematemesis, hematochezia, dark urine, confusion, unintentional weight loss or gain. Denies family history of sudden death or late trimester miscarriages.\par Alcohol consumption.Denies sig history\par Family history/ Liver disease:  No pertinent family history in first degree relatives.\par See admission details below.\par \par Physical exam:\par Gen: A&Ox3, NAD\par HEENT: normal outer ears & nose, PERRL, mucus membranes moist, anicteric, no lymphadenopathy\par CVS: regular rate and rhythm, no murmur\par Pulm: vesicular breath sounds\par Abdomen: normal bowel sounds, soft, nontender, no hepatosplenomegaly\par Legs: no edema, no clubbing\par Musculoskeletal: normal gait\par Skin: no rash\par Neuro: no asterixis, EOMI\par Psych: normal affect\par \par \par \par  [de-identified] : \par \par \par    \par    \par    \par    \par    \par    \par    \par  \par \par \par \par \par \par \par \par \par        \par        \par        \par        \par        \par        \par        \par        \par        \par        \par        \par        \par        \par        \par        \par        \par        \par        \par        \par        \par        \par        \par  \par \par \par \par \par \par \par \par \par \par \par    \par    \par    \par    \par    \par    \par    \par    \par    \par    \par    \par    \par    \par    \par    \par    \par    \par    \par    \par  \par \par \par \par \par \par \par \par \par \par \par \par    \par    \par  \par      \par    \par \par                                                                              \par      \par Discharge Note Provider\par      \par \par \par     Status  Complete: Signed  Document Date  03- 09:09\par     Facility  [facility]  Encounter Date  03- 17:19\par     Clinician  Matt Mathew  Last Update Date  03- 09:09\par     Doc Type    [id number]  Finalized Date  03- 12:37\par      \par \par    \par Wrap Text: Off  \par Hospital Course:\par Discharge Date    29-Mar-2022\par Admission Date    24-Mar-2022 17:19\par Reason for Admission    LE edema/Abd distention\par Hospital Course   \par  \par CC: LE edema/Abd distention (27 Mar 2022 11:10)\par  \par HPI: 76 year old male with PMHx of systolic CHF (EF 45%), anasarca, COPD -\par Severe Pulmonary HTN, HTN, dyslipidemia, CAD, a-fib/PPM on xarelto, arthritis,\par ESBL UTI c/o abdominal distention and bilateral LE swelling x 2 weeks. Was seen\par at [facility] and asked to start a new regimen to combat his symptoms however\par he endorses symptoms have not improved and have actually gotten worse. He also\par endorses some mild chest discomfort that is present intermittently Non\par radiating. No PND. No hemoptysis. No cough. No exertional component. But feels\par somewhat sob when he is exerting himself. Most recent cath 8/2020: non\par obstructive CAD with e/o severe pulmonary HTN and severe mitral regurg. EF45%.\par  \par Medical progress: Patient denies any HA, CP, SOB. No fevers, chills or shakes.\par patient continues to have orthostatics -  but patient is fully\par Complaints: no new complaints\par  \par  \par Physical Exam:\par GENERAL APPEARANCE:  NAD, hemodynamically stable\par T(C): 36.3 (30 Mar 2022 08:39), Max: 36.5 (29 Mar 2022 15:46)\par T(F): 97.3 (30 Mar 2022 08:39), Max: 97.7 (29 Mar 2022 15:46)\par HR: 80 (30 Mar 2022 10:22) (61 - 80)\par BP: 117/72 (30 Mar 2022 10:22) (90/52 - 119/65)\par RR: 18 (30 Mar 2022 08:39) (18 - 18)\par SpO2: 98% (30 Mar 2022 08:39) (97% - 98%)\par HEENT:  Head is normocephalic\par Skin:  Warm and dry without any rash\par NECK:  Supple without lymphadenopathy.\par HEART:  Regular rate and rhythm. normal S1 and S2, No M/R/G\par LUNGS:  Good ins/exp effort, no W/R/R/C\par ABDOMEN:  Soft, nontender, nondistended with good bowel sounds heard\par EXTREMITIES:  Without cyanosis, clubbing or edema.\par NEUROLOGICAL:  Gross nonfocal\par  \par  \par Med Reconciliation:\par Medication Reconciliation Status    Admission Reconciliation is Completed\par Discharge Reconciliation is Completed\par Discharge Medications   albuterol 90 mcg/inh inhalation aerosol: 2 puff(s)\par inhaled every 6 hours, As needed, Bronchospasm\par atorvastatin 20 mg oral tablet: 1 tab(s) orally once a day (at bedtime)\par budesonide-formoterol 80 mcg-4.5 mcg/inh inhalation aerosol: 1 application\par inhaled 2 times a day\par carvedilol 6.25 mg oral tablet: 1 tab(s) orally every 12 hours\par finasteride 5 mg oral tablet: 1 tab(s) orally once a day\par furosemide 40 mg oral tablet: 1 tab(s) orally 2 times a day\par lisinopril 10 mg oral tablet: 1 tab(s) orally once a day\par omeprazole 20 mg oral delayed release capsule: 1 cap(s) orally once a day\par rivaroxaban 20 mg oral tablet: 1 tab(s) orally once a day (with dinner)\par sertal compound: 1 tab(s) orally once a day, As Needed\par ***Medication from the [place], Propinox HCl 10mg - Lysine\par Clonixinate 125mg***\par spironolactone 25 mg oral tablet: 1 tab(s) orally once a day\par tamsulosin 0.4 mg oral capsule: 1 cap(s) orally 2 times a day\par ,\par ,\par  \par Care Plan/Procedures:\par Discharge Diagnoses, Assessment and Plan of Treatment PRINCIPAL DISCHARGE\par DIAGNOSIS\par Diagnosis: CHF exacerbation\par Assessment and Plan of Treatment: # Acute on chronic combined systolic and\par diastolic CHF / R sided HF/ valvulopathy /  Severe Pulm HTN / congestive\par hepatopathy w/ ascites\par - lasix 40 mg orally 2 times daily\par - discharge weight 167

## 2023-04-10 NOTE — ASSESSMENT
[FreeTextEntry1] : 78 y/o with cardiac history and recent admission for CHF exacerbation presents for evaluation of\par  elevated LFT's.\par Likely Congestive Hepatopathy since Probnp 3919. DILI less likely.\par Get MRI\par \par F/u in 6 weeks.

## 2023-04-12 RX ORDER — SPIRONOLACTONE 25 MG/1
25 TABLET ORAL DAILY
Qty: 90 | Refills: 1 | Status: ACTIVE | COMMUNITY
Start: 2020-11-12 | End: 1900-01-01

## 2023-04-24 ENCOUNTER — APPOINTMENT (OUTPATIENT)
Dept: CARDIOLOGY | Facility: CLINIC | Age: 78
End: 2023-04-24
Payer: MEDICARE

## 2023-04-24 ENCOUNTER — NON-APPOINTMENT (OUTPATIENT)
Age: 78
End: 2023-04-24

## 2023-04-24 VITALS
DIASTOLIC BLOOD PRESSURE: 90 MMHG | SYSTOLIC BLOOD PRESSURE: 144 MMHG | OXYGEN SATURATION: 97 % | HEART RATE: 73 BPM | WEIGHT: 182.98 LBS | BODY MASS INDEX: 28.66 KG/M2

## 2023-04-24 DIAGNOSIS — J44.9 CHRONIC OBSTRUCTIVE PULMONARY DISEASE, UNSPECIFIED: ICD-10-CM

## 2023-04-24 PROCEDURE — 93000 ELECTROCARDIOGRAM COMPLETE: CPT

## 2023-04-24 NOTE — ASSESSMENT
[FreeTextEntry1] : chronic systolic congestive heart failure; appears euvolemic no SOB/PND/Orthopnea/Edema has gained some weight which I have advised weight loss and to monitor daily weight, low sodium diet\par Advised strict adherence to daily medications and ; he knows to maintain daily weight\par continue lasix 40 bid /spironolactone 25 mg daily / coreg 6.25 mg po BID  would obtain echo to monitor EF \par \par HTN:  improved blood pressure , continue current medications ,  discussed in detail recommendations to follow a strict low sodium diet he will loose the weight gained and continue daily weights , will continue  lisinopril to 20 mg po daily \par \par Pulmonary HTN: Stable O2 sat RA 95%  will ot\par \par AF: S/P ablation PPM EP management  Continue oral AC/BB\par \par Mitral Regurgitation; Continue to monitor with periodic Echocardiogram.\par \par MIld COPD: Pulmonary management \par \par \par follow up after 2 months

## 2023-04-24 NOTE — HISTORY OF PRESENT ILLNESS
[FreeTextEntry1] : Patient seen today with assistance of  PMH:   permanent atrial fibrillation SP ablation/PPM,  chronic systolic heart failure, non-obstructive CAD, CRT-P, nonischemic cardiomyopathy, mitral regurgitation, HTN, pulmonary hypertension, COPD  chronic systolic congestive heart failure here today in routine cardiac follow up\par \par Patient says he is feeling fine ,his blood pressure is well controlled , denies any chest pain or shortness of breath or dizziness , \par \par All medications discussed in details   patient appears to have no insight in to his medical conditions \par \par his weight is stable , \par \par PM was interrogated feb 2023 \par \par \par \par \par

## 2023-04-24 NOTE — REASON FOR VISIT
[[employer] ] : provided by [employer]   [Time Spent: ____ minutes] : Total time spent using  services: [unfilled] minutes. The patient's primary language is not English thus required  services. [Interpreters_IDNumber] : [id number] [Interpreters_FullName] : yan

## 2023-04-24 NOTE — CARDIOLOGY SUMMARY
[de-identified] : 4/24/23  ventricular paced rhythm  [de-identified] : 3/25/22 ( [facility] )  EF 45 % apical wall hypokinesis ,  mild to moderate MR ,severe LAE ,  Mild  AI , severe  TR severe ,pulmonary hypertension normal RV function   IVC dilated decreased respiratory variation  [de-identified] : 5/19/2017  [employer] VVI   MRI compatible  ADVISA  MRI [id number] [de-identified] : aug 28  2020   all coronaries showed minimal luminal irregularities  EF 48%

## 2023-04-25 NOTE — ED CLERICAL - DIVISION
CHIEF COMPLAINT:  Chief Complaint   Patient presents with   • Back Pain     C/o mid back pain, bilateral. Did help build a deck this weekend then Sunday he was gambling and sitting all day. Sunday night the back felt like it was spasming and very tight        HISTORY OF PRESENT ILLNESS:  Patient is here with the above.  Three days ago he doom all assist and old back on a house.  It was a lot of sledgehammer work and manual labor as well as lifting the old deck planks up in over a dumpster.  Day after that he did a lot of sitting at a casino.  That night he developed significant pain in the thoracolumbar area.  It is bilateral.  Does not radiate.  He went to work last night and his work was exacerbating it.  He would like to take some time off to let is back rest.    PAST MEDICAL HISTORY:  Past Medical History:   Diagnosis Date   • BMI 35.0-35.9,adult    • Elevated LFTs    • Hepatic steatosis 8/6/2019   • Hyperglycemia    • IFG (impaired fasting glucose)    • Inflammatory arthritis     is currently being treated   • Mass of soft tissue of neck    • Myopia    • Other and unspecified hyperlipidemia    • Presbyopia    • Urinary frequency         PAST SURGICAL HISTORY:  Past Surgical History:   Procedure Laterality Date   • ------------other------------- Left 2/17/16    Left achilles debridement, partial excision calceneus, plantar fascia release, gastrocnemius recession   • Colonoscopy  12/23/2019    Per Dr. Child repeat in 7 years.    • Colonoscopy diagnostic  8/8/2014    Colonoscopy, Dx. Repeat 8-2019   • Foot surgery     • Knee surgery      RIGHT ACL   • Repair umbilical katina,5+y/o,carmen  04/07/2009   • Vasectomy         ALLERGIES:  ALLERGIES:  No Known Allergies    MEDICATIONS:  Current Outpatient Medications   Medication Sig Dispense Refill   • rosuvastatin (CRESTOR) 10 MG tablet Take 1 tablet by mouth daily. Replaces simvastatin 4/19/23 90 tablet 3   • Otezla 30 MG Tab Take 1 tablet by mouth in the morning and 1  Day... tablet in the evening. 60 tablet 3   • meloxicam (MOBIC) 15 MG tablet Take 1 tablet by mouth daily. 90 tablet 1   • VITAMIN D PO 2 tabs daily     • Turmeric 400 MG Cap      • Moringa 500 MG Cap      • pimecrolimus (ELIDEL) 1 % cream      • Multiple Vitamins-Minerals (DAILY MULTI PO) Take 1 tablet by mouth daily.       No current facility-administered medications for this visit.       SOCIAL HISTORY:  Social History     Socioeconomic History   • Marital status: /Civil Union     Spouse name: Not on file   • Number of children: 3   • Years of education: Not on file   • Highest education level: Not on file   Occupational History   • Occupation: Teacher     Employer: [employer]   Tobacco Use   • Smoking status: Never   • Smokeless tobacco: Never   Vaping Use   • Vaping status: never used     Passive vaping exposure: Yes   Substance and Sexual Activity   • Alcohol use: Yes     Comment: Socially. 6 times per month. 8-10 beers per sitting   • Drug use: No   • Sexual activity: Not on file   Other Topics Concern   • Not on file   Social History Narrative   • Not on file     Social Determinants of Health     Financial Resource Strain: Not on file   Food Insecurity: Not on file   Transportation Needs: Not on file   Physical Activity: Not on file   Stress: Not on file   Social Connections: Not on file   Intimate Partner Violence: Not At Risk (3/28/2021)    Intimate Partner Violence    • Social Determinants: Intimate Partner Violence Past Fear: Not on file    • Social Determinants: Intimate Partner Violence Current Fear: Not on file       FAMILY HISTORY:  Family History   Problem Relation Age of Onset   • Cancer, Breast Mother    • Cancer Mother         lymphoma   • Macular degeneration Mother    • Myocardial Infarction Father 40   • Early death Father    • Patient is unaware of any medical problems Sister    • Diabetes Brother    • Liver Disease Brother    • Hypertension Brother    • Patient is unaware of  any medical problems Brother    • Patient is unaware of any medical problems Son    • Patient is unaware of any medical problems Son    • Patient is unaware of any medical problems Son    • NEGATIVE FAMILY HX OF Other         Colon Cancer       PHYSICAL EXAM:  VITAL SIGNS:  There were no vitals taken for this visit.  General: he is alert and oriented in no acute distress.    Vital signs: as noted above.    Patient points to the paraspinal muscles in the thoracolumbar region.  Nontender to palpation.    Psychiatric: his mood and affect are appropriate his speech is fluent and not pressured he makes good eye contact.    ASSESSMENT/PLAN:    Muscular etiology of acute mid low back pain.  We will prescribe muscle relaxer p.r.n. contract topical remedies over-the-counter.  Gave a note for work.  Would recommend follow-up as needed for no improvement or any worsening she    No diagnosis found.     There are no diagnoses linked to this encounter.    No follow-ups on file.

## 2023-05-09 ENCOUNTER — NON-APPOINTMENT (OUTPATIENT)
Age: 78
End: 2023-05-09

## 2023-05-09 DIAGNOSIS — E55.9 VITAMIN D DEFICIENCY, UNSPECIFIED: ICD-10-CM

## 2023-05-09 LAB
25(OH)D3 SERPL-MCNC: 21.1 NG/ML
ALBUMIN SERPL ELPH-MCNC: 4.2 G/DL
ALP BLD-CCNC: 99 U/L
ALT SERPL-CCNC: 17 U/L
ANION GAP SERPL CALC-SCNC: 9 MMOL/L
AST SERPL-CCNC: 21 U/L
BASOPHILS # BLD AUTO: 0.05 K/UL
BASOPHILS NFR BLD AUTO: 0.7 %
BILIRUB SERPL-MCNC: 1 MG/DL
BUN SERPL-MCNC: 19 MG/DL
CALCIUM SERPL-MCNC: 9.3 MG/DL
CHLORIDE SERPL-SCNC: 107 MMOL/L
CHOLEST SERPL-MCNC: 100 MG/DL
CO2 SERPL-SCNC: 26 MMOL/L
CREAT SERPL-MCNC: 1.09 MG/DL
EGFR: 69 ML/MIN/1.73M2
EOSINOPHIL # BLD AUTO: 0.14 K/UL
EOSINOPHIL NFR BLD AUTO: 1.9 %
GLUCOSE SERPL-MCNC: 114 MG/DL
HCT VFR BLD CALC: 44.8 %
HDLC SERPL-MCNC: 42 MG/DL
HGB BLD-MCNC: 14.3 G/DL
IMM GRANULOCYTES NFR BLD AUTO: 0.1 %
LDLC SERPL CALC-MCNC: 49 MG/DL
LYMPHOCYTES # BLD AUTO: 1.54 K/UL
LYMPHOCYTES NFR BLD AUTO: 21 %
MAN DIFF?: NORMAL
MCHC RBC-ENTMCNC: 31.9 GM/DL
MCHC RBC-ENTMCNC: 32.2 PG
MCV RBC AUTO: 100.9 FL
MONOCYTES # BLD AUTO: 0.63 K/UL
MONOCYTES NFR BLD AUTO: 8.6 %
NEUTROPHILS # BLD AUTO: 4.98 K/UL
NEUTROPHILS NFR BLD AUTO: 67.7 %
NONHDLC SERPL-MCNC: 57 MG/DL
PLATELET # BLD AUTO: 118 K/UL
POTASSIUM SERPL-SCNC: 4.5 MMOL/L
PROT SERPL-MCNC: 6.5 G/DL
RBC # BLD: 4.44 M/UL
RBC # FLD: 12.7 %
SODIUM SERPL-SCNC: 141 MMOL/L
TRIGL SERPL-MCNC: 43 MG/DL
TSH SERPL-ACNC: 1.15 UIU/ML
WBC # FLD AUTO: 7.35 K/UL

## 2023-05-10 LAB
ESTIMATED AVERAGE GLUCOSE: 137 MG/DL
HBA1C MFR BLD HPLC: 6.4 %

## 2023-05-16 ENCOUNTER — APPOINTMENT (OUTPATIENT)
Dept: UROLOGY | Facility: CLINIC | Age: 78
End: 2023-05-16

## 2023-05-16 NOTE — ED PROVIDER NOTE - GASTROINTESTINAL [+], MLM
05/16/23 1400   Pain Assessment   Pain Assessment Tool 0-10   Pain Score 7   Pain Location/Orientation Orientation: Left; Location: Shoulder   Pain Radiating Towards no   Pain Onset/Description Onset: Ongoing;Frequency: Constant/Continuous   Patient's Stated Pain Goal No pain   Hospital Pain Intervention(s) Medication (See MAR); Rest   Multiple Pain Sites Yes   Pain 2   Pain Score 2 6   Pain Location/Orientation 2 Location: Buttocks   Hospital Pain Intervention(s) 2 Repositioned  (on her Right side at end of tx using wedges and pillows)   Restrictions/Precautions   Precautions Bed/chair alarms; Fall Risk;Supervision on toilet/commode, contact precaution, airborne precaution   LUE Weight Bearing Per Order NWB   Braces or Orthoses Splint   Cognition   Overall Cognitive Status WFL   Arousal/Participation Cooperative; Alert   Attention Within functional limits   Subjective   Subjective pt agreeable  to PT sitting on the w/c with ROHO cushion  although pt reported fatigue from being OOB since this morning  reviewed scheduled and encourage next time to ask to be assisted back to bed for a short rest in between therapies to facilitate participation and act tolerance, she verbalized understanding  Roll Left and Right   Reason if not Attempted Medical concerns   Roll Left and Right CARE Score 88   Sit to Lying   Type of Assistance Needed Physical assistance;Verbal cues   Physical Assistance Level Total assistance   Comment cari AVRELA assist into the bed with 2nd person guiding pt trunk as she lay down in bed   Sit to Lying CARE Score 1   Sit to Stand   Type of Assistance Needed Physical assistance;Verbal cues; Adaptive equipment   Physical Assistance Level 76% or more   Comment max from lower surface like BSC, mod-max from w/c depending on fatigue   Sit to Stand CARE Score 2   Bed-Chair Transfer   Type of Assistance Needed Physical assistance;Verbal cues; Adaptive equipment   Physical Assistance Level 26%-50%   Comment w/c to bed with HW   Chair/Bed-to-Chair Transfer CARE Score 3   Walk 10 Feet   Type of Assistance Needed Physical assistance;Verbal cues; Adaptive equipment   Physical Assistance Level Total assistance   Comment 20' x 3 with HW, due to fatigue & weakness assist for advancement of HW provided , CFA of 2nd person for safety due to dec act tolerance   Walk 10 Feet CARE Score 1   Toilet Transfer   Type of Assistance Needed Physical assistance;Verbal cues; Adaptive equipment   Physical Assistance Level 76% or more   Comment max of 1 to stand up from [facility], mod of 1 to sit down while min-mod walk w/c to/from [facility] ~ 5' using a [employer]   Toilet Transfer CARE Score 2   Therapeutic Interventions   Strengthening supine bilat LAQ x 5 SH x 10 reps x 5 sets, A/A SLR x 10 reps x 5 sets, A/A hip abduction/adduction x 10 reps x 5 sets, AA bilat heel slides x 10 reps x 5 sets, quad sets x 10 reps x 5 sets, ankle pumps x 70 reps  attempted gluteal sets but unable to tolerate due to buttock pain from pressure injury   Assessment   Treatment Assessment Pt able to participate in skilled PT intervention but due to fatigue demos dec gait tolerance frequency  Due to body habitus, fatigue and strength deficits with L UE NWB she required mod-max A of 1 person to stand up with most difficulty from lower surfaces while still requires 2 person assist to complete sit to supine transfer and to reposition in bed for pressure relief  Pt tolerated bed level LE sterngthening exercises well which will be beneficial to facilitate transfers  Pt will greatly benefit from additional skilled PT intervention to address ongoing impairments and functional decline for at this time she is very high risk for falls and inc caregiver burden with inc risk for additional skin breakdown  Family/Caregiver Present no   Barriers to Discharge Inaccessible home environment;Decreased caregiver support   PT Barriers   Functional Limitation Car transfers; Ramp negotiation;Stair negotiation;Standing;Transfers; Walking   Plan   Treatment/Interventions Functional transfer training;LE strengthening/ROM; Therapeutic exercise; Bed mobility;Gait training;Spoke to nursing;Spoke to MD;OT   Progress Slow progress, decreased activity tolerance   Recommendation   Equipment Recommended   (HW)   PT Therapy Minutes   PT Time In 1400   PT Time Out 1530   PT Total Time (minutes) 90   PT Mode of treatment - Individual (minutes) 90   PT Mode of treatment - Concurrent (minutes) 0   PT Mode of treatment - Group (minutes) 0   PT Mode of treatment - Co-treat (minutes) 0   PT Mode of Treatment - Total time(minutes) 90 minutes   PT Cumulative Minutes 255   Therapy Time missed   Time missed?  No +abd distention

## 2023-05-19 ENCOUNTER — EMERGENCY (EMERGENCY)
Facility: HOSPITAL | Age: 78
LOS: 0 days | Discharge: HOME | End: 2023-05-19
Attending: EMERGENCY MEDICINE
Payer: MEDICARE

## 2023-05-19 VITALS
TEMPERATURE: 98 F | OXYGEN SATURATION: 96 % | HEART RATE: 77 BPM | RESPIRATION RATE: 17 BRPM | DIASTOLIC BLOOD PRESSURE: 85 MMHG | SYSTOLIC BLOOD PRESSURE: 157 MMHG

## 2023-05-19 VITALS — WEIGHT: 179.9 LBS | HEIGHT: 63.78 IN

## 2023-05-19 DIAGNOSIS — Z95.0 PRESENCE OF CARDIAC PACEMAKER: Chronic | ICD-10-CM

## 2023-05-19 DIAGNOSIS — I11.0 HYPERTENSIVE HEART DISEASE WITH HEART FAILURE: ICD-10-CM

## 2023-05-19 DIAGNOSIS — I25.119 ATHEROSCLEROTIC HEART DISEASE OF NATIVE CORONARY ARTERY WITH UNSPECIFIED ANGINA PECTORIS: ICD-10-CM

## 2023-05-19 DIAGNOSIS — Z88.8 ALLERGY STATUS TO OTHER DRUGS, MEDICAMENTS AND BIOLOGICAL SUBSTANCES: ICD-10-CM
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
## 2023-05-19 DIAGNOSIS — I48.91 UNSPECIFIED ATRIAL FIBRILLATION: ICD-10-CM

## 2023-05-19 DIAGNOSIS — Z20.822 CONTACT WITH AND (SUSPECTED) EXPOSURE TO COVID-19: ICD-10-CM

## 2023-05-19 DIAGNOSIS — J02.9 ACUTE PHARYNGITIS, UNSPECIFIED: ICD-10-CM

## 2023-05-19 DIAGNOSIS — E78.5 HYPERLIPIDEMIA, UNSPECIFIED: ICD-10-CM

## 2023-05-19 DIAGNOSIS — I50.20 UNSPECIFIED SYSTOLIC (CONGESTIVE) HEART FAILURE: ICD-10-CM

## 2023-05-19 DIAGNOSIS — R06.02 SHORTNESS OF BREATH: ICD-10-CM

## 2023-05-19 DIAGNOSIS — I27.20 PULMONARY HYPERTENSION, UNSPECIFIED: ICD-10-CM

## 2023-05-19 DIAGNOSIS — Z79.01 LONG TERM (CURRENT) USE OF ANTICOAGULANTS: ICD-10-CM

## 2023-05-19 DIAGNOSIS — M19.90 UNSPECIFIED OSTEOARTHRITIS, UNSPECIFIED SITE: ICD-10-CM

## 2023-05-19 DIAGNOSIS — T78.40XA ALLERGY, UNSPECIFIED, INITIAL ENCOUNTER: ICD-10-CM
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
## 2023-05-19 DIAGNOSIS — Z95.0 PRESENCE OF CARDIAC PACEMAKER: ICD-10-CM

## 2023-05-19 DIAGNOSIS — J44.9 CHRONIC OBSTRUCTIVE PULMONARY DISEASE, UNSPECIFIED: ICD-10-CM

## 2023-05-19 LAB
FLUAV AG NPH QL: SIGNIFICANT CHANGE UP
FLUBV AG NPH QL: SIGNIFICANT CHANGE UP
RSV RNA NPH QL NAA+NON-PROBE: SIGNIFICANT CHANGE UP
S PYO AG SPEC QL IA: NEGATIVE — SIGNIFICANT CHANGE UP
SARS-COV-2 RNA SPEC QL NAA+PROBE: SIGNIFICANT CHANGE UP

## 2023-05-19 PROCEDURE — 99283 EMERGENCY DEPT VISIT LOW MDM: CPT

## 2023-05-19 PROCEDURE — 99284 EMERGENCY DEPT VISIT MOD MDM: CPT | Mod: FS

## 2023-05-19 PROCEDURE — 0241U: CPT

## 2023-05-19 PROCEDURE — 87880 STREP A ASSAY W/OPTIC: CPT

## 2023-05-19 PROCEDURE — 87081 CULTURE SCREEN ONLY: CPT

## 2023-05-19 RX ORDER — DEXAMETHASONE 0.5 MG/5ML
6 ELIXIR ORAL ONCE
Refills: 0 | Status: COMPLETED | OUTPATIENT
Start: 2023-05-19 | End: 2023-05-19

## 2023-05-19 RX ORDER — HYDROCORTISONE 1 %
1 OINTMENT (GRAM) TOPICAL
Qty: 1 | Refills: 0
Start: 2023-05-19 | End: 2023-05-23

## 2023-05-19 RX ADMIN — Medication 6 MILLIGRAM(S): at 12:43

## 2023-05-19 NOTE — ED STATDOCS - PATIENT PORTAL LINK FT
You can access the FollowMyHealth Patient Portal offered by [facility] by registering at the following website: [email or web address]. By joining [employer]’s FollowMyHealth portal, you will also be able to view your health information using other applications (apps) compatible with our system.

## 2023-05-19 NOTE — ED STATDOCS - PHYSICAL EXAMINATION
GEN - NAD; well appearing; A+O x3   HEAD - NC/AT     EYES - EOMI, no conjunctival pallor, no scleral icterus  ENT -   mucous membranes  moist , no discharge. Erythema to posterior pharynx.   NECK - Neck supple. Anterior cervical lymphadenopathy.   PULM - CTA b/l,  symmetric breath sounds  COR -  RRR, S1 S2, no murmurs  ABD - , ND, NT, soft, no guarding, no rebound, no masses    BACK - no CVA tenderness, nontender spine     EXTREMS - no edema, no deformity, warm and well perfused    SKIN - no rash or bruising      NEUROLOGIC - alert, sensation nl, motor 5/5 RUE/LUE/RLE/LLE

## 2023-05-19 NOTE — ED STATDOCS - OBJECTIVE STATEMENT
78 y/o male w/ a PMHx of systolic CHF (EF 45%), anasarca, COPD - Severe Pulmonary HTN, HTN, dyslipidemia, CAD, a-fib/PPM on Xarelto, arthritis, and ESBL UTI presents to the ED c/o redness/swelling to b/l neck/chest x3 days. Pt also c/o HA, sore throat, and SOB, states it feels like his airway is tighter. Denies cough, congestion, or change in voice. Pt thinks his Sx may be due to allergies. Denies new soaps, lotions, or detergents. Pt notes he started taking Doxycycline on his own. No other complaints at this time. Allergies: Amiodarone, ACE inhibitors.    #[id number]

## 2023-05-19 NOTE — ED STATDOCS - NS ED ATTENDING STATEMENT MOD
I have seen and examined this patient and fully participated in the care of this patient as the teaching attending.  The service was shared with the ALICE.  I reviewed and verified the documentation and independently performed the documented:

## 2023-05-19 NOTE — ED STATDOCS - PROGRESS NOTE DETAILS
pt aware of strep results, recommend stop Doxy and fu with dermatologist and pmd, pt well appearing on dc and redness has decreased to face and neck area, recommend short course of steroids and he agrees with plan. -Mimi Nino PA-C

## 2023-05-19 NOTE — ED STATDOCS - CLINICAL SUMMARY MEDICAL DECISION MAKING FREE TEXT BOX
Patient with pharyngitis.  Patient self started on Doxy.  Will strep swab will flu and COVID swab will give Medrol Dosepak.

## 2023-05-19 NOTE — ED STATDOCS - NS ED ROS FT
Gen: No fever, normal appetite  Eyes: No eye irritation or discharge  ENT: No ear pain, congestion +sore throat  Resp: No cough +SOB  Cardiovascular: No chest pain or palpitation  Gastroenteric: No nausea/vomiting, diarrhea, constipation  :  No change in urine output; no dysuria  MS: No joint or muscle pain  Skin: +redness/swelling to the neck/chest  Neuro: No headache; no abnormal movements  Remainder negative, except as per the HPI

## 2023-05-19 NOTE — ED ADULT TRIAGE NOTE - CHIEF COMPLAINT QUOTE
pt presents to Ed with complaints of headache and redness and swelling to neck x 3 days. pt endorses inflammation and itchiness on neck started this am. pt denies new soaps, lotions, or detergents. pt able to speak in full sentences in triage. no respiratory distress noted.

## 2023-05-19 NOTE — ED STATDOCS - NSFOLLOWUPINSTRUCTIONS_ED_ALL_ED_FT
Erupción medicamentosa  Drug Rash  Close-up of red and inflamed skin around a bandage after an injection.   [place] erupción medicamentosa ocurre cuando un medicamento provoca un cambio en el color o la textura de la piel. Puede desarrollarse minutos, horas o días después de lesly el medicamento. La erupción puede aparecer en denver jessika pequeña de piel o en todo el cuerpo.    ¿Cuáles son las causas?  La causa de esta afección puede ser denver de estas katelyn afecciones:  [place] reacción alérgica al medicamento.  Un efecto secundario no deseado de un medicamento determinado.  Sensibilidad extrema a la vivi solar causada por el medicamento.  ¿Qué incrementa el riesgo?  Si charles alguno de estos medicamentos que hacen que reid piel se vuelva sensible a la vivi y se expone a la vivi solar, puede volverlo más propenso a desarrollar esta afección:  Antibióticos, incluidas las tetraciclinas y las sulfamidas.  Antifúngicos.  Antihistamínicos.  Diuréticos.  Retinoides, naa la isotretinoína.  Estatinas.  Antiinflamatorios no esteroides (RIGO).  ¿Cuáles son los signos o síntomas?  A person scratching their arm.  Los síntomas de esta afección incluyen:  Enrojecimiento.  Pequeñas protuberancias.  Descamación.  Picazón.  Ronchas que causan picazón (urticaria).  Hinchazón.  ¿Cómo se diagnostica?  Esta afección se puede diagnosticar en función de lo siguiente:  Un examen físico.  Estudios para averiguar qué medicamentos causaron la erupción. Estas pruebas pueden incluir lo siguiente:  Pruebas cutáneas.  Análisis de tom.  ¿Cómo se trata?  Esta afección se trata con medicamentos, por ejemplo:  Un antihistamínico. Kisha se puede administrar para aliviar la picazón.  Antiinflamatorios no esteroides (RIGO). Estos se pueden administrar para reducir la hinchazón y para tratar el dolor.  Un medicamento con corticoesteroide. Kisha se puede administrar para reducir la hinchazón.  Generalmente, la erupción desaparece al dejar de lesly el medicamento que la causó.    Siga estas indicaciones en reid casa:  Use los medicamentos de venta klarissa y los recetados solamente naa se lo haya indicado el médico.  Informe a todos rosa médicos acerca de cualquier reacción medicamentosa que haya tenido en el pasado.  Si la erupción fue ocasionada por la sensibilidad a la vivi del sol, mientras se vanessa la erupción:  De ser posible, evite estar al sol, en especial cuando está más drew, normalmente entre las 10 a. m. y las 4 p. m.  Cúbrase la piel con pantalones, mangas largas y un sombrero cuando esté expuesto a la vivi del sol.  Si tiene urticaria:  [place] ducha de agua fría o utilice denver compresa fría para aliviar la picazón.  Utilice antihistamínicos de venta klarissa, según lo recomendado por el médico, hasta que la urticaria haya desaparecido. La urticaria no es contagiosa.  Concurra a todas las visitas de seguimiento. [place] es importante.  Comuníquese con un médico si:  Tiene fiebre.  La erupción no desaparece.  La irritación empeora.  La erupción reaparece.  Emite sonidos de silbidos agudos al respirar, más a menudo al exhalar (sibilancias) o toser.  Solicite ayuda de inmediato si:  Comienza a tener problemas respiratorios.  Comienza a faltarle el aire.  El otf o la garganta comienza a hinchársele.  Tiene debilidad intensa con mareos o desmayos.  Siente dolor en el pecho.  La piel comienza a ampollarse y descamarse.  Estos síntomas pueden representar un problema grave que constituye denver emergencia. No espere a ethan si los síntomas desaparecen. Solicite atención médica de inmediato. Comuníquese con el servicio de emergencias de reid localidad (911 en los [place]). No conduzca por rosa propios medios hasta el hospital.    Resumen  [place] erupción medicamentosa ocurre cuando un medicamento provoca un cambio en el color o la textura de la piel. La erupción puede aparecer en denver jessika pequeña de piel o en todo el cuerpo.  Puede desarrollarse minutos, horas o días después de lesly el medicamento.  El médico hará varios estudios para determinar qué medicamento causó la erupción.  La erupción puede tratarse con medicamentos para aliviar la picazón, la hinchazón y el dolor.  Esta información no tiene naa fin reemplazar el consejo del médico. Asegúrese de hacerle al médico cualquier pregunta que tenga.

## 2023-05-24 NOTE — DISCHARGE NOTE ADULT - DISCHARGE WEIGHT
Care Transitions Outreach Attempt    Call within 2 business days of discharge: Yes   Attempted to reach patient for transitions of care follow up. Unable to reach patient. Patient: Caty Saucedo Patient : 1966 MRN: <[id number]>    Last Discharge  Street       Date Complaint Diagnosis Description Type Department Provider    23  Postoperative pain . .. Admission (Discharged) Valentino Wilson MD          # 3 attempt-Attempted initial 24 hour hospital follow up call. Left a Hipaa compliant message with name and call back information. Requested return call to [phone]. Unable to reach patient, care transitions episode resolved//JU  Was this an external facility discharge?  No Discharge Facility: [facility]    Noted following upcoming appointments from discharge chart review:   [facility] Steve Franco follow up appointment(s):   Future Appointments   Date Time Provider Lee Kelley   20[facility]  2:00 PM [facility]   2023  1:30 PM Adam [facility]nett [facility] None   [facility]   [facility]  2[facility]20 PM [facility]   [facility]23  1:30 PM [facility]     [facility] follow up appointment(s): standing

## 2023-06-05 ENCOUNTER — NON-APPOINTMENT (OUTPATIENT)
Age: 78
End: 2023-06-05

## 2023-06-05 ENCOUNTER — APPOINTMENT (OUTPATIENT)
Dept: ELECTROPHYSIOLOGY | Facility: CLINIC | Age: 78
End: 2023-06-05
Payer: MEDICARE

## 2023-06-05 VITALS
HEART RATE: 66 BPM | SYSTOLIC BLOOD PRESSURE: 140 MMHG | WEIGHT: 180 LBS | DIASTOLIC BLOOD PRESSURE: 79 MMHG | OXYGEN SATURATION: 95 % | BODY MASS INDEX: 28.25 KG/M2 | HEIGHT: 67 IN

## 2023-06-05 DIAGNOSIS — I44.2 ATRIOVENTRICULAR BLOCK, COMPLETE: ICD-10-CM

## 2023-06-05 PROCEDURE — 93279 PRGRMG DEV EVAL PM/LDLS PM: CPT

## 2023-06-06 ENCOUNTER — APPOINTMENT (OUTPATIENT)
Dept: UROLOGY | Facility: CLINIC | Age: 78
End: 2023-06-06
Payer: MEDICARE

## 2023-06-06 VITALS
HEIGHT: 67 IN | BODY MASS INDEX: 28.25 KG/M2 | SYSTOLIC BLOOD PRESSURE: 167 MMHG | WEIGHT: 180 LBS | DIASTOLIC BLOOD PRESSURE: 85 MMHG

## 2023-06-06 DIAGNOSIS — N13.8 BENIGN PROSTATIC HYPERPLASIA WITH LOWER URINARY TRACT SYMPMS: ICD-10-CM

## 2023-06-06 DIAGNOSIS — N40.1 BENIGN PROSTATIC HYPERPLASIA WITH LOWER URINARY TRACT SYMPMS: ICD-10-CM

## 2023-06-06 DIAGNOSIS — R97.20 ELEVATED PROSTATE, SPECIFIC ANTIGEN [PSA]: ICD-10-CM

## 2023-06-06 PROCEDURE — 99213 OFFICE O/P EST LOW 20 MIN: CPT

## 2023-06-06 RX ORDER — TAMSULOSIN HYDROCHLORIDE 0.4 MG/1
0.4 CAPSULE ORAL
Qty: 180 | Refills: 3 | Status: ACTIVE | COMMUNITY
Start: 2021-12-03 | End: 1900-01-01

## 2023-06-06 RX ORDER — FINASTERIDE 5 MG/1
5 TABLET, FILM COATED ORAL DAILY
Qty: 90 | Refills: 3 | Status: ACTIVE | COMMUNITY
Start: 2023-06-06 | End: 1900-01-01

## 2023-06-06 NOTE — REASON FOR VISIT
[Follow-up Visit ___] : a follow-up visit  for [unfilled] [[employer] ] : provided by [employer]   [Interpreters_IDNumber] : [id number] [Interpreters_FullName] : Jazmin [TWNoteComboBox1] : Serbian

## 2023-06-06 NOTE — HISTORY OF PRESENT ILLNESS
[FreeTextEntry1] : 76 year old man, patient of Dr Walden, seen 10/20/2022 for follow up. He had urinary retention when he held his dual therapy of flomax and finasteride. Now back on medications and feels he is voiding freely. No acute complaints. He has history of elevated PSA, has not repeated recently. \par \par 06/06/2023: Patient presents for follow up. He reports very good response to dual therapy. no complaints today. PSA was down to 3.43 from 5.5.

## 2023-06-06 NOTE — ASSESSMENT
[FreeTextEntry1] : 77 yo M with BPH with LUTS, well controlled on dual therapy. WIll continue. \par For elevated PSA, will trend further.\par RTO in 1 year for routine f/u.

## 2023-06-07 NOTE — DISCHARGE NOTE NURSING/CASE MANAGEMENT/SOCIAL WORK - NSDCPEXARELTOREACT_GEN_ALL_CORE
Rivaroxaban/Xarelto increases your risk for bleeding. Notify your doctor if you experience any of the following side effects: unusual bleeding or bruising, vomiting blood or coffee ground-like material, red or black stool, itching or hives, chest tightness, trouble breathing, swelling in your face or hands, swelling in your mouth or throat, change in how much or how often you urinate, red or brown urine, heavy menstrual or vaginal bleeding, or blistering or peeling skin. When Rivaroxaban/Xarelto is taken with other medicines, they can affect how it works. Taking other medications such as aspirin, antibiotics, antifungals, blood thinners, nonsteroidal anti-inflammatories, and medications that treat depression can increase your risk of bleeding. It is very important to tell your health care provider about all of the other medicines, including over-the-counter medications, herbs, and vitamins you are taking.  DO NOT start, stop, or change the dosage of any medicine, including over-the-counter medicines, vitamins, and herbal products without your doctor’s approval.  Any products containing aspirin or are nonsteroidal anti-inflammatories lessen the blood’s ability to form clots and adds to the effect of Rivaroxaban/Xarelto. Never take aspirin or medicines that contain aspirin without speaking to your doctor. Her/She

## 2023-06-19 ENCOUNTER — APPOINTMENT (OUTPATIENT)
Dept: CARDIOLOGY | Facility: CLINIC | Age: 78
End: 2023-06-19

## 2023-08-10 NOTE — PATIENT PROFILE ADULT - BILL PAYMENT
no yes Doxepin Counseling:  Patient advised that the medication is sedating and not to drive a car after taking this medication. Patient informed of potential adverse effects including but not limited to dry mouth, urinary retention, and blurry vision.  The patient verbalized understanding of the proper use and possible adverse effects of doxepin.  All of the patient's questions and concerns were addressed.

## 2023-08-21 NOTE — ED STATDOCS - CPE ED EYE NORM
Service to wound care placed, therapy plan sent to dr nash to review and sign. Orders placed for labs.    bilateral normal...

## 2023-08-24 ENCOUNTER — INPATIENT (INPATIENT)
Facility: HOSPITAL | Age: 78
LOS: 4 days | Discharge: HOME | DRG: 917 | End: 2023-08-29
Attending: INTERNAL MEDICINE | Admitting: INTERNAL MEDICINE
Payer: MEDICARE

## 2023-08-24 VITALS
OXYGEN SATURATION: 95 % | DIASTOLIC BLOOD PRESSURE: 74 MMHG | RESPIRATION RATE: 12 BRPM | TEMPERATURE: 98 F | HEART RATE: 63 BPM | SYSTOLIC BLOOD PRESSURE: 125 MMHG

## 2023-08-24 DIAGNOSIS — Z95.0 PRESENCE OF CARDIAC PACEMAKER: Chronic | ICD-10-CM

## 2023-08-24 LAB
ALBUMIN SERPL ELPH-MCNC: 3.1 G/DL — LOW (ref 3.3–5)
ALP SERPL-CCNC: 90 U/L — SIGNIFICANT CHANGE UP (ref 40–120)
ALT FLD-CCNC: 27 U/L — SIGNIFICANT CHANGE UP (ref 12–78)
AMMONIA BLD-MCNC: 58 UMOL/L — HIGH (ref 11–32)
AMPHET UR-MCNC: NEGATIVE — SIGNIFICANT CHANGE UP
ANION GAP SERPL CALC-SCNC: 3 MMOL/L — LOW (ref 5–17)
APPEARANCE UR: CLEAR — SIGNIFICANT CHANGE UP
APTT BLD: 35 SEC — SIGNIFICANT CHANGE UP (ref 24.5–35.6)
AST SERPL-CCNC: 24 U/L — SIGNIFICANT CHANGE UP (ref 15–37)
BACTERIA # UR AUTO: ABNORMAL
BARBITURATES UR SCN-MCNC: NEGATIVE — SIGNIFICANT CHANGE UP
BASOPHILS # BLD AUTO: 0.04 K/UL — SIGNIFICANT CHANGE UP (ref 0–0.2)
BASOPHILS NFR BLD AUTO: 0.7 % — SIGNIFICANT CHANGE UP (ref 0–2)
BENZODIAZ UR-MCNC: POSITIVE — SIGNIFICANT CHANGE UP
BILIRUB SERPL-MCNC: 0.4 MG/DL — SIGNIFICANT CHANGE UP (ref 0.2–1.2)
BILIRUB UR-MCNC: NEGATIVE — SIGNIFICANT CHANGE UP
BUN SERPL-MCNC: 17 MG/DL — SIGNIFICANT CHANGE UP (ref 7–23)
CALCIUM SERPL-MCNC: 8.1 MG/DL — LOW (ref 8.5–10.1)
CHLORIDE SERPL-SCNC: 110 MMOL/L — HIGH (ref 96–108)
CO2 SERPL-SCNC: 27 MMOL/L — SIGNIFICANT CHANGE UP (ref 22–31)
COCAINE METAB.OTHER UR-MCNC: NEGATIVE — SIGNIFICANT CHANGE UP
COLOR SPEC: YELLOW — SIGNIFICANT CHANGE UP
CREAT SERPL-MCNC: 0.91 MG/DL — SIGNIFICANT CHANGE UP (ref 0.5–1.3)
DIFF PNL FLD: ABNORMAL
EGFR: 86 ML/MIN/1.73M2 — SIGNIFICANT CHANGE UP
EOSINOPHIL # BLD AUTO: 0.14 K/UL — SIGNIFICANT CHANGE UP (ref 0–0.5)
EOSINOPHIL NFR BLD AUTO: 2.5 % — SIGNIFICANT CHANGE UP (ref 0–6)
EPI CELLS # UR: SIGNIFICANT CHANGE UP
ETHANOL SERPL-MCNC: <10 MG/DL — SIGNIFICANT CHANGE UP (ref 0–10)
GLUCOSE SERPL-MCNC: 122 MG/DL — HIGH (ref 70–99)
GLUCOSE UR QL: NEGATIVE — SIGNIFICANT CHANGE UP
HCT VFR BLD CALC: 40.4 % — SIGNIFICANT CHANGE UP (ref 39–50)
HGB BLD-MCNC: 13.4 G/DL — SIGNIFICANT CHANGE UP (ref 13–17)
IMM GRANULOCYTES NFR BLD AUTO: 0.4 % — SIGNIFICANT CHANGE UP (ref 0–0.9)
INR BLD: 1.02 RATIO — SIGNIFICANT CHANGE UP (ref 0.85–1.18)
KETONES UR-MCNC: NEGATIVE — SIGNIFICANT CHANGE UP
LACTATE SERPL-SCNC: 0.9 MMOL/L — SIGNIFICANT CHANGE UP (ref 0.7–2)
LEUKOCYTE ESTERASE UR-ACNC: ABNORMAL
LYMPHOCYTES # BLD AUTO: 1.21 K/UL — SIGNIFICANT CHANGE UP (ref 1–3.3)
LYMPHOCYTES # BLD AUTO: 21.6 % — SIGNIFICANT CHANGE UP (ref 13–44)
MCHC RBC-ENTMCNC: 33 PG — SIGNIFICANT CHANGE UP (ref 27–34)
MCHC RBC-ENTMCNC: 33.2 GM/DL — SIGNIFICANT CHANGE UP (ref 32–36)
MCV RBC AUTO: 99.5 FL — SIGNIFICANT CHANGE UP (ref 80–100)
METHADONE UR-MCNC: NEGATIVE — SIGNIFICANT CHANGE UP
MONOCYTES # BLD AUTO: 0.54 K/UL — SIGNIFICANT CHANGE UP (ref 0–0.9)
MONOCYTES NFR BLD AUTO: 9.6 % — SIGNIFICANT CHANGE UP (ref 2–14)
NEUTROPHILS # BLD AUTO: 3.65 K/UL — SIGNIFICANT CHANGE UP (ref 1.8–7.4)
NEUTROPHILS NFR BLD AUTO: 65.2 % — SIGNIFICANT CHANGE UP (ref 43–77)
NITRITE UR-MCNC: POSITIVE
OPIATES UR-MCNC: NEGATIVE — SIGNIFICANT CHANGE UP
PCP SPEC-MCNC: SIGNIFICANT CHANGE UP
PCP UR-MCNC: NEGATIVE — SIGNIFICANT CHANGE UP
PH UR: 5 — SIGNIFICANT CHANGE UP (ref 5–8)
PLATELET # BLD AUTO: 113 K/UL — LOW (ref 150–400)
POTASSIUM SERPL-MCNC: 4.1 MMOL/L — SIGNIFICANT CHANGE UP (ref 3.5–5.3)
POTASSIUM SERPL-SCNC: 4.1 MMOL/L — SIGNIFICANT CHANGE UP (ref 3.5–5.3)
PROT SERPL-MCNC: 6.1 GM/DL — SIGNIFICANT CHANGE UP (ref 6–8.3)
PROT UR-MCNC: 15
PROTHROM AB SERPL-ACNC: 11.5 SEC — SIGNIFICANT CHANGE UP (ref 9.5–13)
RBC # BLD: 4.06 M/UL — LOW (ref 4.2–5.8)
RBC # FLD: 13.2 % — SIGNIFICANT CHANGE UP (ref 10.3–14.5)
RBC CASTS # UR COMP ASSIST: ABNORMAL /HPF (ref 0–4)
SODIUM SERPL-SCNC: 140 MMOL/L — SIGNIFICANT CHANGE UP (ref 135–145)
SP GR SPEC: 1.02 — SIGNIFICANT CHANGE UP (ref 1.01–1.02)
THC UR QL: NEGATIVE — SIGNIFICANT CHANGE UP
TROPONIN I, HIGH SENSITIVITY RESULT: 133.63 NG/L — HIGH
TROPONIN I, HIGH SENSITIVITY RESULT: 158.17 NG/L — HIGH
UROBILINOGEN FLD QL: NEGATIVE — SIGNIFICANT CHANGE UP
WBC # BLD: 5.6 K/UL — SIGNIFICANT CHANGE UP (ref 3.8–10.5)
WBC # FLD AUTO: 5.6 K/UL — SIGNIFICANT CHANGE UP (ref 3.8–10.5)
WBC UR QL: ABNORMAL /HPF (ref 0–5)

## 2023-08-24 PROCEDURE — 0042T: CPT | Mod: MA

## 2023-08-24 PROCEDURE — 70496 CT ANGIOGRAPHY HEAD: CPT | Mod: 26,MA

## 2023-08-24 PROCEDURE — 70498 CT ANGIOGRAPHY NECK: CPT | Mod: 26,MA

## 2023-08-24 PROCEDURE — 71045 X-RAY EXAM CHEST 1 VIEW: CPT | Mod: 26

## 2023-08-24 PROCEDURE — 74176 CT ABD & PELVIS W/O CONTRAST: CPT | Mod: 26,MA

## 2023-08-24 PROCEDURE — 99285 EMERGENCY DEPT VISIT HI MDM: CPT

## 2023-08-24 PROCEDURE — 70450 CT HEAD/BRAIN W/O DYE: CPT | Mod: 26,MA,59

## 2023-08-24 PROCEDURE — 71250 CT THORAX DX C-: CPT | Mod: 26,MA

## 2023-08-24 RX ORDER — LACTULOSE 10 G/15ML
30 SOLUTION ORAL ONCE
Refills: 0 | Status: COMPLETED | OUTPATIENT
Start: 2023-08-24 | End: 2023-08-24

## 2023-08-24 RX ORDER — CEFTRIAXONE 500 MG/1
1000 INJECTION, POWDER, FOR SOLUTION INTRAMUSCULAR; INTRAVENOUS ONCE
Refills: 0 | Status: DISCONTINUED | OUTPATIENT
Start: 2023-08-24 | End: 2023-08-24

## 2023-08-24 RX ORDER — CEFTRIAXONE 500 MG/1
1000 INJECTION, POWDER, FOR SOLUTION INTRAMUSCULAR; INTRAVENOUS ONCE
Refills: 0 | Status: COMPLETED | OUTPATIENT
Start: 2023-08-24 | End: 2023-08-24

## 2023-08-24 RX ORDER — LACTULOSE 10 G/15ML
200 SOLUTION ORAL ONCE
Refills: 0 | Status: DISCONTINUED | OUTPATIENT
Start: 2023-08-24 | End: 2023-08-24

## 2023-08-24 RX ADMIN — LACTULOSE 30 GRAM(S): 10 SOLUTION ORAL at 23:30

## 2023-08-24 RX ADMIN — CEFTRIAXONE 1000 MILLIGRAM(S): 500 INJECTION, POWDER, FOR SOLUTION INTRAMUSCULAR; INTRAVENOUS at 23:15

## 2023-08-24 NOTE — ED ADULT NURSE NOTE - NSFALLFACTORS_ED_ALL_ED
Confusion/AMS/Impaired gait Confusion/AMS/Impaired gait/Weakness Confusion/AMS/Disorientation/Impaired gait/Weakness

## 2023-08-24 NOTE — ED ADULT TRIAGE NOTE - CHIEF COMPLAINT QUOTE
Pt BIBA from home, lives with family, no family here. Per EMS patient had been having altered mental status for the past few hours. unknown history. Patient is confused and sleepy in EMS. unable to obtain last name. Code stroke called by Dr Monsalve 1954. EKG done,

## 2023-08-24 NOTE — ED ADULT NURSE NOTE - NSFALLHARMRISKINTERV_ED_ALL_ED
Assistance OOB with selected safe patient handling equipment if applicable/Assistance with ambulation/Communicate risk of Fall with Harm to all staff, patient, and family/Monitor gait and stability/Monitor for mental status changes and reorient to person, place, and time, as needed/Move patient closer to nursing station/within visual sight of ED staff/Provide patient with walking aids/Provide visual cue: red socks, yellow wristband, yellow gown, etc/Reinforce activity limits and safety measures with patient and family/Toileting schedule using arm’s reach rule for commode and bathroom/Use of alarms - bed, stretcher, chair and/or video monitoring/Bed in lowest position, wheels locked, appropriate side rails in place/Call bell, personal items and telephone in reach/Instruct patient to call for assistance before getting out of bed/chair/stretcher/Non-slip footwear applied when patient is off stretcher/[place] to call system/Physically safe environment - no spills, clutter or unnecessary equipment/Purposeful Proactive Rounding/Room/bathroom lighting operational, light cord in reach

## 2023-08-24 NOTE — ED ADULT NURSE NOTE - OBJECTIVE STATEMENT
Presents with AMS, incoherent speech, obtunded at this time. Neuro checks in progress. Unable to answer some questions or follow all commands. No s/s pain noted.  Stable at this time. Presents with AMS, incoherent speech, obtunded at this time. Neuro checks in progress. Unable to answer some questions or follow all commands. No s/s pain noted. Stable at this time.  As per step son Cody GORDON ([id number]) pt has hx of etoh abuse, is currently retired, lives upstairs by himself with friends in the house. Presents with AMS, incoherent speech, obtunded at this time. Neuro checks in progress. Unable to answer some questions or follow all commands. No s/s pain noted. Stable at this time.  As per step son Cody GORDON ([phone]) pt has hx of etoh abuse, is currently retired, lives upstairs by himself , friends reside on ground floor.

## 2023-08-24 NOTE — ED PROVIDER NOTE - NS_ ATTENDINGSCRIBEDETAILS _ED_A_ED_FT
I, Yong Monsalve DO,  performed the initial face to face bedside interview with this patient regarding history of present illness, review of symptoms and relevant past medical, social and family history.  I completed an independent physical examination.  I was the initial provider who evaluated this patient.   I personally saw the patient and performed a substantive portion of the visit including all aspects of the medical decision making.  The history, relevant review of systems, past medical and surgical history, medical decision making, and physical examination was documented by the scribe in my presence and I attest to the accuracy of the documentation.

## 2023-08-24 NOTE — ED PROVIDER NOTE - PHYSICAL EXAMINATION
General: Patient in no acute distress, AAOX2.   HENMT: NC/AT, no nasal congestion, MMM  Neck: supple  CVS: regular rate and rhythm, no murmur  Resp: Good air entry bilaterally, No wheeze/rhonchi.  Abd: Soft non tender, non distended, +bowel sounds, No guarding, rebound tenderness   Ext: FROM in all ext, 2+ pulses throughout, cap refill<2 sec.  BACK: no midline tenderness, no stepoffs   NEURO: moving all extremities, answering questions intermittently, following intermittent commands

## 2023-08-24 NOTE — ED PROVIDER NOTE - CLINICAL SUMMARY MEDICAL DECISION MAKING FREE TEXT BOX
79 yo male presents to the ED BIBEMS for AMS.  Per EMS, family states pt had been acting weird for the last few hours. Will r/o ICH vs ischemia, vs ACS vs arrhythmias vs electrolyte abnormalities vs infectious pathology. Plan to do labs, ekg, imaging. meds and reassess. 77 yo male presents to the ED BIBEMS for AMS.  Per EMS, family states pt had been acting weird for the last few hours. Will r/o ICH vs ischemia, vs ACS vs arrhythmias vs electrolyte abnormalities vs infectious pathology vs metabolic encephalopathy.  Plan to do labs, ekg, imaging. meds and reassess.

## 2023-08-24 NOTE — ED PROVIDER NOTE - OBJECTIVE STATEMENT
79 yo male presents to the ED BIBEMS for AMS. Per EMS, family states pt had been acting weird for the last few hours. Pt was evaluated in triage, code stroke called in triage. Pt AANO x2, answering intermittent questions, not able to get any additional details even when using .

## 2023-08-24 NOTE — ED PROVIDER NOTE - PROGRESS NOTE DETAILS
Nathan Mcfarland for attending Dr. Monsalve     Spoke to  regarding elevated troponin, no cute intervention. Positive for UTI, s/p antibiotics, elevated ammonia. Will give lactulose, pending ct abd, pelvis, and chest. Received patient in signout reassessment of patient shows negative intracranial scans patient still markedly somnolent suspect possible benzo overdose I do not think this acute encephalopathy can be attributed solely to his hyperammonemia.  Will obtain VBG rule out CO2 narcosis I obtained an ICU consult they agree ICU admission for respiratory monitoring he is currently sat 96% but requires deep sternal rub to arouse I do feel and agree with ICU that he is currently protecting his airway Nathan Mcfarland for attending Dr. Monsalve     Spoke to  regarding elevated troponin, no acute intervention. Positive for UTI, s/p antibiotics, elevated ammonia. Will give lactulose, pending ct abd, pelvis, and chest.

## 2023-08-25 DIAGNOSIS — I42.8 OTHER CARDIOMYOPATHIES: ICD-10-CM

## 2023-08-25 DIAGNOSIS — G93.40 ENCEPHALOPATHY, UNSPECIFIED: ICD-10-CM

## 2023-08-25 DIAGNOSIS — R93.1 ABNORMAL FINDINGS ON DIAGNOSTIC IMAGING OF HEART AND CORONARY CIRCULATION: ICD-10-CM

## 2023-08-25 DIAGNOSIS — I48.20 CHRONIC ATRIAL FIBRILLATION, UNSPECIFIED: ICD-10-CM

## 2023-08-25 DIAGNOSIS — R77.8 OTHER SPECIFIED ABNORMALITIES OF PLASMA PROTEINS: ICD-10-CM

## 2023-08-25 LAB
ALBUMIN SERPL ELPH-MCNC: 3.8 G/DL — SIGNIFICANT CHANGE UP (ref 3.3–5)
ALP SERPL-CCNC: 96 U/L — SIGNIFICANT CHANGE UP (ref 40–120)
ALT FLD-CCNC: 27 U/L — SIGNIFICANT CHANGE UP (ref 12–78)
AMMONIA BLD-MCNC: 56 UMOL/L — HIGH (ref 11–32)
ANION GAP SERPL CALC-SCNC: 4 MMOL/L — LOW (ref 5–17)
APAP SERPL-MCNC: <2 UG/ML — LOW (ref 10–30)
AST SERPL-CCNC: 20 U/L — SIGNIFICANT CHANGE UP (ref 15–37)
BASE EXCESS BLDA CALC-SCNC: -0.9 MMOL/L — SIGNIFICANT CHANGE UP (ref -2–3)
BASE EXCESS BLDV CALC-SCNC: -1.1 MMOL/L — SIGNIFICANT CHANGE UP (ref -2–3)
BILIRUB SERPL-MCNC: 0.7 MG/DL — SIGNIFICANT CHANGE UP (ref 0.2–1.2)
BUN SERPL-MCNC: 15 MG/DL — SIGNIFICANT CHANGE UP (ref 7–23)
CALCIUM SERPL-MCNC: 9 MG/DL — SIGNIFICANT CHANGE UP (ref 8.5–10.1)
CHLORIDE SERPL-SCNC: 111 MMOL/L — HIGH (ref 96–108)
CO2 SERPL-SCNC: 26 MMOL/L — SIGNIFICANT CHANGE UP (ref 22–31)
CREAT SERPL-MCNC: 0.94 MG/DL — SIGNIFICANT CHANGE UP (ref 0.5–1.3)
EGFR: 83 ML/MIN/1.73M2 — SIGNIFICANT CHANGE UP
GAS PNL BLDV: SIGNIFICANT CHANGE UP
GLUCOSE BLDC GLUCOMTR-MCNC: 99 MG/DL — SIGNIFICANT CHANGE UP (ref 70–99)
GLUCOSE SERPL-MCNC: 131 MG/DL — HIGH (ref 70–99)
HCO3 BLDA-SCNC: 26 MMOL/L — SIGNIFICANT CHANGE UP (ref 21–28)
HCO3 BLDV-SCNC: 26 MMOL/L — SIGNIFICANT CHANGE UP (ref 22–29)
HCT VFR BLD CALC: 45.4 % — SIGNIFICANT CHANGE UP (ref 39–50)
HGB BLD-MCNC: 14.6 G/DL — SIGNIFICANT CHANGE UP (ref 13–17)
MAGNESIUM SERPL-MCNC: 2.1 MG/DL — SIGNIFICANT CHANGE UP (ref 1.6–2.6)
MCHC RBC-ENTMCNC: 32 PG — SIGNIFICANT CHANGE UP (ref 27–34)
MCHC RBC-ENTMCNC: 32.2 GM/DL — SIGNIFICANT CHANGE UP (ref 32–36)
MCV RBC AUTO: 99.6 FL — SIGNIFICANT CHANGE UP (ref 80–100)
PCO2 BLDA: 50 MMHG — HIGH (ref 35–48)
PCO2 BLDV: 53 MMHG — SIGNIFICANT CHANGE UP (ref 42–55)
PH BLDA: 7.32 — LOW (ref 7.35–7.45)
PH BLDV: 7.3 — LOW (ref 7.32–7.43)
PHOSPHATE SERPL-MCNC: 2.5 MG/DL — SIGNIFICANT CHANGE UP (ref 2.5–4.5)
PLATELET # BLD AUTO: 112 K/UL — LOW (ref 150–400)
PO2 BLDA: 85 MMHG — SIGNIFICANT CHANGE UP (ref 83–108)
PO2 BLDV: 154 MMHG — HIGH (ref 25–45)
POTASSIUM SERPL-MCNC: 4 MMOL/L — SIGNIFICANT CHANGE UP (ref 3.5–5.3)
POTASSIUM SERPL-SCNC: 4 MMOL/L — SIGNIFICANT CHANGE UP (ref 3.5–5.3)
PROT SERPL-MCNC: 7.2 GM/DL — SIGNIFICANT CHANGE UP (ref 6–8.3)
RBC # BLD: 4.56 M/UL — SIGNIFICANT CHANGE UP (ref 4.2–5.8)
RBC # FLD: 13.2 % — SIGNIFICANT CHANGE UP (ref 10.3–14.5)
SALICYLATES SERPL-MCNC: <1.7 MG/DL — LOW (ref 2.8–20)
SAO2 % BLDA: 98 % — SIGNIFICANT CHANGE UP (ref 94–98)
SAO2 % BLDV: 100 % — HIGH (ref 67–88)
SODIUM SERPL-SCNC: 141 MMOL/L — SIGNIFICANT CHANGE UP (ref 135–145)
TROPONIN I, HIGH SENSITIVITY RESULT: 156.81 NG/L — HIGH
TROPONIN I, HIGH SENSITIVITY RESULT: 169.67 NG/L — HIGH
TSH SERPL-MCNC: 0.18 UU/ML — LOW (ref 0.34–4.82)
WBC # BLD: 13.13 K/UL — HIGH (ref 3.8–10.5)
WBC # FLD AUTO: 13.13 K/UL — HIGH (ref 3.8–10.5)

## 2023-08-25 PROCEDURE — 36600 WITHDRAWAL OF ARTERIAL BLOOD: CPT

## 2023-08-25 PROCEDURE — 80048 BASIC METABOLIC PNL TOTAL CA: CPT

## 2023-08-25 PROCEDURE — 80307 DRUG TEST PRSMV CHEM ANLYZR: CPT

## 2023-08-25 PROCEDURE — 99223 1ST HOSP IP/OBS HIGH 75: CPT

## 2023-08-25 PROCEDURE — 71045 X-RAY EXAM CHEST 1 VIEW: CPT | Mod: 26

## 2023-08-25 PROCEDURE — 87040 BLOOD CULTURE FOR BACTERIA: CPT

## 2023-08-25 PROCEDURE — 36415 COLL VENOUS BLD VENIPUNCTURE: CPT

## 2023-08-25 PROCEDURE — 82140 ASSAY OF AMMONIA: CPT

## 2023-08-25 PROCEDURE — 87086 URINE CULTURE/COLONY COUNT: CPT

## 2023-08-25 PROCEDURE — 83735 ASSAY OF MAGNESIUM: CPT

## 2023-08-25 PROCEDURE — 84443 ASSAY THYROID STIM HORMONE: CPT

## 2023-08-25 PROCEDURE — 84425 ASSAY OF VITAMIN B-1: CPT

## 2023-08-25 PROCEDURE — 71045 X-RAY EXAM CHEST 1 VIEW: CPT

## 2023-08-25 PROCEDURE — 95700 EEG CONT REC W/VID EEG TECH: CPT

## 2023-08-25 PROCEDURE — 82607 VITAMIN B-12: CPT

## 2023-08-25 PROCEDURE — 84436 ASSAY OF TOTAL THYROXINE: CPT

## 2023-08-25 PROCEDURE — 85027 COMPLETE CBC AUTOMATED: CPT

## 2023-08-25 PROCEDURE — 95714 VEEG EA 12-26 HR UNMNTR: CPT

## 2023-08-25 PROCEDURE — 82803 BLOOD GASES ANY COMBINATION: CPT

## 2023-08-25 PROCEDURE — 99291 CRITICAL CARE FIRST HOUR: CPT

## 2023-08-25 PROCEDURE — 93306 TTE W/DOPPLER COMPLETE: CPT | Mod: 26

## 2023-08-25 PROCEDURE — 93306 TTE W/DOPPLER COMPLETE: CPT

## 2023-08-25 PROCEDURE — 84480 ASSAY TRIIODOTHYRONINE (T3): CPT

## 2023-08-25 PROCEDURE — C9113: CPT

## 2023-08-25 PROCEDURE — 84484 ASSAY OF TROPONIN QUANT: CPT

## 2023-08-25 PROCEDURE — 99222 1ST HOSP IP/OBS MODERATE 55: CPT

## 2023-08-25 PROCEDURE — 84100 ASSAY OF PHOSPHORUS: CPT

## 2023-08-25 PROCEDURE — 80053 COMPREHEN METABOLIC PANEL: CPT

## 2023-08-25 RX ORDER — LACTULOSE 10 G/15ML
30 SOLUTION ORAL ONCE
Refills: 0 | Status: COMPLETED | OUTPATIENT
Start: 2023-08-25 | End: 2023-08-25

## 2023-08-25 RX ORDER — SODIUM CHLORIDE 9 MG/ML
1000 INJECTION, SOLUTION INTRAVENOUS
Refills: 0 | Status: DISCONTINUED | OUTPATIENT
Start: 2023-08-25 | End: 2023-08-26

## 2023-08-25 RX ORDER — HEPARIN SODIUM 5000 [USP'U]/ML
5000 INJECTION INTRAVENOUS; SUBCUTANEOUS EVERY 8 HOURS
Refills: 0 | Status: DISCONTINUED | OUTPATIENT
Start: 2023-08-25 | End: 2023-08-26

## 2023-08-25 RX ORDER — THIAMINE MONONITRATE (VIT B1) 100 MG
100 TABLET ORAL DAILY
Refills: 0 | Status: DISCONTINUED | OUTPATIENT
Start: 2023-08-25 | End: 2023-08-29

## 2023-08-25 RX ORDER — NALOXONE HYDROCHLORIDE 4 MG/.1ML
2 SPRAY NASAL ONCE
Refills: 0 | Status: COMPLETED | OUTPATIENT
Start: 2023-08-25 | End: 2023-08-25

## 2023-08-25 RX ORDER — CEFTRIAXONE 500 MG/1
1000 INJECTION, POWDER, FOR SOLUTION INTRAMUSCULAR; INTRAVENOUS EVERY 24 HOURS
Refills: 0 | Status: DISCONTINUED | OUTPATIENT
Start: 2023-08-25 | End: 2023-08-27

## 2023-08-25 RX ORDER — CEFTRIAXONE 500 MG/1
1000 INJECTION, POWDER, FOR SOLUTION INTRAMUSCULAR; INTRAVENOUS EVERY 24 HOURS
Refills: 0 | Status: DISCONTINUED | OUTPATIENT
Start: 2023-08-25 | End: 2023-08-25

## 2023-08-25 RX ORDER — PANTOPRAZOLE SODIUM 20 MG/1
40 TABLET, DELAYED RELEASE ORAL DAILY
Refills: 0 | Status: DISCONTINUED | OUTPATIENT
Start: 2023-08-25 | End: 2023-08-27

## 2023-08-25 RX ORDER — LACTULOSE 10 G/15ML
20 SOLUTION ORAL EVERY 8 HOURS
Refills: 0 | Status: DISCONTINUED | OUTPATIENT
Start: 2023-08-25 | End: 2023-08-27

## 2023-08-25 RX ADMIN — LACTULOSE 20 GRAM(S): 10 SOLUTION ORAL at 15:36

## 2023-08-25 RX ADMIN — NALOXONE HYDROCHLORIDE 2 MILLIGRAM(S): 4 SPRAY NASAL at 00:57

## 2023-08-25 RX ADMIN — Medication 100 MILLIGRAM(S): at 17:48

## 2023-08-25 RX ADMIN — LACTULOSE 20 GRAM(S): 10 SOLUTION ORAL at 22:01

## 2023-08-25 RX ADMIN — PANTOPRAZOLE SODIUM 40 MILLIGRAM(S): 20 TABLET, DELAYED RELEASE ORAL at 15:37

## 2023-08-25 RX ADMIN — HEPARIN SODIUM 5000 UNIT(S): 5000 INJECTION INTRAVENOUS; SUBCUTANEOUS at 22:00

## 2023-08-25 RX ADMIN — SODIUM CHLORIDE 75 MILLILITER(S): 9 INJECTION, SOLUTION INTRAVENOUS at 15:37

## 2023-08-25 NOTE — DIETITIAN INITIAL EVALUATION ADULT - ENTERAL
1) Initiate Pivot 1.5 @ 20 cc/hr, increase by 10 cc/hr q6 hours until goal rate of 65 cc/hr is met (total volume = 1300 mL). Will provide ~ 1950 kcal, 122 g protein, and 975 mL free water. 2) Consider free water flush prn to maintain hydration as per intensivist.

## 2023-08-25 NOTE — DIETITIAN INITIAL EVALUATION ADULT - PERTINENT LABORATORY DATA
08-25    141  |  111<H>  |  15  ----------------------------<  131<H>  4.0   |  26  |  0.94    Ca    9.0      25 Aug 2023 06:55  Phos  2.5     08-25  Mg     2.1     08-25    TPro  7.2  /  Alb  3.8  /  TBili  0.7  /  DBili  x   /  AST  20  /  ALT  27  /  AlkPhos  96  08-25  POCT Blood Glucose.: 99 mg/dL (08-25-23 @ 00:53)

## 2023-08-25 NOTE — PROGRESS NOTE ADULT - ASSESSMENT
77 male who is presenting with AMS and hyperammonemia     Plan:  ICU  NPO  Feeding tube placed for medications and feeds  LR at 75  Lactulose  Neuro Consult PND  ECHO  Positive Trop is Type II and NTD  Hold ACE  Add SQH DVT Prophylaxis  Will Contact Family    D/W Cardio

## 2023-08-25 NOTE — CONSULT NOTE ADULT - ASSESSMENT
77 year old man with afib on Xarelto, s/p PPM, CAD, pulmonary HTN, COPD, HTN, HLD, admitted with delirium, progressive mental status decline to currently being obtunded, nearly comatose.  On exam, his brainstem reflexes are intact, and he grimaces, but his level of consciousness is severely depressed.  Imaging without any revealing findings.   Unclear etiology at this point.  Will check for structural and electrical CNS abnormalities.     -MRI brain when able.  Has PPM, will need to be cleared for MRI  -continuous EEG  -would obtain an ABG, TSH, thiamine, and B12 levels  -trend the WBC, and NH3.   -would give thiamine 100mg TID empirically  -neurology to follow.  patient will be signed out to Dr. Jo Mendez, who will be on neurology service beginning 8/26.  77 year old man with afib on Xarelto, s/p PPM, CAD, pulmonary HTN, COPD, HTN, HLD, admitted with delirium, progressive mental status decline to currently being obtunded, nearly comatose.  On exam, his brainstem reflexes are intact, and he grimaces, but his level of consciousness is severely depressed.  Imaging without any revealing findings.   Unclear etiology at this point.  Will check for structural and electrical CNS abnormalities.     -MRI brain when able.  Has PPM, will need to be cleared for MRI  -continuous EEG  -would obtain an ABG, TSH, thiamine, and B12 levels  -trend the WBC, and NH3.   -would give thiamine 100mg TID empirically  -if hemodynamically stable, would consider starting on anticoagulation as he has a history of atrial fibrillation  -neurology to follow.  patient will be signed out to Dr. Jo Mendez, who will be on neurology service beginning 8/26.

## 2023-08-25 NOTE — H&P ADULT - HISTORY OF PRESENT ILLNESS
79 y/o male w/ unknown past medical history BIBEMS for AMS.  As per ER report, family states patient had been " acting weird" before calling EMS. Code stroke initiated CT head/ CTA and CT perfusion scan done, no evidence of acute CVA, ICH or LVO. Patient w/ progressively worsening mentation, placed on end tidal. Unable to participate in interview. No family at bedside for collateral. Labs significant for ammonia level 58, trop 158,  Admitted to ICU for airway monitoring.

## 2023-08-25 NOTE — ED ADULT NURSE REASSESSMENT NOTE - NS ED NURSE REASSESS COMMENT FT1
Patient noted to be vomiting at approx 0410. Oral and nasotracheal suction performed with moderate secretions noted. Patient gown and linens changed, delfina care administered. Warming blanket and warm blankets applied. Pt VSS, mentation unchanged from previous primary RN assessments, pending admission to ICU.

## 2023-08-25 NOTE — DIETITIAN INITIAL EVALUATION ADULT - LAB (SPECIFY)
Monitor daily lytes/min and replete prn; Consider to obtain vitamin D 25OH level to assess nutriture

## 2023-08-25 NOTE — H&P ADULT - NSHPLABSRESULTS_GEN_ALL_CORE
< from: CT Angio Neck Stroke Protocol w/ IV Cont (08.24.23 @ 20:16) >    CT PERFUSION:    CBF<30% volume: 0 ml. No core infarct.  Tmax>6.0s volume: 0 ml  Mismatch volume: 0 ml. No ischemic penumbra.  Mismatch ratio: None    CT ANGIOGRAPHY NECK:    Thoracic aorta and branch vessels: Patent.  Right carotid system: Patent.  No hemodynamically significant stenosis   using NASCET criteria.  Left carotid system: Patent.  No hemodynamically significant stenosis   using NASCET criteria.  Vertebral arteries: No focal stenosis or dissection.    CT ANGIOGRAPHY BRAIN:    Internal carotid arteries: Patent.  Anterior cerebral arteries: Patent.  Middle cerebral arteries: Patent.  Posterior cerebral arteries: Patent.  Vertebrobasilar: Patent.  Branch vasculature of the posterior circulation   is within normal limits.    Vascular lesions: No evidence of intracranial aneurysm or large vascular  malformation, within limits of CT technique.      IMPRESSION:    CT perfusion:  CBF<30% volume: 0 ml. No core infarct.  Tmax>6.0s volume: 0 ml  Mismatch volume: 0 ml. No ischemic penumbra.  Mismatch ratio: None    CT angiography neck: No hemodynamically significant stenosis of the   bilateral cervical ICAs using NASCET criteria.  Patent vertebral   arteries.  No evidence of vascular dissection.    CT angiography brain: No large vessel occlusion. No evidence of aneurysm.    --- End of Report ---    < end of copied text >

## 2023-08-25 NOTE — CONSULT NOTE ADULT - PROBLEM SELECTOR RECOMMENDATION 3
-No on anticoag as OP for unclear reasons -suspect b/c pt is afall risk  await discussion w/ family & clinical improvement prior to starting this.  -currently rate [id number].

## 2023-08-25 NOTE — DIETITIAN INITIAL EVALUATION ADULT - OTHER INFO
77 y/o male w/ unknown past medical history BIBEMS for AMS. As per ER report, family states patient had been "acting weird" before calling EMS. Code stroke initiated CT head/ CTA and CT perfusion scan done, no evidence of acute CVA, ICH or LVO. Patient w/ progressively worsening mentation, placed on end tidal. Unable to participate in interview. Labs significant for ammonia level 58, trop 158,  Admitted to ICU for airway monitoring.     Unable to obtain info during assessment 2/2 unarousable/ AMS. Utox + for benzos, on lactulose for elevated ammonia level. Bed scale wt of 208# taken on 8/25. NFPE reveals no muscle/fat wasting at this time. D/w intensivist and RN, will initiate TF via [facility] - PIVOT 1.5 is peptide-based, high protein tube feeding for metabolically-stressed surgical, trauma, burn, neuro, or head/ neck cancer patients who could benefit from an immune-modulating enteral formula. See TF recs below.

## 2023-08-25 NOTE — CONSULT NOTE ADULT - SUBJECTIVE AND OBJECTIVE BOX
76 year old man with hx of CHF, COPD, HTN, HLD, CAD, afib s/p PPM on Xarelto, pulmonary HTN, admitted to  ICU on 8/24 with progressive deterioration in mental status.      Patient is unable to give any history.  Called his family for collateral history, but no answer at any of the listed numbers.      Patient arrived in ED yesterday, brought in by EMS, with report that family noticed him 'acting weird over the last few hours."  While in the ED, he was initially still speaking, described as A&Ox2.  A stroke alert was activated, he was not a candidate for TNK.  He continued to become more lethargic, and was admitted to the ICU.  This AM, by report, he continued to worsen in his mental status.      On his labs, he does have a UTI (had ESBL in the past), and leukocytosis, as well as hyperammonemia, and Utox was positive for benzodiazepines.  Neuroimaging was unremarkable.      He has been started on antibiotics, and lactulose.      On exam:   GEN: critically ill appearing elderly gentleman.  GCS is 4.  CV: RRR, S1, S2  PULM: CTAB, O2 NC  GI: soft, non tender  HEENT: no nuchal rigidity, negative Kernig  EXTREm: no CCE    NEURO:   Eyes are closed, not speaking, and not following commands.    Pupisl 3mm, symmetric, round, and reactive to light.  No blink to threat bilaterally.  VOR and corneals are intact.  His grimace is symmetric.    MOTOR: no withdraw in the upper or lower extremities to noxious stimulus.   SENSORY: grimace to sternal rub, grimace to noxious stim in the upper extremities, not the lower.     CTH images reviewed: no hemorrhage.  No large territorial infarct.   CTA H&N images reviewed: no significant cervical carotid or vertebral stenosis.  No large vessel occlusion.  The L PCA is fetal in origin.

## 2023-08-25 NOTE — DIETITIAN INITIAL EVALUATION ADULT - ADD RECOMMEND
1) Initiate TF rx to above. PIVOT 1.5 is peptide-based, high protein tube feeding for metabolically-stressed surgical, trauma, burn, neuro, or head/ neck cancer patients who could benefit from an immune-modulating enteral formula.   2) Maintain aspiration precautions, back of bed >45 degrees.  3) Monitor daily wt to track/trend changes; strict I/Os   4) Monitor daily lytes/min and replete prn  5) Monitor bowel movements, if no BM for >3 days, consider implementing bowel regimen.   RD will continue to monitor TF tolerance, labs, hydration, and wt prn.

## 2023-08-25 NOTE — CONSULT NOTE ADULT - PROBLEM SELECTOR RECOMMENDATION 9
-minimally elevated trop likely demand ischemia  -Echo w/ mildly reduced EF. No further workup at this time.

## 2023-08-25 NOTE — PROGRESS NOTE ADULT - SUBJECTIVE AND OBJECTIVE BOX
CC:    HPI:  79 y/o male w/ unknown past medical history BIBEMS for AMS.  As per ER report, family states patient had been " acting weird" before calling EMS. Code stroke initiated CT head/ CTA and CT perfusion scan done, no evidence of acute CVA, ICH or LVO. Patient w/ progressively worsening mentation, placed on end tidal. Unable to participate in interview.     8/25: PAtient continues with a Poor MS, not following, no temps.       PAST MEDICAL & SURGICAL HISTORY:      FAMILY HISTORY:      Social Hx:    Allergies    No Known Allergies    Intolerances          Weight (kg): 85.3 (08-24 @ 20:17)    ICU Vital Signs Last 24 Hrs  T(C): 36.9 (25 Aug 2023 13:00), Max: 37.3 (25 Aug 2023 07:20)  T(F): 98.5 (25 Aug 2023 13:00), Max: 99.2 (25 Aug 2023 07:20)  HR: 63 (25 Aug 2023 15:00) (53 - 66)  BP: 108/66 (25 Aug 2023 15:00) (108/66 - 160/87)  BP(mean): 80 (25 Aug 2023 15:00) (64 - 111)  ABP: --  ABP(mean): --  RR: 16 (25 Aug 2023 15:00) (10 - 23)  SpO2: 97% (25 Aug 2023 15:00) (92% - 100%)    O2 Parameters below as of 25 Aug 2023 07:20  Patient On (Oxygen Delivery Method): nasal cannula  O2 Flow (L/min): 2              I&O's Summary                            14.6   13.13 )-----------( 112      ( 25 Aug 2023 06:55 )             45.4       08-25    141  |  111<H>  |  15  ----------------------------<  131<H>  4.0   |  26  |  0.94    Ca    9.0      25 Aug 2023 06:55  Phos  2.5     08-25  Mg     2.1     08-25    TPro  7.2  /  Alb  3.8  /  TBili  0.7  /  DBili  x   /  AST  20  /  ALT  27  /  AlkPhos  96  08-25                Urinalysis Basic - ( 25 Aug 2023 06:55 )    Color: x / Appearance: x / SG: x / pH: x  Gluc: 131 mg/dL / Ketone: x  / Bili: x / Urobili: x   Blood: x / Protein: x / Nitrite: x   Leuk Esterase: x / RBC: x / WBC x   Sq Epi: x / Non Sq Epi: x / Bacteria: x        MEDICATIONS  (STANDING):  cefTRIAXone Injectable. 1000 milliGRAM(s) IV Push every 24 hours  lactulose Syrup 20 Gram(s) Oral every 8 hours  lactulose Syrup 30 Gram(s) Oral once    MEDICATIONS  (PRN):      DVT Prophylaxis:    Advanced Directives:  Discussed with:    Visit Information: 30 min    ** Time is exclusive of billed procedures and/or teaching and/or routine family updates.

## 2023-08-25 NOTE — DIETITIAN INITIAL EVALUATION ADULT - PERTINENT MEDS FT
MEDICATIONS  (STANDING):  cefTRIAXone Injectable. 1000 milliGRAM(s) IV Push every 24 hours  lactulose Syrup 20 Gram(s) Oral every 8 hours  lactulose Syrup 30 Gram(s) Oral once    MEDICATIONS  (PRN):

## 2023-08-25 NOTE — H&P ADULT - ASSESSMENT
77 y/o male w/ unknown past medical history BIBEMS for AMS.     Assessment:  1. AMS, r/o drug overdose  2. Acute hypoxic respiratory failure  3. Hepatic encephalopathy   4. Abnormal UA  79 y/o male w/ unknown past medical history BIBEMS for AMS.     Assessment:  1. AMS, r/o drug overdose  2. Acute hypoxic respiratory failure  3. Hepatic encephalopathy   4. Abnormal UA     Plan:  Neuro: Stroke w/u negative thus far. Encephelopathy likely due to benzo OD.  Hold off flumazenil ? chronic medication.  Utox + benzos. ETOH level negative. Check tylenol & salicylate.  Ammonia level 58 , given dose of lactulose. VBGw/out hypercapnia.  Avoid further neuro suppressants   CV: Monitoring end points of perfusion.  Resp: End tidal Co2 monitoring.  Supplemental O2 to maintain O2 sat >92%. High risk for further respiratory decompensation requiring intubation. Aspiration precautions. HOB >30  GI: NPO  ID: +UA, started on Rocephin. Check Urine culture.       CCT 40min

## 2023-08-25 NOTE — CONSULT NOTE ADULT - SUBJECTIVE AND OBJECTIVE BOX
CHIEF COMPLAINT:    HPI:  77 y/o male w/ unknown past medical history BIBEMS for AMS.  As per ER report, family states patient had been " acting weird" before calling EMS. Code stroke initiated CT head/ CTA and CT perfusion scan done, no evidence of acute CVA, ICH or LVO. Patient w/ progressively worsening mentation, placed on end tidal. Unable to participate in interview. No family at bedside for collateral. Labs significant for ammonia level 58, trop 158,  Admitted to ICU for airway monitoring.  (25 Aug 2023 02:52)        PAST MEDICAL AND SURGICAL HISTORY:  PAST MEDICAL & SURGICAL HISTORY:      ALLERGIES:  Allergies    No Known Allergies    Intolerances        SOCIAL HISTORY:  Social History:  Utox positive for benzos (25 Aug 2023 02:52)      FAMILY  HISTORY:  FAMILY HISTORY:      MEDICATIONS:  OUTPATIENT:  Home Medications:  Daily Cristina oral tablet: 1 tab(s) orally once a day (25 Aug 2023 09:41)  ergocalciferol 1.25 mg (50,000 intl units) oral capsule: 1 cap(s) orally once a week (25 Aug 2023 09:41)  finasteride 5 mg oral tablet: 1 tab(s) orally once a day (25 Aug 2023 09:41)  furosemide 40 mg oral tablet: 2 tab(s) orally once a day (25 Aug 2023 09:41)  lisinopril 20 mg oral tablet: 1 tab(s) orally once a day (25 Aug 2023 09:41)  omeprazole 20 mg oral delayed release capsule: 1 cap(s) orally once a day (25 Aug 2023 09:41)  tamsulosin 0.4 mg oral capsule: 1 cap(s) orally once a day (25 Aug 2023 09:41)      INPATIENT:  MEDICATIONS  (STANDING):  cefTRIAXone Injectable. 1000 milliGRAM(s) IV Push every 24 hours  heparin   Injectable 5000 Unit(s) SubCutaneous every 8 hours  lactated ringers. 1000 milliLiter(s) (75 mL/Hr) IV Continuous <Continuous>  lactulose Syrup 20 Gram(s) Oral every 8 hours  pantoprazole  Injectable 40 milliGRAM(s) IV Push daily  thiamine 100 milliGRAM(s) Enteral Tube daily    MEDICATIONS  (PRN):    MEDICATIONS  (PRN):      REVIEW OF SYSTEMS:  ===============================  ===============================  CONSTITUTIONAL: No weakness, fevers or chills  EYES/ENT: No visual changes;  No vertigo or throat pain   NECK: No pain or stiffness  RESPIRATORY: No cough, wheezing, hemoptysis; No shortness of breath  CARDIOVASCULAR: No chest pain or palpitations  GASTROINTESTINAL: No abdominal or epigastric pain. No nausea, vomiting, or hematemesis;   No diarrhea or constipation. No melena or hematochezia.  GENITOURINARY: No dysuria, frequency or hematuria  NEUROLOGICAL: No numbness or weakness  SKIN: No itching, burning, rashes, or lesions   All other review of systems is negative unless indicated above    Vital Signs Last 24 Hrs  T(C): 36.9 (25 Aug 2023 13:00), Max: 37.3 (25 Aug 2023 07:20)  T(F): 98.5 (25 Aug 2023 13:00), Max: 99.2 (25 Aug 2023 07:20)  HR: 63 (25 Aug 2023 15:00) (53 - 66)  BP: 108/66 (25 Aug 2023 15:00) (108/66 - 160/87)  BP(mean): 80 (25 Aug 2023 15:00) (64 - 111)  RR: 16 (25 Aug 2023 15:00) (10 - 23)  SpO2: 97% (25 Aug 2023 15:00) (92% - 100%)    Parameters below as of 25 Aug 2023 07:20  Patient On (Oxygen Delivery Method): nasal cannula  O2 Flow (L/min): 2      I&O's Summary      I&O's Detail      PHYSICAL EXAM:    Constitutional: NAD, awake and alert, well-developed  HEENT: PERR, EOMI,  No oral cyananosis.  Neck:  supple,  No JVD  Respiratory: Breath sounds are clear bilaterally, No wheezing, rales or rhonchi  Cardiovascular: S1 and S2, regular rate and rhythm, no Murmurs, gallops or rubs  Gastrointestinal: Bowel Sounds present, soft, nontender.   Extremities: No peripheral edema. No clubbing or cyanosis.  Vascular: 2+ peripheral pulses  Neurological: A/O x 3, no focal deficits  Musculoskeletal: no calf tenderness.  Skin: No rashes.    ===============================  ===============================  LABS:                         14.6   13.13 )-----------( 112      ( 25 Aug 2023 06:55 )             45.4                         13.4   5.60  )-----------( 113      ( 24 Aug 2023 20:30 )             40.4     25 Aug 2023 06:55    141    |  111    |  15     ----------------------------<  131    4.0     |  26     |  0.94   24 Aug 2023 20:30    140    |  110    |  17     ----------------------------<  122    4.1     |  27     |  0.91     Ca    9.0        25 Aug 2023 06:55  Ca    8.1        24 Aug 2023 20:30  Phos  2.5       25 Aug 2023 06:55  Mg     2.1       25 Aug 2023 06:55    TPro  7.2    /  Alb  3.8    /  TBili  0.7    /  DBili  x      /  AST  20     /  ALT  27     /  AlkPhos  96     25 Aug 2023 06:55  TPro  6.1    /  Alb  3.1    /  TBili  0.4    /  DBili  x      /  AST  24     /  ALT  27     /  AlkPhos  90     24 Aug 2023 20:30    PT/INR - ( 24 Aug 2023 20:30 )   PT: 11.5 sec;   INR: 1.02 ratio         PTT - ( 24 Aug 2023 20:30 )  PTT:35.0 sec    THYROID STUDIES:    ===============================  ===============================  CARDIAC BIOMARKERS:  -------  -BNP VALUES:    -------  -TROPONIN VALUES:   Troponin I, High Sensitivity Result: 156.81 ng/L *H* (08-25-23 @ 09:37)  Troponin I, High Sensitivity Result: 169.67 ng/L *H* (08-25-23 @ 06:55)  Troponin I, High Sensitivity Result: 158.17 ng/L *H* (08-24-23 @ 22:53)  Troponin I, High Sensitivity Result: 133.63 ng/L *H* (08-24-23 @ 20:30)      ===============================  ===============================  EKG:       CHIEF COMPLAINT:    HPI:  79 y/o male w/ unknown past medical history BIBEMS for AMS.  As per ER report, family states patient had been " acting weird" before calling EMS. Code stroke initiated CT head/ CTA and CT perfusion scan done, no evidence of acute CVA, ICH or LVO. Patient w/ progressively worsening mentation, placed on end tidal. Unable to participate in interview. No family at bedside for collateral. Labs significant for ammonia level 58, trop 158,  Admitted to ICU for airway monitoring.  (25 Aug 2023 02:52)    Admitted for altered mental status.  Consulted for elevated trops 100s x 3 values.  Please note - pt has multiple admission under this name:  VIRGILIO FELTON - discussed w/ nursing staff merging both charts.    Pt has a complicated cardiac device rhythm device.  Had CRT-P device in '16 complicated by device erosion,  ultimatedly had CRT-P removal & single chamber pacemaker device.  Also had a micra device implanted for unclear reasons which was later   turned off.  it is currently nonfunctional.    Pt also has a h/o permanent atrial fibrillation, non-obstructive CAD, nonischemic cardiomyopathy,   mitral regurgitation, HTN, pulmonary hypertension, COPD, anasarca.    Pt is unable to provide history at this time due to AMS.    PAST MEDICAL AND SURGICAL HISTORY:  PAST MEDICAL & SURGICAL HISTORY:      ALLERGIES:  Allergies    No Known Allergies    Intolerances        SOCIAL HISTORY:  Social History:  Utox positive for benzos (25 Aug 2023 02:52)      FAMILY  HISTORY:  FAMILY HISTORY:      MEDICATIONS:  OUTPATIENT:  Home Medications:  Daily Cristina oral tablet: 1 tab(s) orally once a day (25 Aug 2023 09:41)  ergocalciferol 1.25 mg (50,000 intl units) oral capsule: 1 cap(s) orally once a week (25 Aug 2023 09:41)  finasteride 5 mg oral tablet: 1 tab(s) orally once a day (25 Aug 2023 09:41)  furosemide 40 mg oral tablet: 2 tab(s) orally once a day (25 Aug 2023 09:41)  lisinopril 20 mg oral tablet: 1 tab(s) orally once a day (25 Aug 2023 09:41)  omeprazole 20 mg oral delayed release capsule: 1 cap(s) orally once a day (25 Aug 2023 09:41)  tamsulosin 0.4 mg oral capsule: 1 cap(s) orally once a day (25 Aug 2023 09:41)      INPATIENT:  MEDICATIONS  (STANDING):  cefTRIAXone Injectable. 1000 milliGRAM(s) IV Push every 24 hours  heparin   Injectable 5000 Unit(s) SubCutaneous every 8 hours  lactated ringers. 1000 milliLiter(s) (75 mL/Hr) IV Continuous <Continuous>  lactulose Syrup 20 Gram(s) Oral every 8 hours  pantoprazole  Injectable 40 milliGRAM(s) IV Push daily  thiamine 100 milliGRAM(s) Enteral Tube daily    MEDICATIONS  (PRN):    MEDICATIONS  (PRN):      REVIEW OF SYSTEMS:  ===============================  ===============================  CONSTITUTIONAL: No weakness, fevers or chills  EYES/ENT: No visual changes;  No vertigo or throat pain   NECK: No pain or stiffness  RESPIRATORY: No cough, wheezing, hemoptysis; No shortness of breath  CARDIOVASCULAR: No chest pain or palpitations  GASTROINTESTINAL: No abdominal or epigastric pain. No nausea, vomiting, or hematemesis;   No diarrhea or constipation. No melena or hematochezia.  GENITOURINARY: No dysuria, frequency or hematuria  NEUROLOGICAL: No numbness or weakness  SKIN: No itching, burning, rashes, or lesions   All other review of systems is negative unless indicated above    Vital Signs Last 24 Hrs  T(C): 36.9 (25 Aug 2023 13:00), Max: 37.3 (25 Aug 2023 07:20)  T(F): 98.5 (25 Aug 2023 13:00), Max: 99.2 (25 Aug 2023 07:20)  HR: 63 (25 Aug 2023 15:00) (53 - 66)  BP: 108/66 (25 Aug 2023 15:00) (108/66 - 160/87)  BP(mean): 80 (25 Aug 2023 15:00) (64 - 111)  RR: 16 (25 Aug 2023 15:00) (10 - 23)  SpO2: 97% (25 Aug 2023 15:00) (92% - 100%)    Parameters below as of 25 Aug 2023 07:20  Patient On (Oxygen Delivery Method): nasal cannula  O2 Flow (L/min): 2      I&O's Summary      I&O's Detail      PHYSICAL EXAM:    Constitutional: NAD,   HEENT: PERR, EOMI,  No oral cyananosis.  Neck:  supple,  No JVD  Respiratory: Breath sounds are clear bilaterally, No wheezing, rales or rhonchi  Cardiovascular: S1 and S2, regular rate and rhythm, no Murmurs, gallops or rubs  Gastrointestinal: Bowel Sounds present, soft, nontender.   Extremities: No peripheral edema. No clubbing or cyanosis.  Vascular: 2+ peripheral pulses    ===============================  ===============================  LABS:                         14.6   13.13 )-----------( 112      ( 25 Aug 2023 06:55 )             45.4                         13.4   5.60  )-----------( 113      ( 24 Aug 2023 20:30 )             40.4     25 Aug 2023 06:55    141    |  111    |  15     ----------------------------<  131    4.0     |  26     |  0.94   24 Aug 2023 20:30    140    |  110    |  17     ----------------------------<  122    4.1     |  27     |  0.91     Ca    9.0        25 Aug 2023 06:55  Ca    8.1        24 Aug 2023 20:30  Phos  2.5       25 Aug 2023 06:55  Mg     2.1       25 Aug 2023 06:55    TPro  7.2    /  Alb  3.8    /  TBili  0.7    /  DBili  x      /  AST  20     /  ALT  27     /  AlkPhos  96     25 Aug 2023 06:55  TPro  6.1    /  Alb  3.1    /  TBili  0.4    /  DBili  x      /  AST  24     /  ALT  27     /  AlkPhos  90     24 Aug 2023 20:30    PT/INR - ( 24 Aug 2023 20:30 )   PT: 11.5 sec;   INR: 1.02 ratio         PTT - ( 24 Aug 2023 20:30 )  PTT:35.0 sec    THYROID STUDIES:    ===============================  ===============================  CARDIAC BIOMARKERS:  -------  -BNP VALUES:    -------  -TROPONIN VALUES:   Troponin I, High Sensitivity Result: 156.81 ng/L *H* (08-25-23 @ 09:37)  Troponin I, High Sensitivity Result: 169.67 ng/L *H* (08-25-23 @ 06:55)  Troponin I, High Sensitivity Result: 158.17 ng/L *H* (08-24-23 @ 22:53)  Troponin I, High Sensitivity Result: 133.63 ng/L *H* (08-24-23 @ 20:30)      ===============================  ===============================  EKG: V paced rhythm.      < from: TTE Echo Complete w/o Contrast w/ Doppler (08.25.23 @ 13:24) >   Impression     Summary     A micra device is noted in the RVOT. Device is highly   mobile and attached to RV free wall.     Endocardium is not well visualized, however, overall left ventricular   systolic function appears impaired. Technically Difficult Study.   Definity was used to better visualize the endocardial border.   Estimated left ventricular ejection fraction is 50 %.   A device wire is seen in the RV and RA.   Mild (1+) aortic regurgitation   Mild (1+) mitral regurgitation   Moderate (2+) tricuspid valve regurgitation. Mild pulmonary hypertension.     Signature     ----------------------------------------------------------------   Electronically signed by Abelino Mccoy MD(Interpreting   physician) on 08/25/2023 03:30PM   ----------------------------------------------------------------    < end of copied text >

## 2023-08-26 DIAGNOSIS — Z95.0 PRESENCE OF CARDIAC PACEMAKER: ICD-10-CM

## 2023-08-26 LAB
ANION GAP SERPL CALC-SCNC: 5 MMOL/L — SIGNIFICANT CHANGE UP (ref 5–17)
BUN SERPL-MCNC: 14 MG/DL — SIGNIFICANT CHANGE UP (ref 7–23)
CALCIUM SERPL-MCNC: 8.4 MG/DL — LOW (ref 8.5–10.1)
CHLORIDE SERPL-SCNC: 111 MMOL/L — HIGH (ref 96–108)
CO2 SERPL-SCNC: 28 MMOL/L — SIGNIFICANT CHANGE UP (ref 22–31)
CREAT SERPL-MCNC: 0.87 MG/DL — SIGNIFICANT CHANGE UP (ref 0.5–1.3)
CULTURE RESULTS: SIGNIFICANT CHANGE UP
EGFR: 88 ML/MIN/1.73M2 — SIGNIFICANT CHANGE UP
GLUCOSE SERPL-MCNC: 130 MG/DL — HIGH (ref 70–99)
HCT VFR BLD CALC: 40.3 % — SIGNIFICANT CHANGE UP (ref 39–50)
HGB BLD-MCNC: 13.3 G/DL — SIGNIFICANT CHANGE UP (ref 13–17)
MAGNESIUM SERPL-MCNC: 2.1 MG/DL — SIGNIFICANT CHANGE UP (ref 1.6–2.6)
MCHC RBC-ENTMCNC: 33 GM/DL — SIGNIFICANT CHANGE UP (ref 32–36)
MCHC RBC-ENTMCNC: 33.2 PG — SIGNIFICANT CHANGE UP (ref 27–34)
MCV RBC AUTO: 100.5 FL — HIGH (ref 80–100)
PHOSPHATE SERPL-MCNC: 2.4 MG/DL — LOW (ref 2.5–4.5)
PLATELET # BLD AUTO: 105 K/UL — LOW (ref 150–400)
POTASSIUM SERPL-MCNC: 3.9 MMOL/L — SIGNIFICANT CHANGE UP (ref 3.5–5.3)
POTASSIUM SERPL-SCNC: 3.9 MMOL/L — SIGNIFICANT CHANGE UP (ref 3.5–5.3)
RBC # BLD: 4.01 M/UL — LOW (ref 4.2–5.8)
RBC # FLD: 13.2 % — SIGNIFICANT CHANGE UP (ref 10.3–14.5)
SODIUM SERPL-SCNC: 144 MMOL/L — SIGNIFICANT CHANGE UP (ref 135–145)
SPECIMEN SOURCE: SIGNIFICANT CHANGE UP
VIT B12 SERPL-MCNC: 757 PG/ML — SIGNIFICANT CHANGE UP (ref 232–1245)
WBC # BLD: 7.79 K/UL — SIGNIFICANT CHANGE UP (ref 3.8–10.5)
WBC # FLD AUTO: 7.79 K/UL — SIGNIFICANT CHANGE UP (ref 3.8–10.5)

## 2023-08-26 PROCEDURE — 95720 EEG PHY/QHP EA INCR W/VEEG: CPT

## 2023-08-26 PROCEDURE — 99233 SBSQ HOSP IP/OBS HIGH 50: CPT

## 2023-08-26 PROCEDURE — 99291 CRITICAL CARE FIRST HOUR: CPT

## 2023-08-26 PROCEDURE — 99232 SBSQ HOSP IP/OBS MODERATE 35: CPT

## 2023-08-26 RX ORDER — FINASTERIDE 5 MG/1
5 TABLET, FILM COATED ORAL DAILY
Refills: 0 | Status: DISCONTINUED | OUTPATIENT
Start: 2023-08-26 | End: 2023-08-29

## 2023-08-26 RX ORDER — TAMSULOSIN HYDROCHLORIDE 0.4 MG/1
0.4 CAPSULE ORAL AT BEDTIME
Refills: 0 | Status: DISCONTINUED | OUTPATIENT
Start: 2023-08-26 | End: 2023-08-29

## 2023-08-26 RX ORDER — RIVAROXABAN 15 MG-20MG
20 KIT ORAL
Refills: 0 | Status: DISCONTINUED | OUTPATIENT
Start: 2023-08-26 | End: 2023-08-29

## 2023-08-26 RX ORDER — LISINOPRIL 2.5 MG/1
10 TABLET ORAL ONCE
Refills: 0 | Status: COMPLETED | OUTPATIENT
Start: 2023-08-26 | End: 2023-08-26

## 2023-08-26 RX ORDER — SODIUM,POTASSIUM PHOSPHATES 278-250MG
2 POWDER IN PACKET (EA) ORAL ONCE
Refills: 0 | Status: COMPLETED | OUTPATIENT
Start: 2023-08-26 | End: 2023-08-26

## 2023-08-26 RX ORDER — LISINOPRIL 2.5 MG/1
20 TABLET ORAL DAILY
Refills: 0 | Status: DISCONTINUED | OUTPATIENT
Start: 2023-08-26 | End: 2023-08-29

## 2023-08-26 RX ADMIN — HEPARIN SODIUM 5000 UNIT(S): 5000 INJECTION INTRAVENOUS; SUBCUTANEOUS at 05:54

## 2023-08-26 RX ADMIN — Medication 100 MILLIGRAM(S): at 10:46

## 2023-08-26 RX ADMIN — LACTULOSE 20 GRAM(S): 10 SOLUTION ORAL at 13:44

## 2023-08-26 RX ADMIN — HEPARIN SODIUM 5000 UNIT(S): 5000 INJECTION INTRAVENOUS; SUBCUTANEOUS at 13:44

## 2023-08-26 RX ADMIN — Medication 2 PACKET(S): at 10:46

## 2023-08-26 RX ADMIN — LACTULOSE 20 GRAM(S): 10 SOLUTION ORAL at 05:54

## 2023-08-26 RX ADMIN — PANTOPRAZOLE SODIUM 40 MILLIGRAM(S): 20 TABLET, DELAYED RELEASE ORAL at 10:46

## 2023-08-26 RX ADMIN — FINASTERIDE 5 MILLIGRAM(S): 5 TABLET, FILM COATED ORAL at 22:17

## 2023-08-26 RX ADMIN — SODIUM CHLORIDE 75 MILLILITER(S): 9 INJECTION, SOLUTION INTRAVENOUS at 03:42

## 2023-08-26 RX ADMIN — LISINOPRIL 10 MILLIGRAM(S): 2.5 TABLET ORAL at 22:17

## 2023-08-26 RX ADMIN — RIVAROXABAN 20 MILLIGRAM(S): KIT at 18:17

## 2023-08-26 RX ADMIN — LACTULOSE 20 GRAM(S): 10 SOLUTION ORAL at 21:45

## 2023-08-26 RX ADMIN — CEFTRIAXONE 1000 MILLIGRAM(S): 500 INJECTION, POWDER, FOR SOLUTION INTRAMUSCULAR; INTRAVENOUS at 02:23

## 2023-08-26 NOTE — CONSULT NOTE ADULT - PROBLEM SELECTOR RECOMMENDATION 2
-Echo w/ highly mobile large echogenic lesion  3.0 cm x 0.5 cm attached to RV free wall  at the RV outflow tract  likely representing nonfunctional micra device.  It was not placed  in the usual position (septum)  -D/w Dr. Tomas 8/25 - no need for removal of device. No need for interrogation as it was turned off.  -Review of outpatient records indicates micra device implanted 5/17/'17 but was not capturing after implant  5/19/'17 attempt at removal of micra was unsuccessful (see above notes).
-Echo w/ highly mobile large echogenic lesion  3.0 cm x 0.5 cm attached to RV free wall  at the RV outflow tract  likely representing nonfunctional micra device.  It was not placed  in the usual position (septum)  -D/w Dr. Tomas 8/25 - no need for removal of device. No need for interrogation as it was turned off.

## 2023-08-26 NOTE — PROGRESS NOTE ADULT - SUBJECTIVE AND OBJECTIVE BOX
Patient is a 77y old  Male who presents with a chief complaint of AMS (26 Aug 2023 17:30)    VIRGILIO JALLOH   77y    Male  All allergies reviewed No Known Allergies        BRIEF HOSPITAL COURSE:        PAST MEDICAL & SURGICAL HISTORY:      Review of Systems:                       All other ROS are negative.        ICU Vital Signs Last 24 Hrs  T(C): 36.4 (26 Aug 2023 20:00), Max: 36.7 (26 Aug 2023 00:00)  T(F): 97.6 (26 Aug 2023 20:00), Max: 98 (26 Aug 2023 00:00)  HR: 60 (26 Aug 2023 21:00) (60 - 62)  BP: 161/88 (26 Aug 2023 21:00) (125/79 - 167/84)  BP(mean): 109 (26 Aug 2023 21:00) (88 - 109)  ABP: --  ABP(mean): --  RR: 17 (26 Aug 2023 21:00) (11 - 21)  SpO2: 98% (26 Aug 2023 21:00) (92% - 99%)    O2 Parameters below as of 26 Aug 2023 20:00  Patient On (Oxygen Delivery Method): room air            I&O's Detail    25 Aug 2023 07:01  -  26 Aug 2023 07:00  --------------------------------------------------------  IN:    Free Water: 130 mL    Lactated Ringers: 1056 mL    Pivot 1.5: 309 mL  Total IN: 1495 mL    OUT:    Voided (mL): 750 mL  Total OUT: 750 mL    Total NET: 745 mL        Physical Examination:    General:  NAD    HEENT: no JVD     PULM: bilateral BS     CVS:  Paced   s1 s2     ABD: soft     EXT: no edema     SKIN:  warm     Neuro:  ABG - ( 25 Aug 2023 16:24 )  pH, Arterial: 7.32  pH, Blood: x     /  pCO2: 50    /  pO2: 85    / HCO3: 26    / Base Excess: -0.9  /  SaO2: 98              LABS:                        13.3   7.79  )-----------( 105      ( 26 Aug 2023 05:24 )             40.3     08-26    144  |  111<H>  |  14  ----------------------------<  130<H>  3.9   |  28  |  0.87    Ca    8.4<L>      26 Aug 2023 05:24  Phos  2.4     08-26  Mg     2.1     08-26    TPro  7.2  /  Alb  3.8  /  TBili  0.7  /  DBili  x   /  AST  20  /  ALT  27  /  AlkPhos  96  08-25          CAPILLARY BLOOD GLUCOSE          Urinalysis Basic - ( 26 Aug 2023 05:24 )    Color: x / Appearance: x / SG: x / pH: x  Gluc: 130 mg/dL / Ketone: x  / Bili: x / Urobili: x   Blood: x / Protein: x / Nitrite: x   Leuk Esterase: x / RBC: x / WBC x   Sq Epi: x / Non Sq Epi: x / Bacteria: x      CULTURES:  Culture Results:   <10,000 CFU/mL Normal Urogenital Luz (08-25 @ 16:00)  Culture Results:   No growth at 24 hours (08-25 @ 13:59)  Culture Results:   No growth at 24 hours (08-25 @ 13:57)    Medications:  MEDICATIONS  (STANDING):  cefTRIAXone Injectable. 1000 milliGRAM(s) IV Push every 24 hours  lactulose Syrup 20 Gram(s) Oral every 8 hours  pantoprazole  Injectable 40 milliGRAM(s) IV Push daily  rivaroxaban 20 milliGRAM(s) Oral with dinner  thiamine 100 milliGRAM(s) Enteral Tube daily    MEDICATIONS  (PRN):    RADIOLOGY/IMAGING/ECHO    Appropriate Imaging reviewed.      Health issues     HEALTH ISSUES - PROBLEM Dx:  Elevated troponin    Abnormal echocardiogram    Chronic atrial fibrillation    Nonischemic cardiomyopathy    Cardiac pacemaker in situ        < from: TTE Echo Complete w/o Contrast w/ Doppler (08.25.23 @ 13:24) >     A micra device is noted in the RVOT. Device is highly   mobile and attached to RV free wall.     Endocardium is not well visualized, however, overall left ventricular   systolic function appears impaired. Technically Difficult Study.   Definity was used to better visualize the endocardial border.   Estimated left ventricular ejection fraction is 50 %.   A device wire is seen in the RV and RA.   Mild (1+) aortic regurgitation   Mild (1+) mitral regurgitation   Moderate (2+) tricuspid valve regurgitation. Mild pulmonary hypertension.    < end of copied text >    Assessment/Plan:    76 year old man with hx of CHF, COPD, HTN, HLD, CAD, afib s/p PPM on Xarelto, pulmonary HTN, admitted to  ICU on 8/24 with progressive deterioration in mental status to the point uf near obtundation     Code stoke imaging negative    Likely Attributable to Benzo's + U tox tox and encephalopathy from a UTI (+u/a) ammonia a bit high too.  Family trying to ascertain his meds as they do not know that he is on any benzo's.  He had an ocver the counter med form a local apothecary for cough    Now awake alert/oriented  via .      Ceftriaxone 5 days  follow cultures   Full AC hx a fib   Micra device in RVOT no rx per EP   Mild + trop.  No W/U per cardiology    restart Zestril  Flomax   Low TSH check t3-4   Awake  D/C lactulose over the next day or so.      Transfer to a regular bed    Called hospitalist Phone to endorse, message left. awaiting a call back           Time spent on this patient encounter, which includes documenting this note in the electronic medical record, was 42 minutes including assessing the presenting problems with associated risks, reviewing the medical record to prepare for the encounter, and meeting face to face with the patient to obtain additional history.  I have also performed an appropriate physical exam, made interventions listed and ordered and interpreted appropriate diagnostic studies as documented.     To improve communication and patient safety, I have coordinated care with the multidisciplinary team including the bedside nurse, appropriate attending of record and consultants as needed.       Patient is a 77y old  Male who presents with a chief complaint of AMS (26 Aug 2023 17:30)    VIRGILIO JALLOH   77y    Male  All allergies reviewed No Known Allergies        BRIEF HOSPITAL COURSE:        PAST MEDICAL & SURGICAL HISTORY:      Review of Systems:                       All other ROS are negative.        ICU Vital Signs Last 24 Hrs  T(C): 36.4 (26 Aug 2023 20:00), Max: 36.7 (26 Aug 2023 00:00)  T(F): 97.6 (26 Aug 2023 20:00), Max: 98 (26 Aug 2023 00:00)  HR: 60 (26 Aug 2023 21:00) (60 - 62)  BP: 161/88 (26 Aug 2023 21:00) (125/79 - 167/84)  BP(mean): 109 (26 Aug 2023 21:00) (88 - 109)  ABP: --  ABP(mean): --  RR: 17 (26 Aug 2023 21:00) (11 - 21)  SpO2: 98% (26 Aug 2023 21:00) (92% - 99%)    O2 Parameters below as of 26 Aug 2023 20:00  Patient On (Oxygen Delivery Method): room air            I&O's Detail    25 Aug 2023 07:01  -  26 Aug 2023 07:00  --------------------------------------------------------  IN:    Free Water: 130 mL    Lactated Ringers: 1056 mL    Pivot 1.5: 309 mL  Total IN: 1495 mL    OUT:    Voided (mL): 750 mL  Total OUT: 750 mL    Total NET: 745 mL        Physical Examination:    General:  NAD    HEENT: no JVD     PULM: bilateral BS     CVS:  Paced   s1 s2     ABD: soft     EXT: no edema     SKIN:  warm     Neuro:  ABG - ( 25 Aug 2023 16:24 )  pH, Arterial: 7.32  pH, Blood: x     /  pCO2: 50    /  pO2: 85    / HCO3: 26    / Base Excess: -0.9  /  SaO2: 98              LABS:                        13.3   7.79  )-----------( 105      ( 26 Aug 2023 05:24 )             40.3     08-26    144  |  111<H>  |  14  ----------------------------<  130<H>  3.9   |  28  |  0.87    Ca    8.4<L>      26 Aug 2023 05:24  Phos  2.4     08-26  Mg     2.1     08-26    TPro  7.2  /  Alb  3.8  /  TBili  0.7  /  DBili  x   /  AST  20  /  ALT  27  /  AlkPhos  96  08-25          CAPILLARY BLOOD GLUCOSE          Urinalysis Basic - ( 26 Aug 2023 05:24 )    Color: x / Appearance: x / SG: x / pH: x  Gluc: 130 mg/dL / Ketone: x  / Bili: x / Urobili: x   Blood: x / Protein: x / Nitrite: x   Leuk Esterase: x / RBC: x / WBC x   Sq Epi: x / Non Sq Epi: x / Bacteria: x      CULTURES:  Culture Results:   <10,000 CFU/mL Normal Urogenital Luz (08-25 @ 16:00)  Culture Results:   No growth at 24 hours (08-25 @ 13:59)  Culture Results:   No growth at 24 hours (08-25 @ 13:57)    Medications:  MEDICATIONS  (STANDING):  cefTRIAXone Injectable. 1000 milliGRAM(s) IV Push every 24 hours  lactulose Syrup 20 Gram(s) Oral every 8 hours  pantoprazole  Injectable 40 milliGRAM(s) IV Push daily  rivaroxaban 20 milliGRAM(s) Oral with dinner  thiamine 100 milliGRAM(s) Enteral Tube daily    MEDICATIONS  (PRN):    RADIOLOGY/IMAGING/ECHO    Appropriate Imaging reviewed.      Health issues     HEALTH ISSUES - PROBLEM Dx:  Elevated troponin    Abnormal echocardiogram    Chronic atrial fibrillation    Nonischemic cardiomyopathy    Cardiac pacemaker in situ        < from: TTE Echo Complete w/o Contrast w/ Doppler (08.25.23 @ 13:24) >     A micra device is noted in the RVOT. Device is highly   mobile and attached to RV free wall.     Endocardium is not well visualized, however, overall left ventricular   systolic function appears impaired. Technically Difficult Study.   Definity was used to better visualize the endocardial border.   Estimated left ventricular ejection fraction is 50 %.   A device wire is seen in the RV and RA.   Mild (1+) aortic regurgitation   Mild (1+) mitral regurgitation   Moderate (2+) tricuspid valve regurgitation. Mild pulmonary hypertension.    < end of copied text >    Assessment/Plan:    76 year old man with hx of CHF, COPD, HTN, HLD, CAD, afib s/p PPM on Xarelto, pulmonary HTN, admitted to  ICU on 8/24 with progressive deterioration in mental status to the point uf near obtundation     Code stoke imaging negative    Likely Attributable to Benzo's + U tox tox and encephalopathy from a UTI (+u/a) ammonia a bit high too.  Family trying to ascertain his meds as they do not know that he is on any benzo's.  He had an ocver the counter med form a local apothecary for cough    Now awake alert/oriented  via .      Ceftriaxone 5 days  follow cultures   Full AC hx a fib   Micra device in RVOT no rx per EP   Mild + trop.  No W/U per cardiology    restart Zestril  Flomax   Low TSH check t3-4   Awake  D/C lactulose over the next day or so.      Transfer to a regular bed    Called hospitalist Phone to endorse, message left fro Dr Mathew. awaiting a call back.            Time spent on this patient encounter, which includes documenting this note in the electronic medical record, was 42 minutes including assessing the presenting problems with associated risks, reviewing the medical record to prepare for the encounter, and meeting face to face with the patient to obtain additional history.  I have also performed an appropriate physical exam, made interventions listed and ordered and interpreted appropriate diagnostic studies as documented.     To improve communication and patient safety, I have coordinated care with the multidisciplinary team including the bedside nurse, appropriate attending of record and consultants as needed.

## 2023-08-26 NOTE — PROGRESS NOTE ADULT - SUBJECTIVE AND OBJECTIVE BOX
Interval History:  8/26/23: Obtunded, not answering questions    MEDICATIONS  (STANDING):  cefTRIAXone Injectable. 1000 milliGRAM(s) IV Push every 24 hours  heparin   Injectable 5000 Unit(s) SubCutaneous every 8 hours  lactated ringers. 1000 milliLiter(s) (75 mL/Hr) IV Continuous <Continuous>  lactulose Syrup 20 Gram(s) Oral every 8 hours  pantoprazole  Injectable 40 milliGRAM(s) IV Push daily  potassium phosphate / sodium phosphate Powder (PHOS-NaK) 2 Packet(s) Oral once  thiamine 100 milliGRAM(s) Enteral Tube daily    MEDICATIONS  (PRN):      Allergies    No Known Allergies    Intolerances        PHYSICAL EXAM:  Vital Signs Last 24 Hrs  T(F): 97 (08-26-23 @ 04:00)  HR: 60 (08-26-23 @ 07:00)  BP: 128/71 (08-26-23 @ 07:00)  RR: 15 (08-26-23 @ 07:00)    GENERAL: NAD  HEAD:  Atraumatic, Normocephalic  Neuro:  Obtunded, groans to sternal rub, coughs  Not following commands  CN: PERRL, + oculocephalics, + corneals  motor: some spontaneous movements of extremities  sensory: withdraws right side briskly, less briskly on left  coordination: unable to test  DTRs: hypoactive    LABS:                        13.3   7.79  )-----------( 105      ( 26 Aug 2023 05:24 )             40.3     08-26    144  |  111<H>  |  14  ----------------------------<  130<H>  3.9   |  28  |  0.87    Ca    8.4<L>      26 Aug 2023 05:24  Phos  2.4     08-26  Mg     2.1     08-26    TPro  7.2  /  Alb  3.8  /  TBili  0.7  /  DBili  x   /  AST  20  /  ALT  27  /  AlkPhos  96  08-25    PT/INR - ( 24 Aug 2023 20:30 )   PT: 11.5 sec;   INR: 1.02 ratio         PTT - ( 24 Aug 2023 20:30 )  PTT:35.0 sec  Urinalysis Basic - ( 26 Aug 2023 05:24 )    Color: x / Appearance: x / SG: x / pH: x  Gluc: 130 mg/dL / Ketone: x  / Bili: x / Urobili: x   Blood: x / Protein: x / Nitrite: x   Leuk Esterase: x / RBC: x / WBC x   Sq Epi: x / Non Sq Epi: x / Bacteria: x        RADIOLOGY & ADDITIONAL STUDIES:    CT head 8/24/23:  No acute intracranial hemorrhage or mass effect.    CTA head, CTA neck, CT perfusion 8/24/23:  CT perfusion:  CBF<30% volume: 0 ml. No core infarct.  Tmax>6.0s volume: 0 ml  Mismatch volume: 0 ml. No ischemic penumbra.  Mismatch ratio: None    CT angiography neck: No hemodynamically significant stenosis of the   bilateral cervical ICAs using NASCET criteria.  Patent vertebral   arteries.  No evidence of vascular dissection.    CT angiography brain: No large vessel occlusion. No evidence of aneurysm.

## 2023-08-26 NOTE — PROGRESS NOTE ADULT - SUBJECTIVE AND OBJECTIVE BOX
HPI:  79 y/o male w/ unknown past medical history BIBEMS for AMS.  As per ER report, family states patient had been " acting weird" before calling EMS. Code stroke initiated CT head/ CTA and CT perfusion scan done, no evidence of acute CVA, ICH or LVO. Patient w/ progressively worsening mentation, placed on end tidal. Unable to participate in interview.     8/25: PAtient continues with a Poor MS, not following, no temps.     8/26: Ns Improving, TSH low, no seizures on EEG       PAST MEDICAL & SURGICAL HISTORY:      FAMILY HISTORY:      Social Hx:    Allergies    No Known Allergies    Intolerances            ICU Vital Signs Last 24 Hrs  T(C): 36.1 (26 Aug 2023 04:00), Max: 36.9 (25 Aug 2023 13:00)  T(F): 97 (26 Aug 2023 04:00), Max: 98.5 (25 Aug 2023 13:00)  HR: 60 (26 Aug 2023 11:00) (60 - 63)  BP: 146/81 (26 Aug 2023 11:00) (108/66 - 146/82)  BP(mean): 99 (26 Aug 2023 11:00) (64 - 101)  ABP: --  ABP(mean): --  RR: 18 (26 Aug 2023 11:00) (14 - 21)  SpO2: 96% (26 Aug 2023 11:00) (92% - 100%)    O2 Parameters below as of 26 Aug 2023 09:00  Patient On (Oxygen Delivery Method): nasal cannula  O2 Flow (L/min): 2              I&O's Summary    25 Aug 2023 07:01  -  26 Aug 2023 07:00  --------------------------------------------------------  IN: 1495 mL / OUT: 750 mL / NET: 745 mL                              13.3   7.79  )-----------( 105      ( 26 Aug 2023 05:24 )             40.3       08-26    144  |  111<H>  |  14  ----------------------------<  130<H>  3.9   |  28  |  0.87    Ca    8.4<L>      26 Aug 2023 05:24  Phos  2.4     08-26  Mg     2.1     08-26    TPro  7.2  /  Alb  3.8  /  TBili  0.7  /  DBili  x   /  AST  20  /  ALT  27  /  AlkPhos  96  08-25            ABG - ( 25 Aug 2023 16:24 )  pH, Arterial: 7.32  pH, Blood: x     /  pCO2: 50    /  pO2: 85    / HCO3: 26    / Base Excess: -0.9  /  SaO2: 98                  Urinalysis Basic - ( 26 Aug 2023 05:24 )    Color: x / Appearance: x / SG: x / pH: x  Gluc: 130 mg/dL / Ketone: x  / Bili: x / Urobili: x   Blood: x / Protein: x / Nitrite: x   Leuk Esterase: x / RBC: x / WBC x   Sq Epi: x / Non Sq Epi: x / Bacteria: x        MEDICATIONS  (STANDING):  cefTRIAXone Injectable. 1000 milliGRAM(s) IV Push every 24 hours  heparin   Injectable 5000 Unit(s) SubCutaneous every 8 hours  lactated ringers. 1000 milliLiter(s) (75 mL/Hr) IV Continuous <Continuous>  lactulose Syrup 20 Gram(s) Oral every 8 hours  pantoprazole  Injectable 40 milliGRAM(s) IV Push daily  thiamine 100 milliGRAM(s) Enteral Tube daily    MEDICATIONS  (PRN):      DVT Prophylaxis: SQH    Advanced Directives:  Discussed with:    Visit Information: 30 min    ** Time is exclusive of billed procedures and/or teaching and/or routine family updates.

## 2023-08-26 NOTE — PROGRESS NOTE ADULT - PROBLEM SELECTOR PLAN 3
-review of outpatient record indicates pt has a single chamber Medtronic [id number] [employer]vis[employer] SR MRI last checked Jun '23.  battery voltage 2.99 RRT 2.83.  -Discussed w/ EP CORDELIA Gaston - nonfunctional Micra device is also MRI conditional.  -EP to check PM tommmorrow.

## 2023-08-26 NOTE — PROGRESS NOTE ADULT - PROBLEM SELECTOR PLAN 2
-Echo w/ highly mobile large echogenic lesion  3.0 cm x 0.5 cm attached to RV free wall  at the RV outflow tract - this nonfunctional micra device.  It is in the RV free wall  rather than the usual position at the septum.  -pt has a h/o VVI PM '16, CRT-P upgrade Apr '17, PM generator erosion  May '17 w/ micra implant May '17, loss of capture May '17 w/ failed micra   removal & subsequent permanent PM insertion May '17.  -D/w Dr. Tomas 8/25 - no need for removal of device. No need for interrogation as it was turned off. -Echo w/ highly mobile large echogenic lesion  3.0 cm x 0.5 cm attached to RV free wall  at the RV outflow tract - this nonfunctional micra device.  It is in the RV free wall  rather than the usual position at the septum.  -pt has a h/o VVI PM '16, CRT-P upgrade Apr '17, PM generator erosion  May '17 w/ micra implant May '17, loss of capture postprocedure w/ failed attempt micra   removal 2 days later & subsequent permanent PM insertion May '17.  -D/w Dr. Tomas 8/25 - no need for removal of device. No need for interrogation as it was turned off.

## 2023-08-26 NOTE — PROGRESS NOTE ADULT - ASSESSMENT
77 male who is presenting with AMS and hyperammonemia     Plan:  ICU  Feeding tube placed for medications and feeds  D/C LR at 75  Continue Lactulose  NH3+ level in AM  ECHO Noted  Positive Trop is Type II and NTD  Hold ACE  D/W Neuro  UA positive.  UC PND, Continue Rocephin  SQH DVT Prophylaxis  Encephalopathy likely from UTI

## 2023-08-26 NOTE — CONSULT NOTE ADULT - PROBLEM SELECTOR RECOMMENDATION 3
-review of outpatient record indicates pt has a single chamber Medtronic [id number] [employer]visa SR MRI last checked Jun '23.  battery voltage 2.99 RRT 2.83.  -Discussed w/ EP CORDELIA Gaston she will check if micra device is also MRI conditional (i.e. MRI) safe) & get back to me.  -EP to check PM tommmorrow. -review of outpatient record indicates pt has a single chamber Medtronic [id number] [employer]visa SR MRI last checked Jun '23.  battery voltage 2.99 RRT 2.83.  -Discussed w/ EP CORDELIA Gaston - nonfunctional Micra device is also MRI conditional.  -EP to check PM tommmorrow.

## 2023-08-26 NOTE — CONSULT NOTE ADULT - SUBJECTIVE AND OBJECTIVE BOX
HPI:  79 y/o male w/ unknown past medical history BIBEMS for AMS.  As per ER report, family states patient had been " acting weird" before calling EMS. Code stroke initiated CT head/ CTA and CT perfusion scan done, no evidence of acute CVA, ICH or LVO. Patient w/ progressively worsening mentation, placed on end tidal. Unable to participate in interview. No family at bedside for collateral. Labs significant for ammonia level 58, trop 158,  Admitted to ICU for airway monitoring.  (25 Aug 2023 02:52)  ===============  Admitted for altered mental status.  Consulted for elevated trops 100s x 3 values.  Please note - pt has multiple admission under this name:  VIRGILIO FELTON - discussed w/ nursing staff merging both charts.    Pt has a complicated cardiac device rhythm device.  Had CRT-P device in '16 complicated by device erosion,  ultimatedly had CRT-P removal & single chamber pacemaker device.  Also had a micra device implanted for unclear reasons which was later   turned off.  it is currently nonfunctional.    Pt also has a h/o permanent atrial fibrillation, non-obstructive CAD, nonischemic cardiomyopathy,   mitral regurgitation, HTN, pulmonary hypertension, COPD, anasarca.    Pt is unable to provide history at this time due to AMS.  ===============  /: more alert today.  Reviewed outpatient records. Seen by Dr. Palla in cardio clinic.  Last visit .  No med changes at that time.  outpatient meds were as follows:    Current Meds  *********Aspirin Adult Low Dose 81 MG Oral Tablet Delayed Release; Take 1 tablet daily  *********Atorvastatin Calcium 20 MG Oral Tablet; TAKE 1 TABLET DAILY  *********Coreg 6.25 MG Oral Tablet; TAKE 1 TABLET TWICE DAILY WITH MEALS  ?Furosemide 40 MG Oral Tablet; TAKE TWO TABLETS BY MOUTH EVERY DAY  ?Lisinopril 20 MG Oral Tablet; TAKE 1 TABLET DAILY AS DIRECTED  ?Omeprazole 20 MG Oral Capsule Delayed Release; TAKE 1 CAPSULE Daily  *********Spironolactone 25 MG Oral Tablet; TAKE 1 TABLET DAILY  ?? Tamsulosin HCl - 0.4 MG Oral Capsule; 2 tabs PO q HS  *********Xarelto 20 MG Oral Tablet; TAKE 1 TABLET DAILY ORALLY    Spoke w/ son - no recent issues w/ falling or GI bleeding.  ===============    MEDICATIONS:  OUTPATIENT  Home Medications:  Daily Cristina oral tablet: 1 tab(s) orally once a day (25 Aug 2023 09:41)  ergocalciferol 1.25 mg (50,000 intl units) oral capsule: 1 cap(s) orally once a week (25 Aug 2023 09:)  finasteride 5 mg oral tablet: 1 tab(s) orally once a day (25 Aug 2023 09:)  furosemide 40 mg oral tablet: 2 tab(s) orally once a day (25 Aug 2023 09:)  lisinopril 20 mg oral tablet: 1 tab(s) orally once a day (25 Aug 2023 09:41)  omeprazole 20 mg oral delayed release capsule: 1 cap(s) orally once a day (25 Aug 2023 09:)  tamsulosin 0.4 mg oral capsule: 1 cap(s) orally once a day (25 Aug 2023 09:)      INPATIENT  MEDICATIONS  (STANDING):  cefTRIAXone Injectable. 1000 milliGRAM(s) IV Push every 24 hours  heparin   Injectable 5000 Unit(s) SubCutaneous every 8 hours  lactulose Syrup 20 Gram(s) Oral every 8 hours  pantoprazole  Injectable 40 milliGRAM(s) IV Push daily  thiamine 100 milliGRAM(s) Enteral Tube daily    MEDICATIONS  (PRN):    Vital Signs Last 24 Hrs  T(C): 36.1 (26 Aug 2023 04:00), Max: 36.7 (26 Aug 2023 00:00)  T(F): 97 (26 Aug 2023 04:00), Max: 98 (26 Aug 2023 00:00)  HR: 60 (26 Aug 2023 15:00) (60 - 62)  BP: 137/77 (26 Aug 2023 15:00) (125/72 - 147/83)  BP(mean): 95 (26 Aug 2023 15:00) (88 - 101)  RR: 13 (26 Aug 2023 15:00) (13 - 21)  SpO2: 99% (26 Aug 2023 15:00) (92% - 100%)    Parameters below as of 26 Aug 2023 13:00  Patient On (Oxygen Delivery Method): nasal cannula    Daily     Daily Weight in k.7 (26 Aug 2023 04:00)I&O's Summary    25 Aug 2023 07:  -  26 Aug 2023 07:00  --------------------------------------------------------  IN: 1495 mL / OUT: 750 mL / NET: 745 mL        I&O's Detail    25 Aug 2023 07:  -  26 Aug 2023 07:00  --------------------------------------------------------  IN:    Free Water: 130 mL    Lactated Ringers: 1056 mL    Pivot 1.5: 309 mL  Total IN: 1495 mL    OUT:    Voided (mL): 750 mL  Total OUT: 750 mL    Total NET: 745 mL          I&O's Summary    25 Aug 2023 07:  -  26 Aug 2023 07:00  --------------------------------------------------------  IN: 1495 mL / OUT: 750 mL / NET: 745 mL        PHYSICAL EXAM:    Constitutional: NAD,   HEENT: PERR, EOMI,  No oral cyananosis.  Neck:  supple,  No JVD  Respiratory: Breath sounds are clear bilaterally, No wheezing, rales or rhonchi  Cardiovascular: S1 and S2, regular rate and rhythm, no Murmurs, gallops or rubs  Gastrointestinal: Bowel Sounds present, soft, nontender.   Extremities: No peripheral edema. No clubbing or cyanosis.  Vascular: 2+ peripheral pulses        ===============================  ===============================  LABS:                         13.3   7.79  )-----------( 105      ( 26 Aug 2023 05:24 )             40.3                         14.6   13.13 )-----------( 112      ( 25 Aug 2023 06:55 )             45.4                         13.4   5.60  )-----------( 113      ( 24 Aug 2023 20:30 )             40.4     26 Aug 2023 05:24    144    |  111    |  14     ----------------------------<  130    3.9     |  28     |  0.87   25 Aug 2023 06:55    141    |  111    |  15     ----------------------------<  131    4.0     |  26     |  0.94   24 Aug 2023 20:30    140    |  110    |  17     ----------------------------<  122    4.1     |  27     |  0.91     Ca    8.4        26 Aug 2023 05:24  Ca    9.0        25 Aug 2023 06:55  Ca    8.1        24 Aug 2023 20:30  Phos  2.4       26 Aug 2023 05:24  Phos  2.5       25 Aug 2023 06:55  Mg     2.1       26 Aug 2023 05:24  Mg     2.1       25 Aug 2023 06:55    TPro  7.2    /  Alb  3.8    /  TBili  0.7    /  DBili  x      /  AST  20     /  ALT  27     /  AlkPhos  96     25 Aug 2023 06:55  TPro  6.1    /  Alb  3.1    /  TBili  0.4    /  DBili  x      /  AST  24     /  ALT  27     /  AlkPhos  90     24 Aug 2023 20:30    PT/INR - ( 24 Aug 2023 20:30 )   PT: 11.5 sec;   INR: 1.02 ratio         PTT - ( 24 Aug 2023 20:30 )  PTT:35.0 sec  ===============================  ===============================  CARDIAC BIOMARKERS:  BNP      TROPONIN  Troponin I, High Sensitivity Result: 156.81 ng/L *H* (23 @ 09:37)  Troponin I, High Sensitivity Result: 169.67 ng/L *H* (23 @ 06:55)  Troponin I, High Sensitivity Result: 158.17 ng/L *H* (23 @ 22:53)  Troponin I, High Sensitivity Result: 133.63 ng/L *H* (23 @ 20:30)      ===============================  ===============================  EKG: V paced rhythm.      < from: TTE Echo Complete w/o Contrast w/ Doppler (23 @ 13:24) >   Impression     Summary     A micra device is noted in the RVOT. Device is highly   mobile and attached to RV free wall.     Endocardium is not well visualized, however, overall left ventricular   systolic function appears impaired. Technically Difficult Study.   Definity was used to better visualize the endocardial border.   Estimated left ventricular ejection fraction is 50 %.   A device wire is seen in the RV and RA.   Mild (1+) aortic regurgitation   Mild (1+) mitral regurgitation   Moderate (2+) tricuspid valve regurgitation. Mild pulmonary hypertension.     Signature     ----------------------------------------------------------------   Electronically signed by Abelino Mccoy MD(Interpreting   physician) on 2023 03:30PM   ----------------------------------------------------------------    ===============================    Abelino Mccoy M.D.  Cardiology, [facility]  Cell: [phone]  Offices:    ([facility] Office)  [phone] ([facility] Office)        HPI:  79 y/o male w/ unknown past medical history BIBEMS for AMS.  As per ER report, family states patient had been " acting weird" before calling EMS. Code stroke initiated CT head/ CTA and CT perfusion scan done, no evidence of acute CVA, ICH or LVO. Patient w/ progressively worsening mentation, placed on end tidal. Unable to participate in interview. No family at bedside for collateral. Labs significant for ammonia level 58, trop 158,  Admitted to ICU for airway monitoring.  (25 Aug 2023 02:52)  ===============  Admitted for altered mental status.  Consulted for elevated trops 100s x 3 values.  Please note - pt has multiple admission under this name:  VIRGILIO FELTON - discussed w/ nursing staff merging both charts.    Pt has a complicated cardiac device rhythm device.  Had CRT-P device in '16 complicated by device erosion,  ultimatedly had CRT-P removal & single chamber pacemaker device.  Also had a micra device implanted for unclear reasons which was later   turned off.  it is currently nonfunctional.    Pt also has a h/o permanent atrial fibrillation, non-obstructive CAD, nonischemic cardiomyopathy,   mitral regurgitation, HTN, pulmonary hypertension, COPD, anasarca.    Pt is unable to provide history at this time due to AMS.  ===============  /: more alert today.  Reviewed outpatient records. Seen by Dr. Palla in cardio clinic.  Last visit .  No med changes at that time.  outpatient meds were as follows:    Current Meds  *********Aspirin Adult Low Dose 81 MG Oral Tablet Delayed Release; Take 1 tablet daily  *********Atorvastatin Calcium 20 MG Oral Tablet; TAKE 1 TABLET DAILY  *********Coreg 6.25 MG Oral Tablet; TAKE 1 TABLET TWICE DAILY WITH MEALS  ?Furosemide 40 MG Oral Tablet; TAKE TWO TABLETS BY MOUTH EVERY DAY  ?Lisinopril 20 MG Oral Tablet; TAKE 1 TABLET DAILY AS DIRECTED  ?Omeprazole 20 MG Oral Capsule Delayed Release; TAKE 1 CAPSULE Daily  *********Spironolactone 25 MG Oral Tablet; TAKE 1 TABLET DAILY  ?? Tamsulosin HCl - 0.4 MG Oral Capsule; 2 tabs PO q HS  *********Xarelto 20 MG Oral Tablet; TAKE 1 TABLET DAILY ORALLY    Spoke w/ son - no recent issues w/ falling or GI bleeding.      ===============    MEDICATIONS:  OUTPATIENT  Home Medications:  Daily Cristina oral tablet: 1 tab(s) orally once a day (25 Aug 2023 09:41)  ergocalciferol 1.25 mg (50,000 intl units) oral capsule: 1 cap(s) orally once a week (25 Aug 2023 09:)  finasteride 5 mg oral tablet: 1 tab(s) orally once a day (25 Aug 2023 09:)  furosemide 40 mg oral tablet: 2 tab(s) orally once a day (25 Aug 2023 09:)  lisinopril 20 mg oral tablet: 1 tab(s) orally once a day (25 Aug 2023 09:41)  omeprazole 20 mg oral delayed release capsule: 1 cap(s) orally once a day (25 Aug 2023 09:)  tamsulosin 0.4 mg oral capsule: 1 cap(s) orally once a day (25 Aug 2023 09:)      INPATIENT  MEDICATIONS  (STANDING):  cefTRIAXone Injectable. 1000 milliGRAM(s) IV Push every 24 hours  heparin   Injectable 5000 Unit(s) SubCutaneous every 8 hours  lactulose Syrup 20 Gram(s) Oral every 8 hours  pantoprazole  Injectable 40 milliGRAM(s) IV Push daily  thiamine 100 milliGRAM(s) Enteral Tube daily    MEDICATIONS  (PRN):    Vital Signs Last 24 Hrs  T(C): 36.1 (26 Aug 2023 04:00), Max: 36.7 (26 Aug 2023 00:00)  T(F): 97 (26 Aug 2023 04:00), Max: 98 (26 Aug 2023 00:00)  HR: 60 (26 Aug 2023 15:00) (60 - 62)  BP: 137/77 (26 Aug 2023 15:00) (125/72 - 147/83)  BP(mean): 95 (26 Aug 2023 15:00) (88 - 101)  RR: 13 (26 Aug 2023 15:00) (13 - 21)  SpO2: 99% (26 Aug 2023 15:00) (92% - 100%)    Parameters below as of 26 Aug 2023 13:00  Patient On (Oxygen Delivery Method): nasal cannula    Daily     Daily Weight in k.7 (26 Aug 2023 04:00)I&O's Summary    25 Aug 2023 07:  -  26 Aug 2023 07:00  --------------------------------------------------------  IN: 1495 mL / OUT: 750 mL / NET: 745 mL        I&O's Detail    25 Aug 2023 07:  -  26 Aug 2023 07:00  --------------------------------------------------------  IN:    Free Water: 130 mL    Lactated Ringers: 1056 mL    Pivot 1.5: 309 mL  Total IN: 1495 mL    OUT:    Voided (mL): 750 mL  Total OUT: 750 mL    Total NET: 745 mL          I&O's Summary    25 Aug 2023 07:  -  26 Aug 2023 07:00  --------------------------------------------------------  IN: 1495 mL / OUT: 750 mL / NET: 745 mL        PHYSICAL EXAM:    Constitutional: NAD,   HEENT: PERR, EOMI,  No oral cyananosis.  Neck:  supple,  No JVD  Respiratory: Breath sounds are clear bilaterally, No wheezing, rales or rhonchi  Cardiovascular: S1 and S2, regular rate and rhythm, no Murmurs, gallops or rubs  Gastrointestinal: Bowel Sounds present, soft, nontender.   Extremities: No peripheral edema. No clubbing or cyanosis.  Vascular: 2+ peripheral pulses        ===============================  ===============================  LABS:                         13.3   7.79  )-----------( 105      ( 26 Aug 2023 05:24 )             40.3                         14.6   13.13 )-----------( 112      ( 25 Aug 2023 06:55 )             45.4                         13.4   5.60  )-----------( 113      ( 24 Aug 2023 20:30 )             40.4     26 Aug 2023 05:24    144    |  111    |  14     ----------------------------<  130    3.9     |  28     |  0.87   25 Aug 2023 06:55    141    |  111    |  15     ----------------------------<  131    4.0     |  26     |  0.94   24 Aug 2023 20:30    140    |  110    |  17     ----------------------------<  122    4.1     |  27     |  0.91     Ca    8.4        26 Aug 2023 05:24  Ca    9.0        25 Aug 2023 06:55  Ca    8.1        24 Aug 2023 20:30  Phos  2.4       26 Aug 2023 05:24  Phos  2.5       25 Aug 2023 06:55  Mg     2.1       26 Aug 2023 05:24  Mg     2.1       25 Aug 2023 06:55    TPro  7.2    /  Alb  3.8    /  TBili  0.7    /  DBili  x      /  AST  20     /  ALT  27     /  AlkPhos  96     25 Aug 2023 06:55  TPro  6.1    /  Alb  3.1    /  TBili  0.4    /  DBili  x      /  AST  24     /  ALT  27     /  AlkPhos  90     24 Aug 2023 20:30    PT/INR - ( 24 Aug 2023 20:30 )   PT: 11.5 sec;   INR: 1.02 ratio         PTT - ( 24 Aug 2023 20:30 )  PTT:35.0 sec  ===============================  ===============================  CARDIAC BIOMARKERS:  BNP      TROPONIN  Troponin I, High Sensitivity Result: 156.81 ng/L *H* (23 @ 09:37)  Troponin I, High Sensitivity Result: 169.67 ng/L *H* (23 @ 06:55)  Troponin I, High Sensitivity Result: 158.17 ng/L *H* (23 @ 22:53)  Troponin I, High Sensitivity Result: 133.63 ng/L *H* (23 @ 20:30)      ===============================  ===============================  EKG: V paced rhythm.      < from: TTE Echo Complete w/o Contrast w/ Doppler (23 @ 13:24) >   Impression     Summary     A micra device is noted in the RVOT. Device is highly   mobile and attached to RV free wall.     Endocardium is not well visualized, however, overall left ventricular   systolic function appears impaired. Technically Difficult Study.   Definity was used to better visualize the endocardial border.   Estimated left ventricular ejection fraction is 50 %.   A device wire is seen in the RV and RA.   Mild (1+) aortic regurgitation   Mild (1+) mitral regurgitation   Moderate (2+) tricuspid valve regurgitation. Mild pulmonary hypertension.     Signature     ----------------------------------------------------------------   Electronically signed by Abelino Mccoy MD(Interpreting   physician) on 2023 03:30PM   ----------------------------------------------------------------  ?  ===============================  ===============================  OUTPATIENT CARDIOLOGY TESTING:    EC23 ventricular paced rhythm    Echo: 3/25/22 ( [facility] ) EF 45 % apical wall hypokinesis , mild to moderate MR ,severe LAE , Mild AI , severe TR severe,  pulmonary hypertension normal RV function IVC dilated decreased respiratory variation    Device/PPM/ICD: 2017 [employer] VVI MRI compatible ADVISA SR MRI [id number]    Cardiac Cath/PCI: aug 28 2020 all coronaries showed minimal luminal irregularities EF 48%  ?    OUTPATIENT CARDIO NOTES:  [facility] Procedure note May '17: "leadless PM removal report"  "s/p single chamber PM implant , CRT-P upgrade   presented w/ PM pocket erosion, had PM extraction & placement  of temporary PM.  had leadless PM implant May '17 but had intermittent loss  of capture thought to be position. to prevent further dislodgement,  pt presented today for attempt at removal of leadless PM."  "multiple snares were introduced through agilis sheath device  device could not be captured" "device could not be snared"  "Summary: unsuccessfull attempt to remove leadless pacemaker"  recc: permanent transvenous PM implant"    ===============================  ===============================  Abelino Mccoy M.D.  Cardiology, [facility]  Cell: [phone]  Offices:    ([facility] Office)  [phone] ([facility] Office)

## 2023-08-26 NOTE — PROGRESS NOTE ADULT - ASSESSMENT
77 year old man with afib on Xarelto, s/p PPM, CAD, pulmonary HTN, COPD, HTN, HLD, admitted with delirium, progressive mental status decline to currently being obtunded, nearly comatose.  On exam, his brainstem reflexes are intact, and he grimaces, but his level of consciousness is severely depressed.     -Ua is positive for UTI - now on antibiotics  -Has pacemaker and micra, MRI will not be possible until 8/28 if at all. Repeat head CT to r/o infarct not seen on initial CT head.  -Currently on video EEG - there is voltage suppression, slowing, but no epileptiform activity  -Continue to treat elevated ammonia  -Continue thiamine      There has been some improvement in mental status.  If patient does not continue to improve will consider additional testing.    Discussed with Dr. Hull

## 2023-08-26 NOTE — PROGRESS NOTE ADULT - SUBJECTIVE AND OBJECTIVE BOX
HPI:  77 y/o male w/ unknown past medical history BIBEMS for AMS.  As per ER report, family states patient had been " acting weird" before calling EMS. Code stroke initiated CT head/ CTA and CT perfusion scan done, no evidence of acute CVA, ICH or LVO. Patient w/ progressively worsening mentation, placed on end tidal. Unable to participate in interview. No family at bedside for collateral. Labs significant for ammonia level 58, trop 158,  Admitted to ICU for airway monitoring.  (25 Aug 2023 02:52)  ===============  Admitted for altered mental status.  Consulted for elevated trops 100s x 3 values.  Please note - pt has multiple admission under this name:  VIRGILIO FELTON - discussed w/ nursing staff merging both charts.    Pt has a complicated cardiac device rhythm device.  Had CRT-P device in '16 complicated by device erosion,  ultimatedly had CRT-P removal & single chamber pacemaker device.  Also had a micra device implanted for unclear reasons which was later   turned off.  it is currently nonfunctional.    Pt also has a h/o permanent atrial fibrillation, non-obstructive CAD, nonischemic cardiomyopathy,   mitral regurgitation, HTN, pulmonary hypertension, COPD, anasarca.    Pt is unable to provide history at this time due to AMS.  ===============  /: more alert today.  Reviewed outpatient records. Seen by Dr. Palla in cardio clinic.  Last visit .  No med changes at that time.  outpatient meds were as follows:    *********Aspirin Adult Low Dose 81 MG Oral Tablet Delayed Release; Take 1 tablet daily  *********Atorvastatin Calcium 20 MG Oral Tablet; TAKE 1 TABLET DAILY  *********Coreg 6.25 MG Oral Tablet; TAKE 1 TABLET TWICE DAILY WITH MEALS  ?Furosemide 40 MG Oral Tablet; TAKE TWO TABLETS BY MOUTH EVERY DAY  ?Lisinopril 20 MG Oral Tablet; TAKE 1 TABLET DAILY AS DIRECTED  ?Omeprazole 20 MG Oral Capsule Delayed Release; TAKE 1 CAPSULE Daily  *********Spironolactone 25 MG Oral Tablet; TAKE 1 TABLET DAILY  ?? Tamsulosin HCl - 0.4 MG Oral Capsule; 2 tabs PO q HS  *********Xarelto 20 MG Oral Tablet; TAKE 1 TABLET DAILY ORALLY    Spoke w/ son - no recent issues w/ falling or GI bleeding.      ===============        MEDICATIONS:  OUTPATIENT  Home Medications:  Daily Cristina oral tablet: 1 tab(s) orally once a day (25 Aug 2023 09:41)  ergocalciferol 1.25 mg (50,000 intl units) oral capsule: 1 cap(s) orally once a week (25 Aug 2023 09:41)  finasteride 5 mg oral tablet: 1 tab(s) orally once a day (25 Aug 2023 09:)  furosemide 40 mg oral tablet: 2 tab(s) orally once a day (25 Aug 2023 09:)  lisinopril 20 mg oral tablet: 1 tab(s) orally once a day (25 Aug 2023 09:)  omeprazole 20 mg oral delayed release capsule: 1 cap(s) orally once a day (25 Aug 2023 09:)  tamsulosin 0.4 mg oral capsule: 1 cap(s) orally once a day (25 Aug 2023 09:41)      INPATIENT  MEDICATIONS  (STANDING):  cefTRIAXone Injectable. 1000 milliGRAM(s) IV Push every 24 hours  lactulose Syrup 20 Gram(s) Oral every 8 hours  pantoprazole  Injectable 40 milliGRAM(s) IV Push daily  rivaroxaban 20 milliGRAM(s) Oral with dinner  thiamine 100 milliGRAM(s) Enteral Tube daily    MEDICATIONS  (PRN):          Vital Signs Last 24 Hrs  T(C): 36.2 (26 Aug 2023 16:00), Max: 36.7 (26 Aug 2023 00:00)  T(F): 97.1 (26 Aug 2023 16:00), Max: 98 (26 Aug 2023 00:00)  HR: 61 (26 Aug 2023 17:00) (60 - 62)  BP: 129/77 (26 Aug 2023 17:00) (125/72 - 147/83)  BP(mean): 93 (26 Aug 2023 17:00) (88 - 101)  RR: 16 (26 Aug 2023 17:00) (13 - 21)  SpO2: 99% (26 Aug 2023 17:00) (92% - 100%)    Parameters below as of 26 Aug 2023 13:00  Patient On (Oxygen Delivery Method): nasal cannula    Daily     Daily Weight in k.7 (26 Aug 2023 04:00)I&O's Summary    25 Aug 2023 07:01  -  26 Aug 2023 07:00  --------------------------------------------------------  IN: 1495 mL / OUT: 750 mL / NET: 745 mL        I&O's Detail    25 Aug 2023 07:  -  26 Aug 2023 07:00  --------------------------------------------------------  IN:    Free Water: 130 mL    Lactated Ringers: 1056 mL    Pivot 1.5: 309 mL  Total IN: 1495 mL    OUT:    Voided (mL): 750 mL  Total OUT: 750 mL    Total NET: 745 mL          I&O's Summary    25 Aug 2023 07:01  -  26 Aug 2023 07:00  --------------------------------------------------------  IN: 1495 mL / OUT: 750 mL / NET: 745 mL        PHYSICAL EXAM:    Constitutional: NAD,   HEENT: PERR, EOMI,  No oral cyananosis.  Neck:  supple,  No JVD  Respiratory: Breath sounds are clear bilaterally, No wheezing, rales or rhonchi  Cardiovascular: S1 and S2, regular rate and rhythm, no Murmurs, gallops or rubs  Gastrointestinal: Bowel Sounds present, soft, nontender.   Extremities: No peripheral edema. No clubbing or cyanosis.  Vascular: 2+ peripheral pulses        ===============================  ===============================  LABS:                         13.3   7.79  )-----------( 105      ( 26 Aug 2023 05:24 )             40.3                         14.6   13.13 )-----------( 112      ( 25 Aug 2023 06:55 )             45.4                         13.4   5.60  )-----------( 113      ( 24 Aug 2023 20:30 )             40.4     26 Aug 2023 05:24    144    |  111    |  14     ----------------------------<  130    3.9     |  28     |  0.87   25 Aug 2023 06:55    141    |  111    |  15     ----------------------------<  131    4.0     |  26     |  0.94   24 Aug 2023 20:30    140    |  110    |  17     ----------------------------<  122    4.1     |  27     |  0.91     Ca    8.4        26 Aug 2023 05:24  Ca    9.0        25 Aug 2023 06:55  Ca    8.1        24 Aug 2023 20:30  Phos  2.4       26 Aug 2023 05:24  Phos  2.5       25 Aug 2023 06:55  Mg     2.1       26 Aug 2023 05:24  Mg     2.1       25 Aug 2023 06:55    TPro  7.2    /  Alb  3.8    /  TBili  0.7    /  DBili  x      /  AST  20     /  ALT  27     /  AlkPhos  96     25 Aug 2023 06:55  TPro  6.1    /  Alb  3.1    /  TBili  0.4    /  DBili  x      /  AST  24     /  ALT  27     /  AlkPhos  90     24 Aug 2023 20:30    PT/INR - ( 24 Aug 2023 20:30 )   PT: 11.5 sec;   INR: 1.02 ratio         PTT - ( 24 Aug 2023 20:30 )  PTT:35.0 sec  ===============================  ===============================  CARDIAC BIOMARKERS:  BNP      TROPONIN  Troponin I, High Sensitivity Result: 156.81 ng/L *H* (23 @ 09:37)  Troponin I, High Sensitivity Result: 169.67 ng/L *H* (23 @ 06:55)  Troponin I, High Sensitivity Result: 158.17 ng/L *H* (23 @ 22:53)  Troponin I, High Sensitivity Result: 133.63 ng/L *H* (23 @ 20:30)    ===============================  ===============================  BLOOD CULTURES:    Blood Culture:      20:27  TSH: 0.18      ===============================  ===============================  EKG: V paced rhythm.      < from: TTE Echo Complete w/o Contrast w/ Doppler (23 @ 13:24) >   Impression     Summary     A micra device is noted in the RVOT. Device is highly   mobile and attached to RV free wall.     Endocardium is not well visualized, however, overall left ventricular   systolic function appears impaired. Technically Difficult Study.   Definity was used to better visualize the endocardial border.   Estimated left ventricular ejection fraction is 50 %.   A device wire is seen in the RV and RA.   Mild (1+) aortic regurgitation   Mild (1+) mitral regurgitation   Moderate (2+) tricuspid valve regurgitation. Mild pulmonary hypertension.     Signature     ----------------------------------------------------------------   Electronically signed by Abelino Mccoy MD(Interpreting   physician) on 2023 03:30PM   ----------------------------------------------------------------  ?  ===============================  ===============================  OUTPATIENT CARDIOLOGY TESTING:    EC23 ventricular paced rhythm    Echo: 3/25/22 ( [facility] ) EF 45 % apical wall hypokinesis , mild to moderate MR ,severe LAE , Mild AI , severe TR severe,  pulmonary hypertension normal RV function IVC dilated decreased respiratory variation    Device/PPM/ICD: 2017 [employer] VVI MRI compatible ADVISA SR MRI A3SR01    Cardiac Cath/PCI: aug 28 2020 all coronaries showed minimal luminal irregularities EF 48%  ?    OUTPATIENT CARDIO NOTES:  [facility] Procedure note May-19-  FIORDALIZA Carrion: "leadless PM removal report"  "s/p single chamber PM implant , CRT-P upgrade   presented w/ PM pocket erosion, had PM extraction & placement  of temporary PM.  had leadless PM implant May '17 but had intermittent loss  of capture thought to be position. to prevent further dislodgement,  pt presented today for attempt at removal of leadless PM."  "multiple snares were introduced through agilis sheath device  device could not be captured" "device could not be snared"  "Summary: unsuccessfull attempt to remove leadless pacemaker"  recc: permanent transvenous PM implant"      ===============================  ===============================    Abelino Mccoy M.D.  Cardiology, [facility]  Cell: [phone]  Offices:   [phone] ([facility] Office)  [phone] ([facility] Office)

## 2023-08-26 NOTE — CONSULT NOTE ADULT - PROBLEM SELECTOR RECOMMENDATION 4
-Pt clinically improving.  Reviewed OP records & discussed w/ son.  was on xarelto prior to admit.  discussed w/ ICU Dr. leavitt - no plans   for procedures - xarelto 20 mg daily ordered.  -currently rate controlled.

## 2023-08-26 NOTE — CONSULT NOTE ADULT - PROBLEM SELECTOR RECOMMENDATION 5
-EF this admit 50%.  Was on coreg 6.25 BID, rosalia 25 daily as OP.  -Will restart in next few days as clinical status continues to improve.

## 2023-08-27 LAB
AMMONIA BLD-MCNC: 52 UMOL/L — HIGH (ref 11–32)
ANION GAP SERPL CALC-SCNC: 5 MMOL/L — SIGNIFICANT CHANGE UP (ref 5–17)
BUN SERPL-MCNC: 13 MG/DL — SIGNIFICANT CHANGE UP (ref 7–23)
CALCIUM SERPL-MCNC: 8.6 MG/DL — SIGNIFICANT CHANGE UP (ref 8.5–10.1)
CHLORIDE SERPL-SCNC: 110 MMOL/L — HIGH (ref 96–108)
CO2 SERPL-SCNC: 26 MMOL/L — SIGNIFICANT CHANGE UP (ref 22–31)
CREAT SERPL-MCNC: 1.08 MG/DL — SIGNIFICANT CHANGE UP (ref 0.5–1.3)
EGFR: 70 ML/MIN/1.73M2 — SIGNIFICANT CHANGE UP
GLUCOSE SERPL-MCNC: 126 MG/DL — HIGH (ref 70–99)
HCT VFR BLD CALC: 42.1 % — SIGNIFICANT CHANGE UP (ref 39–50)
HGB BLD-MCNC: 13.7 G/DL — SIGNIFICANT CHANGE UP (ref 13–17)
MAGNESIUM SERPL-MCNC: 2 MG/DL — SIGNIFICANT CHANGE UP (ref 1.6–2.6)
MCHC RBC-ENTMCNC: 32.5 GM/DL — SIGNIFICANT CHANGE UP (ref 32–36)
MCHC RBC-ENTMCNC: 32.7 PG — SIGNIFICANT CHANGE UP (ref 27–34)
MCV RBC AUTO: 100.5 FL — HIGH (ref 80–100)
PHOSPHATE SERPL-MCNC: 2.9 MG/DL — SIGNIFICANT CHANGE UP (ref 2.5–4.5)
PLATELET # BLD AUTO: 110 K/UL — LOW (ref 150–400)
POTASSIUM SERPL-MCNC: 4 MMOL/L — SIGNIFICANT CHANGE UP (ref 3.5–5.3)
POTASSIUM SERPL-SCNC: 4 MMOL/L — SIGNIFICANT CHANGE UP (ref 3.5–5.3)
RBC # BLD: 4.19 M/UL — LOW (ref 4.2–5.8)
RBC # FLD: 13.1 % — SIGNIFICANT CHANGE UP (ref 10.3–14.5)
SODIUM SERPL-SCNC: 141 MMOL/L — SIGNIFICANT CHANGE UP (ref 135–145)
T3 SERPL-MCNC: 74 NG/DL — LOW (ref 80–200)
T3 SERPL-MCNC: 74 NG/DL — LOW (ref 80–200)
T4 AB SER-ACNC: 5.8 UG/DL — SIGNIFICANT CHANGE UP (ref 4.6–12)
T4 AB SER-ACNC: 5.9 UG/DL — SIGNIFICANT CHANGE UP (ref 4.6–12)
TSH SERPL-MCNC: 0.96 UU/ML — SIGNIFICANT CHANGE UP (ref 0.34–4.82)
WBC # BLD: 6.74 K/UL — SIGNIFICANT CHANGE UP (ref 3.8–10.5)
WBC # FLD AUTO: 6.74 K/UL — SIGNIFICANT CHANGE UP (ref 3.8–10.5)

## 2023-08-27 PROCEDURE — 99232 SBSQ HOSP IP/OBS MODERATE 35: CPT

## 2023-08-27 PROCEDURE — 99223 1ST HOSP IP/OBS HIGH 75: CPT

## 2023-08-27 PROCEDURE — 99233 SBSQ HOSP IP/OBS HIGH 50: CPT

## 2023-08-27 PROCEDURE — 93280 PM DEVICE PROGR EVAL DUAL: CPT | Mod: 26

## 2023-08-27 RX ORDER — CARVEDILOL PHOSPHATE 80 MG/1
6.25 CAPSULE, EXTENDED RELEASE ORAL EVERY 12 HOURS
Refills: 0 | Status: DISCONTINUED | OUTPATIENT
Start: 2023-08-27 | End: 2023-08-29

## 2023-08-27 RX ADMIN — TAMSULOSIN HYDROCHLORIDE 0.4 MILLIGRAM(S): 0.4 CAPSULE ORAL at 20:59

## 2023-08-27 RX ADMIN — LACTULOSE 20 GRAM(S): 10 SOLUTION ORAL at 05:58

## 2023-08-27 RX ADMIN — CARVEDILOL PHOSPHATE 6.25 MILLIGRAM(S): 80 CAPSULE, EXTENDED RELEASE ORAL at 20:58

## 2023-08-27 RX ADMIN — FINASTERIDE 5 MILLIGRAM(S): 5 TABLET, FILM COATED ORAL at 09:52

## 2023-08-27 RX ADMIN — RIVAROXABAN 20 MILLIGRAM(S): KIT at 17:22

## 2023-08-27 RX ADMIN — Medication 100 MILLIGRAM(S): at 09:52

## 2023-08-27 RX ADMIN — LISINOPRIL 20 MILLIGRAM(S): 2.5 TABLET ORAL at 09:52

## 2023-08-27 RX ADMIN — CEFTRIAXONE 1000 MILLIGRAM(S): 500 INJECTION, POWDER, FOR SOLUTION INTRAMUSCULAR; INTRAVENOUS at 02:39

## 2023-08-27 RX ADMIN — PANTOPRAZOLE SODIUM 40 MILLIGRAM(S): 20 TABLET, DELAYED RELEASE ORAL at 09:52

## 2023-08-27 NOTE — PROGRESS NOTE ADULT - PROBLEM SELECTOR PLAN 3
-review of outpatient record indicates pt has a single chamber Medtronic A3SR01 Advisa SR MRI last checked Jun '23.  battery voltage 2.99 RRT 2.83.  -Appreciate EP device check today - VVI PM, lead & abandoned MICRA are  MRI compatible - OK to due MRI for AMS if needed. -review of outpatient record indicates pt has a single chamber Medtronic A3SR01 Advisa SR MRI last checked Jun '23.  battery voltage 2.99 RRT 2.83.  -Appreciate EP device check today - b/c the MICRA device is turned off it is NOT MRI compatible.  discussed w/ neuro - cannot perform cardiac MRI.

## 2023-08-27 NOTE — PROGRESS NOTE ADULT - SUBJECTIVE AND OBJECTIVE BOX
CHIEF COMPLAINT/INTERVAL HISTORY:    Patient is a 77y old  Male who presents with a chief complaint of AMS (26 Aug 2023 21:39)      HPI:  79 y/o male w/ unknown past medical history BIBEMS for AMS.  As per ER report, family states patient had been " acting weird" before calling EMS. Code stroke initiated CT head/ CTA and CT perfusion scan done, no evidence of acute CVA, ICH or LVO. Patient w/ progressively worsening mentation, placed on end tidal. Unable to participate in interview. No family at bedside for collateral. Labs significant for ammonia level 58, trop 158,  Admitted to ICU for airway monitoring.  (25 Aug 2023 02:52)  Pt downgraded to Med last night. Pleasant, confused trying to get out of bed but easily reoriented    SUBJECTIVE & OBJECTIVE: Pt seen and examined at bedside.     ICU Vital Signs Last 24 Hrs  T(C): 36.5 (27 Aug 2023 08:51), Max: 36.7 (27 Aug 2023 01:08)  T(F): 97.7 (27 Aug 2023 08:51), Max: 98 (27 Aug 2023 01:08)  HR: 60 (27 Aug 2023 08:51) (59 - 61)  BP: 143/70 (27 Aug 2023 08:51) (129/77 - 170/90)  BP(mean): 103 (26 Aug 2023 23:00) (93 - 112)  ABP: --  ABP(mean): --  RR: 18 (27 Aug 2023 08:51) (11 - 19)  SpO2: 95% (27 Aug 2023 08:51) (94% - 100%)    O2 Parameters below as of 27 Aug 2023 08:51  Patient On (Oxygen Delivery Method): room air        MEDICATIONS  (STANDING):  cefTRIAXone Injectable. 1000 milliGRAM(s) IV Push every 24 hours  finasteride 5 milliGRAM(s) Oral daily  lactulose Syrup 20 Gram(s) Oral every 8 hours  lisinopril 20 milliGRAM(s) Oral daily  pantoprazole  Injectable 40 milliGRAM(s) IV Push daily  rivaroxaban 20 milliGRAM(s) Oral with dinner  tamsulosin 0.4 milliGRAM(s) Oral at bedtime  thiamine 100 milliGRAM(s) Enteral Tube daily    MEDICATIONS  (PRN):        PHYSICAL EXAM:    GENERAL: NAD  NERVOUS SYSTEM:  oriented to place 3, Motor Strength 5/5 B/L upper and lower extremities; follows simple commands  CHEST/LUNG: Clear to auscultation bilaterally; No rales, rhonchi, wheezing, or rubs  HEART: Regular rate and rhythm; No murmurs, rubs, or gallops  ABDOMEN: Soft, Nontender, Nondistended; Bowel sounds present  EXTREMITIES:  2+ Peripheral Pulses, No clubbing, cyanosis, or edema    LABS:                        13.3   7.79  )-----------( 105      ( 26 Aug 2023 05:24 )             40.3     08-26    144  |  111<H>  |  14  ----------------------------<  130<H>  3.9   |  28  |  0.87    Ca    8.4<L>      26 Aug 2023 05:24  Phos  2.4     08-26  Mg     2.1     08-26    TTE Echo Complete w/o Contrast w/ Doppler (08.25.23 @ 13:24) >       A micra device is noted in the RVOT. Device is highly   mobile and attached to RV free wall.     Endocardium is not well visualized, however, overall left ventricular   systolic function appears impaired. Technically Difficult Study.   Definity was used to better visualize the endocardial border.   Estimated left ventricular ejection fraction is 50 %.   A device wire is seen in the RV and RA.   Mild (1+) aortic regurgitation   Mild (1+) mitral regurgitation   Moderate (2+) tricuspid valve regurgitation. Mild pulmonary hypertension.              76 year old man with hx of CHF, COPD, HTN, HLD, CAD, afib s/p PPM on Xarelto, pulmonary HTN, admitted to  ICU on 8/24 with progressive deterioration in mental status to the point of near obtundation ,Code stoke imaging negative, now improved but remains confused    Assessment     Encephalopathy acute  Elevated troponin  Abnormal echocardiogram  Chronic atrial fibrillation  Nonischemic cardiomyopathy  Cardiac pacemaker in situ  mild trhombocytopenia      Plan    - Likely Attributable to Benzo's + U tox tox and encephalopathy from a UTI (+u/a) ammonia a bit high too.  Family trying to ascertain his meds as they do not know that he is on any benzo's.  He had an over the counter med form a local apothecary for cough  - Now awake alert/oriented  via .  - dc abx, Ucx neg  Full AC hx a fib   Micra device in RVOT no rx per EP   Mild + trop.  No W/U per cardiology   Flomax   Low TSH check t3-4   repeat plts ct in am  dc Lactulose q8, the NH$ of 58 is not responsible for his presentation                  CHIEF COMPLAINT/INTERVAL HISTORY:    Patient is a 77y old  Male who presents with a chief complaint of AMS (26 Aug 2023 21:39)      HPI:  79 y/o male w/ unknown past medical history BIBEMS for AMS.  As per ER report, family states patient had been " acting weird" before calling EMS. Code stroke initiated CT head/ CTA and CT perfusion scan done, no evidence of acute CVA, ICH or LVO. Patient w/ progressively worsening mentation, placed on end tidal. Unable to participate in interview. No family at bedside for collateral. Labs significant for ammonia level 58, trop 158,  Admitted to ICU for airway monitoring.  (25 Aug 2023 02:52)  Pt downgraded to Med last night. Pleasant, confused trying to get out of bed but easily reoriented    SUBJECTIVE & OBJECTIVE: Pt seen and examined at bedside.     ICU Vital Signs Last 24 Hrs  T(C): 36.5 (27 Aug 2023 08:51), Max: 36.7 (27 Aug 2023 01:08)  T(F): 97.7 (27 Aug 2023 08:51), Max: 98 (27 Aug 2023 01:08)  HR: 60 (27 Aug 2023 08:51) (59 - 61)  BP: 143/70 (27 Aug 2023 08:51) (129/77 - 170/90)  BP(mean): 103 (26 Aug 2023 23:00) (93 - 112)  ABP: --  ABP(mean): --  RR: 18 (27 Aug 2023 08:51) (11 - 19)  SpO2: 95% (27 Aug 2023 08:51) (94% - 100%)    O2 Parameters below as of 27 Aug 2023 08:51  Patient On (Oxygen Delivery Method): room air        MEDICATIONS  (STANDING):  cefTRIAXone Injectable. 1000 milliGRAM(s) IV Push every 24 hours  finasteride 5 milliGRAM(s) Oral daily  lactulose Syrup 20 Gram(s) Oral every 8 hours  lisinopril 20 milliGRAM(s) Oral daily  pantoprazole  Injectable 40 milliGRAM(s) IV Push daily  rivaroxaban 20 milliGRAM(s) Oral with dinner  tamsulosin 0.4 milliGRAM(s) Oral at bedtime  thiamine 100 milliGRAM(s) Enteral Tube daily    MEDICATIONS  (PRN):        PHYSICAL EXAM:    GENERAL: NAD  NERVOUS SYSTEM:  oriented to place 3, Motor Strength 5/5 B/L upper and lower extremities; follows simple commands  CHEST/LUNG: Clear to auscultation bilaterally; No rales, rhonchi, wheezing, or rubs  HEART: Regular rate and rhythm; No murmurs, rubs, or gallops  ABDOMEN: Soft, Nontender, Nondistended; Bowel sounds present  EXTREMITIES:  2+ Peripheral Pulses, No clubbing, cyanosis, or edema    LABS:                        13.3   7.79  )-----------( 105      ( 26 Aug 2023 05:24 )             40.3     08-26    144  |  111<H>  |  14  ----------------------------<  130<H>  3.9   |  28  |  0.87    Ca    8.4<L>      26 Aug 2023 05:24  Phos  2.4     08-26  Mg     2.1     08-26    TTE Echo Complete w/o Contrast w/ Doppler (08.25.23 @ 13:24) >       A micra device is noted in the RVOT. Device is highly   mobile and attached to RV free wall.     Endocardium is not well visualized, however, overall left ventricular   systolic function appears impaired. Technically Difficult Study.   Definity was used to better visualize the endocardial border.   Estimated left ventricular ejection fraction is 50 %.   A device wire is seen in the RV and RA.   Mild (1+) aortic regurgitation   Mild (1+) mitral regurgitation   Moderate (2+) tricuspid valve regurgitation. Mild pulmonary hypertension.              76 year old man with hx of CHF, COPD, HTN, HLD, CAD, afib s/p PPM on Xarelto, pulmonary HTN, admitted to  ICU on 8/24 with progressive deterioration in mental status to the point of near obtundation ,Code stoke imaging negative, now improved but remains confused    Assessment     Encephalopathy acute  Elevated troponin  Abnormal echocardiogram  Chronic atrial fibrillation  Nonischemic cardiomyopathy  Cardiac pacemaker in situ  mild trhombocytopenia      Plan    - Likely Attributable to Benzo's + U tox tox and encephalopathy from a UTI (+u/a) ammonia a bit high too.  Family trying to ascertain his meds as they do not know that he is on any benzo's.  He had an over the counter med form a local apothecary for cough  - Now awake alert/oriented  via .  - dc abx, Ucx neg  Full AC hx a fib   Micra device in RVOT Dr Tomas will d/w [facility] re: micra removal/ but it seems it will be done as OP  Mild + trop.  No W/U per cardiology   Flomax   Low TSH check t3-4   repeat plts ct in am  dc Lactulose q8, the NH$ of 58 is not responsible for his presentation   PPM not compatible with mri

## 2023-08-27 NOTE — PROGRESS NOTE ADULT - SUBJECTIVE AND OBJECTIVE BOX
HPI:  79 y/o male w/ unknown past medical history BIBEMS for AMS.  As per ER report, family states patient had been " acting weird" before calling EMS. Code stroke initiated CT head/ CTA and CT perfusion scan done, no evidence of acute CVA, ICH or LVO. Patient w/ progressively worsening mentation, placed on end tidal. Unable to participate in interview. No family at bedside for collateral. Labs significant for ammonia level 58, trop 158,  Admitted to ICU for airway monitoring.  (25 Aug 2023 02:52)  ===============  Admitted for altered mental status.  Consulted for elevated trops 100s x 3 values.  Please note - pt has multiple admission under this name:  VIRGILIO FELTON - discussed w/ nursing staff merging both charts.    Pt has a complicated cardiac device rhythm device history.  -pt has a h/o VVI PM 16, CRT-P upgrade , PM generator erosion  May '17 w/ micra implant May '17, loss of capture postprocedure w/ failed attempt micra removal 2 days later & subsequent permanent PM insertion May '17.    Pt also has a h/o permanent atrial fibrillation, non-obstructive CAD, nonischemic cardiomyopathy, mitral regurgitation, HTN, pulmonary hypertension, COPD, anasarca.    Pt is unable to provide history at this time due to AMS.  ===============  /: more alert today.  Reviewed outpatient records. Seen by Dr. Palla in cardio clinic.  Last visit .  No med changes at that time.  outpatient meds were as follows:    *********Aspirin Adult Low Dose 81 MG Oral Tablet Delayed Release; Take 1 tablet daily  *********Atorvastatin Calcium 20 MG Oral Tablet; TAKE 1 TABLET DAILY  _*********Coreg 6.25 MG Oral Tablet; TAKE 1 TABLET TWICE DAILY WITH MEALS  x?Furosemide 40 MG Oral Tablet; TAKE TWO TABLETS BY MOUTH EVERY DAY  x?Lisinopril 20 MG Oral Tablet; TAKE 1 TABLET DAILY AS DIRECTED  ?Omeprazole 20 MG Oral Capsule Delayed Release; TAKE 1 CAPSULE Daily  *********Spironolactone 25 MG Oral Tablet; TAKE 1 TABLET DAILY  ?? Tamsulosin HCl - 0.4 MG Oral Capsule; 2 tabs PO q HS  *********Xarelto 20 MG Oral Tablet; TAKE 1 TABLET DAILY ORALLY    Spoke w/ son - no recent issues w/ falling or GI bleeding.  ===============  23: no alert - no complaints.  ===============  MEDICATIONS:  OUTPATIENT  Home Medications:  Daily Cristina oral tablet: 1 tab(s) orally once a day (25 Aug 2023 09:41)  ergocalciferol 1.25 mg (50,000 intl units) oral capsule: 1 cap(s) orally once a week (25 Aug 2023 09:41)  finasteride 5 mg oral tablet: 1 tab(s) orally once a day (25 Aug 2023 09:41)  furosemide 40 mg oral tablet: 2 tab(s) orally once a day (25 Aug 2023 09:41)  lisinopril 20 mg oral tablet: 1 tab(s) orally once a day (25 Aug 2023 09:41)  omeprazole 20 mg oral delayed release capsule: 1 cap(s) orally once a day (25 Aug 2023 09:41)  tamsulosin 0.4 mg oral capsule: 1 cap(s) orally once a day (25 Aug 2023 09:41)      INPATIENT  MEDICATIONS  (STANDING):  finasteride 5 milliGRAM(s) Oral daily  lisinopril 20 milliGRAM(s) Oral daily  pantoprazole  Injectable 40 milliGRAM(s) IV Push daily  rivaroxaban 20 milliGRAM(s) Oral with dinner  tamsulosin 0.4 milliGRAM(s) Oral at bedtime  thiamine 100 milliGRAM(s) Enteral Tube daily    MEDICATIONS  (PRN):          Vital Signs Last 24 Hrs  T(C): 36.5 (27 Aug 2023 08:51), Max: 36.7 (27 Aug 2023 01:08)  T(F): 97.7 (27 Aug 2023 08:51), Max: 98 (27 Aug 2023 01:08)  HR: 60 (27 Aug 2023 08:51) (59 - 61)  BP: 143/70 (27 Aug 2023 08:51) (129/77 - 170/90)  BP(mean): 103 (26 Aug 2023 23:00) (93 - 112)  RR: 18 (27 Aug 2023 08:51) (11 - 19)  SpO2: 95% (27 Aug 2023 08:51) (94% - 100%)    Parameters below as of 27 Aug 2023 08:51  Patient On (Oxygen Delivery Method): room air    Daily     Daily I&O's Summary    26 Aug 2023 07:  -  27 Aug 2023 07:00  --------------------------------------------------------  IN: 0 mL / OUT: 350 mL / NET: -350 mL        I&O's Detail    26 Aug 2023 07:  -  27 Aug 2023 07:00  --------------------------------------------------------  IN:  Total IN: 0 mL    OUT:    Voided (mL): 350 mL  Total OUT: 350 mL    Total NET: -350 mL          I&O's Summary    26 Aug 2023 07:  -  27 Aug 2023 07:00  --------------------------------------------------------  IN: 0 mL / OUT: 350 mL / NET: -350 mL        PHYSICAL EXAM:    Constitutional: NAD,   HEENT: PERR, EOMI,  No oral cyananosis.  Neck:  supple,  No JVD  Respiratory: Breath sounds are clear bilaterally, No wheezing, rales or rhonchi  Cardiovascular: S1 and S2, regular rate and rhythm, no Murmurs, gallops or rubs  Gastrointestinal: Bowel Sounds present, soft, nontender.   Extremities: No peripheral edema. No clubbing or cyanosis.  Vascular: 2+ peripheral pulses    ===============================  ===============================  LABS:                         13.7   6.74  )-----------( 110      ( 27 Aug 2023 09:01 )             42.1                         13.3   7.79  )-----------( 105      ( 26 Aug 2023 05:24 )             40.3                         14.6   13.13 )-----------( 112      ( 25 Aug 2023 06:55 )             45.4     27 Aug 2023 09:01    141    |  110    |  13     ----------------------------<  126    4.0     |  26     |  1.08   26 Aug 2023 05:24    144    |  111    |  14     ----------------------------<  130    3.9     |  28     |  0.87   25 Aug 2023 06:55    141    |  111    |  15     ----------------------------<  131    4.0     |  26     |  0.94     Ca    8.6        27 Aug 2023 09:01  Ca    8.4        26 Aug 2023 05:24  Ca    9.0        25 Aug 2023 06:55  Phos  2.9       27 Aug 2023 09:01  Phos  2.4       26 Aug 2023 05:24  Phos  2.5       25 Aug 2023 06:55  Mg     2.0       27 Aug 2023 09:01  Mg     2.1       26 Aug 2023 05:24  Mg     2.1       25 Aug 2023 06:55    TPro  7.2    /  Alb  3.8    /  TBili  0.7    /  DBili  x      /  AST  20     /  ALT  27     /  AlkPhos  96     25 Aug 2023 06:55  TPro  6.1    /  Alb  3.1    /  TBili  0.4    /  DBili  x      /  AST  24     /  ALT  27     /  AlkPhos  90     24 Aug 2023 20:30      ===============================  ===============================  CARDIAC BIOMARKERS:  BNP      TROPONIN  Troponin I, High Sensitivity Result: 156.81 ng/L *H* (23 @ 09:37)  Troponin I, High Sensitivity Result: 169.67 ng/L *H* (23 @ 06:55)  Troponin I, High Sensitivity Result: 158.17 ng/L *H* (23 @ 22:53)  Troponin I, High Sensitivity Result: 133.63 ng/L *H* (23 @ 20:30)    ===============================  ===============================     @ 09:01  TSH: 0.96  :27  TSH: 0.18      ===============================  ===============================  EKG: V paced rhythm.      < from: TTE Echo Complete w/o Contrast w/ Doppler (23 @ 13:24) >   Impression     Summary     A micra device is noted in the RVOT. Device is highly   mobile and attached to RV free wall.     Endocardium is not well visualized, however, overall left ventricular   systolic function appears impaired. Technically Difficult Study.   Definity was used to better visualize the endocardial border.   Estimated left ventricular ejection fraction is 50 %.   A device wire is seen in the RV and RA.   Mild (1+) aortic regurgitation   Mild (1+) mitral regurgitation   Moderate (2+) tricuspid valve regurgitation. Mild pulmonary hypertension.     Signature     ----------------------------------------------------------------   Electronically signed by Abelino Mccoy MD(Interpreting   physician) on 2023 03:30PM   ----------------------------------------------------------------  ?  ===============================  ===============================  OUTPATIENT CARDIOLOGY TESTING:    EC23 ventricular paced rhythm    Echo: 3/25/22 ( [facility] ) EF 45 % apical wall hypokinesis , mild to moderate MR ,severe LAE , Mild AI , severe TR severe,  pulmonary hypertension normal RV function IVC dilated decreased respiratory variation    Device/PPM/ICD: 2017 [employer] VVI MRI compatible ADVISA SR MRI [id number]    Cardiac Cath/PCI: aug 28 2020 all coronaries showed minimal luminal irregularities EF 48%  ?    OUTPATIENT CARDIO NOTES:  [facility] Procedure note May-19-  FIORDALIZA Carrion: "leadless PM removal report"  "s/p single chamber PM implant , CRT-P upgrade   presented w/ PM pocket erosion, had PM extraction & placement  of temporary PM.  had leadless PM implant May '17 but had intermittent loss  of capture thought to be position. to prevent further dislodgement,  pt presented today for attempt at removal of leadless PM."  "multiple snares were introduced through agilis sheath device  device could not be captured" "device could not be snared"  "Summary: unsuccessfull attempt to remove leadless pacemaker"  recc: permanent transvenous PM implant"      ===============================  ===============================    Abelino Mccoy M.D.  Cardiology, [facility]  Cell: [phone]  Offices:    ([facility] Office)  [phone] ([facility] Office)

## 2023-08-27 NOTE — PROGRESS NOTE ADULT - PROBLEM SELECTOR PLAN 2
-Echo w/ highly mobile large echogenic lesion  3.0 cm x 0.5 cm attached to RV free wall  at the RV outflow tract - this nonfunctional micra device.  It is in the RV free wall  rather than the usual position at the septum.  -pt has a h/o VVI PM '16, CRT-P upgrade Apr '17, PM generator erosion  May '17 w/ micra implant May '17, loss of capture postprocedure w/ failed attempt micra   removal 2 days later & subsequent permanent PM insertion May '17.  -D/w Dr. Tomas 8/25 - no need for removal of device. No need for interrogation as it was turned off.

## 2023-08-27 NOTE — PROGRESS NOTE ADULT - ASSESSMENT
77 year old man with afib on Xarelto, s/p PPM, CAD, pulmonary HTN, COPD, HTN, HLD, admitted with delirium, progressive mental status decline to currently being obtunded, nearly comatose.  On exam, his brainstem reflexes are intact, and he grimaces, but his level of consciousness is severely depressed.     -Ua is positive for UTI - now on antibiotics  -Has pacemaker and micra, MRI will not be possible until 8/28 if at all. Repeat head CT to r/o infarct not seen on initial CT head.  -Currently on video EEG - there is voltage suppression, slowing, but no epileptiform activity  -Continue to treat elevated ammonia  -Continue thiamine      There has been some improvement in mental status.  If patient does not continue to improve will consider additional testing.    Discussed with Dr. Hull   77 year old man with afib on Xarelto, s/p PPM, CAD, pulmonary HTN, COPD, HTN, HLD, admitted with delirium, progressive mental status decline to being obtunded, nearly comatose.  He has now improved.    -Ua positive for UTI, now on antibiotics  -EEG showed encephalopathy but not seizures  -Continue to treat elevated ammonia  -PPM and micra are MRI compatible. Will order for 8/28.  -Continue thiamine    will f/u

## 2023-08-27 NOTE — PROGRESS NOTE ADULT - SUBJECTIVE AND OBJECTIVE BOX
Interval History:  8/27/23: Patient was transferred to the floor.      MEDICATIONS  (STANDING):  carvedilol 6.25 milliGRAM(s) Oral every 12 hours  finasteride 5 milliGRAM(s) Oral daily  lisinopril 20 milliGRAM(s) Oral daily  rivaroxaban 20 milliGRAM(s) Oral with dinner  tamsulosin 0.4 milliGRAM(s) Oral at bedtime  thiamine 100 milliGRAM(s) Enteral Tube daily    MEDICATIONS  (PRN):      Allergies    No Known Allergies    Intolerances        PHYSICAL EXAM:  Vital Signs Last 24 Hrs  T(F): 97.7 (08-27-23 @ 08:51)  HR: 60 (08-27-23 @ 08:51)  BP: 143/70 (08-27-23 @ 08:51)  RR: 18 (08-27-23 @ 08:51)    GENERAL: NAD, well-groomed  HEAD:  Atraumatic, Normocephalic  Neuro:  Awake, alert, follows commands  CN: PERRL, EOMI, no nystagmus, no facial weakness, tongue protrudes in the midline  motor: moving all extremities. Some decreased tone in left arm compared to right  sensory: intact to light touch  coordination: finger to nose intact bilaterally  gait: not tested    LABS:                        13.7   6.74  )-----------( 110      ( 27 Aug 2023 09:01 )             42.1     08-27    141  |  110<H>  |  13  ----------------------------<  126<H>  4.0   |  26  |  1.08    Ca    8.6      27 Aug 2023 09:01  Phos  2.9     08-27  Mg     2.0     08-27        Urinalysis Basic - ( 27 Aug 2023 09:01 )    Color: x / Appearance: x / SG: x / pH: x  Gluc: 126 mg/dL / Ketone: x  / Bili: x / Urobili: x   Blood: x / Protein: x / Nitrite: x   Leuk Esterase: x / RBC: x / WBC x   Sq Epi: x / Non Sq Epi: x / Bacteria: x        RADIOLOGY & ADDITIONAL STUDIES:    CT head 8/24/23:  No acute intracranial hemorrhage or mass effect.    CTA head, CTA neck, CT perfusion 8/24/23:  CT perfusion:  CBF<30% volume: 0 ml. No core infarct.  Tmax>6.0s volume: 0 ml  Mismatch volume: 0 ml. No ischemic penumbra.  Mismatch ratio: None    CT angiography neck: No hemodynamically significant stenosis of the   bilateral cervical ICAs using NASCET criteria.  Patent vertebral   arteries.  No evidence of vascular dissection.    CT angiography brain: No large vessel occlusion. No evidence of aneurysm.    24 hour EEG 8/25/23-8/26/23:  -Minimal waking record  -Generalized slowing and voltage suppression, improving by the end of the   study  -Excess beta activity

## 2023-08-27 NOTE — PROCEDURE NOTE - ADDITIONAL PROCEDURE DETAILS
Battery longevity has 4.5 years remaining  The device and RV lead implanted May 19, 2017 are  MRI compatible, as well as abandoned MICRA . 99.9%  Permanent AF Battery longevity has 4.5 years remaining  The current  device and RV lead was implanted May 19, 2017   The NON-FUNCTIONAL ABANDONED MICRA device is NOT COMPATIBLE with pt having an MRI Battery longevity has 4.5 years remaining  The current  device and RV lead was implanted May 19, 2017   The NON-FUNCTIONAL ABANDONED MICRA device is NOT COMPATIBLE with pt having an MRI    From ECHO:   A micra device is noted in the RVOT. Device is highly   mobile and attached to RV free wall.

## 2023-08-28 ENCOUNTER — APPOINTMENT (OUTPATIENT)
Dept: CARDIOLOGY | Facility: CLINIC | Age: 78
End: 2023-08-28

## 2023-08-28 PROCEDURE — 99232 SBSQ HOSP IP/OBS MODERATE 35: CPT

## 2023-08-28 PROCEDURE — 99233 SBSQ HOSP IP/OBS HIGH 50: CPT

## 2023-08-28 RX ORDER — FUROSEMIDE 40 MG
1 TABLET ORAL
Qty: 0 | Refills: 0 | DISCHARGE

## 2023-08-28 RX ORDER — TAMSULOSIN HYDROCHLORIDE 0.4 MG/1
1 CAPSULE ORAL
Qty: 0 | Refills: 0 | DISCHARGE

## 2023-08-28 RX ORDER — ASPIRIN/CALCIUM CARB/MAGNESIUM 324 MG
1 TABLET ORAL
Qty: 0 | Refills: 0 | DISCHARGE

## 2023-08-28 RX ORDER — POTASSIUM CHLORIDE 20 MEQ
1 PACKET (EA) ORAL
Qty: 0 | Refills: 0 | DISCHARGE

## 2023-08-28 RX ORDER — CARVEDILOL PHOSPHATE 80 MG/1
1 CAPSULE, EXTENDED RELEASE ORAL
Qty: 0 | Refills: 0 | DISCHARGE

## 2023-08-28 RX ORDER — BETHANECHOL CHLORIDE 25 MG
1 TABLET ORAL
Qty: 0 | Refills: 0 | DISCHARGE

## 2023-08-28 RX ORDER — IBUPROFEN 200 MG
1 TABLET ORAL
Qty: 0 | Refills: 0 | DISCHARGE

## 2023-08-28 RX ORDER — SPIRONOLACTONE 25 MG/1
25 TABLET, FILM COATED ORAL DAILY
Refills: 0 | Status: DISCONTINUED | OUTPATIENT
Start: 2023-08-28 | End: 2023-08-29

## 2023-08-28 RX ORDER — RIVAROXABAN 15 MG-20MG
1 KIT ORAL
Qty: 0 | Refills: 0 | DISCHARGE

## 2023-08-28 RX ORDER — FUROSEMIDE 40 MG
2 TABLET ORAL
Qty: 0 | Refills: 0 | DISCHARGE

## 2023-08-28 RX ORDER — ATORVASTATIN CALCIUM 80 MG/1
1 TABLET, FILM COATED ORAL
Qty: 0 | Refills: 0 | DISCHARGE

## 2023-08-28 RX ORDER — CHLORTHALIDONE 50 MG
1 TABLET ORAL
Qty: 0 | Refills: 0 | DISCHARGE

## 2023-08-28 RX ORDER — POTASSIUM CHLORIDE 20 MEQ
0 PACKET (EA) ORAL
Qty: 0 | Refills: 0 | DISCHARGE

## 2023-08-28 RX ORDER — LORATADINE 10 MG/1
1 TABLET ORAL
Qty: 0 | Refills: 0 | DISCHARGE

## 2023-08-28 RX ORDER — DILTIAZEM HCL 120 MG
1 CAPSULE, EXT RELEASE 24 HR ORAL
Qty: 0 | Refills: 0 | DISCHARGE

## 2023-08-28 RX ORDER — ACTIVATED CHARCOAL 25 G/120ML
2 SUSPENSION, ORAL (FINAL DOSE FORM) ORAL
Qty: 0 | Refills: 0 | DISCHARGE

## 2023-08-28 RX ADMIN — TAMSULOSIN HYDROCHLORIDE 0.4 MILLIGRAM(S): 0.4 CAPSULE ORAL at 20:41

## 2023-08-28 RX ADMIN — LISINOPRIL 20 MILLIGRAM(S): 2.5 TABLET ORAL at 10:43

## 2023-08-28 RX ADMIN — Medication 200 MILLIGRAM(S): at 20:40

## 2023-08-28 RX ADMIN — CARVEDILOL PHOSPHATE 6.25 MILLIGRAM(S): 80 CAPSULE, EXTENDED RELEASE ORAL at 20:41

## 2023-08-28 RX ADMIN — CARVEDILOL PHOSPHATE 6.25 MILLIGRAM(S): 80 CAPSULE, EXTENDED RELEASE ORAL at 10:43

## 2023-08-28 RX ADMIN — FINASTERIDE 5 MILLIGRAM(S): 5 TABLET, FILM COATED ORAL at 10:44

## 2023-08-28 RX ADMIN — RIVAROXABAN 20 MILLIGRAM(S): KIT at 18:13

## 2023-08-28 RX ADMIN — Medication 100 MILLIGRAM(S): at 10:45

## 2023-08-28 NOTE — PROGRESS NOTE ADULT - PROBLEM SELECTOR PLAN 5
-EF this admit 50%.  Was on coreg 6.25 BID, rosalia 25 daily as OP.  -Will restart in next few days as clinical status continues to improve
-EF this admit 50%.  -was on coreg 6.25 BID as OP not started on admit  b/c OP med list was not known.  - restarted on coreg at home dose, will also restart rosalia 25 daily as creatinine is wnl and SBP 150s
-EF this admit 50%.  -was on coreg 6.25 BID as OP not started on admit  b/c OP med list was not known.  -will restart coreg & restart rosalia 25 daily prior to d/c if BP & Cr OK.

## 2023-08-28 NOTE — PROGRESS NOTE ADULT - SUBJECTIVE AND OBJECTIVE BOX
HPI:  77 y/o male w/ unknown past medical history BIBEMS for AMS.  As per ER report, family states patient had been " acting weird" before calling EMS. Code stroke initiated CT head/ CTA and CT perfusion scan done, no evidence of acute CVA, ICH or LVO. Patient w/ progressively worsening mentation, placed on end tidal. Unable to participate in interview. No family at bedside for collateral. Labs significant for ammonia level 58, trop 158,  Admitted to ICU for airway monitoring.  (25 Aug 2023 02:52)  ===============  Admitted for altered mental status.  Consulted for elevated trops 100s x 3 values.  Please note - pt has multiple admission under this name:  VIRGILIO FELTON - discussed w/ nursing staff merging both charts.    Pt has a complicated cardiac device rhythm device history.  -pt has a h/o VVI PM 16, CRT-P upgrade , PM generator erosion  May '17 w/ micra implant May '17, loss of capture postprocedure w/ failed attempt micra removal 2 days later & subsequent permanent PM insertion May '17.    Pt also has a h/o permanent atrial fibrillation, non-obstructive CAD, nonischemic cardiomyopathy, mitral regurgitation, HTN, pulmonary hypertension, COPD, anasarca.    Pt is unable to provide history at this time due to AMS.  ===============  /: more alert today.  Reviewed outpatient records. Seen by Dr. Palla in cardio clinic.  Last visit .  No med changes at that time.  outpatient meds were as follows:    *********Aspirin Adult Low Dose 81 MG Oral Tablet Delayed Release; Take 1 tablet daily  *********Atorvastatin Calcium 20 MG Oral Tablet; TAKE 1 TABLET DAILY  _*********Coreg 6.25 MG Oral Tablet; TAKE 1 TABLET TWICE DAILY WITH MEALS  x?Furosemide 40 MG Oral Tablet; TAKE TWO TABLETS BY MOUTH EVERY DAY  x?Lisinopril 20 MG Oral Tablet; TAKE 1 TABLET DAILY AS DIRECTED  ?Omeprazole 20 MG Oral Capsule Delayed Release; TAKE 1 CAPSULE Daily  *********Spironolactone 25 MG Oral Tablet; TAKE 1 TABLET DAILY  ?? Tamsulosin HCl - 0.4 MG Oral Capsule; 2 tabs PO q HS  *********Xarelto 20 MG Oral Tablet; TAKE 1 TABLET DAILY ORALLY    Spoke w/ son - no recent issues w/ falling or GI bleeding.    : no alert - no complaints  23: pt. is Israeli speaking (NP fluent) seen pt. in bed, c/o sore throat, no cardiac complaints, off tele     ===============  MEDICATIONS:  OUTPATIENT  Home Medications:  Daily Cristina oral tablet: 1 tab(s) orally once a day (25 Aug 2023 09:41)  ergocalciferol 1.25 mg (50,000 intl units) oral capsule: 1 cap(s) orally once a week (25 Aug 2023 09:41)  finasteride 5 mg oral tablet: 1 tab(s) orally once a day (25 Aug 2023 09:41)  furosemide 40 mg oral tablet: 2 tab(s) orally once a day (25 Aug 2023 09:41)  lisinopril 20 mg oral tablet: 1 tab(s) orally once a day (25 Aug 2023 09:41)  omeprazole 20 mg oral delayed release capsule: 1 cap(s) orally once a day (25 Aug 2023 09:41)  tamsulosin 0.4 mg oral capsule: 1 cap(s) orally once a day (25 Aug 2023 09:41)      INPATIENT  MEDICATIONS  (STANDING):  carvedilol 6.25 milliGRAM(s) Oral every 12 hours  finasteride 5 milliGRAM(s) Oral daily  lisinopril 20 milliGRAM(s) Oral daily  rivaroxaban 20 milliGRAM(s) Oral with dinner  spironolactone 25 milliGRAM(s) Oral daily  tamsulosin 0.4 milliGRAM(s) Oral at bedtime  thiamine 100 milliGRAM(s) Enteral Tube daily      Vital Signs Last 24 Hrs  T(C): 36.7 (28 Aug 2023 08:26), Max: 36.7 (28 Aug 2023 08:26)  T(F): 98 (28 Aug 2023 08:26), Max: 98 (28 Aug 2023 08:26)  HR: 68 (28 Aug 2023 08:26) (59 - 68)  BP: 153/81 (28 Aug 2023 08:26) (152/76 - 156/77)  BP(mean): --  RR: 18 (28 Aug 2023 08:26) (18 - 18)  SpO2: 96% (28 Aug 2023 08:26) (95% - 96%)    Parameters below as of 28 Aug 2023 08:26  Patient On (Oxygen Delivery Method): room air      PHYSICAL EXAM:    Constitutional: NAD,   HEENT: PERR, EOMI,  No oral cyananosis.  Neck:  supple,  No JVD  Respiratory: Breath sounds are clear bilaterally, No wheezing, rales or rhonchi  Cardiovascular: S1 and S2, regular rate and rhythm, no Murmurs, gallops or rubs  Gastrointestinal: Bowel Sounds present, soft, nontender.   Extremities: No peripheral edema. No clubbing or cyanosis.  Vascular: 2+ peripheral pulses    ===============================  ===============================  LABS: reviewed     Ca    8.6      27 Aug 2023 09:01  Phos  2.9     08-27  Mg     2.0     0827      - TroponinI hsT: <-156.81, <-169.67, <-158.17, <-133.63                                  13.7   6.74  )-----------( 110      ( 27 Aug 2023 09:01 )             42.1     08-27    141  |  110<H>  |  13  ----------------------------<  126<H>  4.0   |  26  |  1.08    Ca    8.6      27 Aug 2023 09:01  Phos  2.9     08-27  Mg     2.0     08-27      - TroponinI hsT: <-156.81, <-169.67, <-158.17, <-133.63    TPro  7.2    /  Alb  3.8    /  TBili  0.7    /  DBili  x      /  AST  20     /  ALT  27     /  AlkPhos  96     25 Aug 2023 06:55  TPro  6.1    /  Alb  3.1    /  TBili  0.4    /  DBili  x      /  AST  24     /  ALT  27     /  AlkPhos  90     24 Aug 2023 20:30      ===============================  ===============================  CARDIAC BIOMARKERS:  BNP      TROPONIN  Troponin I, High Sensitivity Result: 156.81 ng/L *H* (23 @ 09:37)  Troponin I, High Sensitivity Result: 169.67 ng/L *H* (23 @ 06:55)  Troponin I, High Sensitivity Result: 158.17 ng/L *H* (23 @ 22:53)  Troponin I, High Sensitivity Result: 133.63 ng/L *H* (23 @ 20:30)    ===============================  ===============================     @ 09:01  TSH: 0.96   @ 20:27  TSH: 0.18      ===============================  ===============================  EKG: V paced rhythm.      < from: TTE Echo Complete w/o Contrast w/ Doppler (23 @ 13:24) >   Impression     Summary     A micra device is noted in the RVOT. Device is highly   mobile and attached to RV free wall.     Endocardium is not well visualized, however, overall left ventricular   systolic function appears impaired. Technically Difficult Study.   Definity was used to better visualize the endocardial border.   Estimated left ventricular ejection fraction is 50 %.   A device wire is seen in the RV and RA.   Mild (1+) aortic regurgitation   Mild (1+) mitral regurgitation   Moderate (2+) tricuspid valve regurgitation. Mild pulmonary hypertension.     Signature     ----------------------------------------------------------------   Electronically signed by Abelino Mccoy MD(Interpreting   physician) on 2023 03:30PM   ----------------------------------------------------------------  ?  ===============================  ===============================  OUTPATIENT CARDIOLOGY TESTING:    EC23 ventricular paced rhythm    Echo: 3/25/22 ( [facility] ) EF 45 % apical wall hypokinesis , mild to moderate MR ,severe LAE , Mild AI , severe TR severe,  pulmonary hypertension normal RV function IVC dilated decreased respiratory variation    Device/PPM/ICD: 2017 [employer] VVI MRI compatible ADVISA SR MRI A3SR01    Cardiac Cath/PCI: aug 28 2020 all coronaries showed minimal luminal irregularities EF 48%  ?    OUTPATIENT CARDIO NOTES:  [facility] Procedure note May-19-  FIORDALIZA Carrion: "leadless PM removal report"  "s/p single chamber PM implant , CRT-P upgrade   presented w/ PM pocket erosion, had PM extraction & placement  of temporary PM.  had leadless PM implant May '17 but had intermittent loss  of capture thought to be position. to prevent further dislodgement,  pt presented today for attempt at removal of leadless PM."  "multiple snares were introduced through agilis sheath device  device could not be captured" "device could not be snared"  "Summary: unsuccessfull attempt to remove leadless pacemaker"  recc: permanent transvenous PM implant"      ===============================  ===============================    Abelino Darius, M.D.  Cardiology, [facility]  Cell: [phone]  Offices:    ([facility] Office)  [phone] ([facility] Office)    HPI:  77 y/o male w/ unknown past medical history BIBEMS for AMS.  As per ER report, family states patient had been " acting weird" before calling EMS. Code stroke initiated CT head/ CTA and CT perfusion scan done, no evidence of acute CVA, ICH or LVO. Patient w/ progressively worsening mentation, placed on end tidal. Unable to participate in interview. No family at bedside for collateral. Labs significant for ammonia level 58, trop 158,  Admitted to ICU for airway monitoring.  (25 Aug 2023 02:52)  ===============  Admitted for altered mental status.  Consulted for elevated trops 100s x 3 values.  name on admission was the followng:   VIRGILIO JALLOH    Please note - pt has multiple admission under this name:  VIRGILIO FELTON - discussed w/ nursing staff merging both charts.    Pt has a complicated cardiac device rhythm device history.  -pt has a h/o VVI PM , CRT-P upgrade , PM generator erosion  May '17 w/ micra implant May '17, loss of capture postprocedure w/ failed attempt micra removal 2 days later & subsequent permanent PM insertion May '17.    Pt also has a h/o permanent atrial fibrillation, non-obstructive CAD, nonischemic cardiomyopathy, mitral regurgitation, HTN, pulmonary hypertension, COPD, anasarca.    Pt is unable to provide history at this time due to AMS.  ===============  /: more alert today.  Reviewed outpatient records. Seen by Dr. Palla in cardio clinic.  Last visit .  No med changes at that time.  outpatient meds were as follows:    *********Aspirin Adult Low Dose 81 MG Oral Tablet Delayed Release; Take 1 tablet daily  *********Atorvastatin Calcium 20 MG Oral Tablet; TAKE 1 TABLET DAILY  _*********Coreg 6.25 MG Oral Tablet; TAKE 1 TABLET TWICE DAILY WITH MEALS  x?Furosemide 40 MG Oral Tablet; TAKE TWO TABLETS BY MOUTH EVERY DAY  x?Lisinopril 20 MG Oral Tablet; TAKE 1 TABLET DAILY AS DIRECTED  ?Omeprazole 20 MG Oral Capsule Delayed Release; TAKE 1 CAPSULE Daily  *********Spironolactone 25 MG Oral Tablet; TAKE 1 TABLET DAILY  ?? Tamsulosin HCl - 0.4 MG Oral Capsule; 2 tabs PO q HS  *********Xarelto 20 MG Oral Tablet; TAKE 1 TABLET DAILY ORALLY    Spoke w/ son - no recent issues w/ falling or GI bleeding.    : no alert - no complaints  23: pt. is Portuguese speaking (NP fluent) seen pt. in bed, c/o sore throat, no cardiac complaints, off tele     ===============  MEDICATIONS:  OUTPATIENT  Home Medications:  Daily Cristina oral tablet: 1 tab(s) orally once a day (25 Aug 2023 09:41)  ergocalciferol 1.25 mg (50,000 intl units) oral capsule: 1 cap(s) orally once a week (25 Aug 2023 09:41)  finasteride 5 mg oral tablet: 1 tab(s) orally once a day (25 Aug 2023 09:41)  furosemide 40 mg oral tablet: 2 tab(s) orally once a day (25 Aug 2023 09:41)  lisinopril 20 mg oral tablet: 1 tab(s) orally once a day (25 Aug 2023 09:41)  omeprazole 20 mg oral delayed release capsule: 1 cap(s) orally once a day (25 Aug 2023 09:41)  tamsulosin 0.4 mg oral capsule: 1 cap(s) orally once a day (25 Aug 2023 09:41)      INPATIENT  MEDICATIONS  (STANDING):  carvedilol 6.25 milliGRAM(s) Oral every 12 hours  finasteride 5 milliGRAM(s) Oral daily  lisinopril 20 milliGRAM(s) Oral daily  rivaroxaban 20 milliGRAM(s) Oral with dinner  spironolactone 25 milliGRAM(s) Oral daily  tamsulosin 0.4 milliGRAM(s) Oral at bedtime  thiamine 100 milliGRAM(s) Enteral Tube daily      Vital Signs Last 24 Hrs  T(C): 36.7 (28 Aug 2023 08:26), Max: 36.7 (28 Aug 2023 08:26)  T(F): 98 (28 Aug 2023 08:26), Max: 98 (28 Aug 2023 08:26)  HR: 68 (28 Aug 2023 08:26) (59 - 68)  BP: 153/81 (28 Aug 2023 08:26) (152/76 - 156/77)  BP(mean): --  RR: 18 (28 Aug 2023 08:26) (18 - 18)  SpO2: 96% (28 Aug 2023 08:26) (95% - 96%)    Parameters below as of 28 Aug 2023 08:26  Patient On (Oxygen Delivery Method): room air      PHYSICAL EXAM:    Constitutional: NAD,   HEENT: PERR, EOMI,  No oral cyananosis.  Neck:  supple,  No JVD  Respiratory: Breath sounds are clear bilaterally, No wheezing, rales or rhonchi  Cardiovascular: S1 and S2, regular rate and rhythm, no Murmurs, gallops or rubs  Gastrointestinal: Bowel Sounds present, soft, nontender.   Extremities: No peripheral edema. No clubbing or cyanosis.  Vascular: 2+ peripheral pulses    ===============================  ===============================  LABS: reviewed     Ca    8.6      27 Aug 2023 09:01  Phos  2.9     08-27  Mg     2.0           - TroponinI hsT: <-156.81, <-169.67, <-158.17, <-133.63                                  13.7   6.74  )-----------( 110      ( 27 Aug 2023 09:01 )             42.1     08-27    141  |  110<H>  |  13  ----------------------------<  126<H>  4.0   |  26  |  1.08    Ca    8.6      27 Aug 2023 09:01  Phos  2.9     08-27  Mg     2.0     08-27      - TroponinI hsT: <-156.81, <-169.67, <-158.17, <-133.63    TPro  7.2    /  Alb  3.8    /  TBili  0.7    /  DBili  x      /  AST  20     /  ALT  27     /  AlkPhos  96     25 Aug 2023 06:55  TPro  6.1    /  Alb  3.1    /  TBili  0.4    /  DBili  x      /  AST  24     /  ALT  27     /  AlkPhos  90     24 Aug 2023 20:30      ===============================  ===============================  CARDIAC BIOMARKERS:  BNP      TROPONIN  Troponin I, High Sensitivity Result: 156.81 ng/L *H* (23 @ 09:37)  Troponin I, High Sensitivity Result: 169.67 ng/L *H* (23 @ 06:55)  Troponin I, High Sensitivity Result: 158.17 ng/L *H* (23 @ 22:53)  Troponin I, High Sensitivity Result: 133.63 ng/L *H* (23 @ 20:30)    ===============================  ===============================     @ 09:01  TSH: 0.96   @ 20:27  TSH: 0.18      ===============================  ===============================  EKG: V paced rhythm.      < from: TTE Echo Complete w/o Contrast w/ Doppler (23 @ 13:24) >   Impression     Summary     A micra device is noted in the RVOT. Device is highly   mobile and attached to RV free wall.     Endocardium is not well visualized, however, overall left ventricular   systolic function appears impaired. Technically Difficult Study.   Definity was used to better visualize the endocardial border.   Estimated left ventricular ejection fraction is 50 %.   A device wire is seen in the RV and RA.   Mild (1+) aortic regurgitation   Mild (1+) mitral regurgitation   Moderate (2+) tricuspid valve regurgitation. Mild pulmonary hypertension.     Signature     ----------------------------------------------------------------   Electronically signed by Abelino Mccoy MD(Interpreting   physician) on 2023 03:30PM   ----------------------------------------------------------------  ?  ===============================  ===============================  OUTPATIENT CARDIOLOGY TESTING:    EC23 ventricular paced rhythm    Echo: 3/25/22 ( [facility] ) EF 45 % apical wall hypokinesis , mild to moderate MR ,severe LAE , Mild AI , severe TR severe,  pulmonary hypertension normal RV function IVC dilated decreased respiratory variation    Device/PPM/ICD: 2017 [employer] VVI MRI compatible ADVISA SR MRI A3SR01    Cardiac Cath/PCI: aug 28 2020 all coronaries showed minimal luminal irregularities EF 48%  ?    OUTPATIENT CARDIO NOTES:  [facility] Procedure note May-19-  FIORDALIZA Carrion: "leadless PM removal report"  "s/p single chamber PM implant , CRT-P upgrade   presented w/ PM pocket erosion, had PM extraction & placement  of temporary PM.  had leadless PM implant May '17 but had intermittent loss  of capture thought to be position. to prevent further dislodgement,  pt presented today for attempt at removal of leadless PM."  "multiple snares were introduced through agilis sheath device  device could not be captured" "device could not be snared"  "Summary: unsuccessfull attempt to remove leadless pacemaker"  recc: permanent transvenous PM implant"      ===============================  ===============================    Abelino Mccoy M.D.  Cardiology, [facility]  Cell: [phone]  Offices:    ([facility] Office)  [phone] ([facility] Office)

## 2023-08-28 NOTE — PROGRESS NOTE ADULT - SUBJECTIVE AND OBJECTIVE BOX
Interval History:  8/28/23: Interviewed with [employer] .   He denies having any pain including headache.  He states that he does not recall what happened earlier in his hospitalization.    MEDICATIONS  (STANDING):  carvedilol 6.25 milliGRAM(s) Oral every 12 hours  finasteride 5 milliGRAM(s) Oral daily  lisinopril 20 milliGRAM(s) Oral daily  rivaroxaban 20 milliGRAM(s) Oral with dinner  tamsulosin 0.4 milliGRAM(s) Oral at bedtime  thiamine 100 milliGRAM(s) Enteral Tube daily    MEDICATIONS  (PRN):      Allergies    amiodarone (Other)  ACE inhibitors (Other)    Intolerances        PHYSICAL EXAM:  Vital Signs Last 24 Hrs  T(F): 98 (08-28-23 @ 08:26)  HR: 68 (08-28-23 @ 08:26)  BP: 153/81 (08-28-23 @ 08:26)  RR: 18 (08-28-23 @ 08:26)    GENERAL: NAD, well-groomed   HEAD:  Atraumatic, Normocephalic  Neuro:  Awake, alert, no aphasia  CN: PERRL, EOMI, no nystagmus, no facial weakness, tongue protrudes in the midline  motor: normal tone, no pronator drift, full strength in all four extremities  sensory: intact to light touch  coordination: finger to nose intact bilaterally  DTRs: symmetric    LABS:                        13.7   6.74  )-----------( 110      ( 27 Aug 2023 09:01 )             42.1     08-27    141  |  110<H>  |  13  ----------------------------<  126<H>  4.0   |  26  |  1.08    Ca    8.6      27 Aug 2023 09:01  Phos  2.9     08-27  Mg     2.0     08-27        Urinalysis Basic - ( 27 Aug 2023 09:01 )    Color: x / Appearance: x / SG: x / pH: x  Gluc: 126 mg/dL / Ketone: x  / Bili: x / Urobili: x   Blood: x / Protein: x / Nitrite: x   Leuk Esterase: x / RBC: x / WBC x   Sq Epi: x / Non Sq Epi: x / Bacteria: x        RADIOLOGY & ADDITIONAL STUDIES:    CT head 8/24/23:  No acute intracranial hemorrhage or mass effect.    CTA head, CTA neck, CT perfusion 8/24/23:  CT perfusion:  CBF<30% volume: 0 ml. No core infarct.  Tmax>6.0s volume: 0 ml  Mismatch volume: 0 ml. No ischemic penumbra.  Mismatch ratio: None    CT angiography neck: No hemodynamically significant stenosis of the   bilateral cervical ICAs using NASCET criteria.  Patent vertebral   arteries.  No evidence of vascular dissection.    CT angiography brain: No large vessel occlusion. No evidence of aneurysm.    24 hour EEG 8/25/23-8/26/23:  -Minimal waking record  -Generalized slowing and voltage suppression, improving by the end of the   study  -Excess beta activity

## 2023-08-28 NOTE — PROGRESS NOTE ADULT - ASSESSMENT
77 year old man with afib on Xarelto, s/p PPM, CAD, pulmonary HTN, COPD, HTN, HLD, admitted with delirium, progressive mental status decline to being obtunded, nearly comatose.  He has now improved and has a non-focal neurological examination.    -Ua positive for UTI, treated with antibiotics  -EEG showed encephalopathy but not seizures  -He is unable to have MRI (micra is not compatible when turned off).  -Likely resolved encephalopathy in the setting of UTI and possible benzodiazepine use.    Please call back if additional input from neurology service is needed.

## 2023-08-28 NOTE — PROGRESS NOTE ADULT - ASSESSMENT
76 year old man with hx of CHF, COPD, HTN, HLD, CAD, afib s/p PPM on Xarelto, pulmonary HTN, admitted to  ICU on 8/24 with progressive deterioration in mental status to the point of near obtundation.     Assessment     Encephalopathy acute  Elevated troponin  Abnormal echocardiogram  Chronic atrial fibrillation  Nonischemic cardiomyopathy  Cardiac pacemaker in situ  mild thrombocytopenia      Plan    - Likely Attributable to Benzo's + U tox tox and encephalopathy from a UTI (+u/a) ammonia a bit high too.  Family trying to ascertain his meds as they do not know that he is on any benzo's.  He had an over the counter med form a local apothecary for cough  - encephalopathy- resolved   -   Full AC hx a fib   Micra device in RVOT Dr Tomas will d/w [facility] re: micra removal/ but it seems it will be done as OP  Mild + trop.  No W/U per cardiology   Flomax   Low TSH check t3-4   repeat plts ct in am  dc Lactulose q8, the NH$ of 58 is not responsible for his presentation   PPM not compatible with mri 76 year old man with hx of CHF, COPD, HTN, HLD, CAD, afib s/p PPM on Xarelto, pulmonary HTN, admitted to  ICU on 8/24 with progressive deterioration in mental status to the point of near obtundation.     Assessment     Encephalopathy acute- resolved  Elevated troponin  Abnormal echocardiogram  Chronic atrial fibrillation  Nonischemic cardiomyopathy  Cardiac pacemaker in situ  mild thrombocytopenia      Plan    - Likely Attributable to Benzo's + U tox and encephalopathy from a UTI (+u/a) ammonia a bit high too.  Family trying to ascertain his meds as they do not know that he is on any benzo's.  He had an over the counter med form a local apothecary for cough  - encephalopathy- resolved-encephalopathy in the setting of UTI and possible benzodiazepine use.  - completed treatment for UTI  - Urine culture negative   - Full AC hx a fib   -He is unable to have MRI (micra is not compatible when turned off).  - Micra device in RVOT Dr Tomas will d/w [facility] re: micra removal/ but it seems it will be done as OP  Mild + trop.  No W/U per cardiology   Flomax   Low TSH check t3-4   repeat plts ct in am  dc Lactulose q8, the NH3 of 58 is not responsible for his presentation   PPM not compatible with mri      Case d/w team on IDR. Plan for possible dc in AM.

## 2023-08-28 NOTE — PROGRESS NOTE ADULT - PROBLEM SELECTOR PLAN 3
-review of outpatient record indicates pt has a single chamber Medtronic A3SR01 Advisa SR MRI last checked Jun '23.  battery voltage 2.99 RRT 2.83.  -Appreciate EP device check today - b/c the MICRA device is turned off it is NOT MRI compatible.  discussed w/ neuro - cannot perform cardiac MRI.

## 2023-08-28 NOTE — PROGRESS NOTE ADULT - SUBJECTIVE AND OBJECTIVE BOX
79 y/o man w/ unknown past medical history BIBEMS for AMS.  As per ER report, family states patient had been " acting weird" before calling EMS. Code stroke initiated CT head/ CTA and CT perfusion scan done, no evidence of acute CVA, ICH or LVO. Patient w/ progressively worsening mentation, placed on end tidal. Unable to participate in interview. No family at bedside for collateral. Labs significant for ammonia level 58, trop 158,  Admitted to ICU for airway monitoring.  (25 Aug 2023 02:52)  Pt downgraded to medicine- encephalopathy resolved.     8/28-  phone used- Cristine [phone]- patient is oriented x3- denies pain, fever or dysuria.     Vital Signs Last 24 Hrs  T(C): 36.7 (28 Aug 2023 08:26), Max: 36.7 (28 Aug 2023 08:26)  T(F): 98 (28 Aug 2023 08:26), Max: 98 (28 Aug 2023 08:26)  HR: 68 (28 Aug 2023 08:26) (59 - 68)  BP: 153/81 (28 Aug 2023 08:26) (152/76 - 156/77)  BP(mean): --  RR: 18 (28 Aug 2023 08:26) (18 - 18)  SpO2: 96% (28 Aug 2023 08:26) (95% - 96%)    Parameters below as of 28 Aug 2023 08:26  Patient On (Oxygen Delivery Method): room air      ROS:   All 10 systems reviewed and found to be negative with the exception of what has been described above.     PE:  Constitutional: NAD, laying in bed  HEENT: NC/AT  Back: no tenderness  Respiratory: respirations even and non labored, LCTA  Cardiovascular: S1S2 regular, no murmurs  Abdomen: soft, not tender, not distended, positive BS  Genitourinary: voiding  Rectal: deferred  Musculoskeletal: no muscle tenderness, no joint swelling or tenderness  Extremities: no pedal edema   Neurological: no focal deficits    MEDICATIONS  (STANDING):  carvedilol 6.25 milliGRAM(s) Oral every 12 hours  finasteride 5 milliGRAM(s) Oral daily  lisinopril 20 milliGRAM(s) Oral daily  rivaroxaban 20 milliGRAM(s) Oral with dinner  spironolactone 25 milliGRAM(s) Oral daily  tamsulosin 0.4 milliGRAM(s) Oral at bedtime  thiamine 100 milliGRAM(s) Enteral Tube daily    MEDICATIONS  (PRN):      LABS:    08-27    141  |  110<H>  |  13  ----------------------------<  126<H>  4.0   |  26  |  1.08    Ca    8.6      27 Aug 2023 09:01  Phos  2.9     08-27  Mg     2.0     08-27                          TTE Echo Complete w/o Contrast w/ Doppler (08.25.23 @ 13:24) >       A micra device is noted in the RVOT. Device is highly   mobile and attached to RV free wall.     Endocardium is not well visualized, however, overall left ventricular   systolic function appears impaired. Technically Difficult Study.   Definity was used to better visualize the endocardial border.   Estimated left ventricular ejection fraction is 50 %.   A device wire is seen in the RV and RA.   Mild (1+) aortic regurgitation   Mild (1+) mitral regurgitation   Moderate (2+) tricuspid valve regurgitation. Mild pulmonary hypertension.

## 2023-08-28 NOTE — PROGRESS NOTE ADULT - PROBLEM SELECTOR PLAN 2
-Echo w/ highly mobile large echogenic lesion  3.0 cm x 0.5 cm attached to RV free wall  at the RV outflow tract - this is a nonfunctional micra device.  It is in the RV free wall  rather than the usual position at the septum.  -pt has a h/o VVI PM '16, CRT-P upgrade Apr '17, PM generator erosion  May '17 w/ micra implant May '17, loss of capture postprocedure w/ failed attempt micra   removal 2 days later & subsequent permanent PM insertion May '17.  -D/w Dr. Tomas 8/25 - no need for removal of device. No need for interrogation as it was turned off.

## 2023-08-28 NOTE — PROGRESS NOTE ADULT - PROBLEM SELECTOR PLAN 4
-Pt clinically improving.  Reviewed OP records & discussed w/ son.  was on xarelto prior to admit.  discussed w/ ICU Dr. leavitt - no plans   for procedures - xarelto 20 mg daily ordered.  -currently rate controlled.
-xarelto 20 mg daily started 8/26/'23.  -currently rate controlled.
-xarelto 20 mg daily started 8/26/'23.  -currently rate controlled, cont. coreg

## 2023-08-28 NOTE — PROGRESS NOTE ADULT - PROBLEM SELECTOR PLAN 1
-minimally elevated trop likely demand ischemia  (type 2 NSTEMI)  -Echo w/ mildly reduced EF. No further workup at this time.

## 2023-08-29 ENCOUNTER — TRANSCRIPTION ENCOUNTER (OUTPATIENT)
Age: 78
End: 2023-08-29

## 2023-08-29 VITALS
TEMPERATURE: 98 F | SYSTOLIC BLOOD PRESSURE: 152 MMHG | HEART RATE: 62 BPM | DIASTOLIC BLOOD PRESSURE: 85 MMHG | RESPIRATION RATE: 18 BRPM | OXYGEN SATURATION: 94 %

## 2023-08-29 LAB
ANION GAP SERPL CALC-SCNC: 4 MMOL/L — LOW (ref 5–17)
BUN SERPL-MCNC: 13 MG/DL — SIGNIFICANT CHANGE UP (ref 7–23)
CALCIUM SERPL-MCNC: 8.9 MG/DL — SIGNIFICANT CHANGE UP (ref 8.5–10.1)
CHLORIDE SERPL-SCNC: 105 MMOL/L — SIGNIFICANT CHANGE UP (ref 96–108)
CO2 SERPL-SCNC: 30 MMOL/L — SIGNIFICANT CHANGE UP (ref 22–31)
CREAT SERPL-MCNC: 0.98 MG/DL — SIGNIFICANT CHANGE UP (ref 0.5–1.3)
EGFR: 79 ML/MIN/1.73M2 — SIGNIFICANT CHANGE UP
GLUCOSE SERPL-MCNC: 98 MG/DL — SIGNIFICANT CHANGE UP (ref 70–99)
HCT VFR BLD CALC: 42.4 % — SIGNIFICANT CHANGE UP (ref 39–50)
HGB BLD-MCNC: 14.1 G/DL — SIGNIFICANT CHANGE UP (ref 13–17)
MCHC RBC-ENTMCNC: 32.9 PG — SIGNIFICANT CHANGE UP (ref 27–34)
MCHC RBC-ENTMCNC: 33.3 GM/DL — SIGNIFICANT CHANGE UP (ref 32–36)
MCV RBC AUTO: 99.1 FL — SIGNIFICANT CHANGE UP (ref 80–100)
PLATELET # BLD AUTO: 142 K/UL — LOW (ref 150–400)
POTASSIUM SERPL-MCNC: 4.2 MMOL/L — SIGNIFICANT CHANGE UP (ref 3.5–5.3)
POTASSIUM SERPL-SCNC: 4.2 MMOL/L — SIGNIFICANT CHANGE UP (ref 3.5–5.3)
RBC # BLD: 4.28 M/UL — SIGNIFICANT CHANGE UP (ref 4.2–5.8)
RBC # FLD: 13 % — SIGNIFICANT CHANGE UP (ref 10.3–14.5)
SODIUM SERPL-SCNC: 139 MMOL/L — SIGNIFICANT CHANGE UP (ref 135–145)
WBC # BLD: 6.05 K/UL — SIGNIFICANT CHANGE UP (ref 3.8–10.5)
WBC # FLD AUTO: 6.05 K/UL — SIGNIFICANT CHANGE UP (ref 3.8–10.5)

## 2023-08-29 PROCEDURE — 99239 HOSP IP/OBS DSCHRG MGMT >30: CPT

## 2023-08-29 RX ORDER — FUROSEMIDE 40 MG
2 TABLET ORAL
Refills: 0 | DISCHARGE

## 2023-08-29 RX ADMIN — Medication 100 MILLIGRAM(S): at 09:35

## 2023-08-29 RX ADMIN — FINASTERIDE 5 MILLIGRAM(S): 5 TABLET, FILM COATED ORAL at 09:35

## 2023-08-29 RX ADMIN — CARVEDILOL PHOSPHATE 6.25 MILLIGRAM(S): 80 CAPSULE, EXTENDED RELEASE ORAL at 09:35

## 2023-08-29 RX ADMIN — SPIRONOLACTONE 25 MILLIGRAM(S): 25 TABLET, FILM COATED ORAL at 09:35

## 2023-08-29 RX ADMIN — LISINOPRIL 20 MILLIGRAM(S): 2.5 TABLET ORAL at 09:35

## 2023-08-29 NOTE — DISCHARGE NOTE NURSING/CASE MANAGEMENT/SOCIAL WORK - PATIENT PORTAL LINK FT
You can access the FollowMyHealth Patient Portal offered by [facility] by registering at the following website: [email or web address]. By joining [facility]’s FollowMyHealth portal, you will also be able to view your health information using other applications (apps) compatible with our system.

## 2023-08-29 NOTE — PROGRESS NOTE ADULT - ASSESSMENT
76 year old man with hx of CHF, COPD, HTN, HLD, CAD, afib s/p PPM on Xarelto, pulmonary HTN, admitted to  ICU on 8/24 with progressive deterioration in mental status to the point of near obtundation.     Assessment     Encephalopathy acute- resolved  Elevated troponin  Abnormal echocardiogram  Chronic atrial fibrillation  Nonischemic cardiomyopathy  Cardiac pacemaker in situ  mild thrombocytopenia      Plan    - Likely Attributable to Benzo's + U tox and encephalopathy from a UTI (+u/a) ammonia a bit high too.  Family trying to ascertain his meds as they do not know that he is on any benzo's.  He had an over the counter med form a local apothecary for cough  - encephalopathy- resolved-encephalopathy in the setting of UTI and possible benzodiazepine use.  - completed treatment for UTI  - Urine culture negative   - Full AC hx a fib   -He is unable to have MRI (micra is not compatible when turned off).  - Micra device in RVOT Dr Tomas will d/w [facility] re: micra removal/ but it seems it will be done as OP  Mild + trop.  No W/U per cardiology   Flomax   Low TSH check t3-4  PPM not compatible with mri

## 2023-08-29 NOTE — DISCHARGE NOTE NURSING/CASE MANAGEMENT/SOCIAL WORK - NSDCVIVACCINE_GEN_ALL_CORE_FT
influenza, injectable, quadrivalent, preservative free; 03-Nov-2020 13:38; Marcy Bartlett (RN); [employer]; [id number] (Exp. Date: 30-Jun-2021); IntraMuscular; Deltoid Left.; 0.5 milliLiter(s); VIS (VIS Published: 15-Aug-2019, VIS Presented: 03-Nov-2020);

## 2023-08-29 NOTE — PROGRESS NOTE ADULT - SUBJECTIVE AND OBJECTIVE BOX
77 y/o man w/ unknown past medical history BIBEMS for AMS.  As per ER report, family states patient had been " acting weird" before calling EMS. Code stroke initiated CT head/ CTA and CT perfusion scan done, no evidence of acute CVA, ICH or LVO. Patient w/ progressively worsening mentation, placed on end tidal. Unable to participate in interview. No family at bedside for collateral. Labs significant for ammonia level 58, trop 158,  Admitted to ICU for airway monitoring.  (25 Aug 2023 02:52)  Pt downgraded to medicine- encephalopathy resolved.     Vital Signs Last 24 Hrs  T(C): 36.5 (29 Aug 2023 07:44), Max: 36.5 (29 Aug 2023 07:44)  T(F): 97.7 (29 Aug 2023 07:44), Max: 97.7 (29 Aug 2023 07:44)  HR: 62 (29 Aug 2023 07:44) (59 - 62)  BP: 152/85 (29 Aug 2023 07:44) (144/78 - 154/86)  BP(mean): --  RR: 18 (29 Aug 2023 07:44) (16 - 18)  SpO2: 94% (29 Aug 2023 07:44) (94% - 98%)    Parameters below as of 29 Aug 2023 07:44  Patient On (Oxygen Delivery Method): room air      PE:  Constitutional: NAD, laying in bed  HEENT: NC/AT  Back: no tenderness  Respiratory: respirations even and non labored, LCTA  Cardiovascular: S1S2 regular, no murmurs  Abdomen: soft, not tender, not distended, positive BS  Genitourinary: voiding  Rectal: deferred  Musculoskeletal: no muscle tenderness, no joint swelling or tenderness  Extremities: no pedal edema   Neurological: no focal deficits    MEDICATIONS  (STANDING):  carvedilol 6.25 milliGRAM(s) Oral every 12 hours  finasteride 5 milliGRAM(s) Oral daily  lisinopril 20 milliGRAM(s) Oral daily  rivaroxaban 20 milliGRAM(s) Oral with dinner  spironolactone 25 milliGRAM(s) Oral daily  tamsulosin 0.4 milliGRAM(s) Oral at bedtime  thiamine 100 milliGRAM(s) Enteral Tube daily    MEDICATIONS  (PRN):      LABS:    08-27    141  |  110<H>  |  13  ----------------------------<  126<H>  4.0   |  26  |  1.08    Ca    8.6      27 Aug 2023 09:01  Phos  2.9     08-27  Mg     2.0     08-27                          TTE Echo Complete w/o Contrast w/ Doppler (08.25.23 @ 13:24) >       A micra device is noted in the RVOT. Device is highly   mobile and attached to RV free wall.     Endocardium is not well visualized, however, overall left ventricular   systolic function appears impaired. Technically Difficult Study.   Definity was used to better visualize the endocardial border.   Estimated left ventricular ejection fraction is 50 %.   A device wire is seen in the RV and RA.   Mild (1+) aortic regurgitation   Mild (1+) mitral regurgitation   Moderate (2+) tricuspid valve regurgitation. Mild pulmonary hypertension.

## 2023-08-29 NOTE — DISCHARGE NOTE PROVIDER - NSDCFUSCHEDAPPT_GEN_ALL_CORE_FT
[facility] Physician [facility] [address]  Scheduled Appointment: 09/08/2023    Unknown, Doctor  [facility]  HNT PreAdmits  Scheduled Appointment: 09/12/2023    Nader Xie  [facility]  DERM [address]  Scheduled Appointment: 09/15/2023

## 2023-08-29 NOTE — DISCHARGE NOTE PROVIDER - NSDCMRMEDTOKEN_GEN_ALL_CORE_FT
albuterol 90 mcg/inh inhalation aerosol: 2 puff(s) inhaled every 6 hours, As needed, Bronchospasm  atorvastatin 20 mg oral tablet: 1 tab(s) orally once a day (at bedtime)  budesonide-formoterol 80 mcg-4.5 mcg/inh inhalation aerosol: 1 application inhaled 2 times a day   carvedilol 6.25 mg oral tablet: 1 tab(s) orally every 12 hours  ergocalciferol 1.25 mg (50,000 intl units) oral capsule: 1 cap(s) orally once a week  finasteride 5 mg oral tablet: 1 tab(s) orally once a day  finasteride 5 mg oral tablet: 1 tab(s) orally once a day  hydrocortisone 1% topical ointment: Apply topically to affected area once a day (at bedtime)  lisinopril 10 mg oral tablet: 1 tab(s) orally once a day  lisinopril 20 mg oral tablet: 1 tab(s) orally once a day  omeprazole 20 mg oral delayed release capsule: 1 cap(s) orally once a day  omeprazole 20 mg oral delayed release capsule: 1 cap(s) orally once a day  rivaroxaban 20 mg oral tablet: 1 tab(s) orally once a day (with dinner)   sertal compound: 1 tab(s) orally once a day, As Needed  ***Medication from the [place], Propinox HCl 10mg - Lysine Clonixinate 125mg***  spironolactone 25 mg oral tablet: 1 tab(s) orally once a day  tamsulosin 0.4 mg oral capsule: 1 cap(s) orally 2 times a day  tamsulosin 0.4 mg oral capsule: 1 cap(s) orally once a day

## 2023-08-29 NOTE — PROGRESS NOTE ADULT - PROVIDER SPECIALTY LIST ADULT
Critical Care
Hospitalist
Neurology
Neurology
Hospitalist
Hospitalist
Critical Care
Neurology
Critical Care
Cardiology

## 2023-08-29 NOTE — DISCHARGE NOTE PROVIDER - NSDCCPCAREPLAN_GEN_ALL_CORE_FT
PRINCIPAL DISCHARGE DIAGNOSIS  Diagnosis: Encephalopathy acute  Assessment and Plan of Treatment: resolved. Please f/up with Dr Terrazas re: further management of micra lead will need to be removed

## 2023-08-30 LAB
CULTURE RESULTS: SIGNIFICANT CHANGE UP
CULTURE RESULTS: SIGNIFICANT CHANGE UP
SPECIMEN SOURCE: SIGNIFICANT CHANGE UP
SPECIMEN SOURCE: SIGNIFICANT CHANGE UP
VIT B1 SERPL-MCNC: 187.7 NMOL/L — SIGNIFICANT CHANGE UP (ref 66.5–200)

## 2023-09-03 DIAGNOSIS — I48.19 OTHER PERSISTENT ATRIAL FIBRILLATION: ICD-10-CM

## 2023-09-03 DIAGNOSIS — N39.0 URINARY TRACT INFECTION, SITE NOT SPECIFIED: ICD-10-CM

## 2023-09-03 DIAGNOSIS — Z95.0 PRESENCE OF CARDIAC PACEMAKER: ICD-10-CM

## 2023-09-03 DIAGNOSIS — I25.10 ATHEROSCLEROTIC HEART DISEASE OF NATIVE CORONARY ARTERY WITHOUT ANGINA PECTORIS: ICD-10-CM

## 2023-09-03 DIAGNOSIS — D69.6 THROMBOCYTOPENIA, UNSPECIFIED: ICD-10-CM

## 2023-09-03 DIAGNOSIS — T42.4X1A POISONING BY BENZODIAZEPINES, ACCIDENTAL (UNINTENTIONAL), INITIAL ENCOUNTER: ICD-10-CM

## 2023-09-03 DIAGNOSIS — E78.5 HYPERLIPIDEMIA, UNSPECIFIED: ICD-10-CM

## 2023-09-03 DIAGNOSIS — G92.8 OTHER TOXIC ENCEPHALOPATHY: ICD-10-CM

## 2023-09-03 DIAGNOSIS — I42.9 CARDIOMYOPATHY, UNSPECIFIED: ICD-10-CM

## 2023-09-03 DIAGNOSIS — Z79.899 OTHER LONG TERM (CURRENT) DRUG THERAPY: ICD-10-CM

## 2023-09-03 DIAGNOSIS — F13.90 SEDATIVE, HYPNOTIC, OR ANXIOLYTIC USE, UNSPECIFIED, UNCOMPLICATED: ICD-10-CM

## 2023-09-03 DIAGNOSIS — J44.9 CHRONIC OBSTRUCTIVE PULMONARY DISEASE, UNSPECIFIED: ICD-10-CM

## 2023-09-03 DIAGNOSIS — I24.8 OTHER FORMS OF ACUTE ISCHEMIC HEART DISEASE: ICD-10-CM

## 2023-09-03 DIAGNOSIS — I10 ESSENTIAL (PRIMARY) HYPERTENSION: ICD-10-CM

## 2023-09-03 DIAGNOSIS — Z79.01 LONG TERM (CURRENT) USE OF ANTICOAGULANTS: ICD-10-CM

## 2023-09-03 DIAGNOSIS — I27.20 PULMONARY HYPERTENSION, UNSPECIFIED: ICD-10-CM

## 2023-09-08 ENCOUNTER — APPOINTMENT (OUTPATIENT)
Dept: ELECTROPHYSIOLOGY | Facility: CLINIC | Age: 78
End: 2023-09-08

## 2023-09-11 PROBLEM — M19.90 UNSPECIFIED OSTEOARTHRITIS, UNSPECIFIED SITE: Chronic | Status: ACTIVE | Noted: 2020-05-18

## 2023-09-11 PROBLEM — I25.119 ATHEROSCLEROTIC HEART DISEASE OF NATIVE CORONARY ARTERY WITH UNSPECIFIED ANGINA PECTORIS: Chronic | Status: ACTIVE | Noted: 2017-04-15

## 2023-09-11 PROBLEM — E78.5 HYPERLIPIDEMIA, UNSPECIFIED: Chronic | Status: ACTIVE | Noted: 2020-05-18

## 2023-09-11 PROBLEM — I50.20 UNSPECIFIED SYSTOLIC (CONGESTIVE) HEART FAILURE: Chronic | Status: ACTIVE | Noted: 2020-08-28

## 2023-09-11 PROBLEM — I10 ESSENTIAL (PRIMARY) HYPERTENSION: Chronic | Status: ACTIVE | Noted: 2019-06-05

## 2023-09-11 PROBLEM — J44.9 CHRONIC OBSTRUCTIVE PULMONARY DISEASE, UNSPECIFIED: Chronic | Status: ACTIVE | Noted: 2020-05-18

## 2023-09-12 ENCOUNTER — RESULT REVIEW (OUTPATIENT)
Age: 78
End: 2023-09-12

## 2023-09-12 ENCOUNTER — OUTPATIENT (OUTPATIENT)
Dept: OUTPATIENT SERVICES | Facility: HOSPITAL | Age: 78
LOS: 1 days | End: 2023-09-12
Payer: MEDICARE

## 2023-09-12 DIAGNOSIS — K76.1 CHRONIC PASSIVE CONGESTION OF LIVER: ICD-10-CM

## 2023-09-12 DIAGNOSIS — Z95.0 PRESENCE OF CARDIAC PACEMAKER: Chronic | ICD-10-CM

## 2023-09-12 PROCEDURE — 71046 X-RAY EXAM CHEST 2 VIEWS: CPT | Mod: 26

## 2023-09-12 PROCEDURE — 72197 MRI PELVIS W/O & W/DYE: CPT | Mod: MH

## 2023-09-12 PROCEDURE — 74183 MRI ABD W/O CNTR FLWD CNTR: CPT | Mod: MH

## 2023-09-12 PROCEDURE — 72197 MRI PELVIS W/O & W/DYE: CPT | Mod: 26,MH

## 2023-09-12 PROCEDURE — A9579: CPT

## 2023-09-12 PROCEDURE — 74183 MRI ABD W/O CNTR FLWD CNTR: CPT | Mod: 26,MH

## 2023-09-12 PROCEDURE — 71046 X-RAY EXAM CHEST 2 VIEWS: CPT

## 2023-09-13 DIAGNOSIS — K76.1 CHRONIC PASSIVE CONGESTION OF LIVER: ICD-10-CM

## 2023-09-15 ENCOUNTER — APPOINTMENT (OUTPATIENT)
Dept: DERMATOLOGY | Facility: CLINIC | Age: 78
End: 2023-09-15
Payer: MEDICARE

## 2023-09-15 DIAGNOSIS — L20.9 ATOPIC DERMATITIS, UNSPECIFIED: ICD-10-CM

## 2023-09-15 DIAGNOSIS — B35.1 TINEA UNGUIUM: ICD-10-CM

## 2023-09-15 PROCEDURE — 99214 OFFICE O/P EST MOD 30 MIN: CPT

## 2023-09-15 RX ORDER — TRIAMCINOLONE ACETONIDE 1 MG/G
0.1 CREAM TOPICAL
Qty: 1 | Refills: 3 | Status: ACTIVE | COMMUNITY
Start: 2022-11-09 | End: 1900-01-01

## 2023-09-20 ENCOUNTER — APPOINTMENT (OUTPATIENT)
Dept: CARDIOLOGY | Facility: CLINIC | Age: 78
End: 2023-09-20
Payer: MEDICARE

## 2023-09-20 ENCOUNTER — NON-APPOINTMENT (OUTPATIENT)
Age: 78
End: 2023-09-20

## 2023-09-20 VITALS
HEART RATE: 65 BPM | DIASTOLIC BLOOD PRESSURE: 80 MMHG | HEIGHT: 67 IN | BODY MASS INDEX: 29.03 KG/M2 | OXYGEN SATURATION: 97 % | SYSTOLIC BLOOD PRESSURE: 136 MMHG | WEIGHT: 185 LBS

## 2023-09-20 VITALS
WEIGHT: 185 LBS | DIASTOLIC BLOOD PRESSURE: 80 MMHG | HEIGHT: 67 IN | BODY MASS INDEX: 29.03 KG/M2 | SYSTOLIC BLOOD PRESSURE: 124 MMHG

## 2023-09-20 PROCEDURE — 99214 OFFICE O/P EST MOD 30 MIN: CPT

## 2023-09-20 PROCEDURE — 93000 ELECTROCARDIOGRAM COMPLETE: CPT

## 2023-09-20 RX ORDER — LISINOPRIL 20 MG/1
20 TABLET ORAL DAILY
Qty: 90 | Refills: 1 | Status: DISCONTINUED | COMMUNITY
Start: 2020-11-04 | End: 2023-09-20

## 2023-09-24 NOTE — PROGRESS NOTE ADULT - SUBJECTIVE AND OBJECTIVE BOX
The catheter was inserted into the ostial  left coronary artery. An angiography was performed of the left coronary arteries. Multiple views were taken. The angiography was performed via power injection. The injected amount was 8 mL contrast at 4 mL/s. [facility]  INFECTIOUS DISEASES AND INTERNAL MEDICINE OF [place]  =======================================================  Sharan Neves MD  Diplomates American Board of Internal Medicine and Infectious Diseases  =======================================================    JESSICAUAVIRGILIO JALLOH     Follow up: s/p PPM extraction    No fevers or chills      Allergies:  ACE inhibitors (Other)  amiodarone (Other)      Antibiotics:  vancomycin  IVPB 1250milliGRAM(s) IV Intermittent every 12 hours      REVIEW OF SYSTEMS:  CONSTITUTIONAL:  No fever or chills  HEENT: No diplopia or blurred vision. No earache, sore throat or runny nose.  CARDIOVASCULAR:  No pressure, squeezing, strangling, tightness, heaviness or aching about the chest, neck, axilla or epigastrium.  RESPIRATORY:  No cough, shortness of breath.  GASTROINTESTINAL:  No nausea, vomiting or diarrhea.  GENITOURINARY:  No dysuria, frequency or urgency  MUSCULOSKELETAL:  no joint aches, no muscle pain  SKIN:  No change in skin, hair or nails.  NEUROLOGIC:  No paresthesias, fasciculations  PSYCHIATRIC:  No disorder of thought or mood.  ENDOCRINE:  No heat or cold intolerance, polyuria or polydipsia.  HEMATOLOGICAL:  No easy bruising or bleeding.       Physical Exam:  Vital Signs Last 24 Hrs  T(C): 36.5, Max: 37.1 (05-15 @ 22:13)  T(F): 97.7, Max: 98.8 (05-16 @ 05:11)  HR: 80 (80 - 82)  BP: 104/60 (104/60 - 120/72)  RR: 16 (16 - 18)  SpO2: 100% (97% - 100%)    GEN: NAD, pleasant  HEENT: normocephalic and atraumatic. EOMI. PERRL.    NECK: Supple.   LUNGS: Clear to auscultation.  HEART: Regular rate and rhythm  ABDOMEN: Soft, nontender, and nondistended.  Positive bowel sounds.    ; No CVA tenderness  EXTREMITIES: Without any edema.  NEUROLOGIC: Cranial nerves II through XII are grossly intact.  PSYCHIATRIC: Appropriate affect .  SKIN: Rt chest wall with bandage, no surrounding erythema       Labs:  05-16    140  |  97<L>  |  14.0  ----------------------------<  97  3.8   |  31.0<H>  |  0.75    Ca    9.2      16 May 2017 04:58  Mg     2.0     05-16    TPro  7.1  /  Alb  3.8  /  TBili  1.2  /  DBili  x   /  AST  21  /  ALT  16  /  AlkPhos  157<H>  05-16                          11.8   7.0   )-----------( 138      ( 16 May 2017 04:58 )             36.3       LIVER FUNCTIONS - ( 16 May 2017 04:58 )  Alb: 3.8 g/dL / Pro: 7.1 g/dL / ALK PHOS: 157 U/L / ALT: 16 U/L / AST: 21 U/L / GGT: x             RECENT CULTURES:  05-09 .Blood Blood-Peripheral XXXX XXXX   No growth at 5 days.    05-09 .Blood Blood-Peripheral XXXX XXXX   No growth at 5 days.      Blood cultures 05/08 - no growth x 5 days

## 2023-09-27 NOTE — DISCHARGE NOTE NURSING/CASE MANAGEMENT/SOCIAL WORK - NSDCFUADDAPPT_GEN_ALL_CORE_FT
**Follow up at the HF clinic on 11/9 at 1PM [address] [facility]** Mauc Instructions: By selecting yes to the question below the MAUC number will be added into the note.  This will be calculated automatically based on the diagnosis chosen, the size entered, the body zone selected (H,M,L) and the specific indications you chose. You will also have the option to override the Mohs AUC if you disagree with the automatically calculated number and this option is found in the Case Summary tab.

## 2023-11-16 ENCOUNTER — APPOINTMENT (OUTPATIENT)
Dept: GASTROENTEROLOGY | Facility: CLINIC | Age: 78
End: 2023-11-16
Payer: MEDICARE

## 2023-11-16 VITALS
RESPIRATION RATE: 17 BRPM | OXYGEN SATURATION: 98 % | SYSTOLIC BLOOD PRESSURE: 140 MMHG | HEART RATE: 62 BPM | HEIGHT: 67 IN | WEIGHT: 184 LBS | DIASTOLIC BLOOD PRESSURE: 99 MMHG | BODY MASS INDEX: 28.88 KG/M2

## 2023-11-16 DIAGNOSIS — Z09 ENCOUNTER FOR FOLLOW-UP EXAMINATION AFTER COMPLETED TREATMENT FOR CONDITIONS OTHER THAN MALIGNANT NEOPLASM: ICD-10-CM

## 2023-11-16 PROCEDURE — 99213 OFFICE O/P EST LOW 20 MIN: CPT

## 2024-01-10 ENCOUNTER — NON-APPOINTMENT (OUTPATIENT)
Age: 79
End: 2024-01-10

## 2024-01-10 ENCOUNTER — APPOINTMENT (OUTPATIENT)
Dept: CARDIOLOGY | Facility: CLINIC | Age: 79
End: 2024-01-10
Payer: MEDICARE

## 2024-01-10 VITALS
BODY MASS INDEX: 29.13 KG/M2 | WEIGHT: 186 LBS | HEART RATE: 67 BPM | SYSTOLIC BLOOD PRESSURE: 140 MMHG | OXYGEN SATURATION: 96 % | DIASTOLIC BLOOD PRESSURE: 76 MMHG

## 2024-01-10 PROCEDURE — 99214 OFFICE O/P EST MOD 30 MIN: CPT

## 2024-01-10 PROCEDURE — 93000 ELECTROCARDIOGRAM COMPLETE: CPT

## 2024-01-10 NOTE — REASON FOR VISIT
[Symptom and Test Evaluation] : symptom and test evaluation [Cardiac Failure] : cardiac failure [Arrhythmia/ECG Abnorrmalities] : arrhythmia/ECG abnormalities [Structural Heart and Valve Disease] : structural heart and valve disease [Hyperlipidemia] : hyperlipidemia [Hypertension] : hypertension [[employer] ] : provided by [employer]   [Interpreters_IDNumber] : [id number] [Interpreters_FullName] : edilberto

## 2024-01-10 NOTE — ASSESSMENT
[FreeTextEntry1] : here today in routine cardiac follow up with above hx,  chronic systolic congestive heart failure; appears euvolemic no SOB/PND/Orthopnea/Edema weight stable which I recommend daily weight, low sodium diet CW current medications  PPM   HTN: Controlled   Pulmonary HTN: Stable O2 sat RA 96%  AF: S/P ablation PPM EP management Continue oral AC/BB, Patient had freely mobile device in right ventricle will refer to EP for further evaluation have discussed this referral again today    Mitral Regurgitation; Continue to monitor with periodic Echocardiogram.  MIld COPD: Pulmonary management  follow up after 3 months.  plan DW patient/Dr Palla

## 2024-01-10 NOTE — HISTORY OF PRESENT ILLNESS
[FreeTextEntry1] : Patient seen today with assistance of  PMH:   permanent atrial fibrillation SP ablation/PPM,  chronic systolic heart failure, non-obstructive CAD, CRT-P, nonischemic cardiomyopathy, mitral regurgitation, HTN, pulmonary hypertension, COPD  chronic systolic congestive heart failure here today in routine cardiac follow up Claims to be feeling well no cardiovascular complaints   All medications/testing discussed in details with support of     his weight is stable ,  PM was interrogated 6/5/23

## 2024-01-10 NOTE — END OF VISIT
[FreeTextEntry3] : patient was seen w ith NP agree with assessment and plan  [Time Spent: ___ minutes] : I have spent [unfilled] minutes of time on the encounter. [>50% of the face to face encounter time was spent on counseling and/or coordination of care for ___] : Greater than 50% of the face to face encounter time was spent on counseling and/or coordination of care for [unfilled]

## 2024-01-10 NOTE — CARDIOLOGY SUMMARY
[de-identified] : 1/10/23 ventricular paced rhythm  [de-identified] : 3/25/22 ( [facility] )  EF 45 % apical wall hypokinesis ,  mild to moderate MR ,severe LAE ,  Mild  AI , severe  TR severe ,pulmonary hypertension normal RV function   IVC dilated decreased respiratory variation   2/3/23 Left ventricular systolic function is normal with an ejection fraction visually estimated at 50 to 55%. Mild left ventricular hypertrophy. Severely dilated left /Right atrium., Moderate mitral regurgitation. Mild-to-moderate aortic regurgitation. Mild pulmonic regurgitation. Moderate-severe tricuspid regurgitation. Device wire is visualized in the right heart. No pericardial effusion seen. There is moderate pulmonary hypertension. The inferior vena cava measures 1.84 cm in diameter, and is normal in size (<2.1cm) with normal inspiratory collapse (>50%) consistent with normal right atrial pressure ( R 3, range 0-5mmHg). [de-identified] : 5/19/2017  [employer] VVI   MRI compatible  ADVISA  MRI [id number] [de-identified] : aug 28  2020   all coronaries showed minimal luminal irregularities  EF 48%

## 2024-01-15 NOTE — ED ADULT TRIAGE NOTE - INTERPRETER'S NAME
Called pt reviewed sperm count's from [facility] sperm freeze, much lower than expected. With current counts would recommend IVF, reviewed do not recommend IUI with less than 1 million moving sperm at time of IUI and this is what is anticipated with current samples. [facility] has additional freeze apt tomorrow, pt will await these results before making any decisions about plan of care  Reviewed and approved by LOTTIE BARNEY on 1/15/24 at 3:40 PM.   
Evin

## 2024-01-24 NOTE — ED ADULT TRIAGE NOTE - ESI TRIAGE ACUITY LEVEL, MLM
Lilly las instrucciones de debbie. Bo un seguimiento con hutchinson médico de atención primaria en los próximos 1 o 2 días; o si no puede programar bear gonzalez con ellos, bo un seguimiento con nuestro médico de referencia dentro de los próximos 1 a 2 días. Incluimos htuchinson información aquí en hany instrucciones de debbie. Regrese al departamento de emergencias si los síntomas empeoran, si tiene nuevas quejas o si tiene alguna inquietud. Esperamos que te sientas mejor.    Llame a bear de las clínicas a continuación para concertar un seguimiento con un médico de atención primaria. Es importante que usted tenga un médico de cabecera para que pueda hacerse chequeos anuales, recibir chequeos anuales y visitar cualquier problema médico que no sea de emergencia:   Atención médica del [employer]: 1-800-3-ADVOCATE   [facility] de práctica familiar [facility]: [phone]   Médico de referencia: [phone]   [facility]: [phone]   3

## 2024-01-30 NOTE — ED ADULT NURSE NOTE - NS ED PATIENT SAFETY CONCERN
Benign appearing lesions, but changing/ growing. Derm referral provided  Encouraged consistent use as sunscreen and protection from son as able.   
No

## 2024-02-14 RX ORDER — RIVAROXABAN 20 MG/1
20 TABLET, FILM COATED ORAL
Qty: 1 | Refills: 1 | Status: ACTIVE | COMMUNITY
Start: 2018-08-13 | End: 1900-01-01

## 2024-02-20 ENCOUNTER — APPOINTMENT (OUTPATIENT)
Dept: ELECTROPHYSIOLOGY | Facility: CLINIC | Age: 79
End: 2024-02-20
Payer: MEDICARE

## 2024-02-20 ENCOUNTER — NON-APPOINTMENT (OUTPATIENT)
Age: 79
End: 2024-02-20

## 2024-02-20 VITALS
DIASTOLIC BLOOD PRESSURE: 71 MMHG | HEART RATE: 67 BPM | WEIGHT: 186 LBS | HEIGHT: 67 IN | RESPIRATION RATE: 17 BRPM | SYSTOLIC BLOOD PRESSURE: 120 MMHG | OXYGEN SATURATION: 97 % | BODY MASS INDEX: 29.19 KG/M2

## 2024-02-20 PROCEDURE — 93279 PRGRMG DEV EVAL PM/LDLS PM: CPT

## 2024-04-01 ENCOUNTER — APPOINTMENT (OUTPATIENT)
Dept: CARDIOLOGY | Facility: CLINIC | Age: 79
End: 2024-04-01

## 2024-04-22 ENCOUNTER — INPATIENT (INPATIENT)
Facility: HOSPITAL | Age: 79
LOS: 5 days | Discharge: HOME | DRG: 291 | End: 2024-04-28
Attending: HOSPITALIST | Admitting: FAMILY MEDICINE
Payer: MEDICARE

## 2024-04-22 VITALS
HEART RATE: 70 BPM | DIASTOLIC BLOOD PRESSURE: 90 MMHG | RESPIRATION RATE: 18 BRPM | WEIGHT: 201.94 LBS | TEMPERATURE: 98 F | OXYGEN SATURATION: 97 % | SYSTOLIC BLOOD PRESSURE: 161 MMHG

## 2024-04-22 DIAGNOSIS — I50.9 HEART FAILURE, UNSPECIFIED: ICD-10-CM

## 2024-04-22 DIAGNOSIS — Z95.0 PRESENCE OF CARDIAC PACEMAKER: Chronic | ICD-10-CM

## 2024-04-22 LAB
ADD ON TEST-SPECIMEN IN LAB: SIGNIFICANT CHANGE UP
ALBUMIN SERPL ELPH-MCNC: 3.6 G/DL — SIGNIFICANT CHANGE UP (ref 3.3–5)
ALP SERPL-CCNC: 104 U/L — SIGNIFICANT CHANGE UP (ref 40–120)
ALT FLD-CCNC: 35 U/L — SIGNIFICANT CHANGE UP (ref 12–78)
ANION GAP SERPL CALC-SCNC: 3 MMOL/L — LOW (ref 5–17)
ANION GAP SERPL CALC-SCNC: 6 MMOL/L — SIGNIFICANT CHANGE UP (ref 5–17)
APTT BLD: 29 SEC — SIGNIFICANT CHANGE UP (ref 24.5–35.6)
AST SERPL-CCNC: 33 U/L — SIGNIFICANT CHANGE UP (ref 15–37)
BASE EXCESS BLDV CALC-SCNC: 10.3 MMOL/L — HIGH (ref -2–3)
BASOPHILS # BLD AUTO: 0 K/UL — SIGNIFICANT CHANGE UP (ref 0–0.2)
BASOPHILS NFR BLD AUTO: 0 % — SIGNIFICANT CHANGE UP (ref 0–2)
BILIRUB SERPL-MCNC: 1.7 MG/DL — HIGH (ref 0.2–1.2)
BUN SERPL-MCNC: 32 MG/DL — HIGH (ref 7–23)
BUN SERPL-MCNC: 33 MG/DL — HIGH (ref 7–23)
CALCIUM SERPL-MCNC: 8.8 MG/DL — SIGNIFICANT CHANGE UP (ref 8.5–10.1)
CALCIUM SERPL-MCNC: 9.2 MG/DL — SIGNIFICANT CHANGE UP (ref 8.5–10.1)
CHLORIDE SERPL-SCNC: 102 MMOL/L — SIGNIFICANT CHANGE UP (ref 96–108)
CHLORIDE SERPL-SCNC: 99 MMOL/L — SIGNIFICANT CHANGE UP (ref 96–108)
CO2 SERPL-SCNC: 35 MMOL/L — HIGH (ref 22–31)
CO2 SERPL-SCNC: 35 MMOL/L — HIGH (ref 22–31)
CREAT SERPL-MCNC: 1.49 MG/DL — HIGH (ref 0.5–1.3)
CREAT SERPL-MCNC: 1.62 MG/DL — HIGH (ref 0.5–1.3)
D DIMER BLD IA.RAPID-MCNC: 1144 NG/ML DDU — HIGH
EGFR: 43 ML/MIN/1.73M2 — LOW
EGFR: 47 ML/MIN/1.73M2 — LOW
EOSINOPHIL # BLD AUTO: 0 K/UL — SIGNIFICANT CHANGE UP (ref 0–0.5)
EOSINOPHIL NFR BLD AUTO: 0 % — SIGNIFICANT CHANGE UP (ref 0–6)
FLUAV AG NPH QL: SIGNIFICANT CHANGE UP
FLUBV AG NPH QL: SIGNIFICANT CHANGE UP
GLUCOSE SERPL-MCNC: 153 MG/DL — HIGH (ref 70–99)
GLUCOSE SERPL-MCNC: 155 MG/DL — HIGH (ref 70–99)
HCO3 BLDV-SCNC: 37 MMOL/L — HIGH (ref 22–29)
HCT VFR BLD CALC: 43.4 % — SIGNIFICANT CHANGE UP (ref 39–50)
HGB BLD-MCNC: 13.6 G/DL — SIGNIFICANT CHANGE UP (ref 13–17)
IMM GRANULOCYTES NFR BLD AUTO: 0.7 % — SIGNIFICANT CHANGE UP (ref 0–0.9)
INR BLD: 1.19 RATIO — HIGH (ref 0.85–1.18)
LACTATE SERPL-SCNC: 1.8 MMOL/L — SIGNIFICANT CHANGE UP (ref 0.7–2)
LYMPHOCYTES # BLD AUTO: 0.64 K/UL — LOW (ref 1–3.3)
LYMPHOCYTES # BLD AUTO: 6.9 % — LOW (ref 13–44)
MAGNESIUM SERPL-MCNC: 2.1 MG/DL — SIGNIFICANT CHANGE UP (ref 1.6–2.6)
MCHC RBC-ENTMCNC: 31 PG — SIGNIFICANT CHANGE UP (ref 27–34)
MCHC RBC-ENTMCNC: 31.3 GM/DL — LOW (ref 32–36)
MCV RBC AUTO: 98.9 FL — SIGNIFICANT CHANGE UP (ref 80–100)
MONOCYTES # BLD AUTO: 0.67 K/UL — SIGNIFICANT CHANGE UP (ref 0–0.9)
MONOCYTES NFR BLD AUTO: 7.3 % — SIGNIFICANT CHANGE UP (ref 2–14)
NEUTROPHILS # BLD AUTO: 7.85 K/UL — HIGH (ref 1.8–7.4)
NEUTROPHILS NFR BLD AUTO: 85.1 % — HIGH (ref 43–77)
NT-PROBNP SERPL-SCNC: HIGH PG/ML (ref 0–450)
PCO2 BLDV: 57 MMHG — HIGH (ref 42–55)
PH BLDV: 7.42 — SIGNIFICANT CHANGE UP (ref 7.32–7.43)
PLATELET # BLD AUTO: 138 K/UL — LOW (ref 150–400)
PO2 BLDV: 54 MMHG — HIGH (ref 25–45)
POTASSIUM SERPL-MCNC: 3.8 MMOL/L — SIGNIFICANT CHANGE UP (ref 3.5–5.3)
POTASSIUM SERPL-MCNC: 4.1 MMOL/L — SIGNIFICANT CHANGE UP (ref 3.5–5.3)
POTASSIUM SERPL-SCNC: 3.8 MMOL/L — SIGNIFICANT CHANGE UP (ref 3.5–5.3)
POTASSIUM SERPL-SCNC: 4.1 MMOL/L — SIGNIFICANT CHANGE UP (ref 3.5–5.3)
PROT SERPL-MCNC: 6.7 GM/DL — SIGNIFICANT CHANGE UP (ref 6–8.3)
PROTHROM AB SERPL-ACNC: 13.4 SEC — HIGH (ref 9.5–13)
RBC # BLD: 4.39 M/UL — SIGNIFICANT CHANGE UP (ref 4.2–5.8)
RBC # FLD: 14.6 % — HIGH (ref 10.3–14.5)
RSV RNA NPH QL NAA+NON-PROBE: SIGNIFICANT CHANGE UP
SAO2 % BLDV: 85 % — SIGNIFICANT CHANGE UP (ref 67–88)
SARS-COV-2 RNA SPEC QL NAA+PROBE: SIGNIFICANT CHANGE UP
SODIUM SERPL-SCNC: 140 MMOL/L — SIGNIFICANT CHANGE UP (ref 135–145)
SODIUM SERPL-SCNC: 140 MMOL/L — SIGNIFICANT CHANGE UP (ref 135–145)
TROPONIN I, HIGH SENSITIVITY RESULT: 254.18 NG/L — HIGH
TROPONIN I, HIGH SENSITIVITY RESULT: 259.69 NG/L — HIGH
WBC # BLD: 9.22 K/UL — SIGNIFICANT CHANGE UP (ref 3.8–10.5)
WBC # FLD AUTO: 9.22 K/UL — SIGNIFICANT CHANGE UP (ref 3.8–10.5)

## 2024-04-22 PROCEDURE — 94640 AIRWAY INHALATION TREATMENT: CPT

## 2024-04-22 PROCEDURE — 80061 LIPID PANEL: CPT

## 2024-04-22 PROCEDURE — 93010 ELECTROCARDIOGRAM REPORT: CPT

## 2024-04-22 PROCEDURE — 85027 COMPLETE CBC AUTOMATED: CPT

## 2024-04-22 PROCEDURE — 84484 ASSAY OF TROPONIN QUANT: CPT

## 2024-04-22 PROCEDURE — 36415 COLL VENOUS BLD VENIPUNCTURE: CPT

## 2024-04-22 PROCEDURE — 86753 PROTOZOA ANTIBODY NOS: CPT

## 2024-04-22 PROCEDURE — 93306 TTE W/DOPPLER COMPLETE: CPT

## 2024-04-22 PROCEDURE — 83036 HEMOGLOBIN GLYCOSYLATED A1C: CPT

## 2024-04-22 PROCEDURE — 71045 X-RAY EXAM CHEST 1 VIEW: CPT | Mod: 26

## 2024-04-22 PROCEDURE — 0241U: CPT

## 2024-04-22 PROCEDURE — 80048 BASIC METABOLIC PNL TOTAL CA: CPT

## 2024-04-22 PROCEDURE — 99223 1ST HOSP IP/OBS HIGH 75: CPT

## 2024-04-22 PROCEDURE — 76870 US EXAM SCROTUM: CPT | Mod: 26

## 2024-04-22 PROCEDURE — 83735 ASSAY OF MAGNESIUM: CPT

## 2024-04-22 PROCEDURE — 74177 CT ABD & PELVIS W/CONTRAST: CPT | Mod: 26,MC

## 2024-04-22 PROCEDURE — 71275 CT ANGIOGRAPHY CHEST: CPT | Mod: 26,MC

## 2024-04-22 PROCEDURE — 99285 EMERGENCY DEPT VISIT HI MDM: CPT

## 2024-04-22 RX ORDER — ACETAMINOPHEN 500 MG
650 TABLET ORAL EVERY 6 HOURS
Refills: 0 | Status: DISCONTINUED | OUTPATIENT
Start: 2024-04-22 | End: 2024-04-28

## 2024-04-22 RX ORDER — RIVAROXABAN 15 MG-20MG
15 KIT ORAL
Refills: 0 | Status: DISCONTINUED | OUTPATIENT
Start: 2024-04-22 | End: 2024-04-28

## 2024-04-22 RX ORDER — PANTOPRAZOLE SODIUM 20 MG/1
40 TABLET, DELAYED RELEASE ORAL
Refills: 0 | Status: DISCONTINUED | OUTPATIENT
Start: 2024-04-22 | End: 2024-04-28

## 2024-04-22 RX ORDER — FINASTERIDE 5 MG/1
5 TABLET, FILM COATED ORAL DAILY
Refills: 0 | Status: DISCONTINUED | OUTPATIENT
Start: 2024-04-23 | End: 2024-04-28

## 2024-04-22 RX ORDER — SPIRONOLACTONE 25 MG/1
25 TABLET, FILM COATED ORAL DAILY
Refills: 0 | Status: DISCONTINUED | OUTPATIENT
Start: 2024-04-23 | End: 2024-04-26

## 2024-04-22 RX ORDER — BUDESONIDE AND FORMOTEROL FUMARATE DIHYDRATE 160; 4.5 UG/1; UG/1
2 AEROSOL RESPIRATORY (INHALATION)
Refills: 0 | Status: DISCONTINUED | OUTPATIENT
Start: 2024-04-22 | End: 2024-04-28

## 2024-04-22 RX ORDER — LISINOPRIL 2.5 MG/1
10 TABLET ORAL DAILY
Refills: 0 | Status: DISCONTINUED | OUTPATIENT
Start: 2024-04-22 | End: 2024-04-24

## 2024-04-22 RX ORDER — FINASTERIDE 5 MG/1
1 TABLET, FILM COATED ORAL
Refills: 0 | DISCHARGE

## 2024-04-22 RX ORDER — LANOLIN ALCOHOL/MO/W.PET/CERES
3 CREAM (GRAM) TOPICAL AT BEDTIME
Refills: 0 | Status: DISCONTINUED | OUTPATIENT
Start: 2024-04-22 | End: 2024-04-28

## 2024-04-22 RX ORDER — FUROSEMIDE 40 MG
80 TABLET ORAL ONCE
Refills: 0 | Status: COMPLETED | OUTPATIENT
Start: 2024-04-22 | End: 2024-04-22

## 2024-04-22 RX ORDER — HYDRALAZINE HCL 50 MG
10 TABLET ORAL EVERY 4 HOURS
Refills: 0 | Status: DISCONTINUED | OUTPATIENT
Start: 2024-04-22 | End: 2024-04-23

## 2024-04-22 RX ORDER — CARVEDILOL PHOSPHATE 80 MG/1
6.25 CAPSULE, EXTENDED RELEASE ORAL EVERY 12 HOURS
Refills: 0 | Status: DISCONTINUED | OUTPATIENT
Start: 2024-04-22 | End: 2024-04-26

## 2024-04-22 RX ORDER — LISINOPRIL 2.5 MG/1
1 TABLET ORAL
Refills: 0 | DISCHARGE

## 2024-04-22 RX ORDER — OMEPRAZOLE 10 MG/1
1 CAPSULE, DELAYED RELEASE ORAL
Refills: 0 | DISCHARGE

## 2024-04-22 RX ORDER — TAMSULOSIN HYDROCHLORIDE 0.4 MG/1
0.4 CAPSULE ORAL AT BEDTIME
Refills: 0 | Status: DISCONTINUED | OUTPATIENT
Start: 2024-04-22 | End: 2024-04-28

## 2024-04-22 RX ORDER — ERGOCALCIFEROL 1.25 MG/1
1 CAPSULE ORAL
Refills: 0 | DISCHARGE

## 2024-04-22 RX ORDER — ATORVASTATIN CALCIUM 80 MG/1
20 TABLET, FILM COATED ORAL AT BEDTIME
Refills: 0 | Status: DISCONTINUED | OUTPATIENT
Start: 2024-04-22 | End: 2024-04-28

## 2024-04-22 RX ORDER — ONDANSETRON 8 MG/1
4 TABLET, FILM COATED ORAL EVERY 8 HOURS
Refills: 0 | Status: DISCONTINUED | OUTPATIENT
Start: 2024-04-22 | End: 2024-04-28

## 2024-04-22 RX ORDER — GUAIFENESIN/DEXTROMETHORPHAN 600MG-30MG
10 TABLET, EXTENDED RELEASE 12 HR ORAL EVERY 4 HOURS
Refills: 0 | Status: COMPLETED | OUTPATIENT
Start: 2024-04-22 | End: 2024-04-24

## 2024-04-22 RX ORDER — TAMSULOSIN HYDROCHLORIDE 0.4 MG/1
1 CAPSULE ORAL
Qty: 0 | Refills: 0 | DISCHARGE

## 2024-04-22 RX ORDER — FUROSEMIDE 40 MG
40 TABLET ORAL
Refills: 0 | Status: DISCONTINUED | OUTPATIENT
Start: 2024-04-23 | End: 2024-04-25

## 2024-04-22 RX ADMIN — Medication 10 MILLILITER(S): at 22:04

## 2024-04-22 RX ADMIN — ATORVASTATIN CALCIUM 20 MILLIGRAM(S): 80 TABLET, FILM COATED ORAL at 22:09

## 2024-04-22 RX ADMIN — Medication 80 MILLIGRAM(S): at 17:24

## 2024-04-22 RX ADMIN — Medication 10 MILLIGRAM(S): at 22:07

## 2024-04-22 NOTE — ED ADULT NURSE REASSESSMENT NOTE - NS ED NURSE REASSESS COMMENT FT1
Received report from KARYN Diaz. Pt awake and alert, AO x 4. Respirations even and unlabored. Safety and comfort measures maintained.

## 2024-04-22 NOTE — ED PROVIDER NOTE - PROGRESS NOTE DETAILS
All labs and imaging personally reviewed.  Patient had an elevated D-dimer for which a CTA was ordered.  ELIS present, elevated troponin 254 however patient has been elevated in the past to 150s, proBNP 10,000, CT reviewed and does not show any PE, has moderate right pleural effusion with right lower lobe atelectasis with otherwise clear lungs.  Small volume ascites present.  Cardiomegaly.  Ultrasound of the testicle with bilateral varicoceles including a left interest testicular varicocele.  Blood flow to testicles otherwise normal.  Patient received IV push 80 mg Lasix for diuresis given clinical volume overload.  I personally discussed this patient's care with hospitalist Dr. Puga, advises patient to be admitted to telemetry.

## 2024-04-22 NOTE — PHARMACOTHERAPY INTERVENTION NOTE - COMMENTS
Medication reconciliation completed.  Reviewed Medication list and confirmed med allergies with patient; confirmed with Dr. First MedHx.  Pt unable to provide individual names of meds but confirms that all meds filled at [facility] are what he takes daily.

## 2024-04-22 NOTE — ED ADULT NURSE NOTE - EXTENSIONS OF SELF_ADULT
[FreeTextEntry1] : wbat\par airsport\par ice/elevate\par nsaids prn\par f/up 1 wk\par out from work None

## 2024-04-22 NOTE — H&P ADULT - ASSESSMENT
The patient is a 78y Male with significant PMH of  HTN, atrial fibrillation on Xarelto, Non-ischemic cardiomyopathy, cardiac pacemaker in place, chronic systolic CHF, HPL, BPH, chronic thrombocytopenia, COPD and others presented to the ED with c/o B/L leg edema and some dry cough for last few days.  He also had orthopnea and he prefers to rest in reclining position than in laying flat. His B/L led edema has extended up to thighs and b/l loin areas including scrotal edema. He denies chest pain, fever, chills, N/V, abdominal pain, diarrhea or dysuria.  CT angio chest negative for PE.  CT abd/pelvis:  Moderate right pleural effusion and hepatic congestion. No SBP. CXR: Cardiomegaly with CHF at this time.   US Testicles: Bilateral varicoceles.  No testicular mass, torsion or epididymitis. Denies smoking, alcohol or substance abuse.      # Acute on chronic systolic CHF.  # Mildly elevated troponin likely due to CHF.  -Pro-BNP 54816 (was 2000 in 03/2022), Troponin 254 (had 150s in past),  -2D Echo in 08/2023: LVEF 50%.  -Admit to telemetry.  -Serial troponin. Lipid panel in am.  -Daily weight, fluid restriction and strict I&O.  -Lasix 40 mg IV bid and monitor renal function and electrolytes.  -Continue his Lisinopril and Aldactone.  -Coreg 6.25 mg added.  -Cardiology consult request placed.  -Keep legs elevates.    # ELIS.  -Cr 1.62 (was 0.98 in 08/2023),  -It may go up as patient has received IV contrast in ED.  -BMP in am.    # Acute hyperglycemia.  -.  -Will get A1c level to r/o DM-2.    # Accelerated HTN, PAF, PPM in am and HPL.  -Continue his Xarelto, Atorvastatin, Aldactone and Lisinopril.  -Hydralazine 10 mg IV q4h prn if SBP more than 150 mmHg.    # COPD.  -Not in exacerbation.  -Continue his home inhalers.    # BPH.  -On Flomax and Finasteride.    # Chronic thrombocytopenia.  -Platelets count 138k.  -Continue to monitor.    # DVT prophylaxis: On Xarelto.

## 2024-04-22 NOTE — H&P ADULT - NSICDXPASTMEDICALHX_GEN_ALL_CORE_FT
PAST MEDICAL HISTORY:  AF (atrial fibrillation) on Eliquis    Arthritis     BPH (benign prostatic hyperplasia)     COPD (chronic obstructive pulmonary disease) not on inhalers    Coronary artery disease involving native heart with angina pectoris, unspecified vessel or lesion type     Heart failure with reduced ejection fraction     HLD (hyperlipidemia)     HTN (hypertension)     Pacemaker single chamber meditronic  placed by Dr Tomas  May  of  2016    Thrombocytopenia

## 2024-04-22 NOTE — ED ADULT NURSE NOTE - CHIEF COMPLAINT QUOTE
Patient presents to the ER with complaints of bilateral lower extremity swelling, abdominal distention, chest pain and shortness of breath. Patient states he is compliant with medication at home. (V5) oriented

## 2024-04-22 NOTE — ED ADULT NURSE NOTE - OBJECTIVE STATEMENT
Patient presents to the ER with complaints of bilateral lower extremity swelling, abdominal distention, chest pain and shortness of breath. Patient states he is compliant with medication at home. Pt in hallway not, cardiac monitor in place. Pt reports that he has 6/10 pain to b/l legs and left testicle, he also reports that his abdomen is distended and that he has "three veins on his L testicle that is really bothering him when he's walking". Pt with hx of CHF, PPM and afib. Safety and comfort measures maintained.

## 2024-04-22 NOTE — ED ADULT TRIAGE NOTE - CHIEF COMPLAINT QUOTE
Patient presents to the ER with complaints of bilateral lower extremity swelling, abdominal distention, chest pain and shortness of breath. Patient states he is compliant with medication at home.

## 2024-04-22 NOTE — ED PROVIDER NOTE - CLINICAL SUMMARY MEDICAL DECISION MAKING FREE TEXT BOX
8-year-old male presenting with lower extremity edema extending up to the abdomen associated with orthopnea.  Concern for worsening CHF, does not appear to be decompensated CHF at this time.  Will evaluate with blood work, testicular ultrasound, chest x-ray, diuresis as needed, anticipate admission.

## 2024-04-22 NOTE — ED PROVIDER NOTE - CARE PLAN
1 Principal Discharge DX:	CHF exacerbation  Secondary Diagnosis:	Elevated troponin  Secondary Diagnosis:	ELIS (acute kidney injury)

## 2024-04-22 NOTE — H&P ADULT - NSHPPHYSICALEXAM_GEN_ALL_CORE
PHYSICAL EXAM:  GENERAL: NAD, lying in bed comfortably  HEAD:  Atraumatic, Normocephalic  EYES: EOMI, PERRLA, conjunctiva and sclera clear  ENT: Moist mucous membranes  NECK: Supple, No JVD  CHEST/LUNG: Clear to auscultation bilaterally; No rales, rhonchi, wheezing, or rubs. Unlabored respirations  HEART: Regular rate and rhythm; No murmurs, rubs, or gallops  ABDOMEN: Bowel sounds present; Soft, Nontender, Nondistended. No hepatomegaly  EXTREMITIES:  2+ Peripheral Pulses, brisk capillary refill. No clubbing, cyanosis, B/L legs 3+ pitting edema present.  NERVOUS SYSTEM:  Alert & Oriented X3, speech clear. No deficits   MSK: FROM all 4 extremities, full and equal strength  SKIN: No rashes or lesions

## 2024-04-22 NOTE — H&P ADULT - NSHPLABSRESULTS_GEN_ALL_CORE
LABS:                        13.6   9.22  )-----------( 138      ( 22 Apr 2024 15:36 )             43.4     04-22    140  |  102  |  32<H>  ----------------------------<  155<H>  4.1   |  35<H>  |  1.62<H>    Ca    8.8      22 Apr 2024 15:36    TPro  6.7  /  Alb  3.6  /  TBili  1.7<H>  /  DBili  x   /  AST  33  /  ALT  35  /  AlkPhos  104  04-22    PT/INR - ( 22 Apr 2024 15:36 )   PT: 13.4 sec;   INR: 1.19 ratio         PTT - ( 22 Apr 2024 15:36 )  PTT:29.0 sec        < from: CT Angio Chest PE Protocol, CT abd and Pelvis w/ IV Cont (04.22.24 @ 17:07) >    IMPRESSION:  No evidence of pulmonary embolism.    Moderate right pleural effusion with right lower lobe atelectasis. The   lungs are otherwise clear.    Small volume ascites. Otherwise no findings identified to explain the   patient's abdominal distention. No evidence of bowel obstruction.    Heterogeneous enhancement of the liver, suggestive of hepatic congestion.    Cardiomegaly.        --- End of Report ---    < end of copied text >      < from: Xray Chest 1 View-PORTABLE IMMEDIATE (04.22.24 @ 15:46) >      IMPRESSION: Possible enlarging heart. Mild central CHF at this time.    --- End of Report ---    < end of copied text >        < from: US Testicles (04.22.24 @ 16:35) >      IMPRESSION:    Bilateral varicoceles, including intratesticular left varicocele.    No testicular mass, torsion or epididymitis.        --- End of Report ---

## 2024-04-22 NOTE — ED ADULT TRIAGE NOTE - PATIENT/CAREGIVER OFFERED  INTERPRETER SERVICES
[Normal Amount/Duration] :  normal amount and duration [Regular Cycle Intervals] : periods have been regular [Menarche Age: ____] : age at menarche was [unfilled] [FreeTextEntry1] : 09/05/2021 [Currently Active] : currently active [Men] : men [Vaginal] : vaginal [No] : No yes

## 2024-04-22 NOTE — H&P ADULT - HISTORY OF PRESENT ILLNESS
Chief Complaint: edema.    · Chief Complaint: The patient is a 78y Male complaining of edema.  · HPI Objective Statement: 78-year-old male PMH HTN, BPH, atrial fibrillation on Eliquis, NICM, cardiac pacemaker, thrombocytopenia, presents to the emergency department for leg swelling.  Patient states over the past couple of days, increasingly worse lower extremity edema extending up into the abdomen associated with abdominal distention.  Patient denies chest pain but does state he has some orthopnea at night.  He also complains of left-sided testicular pain and feels like there is 3 enlarged veins on that side.  He denies history of cirrhosis or abdominal paracentesis.  No history of EtOH reported.   #[id number] used for encounter.   Chief Complaint: edema.    The patient is a 78y Male with significant PMH of  HTN, atrial fibrillation on Xarelto, Non-ischemic cardiomyopathy, cardiac pacemaker in place, chronic systolic CHF, HPL, BPH, chronic thrombocytopenia, COPD and others presented to the ED with c/o B/L leg edema and some dry cough for last few days.  He also had orthopnea and he prefers to rest in reclining position than in laying flat. His B/L led edema has extended up to thighs and b/l loin areas including scrotal edema. He denies chest pain, fever, chills, N/V, abdominal pain, diarrhea or dysuria.  CT angio chest negative for PE.  CT abd/pelvis:  Moderate right pleural effusion and hepatic congestion. No SBP. CXR: Cardiomegaly with CHF at this time.   US Testicles: Bilateral varicoceles.  No testicular mass, torsion or epididymitis. Denies smoking, alcohol or substance abuse.      Sig labs: D-dimer elevated 1144 but CT angio chest negative for PE.  Pro-BNP 95510 (was 2000 in 03/2022), Troponin 254 (had 150s in past), Platelets 138k, Cr 1.62 (was 0.98 in 08/2023), .    CT Angio Chest PE Protocol, CT abd and Pelvis: IMPRESSION: No evidence of pulmonary embolism.  Moderate right pleural effusion with right lower lobe atelectasis. The lungs are otherwise clear.  Small volume ascites. Otherwise no findings identified to explain the patient's abdominal distention. No evidence of bowel obstruction.  Heterogeneous enhancement of the liver, suggestive of hepatic congestion. Cardiomegaly.    Xray Chest 1 View: Possible enlarging heart. Mild central CHF at this time.    US Testicles: Bilateral varicoceles, including intratesticular left varicocele.  No testicular mass, torsion or epididymitis.    Lasix 80 mg IV given in ED.

## 2024-04-22 NOTE — ED PROVIDER NOTE - OBJECTIVE STATEMENT
78-year-old male PMH HTN, BPH, atrial fibrillation on Eliquis, NICM, cardiac pacemaker, thrombocytopenia, presents to the emergency department for leg swelling.  Patient states over the past couple of days, increasingly worse lower extremity edema extending up into the abdomen associated with abdominal distention.  Patient denies chest pain but does state he has some orthopnea at night.  He also complains of left-sided testicular pain and feels like there is 3 enlarged veins on that side.  He denies history of cirrhosis or abdominal paracentesis.  No history of EtOH reported.   #[id number] used for encounter.

## 2024-04-22 NOTE — ED PROVIDER NOTE - PHYSICAL EXAMINATION
GENERAL: A&Ox4, non-toxic appearing, no acute distress  HEENT: NCAT, EOMI, oral mucosa moist, normal conjunctiva  RESP: CTAB, no respiratory distress, no wheezes/rhonchi/rales, speaking in full sentences  CV: irregularly irregular, no murmurs/rubs/gallops, 3+ pitting edema  ABDOMEN: soft, non-tender, distended w/ fluid wave, no guarding, no rebound tenderness  MSK: no visible deformities  NEURO: no focal sensory or motor deficits, CN 2-12 grossly intact  SKIN: warm, normal color, well perfused, no rash  PSYCH: normal affect

## 2024-04-22 NOTE — ED ADULT NURSE NOTE - NSFALLHARMRISKINTERV_ED_ALL_ED

## 2024-04-23 ENCOUNTER — TRANSCRIPTION ENCOUNTER (OUTPATIENT)
Age: 79
End: 2024-04-23

## 2024-04-23 ENCOUNTER — RESULT REVIEW (OUTPATIENT)
Age: 79
End: 2024-04-23

## 2024-04-23 DIAGNOSIS — I50.23 ACUTE ON CHRONIC SYSTOLIC (CONGESTIVE) HEART FAILURE: ICD-10-CM

## 2024-04-23 DIAGNOSIS — I48.20 CHRONIC ATRIAL FIBRILLATION, UNSPECIFIED: ICD-10-CM

## 2024-04-23 DIAGNOSIS — I38 ENDOCARDITIS, VALVE UNSPECIFIED: ICD-10-CM

## 2024-04-23 LAB
A1C WITH ESTIMATED AVERAGE GLUCOSE RESULT: 6.3 % — HIGH (ref 4–5.6)
ANION GAP SERPL CALC-SCNC: 7 MMOL/L — SIGNIFICANT CHANGE UP (ref 5–17)
BUN SERPL-MCNC: 35 MG/DL — HIGH (ref 7–23)
CALCIUM SERPL-MCNC: 9.1 MG/DL — SIGNIFICANT CHANGE UP (ref 8.5–10.1)
CHLORIDE SERPL-SCNC: 98 MMOL/L — SIGNIFICANT CHANGE UP (ref 96–108)
CHOLEST SERPL-MCNC: 139 MG/DL — SIGNIFICANT CHANGE UP
CO2 SERPL-SCNC: 37 MMOL/L — HIGH (ref 22–31)
CREAT SERPL-MCNC: 1.51 MG/DL — HIGH (ref 0.5–1.3)
EGFR: 47 ML/MIN/1.73M2 — LOW
ESTIMATED AVERAGE GLUCOSE: 134 MG/DL — HIGH (ref 68–114)
GLUCOSE SERPL-MCNC: 200 MG/DL — HIGH (ref 70–99)
HCT VFR BLD CALC: 45.3 % — SIGNIFICANT CHANGE UP (ref 39–50)
HDLC SERPL-MCNC: 42 MG/DL — SIGNIFICANT CHANGE UP
HGB BLD-MCNC: 14.1 G/DL — SIGNIFICANT CHANGE UP (ref 13–17)
LIPID PNL WITH DIRECT LDL SERPL: 84 MG/DL — SIGNIFICANT CHANGE UP
MAGNESIUM SERPL-MCNC: 2.2 MG/DL — SIGNIFICANT CHANGE UP (ref 1.6–2.6)
MCHC RBC-ENTMCNC: 30.8 PG — SIGNIFICANT CHANGE UP (ref 27–34)
MCHC RBC-ENTMCNC: 31.1 GM/DL — LOW (ref 32–36)
MCV RBC AUTO: 98.9 FL — SIGNIFICANT CHANGE UP (ref 80–100)
NON HDL CHOLESTEROL: 97 MG/DL — SIGNIFICANT CHANGE UP
PLATELET # BLD AUTO: 147 K/UL — LOW (ref 150–400)
POTASSIUM SERPL-MCNC: 4.3 MMOL/L — SIGNIFICANT CHANGE UP (ref 3.5–5.3)
POTASSIUM SERPL-SCNC: 4.3 MMOL/L — SIGNIFICANT CHANGE UP (ref 3.5–5.3)
RBC # BLD: 4.58 M/UL — SIGNIFICANT CHANGE UP (ref 4.2–5.8)
RBC # FLD: 14.5 % — SIGNIFICANT CHANGE UP (ref 10.3–14.5)
SODIUM SERPL-SCNC: 142 MMOL/L — SIGNIFICANT CHANGE UP (ref 135–145)
TRIGL SERPL-MCNC: 64 MG/DL — SIGNIFICANT CHANGE UP
TROPONIN I, HIGH SENSITIVITY RESULT: 263.37 NG/L — HIGH
TROPONIN I, HIGH SENSITIVITY RESULT: 266.35 NG/L — HIGH
WBC # BLD: 9.28 K/UL — SIGNIFICANT CHANGE UP (ref 3.8–10.5)
WBC # FLD AUTO: 9.28 K/UL — SIGNIFICANT CHANGE UP (ref 3.8–10.5)

## 2024-04-23 PROCEDURE — 99223 1ST HOSP IP/OBS HIGH 75: CPT

## 2024-04-23 PROCEDURE — 93306 TTE W/DOPPLER COMPLETE: CPT | Mod: 26

## 2024-04-23 PROCEDURE — 93279 PRGRMG DEV EVAL PM/LDLS PM: CPT | Mod: 26

## 2024-04-23 PROCEDURE — 99233 SBSQ HOSP IP/OBS HIGH 50: CPT

## 2024-04-23 RX ADMIN — Medication 1 TABLET(S): at 10:47

## 2024-04-23 RX ADMIN — CARVEDILOL PHOSPHATE 6.25 MILLIGRAM(S): 80 CAPSULE, EXTENDED RELEASE ORAL at 00:49

## 2024-04-23 RX ADMIN — Medication 40 MILLIGRAM(S): at 15:23

## 2024-04-23 RX ADMIN — TAMSULOSIN HYDROCHLORIDE 0.4 MILLIGRAM(S): 0.4 CAPSULE ORAL at 20:48

## 2024-04-23 RX ADMIN — TAMSULOSIN HYDROCHLORIDE 0.4 MILLIGRAM(S): 0.4 CAPSULE ORAL at 00:49

## 2024-04-23 RX ADMIN — SPIRONOLACTONE 25 MILLIGRAM(S): 25 TABLET, FILM COATED ORAL at 10:47

## 2024-04-23 RX ADMIN — LISINOPRIL 10 MILLIGRAM(S): 2.5 TABLET ORAL at 10:47

## 2024-04-23 RX ADMIN — Medication 10 MILLILITER(S): at 20:50

## 2024-04-23 RX ADMIN — FINASTERIDE 5 MILLIGRAM(S): 5 TABLET, FILM COATED ORAL at 10:47

## 2024-04-23 RX ADMIN — Medication 40 MILLIGRAM(S): at 04:45

## 2024-04-23 RX ADMIN — CARVEDILOL PHOSPHATE 6.25 MILLIGRAM(S): 80 CAPSULE, EXTENDED RELEASE ORAL at 20:47

## 2024-04-23 RX ADMIN — BUDESONIDE AND FORMOTEROL FUMARATE DIHYDRATE 2 PUFF(S): 160; 4.5 AEROSOL RESPIRATORY (INHALATION) at 08:11

## 2024-04-23 RX ADMIN — RIVAROXABAN 15 MILLIGRAM(S): KIT at 16:28

## 2024-04-23 RX ADMIN — BUDESONIDE AND FORMOTEROL FUMARATE DIHYDRATE 2 PUFF(S): 160; 4.5 AEROSOL RESPIRATORY (INHALATION) at 21:32

## 2024-04-23 RX ADMIN — CARVEDILOL PHOSPHATE 6.25 MILLIGRAM(S): 80 CAPSULE, EXTENDED RELEASE ORAL at 10:46

## 2024-04-23 RX ADMIN — PANTOPRAZOLE SODIUM 40 MILLIGRAM(S): 20 TABLET, DELAYED RELEASE ORAL at 04:45

## 2024-04-23 RX ADMIN — ATORVASTATIN CALCIUM 20 MILLIGRAM(S): 80 TABLET, FILM COATED ORAL at 20:48

## 2024-04-23 NOTE — DIETITIAN INITIAL EVALUATION ADULT - OTHER INFO
The patient is a 78y Male with significant PMH of  HTN, atrial fibrillation on Xarelto, Non-ischemic cardiomyopathy, cardiac pacemaker in place, chronic systolic CHF, HPL, BPH, chronic thrombocytopenia, COPD and others presented to the ED with c/o B/L leg edema and some dry cough for last few days.  He also had orthopnea and he prefers to rest in reclining position than in laying flat. His B/L led edema has extended up to thighs and b/l loin areas including scrotal edema. He denies chest pain, fever, chills, N/V, abdominal pain, diarrhea or dysuria.  CT angio chest negative for PE.  CT abd/pelvis:  Moderate right pleural effusion and hepatic congestion. No SBP. CXR: Cardiomegaly with CHF at this time.   US Testicles: Bilateral varicoceles.  No testicular mass, torsion or epididymitis. Denies smoking, alcohol or substance abuse.     The patient is a 78y Male with significant PMH of  HTN, atrial fibrillation on Xarelto, Non-ischemic cardiomyopathy, cardiac pacemaker in place, chronic systolic CHF, HPL, BPH, chronic thrombocytopenia, COPD and others presented to the ED with c/o B/L leg edema and some dry cough for last few days.  He also had orthopnea and he prefers to rest in reclining position than in laying flat. His B/L led edema has extended up to thighs and b/l loin areas including scrotal edema. He denies chest pain, fever, chills, N/V, abdominal pain, diarrhea or dysuria.  CT angio chest negative for PE.  CT abd/pelvis:  Moderate right pleural effusion and hepatic congestion. No SBP. CXR: Cardiomegaly with CHF at this time.   US Testicles: Bilateral varicoceles.  No testicular mass, torsion or epididymitis. Denies smoking, alcohol or substance abuse.    Admit dx is heart failure  Used  # [id number]  Pt has been a previous admit, without dx of malnutrition  Although he reports that he has been a consistent weight, previous visit  shows he lost 10 kg in 8 months  Pt HgbA1c is 6.3, pt not on meds for diabetes/pre-diabetes  Diabetes ed, CHF ed given  Recommendations to follow in Plan/Intervention     EMR weight is 84 kg, 185#  Pt was in chair on interview  NFPE reveals  moderate muscle wasting, moderate/severe fat wasting   Pt educated on DM diet, CHF diet  Recommendations to follow in Plan/Intervention

## 2024-04-23 NOTE — DIETITIAN INITIAL EVALUATION ADULT - NAME AND PHONE
Rylie Ledesma RDN, CDN, [facility]      [phone]   [email or web address]

## 2024-04-23 NOTE — DIETITIAN INITIAL EVALUATION ADULT - ORAL INTAKE PTA/DIET HISTORY
Pt has a woman shop and cook for him.  He eats three meals a day.  Typically, he will have coffee and bread for breakfast, 2 tortillas with rice and bens or meat for lunch, the same for dinner. Pt reports eating low sugar, low sodium diet.   PO intake estimated < 75% ENN > one month. He says he does not eat much.

## 2024-04-23 NOTE — PATIENT PROFILE ADULT - FALL HARM RISK - HARM RISK INTERVENTIONS

## 2024-04-23 NOTE — DISCHARGE NOTE NURSING/CASE MANAGEMENT/SOCIAL WORK - NSDCVIVACCINE_GEN_ALL_CORE_FT
influenza, injectable, quadrivalent, preservative free; 03-Nov-2020 13:38; Marcy Bartlett (RN); [employer]; [id number] (Exp. Date: 30-Jun-2021); IntraMuscular; Deltoid Left.; 0.5 milliLiter(s); VIS (VIS Published: 15-Aug-2019, VIS Presented: 03-Nov-2020);

## 2024-04-23 NOTE — CONSULT NOTE ADULT - PROBLEM SELECTOR RECOMMENDATION 9
likely due to ? non adherence to diet ? meds   right pleural effusion ,? no evidence pulmonary vascular congestion  right heart failure from TR pulmonary hypertension      Continue IV lasix 40 mg po BID ( home oral 80 mg po dialy ) continue lisinopril , coreg , aldactone  ( patient could not afford entresto or did not tolerate in the past )   will obtain echo full echo , as his MICRA is freely mobile in prior echo , will obtain EP evaluation likely due to ? non adherence to diet ? meds   right pleural effusion ,? no evidence pulmonary vascular congestion  right heart failure from TR pulmonary hypertension      Continue IV lasix 40 mg po BID ( home oral 80 mg po dialy ) continue lisinopril , coreg , aldactone  ( patient could not afford entresto or did not tolerate in the past )   will obtain echo full echo , as his MICRA is freely mobile in prior echo , will obtain EP evaluation    Elevated troponin , likely demand related ischemia , patient had cardiac august 2020 had very minimal luminal irregularities

## 2024-04-23 NOTE — DIETITIAN INITIAL EVALUATION ADULT - PERTINENT LABORATORY DATA
04-23    142  |  98  |  35<H>  ----------------------------<  200<H>  4.3   |  37<H>  |  1.51<H>    Ca    9.1      23 Apr 2024 06:39  Mg     2.1     04-22    TPro  6.7  /  Alb  3.6  /  TBili  1.7<H>  /  DBili  x   /  AST  33  /  ALT  35  /  AlkPhos  104  04-22

## 2024-04-23 NOTE — PROCEDURE NOTE - ADDITIONAL PROCEDURE DETAILS
Single chamber pacemaker with normal function, adequate pacing threshold. Stored data with brief NSVT today lasting one second.  Patient with Micra pacemaker which is turned off and non functional according to cardiology notes from 8/2023 discussed with EP and there is no need for Micra removal as it is turned off.

## 2024-04-23 NOTE — DIETITIAN INITIAL EVALUATION ADULT - ADD RECOMMEND
Maintain DASH diet  Add ONS if pt's po intake falls below 75% ENN   Record PO intake in EMR after each meal (nursing.)   MVI w/ minerals daily to ensure 100% RDA met   Consider adding thiamine 100 mg daily 2/2 poor PO intake/ malnutrition  Monitor bowel movements, if no BM for >3 days, consider implementing bowel regimen.  A target glucose range of 140-180 mg/dL is recommended for most critically ill and non-critically ill patients.   Consider Sliding scale insulin  Monitor PO intake, tolerance, labs and weight.

## 2024-04-23 NOTE — DISCHARGE NOTE NURSING/CASE MANAGEMENT/SOCIAL WORK - NSDCPEFALRISK_GEN_ALL_CORE
For information on Fall & Injury Prevention, visit: [email or web address] OR  [email or web address] OR  https://www.cdc.gov/steadi/patient.html

## 2024-04-23 NOTE — PROGRESS NOTE ADULT - SUBJECTIVE AND OBJECTIVE BOX
HOSPITALIST ATTENDING PROGRESS NOTE    Chart and meds reviewed.  Patient seen and examined.    CC: CHF    Subjective: Pt w AUGUST,  noncompliant w meds.    All other systems reviewed and found to be negative with the exception of what has been described above.    MEDICATIONS  (STANDING):  atorvastatin 20 milliGRAM(s) Oral at bedtime  budesonide  80 MICROgram(s)/formoterol 4.5 MICROgram(s) Inhaler 2 Puff(s) Inhalation two times a day  carvedilol 6.25 milliGRAM(s) Oral every 12 hours  finasteride 5 milliGRAM(s) Oral daily  furosemide   Injectable 40 milliGRAM(s) IV Push two times a day  lisinopril 10 milliGRAM(s) Oral daily  multivitamin 1 Tablet(s) Oral daily  pantoprazole    Tablet 40 milliGRAM(s) Oral before breakfast  rivaroxaban 15 milliGRAM(s) Oral with dinner  spironolactone 25 milliGRAM(s) Oral daily  tamsulosin 0.4 milliGRAM(s) Oral at bedtime    MEDICATIONS  (PRN):  acetaminophen     Tablet .. 650 milliGRAM(s) Oral every 6 hours PRN Temp greater or equal to 38C (100.4F), Mild Pain (1 - 3)  aluminum hydroxide/magnesium hydroxide/simethicone Suspension 30 milliLiter(s) Oral every 4 hours PRN Dyspepsia  guaifenesin/dextromethorphan Oral Liquid 10 milliLiter(s) Oral every 4 hours PRN Cough  hydrALAZINE Injectable 10 milliGRAM(s) IV Push every 4 hours PRN If SBP more than 150 mmHg  melatonin 3 milliGRAM(s) Oral at bedtime PRN Insomnia  ondansetron Injectable 4 milliGRAM(s) IV Push every 8 hours PRN Nausea and/or Vomiting      VITALS:  T(F): 97.4 (04-23-24 @ 20:49), Max: 98.2 (04-23-24 @ 00:40)  HR: 65 (04-23-24 @ 20:49) (61 - 69)  BP: 135/71 (04-23-24 @ 20:49) (135/71 - 179/108)  RR: 18 (04-23-24 @ 20:49) (18 - 19)  SpO2: 95% (04-23-24 @ 20:49) (94% - 97%)  Wt(kg): --    I&O's Summary    22 Apr 2024 07:01  -  23 Apr 2024 07:00  --------------------------------------------------------  IN: 0 mL / OUT: 1000 mL / NET: -1000 mL        CAPILLARY BLOOD GLUCOSE          PHYSICAL EXAM:  Gen: NAD  HEENT:  pupils equal and reactive, EOMI, no oropharyngeal lesions, erythema, exudates, oral thrush  NECK:   supple, no carotid bruits, no palpable lymph nodes, no thyromegaly  CV:  +S1, +S2, regular, no murmurs or rubs  RESP:   lungs clear to auscultation bilaterally, no wheezing, rales, rhonchi, good air entry bilaterally  BREAST:  not examined  GI:  abdomen soft, non-tender, non-distended, normal BS, no bruits, no abdominal masses, no palpable masses  RECTAL:  not examined  :  not examined  MSK:   normal muscle tone, no atrophy, no rigidity, no contractions  EXT:  no clubbing, no cyanosis, 2+ LE edema, no calf pain, swelling or erythema  VASCULAR:  pulses equal and symmetric in the upper and lower extremities  NEURO:  AAOX3, no focal neurological deficits, follows all commands, able to move extremities spontaneously  SKIN:  no ulcers, lesions or rashes    LABS:                            14.1   9.28  )-----------( 147      ( 23 Apr 2024 06:39 )             45.3     04-23    142  |  98  |  35<H>  ----------------------------<  200<H>  4.3   |  37<H>  |  1.51<H>    Ca    9.1      23 Apr 2024 06:39  Mg     2.2     04-23    TPro  6.7  /  Alb  3.6  /  TBili  1.7<H>  /  DBili  x   /  AST  33  /  ALT  35  /  AlkPhos  104  04-22        LIVER FUNCTIONS - ( 22 Apr 2024 15:36 )  Alb: 3.6 g/dL / Pro: 6.7 gm/dL / ALK PHOS: 104 U/L / ALT: 35 U/L / AST: 33 U/L / GGT: x           PT/INR - ( 22 Apr 2024 15:36 )   PT: 13.4 sec;   INR: 1.19 ratio         PTT - ( 22 Apr 2024 15:36 )  PTT:29.0 sec  Urinalysis Basic - ( 23 Apr 2024 06:39 )    Color: x / Appearance: x / SG: x / pH: x  Gluc: 200 mg/dL / Ketone: x  / Bili: x / Urobili: x   Blood: x / Protein: x / Nitrite: x   Leuk Esterase: x / RBC: x / WBC x   Sq Epi: x / Non Sq Epi: x / Bacteria: x    CULTURES:  no new    Additional results/Imaging, I have personally reviewed:  CXR 4/22/24: Possible enlarging heart. Mild central CHF at this time.    US testicles 4/22/24:  Bilateral varicoceles, including intratesticular left varicocele. No testicular mass,torsion or epididymitis.    CTA PE protocol and a/p  4/22/24: No evidence of pulmonary embolism. Moderate right pleural effusion with right lower lobe atelectasis. The lungs are otherwise clear. Small volume ascites. Otherwise no findings identified to explain the patient's abdominal distention. No evidence of bowel obstruction. Heterogeneous enhancement of the liver, suggestive of hepatic congestion. Cardiomegaly.    Echo  4/23/24: 1. Left ventricular cavity is normal in size. Left ventricular systolic function is normal with an ejection fraction of 54 % by Rascon's method of disks. 2. Mildly enlarged right ventricular cavity size and normal systolic function. 3. The left atrium is severely dilated. 4. The right atrium is moderately dilated. 5. Device lead is visualized. 6. Severe mitral regurgitation. There is a dilated mitral annulus with an apically displaced closure pattern which could be c/w ischemic MR. 7. Moderate to severe tricuspid regurgitation. 8. Patent foramen ovale with left to right shunting by color flow Doppler. 9. Mild left ventricular hypertrophy. 10. Mild to moderate aortic regurgitation. 11. Severe pulmonary hypertension. 12. The inferior vena cava is dilated (dilated >2.1cm) with abnormal inspiratory collapse (abnormal <50%) consistent with elevated right atrial pressure (~15, range 10-20mmHg). 13. There ismild calcification of the aortic valve leaflets. 14. There is mild calcification of the mitral valve annulus.    PPM interrogation 4/23/24: · Underlying Rhythm  Afib, v paced  · Comments  Patient is pacemaker dependent  · Additional Procedure Details  Single chamber pacemaker with normal function, adequate pacing threshold. Stored data with brief NSVT today lasting one second.  Patient with Micra pacemaker which is turned off and non functional according to cardiology notes from 8/2023 discussed with EP and there is no need for Micra removal as it is turned off.    Telemetry, personally reviewed:  4/23/24:  Afib, vpaced  60s

## 2024-04-23 NOTE — DIETITIAN INITIAL EVALUATION ADULT - PERTINENT MEDS FT
MEDICATIONS  (STANDING):  atorvastatin 20 milliGRAM(s) Oral at bedtime  budesonide  80 MICROgram(s)/formoterol 4.5 MICROgram(s) Inhaler 2 Puff(s) Inhalation two times a day  carvedilol 6.25 milliGRAM(s) Oral every 12 hours  finasteride 5 milliGRAM(s) Oral daily  furosemide   Injectable 40 milliGRAM(s) IV Push two times a day  lisinopril 10 milliGRAM(s) Oral daily  multivitamin 1 Tablet(s) Oral daily  pantoprazole    Tablet 40 milliGRAM(s) Oral before breakfast  rivaroxaban 15 milliGRAM(s) Oral with dinner  spironolactone 25 milliGRAM(s) Oral daily  tamsulosin 0.4 milliGRAM(s) Oral at bedtime    MEDICATIONS  (PRN):  acetaminophen     Tablet .. 650 milliGRAM(s) Oral every 6 hours PRN Temp greater or equal to 38C (100.4F), Mild Pain (1 - 3)  aluminum hydroxide/magnesium hydroxide/simethicone Suspension 30 milliLiter(s) Oral every 4 hours PRN Dyspepsia  guaifenesin/dextromethorphan Oral Liquid 10 milliLiter(s) Oral every 4 hours PRN Cough  hydrALAZINE Injectable 10 milliGRAM(s) IV Push every 4 hours PRN If SBP more than 150 mmHg  melatonin 3 milliGRAM(s) Oral at bedtime PRN Insomnia  ondansetron Injectable 4 milliGRAM(s) IV Push every 8 hours PRN Nausea and/or Vomiting

## 2024-04-23 NOTE — DISCHARGE NOTE NURSING/CASE MANAGEMENT/SOCIAL WORK - PATIENT PORTAL LINK FT
You can access the FollowMyHealth Patient Portal offered by [facility] by registering at the following website: [email or web address]. By joining [facility]’s FollowMyHealth portal, you will also be able to view your health information using other applications (apps) compatible with our system.

## 2024-04-23 NOTE — CONSULT NOTE ADULT - SUBJECTIVE AND OBJECTIVE BOX
interpretation from staff  and chart   CHIEF COMPLAINT:  increasing LE swelling ,  testicular pain     HPI:  Chief Complaint: edema.    The patient is a 78y Male with significant PMH of  HTN, atrial fibrillation on Xarelto, Non-ischemic cardiomyopathy  cath 2020 minimal luminal irregularities , cardiac pacemaker in place, chronic systolic CHF, HPL, BPH, chronic thrombocytopenia, COPD and others presented to the ED with c/o B/L leg edema and some dry cough for last few days.  He also had orthopnea and he prefers to rest in reclining position than in laying flat. His B/L led edema has extended up to thighs and b/l loin areas including scrotal edema. He denies chest pain, fever, chills, N/V, abdominal pain, diarrhea or dysuria.  CT angio chest negative for PE.  CT abd/pelvis:  Moderate right pleural effusion and hepatic congestion. No SBP. CXR: Cardiomegaly with CHF at this time.   US Testicles: Bilateral varicoceles.  No testicular mass, torsion or epididymitis. Denies smoking, alcohol or substance abuse.    Sig labs: D-dimer elevated 1144 but CT angio chest negative for PE.  Pro-BNP 73314 (was 2000 in 03/2022), Troponin 254 (had 150s in past), Platelets 138k, Cr 1.62 (was 0.98 in 08/2023), .    CT Angio Chest PE Protocol, CT abd and Pelvis: IMPRESSION: No evidence of pulmonary embolism.  Moderate right pleural effusion with right lower lobe atelectasis. The lungs are otherwise clear.  Small volume ascites. Otherwise no findings identified to explain the patient's abdominal distention. No evidence of bowel obstruction.  Heterogeneous enhancement of the liver, suggestive of hepatic congestion. Cardiomegaly.    Xray Chest 1 View: Possible enlarging heart. Mild central CHF at this time.    US Testicles: Bilateral varicoceles, including intratesticular left varicocele.  No testicular mass, torsion or epididymitis.    Lasix 80 mg IV given in ED.    Dianan does not full insight in to his medical condition, he does not follow the recommendations that well , last seen in the office in dec 2023           PAST MEDICAL & SURGICAL HISTORY:  AF (atrial fibrillation)  on Eliquis      Pacemaker  single chamber meditronic  placed by Dr Tomas  May  of  2016      Coronary artery disease involving native heart with angina pectoris, unspecified vessel or lesion type      HTN (hypertension)      Arthritis      COPD (chronic obstructive pulmonary disease)  not on inhalers      HLD (hyperlipidemia)      Heart failure with reduced ejection fraction      Thrombocytopenia      BPH (benign prostatic hyperplasia)      Cardiac pacemaker  single chamber meditronic  placed by Dr Tomas  May  of  2016          Allergies    ACE inhibitors (Other)  amiodarone (Other)    Intolerances        SOCIAL HISTORY:     non smoker     FAMILY HISTORY:  No pertinent family history  of diabetes or heart disease        MEDICATIONS:Home Medications:  Daily-Cristina Men&#x27;s Formula oral tablet: 1 tab(s) orally once a day (22 Apr 2024 19:52)  finasteride 5 mg oral tablet: 1 tab(s) orally once a day (22 Apr 2024 19:52)  furosemide 40 mg oral tablet: 2 tab(s) orally once a day (22 Apr 2024 19:52)  omeprazole 20 mg oral delayed release capsule: 1 cap(s) orally once a day (22 Apr 2024 19:52)  tamsulosin 0.4 mg oral capsule: 1 cap(s) orally once a day (22 Apr 2024 19:52)    MEDICATIONS  (STANDING):  atorvastatin 20 milliGRAM(s) Oral at bedtime  budesonide  80 MICROgram(s)/formoterol 4.5 MICROgram(s) Inhaler 2 Puff(s) Inhalation two times a day  carvedilol 6.25 milliGRAM(s) Oral every 12 hours  finasteride 5 milliGRAM(s) Oral daily  furosemide   Injectable 40 milliGRAM(s) IV Push two times a day  lisinopril 10 milliGRAM(s) Oral daily  multivitamin 1 Tablet(s) Oral daily  pantoprazole    Tablet 40 milliGRAM(s) Oral before breakfast  rivaroxaban 15 milliGRAM(s) Oral with dinner  spironolactone 25 milliGRAM(s) Oral daily  tamsulosin 0.4 milliGRAM(s) Oral at bedtime    MEDICATIONS  (PRN):  acetaminophen     Tablet .. 650 milliGRAM(s) Oral every 6 hours PRN Temp greater or equal to 38C (100.4F), Mild Pain (1 - 3)  aluminum hydroxide/magnesium hydroxide/simethicone Suspension 30 milliLiter(s) Oral every 4 hours PRN Dyspepsia  guaifenesin/dextromethorphan Oral Liquid 10 milliLiter(s) Oral every 4 hours PRN Cough  hydrALAZINE Injectable 10 milliGRAM(s) IV Push every 4 hours PRN If SBP more than 150 mmHg  melatonin 3 milliGRAM(s) Oral at bedtime PRN Insomnia  ondansetron Injectable 4 milliGRAM(s) IV Push every 8 hours PRN Nausea and/or Vomiting      REVIEW OF SYSTEMS:    CONSTITUTIONAL: No weakness, fevers or chills  EYES/ENT: No visual changes;  No vertigo or throat pain   NECK: No pain or stiffness  RESPIRATORY: No cough, wheezing, hemoptysis; No shortness of breath  CARDIOVASCULAR: No chest pain or palpitations  GASTROINTESTINAL: No abdominal or epigastric pain. No nausea, vomiting, or hematemesis; No diarrhea or constipation. No melena or hematochezia.  GENITOURINARY: No dysuria, frequency or hematuria  NEUROLOGICAL: No numbness or weakness  SKIN: No itching, burning, rashes, or lesions   All other review of systems is negative unless indicated above    Vital Signs Last 24 Hrs  T(C): 36.6 (23 Apr 2024 04:07), Max: 36.8 (22 Apr 2024 20:29)  T(F): 97.8 (23 Apr 2024 04:07), Max: 98.2 (22 Apr 2024 20:29)  HR: 69 (23 Apr 2024 04:07) (59 - 70)  BP: 163/95 (23 Apr 2024 04:07) (147/65 - 179/108)  BP(mean): 113 (23 Apr 2024 03:05) (83 - 127)  RR: 19 (23 Apr 2024 04:07) (18 - 19)  SpO2: 94% (23 Apr 2024 04:07) (94% - 98%)    Parameters below as of 23 Apr 2024 04:07  Patient On (Oxygen Delivery Method): room air        I&O's Summary    22 Apr 2024 07:01  -  23 Apr 2024 07:00  --------------------------------------------------------  IN: 0 mL / OUT: 1000 mL / NET: -1000 mL        PHYSICAL EXAM:    Constitutional: NAD, awake and alert, well-developed  HEENT: PERR, EOMI,  No oral cyananosis.  Neck:  supple,  No JVD  Respiratory: Breath sounds are clear bilaterally, No wheezing, rales or rhonchi  Cardiovascular: S1 and S2, regular rate and rhythm, no Murmurs, gallops or rubs  Gastrointestinal: Bowel Sounds present, soft, nontender.   Extremities: 3 plus LE peripheral edema. No clubbing or cyanosis.  Vascular: 2+ peripheral pulses  Neurological: A/O x 3, no focal deficits  Musculoskeletal: no calf tenderness.  Skin: No rashes.      LABS: All Labs Reviewed:                        14.1   9.28  )-----------( 147      ( 23 Apr 2024 06:39 )             45.3                         13.6   9.22  )-----------( 138      ( 22 Apr 2024 15:36 )             43.4     22 Apr 2024 22:22    140    |  99     |  33     ----------------------------<  153    3.8     |  35     |  1.49   22 Apr 2024 15:36    140    |  102    |  32     ----------------------------<  155    4.1     |  35     |  1.62     Ca    9.2        22 Apr 2024 22:22  Ca    8.8        22 Apr 2024 15:36  Mg     2.1       22 Apr 2024 22:22    TPro  6.7    /  Alb  3.6    /  TBili  1.7    /  DBili  x      /  AST  33     /  ALT  35     /  AlkPhos  104    22 Apr 2024 15:36    PT/INR - ( 22 Apr 2024 15:36 )   PT: 13.4 sec;   INR: 1.19 ratio         PTT - ( 22 Apr 2024 15:36 )  PTT:29.0 sec        Blood Culture:     < from: CT Angio Chest PE Protocol w/ IV Cont (04.22.24 @ 17:07) >  MPRESSION:  No evidence of pulmonary embolism.    Moderate right pleural effusion with right lower lobe atelectasis. The   lungs are otherwise clear.    Small volume ascites. Otherwise no findings identified to explain the   patient's abdominal distention. No evidence of bowel obstruction.    Heterogeneous enhancement of the liver, suggestive of hepatic congestion.    Cardiomegaly.      < end of copied text >  < from: TTE Echo Complete w/o Contrast w/ Doppler (08.25.23 @ 13:24) >  Summary     A micra device is noted in the RVOT. Device is highly   mobile and attached to RV free wall.     Endocardium is not well visualized, however, overall left ventricular   systolic function appears impaired. Technically Difficult Study.   Definity was used to better visualize the endocardial border.   Estimated left ventricular ejection fraction is 50 %.   A device wire is seen in the RV and RA.   Mild (1+) aortic regurgitation   Mild (1+) mitral regurgitation   Moderate (2+) tricuspid valve regurgitation. Mild pulmonary hypertension.    < end of copied text >      RADIOLOGY/EKG:      interpretation from staff  and chart   CHIEF COMPLAINT:  increasing LE swelling ,  testicular pain     HPI:  Chief Complaint: edema.    The patient is a 78y Male with significant PMH of  HTN, atrial fibrillation on Xarelto, Non-ischemic cardiomyopathy  cath 2020 minimal luminal irregularities , cardiac pacemaker in place, chronic systolic CHF, HPL, BPH, chronic thrombocytopenia, COPD and others presented to the ED with c/o B/L leg edema and some dry cough for last few days.  He also had orthopnea and he prefers to rest in reclining position than in laying flat. His B/L led edema has extended up to thighs and b/l loin areas including scrotal edema. He denies chest pain, fever, chills, N/V, abdominal pain, diarrhea or dysuria.  CT angio chest negative for PE.  CT abd/pelvis:  Moderate right pleural effusion and hepatic congestion. No SBP. CXR: Cardiomegaly with CHF at this time.   US Testicles: Bilateral varicoceles.  No testicular mass, torsion or epididymitis. Denies smoking, alcohol or substance abuse.    Sig labs: D-dimer elevated 1144 but CT angio chest negative for PE.  Pro-BNP 53317 (was 2000 in 03/2022), Troponin 254 (had 150s in past), Platelets 138k, Cr 1.62 (was 0.98 in 08/2023), .    CT Angio Chest PE Protocol, CT abd and Pelvis: IMPRESSION: No evidence of pulmonary embolism.  Moderate right pleural effusion with right lower lobe atelectasis. The lungs are otherwise clear.  Small volume ascites. Otherwise no findings identified to explain the patient's abdominal distention. No evidence of bowel obstruction.  Heterogeneous enhancement of the liver, suggestive of hepatic congestion. Cardiomegaly.    Xray Chest 1 View: Possible enlarging heart. Mild central CHF at this time.    US Testicles: Bilateral varicoceles, including intratesticular left varicocele.  No testicular mass, torsion or epididymitis.    Lasix 80 mg IV given in ED.    Dianan does not full insight in to his medical condition, he does not follow the recommendations that well , last seen in the office in dec 2023           PAST MEDICAL & SURGICAL HISTORY:  AF (atrial fibrillation)  on Eliquis      Pacemaker  single chamber meditronic  placed by Dr Tomas  May  of  2016      Coronary artery disease involving native heart with angina pectoris, unspecified vessel or lesion type      HTN (hypertension)      Arthritis      COPD (chronic obstructive pulmonary disease)  not on inhalers      HLD (hyperlipidemia)      Heart failure with reduced ejection fraction      Thrombocytopenia      BPH (benign prostatic hyperplasia)      Cardiac pacemaker  single chamber meditronic  placed by Dr Tomas  May  of  2016          Allergies    ACE inhibitors (Other)  amiodarone (Other)    Intolerances        SOCIAL HISTORY:     non smoker     FAMILY HISTORY:  No pertinent family history  of diabetes or heart disease        MEDICATIONS:Home Medications:  Daily-Cristina Men&#x27;s Formula oral tablet: 1 tab(s) orally once a day (22 Apr 2024 19:52)  finasteride 5 mg oral tablet: 1 tab(s) orally once a day (22 Apr 2024 19:52)  furosemide 40 mg oral tablet: 2 tab(s) orally once a day (22 Apr 2024 19:52)  omeprazole 20 mg oral delayed release capsule: 1 cap(s) orally once a day (22 Apr 2024 19:52)  tamsulosin 0.4 mg oral capsule: 1 cap(s) orally once a day (22 Apr 2024 19:52)    MEDICATIONS  (STANDING):  atorvastatin 20 milliGRAM(s) Oral at bedtime  budesonide  80 MICROgram(s)/formoterol 4.5 MICROgram(s) Inhaler 2 Puff(s) Inhalation two times a day  carvedilol 6.25 milliGRAM(s) Oral every 12 hours  finasteride 5 milliGRAM(s) Oral daily  furosemide   Injectable 40 milliGRAM(s) IV Push two times a day  lisinopril 10 milliGRAM(s) Oral daily  multivitamin 1 Tablet(s) Oral daily  pantoprazole    Tablet 40 milliGRAM(s) Oral before breakfast  rivaroxaban 15 milliGRAM(s) Oral with dinner  spironolactone 25 milliGRAM(s) Oral daily  tamsulosin 0.4 milliGRAM(s) Oral at bedtime    MEDICATIONS  (PRN):  acetaminophen     Tablet .. 650 milliGRAM(s) Oral every 6 hours PRN Temp greater or equal to 38C (100.4F), Mild Pain (1 - 3)  aluminum hydroxide/magnesium hydroxide/simethicone Suspension 30 milliLiter(s) Oral every 4 hours PRN Dyspepsia  guaifenesin/dextromethorphan Oral Liquid 10 milliLiter(s) Oral every 4 hours PRN Cough  hydrALAZINE Injectable 10 milliGRAM(s) IV Push every 4 hours PRN If SBP more than 150 mmHg  melatonin 3 milliGRAM(s) Oral at bedtime PRN Insomnia  ondansetron Injectable 4 milliGRAM(s) IV Push every 8 hours PRN Nausea and/or Vomiting      REVIEW OF SYSTEMS:    CONSTITUTIONAL: No weakness, fevers or chills  EYES/ENT: No visual changes;  No vertigo or throat pain   NECK: No pain or stiffness  RESPIRATORY: No cough, wheezing, hemoptysis; No shortness of breath  CARDIOVASCULAR: No chest pain or palpitations  GASTROINTESTINAL: No abdominal or epigastric pain. No nausea, vomiting, or hematemesis; No diarrhea or constipation. No melena or hematochezia.  GENITOURINARY: No dysuria, frequency or hematuria  NEUROLOGICAL: No numbness or weakness  SKIN: No itching, burning, rashes, or lesions   All other review of systems is negative unless indicated above    Vital Signs Last 24 Hrs  T(C): 36.6 (23 Apr 2024 04:07), Max: 36.8 (22 Apr 2024 20:29)  T(F): 97.8 (23 Apr 2024 04:07), Max: 98.2 (22 Apr 2024 20:29)  HR: 69 (23 Apr 2024 04:07) (59 - 70)  BP: 163/95 (23 Apr 2024 04:07) (147/65 - 179/108)  BP(mean): 113 (23 Apr 2024 03:05) (83 - 127)  RR: 19 (23 Apr 2024 04:07) (18 - 19)  SpO2: 94% (23 Apr 2024 04:07) (94% - 98%)    Parameters below as of 23 Apr 2024 04:07  Patient On (Oxygen Delivery Method): room air        I&O's Summary    22 Apr 2024 07:01  -  23 Apr 2024 07:00  --------------------------------------------------------  IN: 0 mL / OUT: 1000 mL / NET: -1000 mL        PHYSICAL EXAM:    Constitutional: NAD, awake and alert, well-developed  HEENT: PERR, EOMI,  No oral cyananosis.  Neck:  supple,  No JVD  Respiratory: Breath sounds are clear bilaterally, No wheezing, rales or rhonchi  Cardiovascular: S1 and S2, regular rate and rhythm, no Murmurs, gallops or rubs  Gastrointestinal: Bowel Sounds present, soft, nontender.   Extremities: 3 plus LE peripheral edema. No clubbing or cyanosis.  Vascular: 2+ peripheral pulses  Neurological: A/O x 3, no focal deficits  Musculoskeletal: no calf tenderness.  Skin: No rashes.      LABS: All Labs Reviewed:                        14.1   9.28  )-----------( 147      ( 23 Apr 2024 06:39 )             45.3                         13.6   9.22  )-----------( 138      ( 22 Apr 2024 15:36 )             43.4     22 Apr 2024 22:22    140    |  99     |  33     ----------------------------<  153    3.8     |  35     |  1.49   22 Apr 2024 15:36    140    |  102    |  32     ----------------------------<  155    4.1     |  35     |  1.62     Ca    9.2        22 Apr 2024 22:22  Ca    8.8        22 Apr 2024 15:36  Mg     2.1       22 Apr 2024 22:22    TPro  6.7    /  Alb  3.6    /  TBili  1.7    /  DBili  x      /  AST  33     /  ALT  35     /  AlkPhos  104    22 Apr 2024 15:36    PT/INR - ( 22 Apr 2024 15:36 )   PT: 13.4 sec;   INR: 1.19 ratio         PTT - ( 22 Apr 2024 15:36 )  PTT:29.0 sec        Blood Culture:     < from: CT Angio Chest PE Protocol w/ IV Cont (04.22.24 @ 17:07) >  MPRESSION:  No evidence of pulmonary embolism.    Moderate right pleural effusion with right lower lobe atelectasis. The   lungs are otherwise clear.    Small volume ascites. Otherwise no findings identified to explain the   patient's abdominal distention. No evidence of bowel obstruction.    Heterogeneous enhancement of the liver, suggestive of hepatic congestion.    Cardiomegaly.      < end of copied text >  < from: TTE Echo Complete w/o Contrast w/ Doppler (08.25.23 @ 13:24) >  Summary     A micra device is noted in the RVOT. Device is highly   mobile and attached to RV free wall.     Endocardium is not well visualized, however, overall left ventricular   systolic function appears impaired. Technically Difficult Study.   Definity was used to better visualize the endocardial border.   Estimated left ventricular ejection fraction is 50 %.   A device wire is seen in the RV and RA.   Mild (1+) aortic regurgitation   Mild (1+) mitral regurgitation   Moderate (2+) tricuspid valve regurgitation. Mild pulmonary hypertension.    < end of copied text >      RADIOLOGY/EKG:   paced rhythm     < from: Cardiac Cath Lab - Adult (08.28.20 @ 14:25) >  ardiac Arteries and Lesion Findings    LMCA: Diffuse irregularity.There is mild diffuse disease noted.    LAD: Diffuse irregularity.There is mild diffuse disease noted.    LCx: Diffuse irregularity.There is mild diffuse disease noted.    RCA: Diffuse irregularity.There is mild diffuse disease noted.    < end of copied text >

## 2024-04-24 DIAGNOSIS — N50.89 OTHER SPECIFIED DISORDERS OF THE MALE GENITAL ORGANS: ICD-10-CM

## 2024-04-24 DIAGNOSIS — I10 ESSENTIAL (PRIMARY) HYPERTENSION: ICD-10-CM

## 2024-04-24 DIAGNOSIS — I50.32 CHRONIC DIASTOLIC (CONGESTIVE) HEART FAILURE: ICD-10-CM

## 2024-04-24 LAB
ANION GAP SERPL CALC-SCNC: 5 MMOL/L — SIGNIFICANT CHANGE UP (ref 5–17)
BUN SERPL-MCNC: 39 MG/DL — HIGH (ref 7–23)
CALCIUM SERPL-MCNC: 8.9 MG/DL — SIGNIFICANT CHANGE UP (ref 8.5–10.1)
CHLORIDE SERPL-SCNC: 99 MMOL/L — SIGNIFICANT CHANGE UP (ref 96–108)
CO2 SERPL-SCNC: 34 MMOL/L — HIGH (ref 22–31)
CREAT SERPL-MCNC: 1.24 MG/DL — SIGNIFICANT CHANGE UP (ref 0.5–1.3)
EGFR: 59 ML/MIN/1.73M2 — LOW
GLUCOSE SERPL-MCNC: 139 MG/DL — HIGH (ref 70–99)
HCT VFR BLD CALC: 45.4 % — SIGNIFICANT CHANGE UP (ref 39–50)
HGB BLD-MCNC: 14 G/DL — SIGNIFICANT CHANGE UP (ref 13–17)
MCHC RBC-ENTMCNC: 30.2 PG — SIGNIFICANT CHANGE UP (ref 27–34)
MCHC RBC-ENTMCNC: 30.8 GM/DL — LOW (ref 32–36)
MCV RBC AUTO: 98.1 FL — SIGNIFICANT CHANGE UP (ref 80–100)
PLATELET # BLD AUTO: 138 K/UL — LOW (ref 150–400)
POTASSIUM SERPL-MCNC: 4 MMOL/L — SIGNIFICANT CHANGE UP (ref 3.5–5.3)
POTASSIUM SERPL-SCNC: 4 MMOL/L — SIGNIFICANT CHANGE UP (ref 3.5–5.3)
RBC # BLD: 4.63 M/UL — SIGNIFICANT CHANGE UP (ref 4.2–5.8)
RBC # FLD: 14.3 % — SIGNIFICANT CHANGE UP (ref 10.3–14.5)
SODIUM SERPL-SCNC: 138 MMOL/L — SIGNIFICANT CHANGE UP (ref 135–145)
WBC # BLD: 10.93 K/UL — HIGH (ref 3.8–10.5)
WBC # FLD AUTO: 10.93 K/UL — HIGH (ref 3.8–10.5)

## 2024-04-24 PROCEDURE — 99233 SBSQ HOSP IP/OBS HIGH 50: CPT

## 2024-04-24 RX ORDER — LOSARTAN POTASSIUM 100 MG/1
50 TABLET, FILM COATED ORAL DAILY
Refills: 0 | Status: DISCONTINUED | OUTPATIENT
Start: 2024-04-24 | End: 2024-04-25

## 2024-04-24 RX ADMIN — ATORVASTATIN CALCIUM 20 MILLIGRAM(S): 80 TABLET, FILM COATED ORAL at 21:18

## 2024-04-24 RX ADMIN — Medication 40 MILLIGRAM(S): at 15:58

## 2024-04-24 RX ADMIN — BUDESONIDE AND FORMOTEROL FUMARATE DIHYDRATE 2 PUFF(S): 160; 4.5 AEROSOL RESPIRATORY (INHALATION) at 22:01

## 2024-04-24 RX ADMIN — CARVEDILOL PHOSPHATE 6.25 MILLIGRAM(S): 80 CAPSULE, EXTENDED RELEASE ORAL at 12:13

## 2024-04-24 RX ADMIN — RIVAROXABAN 15 MILLIGRAM(S): KIT at 18:28

## 2024-04-24 RX ADMIN — BUDESONIDE AND FORMOTEROL FUMARATE DIHYDRATE 2 PUFF(S): 160; 4.5 AEROSOL RESPIRATORY (INHALATION) at 08:02

## 2024-04-24 RX ADMIN — Medication 10 MILLILITER(S): at 21:18

## 2024-04-24 RX ADMIN — TAMSULOSIN HYDROCHLORIDE 0.4 MILLIGRAM(S): 0.4 CAPSULE ORAL at 21:18

## 2024-04-24 RX ADMIN — LOSARTAN POTASSIUM 50 MILLIGRAM(S): 100 TABLET, FILM COATED ORAL at 12:13

## 2024-04-24 RX ADMIN — Medication 40 MILLIGRAM(S): at 06:20

## 2024-04-24 RX ADMIN — Medication 1 TABLET(S): at 12:13

## 2024-04-24 RX ADMIN — CARVEDILOL PHOSPHATE 6.25 MILLIGRAM(S): 80 CAPSULE, EXTENDED RELEASE ORAL at 21:19

## 2024-04-24 RX ADMIN — FINASTERIDE 5 MILLIGRAM(S): 5 TABLET, FILM COATED ORAL at 12:13

## 2024-04-24 RX ADMIN — SPIRONOLACTONE 25 MILLIGRAM(S): 25 TABLET, FILM COATED ORAL at 12:15

## 2024-04-24 RX ADMIN — PANTOPRAZOLE SODIUM 40 MILLIGRAM(S): 20 TABLET, DELAYED RELEASE ORAL at 06:20

## 2024-04-24 NOTE — CONSULT NOTE ADULT - SUBJECTIVE AND OBJECTIVE BOX
The patient is a 78y Male with significant PMH of  HTN, atrial fibrillation on Xarelto, Non-ischemic cardiomyopathy, cardiac pacemaker in place, chronic systolic CHF, HPL, BPH, chronic thrombocytopenia, COPD and others presented to the ED with c/o B/L leg edema and some dry cough for last few days.  He also had orthopnea and he prefers to rest in reclining position than in laying flat. His B/L led edema has extended up to thighs and b/l loin areas including scrotal edema. He denies chest pain, fever, chills, N/V, abdominal pain, diarrhea or dysuria.  CT angio chest negative for PE.  CT abd/pelvis:  Moderate right pleural effusion and hepatic congestion. No SBP. CXR: Cardiomegaly with CHF at this time.   US Testicles: Bilateral varicoceles.  No testicular mass, torsion or epididymitis. Denies smoking, alcohol or substance abuse.    Called to see pt because of bilateral varicocele.  Used  ([place]):  [id number].  Explained to pt that pt should see Dr. Castillo as an outpatient for treatment options for the varicoceles.    Patient History:    Past Medical, Past Surgical, and Family History:  PAST MEDICAL HISTORY:  AF (atrial fibrillation) on Eliquis    Arthritis     BPH (benign prostatic hyperplasia)     COPD (chronic obstructive pulmonary disease) not on inhalers    Coronary artery disease involving native heart with angina pectoris, unspecified vessel or lesion type     Heart failure with reduced ejection fraction     HLD (hyperlipidemia)     HTN (hypertension)     Pacemaker single chamber meditronic  placed by Dr Tomas  May  of  2016    Thrombocytopenia.     PAST SURGICAL HISTORY:  Cardiac pacemaker single chamber meditronic  placed by Dr Tomas  May  of  2016.       Allergies and Intolerances:        Allergies:  	amiodarone: Drug, Other, Originally Entered as [Anaphylaxis] reaction to [amiodarone]  	ACE inhibitors: Drug Category, Other, Originally Entered as [Angioedema] reaction to [ACE Inhibitors]    Home Medications:   * Patient Currently Takes Medications as of 29-Aug-2023 09:38 documented in Structured Notes  · 	hydrocortisone 1% topical ointment: Apply topically to affected area once a day (at bedtime)  · 	albuterol 90 mcg/inh inhalation aerosol: 2 puff(s) inhaled every 6 hours, As needed, Bronchospasm  · 	budesonide-formoterol 80 mcg-4.5 mcg/inh inhalation aerosol: 1 application inhaled 2 times a day   · 	spironolactone 25 mg oral tablet: 1 tab(s) orally once a day  · 	rivaroxaban 20 mg oral tablet: 1 tab(s) orally once a day (with dinner)   · 	carvedilol 6.25 mg oral tablet: 1 tab(s) orally every 12 hours  · 	lisinopril 10 mg oral tablet: 1 tab(s) orally once a day  · 	atorvastatin 20 mg oral tablet: 1 tab(s) orally once a day (at bedtime)  · 	sertal compound: 1 tab(s) orally once a day, As Needed  	***Medication from the [place], Propinox HCl 10mg - Lysine Clonixinate 125mg***  · 	ergocalciferol 1.25 mg (50,000 intl units) oral capsule: 1 cap(s) orally once a week  · 	omeprazole 20 mg oral delayed release capsule: 1 cap(s) orally once a day  · 	lisinopril 20 mg oral tablet: 1 tab(s) orally once a day  · 	finasteride 5 mg oral tablet: 1 tab(s) orally once a day  · 	finasteride 5 mg oral tablet: 1 tab(s) orally once a day  · 	omeprazole 20 mg oral delayed release capsule: 1 cap(s) orally once a day  · 	tamsulosin 0.4 mg oral capsule: 1 cap(s) orally once a day  · 	tamsulosin 0.4 mg oral capsule: 1 cap(s) orally 2 times a day      FAMILY HISTORY:  No pertinent family history, of diabetes or heart disease. No pertinent family history of: brain aneurysm.     Social History:  · Substance use	No      Tobacco Screening:  · Core Measure Site	Yes  · Has the patient used tobacco in the past 30 days?	No     Risk Assessment:    Present on Admission:  Deep Venous Thrombosis	no   Pulmonary Embolus	no     Review of Systems:  Other Review of Systems: All other review of systems negative, except as noted in HPI     Physical Exam:   Vital Signs Last 24 Hrs  T(C): 36.4 (24 Apr 2024 08:03), Max: 36.4 (24 Apr 2024 08:03)  T(F): 97.5 (24 Apr 2024 08:03), Max: 97.5 (24 Apr 2024 08:03)  HR: 63 (24 Apr 2024 08:03) (63 - 68)  BP: 153/80 (24 Apr 2024 08:03) (135/71 - 153/80)  BP(mean): --  RR: 18 (24 Apr 2024 08:03) (18 - 18)  SpO2: 95% (24 Apr 2024 08:03) (95% - 95%)    Parameters below as of 24 Apr 2024 08:03  Patient On (Oxygen Delivery Method): room air      GENERAL: NAD, lying in bed comfortably  HEAD:  NC/AT  Neck: Soft supple  ENT: Moist mucous membranes  CHEST/LUNG: CTA bilat; No rales, rhonchi, wheezing, or rubs. Unlabored respirations  HEART: RRR; No murmurs, rubs, or gallops  ABDOMEN: Bowel sounds present; Soft, NT/ND  :  Pt with bilateral variceles L>R  Lower Ext: No calf tenderness  NERVOUS SYSTEM:  Alert & Oriented X3, speech clear. No deficits   SKIN: Warm and dry      LABS:                          14.0   10.93 )-----------( 138      ( 24 Apr 2024 06:00 )             45.4     04-24    138  |  99  |  39<H>  ----------------------------<  139<H>  4.0   |  34<H>  |  1.24    Ca    8.9      24 Apr 2024 06:00  Mg     2.2     04-23          Urinalysis Basic - ( 24 Apr 2024 06:00 )    Color: x / Appearance: x / SG: x / pH: x  Gluc: 139 mg/dL / Ketone: x  / Bili: x / Urobili: x   Blood: x / Protein: x / Nitrite: x   Leuk Esterase: x / RBC: x / WBC x   Sq Epi: x / Non Sq Epi: x / Bacteria: x      ACC: [id number] EXAM:  US SCROTUM AND CONTENTS   ORDERED BY: DIA GODOY     PROCEDURE DATE:  04/22/2024          INTERPRETATION:  CLINICAL INFORMATION: Left hemiscrotal pain.    COMPARISON: None available.    TECHNIQUE: Testicular ultrasound utilizing color and spectral Doppler.    FINDINGS:    RIGHT:  Right testis: 4.2 cm x 4.8 cm x 2.3 cm. Normal echogenicity and   echotexture with no masses or areas of architectural distortion. Normal   arterial and venous blood flow pattern.  Right epididymis: Within normal limits.  Right hydrocele: None.  Right varicocele: Moderate      LEFT:  Left testis: 3.3 cm x 4.1 cm x 2.5 cm. Normal echogenicity and   echotexture with no masses or areas of architectural distortion. Normal   arterial and venous blood flow pattern.  Left epididymis: Within normal limits.  Left hydrocele: None.  Left varicocele: Large, including intratesticular component.      IMPRESSION:    Bilateral varicoceles, including intratesticular left varicocele.    No testicular mass,torsion or epididymitis.

## 2024-04-24 NOTE — PROGRESS NOTE ADULT - PROBLEM SELECTOR PLAN 2
·  Problem: Chronic atrial fibrillation.   ·  Recommendation: had NSVT x 2 yesterday and today at 1 AM - multifactorial - fluid overload, valvular disease, cot current regimen  . PPM was interrogated by EP - Single chamber pacemaker with normal function, adequate pacing threshold. Stored data with brief NSVT lasting one second.  Patient with Micra pacemaker which is turned off and non functional, there is no need for Micra removal as it is turned off.

## 2024-04-24 NOTE — PROGRESS NOTE ADULT - SUBJECTIVE AND OBJECTIVE BOX
HOSPITALIST ATTENDING PROGRESS NOTE    Chart and meds reviewed.  Patient seen and examined.    CC: CHF    Subjective: Diuresing, reports scrotal pain, urology consulted for varicocele.    All other systems reviewed and found to be negative with the exception of what has been described above.    MEDICATIONS  (STANDING):  atorvastatin 20 milliGRAM(s) Oral at bedtime  budesonide  80 MICROgram(s)/formoterol 4.5 MICROgram(s) Inhaler 2 Puff(s) Inhalation two times a day  carvedilol 6.25 milliGRAM(s) Oral every 12 hours  finasteride 5 milliGRAM(s) Oral daily  furosemide   Injectable 40 milliGRAM(s) IV Push two times a day  losartan 50 milliGRAM(s) Oral daily  multivitamin 1 Tablet(s) Oral daily  pantoprazole    Tablet 40 milliGRAM(s) Oral before breakfast  rivaroxaban 15 milliGRAM(s) Oral with dinner  spironolactone 25 milliGRAM(s) Oral daily  tamsulosin 0.4 milliGRAM(s) Oral at bedtime    MEDICATIONS  (PRN):  acetaminophen     Tablet .. 650 milliGRAM(s) Oral every 6 hours PRN Temp greater or equal to 38C (100.4F), Mild Pain (1 - 3)  aluminum hydroxide/magnesium hydroxide/simethicone Suspension 30 milliLiter(s) Oral every 4 hours PRN Dyspepsia  guaifenesin/dextromethorphan Oral Liquid 10 milliLiter(s) Oral every 4 hours PRN Cough  melatonin 3 milliGRAM(s) Oral at bedtime PRN Insomnia  ondansetron Injectable 4 milliGRAM(s) IV Push every 8 hours PRN Nausea and/or Vomiting      VITALS:  T(F): 97.5 (04-24-24 @ 08:03), Max: 97.5 (04-24-24 @ 08:03)  HR: 63 (04-24-24 @ 08:03) (63 - 68)  BP: 153/80 (04-24-24 @ 08:03) (135/71 - 153/80)  RR: 18 (04-24-24 @ 08:03) (18 - 18)  SpO2: 95% (04-24-24 @ 08:03) (95% - 95%)  Wt(kg): --    I&O's Summary      CAPILLARY BLOOD GLUCOSE          PHYSICAL EXAM:  Gen: NAD  HEENT:  pupils equal and reactive, EOMI, no oropharyngeal lesions, erythema, exudates, oral thrush  NECK:   supple, no carotid bruits, no palpable lymph nodes, no thyromegaly  CV:  +S1, +S2, regular, no murmurs or rubs  RESP:   lungs clear to auscultation bilaterally, no wheezing, rales, rhonchi, good air entry bilaterally  BREAST:  not examined  GI:  abdomen soft, non-tender, non-distended, normal BS, no bruits, no abdominal masses, no palpable masses  RECTAL:  not examined  : + scrotal swelling  MSK:   normal muscle tone, no atrophy, no rigidity, no contractions  EXT:  no clubbing, no cyanosis,1+ LE edema, no calf pain, swelling or erythema  VASCULAR:  pulses equal and symmetric in the upper and lower extremities  NEURO:  AAOX3, no focal neurological deficits, follows all commands, able to move extremities spontaneously  SKIN:  no ulcers, lesions or rashes    LABS:                            14.0   10.93 )-----------( 138      ( 24 Apr 2024 06:00 )             45.4     04-24    138  |  99  |  39<H>  ----------------------------<  139<H>  4.0   |  34<H>  |  1.24    Ca    8.9      24 Apr 2024 06:00  Mg     2.2     04-23              Urinalysis Basic - ( 24 Apr 2024 06:00 )    Color: x / Appearance: x / SG: x / pH: x  Gluc: 139 mg/dL / Ketone: x  / Bili: x / Urobili: x   Blood: x / Protein: x / Nitrite: x   Leuk Esterase: x / RBC: x / WBC x   Sq Epi: x / Non Sq Epi: x / Bacteria: x    CULTURES:  no new    Additional results/Imaging, I have personally reviewed:  CXR 4/22/24: Possible enlarging heart. Mild central CHF at this time.    US testicles 4/22/24:  Bilateral varicoceles, including intratesticular left varicocele. No testicular mass,torsion or epididymitis.    CTA PE protocol and a/p  4/22/24: No evidence of pulmonary embolism. Moderate right pleural effusion with right lower lobe atelectasis. The lungs are otherwise clear. Small volume ascites. Otherwise no findings identified to explain the patient's abdominal distention. No evidence of bowel obstruction. Heterogeneous enhancement of the liver, suggestive of hepatic congestion. Cardiomegaly.    Echo  4/23/24: 1. Left ventricular cavity is normal in size. Left ventricular systolic function is normal with an ejection fraction of 54 % by Rascon's method of disks. 2. Mildly enlarged right ventricular cavity size and normal systolic function. 3. The left atrium is severely dilated. 4. The right atrium is moderately dilated. 5. Device lead is visualized. 6. Severe mitral regurgitation. There is a dilated mitral annulus with an apically displaced closure pattern which could be c/w ischemic MR. 7. Moderate to severe tricuspid regurgitation. 8. Patent foramen ovale with left to right shunting by color flow Doppler. 9. Mild left ventricular hypertrophy. 10. Mild to moderate aortic regurgitation. 11. Severe pulmonary hypertension. 12. The inferior vena cava is dilated (dilated >2.1cm) with abnormal inspiratory collapse (abnormal <50%) consistent with elevated right atrial pressure (~15, range 10-20mmHg). 13. There ismild calcification of the aortic valve leaflets. 14. There is mild calcification of the mitral valve annulus.    PPM interrogation 4/23/24: · Underlying Rhythm  Afib, v paced  · Comments  Patient is pacemaker dependent  · Additional Procedure Details  Single chamber pacemaker with normal function, adequate pacing threshold. Stored data with brief NSVT today lasting one second.  Patient with Micra pacemaker which is turned off and non functional according to cardiology notes from 8/2023 discussed with EP and there is no need for Micra removal as it is turned off.    Telemetry, personally reviewed:  4/23/24:  Afib, vpaced, 60s  4/24/24: Afib, Vpaced, 60s

## 2024-04-24 NOTE — CONSULT NOTE ADULT - PROBLEM SELECTOR PROBLEM 1
Acute on chronic systolic congestive heart failure
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
pt. unsure

## 2024-04-24 NOTE — CONSULT NOTE ADULT - PROBLEM SELECTOR RECOMMENDATION 9
Volume overloaded on exam  - stop lisinopril  - start losartan 50 mg po daily, will plan on transitioning to entresto  - continue carvedilol 6.25 mg po BID  - continue lasix 40 mg IV BID  - continue spironolactone 25 mg po daily  - will plan on adding SGLT2i later this week  - strict I & Os  - Daily Standing weights  - pt from [place], with history of HF and arrhythmias, will order trypanosoma labs to assess for possible chagas

## 2024-04-24 NOTE — PROGRESS NOTE ADULT - PROBLEM SELECTOR PLAN 3
·  Problem: Valvular heart disease.   ·  Recommendation: TTE shows severe MR and severe pHTN , repeat echo once Diuresed.

## 2024-04-24 NOTE — PROGRESS NOTE ADULT - PROBLEM SELECTOR PLAN 1
·  Problem: Acute on chronic systolic congestive heart failure.   ·  Recommendation: likely due to non adherence to diet & meds   right pleural effusion , no evidence pulmonary vascular congestion  right heart failure from TR pulmonary hypertension      Continue IV lasix 40 mg po BID ( home oral 80 mg po dialy ) continue lisinopril , coreg , aldactone  ( patient could not afford entresto or did not tolerate in the past )   TTE shows preserved LVEF, severe MR and pHTN    Elevated troponin , likely demand related ischemia , patient had LHC august 2020 had very minimal luminal irregularities    HF PA Maximiliano Donaldson following

## 2024-04-24 NOTE — CONSULT NOTE ADULT - PROBLEM SELECTOR RECOMMENDATION 2
s/p MICRA 2017   freely mobile device , will obtain EP input and Interrogation continue coreg and AC
Severe MR and TR on echo  - will reassess when near euvolemic.  if valve disease remains severe, will consult structural heart

## 2024-04-24 NOTE — CONSULT NOTE ADULT - SUBJECTIVE AND OBJECTIVE BOX
HPI:  Chief Complaint: edema.    The patient is a 77 yo Male HFrecEF/NICM (Previously 35%, now 54%, LVIDd 5.5)  with significant PMH of HTN, atrial fibrillation on Xarelto, cardiac pacemaker, HLD, BPH, chronic thrombocytopenia, COPD who presented to  ED on  for increasing LE Edema  and others presented to the ED with c/o B/L leg edema and some dry cough for last few days.  He also had orthopnea and he prefers to rest in reclining position than in laying flat. His B/L led edema has extended up to thighs and b/l loin areas including scrotal edema. He denies chest pain, fever, chills, N/V, abdominal pain, diarrhea or dysuria.  CT angio chest negative for PE.  CT abd/pelvis:  Moderate right pleural effusion and hepatic congestion. No SBP. CXR: Cardiomegaly with CHF at this time.   US Testicles: Bilateral varicoceles.  No testicular mass, torsion or epididymitis. Denies smoking, alcohol or substance abuse.      Sig labs: D-dimer elevated 1144 but CT angio chest negative for PE.  Pro-BNP 94173 (was 2000 in 2022), Troponin 254 (had 150s in past), Platelets 138k, Cr 1.62 (was 0.98 in 2023), .    CT Angio Chest PE Protocol, CT abd and Pelvis: IMPRESSION: No evidence of pulmonary embolism.  Moderate right pleural effusion with right lower lobe atelectasis. The lungs are otherwise clear.  Small volume ascites. Otherwise no findings identified to explain the patient's abdominal distention. No evidence of bowel obstruction.  Heterogeneous enhancement of the liver, suggestive of hepatic congestion. Cardiomegaly.    Xray Chest 1 View: Possible enlarging heart. Mild central CHF at this time.    US Testicles: Bilateral varicoceles, including intratesticular left varicocele.  No testicular mass, torsion or epididymitis.    Lasix 80 mg IV given in ED.     (2024 19:35)    PAST MEDICAL & SURGICAL HISTORY:  AF (atrial fibrillation)  on Eliquis      Pacemaker  single chamber meditronic  placed by Dr Tomas  May  of  2016      Coronary artery disease involving native heart with angina pectoris, unspecified vessel or lesion type      HTN (hypertension)      Arthritis      COPD (chronic obstructive pulmonary disease)  not on inhalers      HLD (hyperlipidemia)      Heart failure with reduced ejection fraction      Thrombocytopenia      BPH (benign prostatic hyperplasia)      Cardiac pacemaker  single chamber meditronic  placed by Dr Tomas  May  of  2016        REVIEW OF SYSTEMS:  14 point ROS negative in detail apart from as documented in HPI.    MEDICATIONS  (STANDING):  atorvastatin 20 milliGRAM(s) Oral at bedtime  budesonide  80 MICROgram(s)/formoterol 4.5 MICROgram(s) Inhaler 2 Puff(s) Inhalation two times a day  carvedilol 6.25 milliGRAM(s) Oral every 12 hours  finasteride 5 milliGRAM(s) Oral daily  furosemide   Injectable 40 milliGRAM(s) IV Push two times a day  lisinopril 10 milliGRAM(s) Oral daily  multivitamin 1 Tablet(s) Oral daily  pantoprazole    Tablet 40 milliGRAM(s) Oral before breakfast  rivaroxaban 15 milliGRAM(s) Oral with dinner  spironolactone 25 milliGRAM(s) Oral daily  tamsulosin 0.4 milliGRAM(s) Oral at bedtime    MEDICATIONS  (PRN):  acetaminophen     Tablet .. 650 milliGRAM(s) Oral every 6 hours PRN Temp greater or equal to 38C (100.4F), Mild Pain (1 - 3)  aluminum hydroxide/magnesium hydroxide/simethicone Suspension 30 milliLiter(s) Oral every 4 hours PRN Dyspepsia  guaifenesin/dextromethorphan Oral Liquid 10 milliLiter(s) Oral every 4 hours PRN Cough  melatonin 3 milliGRAM(s) Oral at bedtime PRN Insomnia  ondansetron Injectable 4 milliGRAM(s) IV Push every 8 hours PRN Nausea and/or Vomiting    Home Medications:  Daily-Cristina Men&#x27;s Formula oral tablet: 1 tab(s) orally once a day (2024 19:52)  finasteride 5 mg oral tablet: 1 tab(s) orally once a day (2024 19:52)  furosemide 40 mg oral tablet: 2 tab(s) orally once a day (2024 19:52)  omeprazole 20 mg oral delayed release capsule: 1 cap(s) orally once a day (2024 19:52)  tamsulosin 0.4 mg oral capsule: 1 cap(s) orally once a day (2024 19:52)    Allergies    ACE inhibitors (Other)  amiodarone (Other)    Intolerances      Social History:    FAMILY HISTORY:  No pertinent family history  of diabetes or heart disease        ICU Vital Signs Last 24 Hrs  T(C): 36.4, Max: 36.4 ( @ 08:03)  HR: 63 (63 - 68)  BP: 153/80 (135/71 - 153/80)  BP(mean): --  ABP: --  ABP(mean): --  RR: 18 (18 - 18)  SpO2: 95% (95% - 95%)    Weight in k.9 (24)      I&O's Summary Last 24 Hrs    Tele:    Physical Exam:    General: No distress. Comfortable.  HEENT: EOM intact.  Neck: Neck supple. JVP not elevated. No masses  Chest: Clear to auscultation bilaterally  CV: Normal S1 and S2. No murmurs, rub, or gallops. Radial pulses normal.  Abdomen: Soft, non-distended, non-tender  Skin: No rashes or skin breakdown  Neurology: Alert and oriented times three. Sensation intact  Psych: Affect normal    Labs:    ( 24 @ 06:00 )               14.0   10.93 )--------( 138                  45.4     ( 24 @ 06:00 )     138  |  99  |  39  ---------------------<  139  4.0  |  34  |  1.24    Ca 8.9  Phos x   Mg x     ( 24 @ 15:36 )  TPro  6.7  /  Alb  3.6  /  TBili  1.7  /  DBili  x   /  AST  33  /  ALT  35  /  AlkPhos  104    PTT/PT/INR - ( 24 @ 15:36 )  PTT: 29.0 sec / PT: 13.4 sec / INR: 1.19 ratio                    RADIOLOGY & ADDITIONAL STUDIES:   The patient is a 77 yo Male HFrecEF/NICM (Previously 35%, now 54%, LVIDd 5.5)  with significant PMH of HTN, atrial fibrillation on Xarelto, cardiac pacemaker, HLD, BPH, chronic thrombocytopenia, COPD who presented to  ED on  for increasing LE Edema.  HF team now consulted to assist in management.    The patient has been followed by his cardiologist for several years. Initially on Entresto, he was lost to follow up for a while and then after a hospitalization was placed on lisinopril.  He has intermittently followed with his cardiologist and appears to be confused over his medications Despite this, he reports being compliant with his diuretics.  He states that oer this past weekend, he had noticed increased shortness of breath and coughing, in addition to increased swelling to his kegs, prompting admission.   He also had orthopnea and he prefers to rest in reclining position than in laying flat. His B/L led edema has extended up to thighs and b/l loin areas including scrotal edema. He denies chest pain, fever, chills, N/V, abdominal pain, diarrhea or dysuria.      CT angio chest negative for PE.    CT abd/pelvis:  Moderate right pleural effusion and hepatic congestion.    US Testicles: Bilateral varicoceles.  No testicular mass, torsion or epididymitis. Denies smoking, alcohol or substance abuse.      Sig labs: D-dimer elevated 1144 but CT angio chest negative for PE.  Pro-BNP 31951 (was 2000 in 2022), Troponin 254 (had 150s in past), Platelets 138k, Cr 1.62 (was 0.98 in 2023), .    PAST MEDICAL & SURGICAL HISTORY:  AF (atrial fibrillation)  on Eliquis      Pacemaker  single chamber Medtronic  placed by Dr Tomas  May  of  2016  Coronary artery disease involving native heart with angina pectoris, unspecified vessel or lesion type  HTN (hypertension)  Arthritis  COPD (chronic obstructive pulmonary disease)  not on inhalers  HLD (hyperlipidemia)  Heart failure with reduced ejection fraction  Thrombocytopenia  BPH (benign prostatic hyperplasia)  Cardiac pacemaker  single chamber Medtronic  placed by Dr Tomas  May  of  2016      REVIEW OF SYSTEMS:  14 point ROS negative in detail apart from as documented in HPI.    MEDICATIONS  (STANDING):  atorvastatin 20 milliGRAM(s) Oral at bedtime  budesonide  80 MICROgram(s)/formoterol 4.5 MICROgram(s) Inhaler 2 Puff(s) Inhalation two times a day  carvedilol 6.25 milliGRAM(s) Oral every 12 hours  finasteride 5 milliGRAM(s) Oral daily  furosemide   Injectable 40 milliGRAM(s) IV Push two times a day  lisinopril 10 milliGRAM(s) Oral daily  multivitamin 1 Tablet(s) Oral daily  pantoprazole    Tablet 40 milliGRAM(s) Oral before breakfast  rivaroxaban 15 milliGRAM(s) Oral with dinner  spironolactone 25 milliGRAM(s) Oral daily  tamsulosin 0.4 milliGRAM(s) Oral at bedtime    MEDICATIONS  (PRN):  acetaminophen     Tablet .. 650 milliGRAM(s) Oral every 6 hours PRN Temp greater or equal to 38C (100.4F), Mild Pain (1 - 3)  aluminum hydroxide/magnesium hydroxide/simethicone Suspension 30 milliLiter(s) Oral every 4 hours PRN Dyspepsia  guaifenesin/dextromethorphan Oral Liquid 10 milliLiter(s) Oral every 4 hours PRN Cough  melatonin 3 milliGRAM(s) Oral at bedtime PRN Insomnia  ondansetron Injectable 4 milliGRAM(s) IV Push every 8 hours PRN Nausea and/or Vomiting    Home Medications:  Daily-Cristina Men&#x27;s Formula oral tablet: 1 tab(s) orally once a day (2024 19:52)  finasteride 5 mg oral tablet: 1 tab(s) orally once a day (2024 19:52)  furosemide 40 mg oral tablet: 2 tab(s) orally once a day (2024 19:52)  omeprazole 20 mg oral delayed release capsule: 1 cap(s) orally once a day (2024 19:52)  tamsulosin 0.4 mg oral capsule: 1 cap(s) orally once a day (2024 19:52)    Allergies    ACE inhibitors (Other)  amiodarone (Other)    Intolerances      Social History:    FAMILY HISTORY:  No pertinent family history  of diabetes or heart disease        ICU Vital Signs Last 24 Hrs  T(C): 36.4, Max: 36.4 (04-24 @ 08:03)  HR: 63 (63 - 68)  BP: 153/80 (135/71 - 153/80)  BP(mean): --  ABP: --  ABP(mean): --  RR: 18 (18 - 18)  SpO2: 95% (95% - 95%)    Weight in k.9 (24)      I&O's Summary Last 24 Hrs    Tele:    Physical Exam:    General: No distress. Comfortable.  HEENT: EOM intact.  Neck: Neck supple. JVP not elevated. No masses  Chest: Clear to auscultation bilaterally  CV: Normal S1 and S2. Systolic murmur present, No rub or gallops. Radial pulses normal.  Abdomen: Soft, non-distended, non-tender  Skin: No rashes or skin breakdown  Extremities  Warm, 2+ B/L LE edema to upper tibia  Neurology: Alert and oriented times three. Sensation intact  Psych: Affect normal    Labs:    ( 24 @ 06:00 )               14.0   10.93 )--------( 138                  45.4     ( 24 @ 06:00 )     138  |  99  |  39  ---------------------<  139  4.0  |  34  |  1.24    Ca 8.9  Phos x   Mg x     ( 24 @ 15:36 )  TPro  6.7  /  Alb  3.6  /  TBili  1.7  /  DBili  x   /  AST  33  /  ALT  35  /  AlkPhos  104    PTT/PT/INR - ( 24 @ 15:36 )  PTT: 29.0 sec / PT: 13.4 sec / INR: 1.19 ratio                    RADIOLOGY & ADDITIONAL STUDIES:

## 2024-04-24 NOTE — CONSULT NOTE ADULT - NS ATTEND AMEND GEN_ALL_CORE FT
79 y/o [place] male with h/o HFrecEF, NICMP (LVEF 25% in 2016 improved to 54%), valvular heart disease (severe MR, mod-severe TR, mild-mod AI), s/p CRT-D complicated by pocket infection transition to Micra (non-functional, poor capture) followed by single lead pacemaker, Afib on xarelto, HTN, thrombocytopenia, COPD, CKD stage 2-3, pre DM (A1c 6.3%) and varicocele who presented to  with worsening SOB and Le edema. The pt reports that his symptoms started ~3 days prior to admission. He has been on some GDMT as outpt including coreg, lisinopril, aldactone and lasix. However, it’s unclear if he was taking all his meds. No recent HF related hospitalizations.    He is originally from [place]. He denies etoh, illicit drug use or tobacco use.      His admission labs were remarkable for Co 35, BUN 32, Creatinine 1.62, Tbili 1.7, Trop I ....35 and pBNP 10.4K.     Prior testing includes:   4/22/24 EKG: V-paced, underlying Afib  4/23/24 TTE: EF 54%, LVIDd 5.5 IVS 1.3 PWd 1.3 LA severely Dilated, RA moderately dilated, RV enlarged with normal function, Severe MR, mild to moderate AI, Moderate to severe TR, PFO,  Severe pHTN, PASP 68 dilated IVC         In summary, this is a 79 y/o [place] male with pmh as mentioned above p/w acute on chronic HF associated to elevated cardiac biomarkers (NSTEMI type 2), ELIS and hyperbilirubinemia. He was started on IV diuretics and some GDMT. His BP remains elevated, and he appears hypervolemic on the physical exam. He would benefit from aggressive diuresis and GDMT optimization as well as HF education.   Recommendations:  - Strict I/O, daily standing weights  - GDMT: stop lisinopril, start losartan 50mg daily (plan to transition to [facility] after 36hr washout period). Continue coreg and Aldactone. Plan to add SGLT2i once pt is on oral diuretics  Diuretics: Lasix 40mg IV BID  - Obtain Trypanosoma cruzi Ab to r/o Chagas  - Should reassess valvulopathies once the pt is euvolemic and on optimal GDMT  - Recommend Urology c/s from testicular pain and varicocele  - We will continue to follow with you

## 2024-04-24 NOTE — CONSULT NOTE ADULT - PROBLEM SELECTOR RECOMMENDATION 3
Mild to moderate AI , moderate MR , moderate to severe  TR  Moderate pulmonary hypertension , will continue IV diuresis , repeat echo once Diuresed
- meds as above, will increase Losartan and Entresto as needed

## 2024-04-24 NOTE — CONSULT NOTE ADULT - ASSESSMENT
The patient is a 77 yo Male HFrecEF/NICM (Previously 35%, now 54%, LVIDd 5.5)  with significant PMH of HTN, atrial fibrillation on Xarelto, cardiac pacemaker, HLD, BPH, chronic thrombocytopenia, COPD who presented to  ED on 4/22 for increasing LE Edema.  He is overloaded on exam and hypertensive.    TTE 4/23/24: EF 54%, LVIDd 5.5 IVS 1.3 PWd 1.3 LA severely Dilated, RA moderately dilated, RV enlarged with normal function, Severe MR, mild to moderate AI,  Moderate to severe TR, PFO,  Severe pHTN, PASP 68 dilated IVC    77 y/o [place] male with h/o HFrecEF, NICMP (LVEF 25% in 2016 improved to 54%), valvular heart disease (severe MR, mod-severe TR, mild-mod AI), s/p CRT-D complicated by pocket infection transition to Micra (non-functional, poor capture) followed by single lead pacemaker, Afib on xarelto, HTN, thrombocytopenia, COPD, CKD stage 2-3, pre DM (A1c 6.3%) and varicocele who presented to  acute on chronic HF associated to elevated cardiac biomarkers (NSTEMI type 2), ELIS and hyperbilirubinemia. His admission labs were remarkable for Co 35, BUN 32, Creatinine 1.62, Tbili 1.7, Trop I ....35 and pBNP 10.4K.   He would benefit from aggressive diuresis and GDMT optimization as well as HF education.     TTE 4/23/24: EF 54%, LVIDd 5.5 IVS 1.3 PWd 1.3 LA severely Dilated, RA moderately dilated, RV enlarged with normal function, Severe MR, mild to moderate AI,  Moderate to severe TR, PFO,  Severe pHTN, PASP 68 dilated IVC

## 2024-04-24 NOTE — CONSULT NOTE ADULT - ASSESSMENT
A/P:  77 y/o male with bilateral varicoceles    Pt can follow up with Dr. Castillo for treatment options for bilateral varicoceles  Above discussed with Dr. Castillo

## 2024-04-25 LAB
ANION GAP SERPL CALC-SCNC: 3 MMOL/L — LOW (ref 5–17)
BUN SERPL-MCNC: 40 MG/DL — HIGH (ref 7–23)
CALCIUM SERPL-MCNC: 8.9 MG/DL — SIGNIFICANT CHANGE UP (ref 8.5–10.1)
CHLORIDE SERPL-SCNC: 99 MMOL/L — SIGNIFICANT CHANGE UP (ref 96–108)
CO2 SERPL-SCNC: 34 MMOL/L — HIGH (ref 22–31)
CREAT SERPL-MCNC: 1.21 MG/DL — SIGNIFICANT CHANGE UP (ref 0.5–1.3)
EGFR: 61 ML/MIN/1.73M2 — SIGNIFICANT CHANGE UP
GLUCOSE SERPL-MCNC: 149 MG/DL — HIGH (ref 70–99)
HCT VFR BLD CALC: 47.6 % — SIGNIFICANT CHANGE UP (ref 39–50)
HGB BLD-MCNC: 15 G/DL — SIGNIFICANT CHANGE UP (ref 13–17)
MCHC RBC-ENTMCNC: 30.4 PG — SIGNIFICANT CHANGE UP (ref 27–34)
MCHC RBC-ENTMCNC: 31.5 GM/DL — LOW (ref 32–36)
MCV RBC AUTO: 96.6 FL — SIGNIFICANT CHANGE UP (ref 80–100)
PLATELET # BLD AUTO: 156 K/UL — SIGNIFICANT CHANGE UP (ref 150–400)
POTASSIUM SERPL-MCNC: 4 MMOL/L — SIGNIFICANT CHANGE UP (ref 3.5–5.3)
POTASSIUM SERPL-SCNC: 4 MMOL/L — SIGNIFICANT CHANGE UP (ref 3.5–5.3)
RBC # BLD: 4.93 M/UL — SIGNIFICANT CHANGE UP (ref 4.2–5.8)
RBC # FLD: 14.1 % — SIGNIFICANT CHANGE UP (ref 10.3–14.5)
SODIUM SERPL-SCNC: 136 MMOL/L — SIGNIFICANT CHANGE UP (ref 135–145)
WBC # BLD: 10.42 K/UL — SIGNIFICANT CHANGE UP (ref 3.8–10.5)
WBC # FLD AUTO: 10.42 K/UL — SIGNIFICANT CHANGE UP (ref 3.8–10.5)

## 2024-04-25 PROCEDURE — 99233 SBSQ HOSP IP/OBS HIGH 50: CPT

## 2024-04-25 PROCEDURE — 99232 SBSQ HOSP IP/OBS MODERATE 35: CPT

## 2024-04-25 RX ORDER — SACUBITRIL AND VALSARTAN 24; 26 MG/1; MG/1
1 TABLET, FILM COATED ORAL ONCE
Refills: 0 | Status: COMPLETED | OUTPATIENT
Start: 2024-04-25 | End: 2024-04-25

## 2024-04-25 RX ORDER — FUROSEMIDE 40 MG
40 TABLET ORAL DAILY
Refills: 0 | Status: DISCONTINUED | OUTPATIENT
Start: 2024-04-25 | End: 2024-04-26

## 2024-04-25 RX ORDER — SACUBITRIL AND VALSARTAN 24; 26 MG/1; MG/1
1 TABLET, FILM COATED ORAL
Refills: 0 | Status: DISCONTINUED | OUTPATIENT
Start: 2024-04-25 | End: 2024-04-26

## 2024-04-25 RX ADMIN — SPIRONOLACTONE 25 MILLIGRAM(S): 25 TABLET, FILM COATED ORAL at 10:07

## 2024-04-25 RX ADMIN — BUDESONIDE AND FORMOTEROL FUMARATE DIHYDRATE 2 PUFF(S): 160; 4.5 AEROSOL RESPIRATORY (INHALATION) at 09:54

## 2024-04-25 RX ADMIN — PANTOPRAZOLE SODIUM 40 MILLIGRAM(S): 20 TABLET, DELAYED RELEASE ORAL at 06:26

## 2024-04-25 RX ADMIN — TAMSULOSIN HYDROCHLORIDE 0.4 MILLIGRAM(S): 0.4 CAPSULE ORAL at 21:33

## 2024-04-25 RX ADMIN — Medication 40 MILLIGRAM(S): at 13:36

## 2024-04-25 RX ADMIN — Medication 1 TABLET(S): at 10:07

## 2024-04-25 RX ADMIN — Medication 40 MILLIGRAM(S): at 06:26

## 2024-04-25 RX ADMIN — SACUBITRIL AND VALSARTAN 1 TABLET(S): 24; 26 TABLET, FILM COATED ORAL at 21:32

## 2024-04-25 RX ADMIN — FINASTERIDE 5 MILLIGRAM(S): 5 TABLET, FILM COATED ORAL at 10:07

## 2024-04-25 RX ADMIN — CARVEDILOL PHOSPHATE 6.25 MILLIGRAM(S): 80 CAPSULE, EXTENDED RELEASE ORAL at 10:06

## 2024-04-25 RX ADMIN — SACUBITRIL AND VALSARTAN 1 TABLET(S): 24; 26 TABLET, FILM COATED ORAL at 10:59

## 2024-04-25 RX ADMIN — BUDESONIDE AND FORMOTEROL FUMARATE DIHYDRATE 2 PUFF(S): 160; 4.5 AEROSOL RESPIRATORY (INHALATION) at 20:49

## 2024-04-25 RX ADMIN — RIVAROXABAN 15 MILLIGRAM(S): KIT at 16:39

## 2024-04-25 RX ADMIN — ATORVASTATIN CALCIUM 20 MILLIGRAM(S): 80 TABLET, FILM COATED ORAL at 21:33

## 2024-04-25 NOTE — PROGRESS NOTE ADULT - SUBJECTIVE AND OBJECTIVE BOX
Subjective:    Medications:  acetaminophen     Tablet .. 650 milliGRAM(s) Oral every 6 hours PRN  aluminum hydroxide/magnesium hydroxide/simethicone Suspension 30 milliLiter(s) Oral every 4 hours PRN  atorvastatin 20 milliGRAM(s) Oral at bedtime  budesonide  80 MICROgram(s)/formoterol 4.5 MICROgram(s) Inhaler 2 Puff(s) Inhalation two times a day  carvedilol 6.25 milliGRAM(s) Oral every 12 hours  finasteride 5 milliGRAM(s) Oral daily  furosemide   Injectable 40 milliGRAM(s) IV Push daily  melatonin 3 milliGRAM(s) Oral at bedtime PRN  multivitamin 1 Tablet(s) Oral daily  ondansetron Injectable 4 milliGRAM(s) IV Push every 8 hours PRN  pantoprazole    Tablet 40 milliGRAM(s) Oral before breakfast  rivaroxaban 15 milliGRAM(s) Oral with dinner  sacubitril 24 mG/valsartan 26 mG 1 Tablet(s) Oral two times a day  spironolactone 25 milliGRAM(s) Oral daily  tamsulosin 0.4 milliGRAM(s) Oral at bedtime      Physical Exam:    Vitals:  Vital Signs Last 24 Hours  T(C): 36.3 (24 @ 08:00), Max: 36.3 (24 @ 20:31)  HR: 65 (24 @ 13:30) (60 - 65)  BP: 110/60 (24 @ 13:30) (110/60 - 137/70)  RR: 18 (24 @ 08:00) (18 - 18)  SpO2: 99% (24 @ 08:00) (97% - 99%)    Weight in k.8 ( @ 06:02)    I&O's Summary    2024 07:01  -  2024 07:00  --------------------------------------------------------  IN: 0 mL / OUT: 1100 mL / NET: -1100 mL        Tele:    General: No distress. Comfortable.  HEENT: EOM intact.  Neck: Neck supple. JVP not elevated. No masses  Chest: Clear to auscultation bilaterally  CV: Normal S1 and S2. No murmurs, rub, or gallops. Radial pulses normal.  Abdomen: Soft, non-distended, non-tender  Skin: No rashes or skin breakdown  Neurology: Alert and oriented times three. Sensation intact  Psych: Affect normal    Labs:                        15.0   10.42 )-----------( 156      ( 2024 06:05 )             47.6         136  |  99  |  40<H>  ----------------------------<  149<H>  4.0   |  34<H>  |  1.21    Ca    8.9      2024 06:05                  Lactate, Blood: 1.8 mmol/L ( @ 15:36)     Subjective:    Feeling well, denies dyspnea or chest pain     Medications:  acetaminophen     Tablet .. 650 milliGRAM(s) Oral every 6 hours PRN  aluminum hydroxide/magnesium hydroxide/simethicone Suspension 30 milliLiter(s) Oral every 4 hours PRN  atorvastatin 20 milliGRAM(s) Oral at bedtime  budesonide  80 MICROgram(s)/formoterol 4.5 MICROgram(s) Inhaler 2 Puff(s) Inhalation two times a day  carvedilol 6.25 milliGRAM(s) Oral every 12 hours  finasteride 5 milliGRAM(s) Oral daily  furosemide   Injectable 40 milliGRAM(s) IV Push daily  melatonin 3 milliGRAM(s) Oral at bedtime PRN  multivitamin 1 Tablet(s) Oral daily  ondansetron Injectable 4 milliGRAM(s) IV Push every 8 hours PRN  pantoprazole    Tablet 40 milliGRAM(s) Oral before breakfast  rivaroxaban 15 milliGRAM(s) Oral with dinner  sacubitril 24 mG/valsartan 26 mG 1 Tablet(s) Oral two times a day  spironolactone 25 milliGRAM(s) Oral daily  tamsulosin 0.4 milliGRAM(s) Oral at bedtime      Physical Exam:    Vitals:  Vital Signs Last 24 Hours  T(C): 36.3 (24 @ 08:00), Max: 36.3 (24 @ 20:31)  HR: 65 (24 @ 13:30) (60 - 65)  BP: 110/60 (24 @ 13:30) (110/60 - 137/70)  RR: 18 (24 @ 08:00) (18 - 18)  SpO2: 99% (24 @ 08:00) (97% - 99%)    Weight in k.8 ( @ 06:02)    I&O's Summary    2024 07:01  -  2024 07:00  --------------------------------------------------------  IN: 0 mL / OUT: 1100 mL / NET: -1100 mL        Tele: Sinus 60s    General: No distress. Comfortable.  HEENT: EOM intact.  Neck: Neck supple. JVP not elevated. No masses  Chest: Clear to auscultation bilaterally  CV: Normal S1 and S2. No murmurs, rub, or gallops. Radial pulses normal.  Abdomen: Soft, non-distended, non-tender  Skin: No rashes or skin breakdown  Extremities  Warm, trace LE edema   Neurology: Alert and oriented times three. Sensation intact  Psych: Affect normal    Labs:                        15.0   10.42 )-----------( 156      ( 2024 06:05 )             47.6         136  |  99  |  40<H>  ----------------------------<  149<H>  4.0   |  34<H>  |  1.21    Ca    8.9      2024 06:05                  Lactate, Blood: 1.8 mmol/L ( @ 15:36)

## 2024-04-25 NOTE — PROGRESS NOTE ADULT - PROBLEM SELECTOR PLAN 1
- Stop losartan   - Start entresto 24-26 mg po BID   - Change lasix to 40 mg IV daily   - continue carvedilol 6.25 mg po BID  - continue spironolactone 25 mg po daily  - will plan on adding SGLT2i later this week  - strict I & Os  - Daily Standing weights  - pt from [place], with history of HF and arrhythmias, trypanosoma labs pending

## 2024-04-25 NOTE — PROGRESS NOTE ADULT - PROBLEM SELECTOR PLAN 2
Severe MR and TR on echo  - will reassess when near euvolemic.  if valve disease remains severe, will consult structural heart.

## 2024-04-25 NOTE — PROGRESS NOTE ADULT - SUBJECTIVE AND OBJECTIVE BOX
HOSPITALIST ATTENDING PROGRESS NOTE    Chart and meds reviewed.  Patient seen and examined.    CC: CHF    Subjective: Diuresing well,  for outpt urol f/u.    All other systems reviewed and found to be negative with the exception of what has been described above.    MEDICATIONS  (STANDING):  atorvastatin 20 milliGRAM(s) Oral at bedtime  budesonide  80 MICROgram(s)/formoterol 4.5 MICROgram(s) Inhaler 2 Puff(s) Inhalation two times a day  carvedilol 6.25 milliGRAM(s) Oral every 12 hours  finasteride 5 milliGRAM(s) Oral daily  furosemide   Injectable 40 milliGRAM(s) IV Push daily  multivitamin 1 Tablet(s) Oral daily  pantoprazole    Tablet 40 milliGRAM(s) Oral before breakfast  rivaroxaban 15 milliGRAM(s) Oral with dinner  sacubitril 24 mG/valsartan 26 mG 1 Tablet(s) Oral two times a day  spironolactone 25 milliGRAM(s) Oral daily  tamsulosin 0.4 milliGRAM(s) Oral at bedtime    MEDICATIONS  (PRN):  acetaminophen     Tablet .. 650 milliGRAM(s) Oral every 6 hours PRN Temp greater or equal to 38C (100.4F), Mild Pain (1 - 3)  aluminum hydroxide/magnesium hydroxide/simethicone Suspension 30 milliLiter(s) Oral every 4 hours PRN Dyspepsia  melatonin 3 milliGRAM(s) Oral at bedtime PRN Insomnia  ondansetron Injectable 4 milliGRAM(s) IV Push every 8 hours PRN Nausea and/or Vomiting      VITALS:  T(F): 97.4 (04-25-24 @ 08:00), Max: 97.4 (04-24-24 @ 20:31)  HR: 65 (04-25-24 @ 13:30) (60 - 76)  BP: 110/60 (04-25-24 @ 13:30) (110/60 - 137/70)  RR: 18 (04-25-24 @ 08:00) (18 - 18)  SpO2: 99% (04-25-24 @ 08:00) (97% - 99%)  Wt(kg): --    I&O's Summary    24 Apr 2024 07:01  -  25 Apr 2024 07:00  --------------------------------------------------------  IN: 0 mL / OUT: 1100 mL / NET: -1100 mL        CAPILLARY BLOOD GLUCOSE          PHYSICAL EXAM:  Gen: NAD  HEENT:  pupils equal and reactive, EOMI, no oropharyngeal lesions, erythema, exudates, oral thrush  NECK:   supple, no carotid bruits, no palpable lymph nodes, no thyromegaly  CV:  +S1, +S2, regular, no murmurs or rubs  RESP:   lungs clear to auscultation bilaterally, no wheezing, rales, rhonchi, good air entry bilaterally  BREAST:  not examined  GI:  abdomen soft, non-tender, non-distended, normal BS, no bruits, no abdominal masses, no palpable masses  RECTAL:  not examined  :  not examined  MSK:   normal muscle tone, no atrophy, no rigidity, no contractions  EXT:  no clubbing, no cyanosis, trace LE edema, no calf pain, swelling or erythema  VASCULAR:  pulses equal and symmetric in the upper and lower extremities  NEURO:  AAOX3, no focal neurological deficits, follows all commands, able to move extremities spontaneously  SKIN:  no ulcers, lesions or rashes    LABS:                            15.0   10.42 )-----------( 156      ( 25 Apr 2024 06:05 )             47.6     04-25    136  |  99  |  40<H>  ----------------------------<  149<H>  4.0   |  34<H>  |  1.21    Ca    8.9      25 Apr 2024 06:05              Urinalysis Basic - ( 25 Apr 2024 06:05 )    Color: x / Appearance: x / SG: x / pH: x  Gluc: 149 mg/dL / Ketone: x  / Bili: x / Urobili: x   Blood: x / Protein: x / Nitrite: x   Leuk Esterase: x / RBC: x / WBC x   Sq Epi: x / Non Sq Epi: x / Bacteria: x    CULTURES:  no new    Additional results/Imaging, I have personally reviewed:  CXR 4/22/24: Possible enlarging heart. Mild central CHF at this time.    US testicles 4/22/24:  Bilateral varicoceles, including intratesticular left varicocele. No testicular mass,torsion or epididymitis.    CTA PE protocol and a/p  4/22/24: No evidence of pulmonary embolism. Moderate right pleural effusion with right lower lobe atelectasis. The lungs are otherwise clear. Small volume ascites. Otherwise no findings identified to explain the patient's abdominal distention. No evidence of bowel obstruction. Heterogeneous enhancement of the liver, suggestive of hepatic congestion. Cardiomegaly.    Echo  4/23/24: 1. Left ventricular cavity is normal in size. Left ventricular systolic function is normal with an ejection fraction of 54 % by Rascon's method of disks. 2. Mildly enlarged right ventricular cavity size and normal systolic function. 3. The left atrium is severely dilated. 4. The right atrium is moderately dilated. 5. Device lead is visualized. 6. Severe mitral regurgitation. There is a dilated mitral annulus with an apically displaced closure pattern which could be c/w ischemic MR. 7. Moderate to severe tricuspid regurgitation. 8. Patent foramen ovale with left to right shunting by color flow Doppler. 9. Mild left ventricular hypertrophy. 10. Mild to moderate aortic regurgitation. 11. Severe pulmonary hypertension. 12. The inferior vena cava is dilated (dilated >2.1cm) with abnormal inspiratory collapse (abnormal <50%) consistent with elevated right atrial pressure (~15, range 10-20mmHg). 13. There ismild calcification of the aortic valve leaflets. 14. There is mild calcification of the mitral valve annulus.    PPM interrogation 4/23/24: · Underlying Rhythm  Afib, v paced  · Comments  Patient is pacemaker dependent  · Additional Procedure Details  Single chamber pacemaker with normal function, adequate pacing threshold. Stored data with brief NSVT today lasting one second.  Patient with Micra pacemaker which is turned off and non functional according to cardiology notes from 8/2023 discussed with EP and there is no need for Micra removal as it is turned off.    Telemetry, personally reviewed:  4/23/24:  Afib, vpaced, 60s  4/24/24: Afib, Vpaced, 60s  4/25/24: Afib,  vpaced, 60-70,  7 beats NSVT

## 2024-04-25 NOTE — PROGRESS NOTE ADULT - PROBLEM SELECTOR PLAN 1
·  Problem: Acute on chronic systolic congestive heart failure.   ·  Recommendation: likely due to non adherence to diet & meds   right pleural effusion , no evidence pulmonary vascular congestion  right heart failure from TR pulmonary hypertension      Continue IV lasix 40 mg po BID ( home oral 80 mg po dialy ) continue lisinopril , coreg , aldactone  ( patient could not afford entresto or did not tolerate in the past )   TTE shows preserved LVEF, severe MR and pHTN    Elevated troponin , likely demand related ischemia , patient had LHC august 2020 had very minimal luminal irregularities    HF PA Maximiliano Donaldson following ·  Problem: Acute on chronic systolic congestive heart failure.   ·  Recommendation: likely due to non adherence to diet & meds   right pleural effusion , no evidence pulmonary vascular congestion  right heart failure from TR pulmonary hypertension      Continue IV lasix 40 mg po BID ( home oral 80 mg po daily ) cont, coreg , aldactone,   start low dose entresto, - plan d/w HF ROSANNA Donaldson, TTE shows preserved LVEF, severe MR and pHTN    Elevated troponin , likely demand related ischemia , patient had LHC august 2020 had very minimal luminal irregularities    HF ROSANNA Donaldson following

## 2024-04-25 NOTE — PROGRESS NOTE ADULT - SUBJECTIVE AND OBJECTIVE BOX
interpretation from staff  and chart   CHIEF COMPLAINT:  increasing LE swelling ,  testicular pain     HPI:  Chief Complaint: edema.    The patient is a 78y Male with significant PMH of  HTN, atrial fibrillation on Xarelto, Non-ischemic cardiomyopathy  cath 2020 minimal luminal irregularities , cardiac pacemaker in place, chronic systolic CHF, HPL, BPH, chronic thrombocytopenia, COPD and others presented to the ED with c/o B/L leg edema and some dry cough for last few days.  He also had orthopnea and he prefers to rest in reclining position than in laying flat. His B/L led edema has extended up to thighs and b/l loin areas including scrotal edema. He denies chest pain, fever, chills, N/V, abdominal pain, diarrhea or dysuria.  CT angio chest negative for PE.  CT abd/pelvis:  Moderate right pleural effusion and hepatic congestion. No SBP. CXR: Cardiomegaly with CHF at this time.   US Testicles: Bilateral varicoceles.  No testicular mass, torsion or epididymitis. Denies smoking, alcohol or substance abuse.    Sig labs: D-dimer elevated 1144 but CT angio chest negative for PE.  Pro-BNP 77993 (was 2000 in 03/2022), Troponin 254 (had 150s in past), Platelets 138k, Cr 1.62 (was 0.98 in 08/2023), .    CT Angio Chest PE Protocol, CT abd and Pelvis: IMPRESSION: No evidence of pulmonary embolism.  Moderate right pleural effusion with right lower lobe atelectasis. The lungs are otherwise clear.  Small volume ascites. Otherwise no findings identified to explain the patient's abdominal distention. No evidence of bowel obstruction.  Heterogeneous enhancement of the liver, suggestive of hepatic congestion. Cardiomegaly.    Xray Chest 1 View: Possible enlarging heart. Mild central CHF at this time.    US Testicles: Bilateral varicoceles, including intratesticular left varicocele.  No testicular mass, torsion or epididymitis.    Lasix 80 mg IV given in ED.    Patient does not have full insight in to his medical condition, he does not follow the recommendations that well , last seen in the office in dec 2023     4/24/24 - seen pt. in chair - [place]ican speaking, NP fluent - c/o pain in the scrotum, no anginal symptoms, Tele; Afib Vpaced, had NSVT x2 yesterday afternoon and at 1 AM today   4/25/24 - no cardiac complaints, Tele:       MEDICATIONS:Home Medications:  Daily-Cristina Men&#x27;s Formula oral tablet: 1 tab(s) orally once a day (22 Apr 2024 19:52)  finasteride 5 mg oral tablet: 1 tab(s) orally once a day (22 Apr 2024 19:52)  furosemide 40 mg oral tablet: 2 tab(s) orally once a day (22 Apr 2024 19:52)  omeprazole 20 mg oral delayed release capsule: 1 cap(s) orally once a day (22 Apr 2024 19:52)  tamsulosin 0.4 mg oral capsule: 1 cap(s) orally once a day (22 Apr 2024 19:52)    MEDICATIONS  (STANDING):  atorvastatin 20 milliGRAM(s) Oral at bedtime  budesonide  80 MICROgram(s)/formoterol 4.5 MICROgram(s) Inhaler 2 Puff(s) Inhalation two times a day  carvedilol 6.25 milliGRAM(s) Oral every 12 hours  finasteride 5 milliGRAM(s) Oral daily  furosemide   Injectable 40 milliGRAM(s) IV Push two times a day  lisinopril 10 milliGRAM(s) Oral daily  multivitamin 1 Tablet(s) Oral daily  pantoprazole    Tablet 40 milliGRAM(s) Oral before breakfast  rivaroxaban 15 milliGRAM(s) Oral with dinner  spironolactone 25 milliGRAM(s) Oral daily  tamsulosin 0.4 milliGRAM(s) Oral at bedtime      MEDICATIONS  (PRN):  acetaminophen     Tablet .. 650 milliGRAM(s) Oral every 6 hours PRN Temp greater or equal to 38C (100.4F), Mild Pain (1 - 3)  aluminum hydroxide/magnesium hydroxide/simethicone Suspension 30 milliLiter(s) Oral every 4 hours PRN Dyspepsia  guaifenesin/dextromethorphan Oral Liquid 10 milliLiter(s) Oral every 4 hours PRN Cough  hydrALAZINE Injectable 10 milliGRAM(s) IV Push every 4 hours PRN If SBP more than 150 mmHg  melatonin 3 milliGRAM(s) Oral at bedtime PRN Insomnia  ondansetron Injectable 4 milliGRAM(s) IV Push every 8 hours PRN Nausea and/or Vomiting    Vital Signs Last 24 Hrs  T(C): 36.4 (24 Apr 2024 08:03), Max: 36.4 (24 Apr 2024 08:03)  T(F): 97.5 (24 Apr 2024 08:03), Max: 97.5 (24 Apr 2024 08:03)  HR: 63 (24 Apr 2024 08:03) (63 - 68)  BP: 153/80 (24 Apr 2024 08:03) (135/71 - 153/80)  BP(mean): --  RR: 18 (24 Apr 2024 08:03) (18 - 18)  SpO2: 95% (24 Apr 2024 08:03) (95% - 95%)    Parameters below as of 24 Apr 2024 08:03  Patient On (Oxygen Delivery Method): room air      PHYSICAL EXAM:    Constitutional: NAD, awake and alert, well-developed  HEENT: PERR, EOMI,  No oral cyananosis.  Neck:  supple,  No JVD  Respiratory: Breath sounds are clear bilaterally, No wheezing, rales or rhonchi  Cardiovascular: S1 and S2, regular rate and rhythm, no Murmurs, gallops or rubs  Gastrointestinal: Bowel Sounds present, soft, nontender.   Extremities: 3 plus LE peripheral edema. No clubbing or cyanosis.  Vascular: 2+ peripheral pulses  Neurological: A/O x 3, no focal deficits  Musculoskeletal: no calf tenderness.  Skin: No rashes.      LABS: All Labs Reviewed:                                  14.0   10.93 )-----------( 138      ( 24 Apr 2024 06:00 )             45.4     04-24    138  |  99  |  39<H>  ----------------------------<  139<H>  4.0   |  34<H>  |  1.24    Ca    8.9      24 Apr 2024 06:00  Mg     2.2     04-23    TPro  6.7  /  Alb  3.6  /  TBili  1.7<H>  /  DBili  x   /  AST  33  /  ALT  35  /  AlkPhos  104  04-22    - TroponinI hsT: <-266.35, <-263.37, <-259.69, <-254.18    PT/INR - ( 22 Apr 2024 15:36 )   PT: 13.4 sec;   INR: 1.19 ratio         PTT - ( 22 Apr 2024 15:36 )  PTT:29.0 sec        Blood Culture:     < from: CT Angio Chest PE Protocol w/ IV Cont (04.22.24 @ 17:07) >  MPRESSION:  No evidence of pulmonary embolism.    Moderate right pleural effusion with right lower lobe atelectasis. The   lungs are otherwise clear.    Small volume ascites. Otherwise no findings identified to explain the   patient's abdominal distention. No evidence of bowel obstruction.    Heterogeneous enhancement of the liver, suggestive of hepatic congestion.    Cardiomegaly.      < end of copied text >  < from: TTE Echo Complete w/o Contrast w/ Doppler (08.25.23 @ 13:24) >  Summary     A micra device is noted in the RVOT. Device is highly   mobile and attached to RV free wall.     Endocardium is not well visualized, however, overall left ventricular   systolic function appears impaired. Technically Difficult Study.   Definity was used to better visualize the endocardial border.   Estimated left ventricular ejection fraction is 50 %.   A device wire is seen in the RV and RA.   Mild (1+) aortic regurgitation   Mild (1+) mitral regurgitation   Moderate (2+) tricuspid valve regurgitation. Mild pulmonary hypertension.    < end of copied text >      RADIOLOGY/EKG/TTE: reviewed     < from: TTE W or WO Ultrasound Enhancing Agent (04.23.24 @ 10:47) >    _______________________________________________________________________________________     CONCLUSIONS:      1. Left ventricular cavity is normal in size. Left ventricular systolic function is normal with an ejection fraction of 54 % by Rascon's method of disks.   2. Mildly enlarged right ventricular cavity size and normal systolic function.   3. The left atrium is severely dilated.   4. The right atrium is moderately dilated.   5. Device lead is visualized.   6. Severe mitral regurgitation. There is a dilated mitral annulus with an apically displaced closure pattern which could be c/w ischemic MR.   7. Moderate to severe tricuspid regurgitation.   8. Patent foramen ovale with left to right shunting by color flow Doppler.   9. Mild left ventricular hypertrophy.  10. Mild to moderate aortic regurgitation.  11. Severe pulmonary hypertension.  12. The inferior vena cava is dilated (dilated >2.1cm) with abnormal inspiratory collapse (abnormal <50%) consistent with elevated right atrial pressure (~15, range 10-20mmHg).  13. There ismild calcification of the aortic valve leaflets.  14. There is mild calcification of the mitral valve annulus.    ____________________________________________________________    < end of copied text >     paced rhythm     < from: Cardiac Cath Lab - Adult (08.28.20 @ 14:25) >  ardiac Arteries and Lesion Findings    LMCA: Diffuse irregularity.There is mild diffuse disease noted.    LAD: Diffuse irregularity.There is mild diffuse disease noted.    LCx: Diffuse irregularity.There is mild diffuse disease noted.    RCA: Diffuse irregularity.There is mild diffuse disease noted.    < end of copied text >      interpretation from staff  and chart   CHIEF COMPLAINT:  increasing LE swelling ,  testicular pain     HPI:  Chief Complaint: edema.    The patient is a 78y Male with significant PMH of  HTN, atrial fibrillation on Xarelto, Non-ischemic cardiomyopathy  cath 2020 minimal luminal irregularities , cardiac pacemaker in place, chronic systolic CHF, HPL, BPH, chronic thrombocytopenia, COPD and others presented to the ED with c/o B/L leg edema and some dry cough for last few days.  He also had orthopnea and he prefers to rest in reclining position than in laying flat. His B/L led edema has extended up to thighs and b/l loin areas including scrotal edema. He denies chest pain, fever, chills, N/V, abdominal pain, diarrhea or dysuria.  CT angio chest negative for PE.  CT abd/pelvis:  Moderate right pleural effusion and hepatic congestion. No SBP. CXR: Cardiomegaly with CHF at this time.   US Testicles: Bilateral varicoceles.  No testicular mass, torsion or epididymitis. Denies smoking, alcohol or substance abuse.    Sig labs: D-dimer elevated 1144 but CT angio chest negative for PE.  Pro-BNP 54365 (was 2000 in 03/2022), Troponin 254 (had 150s in past), Platelets 138k, Cr 1.62 (was 0.98 in 08/2023), .    CT Angio Chest PE Protocol, CT abd and Pelvis: IMPRESSION: No evidence of pulmonary embolism.  Moderate right pleural effusion with right lower lobe atelectasis. The lungs are otherwise clear.  Small volume ascites. Otherwise no findings identified to explain the patient's abdominal distention. No evidence of bowel obstruction.  Heterogeneous enhancement of the liver, suggestive of hepatic congestion. Cardiomegaly.    Xray Chest 1 View: Possible enlarging heart. Mild central CHF at this time.    US Testicles: Bilateral varicoceles, including intratesticular left varicocele.  No testicular mass, torsion or epididymitis.    Lasix 80 mg IV given in ED.    Patient does not have full insight in to his medical condition, he does not follow the recommendations that well , last seen in the office in dec 2023     4/24/24 - seen pt. in chair - [place]isian speaking, NP fluent - c/o pain in the scrotum, no anginal symptoms, Tele; Afib Vpaced, had NSVT x2 yesterday afternoon and at 1 AM today   4/25/24 - no cardiac complaints, Tele: Afib, Vpaced had 7 beasts of Vtach at 8 AM      MEDICATIONS:Home Medications:  Daily-Cristina Men&#x27;s Formula oral tablet: 1 tab(s) orally once a day (22 Apr 2024 19:52)  finasteride 5 mg oral tablet: 1 tab(s) orally once a day (22 Apr 2024 19:52)  furosemide 40 mg oral tablet: 2 tab(s) orally once a day (22 Apr 2024 19:52)  omeprazole 20 mg oral delayed release capsule: 1 cap(s) orally once a day (22 Apr 2024 19:52)  tamsulosin 0.4 mg oral capsule: 1 cap(s) orally once a day (22 Apr 2024 19:52)    MEDICATIONS  (STANDING):  atorvastatin 20 milliGRAM(s) Oral at bedtime  budesonide  80 MICROgram(s)/formoterol 4.5 MICROgram(s) Inhaler 2 Puff(s) Inhalation two times a day  carvedilol 6.25 milliGRAM(s) Oral every 12 hours  finasteride 5 milliGRAM(s) Oral daily  furosemide   Injectable 40 milliGRAM(s) IV Push two times a day  multivitamin 1 Tablet(s) Oral daily  pantoprazole    Tablet 40 milliGRAM(s) Oral before breakfast  rivaroxaban 15 milliGRAM(s) Oral with dinner  sacubitril 24 mG/valsartan 26 mG 1 Tablet(s) Oral two times a day  spironolactone 25 milliGRAM(s) Oral daily  tamsulosin 0.4 milliGRAM(s) Oral at bedtime    MEDICATIONS  (PRN):  acetaminophen     Tablet .. 650 milliGRAM(s) Oral every 6 hours PRN Temp greater or equal to 38C (100.4F), Mild Pain (1 - 3)  aluminum hydroxide/magnesium hydroxide/simethicone Suspension 30 milliLiter(s) Oral every 4 hours PRN Dyspepsia  guaifenesin/dextromethorphan Oral Liquid 10 milliLiter(s) Oral every 4 hours PRN Cough  hydrALAZINE Injectable 10 milliGRAM(s) IV Push every 4 hours PRN If SBP more than 150 mmHg  melatonin 3 milliGRAM(s) Oral at bedtime PRN Insomnia  ondansetron Injectable 4 milliGRAM(s) IV Push every 8 hours PRN Nausea and/or Vomiting      Vital Signs Last 24 Hrs  T(C): 36.3 (25 Apr 2024 08:00), Max: 36.3 (24 Apr 2024 20:31)  T(F): 97.4 (25 Apr 2024 08:00), Max: 97.4 (24 Apr 2024 20:31)  HR: 60 (25 Apr 2024 08:00) (60 - 65)  BP: 120/70 (25 Apr 2024 08:00) (120/70 - 137/70)  BP(mean): --  RR: 18 (25 Apr 2024 08:00) (18 - 18)  SpO2: 99% (25 Apr 2024 08:00) (97% - 99%)    Parameters below as of 25 Apr 2024 08:00  Patient On (Oxygen Delivery Method): room air      PHYSICAL EXAM:    Constitutional: NAD, awake and alert, well-developed  HEENT: PERR, EOMI,  No oral cyananosis.  Neck:  supple,  No JVD  Respiratory: Breath sounds are clear bilaterally, No wheezing, rales or rhonchi  Cardiovascular: S1 and S2, regular rate and rhythm, no Murmurs, gallops or rubs  Gastrointestinal: Bowel Sounds present, soft, nontender.   Extremities: 3 plus LE peripheral edema. No clubbing or cyanosis.  Vascular: 2+ peripheral pulses  Neurological: A/O x 3, no focal deficits  Musculoskeletal: no calf tenderness.  Skin: No rashes.      LABS: All Labs Reviewed                        15.0   10.42 )-----------( 156      ( 25 Apr 2024 06:05 )             47.6     04-25    136  |  99  |  40<H>  ----------------------------<  149<H>  4.0   |  34<H>  |  1.21    Ca    8.9      25 Apr 2024 06:05      - TroponinI hsT: <-266.35, <-263.37, <-259.69, <-254.18               Blood Culture:     < from: CT Angio Chest PE Protocol w/ IV Cont (04.22.24 @ 17:07) >  MPRESSION:  No evidence of pulmonary embolism.    Moderate right pleural effusion with right lower lobe atelectasis. The   lungs are otherwise clear.    Small volume ascites. Otherwise no findings identified to explain the   patient's abdominal distention. No evidence of bowel obstruction.    Heterogeneous enhancement of the liver, suggestive of hepatic congestion.    Cardiomegaly.      < end of copied text >  < from: TTE Echo Complete w/o Contrast w/ Doppler (08.25.23 @ 13:24) >  Summary     A micra device is noted in the RVOT. Device is highly   mobile and attached to RV free wall.     Endocardium is not well visualized, however, overall left ventricular   systolic function appears impaired. Technically Difficult Study.   Definity was used to better visualize the endocardial border.   Estimated left ventricular ejection fraction is 50 %.   A device wire is seen in the RV and RA.   Mild (1+) aortic regurgitation   Mild (1+) mitral regurgitation   Moderate (2+) tricuspid valve regurgitation. Mild pulmonary hypertension.    < end of copied text >      RADIOLOGY/EKG/TTE: reviewed     < from: TTE W or WO Ultrasound Enhancing Agent (04.23.24 @ 10:47) >    _______________________________________________________________________________________     CONCLUSIONS:      1. Left ventricular cavity is normal in size. Left ventricular systolic function is normal with an ejection fraction of 54 % by Rascon's method of disks.   2. Mildly enlarged right ventricular cavity size and normal systolic function.   3. The left atrium is severely dilated.   4. The right atrium is moderately dilated.   5. Device lead is visualized.   6. Severe mitral regurgitation. There is a dilated mitral annulus with an apically displaced closure pattern which could be c/w ischemic MR.   7. Moderate to severe tricuspid regurgitation.   8. Patent foramen ovale with left to right shunting by color flow Doppler.   9. Mild left ventricular hypertrophy.  10. Mild to moderate aortic regurgitation.  11. Severe pulmonary hypertension.  12. The inferior vena cava is dilated (dilated >2.1cm) with abnormal inspiratory collapse (abnormal <50%) consistent with elevated right atrial pressure (~15, range 10-20mmHg).  13. There ismild calcification of the aortic valve leaflets.  14. There is mild calcification of the mitral valve annulus.    ____________________________________________________________    < end of copied text >     paced rhythm     < from: Cardiac Cath Lab - Adult (08.28.20 @ 14:25) >  ardiac Arteries and Lesion Findings    LMCA: Diffuse irregularity.There is mild diffuse disease noted.    LAD: Diffuse irregularity.There is mild diffuse disease noted.    LCx: Diffuse irregularity.There is mild diffuse disease noted.    RCA: Diffuse irregularity.There is mild diffuse disease noted.    < end of copied text >

## 2024-04-26 LAB
ANION GAP SERPL CALC-SCNC: 1 MMOL/L — LOW (ref 5–17)
BUN SERPL-MCNC: 43 MG/DL — HIGH (ref 7–23)
CALCIUM SERPL-MCNC: 9 MG/DL — SIGNIFICANT CHANGE UP (ref 8.5–10.1)
CHLORIDE SERPL-SCNC: 100 MMOL/L — SIGNIFICANT CHANGE UP (ref 96–108)
CO2 SERPL-SCNC: 36 MMOL/L — HIGH (ref 22–31)
CREAT SERPL-MCNC: 1.27 MG/DL — SIGNIFICANT CHANGE UP (ref 0.5–1.3)
EGFR: 57 ML/MIN/1.73M2 — LOW
GLUCOSE SERPL-MCNC: 143 MG/DL — HIGH (ref 70–99)
POTASSIUM SERPL-MCNC: 4.1 MMOL/L — SIGNIFICANT CHANGE UP (ref 3.5–5.3)
POTASSIUM SERPL-SCNC: 4.1 MMOL/L — SIGNIFICANT CHANGE UP (ref 3.5–5.3)
SODIUM SERPL-SCNC: 137 MMOL/L — SIGNIFICANT CHANGE UP (ref 135–145)

## 2024-04-26 PROCEDURE — 99233 SBSQ HOSP IP/OBS HIGH 50: CPT

## 2024-04-26 PROCEDURE — 99232 SBSQ HOSP IP/OBS MODERATE 35: CPT

## 2024-04-26 RX ORDER — LOSARTAN POTASSIUM 100 MG/1
25 TABLET, FILM COATED ORAL DAILY
Refills: 0 | Status: DISCONTINUED | OUTPATIENT
Start: 2024-04-26 | End: 2024-04-26

## 2024-04-26 RX ORDER — METOPROLOL TARTRATE 50 MG
25 TABLET ORAL DAILY
Refills: 0 | Status: DISCONTINUED | OUTPATIENT
Start: 2024-04-26 | End: 2024-04-26

## 2024-04-26 RX ORDER — FUROSEMIDE 40 MG
2 TABLET ORAL
Refills: 0 | DISCHARGE

## 2024-04-26 RX ORDER — RIVAROXABAN 15 MG-20MG
1 KIT ORAL
Qty: 30 | Refills: 0
Start: 2024-04-26 | End: 2024-05-25

## 2024-04-26 RX ORDER — FUROSEMIDE 40 MG
1 TABLET ORAL
Qty: 30 | Refills: 0
Start: 2024-04-26 | End: 2024-05-25

## 2024-04-26 RX ORDER — SACUBITRIL AND VALSARTAN 24; 26 MG/1; MG/1
1 TABLET, FILM COATED ORAL
Qty: 60 | Refills: 0
Start: 2024-04-26 | End: 2024-05-25

## 2024-04-26 RX ORDER — FUROSEMIDE 40 MG
40 TABLET ORAL DAILY
Refills: 0 | Status: DISCONTINUED | OUTPATIENT
Start: 2024-04-26 | End: 2024-04-28

## 2024-04-26 RX ORDER — METOPROLOL TARTRATE 50 MG
25 TABLET ORAL AT BEDTIME
Refills: 0 | Status: DISCONTINUED | OUTPATIENT
Start: 2024-04-26 | End: 2024-04-27

## 2024-04-26 RX ORDER — SACUBITRIL AND VALSARTAN 24; 26 MG/1; MG/1
0.5 TABLET, FILM COATED ORAL
Refills: 0 | Status: DISCONTINUED | OUTPATIENT
Start: 2024-04-26 | End: 2024-04-27

## 2024-04-26 RX ORDER — SPIRONOLACTONE 25 MG/1
12.5 TABLET, FILM COATED ORAL DAILY
Refills: 0 | Status: DISCONTINUED | OUTPATIENT
Start: 2024-04-26 | End: 2024-04-28

## 2024-04-26 RX ORDER — METOPROLOL TARTRATE 50 MG
12.5 TABLET ORAL DAILY
Refills: 0 | Status: DISCONTINUED | OUTPATIENT
Start: 2024-04-26 | End: 2024-04-26

## 2024-04-26 RX ADMIN — Medication 40 MILLIGRAM(S): at 11:15

## 2024-04-26 RX ADMIN — BUDESONIDE AND FORMOTEROL FUMARATE DIHYDRATE 2 PUFF(S): 160; 4.5 AEROSOL RESPIRATORY (INHALATION) at 20:20

## 2024-04-26 RX ADMIN — TAMSULOSIN HYDROCHLORIDE 0.4 MILLIGRAM(S): 0.4 CAPSULE ORAL at 21:48

## 2024-04-26 RX ADMIN — Medication 1 TABLET(S): at 11:15

## 2024-04-26 RX ADMIN — ATORVASTATIN CALCIUM 20 MILLIGRAM(S): 80 TABLET, FILM COATED ORAL at 21:47

## 2024-04-26 RX ADMIN — PANTOPRAZOLE SODIUM 40 MILLIGRAM(S): 20 TABLET, DELAYED RELEASE ORAL at 06:00

## 2024-04-26 RX ADMIN — FINASTERIDE 5 MILLIGRAM(S): 5 TABLET, FILM COATED ORAL at 11:15

## 2024-04-26 RX ADMIN — RIVAROXABAN 15 MILLIGRAM(S): KIT at 18:00

## 2024-04-26 RX ADMIN — SPIRONOLACTONE 25 MILLIGRAM(S): 25 TABLET, FILM COATED ORAL at 11:16

## 2024-04-26 NOTE — PROGRESS NOTE ADULT - SUBJECTIVE AND OBJECTIVE BOX
interpretation from staff  and chart   CHIEF COMPLAINT:  increasing LE swelling ,  testicular pain     HPI:  Chief Complaint: edema.    The patient is a 78y Male with significant PMH of  HTN, atrial fibrillation on Xarelto, Non-ischemic cardiomyopathy  cath 2020 minimal luminal irregularities , cardiac pacemaker in place, chronic systolic CHF, HPL, BPH, chronic thrombocytopenia, COPD and others presented to the ED with c/o B/L leg edema and some dry cough for last few days.  He also had orthopnea and he prefers to rest in reclining position than in laying flat. His B/L led edema has extended up to thighs and b/l loin areas including scrotal edema. He denies chest pain, fever, chills, N/V, abdominal pain, diarrhea or dysuria.  CT angio chest negative for PE.  CT abd/pelvis:  Moderate right pleural effusion and hepatic congestion. No SBP. CXR: Cardiomegaly with CHF at this time.   US Testicles: Bilateral varicoceles.  No testicular mass, torsion or epididymitis. Denies smoking, alcohol or substance abuse.    Sig labs: D-dimer elevated 1144 but CT angio chest negative for PE.  Pro-BNP 30775 (was 2000 in 03/2022), Troponin 254 (had 150s in past), Platelets 138k, Cr 1.62 (was 0.98 in 08/2023), .    CT Angio Chest PE Protocol, CT abd and Pelvis: IMPRESSION: No evidence of pulmonary embolism.  Moderate right pleural effusion with right lower lobe atelectasis. The lungs are otherwise clear.  Small volume ascites. Otherwise no findings identified to explain the patient's abdominal distention. No evidence of bowel obstruction.  Heterogeneous enhancement of the liver, suggestive of hepatic congestion. Cardiomegaly.    Xray Chest 1 View: Possible enlarging heart. Mild central CHF at this time.    US Testicles: Bilateral varicoceles, including intratesticular left varicocele.  No testicular mass, torsion or epididymitis.    Lasix 80 mg IV given in ED.    Patient does not have full insight in to his medical condition, he does not follow the recommendations that well , last seen in the office in dec 2023     4/24/24 - seen pt. in chair - [place] speaking, NP fluent - c/o pain in the scrotum, no anginal symptoms, Tele; Afib Vpaced, had NSVT x2 yesterday afternoon and at 1 AM today   4/25/24 - no cardiac complaints, Tele: Afib, Vpaced had 7 beasts of Vtach at 8 AM  4/26/24 - feels better, no edema      MEDICATIONS:Home Medications:  Daily-Cristina Men&#x27;s Formula oral tablet: 1 tab(s) orally once a day (22 Apr 2024 19:52)  finasteride 5 mg oral tablet: 1 tab(s) orally once a day (22 Apr 2024 19:52)  furosemide 40 mg oral tablet: 2 tab(s) orally once a day (22 Apr 2024 19:52)  omeprazole 20 mg oral delayed release capsule: 1 cap(s) orally once a day (22 Apr 2024 19:52)  tamsulosin 0.4 mg oral capsule: 1 cap(s) orally once a day (22 Apr 2024 19:52)    MEDICATIONS  (STANDING):  atorvastatin 20 milliGRAM(s) Oral at bedtime  budesonide  80 MICROgram(s)/formoterol 4.5 MICROgram(s) Inhaler 2 Puff(s) Inhalation two times a day  finasteride 5 milliGRAM(s) Oral daily  furosemide   Injectable 40 milliGRAM(s) IV Push daily  multivitamin 1 Tablet(s) Oral daily  pantoprazole    Tablet 40 milliGRAM(s) Oral before breakfast  rivaroxaban 15 milliGRAM(s) Oral with dinner  toprol xl 12.5 mg po daily   sacubitril 24 mG/valsartan 26 mG 1 Tablet(s) Oral two times a day  spironolactone 25 milliGRAM(s) Oral daily  tamsulosin 0.4 milliGRAM(s) Oral at bedtime      MEDICATIONS  (PRN):  acetaminophen     Tablet .. 650 milliGRAM(s) Oral every 6 hours PRN Temp greater or equal to 38C (100.4F), Mild Pain (1 - 3)  aluminum hydroxide/magnesium hydroxide/simethicone Suspension 30 milliLiter(s) Oral every 4 hours PRN Dyspepsia  guaifenesin/dextromethorphan Oral Liquid 10 milliLiter(s) Oral every 4 hours PRN Cough  hydrALAZINE Injectable 10 milliGRAM(s) IV Push every 4 hours PRN If SBP more than 150 mmHg  melatonin 3 milliGRAM(s) Oral at bedtime PRN Insomnia  ondansetron Injectable 4 milliGRAM(s) IV Push every 8 hours PRN Nausea and/or Vomiting    Vital Signs Last 24 Hrs  T(C): 36.5 (26 Apr 2024 08:48), Max: 36.5 (26 Apr 2024 08:48)  T(F): 97.7 (26 Apr 2024 08:48), Max: 97.7 (26 Apr 2024 08:48)  HR: 62 (26 Apr 2024 08:48) (62 - 66)  BP: 95/54 (26 Apr 2024 08:48) (95/54 - 112/60)  BP(mean): --  RR: 18 (26 Apr 2024 08:48) (18 - 18)  SpO2: 98% (26 Apr 2024 08:48) (96% - 98%)    Parameters below as of 26 Apr 2024 08:48  Patient On (Oxygen Delivery Method): room air    PHYSICAL EXAM:    Constitutional: NAD, awake and alert, well-developed  HEENT: PERR, EOMI,  No oral cyananosis.  Neck:  supple,  No JVD  Respiratory: Breath sounds are clear bilaterally, No wheezing, rales or rhonchi  Cardiovascular: S1 and S2, regular rate and rhythm, no Murmurs, gallops or rubs  Gastrointestinal: Bowel Sounds present, soft, nontender.   Extremities: 3 plus LE peripheral edema. No clubbing or cyanosis.  Vascular: 2+ peripheral pulses  Neurological: A/O x 3, no focal deficits  Musculoskeletal: no calf tenderness.  Skin: No rashes.      LABS: All Labs Reviewed                               15.0   10.42 )-----------( 156      ( 25 Apr 2024 06:05 )             47.6     04-26    137  |  100  |  43<H>  ----------------------------<  143<H>  4.1   |  36<H>  |  1.27    Ca    9.0      26 Apr 2024 06:40      - TroponinI hsT: <-266.35, <-263.37, <-259.69, <-254.18    - TroponinI hsT: <-266.35, <-263.37, <-259.69, <-254.18               Blood Culture:     < from: CT Angio Chest PE Protocol w/ IV Cont (04.22.24 @ 17:07) >  MPRESSION:  No evidence of pulmonary embolism.    Moderate right pleural effusion with right lower lobe atelectasis. The   lungs are otherwise clear.    Small volume ascites. Otherwise no findings identified to explain the   patient's abdominal distention. No evidence of bowel obstruction.    Heterogeneous enhancement of the liver, suggestive of hepatic congestion.    Cardiomegaly.      < end of copied text >  < from: TTE Echo Complete w/o Contrast w/ Doppler (08.25.23 @ 13:24) >  Summary     A micra device is noted in the RVOT. Device is highly   mobile and attached to RV free wall.     Endocardium is not well visualized, however, overall left ventricular   systolic function appears impaired. Technically Difficult Study.   Definity was used to better visualize the endocardial border.   Estimated left ventricular ejection fraction is 50 %.   A device wire is seen in the RV and RA.   Mild (1+) aortic regurgitation   Mild (1+) mitral regurgitation   Moderate (2+) tricuspid valve regurgitation. Mild pulmonary hypertension.    < end of copied text >      RADIOLOGY/EKG/TTE: reviewed     < from: TTE W or WO Ultrasound Enhancing Agent (04.23.24 @ 10:47) >    _______________________________________________________________________________________     CONCLUSIONS:      1. Left ventricular cavity is normal in size. Left ventricular systolic function is normal with an ejection fraction of 54 % by Rascon's method of disks.   2. Mildly enlarged right ventricular cavity size and normal systolic function.   3. The left atrium is severely dilated.   4. The right atrium is moderately dilated.   5. Device lead is visualized.   6. Severe mitral regurgitation. There is a dilated mitral annulus with an apically displaced closure pattern which could be c/w ischemic MR.   7. Moderate to severe tricuspid regurgitation.   8. Patent foramen ovale with left to right shunting by color flow Doppler.   9. Mild left ventricular hypertrophy.  10. Mild to moderate aortic regurgitation.  11. Severe pulmonary hypertension.  12. The inferior vena cava is dilated (dilated >2.1cm) with abnormal inspiratory collapse (abnormal <50%) consistent with elevated right atrial pressure (~15, range 10-20mmHg).  13. There ismild calcification of the aortic valve leaflets.  14. There is mild calcification of the mitral valve annulus.    ____________________________________________________________    < end of copied text >     paced rhythm     < from: Cardiac Cath Lab - Adult (08.28.20 @ 14:25) >  ardiac Arteries and Lesion Findings    LMCA: Diffuse irregularity.There is mild diffuse disease noted.    LAD: Diffuse irregularity.There is mild diffuse disease noted.    LCx: Diffuse irregularity.There is mild diffuse disease noted.    RCA: Diffuse irregularity.There is mild diffuse disease noted.    < end of copied text >      interpretation from staff  and chart   CHIEF COMPLAINT:  increasing LE swelling ,  testicular pain     HPI:  Chief Complaint: edema.    The patient is a 78y Male with significant PMH of  HTN, atrial fibrillation on Xarelto, Non-ischemic cardiomyopathy  cath 2020 minimal luminal irregularities , cardiac pacemaker in place, chronic systolic CHF, HPL, BPH, chronic thrombocytopenia, COPD and others presented to the ED with c/o B/L leg edema and some dry cough for last few days.  He also had orthopnea and he prefers to rest in reclining position than in laying flat. His B/L led edema has extended up to thighs and b/l loin areas including scrotal edema. He denies chest pain, fever, chills, N/V, abdominal pain, diarrhea or dysuria.  CT angio chest negative for PE.  CT abd/pelvis:  Moderate right pleural effusion and hepatic congestion. No SBP. CXR: Cardiomegaly with CHF at this time.   US Testicles: Bilateral varicoceles.  No testicular mass, torsion or epididymitis. Denies smoking, alcohol or substance abuse.    Sig labs: D-dimer elevated 1144 but CT angio chest negative for PE.  Pro-BNP 01576 (was 2000 in 03/2022), Troponin 254 (had 150s in past), Platelets 138k, Cr 1.62 (was 0.98 in 08/2023), .    CT Angio Chest PE Protocol, CT abd and Pelvis: IMPRESSION: No evidence of pulmonary embolism.  Moderate right pleural effusion with right lower lobe atelectasis. The lungs are otherwise clear.  Small volume ascites. Otherwise no findings identified to explain the patient's abdominal distention. No evidence of bowel obstruction.  Heterogeneous enhancement of the liver, suggestive of hepatic congestion. Cardiomegaly.    Xray Chest 1 View: Possible enlarging heart. Mild central CHF at this time.    US Testicles: Bilateral varicoceles, including intratesticular left varicocele.  No testicular mass, torsion or epididymitis.    Lasix 80 mg IV given in ED.    Patient does not have full insight in to his medical condition, he does not follow the recommendations that well , last seen in the office in dec 2023     4/24/24 - seen pt. in chair - Citizen of [place] speaking, NP fluent - c/o pain in the scrotum, no anginal symptoms, Tele; Afib Vpaced, had NSVT x2 yesterday afternoon and at 1 AM today   4/25/24 - no cardiac complaints, Tele: Shey, Vpaced had 7 beasts of Vtach at 8 AM  4/26/24 - feels better, no edema, now with hypotension, Tele: Shey 60-70       MEDICATIONS:Home Medications:  Daily-Cristina Men&#x27;s Formula oral tablet: 1 tab(s) orally once a day (22 Apr 2024 19:52)  finasteride 5 mg oral tablet: 1 tab(s) orally once a day (22 Apr 2024 19:52)  furosemide 40 mg oral tablet: 2 tab(s) orally once a day (22 Apr 2024 19:52)  omeprazole 20 mg oral delayed release capsule: 1 cap(s) orally once a day (22 Apr 2024 19:52)  tamsulosin 0.4 mg oral capsule: 1 cap(s) orally once a day (22 Apr 2024 19:52)    MEDICATIONS  (STANDING):  atorvastatin 20 milliGRAM(s) Oral at bedtime  budesonide  80 MICROgram(s)/formoterol 4.5 MICROgram(s) Inhaler 2 Puff(s) Inhalation two times a day  finasteride 5 milliGRAM(s) Oral daily  furosemide   Injectable 40 milliGRAM(s) po daily   multivitamin 1 Tablet(s) Oral daily  pantoprazole    Tablet 40 milliGRAM(s) Oral before breakfast  rivaroxaban 15 milliGRAM(s) Oral with dinner  toprol xl 25 mg po daily   spironolactone 25 milliGRAM(s) Oral daily  tamsulosin 0.4 milliGRAM(s) Oral at bedtime      MEDICATIONS  (PRN):  acetaminophen     Tablet .. 650 milliGRAM(s) Oral every 6 hours PRN Temp greater or equal to 38C (100.4F), Mild Pain (1 - 3)  aluminum hydroxide/magnesium hydroxide/simethicone Suspension 30 milliLiter(s) Oral every 4 hours PRN Dyspepsia  guaifenesin/dextromethorphan Oral Liquid 10 milliLiter(s) Oral every 4 hours PRN Cough  hydrALAZINE Injectable 10 milliGRAM(s) IV Push every 4 hours PRN If SBP more than 150 mmHg  melatonin 3 milliGRAM(s) Oral at bedtime PRN Insomnia  ondansetron Injectable 4 milliGRAM(s) IV Push every 8 hours PRN Nausea and/or Vomiting    Vital Signs Last 24 Hrs  T(C): 36.5 (26 Apr 2024 08:48), Max: 36.5 (26 Apr 2024 08:48)  T(F): 97.7 (26 Apr 2024 08:48), Max: 97.7 (26 Apr 2024 08:48)  HR: 62 (26 Apr 2024 08:48) (62 - 66)  BP: 95/54 (26 Apr 2024 08:48) (95/54 - 112/60)  BP(mean): --  RR: 18 (26 Apr 2024 08:48) (18 - 18)  SpO2: 98% (26 Apr 2024 08:48) (96% - 98%)    Parameters below as of 26 Apr 2024 08:48  Patient On (Oxygen Delivery Method): room air    PHYSICAL EXAM:    Constitutional: NAD, awake and alert, well-developed  HEENT: PERR, EOMI,  No oral cyananosis.  Neck:  supple,  No JVD  Respiratory: Breath sounds are clear bilaterally, No wheezing, rales or rhonchi  Cardiovascular: S1 and S2, regular rate and rhythm, no Murmurs, gallops or rubs  Gastrointestinal: Bowel Sounds present, soft, nontender.   Extremities: 3 plus LE peripheral edema. No clubbing or cyanosis.  Vascular: 2+ peripheral pulses  Neurological: A/O x 3, no focal deficits  Musculoskeletal: no calf tenderness.  Skin: No rashes.      LABS: All Labs Reviewed                               15.0   10.42 )-----------( 156      ( 25 Apr 2024 06:05 )             47.6     04-26    137  |  100  |  43<H>  ----------------------------<  143<H>  4.1   |  36<H>  |  1.27    Ca    9.0      26 Apr 2024 06:40      - TroponinI hsT: <-266.35, <-263.37, <-259.69, <-254.18    - TroponinI hsT: <-266.35, <-263.37, <-259.69, <-254.18               Blood Culture:     < from: CT Angio Chest PE Protocol w/ IV Cont (04.22.24 @ 17:07) >  MPRESSION:  No evidence of pulmonary embolism.    Moderate right pleural effusion with right lower lobe atelectasis. The   lungs are otherwise clear.    Small volume ascites. Otherwise no findings identified to explain the   patient's abdominal distention. No evidence of bowel obstruction.    Heterogeneous enhancement of the liver, suggestive of hepatic congestion.    Cardiomegaly.      < end of copied text >  < from: TTE Echo Complete w/o Contrast w/ Doppler (08.25.23 @ 13:24) >  Summary     A micra device is noted in the RVOT. Device is highly   mobile and attached to RV free wall.     Endocardium is not well visualized, however, overall left ventricular   systolic function appears impaired. Technically Difficult Study.   Definity was used to better visualize the endocardial border.   Estimated left ventricular ejection fraction is 50 %.   A device wire is seen in the RV and RA.   Mild (1+) aortic regurgitation   Mild (1+) mitral regurgitation   Moderate (2+) tricuspid valve regurgitation. Mild pulmonary hypertension.    < end of copied text >      RADIOLOGY/EKG/TTE: reviewed     < from: TTE W or WO Ultrasound Enhancing Agent (04.23.24 @ 10:47) >    _______________________________________________________________________________________     CONCLUSIONS:      1. Left ventricular cavity is normal in size. Left ventricular systolic function is normal with an ejection fraction of 54 % by Rascon's method of disks.   2. Mildly enlarged right ventricular cavity size and normal systolic function.   3. The left atrium is severely dilated.   4. The right atrium is moderately dilated.   5. Device lead is visualized.   6. Severe mitral regurgitation. There is a dilated mitral annulus with an apically displaced closure pattern which could be c/w ischemic MR.   7. Moderate to severe tricuspid regurgitation.   8. Patent foramen ovale with left to right shunting by color flow Doppler.   9. Mild left ventricular hypertrophy.  10. Mild to moderate aortic regurgitation.  11. Severe pulmonary hypertension.  12. The inferior vena cava is dilated (dilated >2.1cm) with abnormal inspiratory collapse (abnormal <50%) consistent with elevated right atrial pressure (~15, range 10-20mmHg).  13. There ismild calcification of the aortic valve leaflets.  14. There is mild calcification of the mitral valve annulus.    ____________________________________________________________    < end of copied text >     paced rhythm     < from: Cardiac Cath Lab - Adult (08.28.20 @ 14:25) >  ardiac Arteries and Lesion Findings    LMCA: Diffuse irregularity.There is mild diffuse disease noted.    LAD: Diffuse irregularity.There is mild diffuse disease noted.    LCx: Diffuse irregularity.There is mild diffuse disease noted.    RCA: Diffuse irregularity.There is mild diffuse disease noted.    < end of copied text >

## 2024-04-26 NOTE — PROGRESS NOTE ADULT - PROBLEM SELECTOR PLAN 1
- Can change carvedilol to metoprolol succinate 25 mg po daily in the setting of lower BPs.  - Change lasix to 40 mg po daily    - Continue entresto 24-26 mg po BID   - continue spironolactone 25 mg po daily  - will plan on adding SGLT2i in office   - strict I & Os  - Daily Standing weights  - pt from [place], with history of HF and arrhythmias, trypanosoma labs pending  - Will follow up in HF office next week for GDMT escalation

## 2024-04-26 NOTE — PROGRESS NOTE ADULT - SUBJECTIVE AND OBJECTIVE BOX
HOSPITALIST ATTENDING PROGRESS NOTE    Chart and meds reviewed.  Patient seen and examined.    CC: CHF    Subjective: Hypotensive today, titrating CHF meds.    All other systems reviewed and found to be negative with the exception of what has been described above.    MEDICATIONS  (STANDING):  atorvastatin 20 milliGRAM(s) Oral at bedtime  budesonide  80 MICROgram(s)/formoterol 4.5 MICROgram(s) Inhaler 2 Puff(s) Inhalation two times a day  finasteride 5 milliGRAM(s) Oral daily  furosemide    Tablet 40 milliGRAM(s) Oral daily  metoprolol succinate ER 25 milliGRAM(s) Oral at bedtime  multivitamin 1 Tablet(s) Oral daily  pantoprazole    Tablet 40 milliGRAM(s) Oral before breakfast  rivaroxaban 15 milliGRAM(s) Oral with dinner  sacubitril 24 mG/valsartan 26 mG 0.5 Tablet(s) Oral two times a day  spironolactone 12.5 milliGRAM(s) Oral daily  tamsulosin 0.4 milliGRAM(s) Oral at bedtime    MEDICATIONS  (PRN):  acetaminophen     Tablet .. 650 milliGRAM(s) Oral every 6 hours PRN Temp greater or equal to 38C (100.4F), Mild Pain (1 - 3)  aluminum hydroxide/magnesium hydroxide/simethicone Suspension 30 milliLiter(s) Oral every 4 hours PRN Dyspepsia  melatonin 3 milliGRAM(s) Oral at bedtime PRN Insomnia  ondansetron Injectable 4 milliGRAM(s) IV Push every 8 hours PRN Nausea and/or Vomiting      VITALS:  T(F): 97.7 (04-26-24 @ 08:48), Max: 97.7 (04-26-24 @ 08:48)  HR: 68 (04-26-24 @ 12:59) (60 - 68)  BP: 83/52 (04-26-24 @ 12:59) (83/52 - 108/65)  RR: 18 (04-26-24 @ 08:48) (18 - 18)  SpO2: 98% (04-26-24 @ 08:48) (96% - 98%)  Wt(kg): --    I&O's Summary      CAPILLARY BLOOD GLUCOSE          PHYSICAL EXAM:  Gen: NAD  HEENT:  pupils equal and reactive, EOMI, no oropharyngeal lesions, erythema, exudates, oral thrush  NECK:   supple, no carotid bruits, no palpable lymph nodes, no thyromegaly  CV:  +S1, +S2, regular, no murmurs or rubs  RESP:   lungs clear to auscultation bilaterally, no wheezing, rales, rhonchi, good air entry bilaterally  BREAST:  not examined  GI:  abdomen soft, non-tender, non-distended, normal BS, no bruits, no abdominal masses, no palpable masses  RECTAL:  not examined  :  not examined  MSK:   normal muscle tone, no atrophy, no rigidity, no contractions  EXT:  no clubbing, no cyanosis, no edema, no calf pain, swelling or erythema  VASCULAR:  pulses equal and symmetric in the upper and lower extremities  NEURO:  AAOX3, no focal neurological deficits, follows all commands, able to move extremities spontaneously  SKIN:  no ulcers, lesions or rashes    LABS:                            15.0   10.42 )-----------( 156      ( 25 Apr 2024 06:05 )             47.6     04-26    137  |  100  |  43<H>  ----------------------------<  143<H>  4.1   |  36<H>  |  1.27    Ca    9.0      26 Apr 2024 06:40    Urinalysis Basic - ( 26 Apr 2024 06:40 )    Color: x / Appearance: x / SG: x / pH: x  Gluc: 143 mg/dL / Ketone: x  / Bili: x / Urobili: x   Blood: x / Protein: x / Nitrite: x   Leuk Esterase: x / RBC: x / WBC x   Sq Epi: x / Non Sq Epi: x / Bacteria: x    CULTURES:  no new    Additional results/Imaging, I have personally reviewed:  CXR 4/22/24: Possible enlarging heart. Mild central CHF at this time.    US testicles 4/22/24:  Bilateral varicoceles, including intratesticular left varicocele. No testicular mass,torsion or epididymitis.    CTA PE protocol and a/p  4/22/24: No evidence of pulmonary embolism. Moderate right pleural effusion with right lower lobe atelectasis. The lungs are otherwise clear. Small volume ascites. Otherwise no findings identified to explain the patient's abdominal distention. No evidence of bowel obstruction. Heterogeneous enhancement of the liver, suggestive of hepatic congestion. Cardiomegaly.    Echo  4/23/24: 1. Left ventricular cavity is normal in size. Left ventricular systolic function is normal with an ejection fraction of 54 % by Rascon's method of disks. 2. Mildly enlarged right ventricular cavity size and normal systolic function. 3. The left atrium is severely dilated. 4. The right atrium is moderately dilated. 5. Device lead is visualized. 6. Severe mitral regurgitation. There is a dilated mitral annulus with an apically displaced closure pattern which could be c/w ischemic MR. 7. Moderate to severe tricuspid regurgitation. 8. Patent foramen ovale with left to right shunting by color flow Doppler. 9. Mild left ventricular hypertrophy. 10. Mild to moderate aortic regurgitation. 11. Severe pulmonary hypertension. 12. The inferior vena cava is dilated (dilated >2.1cm) with abnormal inspiratory collapse (abnormal <50%) consistent with elevated right atrial pressure (~15, range 10-20mmHg). 13. There ismild calcification of the aortic valve leaflets. 14. There is mild calcification of the mitral valve annulus.    PPM interrogation 4/23/24: · Underlying Rhythm  Afib, v paced  · Comments  Patient is pacemaker dependent  · Additional Procedure Details  Single chamber pacemaker with normal function, adequate pacing threshold. Stored data with brief NSVT today lasting one second.  Patient with Micra pacemaker which is turned off and non functional according to cardiology notes from 8/2023 discussed with EP and there is no need for Micra removal as it is turned off.    Telemetry, personally reviewed:  4/23/24:  Afib, vpaced, 60s  4/24/24: Afib, Vpaced, 60s  4/25/24: Afib,  vpaced, 60-70,  7 beats NSVT  4/26/24: afib, vpaced, d/c tele

## 2024-04-26 NOTE — PROGRESS NOTE ADULT - PROBLEM SELECTOR PLAN 1
·  Problem: Acute on chronic systolic congestive heart failure.   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 aldactone and entresto, dc coreg due to hypotension and change to low dose toprol xl 12.5 mg po daily - can increase outpatient if BP tolerates - plan d/w HF ROSANNA Donaldson    pt. is stable for discharge from cardiac standpoint, followup with Dr. Palla in one week, will sign off         HF ROSANNA Donaldson following ·  Problem: Acute on chronic systolic congestive heart failure.   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 aldactone, hold entresto for now due to hypotension,  dc coreg due to hypotension and change to low dose toprol xl 25 mg po daily - can increase outpatient if BP tolerates - plan d/w HF ROSANNA Donaldson             HF ROSANNA Donaldson following

## 2024-04-26 NOTE — PROGRESS NOTE ADULT - SUBJECTIVE AND OBJECTIVE BOX
Subjective:    Feeling well, denies dyspnea/CP     Medications:  acetaminophen     Tablet .. 650 milliGRAM(s) Oral every 6 hours PRN  aluminum hydroxide/magnesium hydroxide/simethicone Suspension 30 milliLiter(s) Oral every 4 hours PRN  atorvastatin 20 milliGRAM(s) Oral at bedtime  budesonide  80 MICROgram(s)/formoterol 4.5 MICROgram(s) Inhaler 2 Puff(s) Inhalation two times a day  finasteride 5 milliGRAM(s) Oral daily  furosemide   Injectable 40 milliGRAM(s) IV Push daily  melatonin 3 milliGRAM(s) Oral at bedtime PRN  metoprolol succinate ER 25 milliGRAM(s) Oral daily  multivitamin 1 Tablet(s) Oral daily  ondansetron Injectable 4 milliGRAM(s) IV Push every 8 hours PRN  pantoprazole    Tablet 40 milliGRAM(s) Oral before breakfast  rivaroxaban 15 milliGRAM(s) Oral with dinner  sacubitril 24 mG/valsartan 26 mG 1 Tablet(s) Oral two times a day  spironolactone 25 milliGRAM(s) Oral daily  tamsulosin 0.4 milliGRAM(s) Oral at bedtime      Physical Exam:    Vitals:  Vital Signs Last 24 Hours  T(C): 36.5 (24 @ 08:48), Max: 36.5 (24 @ 08:48)  HR: 62 (24 @ 08:48) (62 - 66)  BP: 95/54 (24 @ 08:48) (95/54 - 110/60)  RR: 18 (24 @ 08:48) (18 - 18)  SpO2: 98% (24 @ 08:48) (96% - 98%)    Weight in k.9 ( @ 07:41)    I&O's Summary      Tele: Sinus 60s     General: No distress. Comfortable.  HEENT: EOM intact.  Neck: Neck supple. JVP not elevated. No masses  Chest: Clear to auscultation bilaterally  CV: Normal S1 and S2. No murmurs, rub, or gallops. Radial pulses normal.  Abdomen: Soft, non-distended, non-tender  Skin: No rashes or skin breakdown  Extremities:  Warm, no edema   Neurology: Alert and oriented times three. Sensation intact  Psych: Affect normal    Labs:                        15.0   10.42 )-----------( 156      ( 2024 06:05 )             47.6     04    137  |  100  |  43<H>  ----------------------------<  143<H>  4.1   |  36<H>  |  1.27    Ca    9.0      2024 06:40

## 2024-04-26 NOTE — PROGRESS NOTE ADULT - PROBLEM SELECTOR PLAN 3
·  Problem: Valvular heart disease.   ·  Recommendation: TTE shows severe MR and severe pHTN , repeat echo oupatient - will followup with Dr. Palla

## 2024-04-26 NOTE — PROGRESS NOTE ADULT - TIME BILLING
Time spent  coordinating the patient's care. This includes reviewing documentation pertinent to this admission, results and imaging. Further tests, medications, and procedures have been ordered as indicated. Laboratory results and the plan of care were communicated to the patient. Discussed care plan with consultants including cards,  EP. Supporting documentation was completed and added to the patient's chart.
Time spent  coordinating the patient's care. This includes reviewing documentation pertinent to this admission, results and imaging. Further tests, medications, and procedures have been ordered as indicated. Laboratory results and the plan of care were communicated to the patient. Discussed care plan with consultants including cards, CHF. Supporting documentation was completed and added to the patient's chart.
Time spent  coordinating the patient's care. This includes reviewing documentation pertinent to this admission, results and imaging. Further tests, medications, and procedures have been ordered as indicated. Laboratory results and the plan of care were communicated to the patient. Discussed care plan with consultants including cards, CHF. Supporting documentation was completed and added to the patient's chart.

## 2024-04-26 NOTE — PROGRESS NOTE ADULT - PROBLEM SELECTOR PLAN 2
·  Problem: Chronic atrial fibrillation.   ·  Recommendation: had runs of NSVT 2 days ago - multifactorial - fluid overload, valvular disease, cot current regimen  . PPM was interrogated by EP - Single chamber pacemaker with normal function, adequate pacing threshold. Stored data with brief NSVT lasting one second.  Patient with Micra pacemaker which is turned off and non functional, there is no need for Micra removal as it is turned off. ·  Problem: Chronic atrial fibrillation.   ·  Recommendation: had runs of NSVT 2 days ago - multifactorial - fluid overload, valvular disease, cot current regimen  - dc coreg and start toprol xl 25 mg po daily due to hypotension   . PPM was interrogated by EP - Single chamber pacemaker with normal function, adequate pacing threshold. Stored data with brief NSVT lasting one second.  Patient with Micra pacemaker which is turned off and non functional, there is no need for Micra removal as it is turned off.

## 2024-04-27 LAB
ANION GAP SERPL CALC-SCNC: 5 MMOL/L — SIGNIFICANT CHANGE UP (ref 5–17)
BUN SERPL-MCNC: 50 MG/DL — HIGH (ref 7–23)
CALCIUM SERPL-MCNC: 9 MG/DL — SIGNIFICANT CHANGE UP (ref 8.5–10.1)
CHLORIDE SERPL-SCNC: 102 MMOL/L — SIGNIFICANT CHANGE UP (ref 96–108)
CO2 SERPL-SCNC: 29 MMOL/L — SIGNIFICANT CHANGE UP (ref 22–31)
CREAT SERPL-MCNC: 1.27 MG/DL — SIGNIFICANT CHANGE UP (ref 0.5–1.3)
EGFR: 57 ML/MIN/1.73M2 — LOW
GLUCOSE SERPL-MCNC: 132 MG/DL — HIGH (ref 70–99)
POTASSIUM SERPL-MCNC: 4.3 MMOL/L — SIGNIFICANT CHANGE UP (ref 3.5–5.3)
POTASSIUM SERPL-SCNC: 4.3 MMOL/L — SIGNIFICANT CHANGE UP (ref 3.5–5.3)
SODIUM SERPL-SCNC: 136 MMOL/L — SIGNIFICANT CHANGE UP (ref 135–145)

## 2024-04-27 PROCEDURE — 99233 SBSQ HOSP IP/OBS HIGH 50: CPT

## 2024-04-27 PROCEDURE — 99232 SBSQ HOSP IP/OBS MODERATE 35: CPT

## 2024-04-27 RX ORDER — METOPROLOL TARTRATE 50 MG
12.5 TABLET ORAL DAILY
Refills: 0 | Status: DISCONTINUED | OUTPATIENT
Start: 2024-04-27 | End: 2024-04-28

## 2024-04-27 RX ADMIN — PANTOPRAZOLE SODIUM 40 MILLIGRAM(S): 20 TABLET, DELAYED RELEASE ORAL at 06:02

## 2024-04-27 RX ADMIN — BUDESONIDE AND FORMOTEROL FUMARATE DIHYDRATE 2 PUFF(S): 160; 4.5 AEROSOL RESPIRATORY (INHALATION) at 20:31

## 2024-04-27 RX ADMIN — Medication 40 MILLIGRAM(S): at 06:02

## 2024-04-27 RX ADMIN — RIVAROXABAN 15 MILLIGRAM(S): KIT at 17:24

## 2024-04-27 RX ADMIN — ATORVASTATIN CALCIUM 20 MILLIGRAM(S): 80 TABLET, FILM COATED ORAL at 21:40

## 2024-04-27 RX ADMIN — Medication 1 TABLET(S): at 10:51

## 2024-04-27 RX ADMIN — TAMSULOSIN HYDROCHLORIDE 0.4 MILLIGRAM(S): 0.4 CAPSULE ORAL at 21:40

## 2024-04-27 RX ADMIN — BUDESONIDE AND FORMOTEROL FUMARATE DIHYDRATE 2 PUFF(S): 160; 4.5 AEROSOL RESPIRATORY (INHALATION) at 08:31

## 2024-04-27 RX ADMIN — FINASTERIDE 5 MILLIGRAM(S): 5 TABLET, FILM COATED ORAL at 10:51

## 2024-04-27 NOTE — PROGRESS NOTE ADULT - SUBJECTIVE AND OBJECTIVE BOX
interpretation from staff  and chart   CHIEF COMPLAINT:  increasing LE swelling ,  testicular pain     HPI:  Chief Complaint: edema.    The patient is a 78y Male with significant PMH of  HTN, atrial fibrillation on Xarelto, Non-ischemic cardiomyopathy  cath 2020 minimal luminal irregularities , cardiac pacemaker in place, chronic systolic CHF, HPL, BPH, chronic thrombocytopenia, COPD and others presented to the ED with c/o B/L leg edema and some dry cough for last few days.  He also had orthopnea and he prefers to rest in reclining position than in laying flat. His B/L led edema has extended up to thighs and b/l loin areas including scrotal edema. He denies chest pain, fever, chills, N/V, abdominal pain, diarrhea or dysuria.  CT angio chest negative for PE.  CT abd/pelvis:  Moderate right pleural effusion and hepatic congestion. No SBP. CXR: Cardiomegaly with CHF at this time.   US Testicles: Bilateral varicoceles.  No testicular mass, torsion or epididymitis. Denies smoking, alcohol or substance abuse.    Sig labs: D-dimer elevated 1144 but CT angio chest negative for PE.  Pro-BNP 03118 (was 2000 in 03/2022), Troponin 254 (had 150s in past), Platelets 138k, Cr 1.62 (was 0.98 in 08/2023), .    CT Angio Chest PE Protocol, CT abd and Pelvis: IMPRESSION: No evidence of pulmonary embolism.  Moderate right pleural effusion with right lower lobe atelectasis. The lungs are otherwise clear.  Small volume ascites. Otherwise no findings identified to explain the patient's abdominal distention. No evidence of bowel obstruction.  Heterogeneous enhancement of the liver, suggestive of hepatic congestion. Cardiomegaly.    Xray Chest 1 View: Possible enlarging heart. Mild central CHF at this time.    US Testicles: Bilateral varicoceles, including intratesticular left varicocele.  No testicular mass, torsion or epididymitis.    Lasix 80 mg IV given in ED.    Patient does not have full insight in to his medical condition, he does not follow the recommendations that well , last seen in the office in dec 2023     4/24/24 - seen pt. in chair - [place] speaking, NP fluent - c/o pain in the scrotum, no anginal symptoms, Tele; Afib Vpaced, had NSVT x2 yesterday afternoon and at 1 AM today   4/25/24 - no cardiac complaints, Tele: Shey, Vpaced had 7 beasts of Vtach at 8 AM  4/26/24 - feels better, no edema, now with hypotension, Tele: Shey 60-70   4/27/24 - no complaints, with asymtoamtic hypotension      MEDICATIONS:Home Medications:  Daily-Cristina Men&#x27;s Formula oral tablet: 1 tab(s) orally once a day (22 Apr 2024 19:52)  finasteride 5 mg oral tablet: 1 tab(s) orally once a day (22 Apr 2024 19:52)  furosemide 40 mg oral tablet: 2 tab(s) orally once a day (22 Apr 2024 19:52)  omeprazole 20 mg oral delayed release capsule: 1 cap(s) orally once a day (22 Apr 2024 19:52)  tamsulosin 0.4 mg oral capsule: 1 cap(s) orally once a day (22 Apr 2024 19:52)    MEDICATIONS  (STANDING):  atorvastatin 20 milliGRAM(s) Oral at bedtime  budesonide  80 MICROgram(s)/formoterol 4.5 MICROgram(s) Inhaler 2 Puff(s) Inhalation two times a day  finasteride 5 milliGRAM(s) Oral daily  furosemide    Tablet 40 milliGRAM(s) Oral daily  metoprolol succinate ER 12.5 milliGRAM(s) Oral daily  multivitamin 1 Tablet(s) Oral daily  pantoprazole    Tablet 40 milliGRAM(s) Oral before breakfast  rivaroxaban 15 milliGRAM(s) Oral with dinner  spironolactone 12.5 milliGRAM(s) Oral daily  tamsulosin 0.4 milliGRAM(s) Oral at bedtime      MEDICATIONS  (PRN):  acetaminophen     Tablet .. 650 milliGRAM(s) Oral every 6 hours PRN Temp greater or equal to 38C (100.4F), Mild Pain (1 - 3)  aluminum hydroxide/magnesium hydroxide/simethicone Suspension 30 milliLiter(s) Oral every 4 hours PRN Dyspepsia  guaifenesin/dextromethorphan Oral Liquid 10 milliLiter(s) Oral every 4 hours PRN Cough  hydrALAZINE Injectable 10 milliGRAM(s) IV Push every 4 hours PRN If SBP more than 150 mmHg  melatonin 3 milliGRAM(s) Oral at bedtime PRN Insomnia  ondansetron Injectable 4 milliGRAM(s) IV Push every 8 hours PRN Nausea and/or Vomiting    Vital Signs Last 24 Hrs  T(C): 36.5 (27 Apr 2024 07:28), Max: 36.6 (26 Apr 2024 20:11)  T(F): 97.7 (27 Apr 2024 07:28), Max: 97.9 (26 Apr 2024 20:11)  HR: 63 (27 Apr 2024 08:32) (60 - 68)  BP: 89/51 (27 Apr 2024 07:28) (83/52 - 100/65)  BP(mean): 76 (27 Apr 2024 06:00) (76 - 76)  RR: 19 (27 Apr 2024 07:28) (18 - 19)  SpO2: 98% (27 Apr 2024 08:32) (98% - 100%)    Parameters below as of 27 Apr 2024 08:32  Patient On (Oxygen Delivery Method): room air        PHYSICAL EXAM:    Constitutional: NAD, awake and alert, well-developed  HEENT: PERR, EOMI,  No oral cyananosis.  Neck:  supple,  No JVD  Respiratory: Breath sounds are clear bilaterally, No wheezing, rales or rhonchi  Cardiovascular: S1 and S2, regular rate and rhythm, no Murmurs, gallops or rubs  Gastrointestinal: Bowel Sounds present, soft, nontender.   Extremities: 3 plus LE peripheral edema. No clubbing or cyanosis.  Vascular: 2+ peripheral pulses  Neurological: A/O x 3, no focal deficits  Musculoskeletal: no calf tenderness.  Skin: No rashes.      LABS: All Labs Reviewed               04-27    136  |  102  |  50<H>  ----------------------------<  132<H>  4.3   |  29  |  1.27    Ca    9.0      27 Apr 2024 05:48      - TroponinI hsT: <-266.35, <-263.37, <-259.69, <-254.18    - TroponinI hsT: <-266.35, <-263.37, <-259.69, <-254.18               Blood Culture:     < from: CT Angio Chest PE Protocol w/ IV Cont (04.22.24 @ 17:07) >  MPRESSION:  No evidence of pulmonary embolism.    Moderate right pleural effusion with right lower lobe atelectasis. The   lungs are otherwise clear.    Small volume ascites. Otherwise no findings identified to explain the   patient's abdominal distention. No evidence of bowel obstruction.    Heterogeneous enhancement of the liver, suggestive of hepatic congestion.    Cardiomegaly.      < end of copied text >  < from: TTE Echo Complete w/o Contrast w/ Doppler (08.25.23 @ 13:24) >  Summary     A micra device is noted in the RVOT. Device is highly   mobile and attached to RV free wall.     Endocardium is not well visualized, however, overall left ventricular   systolic function appears impaired. Technically Difficult Study.   Definity was used to better visualize the endocardial border.   Estimated left ventricular ejection fraction is 50 %.   A device wire is seen in the RV and RA.   Mild (1+) aortic regurgitation   Mild (1+) mitral regurgitation   Moderate (2+) tricuspid valve regurgitation. Mild pulmonary hypertension.    < end of copied text >      RADIOLOGY/EKG/TTE: reviewed     < from: TTE W or WO Ultrasound Enhancing Agent (04.23.24 @ 10:47) >    _______________________________________________________________________________________     CONCLUSIONS:      1. Left ventricular cavity is normal in size. Left ventricular systolic function is normal with an ejection fraction of 54 % by Rascon's method of disks.   2. Mildly enlarged right ventricular cavity size and normal systolic function.   3. The left atrium is severely dilated.   4. The right atrium is moderately dilated.   5. Device lead is visualized.   6. Severe mitral regurgitation. There is a dilated mitral annulus with an apically displaced closure pattern which could be c/w ischemic MR.   7. Moderate to severe tricuspid regurgitation.   8. Patent foramen ovale with left to right shunting by color flow Doppler.   9. Mild left ventricular hypertrophy.  10. Mild to moderate aortic regurgitation.  11. Severe pulmonary hypertension.  12. The inferior vena cava is dilated (dilated >2.1cm) with abnormal inspiratory collapse (abnormal <50%) consistent with elevated right atrial pressure (~15, range 10-20mmHg).  13. There ismild calcification of the aortic valve leaflets.  14. There is mild calcification of the mitral valve annulus.    ____________________________________________________________    < end of copied text >     paced rhythm     < from: Cardiac Cath Lab - Adult (08.28.20 @ 14:25) >  ardiac Arteries and Lesion Findings    LMCA: Diffuse irregularity.There is mild diffuse disease noted.    LAD: Diffuse irregularity.There is mild diffuse disease noted.    LCx: Diffuse irregularity.There is mild diffuse disease noted.    RCA: Diffuse irregularity.There is mild diffuse disease noted.    < end of copied text >      interpretation from staff  and chart   CHIEF COMPLAINT:  increasing LE swelling ,  testicular pain     HPI:  Chief Complaint: edema.    The patient is a 78y Male with significant PMH of  HTN, atrial fibrillation on Xarelto, Non-ischemic cardiomyopathy  cath 2020 minimal luminal irregularities , cardiac pacemaker in place, chronic systolic CHF, HPL, BPH, chronic thrombocytopenia, COPD and others presented to the ED with c/o B/L leg edema and some dry cough for last few days.  He also had orthopnea and he prefers to rest in reclining position than in laying flat. His B/L led edema has extended up to thighs and b/l loin areas including scrotal edema. He denies chest pain, fever, chills, N/V, abdominal pain, diarrhea or dysuria.  CT angio chest negative for PE.  CT abd/pelvis:  Moderate right pleural effusion and hepatic congestion. No SBP. CXR: Cardiomegaly with CHF at this time.   US Testicles: Bilateral varicoceles.  No testicular mass, torsion or epididymitis. Denies smoking, alcohol or substance abuse.    Sig labs: D-dimer elevated 1144 but CT angio chest negative for PE.  Pro-BNP 87849 (was 2000 in 03/2022), Troponin 254 (had 150s in past), Platelets 138k, Cr 1.62 (was 0.98 in 08/2023), .    CT Angio Chest PE Protocol, CT abd and Pelvis: IMPRESSION: No evidence of pulmonary embolism.  Moderate right pleural effusion with right lower lobe atelectasis. The lungs are otherwise clear.  Small volume ascites. Otherwise no findings identified to explain the patient's abdominal distention. No evidence of bowel obstruction.  Heterogeneous enhancement of the liver, suggestive of hepatic congestion. Cardiomegaly.    Xray Chest 1 View: Possible enlarging heart. Mild central CHF at this time.    US Testicles: Bilateral varicoceles, including intratesticular left varicocele.  No testicular mass, torsion or epididymitis.    Lasix 80 mg IV given in ED.    Patient does not have full insight in to his medical condition, he does not follow the recommendations that well , last seen in the office in dec 2023     4/24/24 - seen pt. in chair - [place] speaking, NP fluent - c/o pain in the scrotum, no anginal symptoms, Tele; Afib Vpaced, had NSVT x2 yesterday afternoon and at 1 AM today   4/25/24 - no cardiac complaints, Tele: Shey, Vpaced had 7 beasts of Vtach at 8 AM  4/26/24 - feels better, no edema, now with hypotension, Tele: Shey 60-70   4/27/24 - no complaints, with asymptomatic hypotension      MEDICATIONS:Home Medications:  Daily-Cristina Men&#x27;s Formula oral tablet: 1 tab(s) orally once a day (22 Apr 2024 19:52)  finasteride 5 mg oral tablet: 1 tab(s) orally once a day (22 Apr 2024 19:52)  omeprazole 20 mg oral delayed release capsule: 1 cap(s) orally once a day (22 Apr 2024 19:52)  tamsulosin 0.4 mg oral capsule: 1 cap(s) orally once a day (22 Apr 2024 19:52)    MEDICATIONS  (STANDING):  atorvastatin 20 milliGRAM(s) Oral at bedtime  budesonide  80 MICROgram(s)/formoterol 4.5 MICROgram(s) Inhaler 2 Puff(s) Inhalation two times a day  finasteride 5 milliGRAM(s) Oral daily  furosemide    Tablet 40 milliGRAM(s) Oral daily  metoprolol succinate ER 12.5 milliGRAM(s) Oral daily  multivitamin 1 Tablet(s) Oral daily  pantoprazole    Tablet 40 milliGRAM(s) Oral before breakfast  rivaroxaban 15 milliGRAM(s) Oral with dinner  spironolactone 12.5 milliGRAM(s) Oral daily  tamsulosin 0.4 milliGRAM(s) Oral at bedtime      MEDICATIONS  (PRN):  acetaminophen     Tablet .. 650 milliGRAM(s) Oral every 6 hours PRN Temp greater or equal to 38C (100.4F), Mild Pain (1 - 3)  aluminum hydroxide/magnesium hydroxide/simethicone Suspension 30 milliLiter(s) Oral every 4 hours PRN Dyspepsia  guaifenesin/dextromethorphan Oral Liquid 10 milliLiter(s) Oral every 4 hours PRN Cough  hydrALAZINE Injectable 10 milliGRAM(s) IV Push every 4 hours PRN If SBP more than 150 mmHg  melatonin 3 milliGRAM(s) Oral at bedtime PRN Insomnia  ondansetron Injectable 4 milliGRAM(s) IV Push every 8 hours PRN Nausea and/or Vomiting    Vital Signs Last 24 Hrs  T(C): 36.5 (27 Apr 2024 07:28), Max: 36.6 (26 Apr 2024 20:11)  T(F): 97.7 (27 Apr 2024 07:28), Max: 97.9 (26 Apr 2024 20:11)  HR: 63 (27 Apr 2024 08:32) (60 - 68)  BP: 89/51 (27 Apr 2024 07:28) (83/52 - 100/65)  BP(mean): 76 (27 Apr 2024 06:00) (76 - 76)  RR: 19 (27 Apr 2024 07:28) (18 - 19)  SpO2: 98% (27 Apr 2024 08:32) (98% - 100%)    Parameters below as of 27 Apr 2024 08:32  Patient On (Oxygen Delivery Method): room air        PHYSICAL EXAM:    Constitutional: NAD, awake and alert, well-developed  HEENT: PERR, EOMI,  No oral cyananosis.  Neck:  supple,  No JVD  Respiratory: Breath sounds are clear bilaterally, No wheezing, rales or rhonchi  Cardiovascular: S1 and S2, regular rate and rhythm, no Murmurs, gallops or rubs  Gastrointestinal: Bowel Sounds present, soft, nontender.   Extremities: 3 plus LE peripheral edema. No clubbing or cyanosis.  Vascular: 2+ peripheral pulses  Neurological: A/O x 3, no focal deficits  Musculoskeletal: no calf tenderness.  Skin: No rashes.      LABS: All Labs Reviewed               04-27    136  |  102  |  50<H>  ----------------------------<  132<H>  4.3   |  29  |  1.27    Ca    9.0      27 Apr 2024 05:48      - TroponinI hsT: <-266.35, <-263.37, <-259.69, <-254.18    - TroponinI hsT: <-266.35, <-263.37, <-259.69, <-254.18               Blood Culture:     < from: CT Angio Chest PE Protocol w/ IV Cont (04.22.24 @ 17:07) >  MPRESSION:  No evidence of pulmonary embolism.    Moderate right pleural effusion with right lower lobe atelectasis. The   lungs are otherwise clear.    Small volume ascites. Otherwise no findings identified to explain the   patient's abdominal distention. No evidence of bowel obstruction.    Heterogeneous enhancement of the liver, suggestive of hepatic congestion.    Cardiomegaly.      < end of copied text >  < from: TTE Echo Complete w/o Contrast w/ Doppler (08.25.23 @ 13:24) >  Summary     A micra device is noted in the RVOT. Device is highly   mobile and attached to RV free wall.     Endocardium is not well visualized, however, overall left ventricular   systolic function appears impaired. Technically Difficult Study.   Definity was used to better visualize the endocardial border.   Estimated left ventricular ejection fraction is 50 %.   A device wire is seen in the RV and RA.   Mild (1+) aortic regurgitation   Mild (1+) mitral regurgitation   Moderate (2+) tricuspid valve regurgitation. Mild pulmonary hypertension.    < end of copied text >      RADIOLOGY/EKG/TTE: reviewed     < from: TTE W or WO Ultrasound Enhancing Agent (04.23.24 @ 10:47) >    _______________________________________________________________________________________     CONCLUSIONS:      1. Left ventricular cavity is normal in size. Left ventricular systolic function is normal with an ejection fraction of 54 % by Rascon's method of disks.   2. Mildly enlarged right ventricular cavity size and normal systolic function.   3. The left atrium is severely dilated.   4. The right atrium is moderately dilated.   5. Device lead is visualized.   6. Severe mitral regurgitation. There is a dilated mitral annulus with an apically displaced closure pattern which could be c/w ischemic MR.   7. Moderate to severe tricuspid regurgitation.   8. Patent foramen ovale with left to right shunting by color flow Doppler.   9. Mild left ventricular hypertrophy.  10. Mild to moderate aortic regurgitation.  11. Severe pulmonary hypertension.  12. The inferior vena cava is dilated (dilated >2.1cm) with abnormal inspiratory collapse (abnormal <50%) consistent with elevated right atrial pressure (~15, range 10-20mmHg).  13. There ismild calcification of the aortic valve leaflets.  14. There is mild calcification of the mitral valve annulus.    ____________________________________________________________    < end of copied text >     paced rhythm     < from: Cardiac Cath Lab - Adult (08.28.20 @ 14:25) >  ardiac Arteries and Lesion Findings    LMCA: Diffuse irregularity.There is mild diffuse disease noted.    LAD: Diffuse irregularity.There is mild diffuse disease noted.    LCx: Diffuse irregularity.There is mild diffuse disease noted.    RCA: Diffuse irregularity.There is mild diffuse disease noted.    < end of copied text >

## 2024-04-27 NOTE — PROGRESS NOTE ADULT - ASSESSMENT
78y Male with significant PMH of  HTN, atrial fibrillation on Xarelto, Non-ischemic cardiomyopathy, cardiac pacemaker in place, chronic systolic CHF, HPL, BPH, chronic thrombocytopenia, COPD and others presented to the ED with c/o B/L leg edema and some dry cough for last few days.  He also had orthopnea and he prefers to rest in reclining position than in laying flat. His B/L led edema has extended up to thighs and b/l loin areas including scrotal edema. He denies chest pain, fever, chills, N/V, abdominal pain, diarrhea or dysuria.  CT angio chest negative for PE.  CT abd/pelvis:  Moderate right pleural effusion and hepatic congestion. No SBP. CXR: Cardiomegaly with CHF at this time.   US Testicles: Bilateral varicoceles.  No testicular mass, torsion or epididymitis. Denies smoking, alcohol or substance abuse.    # Acute on chronic HFrEF  -tele  -on RA  -BNP 10K  -trop downtrended (likely demand ischemia)  -imaging c/w volume overload  -echo as above, should repeat when euvolemic  -pt w hx noncompliance  -s/p iv diuresis, now transition to lasix po  -Continue aldactone, 12.5mg daily given hyoptension  -entresto started (cost checked and ok) but now stopped given hypotension  -coreg stopped given hypotension, started Toprol qhs, dose now reduced  -likey start Farxiga as outpt  -appreciate cards recs  -CHF team consulted    #s/p ppm  -functioning  as per interrogation  -also w micra which is off    #scrotal swelling  -varicocele on imaging  -urology recs appreciated, for outpt  f/u    # ELIS,  likely cardiorenal  -improving w diuresis, trend    # Accelerated HTN, PAF, PPM in am and HPL.  -Continue his Xarelto, Atorvastatin  -additional med titration as above    # COPD.  -Not in exacerbation.  -Continue his home inhalers.    # BPH.  -On Flomax and Finasteride.    # Chronic thrombocytopenia.  -Platelets count 138k.  -Continue to monitor.    # DVT prophylaxis: On Xarelto.      Dispo pending stable BP, poss d/c 4/28, F2F.  
78y Male with significant PMH of  HTN, atrial fibrillation on Xarelto, Non-ischemic cardiomyopathy, cardiac pacemaker in place, chronic systolic CHF, HPL, BPH, chronic thrombocytopenia, COPD and others presented to the ED with c/o B/L leg edema and some dry cough for last few days.  He also had orthopnea and he prefers to rest in reclining position than in laying flat. His B/L led edema has extended up to thighs and b/l loin areas including scrotal edema. He denies chest pain, fever, chills, N/V, abdominal pain, diarrhea or dysuria.  CT angio chest negative for PE.  CT abd/pelvis:  Moderate right pleural effusion and hepatic congestion. No SBP. CXR: Cardiomegaly with CHF at this time.   US Testicles: Bilateral varicoceles.  No testicular mass, torsion or epididymitis. Denies smoking, alcohol or substance abuse.    # Acute on chronic HFrEF  -tele  -on RA  -BNP 10K  -trop downtrended (likely demand ischemia)  -imaging c/w volume overload  -echo as above, should repeat when euvolemic  -pt w hx noncompliance  -continue iv diuresis for now, now qd  -Continue aldactone  -entresto started (cost checked and ok)  -Coreg 6.25 mg added.  -likey start Farxiga this week  -appreciate cards recs  -CHF team consulted    #s/p ppm  -functioning  as per interrogation  -also w micra which is off    #scrotal swelling  -varicocele on imaging  -urology recs appreciated, for outpt  f/u    # ELIS,  likely cardiorenal  -improving w diuresis, trend    # Accelerated HTN, PAF, PPM in am and HPL.  -Continue his Xarelto, Atorvastatin  -additional med titration as above    # COPD.  -Not in exacerbation.  -Continue his home inhalers.    # BPH.  -On Flomax and Finasteride.    # Chronic thrombocytopenia.  -Platelets count 138k.  -Continue to monitor.    # DVT prophylaxis: On Xarelto.      Dispo pending adequate diuresis,  1-2 days.      
78y Male with significant PMH of  HTN, atrial fibrillation on Xarelto, Non-ischemic cardiomyopathy, cardiac pacemaker in place, chronic systolic CHF, HPL, BPH, chronic thrombocytopenia, COPD and others presented to the ED with c/o B/L leg edema and some dry cough for last few days.  He also had orthopnea and he prefers to rest in reclining position than in laying flat. His B/L led edema has extended up to thighs and b/l loin areas including scrotal edema. He denies chest pain, fever, chills, N/V, abdominal pain, diarrhea or dysuria.  CT angio chest negative for PE.  CT abd/pelvis:  Moderate right pleural effusion and hepatic congestion. No SBP. CXR: Cardiomegaly with CHF at this time.   US Testicles: Bilateral varicoceles.  No testicular mass, torsion or epididymitis. Denies smoking, alcohol or substance abuse.    # Acute on chronic HFrEF  -tele  -on RA  -BNP 10K  -trop downtrended (likely demand ischemia)  -imaging c/w volume overload  -echo as above, should repeat when euvolemic  -pt w hx noncompliance  -s/p iv diuresis, now transition to lasix po  -Continue aldactone, 12.5mg daily given hyoptension  -entresto started (cost checked and ok), reduce to 1/2 tab lowest dose given hypotension  -coreg stopped given hypotension, started Toprol qhs  -likey start Farxiga as outpt  -appreciate cards recs  -CHF team consulted    #s/p ppm  -functioning  as per interrogation  -also w micra which is off    #scrotal swelling  -varicocele on imaging  -urology recs appreciated, for outpt  f/u    # ELIS,  likely cardiorenal  -improving w diuresis, trend    # Accelerated HTN, PAF, PPM in am and HPL.  -Continue his Xarelto, Atorvastatin  -additional med titration as above    # COPD.  -Not in exacerbation.  -Continue his home inhalers.    # BPH.  -On Flomax and Finasteride.    # Chronic thrombocytopenia.  -Platelets count 138k.  -Continue to monitor.    # DVT prophylaxis: On Xarelto.      Dispo pending stable BP, poss d/c 4/27, F2F.  
78y Male with significant PMH of  HTN, atrial fibrillation on Xarelto, Non-ischemic cardiomyopathy, cardiac pacemaker in place, chronic systolic CHF, HPL, BPH, chronic thrombocytopenia, COPD and others presented to the ED with c/o B/L leg edema and some dry cough for last few days.  He also had orthopnea and he prefers to rest in reclining position than in laying flat. His B/L led edema has extended up to thighs and b/l loin areas including scrotal edema. He denies chest pain, fever, chills, N/V, abdominal pain, diarrhea or dysuria.  CT angio chest negative for PE.  CT abd/pelvis:  Moderate right pleural effusion and hepatic congestion. No SBP. CXR: Cardiomegaly with CHF at this time.   US Testicles: Bilateral varicoceles.  No testicular mass, torsion or epididymitis. Denies smoking, alcohol or substance abuse.    # Acute on chronic HFrEF  -tele  -on RA  -BNP 10K  -trop downtrended (likely demand ischemia)  -imaging c/w volume overload  -pt w hx noncompliance  -continue iv diuresis for now  -Continue his Lisinopril and Aldactone.  -Coreg 6.25 mg added.  -issues Entresto in past (?cost)  -appreciate cards recs  -CHF team consulted    #s/p ppm  -functioning  as per interrogation  -also w micra which is off    # ELIS,  likely cardiorenal  -trend w diuresis    # Accelerated HTN, PAF, PPM in am and HPL.  -Continue his Xarelto, Atorvastatin, Aldactone and Lisinopril.    # COPD.  -Not in exacerbation.  -Continue his home inhalers.    # BPH.  -On Flomax and Finasteride.    # Chronic thrombocytopenia.  -Platelets count 138k.  -Continue to monitor.    # DVT prophylaxis: On Xarelto.      Dispo pending adequate diuresis,  2-3 days.    
77 y/o [place] male with h/o HFrecEF, NICMP (LVEF 25% in 2016 improved to 54%), valvular heart disease (severe MR, mod-severe TR, mild-mod AI), s/p CRT-D complicated by pocket infection transition to Micra (non-functional, poor capture) followed by single lead pacemaker, Afib on xarelto, HTN, thrombocytopenia, COPD, CKD stage 2-3, pre DM (A1c 6.3%) and varicocele who presented to  acute on chronic HF associated to elevated cardiac biomarkers (NSTEMI type 2), ELIS and hyperbilirubinemia. His admission labs were remarkable for Co 35, BUN 32, Creatinine 1.62, Tbili 1.7, Trop I ....35 and pBNP 10.4K.   He would benefit from aggressive diuresis and GDMT optimization as well as HF education.     TTE 4/23/24: EF 54%, LVIDd 5.5 IVS 1.3 PWd 1.3 LA severely Dilated, RA moderately dilated, RV enlarged with normal function, Severe MR, mild to moderate AI,  Moderate to severe TR, PFO,  Severe pHTN, PASP 68 dilated IVC     
78y Male with significant PMH of  HTN, atrial fibrillation on Xarelto, Non-ischemic cardiomyopathy, cardiac pacemaker in place, chronic systolic CHF, HPL, BPH, chronic thrombocytopenia, COPD and others presented to the ED with c/o B/L leg edema and some dry cough for last few days.  He also had orthopnea and he prefers to rest in reclining position than in laying flat. His B/L led edema has extended up to thighs and b/l loin areas including scrotal edema. He denies chest pain, fever, chills, N/V, abdominal pain, diarrhea or dysuria.  CT angio chest negative for PE.  CT abd/pelvis:  Moderate right pleural effusion and hepatic congestion. No SBP. CXR: Cardiomegaly with CHF at this time.   US Testicles: Bilateral varicoceles.  No testicular mass, torsion or epididymitis. Denies smoking, alcohol or substance abuse.    # Acute on chronic HFrEF  -tele  -on RA  -BNP 10K  -trop downtrended (likely demand ischemia)  -imaging c/w volume overload  -echo as above, should repeat when euvolemic  -pt w hx noncompliance  -continue iv diuresis for now  -Continue aldactone  -d/c lisinopril, start losartan (hopefully will transition to Entresto)  -Coreg 6.25 mg added.  -likey start Farxiga this week  -appreciate cards recs  -CHF team consulted    #s/p ppm  -functioning  as per interrogation  -also w micra which is off    #scrotal swelling  -varicocele on imaging  -urology consulted    # ELIS,  likely cardiorenal  -improving w diuresis, trend    # Accelerated HTN, PAF, PPM in am and HPL.  -Continue his Xarelto, Atorvastatin  -additional med titration as above    # COPD.  -Not in exacerbation.  -Continue his home inhalers.    # BPH.  -On Flomax and Finasteride.    # Chronic thrombocytopenia.  -Platelets count 138k.  -Continue to monitor.    # DVT prophylaxis: On Xarelto.      Dispo pending adequate diuresis,  2-3 days.    
77 y/o [facility] male with h/o HFrecEF, NICMP (LVEF 25% in 2016 improved to 54%), valvular heart disease (severe MR, mod-severe TR, mild-mod AI), s/p CRT-D complicated by pocket infection transition to Micra (non-functional, poor capture) followed by single lead pacemaker, Afib on xarelto, HTN, thrombocytopenia, COPD, CKD stage 2-3, pre DM (A1c 6.3%) and varicocele who presented to  acute on chronic HF associated to elevated cardiac biomarkers (NSTEMI type 2), ELIS and hyperbilirubinemia. His admission labs were remarkable for Co 35, BUN 32, Creatinine 1.62, Tbili 1.7, Trop I ....35 and pBNP 10.4K.   He would benefit from aggressive diuresis and GDMT optimization as well as HF education. He is now euvolemic    TTE 4/23/24: EF 54%, LVIDd 5.5 IVS 1.3 PWd 1.3 LA severely Dilated, RA moderately dilated, RV enlarged with normal function, Severe MR, mild to moderate AI,  Moderate to severe TR, PFO,  Severe pHTN, PASP 68 dilated IVC

## 2024-04-27 NOTE — PROGRESS NOTE ADULT - PROBLEM SELECTOR PLAN 2
·  Problem: Chronic atrial fibrillation.   ·  Recommendation: had runs of NSVT 2 days ago - multifactorial - fluid overload, valvular disease, cot current regimen  - dc coreg and decrease  toprol xl to 12.5 mg po daily due to persistent hypotension   . PPM was interrogated by EP - Single chamber pacemaker with normal function, adequate pacing threshold. Stored data with brief NSVT lasting one second.  Patient with Micra pacemaker which is turned off and non functional, there is no need for Micra removal as it is turned off.

## 2024-04-27 NOTE — PROGRESS NOTE ADULT - SUBJECTIVE AND OBJECTIVE BOX
HOSPITALIST ATTENDING PROGRESS NOTE    Chart and meds reviewed.  Patient seen and examined.    CC: CHF    Subjective: Hypotension this am, BB reduced, Entresto stopped.     All other systems reviewed and found to be negative with the exception of what has been described above.    MEDICATIONS  (STANDING):  atorvastatin 20 milliGRAM(s) Oral at bedtime  budesonide  80 MICROgram(s)/formoterol 4.5 MICROgram(s) Inhaler 2 Puff(s) Inhalation two times a day  finasteride 5 milliGRAM(s) Oral daily  furosemide    Tablet 40 milliGRAM(s) Oral daily  metoprolol succinate ER 12.5 milliGRAM(s) Oral daily  multivitamin 1 Tablet(s) Oral daily  pantoprazole    Tablet 40 milliGRAM(s) Oral before breakfast  rivaroxaban 15 milliGRAM(s) Oral with dinner  spironolactone 12.5 milliGRAM(s) Oral daily  tamsulosin 0.4 milliGRAM(s) Oral at bedtime    MEDICATIONS  (PRN):  acetaminophen     Tablet .. 650 milliGRAM(s) Oral every 6 hours PRN Temp greater or equal to 38C (100.4F), Mild Pain (1 - 3)  aluminum hydroxide/magnesium hydroxide/simethicone Suspension 30 milliLiter(s) Oral every 4 hours PRN Dyspepsia  melatonin 3 milliGRAM(s) Oral at bedtime PRN Insomnia  ondansetron Injectable 4 milliGRAM(s) IV Push every 8 hours PRN Nausea and/or Vomiting      VITALS:  T(F): 97.7 (04-27-24 @ 07:28), Max: 97.9 (04-26-24 @ 20:11)  HR: 68 (04-27-24 @ 13:00) (62 - 68)  BP: 88/47 (04-27-24 @ 13:00) (88/47 - 100/65)  RR: 19 (04-27-24 @ 07:28) (18 - 19)  SpO2: 98% (04-27-24 @ 08:32) (98% - 100%)  Wt(kg): --    I&O's Summary      CAPILLARY BLOOD GLUCOSE          PHYSICAL EXAM:  Gen: NAD  HEENT:  pupils equal and reactive, EOMI, no oropharyngeal lesions, erythema, exudates, oral thrush  NECK:   supple, no carotid bruits, no palpable lymph nodes, no thyromegaly  CV:  +S1, +S2, regular, no murmurs or rubs  RESP:   lungs clear to auscultation bilaterally, no wheezing, rales, rhonchi, good air entry bilaterally  BREAST:  not examined  GI:  abdomen soft, non-tender, non-distended, normal BS, no bruits, no abdominal masses, no palpable masses  RECTAL:  not examined  :  not examined  MSK:   normal muscle tone, no atrophy, no rigidity, no contractions  EXT:  no clubbing, no cyanosis, no edema, no calf pain, swelling or erythema  VASCULAR:  pulses equal and symmetric in the upper and lower extremities  NEURO:  AAOX3, no focal neurological deficits, follows all commands, able to move extremities spontaneously  SKIN:  no ulcers, lesions or rashes    LABS:        04-27    136  |  102  |  50<H>  ----------------------------<  132<H>  4.3   |  29  |  1.27    Ca    9.0      27 Apr 2024 05:48              Urinalysis Basic - ( 27 Apr 2024 05:48 )    Color: x / Appearance: x / SG: x / pH: x  Gluc: 132 mg/dL / Ketone: x  / Bili: x / Urobili: x   Blood: x / Protein: x / Nitrite: x   Leuk Esterase: x / RBC: x / WBC x   Sq Epi: x / Non Sq Epi: x / Bacteria: x    CULTURES:  no new    Additional results/Imaging, I have personally reviewed:  CXR 4/22/24: Possible enlarging heart. Mild central CHF at this time.    US testicles 4/22/24:  Bilateral varicoceles, including intratesticular left varicocele. No testicular mass,torsion or epididymitis.    CTA PE protocol and a/p  4/22/24: No evidence of pulmonary embolism. Moderate right pleural effusion with right lower lobe atelectasis. The lungs are otherwise clear. Small volume ascites. Otherwise no findings identified to explain the patient's abdominal distention. No evidence of bowel obstruction. Heterogeneous enhancement of the liver, suggestive of hepatic congestion. Cardiomegaly.    Echo  4/23/24: 1. Left ventricular cavity is normal in size. Left ventricular systolic function is normal with an ejection fraction of 54 % by Rascon's method of disks. 2. Mildly enlarged right ventricular cavity size and normal systolic function. 3. The left atrium is severely dilated. 4. The right atrium is moderately dilated. 5. Device lead is visualized. 6. Severe mitral regurgitation. There is a dilated mitral annulus with an apically displaced closure pattern which could be c/w ischemic MR. 7. Moderate to severe tricuspid regurgitation. 8. Patent foramen ovale with left to right shunting by color flow Doppler. 9. Mild left ventricular hypertrophy. 10. Mild to moderate aortic regurgitation. 11. Severe pulmonary hypertension. 12. The inferior vena cava is dilated (dilated >2.1cm) with abnormal inspiratory collapse (abnormal <50%) consistent with elevated right atrial pressure (~15, range 10-20mmHg). 13. There ismild calcification of the aortic valve leaflets. 14. There is mild calcification of the mitral valve annulus.    PPM interrogation 4/23/24: · Underlying Rhythm  Afib, v paced  · Comments  Patient is pacemaker dependent  · Additional Procedure Details  Single chamber pacemaker with normal function, adequate pacing threshold. Stored data with brief NSVT today lasting one second.  Patient with Micra pacemaker which is turned off and non functional according to cardiology notes from 8/2023 discussed with EP and there is no need for Micra removal as it is turned off.    Telemetry, personally reviewed:  4/23/24:  Afib, vpaced, 60s  4/24/24: Afib, Vpaced, 60s  4/25/24: Afib,  vpaced, 60-70,  7 beats NSVT  4/26/24: afib, vpaced, d/c tele

## 2024-04-27 NOTE — PROGRESS NOTE ADULT - PROBLEM SELECTOR PLAN 1
·  Problem: Acute on chronic systolic congestive heart failure.   ·  Recommendation: likely due to non adherence to diet & meds   right pleural effusion , no evidence pulmonary vascular congestion  right heart failure from TR pulmonary hypertension    - Elevated troponin , likely demand related ischemia , patient had LHC august 2020 had very minimal luminal irregularities  - TTE shows preserved LVEF, severe MR and pHTN  - pt, with persistent hypotension at present - will hold entresto, decrease spironolactone to 12.5 mg po daily, decrease  toprol xl to 12.5  mg po daily, cont. lasix  po as tolerated              HF ROSANNA Donaldson following

## 2024-04-28 ENCOUNTER — TRANSCRIPTION ENCOUNTER (OUTPATIENT)
Age: 79
End: 2024-04-28

## 2024-04-28 VITALS — SYSTOLIC BLOOD PRESSURE: 91 MMHG | DIASTOLIC BLOOD PRESSURE: 45 MMHG

## 2024-04-28 LAB — T CRUZI AB SER-ACNC: SIGNIFICANT CHANGE UP

## 2024-04-28 PROCEDURE — 99233 SBSQ HOSP IP/OBS HIGH 50: CPT

## 2024-04-28 PROCEDURE — 99239 HOSP IP/OBS DSCHRG MGMT >30: CPT

## 2024-04-28 RX ORDER — SPIRONOLACTONE 25 MG/1
0.5 TABLET, FILM COATED ORAL
Qty: 15 | Refills: 0
Start: 2024-04-28 | End: 2024-05-27

## 2024-04-28 RX ORDER — METOPROLOL TARTRATE 50 MG
0.5 TABLET ORAL
Qty: 15 | Refills: 0
Start: 2024-04-28 | End: 2024-05-27

## 2024-04-28 RX ORDER — SPIRONOLACTONE 25 MG
1 TABLET ORAL
Qty: 15 | Refills: 0 | DISCHARGE
Start: 2024-04-28 | End: 2024-05-27

## 2024-04-28 RX ADMIN — Medication 40 MILLIGRAM(S): at 10:12

## 2024-04-28 RX ADMIN — FINASTERIDE 5 MILLIGRAM(S): 5 TABLET, FILM COATED ORAL at 10:08

## 2024-04-28 RX ADMIN — RIVAROXABAN 15 MILLIGRAM(S): KIT at 18:42

## 2024-04-28 RX ADMIN — Medication 1 TABLET(S): at 10:05

## 2024-04-28 RX ADMIN — BUDESONIDE AND FORMOTEROL FUMARATE DIHYDRATE 2 PUFF(S): 160; 4.5 AEROSOL RESPIRATORY (INHALATION) at 08:38

## 2024-04-28 RX ADMIN — ATORVASTATIN CALCIUM 20 MILLIGRAM(S): 80 TABLET, FILM COATED ORAL at 18:42

## 2024-04-28 RX ADMIN — SPIRONOLACTONE 12.5 MILLIGRAM(S): 25 TABLET, FILM COATED ORAL at 10:12

## 2024-04-28 RX ADMIN — PANTOPRAZOLE SODIUM 40 MILLIGRAM(S): 20 TABLET, DELAYED RELEASE ORAL at 06:28

## 2024-04-28 NOTE — PROGRESS NOTE ADULT - PROVIDER SPECIALTY LIST ADULT
Heart Failure
Hospitalist
Cardiology
Heart Failure
Hospitalist
Hospitalist
Cardiology
Hospitalist
Cardiology
Hospitalist
Cardiology
Cardiology

## 2024-04-28 NOTE — DISCHARGE NOTE PROVIDER - NSDCCAREPROVSEEN_GEN_ALL_CORE_FT
Bernadette Sauer (hospital medicine)  Maximiliano Donaldson (CHF)  Abelino Mccoy (cardiology)  Mundo Greco (EP cardiology)

## 2024-04-28 NOTE — PROGRESS NOTE ADULT - NUTRITIONAL ASSESSMENT
This patient has been assessed with a concern for Malnutrition and has been determined to have a diagnosis/diagnoses of Severe protein-calorie malnutrition.    This patient is being managed with:   Diet DASH/TLC-  Sodium & Cholesterol Restricted  Entered: Apr 22 2024  6:25PM  
This patient has been assessed with a concern for Malnutrition and has been determined to have a diagnosis/diagnoses of Moderate protein-calorie malnutrition.    This patient is being managed with:   Diet DASH/TLC-  Sodium & Cholesterol Restricted  Entered: Apr 22 2024  6:25PM  
This patient has been assessed with a concern for Malnutrition and has been determined to have a diagnosis/diagnoses of Severe protein-calorie malnutrition.    This patient is being managed with:   Diet DASH/TLC-  Sodium & Cholesterol Restricted  Entered: Apr 22 2024  6:25PM  
This patient has been assessed with a concern for Malnutrition and has been determined to have a diagnosis/diagnoses of Moderate protein-calorie malnutrition.    This patient is being managed with:   Diet DASH/TLC-  Sodium & Cholesterol Restricted  Entered: Apr 22 2024  6:25PM  
This patient has been assessed with a concern for Malnutrition and has been determined to have a diagnosis/diagnoses of Severe protein-calorie malnutrition.    This patient is being managed with:   Diet DASH/TLC-  Sodium & Cholesterol Restricted  Entered: Apr 22 2024  6:25PM  
This patient has been assessed with a concern for Malnutrition and has been determined to have a diagnosis/diagnoses of Moderate protein-calorie malnutrition.    This patient is being managed with:   Diet DASH/TLC-  Sodium & Cholesterol Restricted  Entered: Apr 22 2024  6:25PM  
This patient has been assessed with a concern for Malnutrition and has been determined to have a diagnosis/diagnoses of Moderate protein-calorie malnutrition.    This patient is being managed with:   Diet DASH/TLC-  Sodium & Cholesterol Restricted  Entered: Apr 22 2024  6:25PM

## 2024-04-28 NOTE — PROGRESS NOTE ADULT - PROBLEM SELECTOR PROBLEM 3
Valvular heart disease
Valvular heart disease
HTN (hypertension)
HTN (hypertension)
Valvular heart disease

## 2024-04-28 NOTE — DISCHARGE NOTE PROVIDER - NSDCCPCAREPLAN_GEN_ALL_CORE_FT
PRINCIPAL DISCHARGE DIAGNOSIS  Diagnosis: CHF exacerbation  Assessment and Plan of Treatment: Take medications as prescribed including lasix, spironolactone and metoprolol. Follow up with cardiology.      SECONDARY DISCHARGE DIAGNOSES  Diagnosis: Varicocele  Assessment and Plan of Treatment: Follow up with urology (Dr. Kuldeep Castillo)

## 2024-04-28 NOTE — PROGRESS NOTE ADULT - PROBLEM SELECTOR PLAN 2
·  Problem: Chronic atrial fibrillation.   ·  Recommendation: had runs of NSVT 2 days ago - multifactorial - fluid overload, valvular disease, cot current regimen  - cont. BB at reduced dose due to hypotension, cont, AC with xarelto   . PPM was interrogated by EP - Single chamber pacemaker with normal function, adequate pacing threshold. Stored data with brief NSVT lasting one second.  Patient with Micra pacemaker which is turned off and non functional, there is no need for Micra removal as it is turned off.

## 2024-04-28 NOTE — DISCHARGE NOTE PROVIDER - CARE PROVIDERS DIRECT ADDRESSES
,[email or web address]

## 2024-04-28 NOTE — DISCHARGE NOTE PROVIDER - PROVIDER TOKENS
PROVIDER:[TOKEN:[[id number]],FOLLOWUP:[1 week]],PROVIDER:[TOKEN:[[id number]],FOLLOWUP:[1 week]]

## 2024-04-28 NOTE — PROGRESS NOTE ADULT - NS ATTEND AMEND GEN_ALL_CORE FT
Plan of care discussed with NP. Agree with plan of care.
Plan of care discussed with NP. Agree with plan of care.
as above ,  patient is at high risk for readmission due to patients non adherence to medications and follow ups
Agree with the above, transitioned to PO lasix.   GDMT as outlined above, adjusted due to hypotension
Patient with non ischemic cardiomyopathy AFIB with PPM ( micra )  with acute on chronci CHF , now hypotensive , on lower dose of medication , monitor , to optimize the dosing ,     patient is non adherent to diet and medications , does not have insight in to his problem

## 2024-04-28 NOTE — PROGRESS NOTE ADULT - PROBLEM SELECTOR PLAN 3
·  Problem: Valvular heart disease.   ·  Recommendation: TTE shows severe MR and severe pHTN , repeat echo oupatient - will followup with Dr. Palla ·  Problem: Valvular heart disease.   ·  Recommendation: TTE shows severe MR and severe pHTN , repeat echo outpatient - will followup with Dr. Palla

## 2024-04-28 NOTE — PROGRESS NOTE ADULT - PROBLEM SELECTOR PROBLEM 2
Chronic atrial fibrillation
Chronic atrial fibrillation
Valvular heart disease
Valvular heart disease
Chronic atrial fibrillation

## 2024-04-28 NOTE — DISCHARGE NOTE PROVIDER - NSDCFUSCHEDAPPT_GEN_ALL_CORE_FT
Kuldeep Castillo  [facility]  UROLOGY [address] [facility]  Scheduled Appointment: 05/03/2024    [facility]  ELECTROPH [address] [facility]  Scheduled Appointment: 05/22/2024

## 2024-04-28 NOTE — PROGRESS NOTE ADULT - PROBLEM SELECTOR PROBLEM 1
Acute on chronic systolic congestive heart failure
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
Acute on chronic systolic congestive heart failure
Acute on chronic systolic congestive heart failure
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

## 2024-04-28 NOTE — DISCHARGE NOTE PROVIDER - HOSPITAL COURSE
CC: CHF  HPI and Hospital Course: 78y Male with significant PMH of  HTN, atrial fibrillation on Xarelto, Non-ischemic cardiomyopathy, cardiac pacemaker in place, chronic systolic CHF, HPL, BPH, chronic thrombocytopenia, COPD and others presented to the ED with c/o B/L leg edema and some dry cough for last few days.  He also had orthopnea and he prefers to rest in reclining position than in laying flat. His B/L led edema has extended up to thighs and b/l loin areas including scrotal edema. He denies chest pain, fever, chills, N/V, abdominal pain, diarrhea or dysuria.  CT angio chest negative for PE.  CT abd/pelvis:  Moderate right pleural effusion and hepatic congestion. No SBP. CXR: Cardiomegaly with CHF at this time.   US Testicles: Bilateral varicoceles.  No testicular mass, torsion or epididymitis. Denies smoking, alcohol or substance abuse.    Managed for acute on chronic HFrEF, initially w iv diuresis, then po. Trialed Entresto but could not tolerate 2/2 hypotension, even w 1/2 tab of lowest dose. To attempt to start as outpt, potentially start farxiga as outpt too.    D/c to home, F2F.  I have spent 32 min on day of d/c coordinating care.  See progress note for problem based plan.

## 2024-04-28 NOTE — PROGRESS NOTE ADULT - SUBJECTIVE AND OBJECTIVE BOX
interpretation from staff  and chart   CHIEF COMPLAINT:  increasing LE swelling ,  testicular pain     HPI:  Chief Complaint: edema.    The patient is a 78y Male with significant PMH of  HTN, atrial fibrillation on Xarelto, Non-ischemic cardiomyopathy  cath 2020 minimal luminal irregularities , cardiac pacemaker in place, chronic systolic CHF, HPL, BPH, chronic thrombocytopenia, COPD and others presented to the ED with c/o B/L leg edema and some dry cough for last few days.  He also had orthopnea and he prefers to rest in reclining position than in laying flat. His B/L led edema has extended up to thighs and b/l loin areas including scrotal edema. He denies chest pain, fever, chills, N/V, abdominal pain, diarrhea or dysuria.  CT angio chest negative for PE.  CT abd/pelvis:  Moderate right pleural effusion and hepatic congestion. No SBP. CXR: Cardiomegaly with CHF at this time.   US Testicles: Bilateral varicoceles.  No testicular mass, torsion or epididymitis. Denies smoking, alcohol or substance abuse.    Sig labs: D-dimer elevated 1144 but CT angio chest negative for PE.  Pro-BNP 68763 (was 2000 in 03/2022), Troponin 254 (had 150s in past), Platelets 138k, Cr 1.62 (was 0.98 in 08/2023), .    CT Angio Chest PE Protocol, CT abd and Pelvis: IMPRESSION: No evidence of pulmonary embolism.  Moderate right pleural effusion with right lower lobe atelectasis. The lungs are otherwise clear.  Small volume ascites. Otherwise no findings identified to explain the patient's abdominal distention. No evidence of bowel obstruction.  Heterogeneous enhancement of the liver, suggestive of hepatic congestion. Cardiomegaly.    Xray Chest 1 View: Possible enlarging heart. Mild central CHF at this time.    US Testicles: Bilateral varicoceles, including intratesticular left varicocele.  No testicular mass, torsion or epididymitis.    Lasix 80 mg IV given in ED.    Patient does not have full insight in to his medical condition, he does not follow the recommendations that well , last seen in the office in dec 2023     4/24/24 - seen pt. in chair - Colombian speaking, NP fluent - c/o pain in the scrotum, no anginal symptoms, Tele; Afib Vpaced, had NSVT x2 yesterday afternoon and at 1 AM today   4/25/24 - no cardiac complaints, Tele: Shey, Vpaced had 7 beasts of Vtach at 8 AM  4/26/24 - feels better, no edema, now with hypotension, Tele: Shey 60-70   4/27/24 - no complaints, with asymptomatic hypotension  4/28/24 - feels better, BP improved       Home Medications:  Daily-Cristina Men&#x27;s Formula oral tablet: 1 tab(s) orally once a day (22 Apr 2024 19:52)  finasteride 5 mg oral tablet: 1 tab(s) orally once a day (22 Apr 2024 19:52)  omeprazole 20 mg oral delayed release capsule: 1 cap(s) orally once a day (22 Apr 2024 19:52)  tamsulosin 0.4 mg oral capsule: 1 cap(s) orally once a day (22 Apr 2024 19:52)      MEDICATIONS  (STANDING):  atorvastatin 20 milliGRAM(s) Oral at bedtime  budesonide  80 MICROgram(s)/formoterol 4.5 MICROgram(s) Inhaler 2 Puff(s) Inhalation two times a day  finasteride 5 milliGRAM(s) Oral daily  furosemide    Tablet 40 milliGRAM(s) Oral daily  metoprolol succinate ER 12.5 milliGRAM(s) Oral daily  multivitamin 1 Tablet(s) Oral daily  pantoprazole    Tablet 40 milliGRAM(s) Oral before breakfast  rivaroxaban 15 milliGRAM(s) Oral with dinner  spironolactone 12.5 milliGRAM(s) Oral daily  tamsulosin 0.4 milliGRAM(s) Oral at bedtime      MEDICATIONS  (PRN):  acetaminophen     Tablet .. 650 milliGRAM(s) Oral every 6 hours PRN Temp greater or equal to 38C (100.4F), Mild Pain (1 - 3)  aluminum hydroxide/magnesium hydroxide/simethicone Suspension 30 milliLiter(s) Oral every 4 hours PRN Dyspepsia  guaifenesin/dextromethorphan Oral Liquid 10 milliLiter(s) Oral every 4 hours PRN Cough  hydrALAZINE Injectable 10 milliGRAM(s) IV Push every 4 hours PRN If SBP more than 150 mmHg  melatonin 3 milliGRAM(s) Oral at bedtime PRN Insomnia  ondansetron Injectable 4 milliGRAM(s) IV Push every 8 hours PRN Nausea and/or Vomiting    Vital Signs Last 24 Hrs  T(C): 36.3 (28 Apr 2024 08:45), Max: 36.8 (27 Apr 2024 20:35)  T(F): 97.4 (28 Apr 2024 08:45), Max: 98.3 (27 Apr 2024 20:35)  HR: 64 (28 Apr 2024 09:00) (59 - 68)  BP: 106/61 (28 Apr 2024 08:45) (88/47 - 106/61)  BP(mean): --  RR: 18 (28 Apr 2024 08:45) (18 - 18)  SpO2: 99% (28 Apr 2024 08:45) (95% - 99%)    Parameters below as of 28 Apr 2024 09:00  Patient On (Oxygen Delivery Method): room air    PHYSICAL EXAM:    Constitutional: NAD, awake and alert, well-developed  HEENT: PERR, EOMI,  No oral cyananosis.  Neck:  supple,  No JVD  Respiratory: Breath sounds are clear bilaterally, No wheezing, rales or rhonchi  Cardiovascular: S1 and S2, regular rate and rhythm, no Murmurs, gallops or rubs  Gastrointestinal: Bowel Sounds present, soft, nontender.   Extremities: 3 plus LE peripheral edema. No clubbing or cyanosis.  Vascular: 2+ peripheral pulses  Neurological: A/O x 3, no focal deficits  Musculoskeletal: no calf tenderness.  Skin: No rashes.      LABS: All Labs Reviewed             04-27    136  |  102  |  50<H>  ----------------------------<  132<H>  4.3   |  29  |  1.27    Ca    9.0      27 Apr 2024 05:48      - TroponinI hsT: <-266.35, <-263.37, <-259.69, <-254.18    - TroponinI hsT: <-266.35, <-263.37, <-259.69, <-254.18               Blood Culture:     < from: CT Angio Chest PE Protocol w/ IV Cont (04.22.24 @ 17:07) >  MPRESSION:  No evidence of pulmonary embolism.    Moderate right pleural effusion with right lower lobe atelectasis. The   lungs are otherwise clear.    Small volume ascites. Otherwise no findings identified to explain the   patient's abdominal distention. No evidence of bowel obstruction.    Heterogeneous enhancement of the liver, suggestive of hepatic congestion.    Cardiomegaly.      < end of copied text >  < from: TTE Echo Complete w/o Contrast w/ Doppler (08.25.23 @ 13:24) >  Summary     A micra device is noted in the RVOT. Device is highly   mobile and attached to RV free wall.     Endocardium is not well visualized, however, overall left ventricular   systolic function appears impaired. Technically Difficult Study.   Definity was used to better visualize the endocardial border.   Estimated left ventricular ejection fraction is 50 %.   A device wire is seen in the RV and RA.   Mild (1+) aortic regurgitation   Mild (1+) mitral regurgitation   Moderate (2+) tricuspid valve regurgitation. Mild pulmonary hypertension.    < end of copied text >      RADIOLOGY/EKG/TTE: reviewed     < from: TTE W or WO Ultrasound Enhancing Agent (04.23.24 @ 10:47) >    _______________________________________________________________________________________     CONCLUSIONS:      1. Left ventricular cavity is normal in size. Left ventricular systolic function is normal with an ejection fraction of 54 % by Rascon's method of disks.   2. Mildly enlarged right ventricular cavity size and normal systolic function.   3. The left atrium is severely dilated.   4. The right atrium is moderately dilated.   5. Device lead is visualized.   6. Severe mitral regurgitation. There is a dilated mitral annulus with an apically displaced closure pattern which could be c/w ischemic MR.   7. Moderate to severe tricuspid regurgitation.   8. Patent foramen ovale with left to right shunting by color flow Doppler.   9. Mild left ventricular hypertrophy.  10. Mild to moderate aortic regurgitation.  11. Severe pulmonary hypertension.  12. The inferior vena cava is dilated (dilated >2.1cm) with abnormal inspiratory collapse (abnormal <50%) consistent with elevated right atrial pressure (~15, range 10-20mmHg).  13. There ismild calcification of the aortic valve leaflets.  14. There is mild calcification of the mitral valve annulus.    ____________________________________________________________    < end of copied text >     paced rhythm     < from: Cardiac Cath Lab - Adult (08.28.20 @ 14:25) >  ardiac Arteries and Lesion Findings    LMCA: Diffuse irregularity.There is mild diffuse disease noted.    LAD: Diffuse irregularity.There is mild diffuse disease noted.    LCx: Diffuse irregularity.There is mild diffuse disease noted.    RCA: Diffuse irregularity.There is mild diffuse disease noted.    < end of copied text >

## 2024-04-28 NOTE — DISCHARGE NOTE PROVIDER - NSDCMRMEDTOKEN_GEN_ALL_CORE_FT
atorvastatin 20 mg oral tablet: 1 tab(s) orally once a day (at bedtime)  budesonide-formoterol 80 mcg-4.5 mcg/inh inhalation aerosol: 1 application inhaled 2 times a day   Daily-Cristina Men&#x27;s Formula oral tablet: 1 tab(s) orally once a day  finasteride 5 mg oral tablet: 1 tab(s) orally once a day  furosemide 40 mg oral tablet: 1 tab(s) orally once a day  omeprazole 20 mg oral delayed release capsule: 1 cap(s) orally once a day  rivaroxaban 15 mg oral tablet: 1 tab(s) orally once a day (before a meal)  spironolactone 25 mg oral tablet: 0.5 tab(s) orally once a day  tamsulosin 0.4 mg oral capsule: 1 cap(s) orally once a day  Toprol-XL 25 mg oral tablet, extended release: 0.5 tab(s) orally once a day (at bedtime)

## 2024-04-28 NOTE — DISCHARGE NOTE PROVIDER - NSDCPNSUBOBJ_GEN_ALL_CORE
VITALS:  T(F): 97.4 (04-28-24 @ 08:45), Max: 98.3 (04-27-24 @ 20:35)  HR: 68 (04-28-24 @ 10:07) (59 - 68)  BP: 91/45 (04-28-24 @ 12:01) (91/45 - 106/61)  RR: 18 (04-28-24 @ 08:45) (18 - 18)  SpO2: 99% (04-28-24 @ 08:45) (95% - 99%)    PHYSICAL EXAM:  Gen: NAD  HEENT:  pupils equal and reactive, EOMI, no oropharyngeal lesions, erythema, exudates, oral thrush  NECK:   supple, no carotid bruits, no palpable lymph nodes, no thyromegaly  CV:  +S1, +S2, regular, no murmurs or rubs  RESP:   lungs clear to auscultation bilaterally, no wheezing, rales, rhonchi, good air entry bilaterally  BREAST:  not examined  GI:  abdomen soft, non-tender, non-distended, normal BS, no bruits, no abdominal masses, no palpable masses  RECTAL:  not examined  :  not examined  MSK:   normal muscle tone, no atrophy, no rigidity, no contractions  EXT:  no clubbing, no cyanosis, no edema, no calf pain, swelling or erythema  VASCULAR:  pulses equal and symmetric in the upper and lower extremities  NEURO:  AAOX3, no focal neurological deficits, follows all commands, able to move extremities spontaneously  SKIN:  no ulcers, lesions or rashes    # Acute on chronic HFrEF  -tele  -on RA  -BNP 10K  -trop downtrended (likely demand ischemia)  -imaging c/w volume overload  -echo done, repeat when euvolemic  -pt w hx noncompliance  -s/p iv diuresis, now transition to lasix po  -Continue aldactone, 12.5mg daily given hyoptension  -entresto started (cost checked and ok) but now stopped given hypotension  -coreg stopped given hypotension, started Toprol qhs, dose now reduced  -likey start Farxiga as outpt  -appreciate cards recs  -CHF team consulted    #s/p ppm  -functioning  as per interrogation  -also w micra which is off    #scrotal swelling  -varicocele on imaging  -urology recs appreciated, for outpt  f/u    # ELIS,  likely cardiorenal  -improving w diuresis, trend    # Accelerated HTN, PAF, PPM in am and HPL.  -Continue his Xarelto, Atorvastatin  -additional med titration as above    # COPD.  -Not in exacerbation.  -Continue his home inhalers.    # BPH.  -On Flomax and Finasteride.    # Chronic thrombocytopenia.  -Platelets count 138k.  -Continue to monitor.    # DVT prophylaxis: On Xarelto.      Home w F2F.

## 2024-04-28 NOTE — PROGRESS NOTE ADULT - PROBLEM SELECTOR PLAN 1
·  Problem: Acute on chronic systolic congestive heart failure.   ·  Recommendation: likely due to non adherence to diet & meds   right pleural effusion , no evidence pulmonary vascular congestion  right heart failure from TR pulmonary hypertension    - Elevated troponin , likely demand related ischemia , patient had LHC august 2020 had very minimal luminal irregularities  - TTE shows preserved LVEF, severe MR and pHTN  - BP improved - will cont, to hold entresto - can be restarted outpatient if BP allows, cont. spironolactone at decreased dose 12.5 mg po daily and  toprol xl  12.5  mg po daily, cont. lasix  40 mg po daily               HF ROSANNA Donaldson following ·  Problem: Acute on chronic systolic congestive heart failure.   ·  Recommendation: likely due to non adherence to diet & meds   right pleural effusion , no evidence pulmonary vascular congestion  right heart failure from TR pulmonary hypertension    - Elevated troponin , likely demand related ischemia , patient had LHC august 2020 had very minimal luminal irregularities  - TTE shows preserved LVEF, severe MR and pHTN  - BP improved - will cont, to hold entresto - can be restarted outpatient if BP allows, cont. spironolactone at decreased dose 12.5 mg po daily and  toprol xl  12.5  mg po daily, cont. lasix  40 mg po daily      pt. is stable from cardiac standpoint, check BP after AM meds - if SBP>94/95 dc, will sign off              HF ROSANNA Donaldson following

## 2024-04-28 NOTE — DISCHARGE NOTE PROVIDER - CARE PROVIDER_API CALL
Roscoe Quick  Family Medicine  [address] [place] [address]  Phone: [phone]  Fax: [phone]  Follow Up Time: 1 week    Maximiliano Donaldson  Physician Assistant Services  [address] [place] [address], [address]  [place] [address]  Phone: [phone]  Fax: [phone]  Follow Up Time: 1 week

## 2024-04-28 NOTE — PROGRESS NOTE ADULT - REASON FOR ADMISSION
[facility]  To promote your recovery after you leave the hospital, your physician has ordered home care. [facility] will contact you after discharge to set up the first appointment.  If you have any questions please contact [facility] at [phone] or [phone].     [facility]    **Please notify the clinic immediately with any symptoms of nausea, vomiting, or diarrhea**    1. Weigh yourself every morning. You should weigh yourself right after you wake up and use the bathroom. Keep a log of these weights and bring them to clinic.    2. Call the clinic with weight gain of 3 pounds in 24 hours or 5 pounds in one week. This could be a sign of fluid build up, and we want to catch this early so we can prevent a hospital stay.    3. Call the clinic with worsening signs of heart failure: chest pain, dizziness, lightheadedness, fainting, increased shortness of breath, increased swelling in your legs/abdomen, poor appetite, feeling full after eating very little, being unable to lie flat in bed due to shortness of breath and having to prop your head up on pillows to sleep, having to sleep in the recliner due to shortness of breath, palpitations/fluttering sensation in the chest, and shocks from your defibrillator (if you have a device).    4. Notify the clinic of any hospitalizations or emergency room visits.    5. Follow a 2 liter fluid restriction (64 fluid oz) per 24 hours. This includes anything that is liquid at room temperature. Measuring your fluid is much better than \"eye-balling\" it. We want to prevent fluid build up with your heart condition.  We also do not want you to be come dehydrated.  Do not drink less than 50 ounces of fluid per day.  There is a happy medium we need to find by keeping both the heart and the kidneys happy!    6. Follow a 2,000 mg sodium (salt) restriction per 24 hours. The best way to stay away from salt is avoid 
Acute on chronic systolic CHF
Acute on chronic systolic CHF
processed foods and restaurants. Get in the habit of reading food labels. Salt causes you to retain fluid and we want to prevent fluid build up with your heart condition.    7. Avoid NSAIDS including but not limited to Ibuprofen, Aleve, and Advil. You should not use these pain relievers with your heart condition.  Tylenol is okay to use for pain relief.    8. Stay away from tobacco products, alcohol and other street drugs.    9. Take your medications as directed and be mindful of when you are in need of refills.    10. Our clinic phone number is [phone].         Discharge Instructions After Open Heart Surgery    1. Shower daily.  Wash your incisions with soap and water daily.  Pat them dry.  2. Weigh yourself DAILY as soon as you wake up.  Record your weight. If your weight increases by 2 lbs in one day or 5 lbs in one week please call Dr Davies's office.   3. Remove your dressing if the edges start to peel up.  If this happens, do not apply a new dressing.  If your dressing remains intact it can be removed one week after you leave the hospital.   4. No soaking in a tub, pool or hot tub until your incisions are completely healed and scabs are gone.   5. Do not apply any ointments, lotions, creams or powders to any of your incisions.  6. Pain & Discomfort - We encourage you to take acetaminophen (Tylenol) between your doses of your narcotic pain medication once you get home.  7. Walk 200-300 feet, 3-4 times daily at a minimum.  8. Use the incentive spirometer at least 5 times each day.    9. Do not lift more than 10 pounds for 1 month after surgery.  You will have lifting, pushing and pulling restrictions for 6 months after surgery.  10. No driving until your staples and sutures are removed AND you are no longer taking your narcotic pain pills.  11. Wear your JA hose every day for 4 weeks after surgery.  They are to either be left on 24 hours/day or taken off at night and reapplied first thing the next 
Acute on chronic systolic CHF
morning.  12. Diet - for the first 2-4 weeks after surgery, you need to focus on high calorie and high protein intake, to help ensure good healing.  If you have diabetes, continue following the American Diabetes Diet.  13. Please call the [facility] Cardiothoracic Surgery office at [phone] (available 24 hours/day) if any of the following symptoms are noted:  • Increased swelling and/or redness of the incision  • Increased tenderness of the incision  • Drainage noted from the incision   • Fever above 101 degrees orally  14. Call 911 or go to the nearest Emergency Room for the following symptoms:  • Chest pain  • Severe Shortness of breath   • Neck, jaw or arm pain  Dr. Dmitriy Dorseyyvrose  Cardiothoracic Surgeon  [phone]      
Acute on chronic systolic CHF

## 2024-04-29 PROBLEM — N40.0 BENIGN PROSTATIC HYPERPLASIA WITHOUT LOWER URINARY TRACT SYMPTOMS: Chronic | Status: ACTIVE | Noted: 2024-04-22

## 2024-04-29 PROBLEM — D69.6 THROMBOCYTOPENIA, UNSPECIFIED: Chronic | Status: ACTIVE | Noted: 2024-04-22

## 2024-05-01 ENCOUNTER — APPOINTMENT (OUTPATIENT)
Dept: CARDIOLOGY | Facility: CLINIC | Age: 79
End: 2024-05-01

## 2024-05-03 ENCOUNTER — APPOINTMENT (OUTPATIENT)
Dept: UROLOGY | Facility: CLINIC | Age: 79
End: 2024-05-03

## 2024-05-03 DIAGNOSIS — Z79.899 OTHER LONG TERM (CURRENT) DRUG THERAPY: ICD-10-CM

## 2024-05-03 DIAGNOSIS — D69.6 THROMBOCYTOPENIA, UNSPECIFIED: ICD-10-CM

## 2024-05-03 DIAGNOSIS — I95.9 HYPOTENSION, UNSPECIFIED: ICD-10-CM

## 2024-05-03 DIAGNOSIS — J44.9 CHRONIC OBSTRUCTIVE PULMONARY DISEASE, UNSPECIFIED: ICD-10-CM

## 2024-05-03 DIAGNOSIS — I11.0 HYPERTENSIVE HEART DISEASE WITH HEART FAILURE: ICD-10-CM

## 2024-05-03 DIAGNOSIS — Z79.01 LONG TERM (CURRENT) USE OF ANTICOAGULANTS: ICD-10-CM

## 2024-05-03 DIAGNOSIS — N50.89 OTHER SPECIFIED DISORDERS OF THE MALE GENITAL ORGANS: ICD-10-CM

## 2024-05-03 DIAGNOSIS — Z95.0 PRESENCE OF CARDIAC PACEMAKER: ICD-10-CM

## 2024-05-03 DIAGNOSIS — I48.0 PAROXYSMAL ATRIAL FIBRILLATION: ICD-10-CM

## 2024-05-03 DIAGNOSIS — I24.89 OTHER FORMS OF ACUTE ISCHEMIC HEART DISEASE: ICD-10-CM

## 2024-05-03 DIAGNOSIS — E43 UNSPECIFIED SEVERE PROTEIN-CALORIE MALNUTRITION: ICD-10-CM

## 2024-05-03 DIAGNOSIS — N40.0 BENIGN PROSTATIC HYPERPLASIA WITHOUT LOWER URINARY TRACT SYMPTOMS: ICD-10-CM

## 2024-05-03 DIAGNOSIS — N17.9 ACUTE KIDNEY FAILURE, UNSPECIFIED: ICD-10-CM

## 2024-05-03 DIAGNOSIS — I50.23 ACUTE ON CHRONIC SYSTOLIC (CONGESTIVE) HEART FAILURE: ICD-10-CM

## 2024-05-06 ENCOUNTER — APPOINTMENT (OUTPATIENT)
Dept: CARDIOLOGY | Facility: CLINIC | Age: 79
End: 2024-05-06
Payer: MEDICARE

## 2024-05-06 VITALS
WEIGHT: 180 LBS | DIASTOLIC BLOOD PRESSURE: 77 MMHG | SYSTOLIC BLOOD PRESSURE: 132 MMHG | HEART RATE: 86 BPM | OXYGEN SATURATION: 97 % | BODY MASS INDEX: 28.25 KG/M2 | HEIGHT: 67 IN

## 2024-05-06 DIAGNOSIS — I50.22 CHRONIC SYSTOLIC (CONGESTIVE) HEART FAILURE: ICD-10-CM

## 2024-05-06 PROCEDURE — 99215 OFFICE O/P EST HI 40 MIN: CPT

## 2024-05-06 PROCEDURE — 93000 ELECTROCARDIOGRAM COMPLETE: CPT

## 2024-05-06 PROCEDURE — G2211 COMPLEX E/M VISIT ADD ON: CPT

## 2024-05-06 RX ORDER — LISINOPRIL 10 MG/1
10 TABLET ORAL DAILY
Qty: 90 | Refills: 0 | Status: DISCONTINUED | COMMUNITY
Start: 1900-01-01 | End: 2024-05-06

## 2024-05-06 RX ORDER — CARVEDILOL 6.25 MG/1
6.25 TABLET, FILM COATED ORAL TWICE DAILY
Qty: 180 | Refills: 1 | Status: DISCONTINUED | COMMUNITY
End: 2024-05-06

## 2024-05-06 NOTE — HISTORY OF PRESENT ILLNESS
[FreeTextEntry1] : Patient seen today with assistance of  [facility] # [id number] PMH: permanent atrial fibrillation, SP ablation/PPM, chronic systolic heart failure, non-obstructive CAD, CRT-P, nonischemic cardiomyopathy, mitral regurgitation, HTN, pulmonary hypertension, COPD,  chronic systolic congestive heart failure here today for hospital follow up.  He was recently hospitalized for acute on chronic systolic HF.  Hospital records reviewed. He offers no complaints of cp/sob/palpitations/LE edema/orthopnea.  Pt is not entirely sure of what medications he is taking.  There is a discrepancy what he says he is taking and what discharge medications are listed.   Patient  has no insight in to his medical condition, does not have list of medications or what he is taking  his blood pressure is normal range  his weight less  by 6 pounds  hospital records reviewed  ,

## 2024-05-06 NOTE — CARDIOLOGY SUMMARY
[de-identified] : 5/6/24: Ventricular paced rhythm  1/10/23 ventricular paced rhythm  [de-identified] : 3/25/22 ( [facility] )  EF 45 % apical wall hypokinesis ,  mild to moderate MR ,severe LAE ,  Mild  AI , severe  TR severe ,pulmonary hypertension normal RV function   IVC dilated decreased respiratory variation   2/3/23 Left ventricular systolic function is normal with an ejection fraction visually estimated at 50 to 55%. Mild left ventricular hypertrophy. Severely dilated left /Right atrium., Moderate mitral regurgitation. Mild-to-moderate aortic regurgitation. Mild pulmonic regurgitation. Moderate-severe tricuspid regurgitation. Device wire is visualized in the right heart. No pericardial effusion seen. There is moderate pulmonary hypertension. The inferior vena cava measures 1.84 cm in diameter, and is normal in size (<2.1cm) with normal inspiratory collapse (>50%) consistent with normal right atrial pressure ( R 3, range 0-5mmHg).   1. Left ventricular cavity is normal in size. Left ventricular systolic function is normal with an ejection fraction of 54 % by Rascon's method of disks.  2. Mildly enlarged right ventricular cavity size and normal systolic function.  3. The left atrium is severely dilated.  4. The right atrium is moderately dilated.  5. Device lead is visualized.  6. Severe mitral regurgitation. There is a dilated mitral annulus with an apically displaced closure pattern which could be c/w ischemic MR.  7. Moderate to severe tricuspid regurgitation.  8. Patent foramen ovale with left to right shunting by color flow Doppler.  9. Mild left ventricular hypertrophy. 10. Mild to moderate aortic regurgitation. 11. Severe pulmonary hypertension. 12. The inferior vena cava is dilated (dilated >2.1cm) with abnormal inspiratory collapse (abnormal <50%) consistent with elevated right atrial pressure (~15, range 10-20mmHg). 13. There ismild calcification of the aortic valve leaflets. 14. There is mild calcification of the mitral valve annulus.   [de-identified] : CT Angio Chest PE Protocol, CT abd and Pelvis: IMPRESSION: No evidence of pulmonary embolism.  Moderate right pleural effusion with right lower lobe atelectasis. The lungs are otherwise clear.  Small volume ascites. Otherwise no findings identified to explain the patient's abdominal distention. No evidence of bowel obstruction.  Heterogeneous enhancement of the liver, suggestive of hepatic congestion. Cardiomegaly.  Xray Chest 1 View: Possible enlarging heart. Mild central CHF at this time.   [de-identified] : 5/19/2017  [employer] VVI   MRI compatible  ADVISA  MRI [id number] [de-identified] : aug 28  2020   all coronaries showed minimal luminal irregularities  EF 48%  < from: Cardiac Cath Lab - Adult (08.28.20 @ 14:25) > ardiac Arteries and Lesion Findings  LMCA: Diffuse irregularity.There is mild diffuse disease noted.  LAD: Diffuse irregularity.There is mild diffuse disease noted.  LCx: Diffuse irregularity.There is mild diffuse disease noted.  RCA: Diffuse irregularity.There is mild diffuse disease noted.  < end of copied text >

## 2024-05-06 NOTE — REASON FOR VISIT
[Symptom and Test Evaluation] : symptom and test evaluation [Cardiac Failure] : cardiac failure [Arrhythmia/ECG Abnorrmalities] : arrhythmia/ECG abnormalities [Structural Heart and Valve Disease] : structural heart and valve disease [Hyperlipidemia] : hyperlipidemia [Hypertension] : hypertension [[employer] ] : provided by [employer]   [Interpreters_IDNumber] : [id number] [Interpreters_FullName] : [email or web address]

## 2024-05-06 NOTE — END OF VISIT
[Time Spent: ___ minutes] : I have spent [unfilled] minutes of time on the encounter. [FreeTextEntry3] : patient was seen w ith NP agree with assessment and plan

## 2024-05-06 NOTE — ASSESSMENT
[FreeTextEntry1] : here today for hospital follow up.  Feels good.  Offers no c/o cp or sob.  Acute on chronic systolic congestive heart failure.:  likely due to noncompliance to diet & meds.  Right pleural effusion , no evidence pulmonary vascular congestion, right heart failure from TR pulmonary hypertension.   TTE shows preserved LVEF, severe MR and pHTN.  BP low in the hospital.  Entresto held (as per pt he is taking full tab 24/26mg BID). Coreg changed to toprol in the hospital (pt believes he is taking toprol 25mg daily).  He will continue with spironolactone ? 25mg daily (reduced to 12.5mg QD in hospital) and lasix 40mg daily.  He was instructed to bring in all of his medication bottles with him to his next appointment.  Recommend daily weight, low sodium diet  Elevated troponin: likely demand related ischemia , patient had LHC august 2020 had very minimal luminal irregularities.  CW ASA, statin, BB  Chronic atrial fibrillation: S/P ablation, PPM EP management; had runs of NSVT on tele, likely multifactorial ( fluid overload, valvular disease).  Continue BB (states he is taking 25mg daily however dose reduced in hospital due to hypotension, cont, AC with xarelto.  Maintain K>4, Mg>2  Valvular heart disease:  TTE shows severe MR and severe pHTN , repeat echo once GDMT optimized.   Pt to follow up in 2 weeks.

## 2024-05-07 ENCOUNTER — APPOINTMENT (OUTPATIENT)
Dept: CARDIOLOGY | Facility: CLINIC | Age: 79
End: 2024-05-07

## 2024-05-08 RX ORDER — SACUBITRIL AND VALSARTAN 24; 26 MG/1; MG/1
24-26 TABLET, FILM COATED ORAL TWICE DAILY
Qty: 180 | Refills: 1 | Status: ACTIVE | COMMUNITY
Start: 2024-04-24 | End: 1900-01-01

## 2024-05-15 NOTE — PATIENT PROFILE ADULT. - PURPOSEFUL PROACTIVE ROUNDING
Patient needs PA completed for BRAND NAME Vyvanse.  Pharmacy does not have generic in stock.     Patient has new insurance coverage:  [employer] [place] [employer]  Policy # [id number]  RxBIN # [id number]  Group# [id number]   Patient

## 2024-05-20 ENCOUNTER — APPOINTMENT (OUTPATIENT)
Dept: CARDIOLOGY | Facility: CLINIC | Age: 79
End: 2024-05-20
Payer: MEDICARE

## 2024-05-20 VITALS
BODY MASS INDEX: 27.41 KG/M2 | DIASTOLIC BLOOD PRESSURE: 70 MMHG | HEART RATE: 71 BPM | SYSTOLIC BLOOD PRESSURE: 118 MMHG | OXYGEN SATURATION: 97 % | WEIGHT: 175 LBS

## 2024-05-20 DIAGNOSIS — E78.5 HYPERLIPIDEMIA, UNSPECIFIED: ICD-10-CM

## 2024-05-20 DIAGNOSIS — I48.20 CHRONIC ATRIAL FIBRILLATION, UNSP: ICD-10-CM

## 2024-05-20 DIAGNOSIS — I10 ESSENTIAL (PRIMARY) HYPERTENSION: ICD-10-CM

## 2024-05-20 DIAGNOSIS — Z95.0 PRESENCE OF CARDIAC PACEMAKER: ICD-10-CM

## 2024-05-20 PROCEDURE — G2211 COMPLEX E/M VISIT ADD ON: CPT

## 2024-05-20 PROCEDURE — 99214 OFFICE O/P EST MOD 30 MIN: CPT

## 2024-05-20 RX ORDER — BUDESONIDE 0.25 MG/2ML
0.25 INHALANT ORAL
Refills: 0 | Status: DISCONTINUED | COMMUNITY
Start: 2024-05-06 | End: 2024-05-20

## 2024-05-20 RX ORDER — BUDESONIDE AND FORMOTEROL FUMARATE DIHYDRATE 80; 4.5 UG/1; UG/1
80-4.5 AEROSOL RESPIRATORY (INHALATION)
Qty: 10 | Refills: 0 | Status: DISCONTINUED | COMMUNITY
Start: 2023-12-15 | End: 2024-05-20

## 2024-05-20 RX ORDER — FUROSEMIDE 40 MG/1
40 TABLET ORAL
Qty: 180 | Refills: 1 | Status: DISCONTINUED | COMMUNITY
End: 2024-05-20

## 2024-05-20 RX ORDER — ERGOCALCIFEROL 1.25 MG/1
1.25 MG CAPSULE ORAL
Qty: 8 | Refills: 1 | Status: DISCONTINUED | COMMUNITY
Start: 2023-05-09 | End: 2024-05-20

## 2024-05-20 RX ORDER — METOPROLOL SUCCINATE 25 MG/1
25 TABLET, EXTENDED RELEASE ORAL DAILY
Refills: 0 | Status: ACTIVE | COMMUNITY
Start: 2024-05-06

## 2024-05-20 RX ORDER — FUROSEMIDE 40 MG/1
40 TABLET ORAL DAILY
Refills: 0 | Status: ACTIVE | COMMUNITY

## 2024-05-20 RX ORDER — CICLOPIROX 80 MG/ML
8 SOLUTION TOPICAL
Qty: 1 | Refills: 2 | Status: DISCONTINUED | COMMUNITY
End: 2024-05-20

## 2024-05-20 NOTE — ASSESSMENT
[FreeTextEntry1] : here today in routine cardiac follow up with above hx,  chronic systolic congestive heart failure; EF improved on recent Echo 54%, appears euvolemic no SOB/PND/Orthopnea/Edema weight stable ,  I recommend daily weight, low sodium diet CW current medications  Labs ordered   Valvular heart disease Severe MR and TR on Hospital Echo will re evaluate now as patient is Euvolemic, if valve disease remains severe, will consult structural heart.  HTN: Controlled (coreg changed to metoprolol in hospital d/t low BP) Pressure today stable   Pulmonary HTN: Stable O2 sat RA 96%  AF: S/P ablation PPM EP management interrogated in hospital  4/23/24 Continue oral AC/BB,     COPD: Pulmonary management  follow up 2 months follow up assessment/review labs and Echo   plan DW patient in detail with Interpreted

## 2024-05-20 NOTE — HISTORY OF PRESENT ILLNESS
[FreeTextEntry1] : Patient seen today with assistance of  PMH:   permanent atrial fibrillation SP ablation/PPM,  chronic systolic heart failure, non-obstructive CAD, CRT-P, nonischemic cardiomyopathy with prior hospitalization for acute on chronic systolic HF, mitral regurgitation, HTN, pulmonary hypertension, COPD  here today in routine cardiac follow up and to discuss and review medications  Claims to be feeling well no cardiovascular complaints  All medications reviewed with patient he claims (VIA ) he is taking all prescribed medications daily, states they are pre poured VIA home care RN and Pharmacy, ( all bottles were brought to office today reviewed and documented )    Echo 4/23/24 CONCLUSIONS: 1. Left ventricular cavity is normal in size. Left ventricular systolic function is normal with an ejection  fraction of 54 % by Rascon's method of disks. 2. Mildly enlarged right ventricular cavity size and normal systolic function. 3. The left atrium is severely dilated. 4. The right atrium is moderately dilated. 5. Device lead is visualized. 6. Severe mitral regurgitation. There is a dilated mitral annulus with an apically displaced closure pattern  which could be c/w ischemic MR. 7. Moderate to severe tricuspid regurgitation. 8. Patent foramen ovale with left to right shunting by color flow Doppler. 9. Mild left ventricular hypertrophy. 10. Mild to moderate aortic regurgitation. 11. Severe pulmonary hypertension. 12. The inferior vena cava is dilated (dilated >2.1cm) with abnormal inspiratory collapse (abnormal <50%)  consistent with elevated right atrial pressure (~15, range 10-20mmHg). 13. There is mild calcification of the aortic valve leaflets. 14. There is mild calcification of the mitral valve annulus.

## 2024-05-20 NOTE — CARDIOLOGY SUMMARY
[de-identified] : 1/10/23 ventricular paced rhythm  [de-identified] :  4/23/24 CONCLUSIONS: 1. Left ventricular cavity is normal in size. Left ventricular systolic function is normal with an ejection  fraction of 54 % by Rascon's method of disks. 2. Mildly enlarged right ventricular cavity size and normal systolic function. 3. The left atrium is severely dilated. 4. The right atrium is moderately dilated. 5. Device lead is visualized. 6. Severe mitral regurgitation. There is a dilated mitral annulus with an apically displaced closure pattern  which could be c/w ischemic MR. 7. Moderate to severe tricuspid regurgitation. 8. Patent foramen ovale with left to right shunting by color flow Doppler. 9. Mild left ventricular hypertrophy. 10. Mild to moderate aortic regurgitation. 11. Severe pulmonary hypertension. 12. The inferior vena cava is dilated (dilated >2.1cm) with abnormal inspiratory collapse (abnormal <50%)  consistent with elevated right atrial pressure (~15, range 10-20mmHg). 13. There is mild calcification of the aortic valve leaflets. 14. There is mild calcification of the mitral valve annulus.  3/25/22 ( [facility] )  EF 45 % apical wall hypokinesis ,  mild to moderate MR ,severe LAE ,  Mild  AI , severe  TR severe ,pulmonary hypertension normal RV function   IVC dilated decreased respiratory variation   2/3/23 Left ventricular systolic function is normal with an ejection fraction visually estimated at 50 to 55%. Mild left ventricular hypertrophy. Severely dilated left /Right atrium., Moderate mitral regurgitation. Mild-to-moderate aortic regurgitation. Mild pulmonic regurgitation. Moderate-severe tricuspid regurgitation. Device wire is visualized in the right heart. No pericardial effusion seen. There is moderate pulmonary hypertension. The inferior vena cava measures 1.84 cm in diameter, and is normal in size (<2.1cm) with normal inspiratory collapse (>50%) consistent with normal right atrial pressure ( R 3, range 0-5mmHg). [de-identified] : 5/19/2017  [employer] VVI   MRI compatible  ADVISA  MRI [id number] [de-identified] : aug 28  2020   all coronaries showed minimal luminal irregularities  EF 48%

## 2024-05-20 NOTE — END OF VISIT
[FreeTextEntry3] : Patient was seen with NP , hospital records reviewed , his medications reviewed ( made him bring his medications bottles ) agree with assessment and plan  [Time Spent: ___ minutes] : I have spent [unfilled] minutes of time on the encounter.

## 2024-05-22 ENCOUNTER — APPOINTMENT (OUTPATIENT)
Dept: ELECTROPHYSIOLOGY | Facility: CLINIC | Age: 79
End: 2024-05-22

## 2024-06-04 ENCOUNTER — APPOINTMENT (OUTPATIENT)
Dept: UROLOGY | Facility: CLINIC | Age: 79
End: 2024-06-04

## 2024-06-07 ENCOUNTER — APPOINTMENT (OUTPATIENT)
Dept: CARDIOLOGY | Facility: CLINIC | Age: 79
End: 2024-06-07
Payer: MEDICARE

## 2024-06-07 VITALS — SYSTOLIC BLOOD PRESSURE: 82 MMHG | DIASTOLIC BLOOD PRESSURE: 52 MMHG

## 2024-06-07 VITALS
HEART RATE: 72 BPM | OXYGEN SATURATION: 97 % | SYSTOLIC BLOOD PRESSURE: 72 MMHG | WEIGHT: 171 LBS | BODY MASS INDEX: 26.84 KG/M2 | DIASTOLIC BLOOD PRESSURE: 44 MMHG | HEIGHT: 67 IN

## 2024-06-07 DIAGNOSIS — I50.30 UNSPECIFIED DIASTOLIC (CONGESTIVE) HEART FAILURE: ICD-10-CM

## 2024-06-07 DIAGNOSIS — I42.8 OTHER CARDIOMYOPATHIES: ICD-10-CM

## 2024-06-07 PROCEDURE — G2211 COMPLEX E/M VISIT ADD ON: CPT

## 2024-06-07 PROCEDURE — 99203 OFFICE O/P NEW LOW 30 MIN: CPT | Mod: 25

## 2024-06-10 NOTE — PROGRESS NOTE ADULT - SUBJECTIVE AND OBJECTIVE BOX
normal  interpretation from staff  and chart   CHIEF COMPLAINT:  increasing LE swelling ,  testicular pain     HPI:  Chief Complaint: edema.    The patient is a 78y Male with significant PMH of  HTN, atrial fibrillation on Xarelto, Non-ischemic cardiomyopathy  cath 2020 minimal luminal irregularities , cardiac pacemaker in place, chronic systolic CHF, HPL, BPH, chronic thrombocytopenia, COPD and others presented to the ED with c/o B/L leg edema and some dry cough for last few days.  He also had orthopnea and he prefers to rest in reclining position than in laying flat. His B/L led edema has extended up to thighs and b/l loin areas including scrotal edema. He denies chest pain, fever, chills, N/V, abdominal pain, diarrhea or dysuria.  CT angio chest negative for PE.  CT abd/pelvis:  Moderate right pleural effusion and hepatic congestion. No SBP. CXR: Cardiomegaly with CHF at this time.   US Testicles: Bilateral varicoceles.  No testicular mass, torsion or epididymitis. Denies smoking, alcohol or substance abuse.    Sig labs: D-dimer elevated 1144 but CT angio chest negative for PE.  Pro-BNP 78867 (was 2000 in 03/2022), Troponin 254 (had 150s in past), Platelets 138k, Cr 1.62 (was 0.98 in 08/2023), .    CT Angio Chest PE Protocol, CT abd and Pelvis: IMPRESSION: No evidence of pulmonary embolism.  Moderate right pleural effusion with right lower lobe atelectasis. The lungs are otherwise clear.  Small volume ascites. Otherwise no findings identified to explain the patient's abdominal distention. No evidence of bowel obstruction.  Heterogeneous enhancement of the liver, suggestive of hepatic congestion. Cardiomegaly.    Xray Chest 1 View: Possible enlarging heart. Mild central CHF at this time.    US Testicles: Bilateral varicoceles, including intratesticular left varicocele.  No testicular mass, torsion or epididymitis.    Lasix 80 mg IV given in ED.    Patient does not have full insight in to his medical condition, he does not follow the recommendations that well , last seen in the office in dec 2023     4/24/24 - seen pt. in chair - Citizen of [place] speaking, NP fluent - c/o pain in the scrotum, no anginal symptoms, Tele; Afib Vpaced, had NSVT x2 yesterday afternoon and at 1 AM today       MEDICATIONS:Home Medications:  Daily-Cristina Men&#x27;s Formula oral tablet: 1 tab(s) orally once a day (22 Apr 2024 19:52)  finasteride 5 mg oral tablet: 1 tab(s) orally once a day (22 Apr 2024 19:52)  furosemide 40 mg oral tablet: 2 tab(s) orally once a day (22 Apr 2024 19:52)  omeprazole 20 mg oral delayed release capsule: 1 cap(s) orally once a day (22 Apr 2024 19:52)  tamsulosin 0.4 mg oral capsule: 1 cap(s) orally once a day (22 Apr 2024 19:52)    MEDICATIONS  (STANDING):  atorvastatin 20 milliGRAM(s) Oral at bedtime  budesonide  80 MICROgram(s)/formoterol 4.5 MICROgram(s) Inhaler 2 Puff(s) Inhalation two times a day  carvedilol 6.25 milliGRAM(s) Oral every 12 hours  finasteride 5 milliGRAM(s) Oral daily  furosemide   Injectable 40 milliGRAM(s) IV Push two times a day  lisinopril 10 milliGRAM(s) Oral daily  multivitamin 1 Tablet(s) Oral daily  pantoprazole    Tablet 40 milliGRAM(s) Oral before breakfast  rivaroxaban 15 milliGRAM(s) Oral with dinner  spironolactone 25 milliGRAM(s) Oral daily  tamsulosin 0.4 milliGRAM(s) Oral at bedtime      MEDICATIONS  (PRN):  acetaminophen     Tablet .. 650 milliGRAM(s) Oral every 6 hours PRN Temp greater or equal to 38C (100.4F), Mild Pain (1 - 3)  aluminum hydroxide/magnesium hydroxide/simethicone Suspension 30 milliLiter(s) Oral every 4 hours PRN Dyspepsia  guaifenesin/dextromethorphan Oral Liquid 10 milliLiter(s) Oral every 4 hours PRN Cough  hydrALAZINE Injectable 10 milliGRAM(s) IV Push every 4 hours PRN If SBP more than 150 mmHg  melatonin 3 milliGRAM(s) Oral at bedtime PRN Insomnia  ondansetron Injectable 4 milliGRAM(s) IV Push every 8 hours PRN Nausea and/or Vomiting    Vital Signs Last 24 Hrs  T(C): 36.4 (24 Apr 2024 08:03), Max: 36.4 (24 Apr 2024 08:03)  T(F): 97.5 (24 Apr 2024 08:03), Max: 97.5 (24 Apr 2024 08:03)  HR: 63 (24 Apr 2024 08:03) (63 - 68)  BP: 153/80 (24 Apr 2024 08:03) (135/71 - 153/80)  BP(mean): --  RR: 18 (24 Apr 2024 08:03) (18 - 18)  SpO2: 95% (24 Apr 2024 08:03) (95% - 95%)    Parameters below as of 24 Apr 2024 08:03  Patient On (Oxygen Delivery Method): room air      PHYSICAL EXAM:    Constitutional: NAD, awake and alert, well-developed  HEENT: PERR, EOMI,  No oral cyananosis.  Neck:  supple,  No JVD  Respiratory: Breath sounds are clear bilaterally, No wheezing, rales or rhonchi  Cardiovascular: S1 and S2, regular rate and rhythm, no Murmurs, gallops or rubs  Gastrointestinal: Bowel Sounds present, soft, nontender.   Extremities: 3 plus LE peripheral edema. No clubbing or cyanosis.  Vascular: 2+ peripheral pulses  Neurological: A/O x 3, no focal deficits  Musculoskeletal: no calf tenderness.  Skin: No rashes.      LABS: All Labs Reviewed:                                  14.0   10.93 )-----------( 138      ( 24 Apr 2024 06:00 )             45.4     04-24    138  |  99  |  39<H>  ----------------------------<  139<H>  4.0   |  34<H>  |  1.24    Ca    8.9      24 Apr 2024 06:00  Mg     2.2     04-23    TPro  6.7  /  Alb  3.6  /  TBili  1.7<H>  /  DBili  x   /  AST  33  /  ALT  35  /  AlkPhos  104  04-22    - TroponinI hsT: <-266.35, <-263.37, <-259.69, <-254.18    PT/INR - ( 22 Apr 2024 15:36 )   PT: 13.4 sec;   INR: 1.19 ratio         PTT - ( 22 Apr 2024 15:36 )  PTT:29.0 sec        Blood Culture:     < from: CT Angio Chest PE Protocol w/ IV Cont (04.22.24 @ 17:07) >  MPRESSION:  No evidence of pulmonary embolism.    Moderate right pleural effusion with right lower lobe atelectasis. The   lungs are otherwise clear.    Small volume ascites. Otherwise no findings identified to explain the   patient's abdominal distention. No evidence of bowel obstruction.    Heterogeneous enhancement of the liver, suggestive of hepatic congestion.    Cardiomegaly.      < end of copied text >  < from: TTE Echo Complete w/o Contrast w/ Doppler (08.25.23 @ 13:24) >  Summary     A micra device is noted in the RVOT. Device is highly   mobile and attached to RV free wall.     Endocardium is not well visualized, however, overall left ventricular   systolic function appears impaired. Technically Difficult Study.   Definity was used to better visualize the endocardial border.   Estimated left ventricular ejection fraction is 50 %.   A device wire is seen in the RV and RA.   Mild (1+) aortic regurgitation   Mild (1+) mitral regurgitation   Moderate (2+) tricuspid valve regurgitation. Mild pulmonary hypertension.    < end of copied text >      RADIOLOGY/EKG/TTE: reviewed     < from: TTE W or WO Ultrasound Enhancing Agent (04.23.24 @ 10:47) >    _______________________________________________________________________________________     CONCLUSIONS:      1. Left ventricular cavity is normal in size. Left ventricular systolic function is normal with an ejection fraction of 54 % by Rascon's method of disks.   2. Mildly enlarged right ventricular cavity size and normal systolic function.   3. The left atrium is severely dilated.   4. The right atrium is moderately dilated.   5. Device lead is visualized.   6. Severe mitral regurgitation. There is a dilated mitral annulus with an apically displaced closure pattern which could be c/w ischemic MR.   7. Moderate to severe tricuspid regurgitation.   8. Patent foramen ovale with left to right shunting by color flow Doppler.   9. Mild left ventricular hypertrophy.  10. Mild to moderate aortic regurgitation.  11. Severe pulmonary hypertension.  12. The inferior vena cava is dilated (dilated >2.1cm) with abnormal inspiratory collapse (abnormal <50%) consistent with elevated right atrial pressure (~15, range 10-20mmHg).  13. There ismild calcification of the aortic valve leaflets.  14. There is mild calcification of the mitral valve annulus.    ____________________________________________________________    < end of copied text >     paced rhythm     < from: Cardiac Cath Lab - Adult (08.28.20 @ 14:25) >  ardiac Arteries and Lesion Findings    LMCA: Diffuse irregularity.There is mild diffuse disease noted.    LAD: Diffuse irregularity.There is mild diffuse disease noted.    LCx: Diffuse irregularity.There is mild diffuse disease noted.    RCA: Diffuse irregularity.There is mild diffuse disease noted.    < end of copied text >

## 2024-06-14 ENCOUNTER — APPOINTMENT (OUTPATIENT)
Dept: CARDIOLOGY | Facility: CLINIC | Age: 79
End: 2024-06-14

## 2024-06-17 PROBLEM — I42.8 CARDIOMYOPATHY, NONISCHEMIC: Status: ACTIVE | Noted: 2024-06-17

## 2024-06-17 PROBLEM — I50.30 HEART FAILURE WITH PRESERVED LEFT VENTRICULAR FUNCTION (HFPEF): Status: ACTIVE | Noted: 2024-06-17

## 2024-06-17 NOTE — REASON FOR VISIT
[Cardiac Failure] : cardiac failure [Source: ______] : History obtained from [unfilled] [FreeTextEntry1] : Ref: Palla

## 2024-06-17 NOTE — CARDIOLOGY SUMMARY
[de-identified] : 4/22/24 EKG: V-paced, underlying Afib [de-identified] : 4/23/24 TTE: EF 54%, LVIDd 5.5 IVS 1.3 PWd 1.3 LA severely Dilated, RA moderately dilated, RV enlarged with normal function, Severe MR, mild to moderate AI, Moderate to severe TR, PFO,  Severe pHTN, PASP 68 dilated IVC

## 2024-06-17 NOTE — HISTORY OF PRESENT ILLNESS
[FreeTextEntry1] : The patient is a 77 yo Male HFrecEF/NICM (Previously 35%, now 54%, LVIDd 5.5)  with significant PMH of HTN, atrial fibrillation on Xarelto, cardiac pacemaker, HLD, BPH, chronic thrombocytopenia, COPD who presented to  ED on 4/22 for increasing LE Edema, discharged on 4/28/24 with GDMT limited due to hypotension. He now presents for hospital follow up.  The patient has been followed by his cardiologist for several years. Initially on Entresto, he was lost to follow up for a while and then after a hospitalization was placed on lisinopril.  He has intermittently followed with his cardiologist and appears to be confused over his medications Despite this, he reports being compliant with his diuretics.  In april of 2024, he began having increased dyspnea with worsening LE edema, prompting admission.  He was started on aggressive diuresis with significant improvement.  GDMT was escalated in hospital but the patient became very hypotensive with lightheadedness and medications were adjusted.  He was discharged on  Spironolactone 12.5 mg po daily, Toprol 12.5 mg po daily, and lasix 40 mg po daily.     He reports that since his discharge, he has not had any LE edema and only occasional dyspnea.  He states that his medications were changed recently after seeing his [facility], but does not recall what was changed.  Since this he has had some lightheadedness throughout the day.  He does not check BP at home but in office today was 82/52.  He denies any syncope, LH/dizziness, chest pain, palpitations, dyspnea, PND, orthopnea, nausea/vomiting, abdominal pain.

## 2024-06-17 NOTE — ASSESSMENT
[FreeTextEntry1] : 79 y/o [place] male with h/o HFrecEF, NICMP (LVEF 25% in 2016 improved to 54%), valvular heart disease (severe MR, mod-severe TR, mild-mod AI), s/p CRT-D complicated by pocket infection transition to Micra (non-functional, poor capture) followed by single lead pacemaker, Afib on xarelto, HTN, thrombocytopenia, COPD, CKD stage 2-3, pre DM (A1c 6.3%) and varicocele who presented to  acute on chronic HF improved after diuresis, but now appears hypovolemic.  ACC/AHA Stage C endorsing NYHA class II symptoms    1. Problem: Heart failure with recovered ejection fraction (HFrecEF).  GDMT- on Spironolactone 12.5 mg po daily,. toprol 12.5 mg po daily per discharge records, but patient is unsure:  Will reach out to PCP office to confirm medications and try ARB if able to tolerate - Diuretic:  On lasix 40 mg po daily   - Device: Has CRT-D   2. Severe MR and TR - Repeat echo ordered  - if valve disease remains severe, will consult structural heart.  3. HTN - Now normo-hypotensive - Meds as above  Follow up with repat labs and echo in 1 week. Dr. Salomon HERNANDEZ

## 2024-06-19 ENCOUNTER — APPOINTMENT (OUTPATIENT)
Dept: UROLOGY | Facility: CLINIC | Age: 79
End: 2024-06-19
Payer: MEDICARE

## 2024-06-19 ENCOUNTER — INPATIENT (INPATIENT)
Facility: HOSPITAL | Age: 79
LOS: 2 days | Discharge: HOME | DRG: 315 | End: 2024-06-22
Attending: STUDENT IN AN ORGANIZED HEALTH CARE EDUCATION/TRAINING PROGRAM | Admitting: INTERNAL MEDICINE
Payer: MEDICARE

## 2024-06-19 VITALS
WEIGHT: 190.04 LBS | TEMPERATURE: 98 F | HEART RATE: 61 BPM | DIASTOLIC BLOOD PRESSURE: 78 MMHG | OXYGEN SATURATION: 99 % | RESPIRATION RATE: 14 BRPM | SYSTOLIC BLOOD PRESSURE: 135 MMHG

## 2024-06-19 VITALS
BODY MASS INDEX: 29.32 KG/M2 | SYSTOLIC BLOOD PRESSURE: 102 MMHG | HEIGHT: 65 IN | DIASTOLIC BLOOD PRESSURE: 64 MMHG | WEIGHT: 176 LBS | HEART RATE: 62 BPM

## 2024-06-19 DIAGNOSIS — Z95.0 PRESENCE OF CARDIAC PACEMAKER: Chronic | ICD-10-CM

## 2024-06-19 DIAGNOSIS — R42 DIZZINESS AND GIDDINESS: ICD-10-CM

## 2024-06-19 LAB
ALBUMIN SERPL ELPH-MCNC: 3.5 G/DL — SIGNIFICANT CHANGE UP (ref 3.3–5)
ALP SERPL-CCNC: 90 U/L — SIGNIFICANT CHANGE UP (ref 40–120)
ALT FLD-CCNC: 33 U/L — SIGNIFICANT CHANGE UP (ref 12–78)
ANION GAP SERPL CALC-SCNC: 4 MMOL/L — LOW (ref 5–17)
AST SERPL-CCNC: 29 U/L — SIGNIFICANT CHANGE UP (ref 15–37)
BASOPHILS # BLD AUTO: 0.05 K/UL — SIGNIFICANT CHANGE UP (ref 0–0.2)
BASOPHILS NFR BLD AUTO: 0.7 % — SIGNIFICANT CHANGE UP (ref 0–2)
BILIRUB SERPL-MCNC: 0.9 MG/DL — SIGNIFICANT CHANGE UP (ref 0.2–1.2)
BUN SERPL-MCNC: 29 MG/DL — HIGH (ref 7–23)
CALCIUM SERPL-MCNC: 8.5 MG/DL — SIGNIFICANT CHANGE UP (ref 8.5–10.1)
CHLORIDE SERPL-SCNC: 107 MMOL/L — SIGNIFICANT CHANGE UP (ref 96–108)
CO2 SERPL-SCNC: 27 MMOL/L — SIGNIFICANT CHANGE UP (ref 22–31)
CREAT SERPL-MCNC: 1.31 MG/DL — HIGH (ref 0.5–1.3)
EGFR: 55 ML/MIN/1.73M2 — LOW
EGFR: 55 ML/MIN/1.73M2 — LOW
EOSINOPHIL # BLD AUTO: 0.2 K/UL — SIGNIFICANT CHANGE UP (ref 0–0.5)
EOSINOPHIL NFR BLD AUTO: 2.8 % — SIGNIFICANT CHANGE UP (ref 0–6)
GLUCOSE SERPL-MCNC: 100 MG/DL — HIGH (ref 70–99)
HCT VFR BLD CALC: 42.2 % — SIGNIFICANT CHANGE UP (ref 39–50)
HGB BLD-MCNC: 14 G/DL — SIGNIFICANT CHANGE UP (ref 13–17)
IMM GRANULOCYTES NFR BLD AUTO: 0.3 % — SIGNIFICANT CHANGE UP (ref 0–0.9)
LYMPHOCYTES # BLD AUTO: 1.66 K/UL — SIGNIFICANT CHANGE UP (ref 1–3.3)
LYMPHOCYTES # BLD AUTO: 23.2 % — SIGNIFICANT CHANGE UP (ref 13–44)
MAGNESIUM SERPL-MCNC: 2.1 MG/DL — SIGNIFICANT CHANGE UP (ref 1.6–2.6)
MCHC RBC-ENTMCNC: 31 PG — SIGNIFICANT CHANGE UP (ref 27–34)
MCHC RBC-ENTMCNC: 33.2 GM/DL — SIGNIFICANT CHANGE UP (ref 32–36)
MCV RBC AUTO: 93.4 FL — SIGNIFICANT CHANGE UP (ref 80–100)
MONOCYTES # BLD AUTO: 0.73 K/UL — SIGNIFICANT CHANGE UP (ref 0–0.9)
MONOCYTES NFR BLD AUTO: 10.2 % — SIGNIFICANT CHANGE UP (ref 2–14)
NEUTROPHILS # BLD AUTO: 4.48 K/UL — SIGNIFICANT CHANGE UP (ref 1.8–7.4)
NEUTROPHILS NFR BLD AUTO: 62.8 % — SIGNIFICANT CHANGE UP (ref 43–77)
NT-PROBNP SERPL-SCNC: 1184 PG/ML — HIGH (ref 0–450)
PLATELET # BLD AUTO: 102 K/UL — LOW (ref 150–400)
POTASSIUM SERPL-MCNC: 4.3 MMOL/L — SIGNIFICANT CHANGE UP (ref 3.5–5.3)
POTASSIUM SERPL-SCNC: 4.3 MMOL/L — SIGNIFICANT CHANGE UP (ref 3.5–5.3)
PROT SERPL-MCNC: 7.1 GM/DL — SIGNIFICANT CHANGE UP (ref 6–8.3)
RBC # BLD: 4.52 M/UL — SIGNIFICANT CHANGE UP (ref 4.2–5.8)
RBC # FLD: 14.4 % — SIGNIFICANT CHANGE UP (ref 10.3–14.5)
SODIUM SERPL-SCNC: 138 MMOL/L — SIGNIFICANT CHANGE UP (ref 135–145)
TROPONIN I, HIGH SENSITIVITY RESULT: 155.05 NG/L — HIGH
WBC # BLD: 7.14 K/UL — SIGNIFICANT CHANGE UP (ref 3.8–10.5)
WBC # FLD AUTO: 7.14 K/UL — SIGNIFICANT CHANGE UP (ref 3.8–10.5)

## 2024-06-19 PROCEDURE — 85730 THROMBOPLASTIN TIME PARTIAL: CPT

## 2024-06-19 PROCEDURE — 85610 PROTHROMBIN TIME: CPT

## 2024-06-19 PROCEDURE — 99497 ADVNCD CARE PLAN 30 MIN: CPT | Mod: 25

## 2024-06-19 PROCEDURE — 93010 ELECTROCARDIOGRAM REPORT: CPT

## 2024-06-19 PROCEDURE — 99214 OFFICE O/P EST MOD 30 MIN: CPT

## 2024-06-19 PROCEDURE — 80053 COMPREHEN METABOLIC PANEL: CPT

## 2024-06-19 PROCEDURE — 85027 COMPLETE CBC AUTOMATED: CPT

## 2024-06-19 PROCEDURE — 84484 ASSAY OF TROPONIN QUANT: CPT

## 2024-06-19 PROCEDURE — 80048 BASIC METABOLIC PNL TOTAL CA: CPT

## 2024-06-19 PROCEDURE — 99223 1ST HOSP IP/OBS HIGH 75: CPT

## 2024-06-19 PROCEDURE — 84100 ASSAY OF PHOSPHORUS: CPT

## 2024-06-19 PROCEDURE — 85025 COMPLETE CBC W/AUTO DIFF WBC: CPT

## 2024-06-19 PROCEDURE — 71045 X-RAY EXAM CHEST 1 VIEW: CPT | Mod: 26

## 2024-06-19 PROCEDURE — 83735 ASSAY OF MAGNESIUM: CPT

## 2024-06-19 PROCEDURE — 36415 COLL VENOUS BLD VENIPUNCTURE: CPT

## 2024-06-19 PROCEDURE — 99285 EMERGENCY DEPT VISIT HI MDM: CPT

## 2024-06-19 PROCEDURE — 93306 TTE W/DOPPLER COMPLETE: CPT

## 2024-06-19 RX ORDER — METOPROLOL SUCCINATE 50 MG/1
25 TABLET, EXTENDED RELEASE ORAL DAILY
Refills: 0 | Status: DISCONTINUED | OUTPATIENT
Start: 2024-06-19 | End: 2024-06-19

## 2024-06-19 RX ORDER — OMEPRAZOLE 10 MG/1
1 CAPSULE, DELAYED RELEASE ORAL
Qty: 0 | Refills: 0 | DISCHARGE

## 2024-06-19 RX ORDER — RIVAROXABAN 10 MG/1
20 TABLET, FILM COATED ORAL
Refills: 0 | Status: DISCONTINUED | OUTPATIENT
Start: 2024-06-19 | End: 2024-06-22

## 2024-06-19 RX ORDER — FUROSEMIDE 10 MG/ML
40 INJECTION INTRAMUSCULAR; INTRAVENOUS DAILY
Refills: 0 | Status: DISCONTINUED | OUTPATIENT
Start: 2024-06-19 | End: 2024-06-20

## 2024-06-19 RX ORDER — ATORVASTATIN CALCIUM 80 MG/1
20 TABLET, FILM COATED ORAL AT BEDTIME
Refills: 0 | Status: DISCONTINUED | OUTPATIENT
Start: 2024-06-19 | End: 2024-06-22

## 2024-06-19 RX ORDER — TAMSULOSIN HYDROCHLORIDE 0.4 MG/1
1 CAPSULE ORAL
Refills: 0 | DISCHARGE

## 2024-06-19 RX ORDER — SACUBITRIL AND VALSARTAN 6; 6 MG/1; MG/1
1 PELLET ORAL
Refills: 0 | Status: DISCONTINUED | OUTPATIENT
Start: 2024-06-19 | End: 2024-06-19

## 2024-06-19 RX ORDER — FINASTERIDE 5 MG/1
1 TABLET, FILM COATED ORAL
Qty: 0 | Refills: 0 | DISCHARGE

## 2024-06-19 RX ORDER — SPIRONOLACTONE 25 MG
12.5 TABLET ORAL DAILY
Refills: 0 | Status: DISCONTINUED | OUTPATIENT
Start: 2024-06-19 | End: 2024-06-20

## 2024-06-19 RX ORDER — ASPIRIN 325 MG
81 TABLET ORAL DAILY
Refills: 0 | Status: DISCONTINUED | OUTPATIENT
Start: 2024-06-19 | End: 2024-06-22

## 2024-06-19 RX ORDER — FINASTERIDE 1 MG/1
5 TABLET, FILM COATED ORAL DAILY
Refills: 0 | Status: DISCONTINUED | OUTPATIENT
Start: 2024-06-19 | End: 2024-06-22

## 2024-06-19 RX ORDER — MECLIZINE HCL 12.5 MG
25 TABLET ORAL ONCE
Refills: 0 | Status: COMPLETED | OUTPATIENT
Start: 2024-06-19 | End: 2024-06-19

## 2024-06-19 RX ORDER — ACETAMINOPHEN 500 MG/5ML
650 LIQUID (ML) ORAL ONCE
Refills: 0 | Status: COMPLETED | OUTPATIENT
Start: 2024-06-19 | End: 2024-06-19

## 2024-06-19 RX ORDER — TAMSULOSIN HYDROCHLORIDE 0.4 MG/1
0.4 CAPSULE ORAL
Refills: 0 | Status: DISCONTINUED | OUTPATIENT
Start: 2024-06-19 | End: 2024-06-19

## 2024-06-19 RX ORDER — TAMSULOSIN HYDROCHLORIDE 0.4 MG/1
0.4 CAPSULE ORAL DAILY
Refills: 0 | Status: DISCONTINUED | OUTPATIENT
Start: 2024-06-19 | End: 2024-06-22

## 2024-06-19 RX ORDER — METOPROLOL SUCCINATE 50 MG/1
12.5 TABLET, EXTENDED RELEASE ORAL DAILY
Refills: 0 | Status: DISCONTINUED | OUTPATIENT
Start: 2024-06-19 | End: 2024-06-22

## 2024-06-19 RX ADMIN — Medication 25 MILLIGRAM(S): at 18:47

## 2024-06-19 NOTE — ASSESSMENT
[FreeTextEntry1] : 79 yo M with BPH with LUTS, well controlled on dual therapy. WIll continue.  For varicocele, discussed varicocelectomy but patient is insistent that he only has discomfort from superfiscial veins and wants laser surgery for these. Discussed this may or may not be effective without addressing varicocele. He understands but wants "the laser." Will send to vascular surgeon for further discussion of possible therapies.

## 2024-06-19 NOTE — PHYSICAL EXAM
[General Appearance - Well Developed] : well developed [General Appearance - Well Nourished] : well nourished [Normal Appearance] : normal appearance [Well Groomed] : well groomed [General Appearance - In No Acute Distress] : no acute distress [Abdomen Soft] : soft [Abdomen Tenderness] : non-tender [Costovertebral Angle Tenderness] : no ~M costovertebral angle tenderness [Urethral Meatus] : meatus normal [Urinary Bladder Findings] : the bladder was normal on palpation [Scrotum] : the scrotum was normal [Testes Mass (___cm)] : there were no testicular masses [No Prostate Nodules] : no prostate nodules [Prostate Size ___ gm] : prostate size [unfilled] gm [Edema] : no peripheral edema [] : no respiratory distress [Respiration, Rhythm And Depth] : normal respiratory rhythm and effort [Exaggerated Use Of Accessory Muscles For Inspiration] : no accessory muscle use [Oriented To Time, Place, And Person] : oriented to person, place, and time [Affect] : the affect was normal [Mood] : the mood was normal [Not Anxious] : not anxious [Normal Station and Gait] : the gait and station were normal for the patient's age [No Focal Deficits] : no focal deficits [No Palpable Adenopathy] : no palpable adenopathy [FreeTextEntry1] : LEFT varicocele, prominant superfiscial veins on skin of scrotum

## 2024-06-19 NOTE — REASON FOR VISIT
[Initial Visit ___] : [unfilled] is here today for an initial visit  for [unfilled] [[employer] ] : provided by [employer]   [Follow-up Visit ___] : a follow-up visit  for [unfilled] [Interpreters_IDNumber] : [id number] [Interpreters_FullName] : Charly [TWNoteComboBox1] : [place]

## 2024-06-19 NOTE — HISTORY OF PRESENT ILLNESS
[FreeTextEntry1] : 76 year old man, patient of Dr Waledn, seen 10/20/2022 for follow up. He had urinary retention when he held his dual therapy of flomax and finasteride. Now back on medications and feels he is voiding freely. No acute complaints. He has history of elevated PSA, has not repeated recently.   06/06/2023: Patient presents for follow up. He reports very good response to dual therapy. no complaints today. PSA was down to 3.43 from 5.5.   06/19/2024: Patient presents for follow up. He reports discomfort from veins on scrotum. He is interested in laser therapy for these veins. Voiding well, no LUTS or other  complaints.

## 2024-06-20 ENCOUNTER — RESULT REVIEW (OUTPATIENT)
Age: 79
End: 2024-06-20

## 2024-06-20 DIAGNOSIS — I50.22 CHRONIC SYSTOLIC (CONGESTIVE) HEART FAILURE: ICD-10-CM

## 2024-06-20 DIAGNOSIS — I48.20 CHRONIC ATRIAL FIBRILLATION, UNSPECIFIED: ICD-10-CM

## 2024-06-20 DIAGNOSIS — I95.9 HYPOTENSION, UNSPECIFIED: ICD-10-CM

## 2024-06-20 DIAGNOSIS — Z95.0 PRESENCE OF CARDIAC PACEMAKER: ICD-10-CM

## 2024-06-20 DIAGNOSIS — R79.89 OTHER SPECIFIED ABNORMAL FINDINGS OF BLOOD CHEMISTRY: ICD-10-CM

## 2024-06-20 LAB
ALBUMIN SERPL ELPH-MCNC: 3.8 G/DL — SIGNIFICANT CHANGE UP (ref 3.3–5)
ALP SERPL-CCNC: 101 U/L — SIGNIFICANT CHANGE UP (ref 40–120)
ALT FLD-CCNC: 34 U/L — SIGNIFICANT CHANGE UP (ref 12–78)
ANION GAP SERPL CALC-SCNC: 2 MMOL/L — LOW (ref 5–17)
APTT BLD: 41.6 SEC — HIGH (ref 24.5–35.6)
AST SERPL-CCNC: 28 U/L — SIGNIFICANT CHANGE UP (ref 15–37)
BILIRUB SERPL-MCNC: 1.1 MG/DL — SIGNIFICANT CHANGE UP (ref 0.2–1.2)
BUN SERPL-MCNC: 25 MG/DL — HIGH (ref 7–23)
CALCIUM SERPL-MCNC: 9.2 MG/DL — SIGNIFICANT CHANGE UP (ref 8.5–10.1)
CHLORIDE SERPL-SCNC: 108 MMOL/L — SIGNIFICANT CHANGE UP (ref 96–108)
CO2 SERPL-SCNC: 30 MMOL/L — SIGNIFICANT CHANGE UP (ref 22–31)
CREAT SERPL-MCNC: 1.17 MG/DL — SIGNIFICANT CHANGE UP (ref 0.5–1.3)
EGFR: 63 ML/MIN/1.73M2 — SIGNIFICANT CHANGE UP
EGFR: 63 ML/MIN/1.73M2 — SIGNIFICANT CHANGE UP
GLUCOSE SERPL-MCNC: 101 MG/DL — HIGH (ref 70–99)
HCT VFR BLD CALC: 47.9 % — SIGNIFICANT CHANGE UP (ref 39–50)
HGB BLD-MCNC: 15.6 G/DL — SIGNIFICANT CHANGE UP (ref 13–17)
INR BLD: 1 RATIO — SIGNIFICANT CHANGE UP (ref 0.85–1.18)
MAGNESIUM SERPL-MCNC: 2.1 MG/DL — SIGNIFICANT CHANGE UP (ref 1.6–2.6)
MCHC RBC-ENTMCNC: 30.7 PG — SIGNIFICANT CHANGE UP (ref 27–34)
MCHC RBC-ENTMCNC: 32.6 GM/DL — SIGNIFICANT CHANGE UP (ref 32–36)
MCV RBC AUTO: 94.3 FL — SIGNIFICANT CHANGE UP (ref 80–100)
PHOSPHATE SERPL-MCNC: 3.5 MG/DL — SIGNIFICANT CHANGE UP (ref 2.5–4.5)
PLATELET # BLD AUTO: 112 K/UL — LOW (ref 150–400)
POTASSIUM SERPL-MCNC: 4.2 MMOL/L — SIGNIFICANT CHANGE UP (ref 3.5–5.3)
POTASSIUM SERPL-SCNC: 4.2 MMOL/L — SIGNIFICANT CHANGE UP (ref 3.5–5.3)
PROT SERPL-MCNC: 7.6 GM/DL — SIGNIFICANT CHANGE UP (ref 6–8.3)
PROTHROM AB SERPL-ACNC: 11.3 SEC — SIGNIFICANT CHANGE UP (ref 9.5–13)
RBC # BLD: 5.08 M/UL — SIGNIFICANT CHANGE UP (ref 4.2–5.8)
RBC # FLD: 14.2 % — SIGNIFICANT CHANGE UP (ref 10.3–14.5)
SODIUM SERPL-SCNC: 140 MMOL/L — SIGNIFICANT CHANGE UP (ref 135–145)
TROPONIN I, HIGH SENSITIVITY RESULT: 144.06 NG/L — HIGH
TROPONIN I, HIGH SENSITIVITY RESULT: 159.94 NG/L — HIGH
WBC # BLD: 6.67 K/UL — SIGNIFICANT CHANGE UP (ref 3.8–10.5)
WBC # FLD AUTO: 6.67 K/UL — SIGNIFICANT CHANGE UP (ref 3.8–10.5)

## 2024-06-20 PROCEDURE — 93279 PRGRMG DEV EVAL PM/LDLS PM: CPT | Mod: 26

## 2024-06-20 PROCEDURE — 93306 TTE W/DOPPLER COMPLETE: CPT | Mod: 26

## 2024-06-20 PROCEDURE — 99232 SBSQ HOSP IP/OBS MODERATE 35: CPT

## 2024-06-20 PROCEDURE — 99223 1ST HOSP IP/OBS HIGH 75: CPT

## 2024-06-20 RX ORDER — DIPHENHYDRAMINE HCL 12.5MG/5ML
25 ELIXIR ORAL EVERY 6 HOURS
Refills: 0 | Status: DISCONTINUED | OUTPATIENT
Start: 2024-06-20 | End: 2024-06-22

## 2024-06-20 RX ORDER — LOSARTAN POTASSIUM 100 MG/1
12.5 TABLET, FILM COATED ORAL DAILY
Refills: 0 | Status: DISCONTINUED | OUTPATIENT
Start: 2024-06-20 | End: 2024-06-22

## 2024-06-20 RX ORDER — HYDROCORTISONE 10 MG/G
1 CREAM TOPICAL EVERY 4 HOURS
Refills: 0 | Status: DISCONTINUED | OUTPATIENT
Start: 2024-06-20 | End: 2024-06-22

## 2024-06-20 RX ADMIN — Medication 81 MILLIGRAM(S): at 12:13

## 2024-06-20 RX ADMIN — METOPROLOL SUCCINATE 12.5 MILLIGRAM(S): 50 TABLET, EXTENDED RELEASE ORAL at 12:12

## 2024-06-20 RX ADMIN — Medication 25 MILLIGRAM(S): at 00:25

## 2024-06-20 RX ADMIN — TAMSULOSIN HYDROCHLORIDE 0.4 MILLIGRAM(S): 0.4 CAPSULE ORAL at 12:13

## 2024-06-20 RX ADMIN — LOSARTAN POTASSIUM 12.5 MILLIGRAM(S): 100 TABLET, FILM COATED ORAL at 12:09

## 2024-06-20 RX ADMIN — HYDROCORTISONE 1 APPLICATION(S): 10 CREAM TOPICAL at 21:19

## 2024-06-20 RX ADMIN — ATORVASTATIN CALCIUM 20 MILLIGRAM(S): 80 TABLET, FILM COATED ORAL at 21:20

## 2024-06-20 RX ADMIN — RIVAROXABAN 20 MILLIGRAM(S): 10 TABLET, FILM COATED ORAL at 18:08

## 2024-06-20 RX ADMIN — FINASTERIDE 5 MILLIGRAM(S): 1 TABLET, FILM COATED ORAL at 12:13

## 2024-06-21 ENCOUNTER — TRANSCRIPTION ENCOUNTER (OUTPATIENT)
Age: 79
End: 2024-06-21

## 2024-06-21 LAB
ANION GAP SERPL CALC-SCNC: 4 MMOL/L — LOW (ref 5–17)
BASOPHILS # BLD AUTO: 0.06 K/UL — SIGNIFICANT CHANGE UP (ref 0–0.2)
BASOPHILS NFR BLD AUTO: 1 % — SIGNIFICANT CHANGE UP (ref 0–2)
BUN SERPL-MCNC: 23 MG/DL — SIGNIFICANT CHANGE UP (ref 7–23)
CALCIUM SERPL-MCNC: 9.2 MG/DL — SIGNIFICANT CHANGE UP (ref 8.5–10.1)
CHLORIDE SERPL-SCNC: 110 MMOL/L — HIGH (ref 96–108)
CO2 SERPL-SCNC: 28 MMOL/L — SIGNIFICANT CHANGE UP (ref 22–31)
CREAT SERPL-MCNC: 1.02 MG/DL — SIGNIFICANT CHANGE UP (ref 0.5–1.3)
EGFR: 75 ML/MIN/1.73M2 — SIGNIFICANT CHANGE UP
EGFR: 75 ML/MIN/1.73M2 — SIGNIFICANT CHANGE UP
EOSINOPHIL # BLD AUTO: 0.25 K/UL — SIGNIFICANT CHANGE UP (ref 0–0.5)
EOSINOPHIL NFR BLD AUTO: 4.1 % — SIGNIFICANT CHANGE UP (ref 0–6)
GLUCOSE SERPL-MCNC: 108 MG/DL — HIGH (ref 70–99)
HCT VFR BLD CALC: 45.7 % — SIGNIFICANT CHANGE UP (ref 39–50)
HGB BLD-MCNC: 14.8 G/DL — SIGNIFICANT CHANGE UP (ref 13–17)
IMM GRANULOCYTES NFR BLD AUTO: 0.3 % — SIGNIFICANT CHANGE UP (ref 0–0.9)
LYMPHOCYTES # BLD AUTO: 1.67 K/UL — SIGNIFICANT CHANGE UP (ref 1–3.3)
LYMPHOCYTES # BLD AUTO: 27.2 % — SIGNIFICANT CHANGE UP (ref 13–44)
MCHC RBC-ENTMCNC: 30.5 PG — SIGNIFICANT CHANGE UP (ref 27–34)
MCHC RBC-ENTMCNC: 32.4 GM/DL — SIGNIFICANT CHANGE UP (ref 32–36)
MCV RBC AUTO: 94 FL — SIGNIFICANT CHANGE UP (ref 80–100)
MONOCYTES # BLD AUTO: 0.55 K/UL — SIGNIFICANT CHANGE UP (ref 0–0.9)
MONOCYTES NFR BLD AUTO: 8.9 % — SIGNIFICANT CHANGE UP (ref 2–14)
NEUTROPHILS # BLD AUTO: 3.6 K/UL — SIGNIFICANT CHANGE UP (ref 1.8–7.4)
NEUTROPHILS NFR BLD AUTO: 58.5 % — SIGNIFICANT CHANGE UP (ref 43–77)
PLATELET # BLD AUTO: 115 K/UL — LOW (ref 150–400)
POTASSIUM SERPL-MCNC: 4.5 MMOL/L — SIGNIFICANT CHANGE UP (ref 3.5–5.3)
POTASSIUM SERPL-SCNC: 4.5 MMOL/L — SIGNIFICANT CHANGE UP (ref 3.5–5.3)
RBC # BLD: 4.86 M/UL — SIGNIFICANT CHANGE UP (ref 4.2–5.8)
RBC # FLD: 14.3 % — SIGNIFICANT CHANGE UP (ref 10.3–14.5)
SODIUM SERPL-SCNC: 142 MMOL/L — SIGNIFICANT CHANGE UP (ref 135–145)
WBC # BLD: 6.15 K/UL — SIGNIFICANT CHANGE UP (ref 3.8–10.5)
WBC # FLD AUTO: 6.15 K/UL — SIGNIFICANT CHANGE UP (ref 3.8–10.5)

## 2024-06-21 PROCEDURE — 99233 SBSQ HOSP IP/OBS HIGH 50: CPT

## 2024-06-21 PROCEDURE — 99232 SBSQ HOSP IP/OBS MODERATE 35: CPT

## 2024-06-21 RX ADMIN — FINASTERIDE 5 MILLIGRAM(S): 1 TABLET, FILM COATED ORAL at 10:56

## 2024-06-21 RX ADMIN — METOPROLOL SUCCINATE 12.5 MILLIGRAM(S): 50 TABLET, EXTENDED RELEASE ORAL at 10:55

## 2024-06-21 RX ADMIN — LOSARTAN POTASSIUM 12.5 MILLIGRAM(S): 100 TABLET, FILM COATED ORAL at 10:55

## 2024-06-21 RX ADMIN — TAMSULOSIN HYDROCHLORIDE 0.4 MILLIGRAM(S): 0.4 CAPSULE ORAL at 10:56

## 2024-06-21 RX ADMIN — RIVAROXABAN 20 MILLIGRAM(S): 10 TABLET, FILM COATED ORAL at 16:39

## 2024-06-21 RX ADMIN — Medication 81 MILLIGRAM(S): at 10:59

## 2024-06-21 RX ADMIN — ATORVASTATIN CALCIUM 20 MILLIGRAM(S): 80 TABLET, FILM COATED ORAL at 21:15

## 2024-06-22 ENCOUNTER — TRANSCRIPTION ENCOUNTER (OUTPATIENT)
Age: 79
End: 2024-06-22

## 2024-06-22 VITALS
DIASTOLIC BLOOD PRESSURE: 66 MMHG | RESPIRATION RATE: 18 BRPM | HEART RATE: 66 BPM | TEMPERATURE: 98 F | OXYGEN SATURATION: 100 % | SYSTOLIC BLOOD PRESSURE: 126 MMHG

## 2024-06-22 PROCEDURE — 99239 HOSP IP/OBS DSCHRG MGMT >30: CPT

## 2024-06-22 PROCEDURE — 99233 SBSQ HOSP IP/OBS HIGH 50: CPT

## 2024-06-22 RX ORDER — METOPROLOL SUCCINATE 50 MG/1
1 TABLET, EXTENDED RELEASE ORAL
Refills: 0 | DISCHARGE

## 2024-06-22 RX ORDER — SACUBITRIL AND VALSARTAN 6; 6 MG/1; MG/1
1 PELLET ORAL
Refills: 0 | DISCHARGE

## 2024-06-22 RX ORDER — METOPROLOL SUCCINATE 50 MG/1
0.5 TABLET, EXTENDED RELEASE ORAL
Qty: 15 | Refills: 0
Start: 2024-06-22 | End: 2024-07-21

## 2024-06-22 RX ORDER — LOSARTAN POTASSIUM 100 MG/1
0.5 TABLET, FILM COATED ORAL
Qty: 15 | Refills: 0
Start: 2024-06-22 | End: 2024-07-21

## 2024-06-22 RX ADMIN — LOSARTAN POTASSIUM 12.5 MILLIGRAM(S): 100 TABLET, FILM COATED ORAL at 09:51

## 2024-06-22 RX ADMIN — FINASTERIDE 5 MILLIGRAM(S): 1 TABLET, FILM COATED ORAL at 09:51

## 2024-06-22 RX ADMIN — TAMSULOSIN HYDROCHLORIDE 0.4 MILLIGRAM(S): 0.4 CAPSULE ORAL at 09:53

## 2024-06-22 RX ADMIN — Medication 81 MILLIGRAM(S): at 09:51

## 2024-06-22 RX ADMIN — METOPROLOL SUCCINATE 12.5 MILLIGRAM(S): 50 TABLET, EXTENDED RELEASE ORAL at 09:51

## 2024-06-22 RX ADMIN — Medication 25 MILLIGRAM(S): at 09:51

## 2024-06-28 DIAGNOSIS — I24.89 OTHER FORMS OF ACUTE ISCHEMIC HEART DISEASE: ICD-10-CM

## 2024-06-28 DIAGNOSIS — Z79.82 LONG TERM (CURRENT) USE OF ASPIRIN: ICD-10-CM

## 2024-06-28 DIAGNOSIS — I13.0 HYPERTENSIVE HEART AND CHRONIC KIDNEY DISEASE WITH HEART FAILURE AND STAGE 1 THROUGH STAGE 4 CHRONIC KIDNEY DISEASE, OR UNSPECIFIED CHRONIC KIDNEY DISEASE: ICD-10-CM

## 2024-06-28 DIAGNOSIS — E78.5 HYPERLIPIDEMIA, UNSPECIFIED: ICD-10-CM

## 2024-06-28 DIAGNOSIS — I50.22 CHRONIC SYSTOLIC (CONGESTIVE) HEART FAILURE: ICD-10-CM

## 2024-06-28 DIAGNOSIS — J44.9 CHRONIC OBSTRUCTIVE PULMONARY DISEASE, UNSPECIFIED: ICD-10-CM

## 2024-06-28 DIAGNOSIS — N18.2 CHRONIC KIDNEY DISEASE, STAGE 2 (MILD): ICD-10-CM

## 2024-06-28 DIAGNOSIS — D69.6 THROMBOCYTOPENIA, UNSPECIFIED: ICD-10-CM

## 2024-06-28 DIAGNOSIS — R79.89 OTHER SPECIFIED ABNORMAL FINDINGS OF BLOOD CHEMISTRY: ICD-10-CM

## 2024-06-28 DIAGNOSIS — N40.0 BENIGN PROSTATIC HYPERPLASIA WITHOUT LOWER URINARY TRACT SYMPTOMS: ICD-10-CM

## 2024-06-28 DIAGNOSIS — Z55.6 PROBLEMS RELATED TO HEALTH LITERACY: ICD-10-CM

## 2024-06-28 DIAGNOSIS — I42.8 OTHER CARDIOMYOPATHIES: ICD-10-CM

## 2024-06-28 DIAGNOSIS — I48.20 CHRONIC ATRIAL FIBRILLATION, UNSPECIFIED: ICD-10-CM

## 2024-06-28 DIAGNOSIS — Z88.9 ALLERGY STATUS TO UNSPECIFIED DRUGS, MEDICAMENTS AND BIOLOGICAL SUBSTANCES: ICD-10-CM

## 2024-06-28 DIAGNOSIS — Z95.0 PRESENCE OF CARDIAC PACEMAKER: ICD-10-CM

## 2024-06-28 DIAGNOSIS — Z79.01 LONG TERM (CURRENT) USE OF ANTICOAGULANTS: ICD-10-CM

## 2024-06-28 DIAGNOSIS — I95.9 HYPOTENSION, UNSPECIFIED: ICD-10-CM

## 2024-06-28 SDOH — EDUCATIONAL SECURITY - EDUCATION ATTAINMENT: PROBLEMS RELATED TO HEALTH LITERACY: Z55.6

## 2024-07-19 ENCOUNTER — OUTPATIENT (OUTPATIENT)
Dept: OUTPATIENT SERVICES | Facility: HOSPITAL | Age: 79
LOS: 1 days | End: 2024-07-19
Payer: MEDICARE

## 2024-07-19 ENCOUNTER — RESULT REVIEW (OUTPATIENT)
Age: 79
End: 2024-07-19

## 2024-07-19 DIAGNOSIS — I48.20 CHRONIC ATRIAL FIBRILLATION, UNSPECIFIED: ICD-10-CM

## 2024-07-19 DIAGNOSIS — Z95.0 PRESENCE OF CARDIAC PACEMAKER: Chronic | ICD-10-CM

## 2024-07-19 PROCEDURE — 93306 TTE W/DOPPLER COMPLETE: CPT

## 2024-07-19 PROCEDURE — 93306 TTE W/DOPPLER COMPLETE: CPT | Mod: 26

## 2024-07-20 DIAGNOSIS — I48.20 CHRONIC ATRIAL FIBRILLATION, UNSPECIFIED: ICD-10-CM

## 2024-07-29 ENCOUNTER — APPOINTMENT (OUTPATIENT)
Dept: CARDIOLOGY | Facility: CLINIC | Age: 79
End: 2024-07-29
Payer: MEDICARE

## 2024-07-29 ENCOUNTER — NON-APPOINTMENT (OUTPATIENT)
Age: 79
End: 2024-07-29

## 2024-07-29 VITALS
OXYGEN SATURATION: 96 % | DIASTOLIC BLOOD PRESSURE: 72 MMHG | BODY MASS INDEX: 28.62 KG/M2 | SYSTOLIC BLOOD PRESSURE: 132 MMHG | WEIGHT: 172 LBS | HEART RATE: 75 BPM

## 2024-07-29 DIAGNOSIS — Z09 ENCOUNTER FOR FOLLOW-UP EXAMINATION AFTER COMPLETED TREATMENT FOR CONDITIONS OTHER THAN MALIGNANT NEOPLASM: ICD-10-CM

## 2024-07-29 PROCEDURE — G2211 COMPLEX E/M VISIT ADD ON: CPT

## 2024-07-29 PROCEDURE — 99215 OFFICE O/P EST HI 40 MIN: CPT

## 2024-07-29 PROCEDURE — 93000 ELECTROCARDIOGRAM COMPLETE: CPT

## 2024-07-29 RX ORDER — LOSARTAN POTASSIUM 25 MG/1
25 TABLET, FILM COATED ORAL
Qty: 1 | Refills: 0 | Status: ACTIVE | COMMUNITY
Start: 2024-06-22 | End: 1900-01-01

## 2024-07-29 NOTE — ASSESSMENT
[FreeTextEntry1] :  Here today post hospital follow up admitted from PCP office 2/2 low BP, near syncope and elevated troponin seen in the setting of acute hypotension and Pharmacotherapy non compliance, patient had not been taking medications as prescribed, sometimes taking meds in excess .rosalia and Entresto d/c'd  Currently claims to be feeling well no cardiovascular complaints, reports he is feeling "Much better"  Elevated troponin likely demand ischemia in setting of acute hypotension, no anginal symptoms, non obstructive LHC on 8/28/20.  Acute hypotension resolved pressure stable today patient feels well, all meds reviewed   chronic systolic congestive heart failure; appears euvolemic no SOB/PND/Orthopnea/Edema weight stable ,  Echo with worsening EF 43% (was 54% in April 2024) and severe biatrial enlargement, would benefit from Micra PPM upgrade to CRT (pt. had a CRT initially that was explanted due to infection and replaced with Micra)  EP eval and reccs appreciated: CRT-P as OPT GDMT limited by hypotension ( Aldactone and Entresto d/c'd CW Losartan 12.5 MG QD )  I recommend daily weight, low sodium diet CW current medications  Labs ordered    Valvular heart disease MR and TR improved  Echo 7/19/24 assess valvular disease; Moderate MR/TR, mild-moderate AI COPD/severe pHTN PASP 61 mmHg pulmonary CW lasix  Repeat Echo 3 months Pulmonary consult   HTN: Controlled ( rosalia and Entresto d/c'd on prior hospitalization 2/2 hypotension )  AF: S/P ablation PPM EP management  Continue oral AC/BB,    OV 6 weeks   Plan DW patient VIA

## 2024-07-29 NOTE — HISTORY OF PRESENT ILLNESS
[FreeTextEntry1] : 79 yo male who is seen today with assistance of  PMH: chronic systolic CHF/NICM, non-obstructive CAD, permanent atrial fibrillation SP ablation/PPM, CRT-P, mitral regurgitation, HTN, severe pulmonary hypertension, COPD  here today post hospital follow up  admitted from PCP office 2/2 low BP, near syncope and elevated troponin likely demand in the setting of acute hypotension as he has not been taking medications as prescribed, sometimes taking meds in excess .rosalia and Entresto d/c'd  Currently claims to be feeling well no cardiovascular complaints, reports he is feeling "Much better" All medications reviewed again in detail with patient/ and Dr Palla ( all bottles were brought to office today reviewed and documented )

## 2024-07-29 NOTE — END OF VISIT
[FreeTextEntry3] : patient was seen with NP agree with assessment and plan  hospital records reviewed , the medication changes noted  [Time Spent: ___ minutes] : I have spent [unfilled] minutes of time on the encounter.

## 2024-07-29 NOTE — HISTORY OF PRESENT ILLNESS
[FreeTextEntry1] : 77 yo male who is seen today with assistance of  PMH: chronic systolic CHF/NICM, non-obstructive CAD, permanent atrial fibrillation SP ablation/PPM, CRT-P, mitral regurgitation, HTN, severe pulmonary hypertension, COPD  here today post hospital follow up  admitted from PCP office 2/2 low BP, near syncope and elevated troponin likely demand in the setting of acute hypotension as he has not been taking medications as prescribed, sometimes taking meds in excess .rosalia and Entresto d/c'd  Currently claims to be feeling well no cardiovascular complaints, reports he is feeling "Much better" All medications reviewed again in detail with patient/ and Dr Palla ( all bottles were brought to office today reviewed and documented )

## 2024-07-29 NOTE — REASON FOR VISIT
[Symptom and Test Evaluation] : symptom and test evaluation [Arrhythmia/ECG Abnorrmalities] : arrhythmia/ECG abnormalities [Structural Heart and Valve Disease] : structural heart and valve disease [Hyperlipidemia] : hyperlipidemia [Hypertension] : hypertension [[employer] ] : provided by [employer]   [Interpreters_IDNumber] : [id number] [Interpreters_FullName] : edilberto

## 2024-07-29 NOTE — CARDIOLOGY SUMMARY
[de-identified] : 1/10/23 ventricular paced rhythm  [de-identified] :  4/23/24 CONCLUSIONS: 1. Left ventricular cavity is normal in size. Left ventricular systolic function is normal with an ejection  fraction of 54 % by Rascon's method of disks. 2. Mildly enlarged right ventricular cavity size and normal systolic function. 3. The left atrium is severely dilated. 4. The right atrium is moderately dilated. 5. Device lead is visualized. 6. Severe mitral regurgitation. There is a dilated mitral annulus with an apically displaced closure pattern  which could be c/w ischemic MR. 7. Moderate to severe tricuspid regurgitation. 8. Patent foramen ovale with left to right shunting by color flow Doppler. 9. Mild left ventricular hypertrophy. 10. Mild to moderate aortic regurgitation. 11. Severe pulmonary hypertension. 12. The inferior vena cava is dilated (dilated >2.1cm) with abnormal inspiratory collapse (abnormal <50%)  consistent with elevated right atrial pressure (~15, range 10-20mmHg). 13. There is mild calcification of the aortic valve leaflets. 14. There is mild calcification of the mitral valve annulus.  3/25/22 ( [facility] )  EF 45 % apical wall hypokinesis ,  mild to moderate MR ,severe LAE ,  Mild  AI , severe  TR severe ,pulmonary hypertension normal RV function   IVC dilated decreased respiratory variation   2/3/23 Left ventricular systolic function is normal with an ejection fraction visually estimated at 50 to 55%. Mild left ventricular hypertrophy. Severely dilated left /Right atrium., Moderate mitral regurgitation. Mild-to-moderate aortic regurgitation. Mild pulmonic regurgitation. Moderate-severe tricuspid regurgitation. Device wire is visualized in the right heart. No pericardial effusion seen. There is moderate pulmonary hypertension. The inferior vena cava measures 1.84 cm in diameter, and is normal in size (<2.1cm) with normal inspiratory collapse (>50%) consistent with normal right atrial pressure ( R 3, range 0-5mmHg). [de-identified] : 5/19/2017  [employer] VVI   MRI compatible  ADVISA  MRI [id number] [de-identified] : aug 28  2020   all coronaries showed minimal luminal irregularities  EF 48%

## 2024-07-29 NOTE — REASON FOR VISIT
[Symptom and Test Evaluation] : symptom and test evaluation [Arrhythmia/ECG Abnorrmalities] : arrhythmia/ECG abnormalities [Structural Heart and Valve Disease] : structural heart and valve disease [Hyperlipidemia] : hyperlipidemia [Hypertension] : hypertension [[employer] ] : provided by [employer]   [Interpreters_IDNumber] : [id number] [Interpreters_FullName] : edilberto

## 2024-07-29 NOTE — CARDIOLOGY SUMMARY
[de-identified] : 1/10/23 ventricular paced rhythm  [de-identified] :  4/23/24 CONCLUSIONS: 1. Left ventricular cavity is normal in size. Left ventricular systolic function is normal with an ejection  fraction of 54 % by Rascon's method of disks. 2. Mildly enlarged right ventricular cavity size and normal systolic function. 3. The left atrium is severely dilated. 4. The right atrium is moderately dilated. 5. Device lead is visualized. 6. Severe mitral regurgitation. There is a dilated mitral annulus with an apically displaced closure pattern  which could be c/w ischemic MR. 7. Moderate to severe tricuspid regurgitation. 8. Patent foramen ovale with left to right shunting by color flow Doppler. 9. Mild left ventricular hypertrophy. 10. Mild to moderate aortic regurgitation. 11. Severe pulmonary hypertension. 12. The inferior vena cava is dilated (dilated >2.1cm) with abnormal inspiratory collapse (abnormal <50%)  consistent with elevated right atrial pressure (~15, range 10-20mmHg). 13. There is mild calcification of the aortic valve leaflets. 14. There is mild calcification of the mitral valve annulus.  3/25/22 ( [facility] )  EF 45 % apical wall hypokinesis ,  mild to moderate MR ,severe LAE ,  Mild  AI , severe  TR severe ,pulmonary hypertension normal RV function   IVC dilated decreased respiratory variation   2/3/23 Left ventricular systolic function is normal with an ejection fraction visually estimated at 50 to 55%. Mild left ventricular hypertrophy. Severely dilated left /Right atrium., Moderate mitral regurgitation. Mild-to-moderate aortic regurgitation. Mild pulmonic regurgitation. Moderate-severe tricuspid regurgitation. Device wire is visualized in the right heart. No pericardial effusion seen. There is moderate pulmonary hypertension. The inferior vena cava measures 1.84 cm in diameter, and is normal in size (<2.1cm) with normal inspiratory collapse (>50%) consistent with normal right atrial pressure ( R 3, range 0-5mmHg). [de-identified] : aug 28  2020   all coronaries showed minimal luminal irregularities  EF 48% [de-identified] : 5/19/2017  [employer] VVI   MRI compatible  ADVISA  MRI [id number]

## 2024-08-02 ENCOUNTER — APPOINTMENT (OUTPATIENT)
Dept: CARDIOLOGY | Facility: CLINIC | Age: 79
End: 2024-08-02
Payer: MEDICARE

## 2024-08-02 ENCOUNTER — APPOINTMENT (OUTPATIENT)
Dept: ELECTROPHYSIOLOGY | Facility: CLINIC | Age: 79
End: 2024-08-02
Payer: MEDICARE

## 2024-08-02 VITALS
OXYGEN SATURATION: 97 % | SYSTOLIC BLOOD PRESSURE: 164 MMHG | RESPIRATION RATE: 14 BRPM | HEART RATE: 65 BPM | HEIGHT: 65 IN | BODY MASS INDEX: 28.66 KG/M2 | DIASTOLIC BLOOD PRESSURE: 81 MMHG | WEIGHT: 172 LBS

## 2024-08-02 VITALS — DIASTOLIC BLOOD PRESSURE: 80 MMHG | SYSTOLIC BLOOD PRESSURE: 140 MMHG

## 2024-08-02 DIAGNOSIS — I42.8 OTHER CARDIOMYOPATHIES: ICD-10-CM

## 2024-08-02 DIAGNOSIS — Z95.0 PRESENCE OF CARDIAC PACEMAKER: ICD-10-CM

## 2024-08-02 DIAGNOSIS — I10 ESSENTIAL (PRIMARY) HYPERTENSION: ICD-10-CM

## 2024-08-02 DIAGNOSIS — I44.2 ATRIOVENTRICULAR BLOCK, COMPLETE: ICD-10-CM

## 2024-08-02 DIAGNOSIS — I48.20 CHRONIC ATRIAL FIBRILLATION, UNSP: ICD-10-CM

## 2024-08-02 DIAGNOSIS — I50.22 CHRONIC SYSTOLIC (CONGESTIVE) HEART FAILURE: ICD-10-CM

## 2024-08-02 DIAGNOSIS — I50.30 UNSPECIFIED DIASTOLIC (CONGESTIVE) HEART FAILURE: ICD-10-CM

## 2024-08-02 PROCEDURE — G2211 COMPLEX E/M VISIT ADD ON: CPT

## 2024-08-02 PROCEDURE — 93279 PRGRMG DEV EVAL PM/LDLS PM: CPT

## 2024-08-02 PROCEDURE — 99214 OFFICE O/P EST MOD 30 MIN: CPT

## 2024-08-02 PROCEDURE — 99213 OFFICE O/P EST LOW 20 MIN: CPT

## 2024-08-02 NOTE — ASSESSMENT
[FreeTextEntry1] : Mr. Raymundo Melchor is a 78-year-old man with hypertension, permanent atrial fibrillation, Non-ischemic cardiomyopathy, chronic systolic heart failure, HPL, BPH, chronic thrombocytopenia, COPD.   1) Atrial fibrillation: Permanent atrial fibrillation with AVJ ablation/ complete heart block.  Plan to continue lifelong anticoagulation with Xarelto 20 mg daily.  Severely dilated RA and LA would not attempt rhythm control.   2) HFrecEF: EF remains in 45-50% range to continue maximally tolerated therapy.  Will see advanced heart failure team.    3) Pacemaker Normal function 100% paced 3.5 years longevity.

## 2024-08-02 NOTE — CARDIOLOGY SUMMARY
[de-identified] : 7/19/2024 : TTE  1. Left ventricular cavity is mildly dilated. Left ventricular systolic function is mildly decreased with an ejection fraction of 48 % by Rascon's method of disks with an ejection fraction visually estimated at 45 to 50 %.  2. Normal right ventricular cavity size, with normal wall thickness, and normal right ventricular systolic function.  3. The left atrium is severely dilated.  4. The right atrium is severely dilated.  5. Moderate mitral regurgitation.  6. Moderate tricuspid regurgitation.  7. Estimated pulmonary artery systolic pressure is 61 mmHg, consistent with severe pulmonary hypertension.  8. Mild pulmonic regurgitation.  9. Mild to moderate aortic regurgitation. 10. Micra pacemaker is visualized in the right ventricle. 11. Patent foramen ovale versus secundum atrial septal defect with left to right shunting by color flow Doppler. 12. Compared to the transthoracic echocardiogram performed on 6/20/2024, there have been no significant interval changes. [de-identified] : 8/28/2020:   Non-Obstructive CAD with Abnormal left ventricular function.  Severe pulmonary hypertension.

## 2024-08-02 NOTE — HISTORY OF PRESENT ILLNESS
[FreeTextEntry1] : Mr. Raymundo Melchor is a 78-year-old man with hypertension, permanent atrial fibrillation, Non-ischemic cardiomyopathy, chronic systolic heart failure, HPL, BPH, chronic thrombocytopenia, COPD, who presents for follow up. He originally had a CRT-P implanted that required explant due to infection. A Micra leadless pacemaker was placed that had high thresholds. A single chamber transvenous pacemaker was then placed.  He denies complaints today. VIRGILIO FELTON  denies chest pain, chest pressure, shortness of breath, lightheadedness, dizziness, palpitations, syncope, presyncope, orthopnea, PND, or edema.

## 2024-08-02 NOTE — REASON FOR VISIT
[Arrhythmia/ECG Abnorrmalities] : arrhythmia/ECG abnormalities [Source: ______] : History obtained from [unfilled]

## 2024-08-06 NOTE — HISTORY OF PRESENT ILLNESS
[FreeTextEntry1] : The patient is a 79 yo Male HFrecEF/NICM (Previously 35%, now 54%, LVIDd 5.5)  with significant PMH of HTN, atrial fibrillation on Xarelto, cardiac pacemaker, HLD, BPH, chronic thrombocytopenia, COPD who presented to  ED on 4/22 for increasing LE Edema, discharged on 4/28/24 with GDMT limited due to hypotension. He now presents for hospital follow up.  The patient has been followed by his cardiologist for several years. Initially on Entresto, he was lost to follow up for a while and then after a hospitalization was placed on lisinopril.  He has intermittently followed with his cardiologist and appears to be confused over his medications Despite this, he reports being compliant with his diuretics.  In april of 2024, he began having increased dyspnea with worsening LE edema, prompting admission.  He was started on aggressive diuresis with significant improvement.  GDMT was escalated in hospital but the patient became very hypotensive with lightheadedness and medications were adjusted.  He was discharged on  Spironolactone 12.5 mg po daily, Toprol 12.5 mg po daily, and lasix 40 mg po daily.     He was last seen our office on 6/7/24 feeling well overall with occasional lightheadedness.  Due to confusion over his meds no changes were made at the time.  He has followed with his cardiologist in the interim. He has continued to feel well with no dyspnea or fatigue.  He has not checked his weight or BP.  SBP elevated in the office today with weight comparable to last visit.  He denies any syncope, LH/dizziness, chest pain, palpitations, dyspnea, PND, orthopnea, nausea/vomiting, abdominal pain.

## 2024-08-06 NOTE — ASSESSMENT
[FreeTextEntry1] : 77 y/o Sudanese male with h/o HFrecEF, NICMP (LVEF 25% in 2016 improved to 54%), valvular heart disease (severe MR, mod-severe TR, mild-mod AI), s/p CRT-D complicated by pocket infection transition to Micra (non-functional, poor capture) followed by single lead pacemaker, Afib on xarelto, HTN, thrombocytopenia, COPD, CKD stage 2-3, pre DM (A1c 6.3%) and varicocele who presented to  acute on chronic HF improved after diuresis, but now appears hypovolemic.  ACC/AHA Stage C endorsing NYHA class II symptoms    1. Problem: Heart failure with recovered ejection fraction (HFrecEF).  GDMT- on Spironolactone 12.5 mg po daily,. toprol 12.5 mg po daily. will increase losartan to 25 mg po daily.  IF BP allows, will resume entresto at next visit.  Plan for SGLT2i and ARB at subsequent visits - Diuretic:  On lasix 40 mg po daily   - Device: Has CRT-D   2. Severe MR and TR - MR improved with severe TR, but minimally symptomatic  - Will speak to structural team  3. HTN - Meds as above - will adjust as planned above  Follow up with PA in 6 weeks, Dr. Salomon HERNANDEZ

## 2024-08-06 NOTE — CARDIOLOGY SUMMARY
[de-identified] : 4/22/24 EKG: V-paced, underlying Afib [de-identified] : TTE 7/19/2024:  EF 45-50%, LVIDd 6.0 IVS 1.0 PWd 1.1 RV normal size and function, LA severely dilated, RA severely dilated, mild to mod AI, Mod MR, mod TR PASP 61 IVC 2.1   4/23/24 TTE: EF 54%, LVIDd 5.5 IVS 1.3 PWd 1.3 LA severely Dilated, RA moderately dilated, RV enlarged with normal function, Severe MR, mild to moderate AI, Moderate to severe TR, PFO,  Severe pHTN, PASP 68 dilated IVC

## 2024-08-06 NOTE — ASSESSMENT
[FreeTextEntry1] : 79 y/o Burmese male with h/o HFrecEF, NICMP (LVEF 25% in 2016 improved to 54%), valvular heart disease (severe MR, mod-severe TR, mild-mod AI), s/p CRT-D complicated by pocket infection transition to Micra (non-functional, poor capture) followed by single lead pacemaker, Afib on xarelto, HTN, thrombocytopenia, COPD, CKD stage 2-3, pre DM (A1c 6.3%) and varicocele who presented to  acute on chronic HF improved after diuresis, but now appears hypovolemic.  ACC/AHA Stage C endorsing NYHA class II symptoms    1. Problem: Heart failure with recovered ejection fraction (HFrecEF).  GDMT- on Spironolactone 12.5 mg po daily,. toprol 12.5 mg po daily. will increase losartan to 25 mg po daily.  IF BP allows, will resume entresto at next visit.  Plan for SGLT2i and ARB at subsequent visits - Diuretic:  On lasix 40 mg po daily   - Device: Has CRT-D   2. Severe MR and TR - MR improved with severe TR, but minimally symptomatic  - Will speak to structural team  3. HTN - Meds as above - will adjust as planned above  Follow up with PA in 6 weeks, Dr. Salomon HERNANDEZ

## 2024-08-06 NOTE — HISTORY OF PRESENT ILLNESS
[FreeTextEntry1] : The patient is a 77 yo Male HFrecEF/NICM (Previously 35%, now 54%, LVIDd 5.5)  with significant PMH of HTN, atrial fibrillation on Xarelto, cardiac pacemaker, HLD, BPH, chronic thrombocytopenia, COPD who presented to  ED on 4/22 for increasing LE Edema, discharged on 4/28/24 with GDMT limited due to hypotension. He now presents for hospital follow up.  The patient has been followed by his cardiologist for several years. Initially on Entresto, he was lost to follow up for a while and then after a hospitalization was placed on lisinopril.  He has intermittently followed with his cardiologist and appears to be confused over his medications Despite this, he reports being compliant with his diuretics.  In april of 2024, he began having increased dyspnea with worsening LE edema, prompting admission.  He was started on aggressive diuresis with significant improvement.  GDMT was escalated in hospital but the patient became very hypotensive with lightheadedness and medications were adjusted.  He was discharged on  Spironolactone 12.5 mg po daily, Toprol 12.5 mg po daily, and lasix 40 mg po daily.     He was last seen our office on 6/7/24 feeling well overall with occasional lightheadedness.  Due to confusion over his meds no changes were made at the time.  He has followed with his cardiologist in the interim. He has continued to feel well with no dyspnea or fatigue.  He has not checked his weight or BP.  SBP elevated in the office today with weight comparable to last visit.  He denies any syncope, LH/dizziness, chest pain, palpitations, dyspnea, PND, orthopnea, nausea/vomiting, abdominal pain.

## 2024-08-06 NOTE — CARDIOLOGY SUMMARY
[de-identified] : 4/22/24 EKG: V-paced, underlying Afib [de-identified] : TTE 7/19/2024:  EF 45-50%, LVIDd 6.0 IVS 1.0 PWd 1.1 RV normal size and function, LA severely dilated, RA severely dilated, mild to mod AI, Mod MR, mod TR PASP 61 IVC 2.1   4/23/24 TTE: EF 54%, LVIDd 5.5 IVS 1.3 PWd 1.3 LA severely Dilated, RA moderately dilated, RV enlarged with normal function, Severe MR, mild to moderate AI, Moderate to severe TR, PFO,  Severe pHTN, PASP 68 dilated IVC

## 2024-08-13 NOTE — ED ADULT NURSE NOTE - AS TEMP SITE
Incoming Fax Received  Facility:  md/inr  INR of 3.6 on 8/13/24  Date received: 8/13/24  Paper copy sent to scanning      Has been addressed below   oral

## 2024-09-13 ENCOUNTER — APPOINTMENT (OUTPATIENT)
Dept: CARDIOLOGY | Facility: CLINIC | Age: 79
End: 2024-09-13
Payer: MEDICARE

## 2024-09-13 VITALS
DIASTOLIC BLOOD PRESSURE: 82 MMHG | HEIGHT: 65 IN | HEART RATE: 69 BPM | BODY MASS INDEX: 31.99 KG/M2 | SYSTOLIC BLOOD PRESSURE: 152 MMHG | OXYGEN SATURATION: 98 % | WEIGHT: 192 LBS

## 2024-09-13 DIAGNOSIS — I50.22 CHRONIC SYSTOLIC (CONGESTIVE) HEART FAILURE: ICD-10-CM

## 2024-09-13 DIAGNOSIS — I42.8 OTHER CARDIOMYOPATHIES: ICD-10-CM

## 2024-09-13 DIAGNOSIS — I50.32 CHRONIC DIASTOLIC (CONGESTIVE) HEART FAILURE: ICD-10-CM

## 2024-09-13 PROCEDURE — G2211 COMPLEX E/M VISIT ADD ON: CPT

## 2024-09-13 PROCEDURE — 99213 OFFICE O/P EST LOW 20 MIN: CPT

## 2024-09-13 RX ORDER — SACUBITRIL AND VALSARTAN 24; 26 MG/1; MG/1
24-26 TABLET, FILM COATED ORAL TWICE DAILY
Qty: 60 | Refills: 3 | Status: ACTIVE | COMMUNITY
Start: 2024-09-13 | End: 1900-01-01

## 2024-09-13 NOTE — CARDIOLOGY SUMMARY
[de-identified] : 4/22/24 EKG: V-paced, underlying Afib [de-identified] : TTE 7/19/2024:  EF 45-50%, LVIDd 6.0 IVS 1.0 PWd 1.1 RV normal size and function, LA severely dilated, RA severely dilated, mild to mod AI, Mod MR, mod TR PASP 61 IVC 2.1   4/23/24 TTE: EF 54%, LVIDd 5.5 IVS 1.3 PWd 1.3 LA severely Dilated, RA moderately dilated, RV enlarged with normal function, Severe MR, mild to moderate AI, Moderate to severe TR, PFO,  Severe pHTN, PASP 68 dilated IVC

## 2024-09-13 NOTE — PHYSICAL EXAM
[Murmur] : murmur [Normal] : moves all extremities, no focal deficits, normal speech [No Carotid Bruit] : no carotid bruit [Elevated JVD ____cm] : elevated JVD ~Vcm [Edema ___] : edema [unfilled]

## 2024-09-13 NOTE — ASSESSMENT
[FreeTextEntry1] : 77 y/o [place]ian male with h/o HFrecEF, NICMP (LVEF 25% in 2016 improved to 54%), valvular heart disease (severe MR, mod-severe TR, mild-mod AI), s/p CRT-D complicated by pocket infection transition to Micra (non-functional, poor capture) followed by single lead pacemaker, Afib on xarelto, HTN, thrombocytopenia, COPD, CKD stage 2-3, pre DM (A1c 6.3%) and varicocele who presented to  acute on chronic HF improved after diuresis, but now appears volume overloaded with elevated BPs.  ACC/AHA Stage C endorsing NYHA class II symptoms    1. Problem: Heart failure with recovered ejection fraction (HFrecEF).  GDMT- Change losartan to Entresto 24-26 mg po BID, Increase Toprol to 25 mg QHS; on Spironolactone 12.5 mg po daily,. Plan for SGLT2i at subsequent visits - Diuretic:  On lasix 40 mg po daily, increase to 80 mg po daily for the next 2 days and then return to 40 mg daily   - Device: Has CRT-D   2. Severe MR and TR - MR improved with severe TR, but minimally symptomatic  - Will speak to structural team  3. HTN - Meds as above - will adjust as planned above  Follow up with PA in 1 month, Dr. Salomon HERNANDEZ

## 2024-09-13 NOTE — HISTORY OF PRESENT ILLNESS
[FreeTextEntry1] : The patient is a 77 yo Male HFrecEF/NICM (Previously 35%, now 54%, LVIDd 5.5)  with significant PMH of HTN, atrial fibrillation on Xarelto, cardiac pacemaker, HLD, BPH, chronic thrombocytopenia, COPD who presented to  ED on 4/22 for increasing LE Edema, discharged on 4/28/24 with GDMT limited due to hypotension. He now presents for hospital follow up.  The patient has been followed by his cardiologist for several years. Initially on Entresto, he was lost to follow up for a while and then after a hospitalization was placed on lisinopril.  He has intermittently followed with his cardiologist and appears to be confused over his medications Despite this, he reports being compliant with his diuretics.  In april of 2024, he began having increased dyspnea with worsening LE edema, prompting admission.  He was started on aggressive diuresis with significant improvement.  GDMT was escalated in hospital but the patient became very hypotensive with lightheadedness and medications were adjusted.  He was discharged on  Spironolactone 12.5 mg po daily, Toprol 12.5 mg po daily, and lasix 40 mg po daily.     He was last seen our office on 8/2 with SBPs elevated and feeling well, Losartan was increased to 25 mg po daily. He has continued to feel well, but has noticed significant weight gain, up to 190 lbs today, was 170 a month ago.  Has not checked his BP at home.  SBP elevated in the office today.  He denies any syncope, LH/dizziness, chest pain, palpitations, dyspnea, PND, orthopnea, nausea/vomiting, abdominal pain.

## 2024-09-19 ENCOUNTER — APPOINTMENT (OUTPATIENT)
Dept: UROLOGY | Facility: CLINIC | Age: 79
End: 2024-09-19
Payer: MEDICARE

## 2024-09-19 DIAGNOSIS — I86.1 SCROTAL VARICES: ICD-10-CM

## 2024-09-19 PROCEDURE — 99213 OFFICE O/P EST LOW 20 MIN: CPT

## 2024-09-19 NOTE — REASON FOR VISIT
[Follow-up Visit ___] : a follow-up visit  for [unfilled] [[employer] ] : provided by [employer]   [Interpreters_IDNumber] : [id number] [Interpreters_FullName] : Charly [TWNoteComboBox1] : [place]an

## 2024-09-19 NOTE — HISTORY OF PRESENT ILLNESS
[FreeTextEntry1] : 76 year old man, patient of Dr Walden, seen 10/20/2022 for follow up. He had urinary retention when he held his dual therapy of flomax and finasteride. Now back on medications and feels he is voiding freely. No acute complaints. He has history of elevated PSA, has not repeated recently.   06/06/2023: Patient presents for follow up. He reports very good response to dual therapy. no complaints today. PSA was down to 3.43 from 5.5.   06/19/2024: Patient presents for follow up. He reports discomfort from veins on scrotum. He is interested in laser therapy for these veins. Voiding well, no LUTS or other  complaints.   09/19/2024: Patient presents for follow up. He reports  more pain and heaviness on scrotum, feels veins are bigger.

## 2024-09-19 NOTE — PHYSICAL EXAM
[Normal Appearance] : normal appearance [Well Groomed] : well groomed [General Appearance - In No Acute Distress] : no acute distress [Edema] : no peripheral edema [Respiration, Rhythm And Depth] : normal respiratory rhythm and effort [Exaggerated Use Of Accessory Muscles For Inspiration] : no accessory muscle use [Abdomen Soft] : soft [Abdomen Tenderness] : non-tender [Costovertebral Angle Tenderness] : no ~M costovertebral angle tenderness [Urinary Bladder Findings] : the bladder was normal on palpation [Normal Station and Gait] : the gait and station were normal for the patient's age [No Focal Deficits] : no focal deficits [] : no rash [Oriented To Time, Place, And Person] : oriented to person, place, and time [Affect] : the affect was normal [Mood] : the mood was normal [No Palpable Adenopathy] : no palpable adenopathy [de-identified] : dilated superficial veins of scrotum, grade 3 varicoceles bilaterally

## 2024-09-19 NOTE — ASSESSMENT
[FreeTextEntry1] : 79 yo M with BPH with LUTS, well controlled on dual therapy. WIll continue.  For varicocele, discussed varicocelectomy. Will send for Scrotal US and if confirmed varicoceles will book for varicocelectomy.

## 2024-09-20 ENCOUNTER — RESULT REVIEW (OUTPATIENT)
Age: 79
End: 2024-09-20

## 2024-09-20 ENCOUNTER — APPOINTMENT (OUTPATIENT)
Dept: ULTRASOUND IMAGING | Facility: CLINIC | Age: 79
End: 2024-09-20

## 2024-09-20 ENCOUNTER — OUTPATIENT (OUTPATIENT)
Dept: OUTPATIENT SERVICES | Facility: HOSPITAL | Age: 79
LOS: 1 days | End: 2024-09-20
Payer: MEDICARE

## 2024-09-20 DIAGNOSIS — Z95.0 PRESENCE OF CARDIAC PACEMAKER: Chronic | ICD-10-CM

## 2024-09-20 DIAGNOSIS — I86.1 SCROTAL VARICES: ICD-10-CM

## 2024-09-20 PROCEDURE — 93975 VASCULAR STUDY: CPT

## 2024-09-20 PROCEDURE — 93975 VASCULAR STUDY: CPT | Mod: 26

## 2024-09-20 PROCEDURE — 76870 US EXAM SCROTUM: CPT

## 2024-09-20 PROCEDURE — 76870 US EXAM SCROTUM: CPT | Mod: 26

## 2024-10-03 ENCOUNTER — APPOINTMENT (OUTPATIENT)
Dept: INTERNAL MEDICINE | Facility: CLINIC | Age: 79
End: 2024-10-03
Payer: MEDICARE

## 2024-10-03 VITALS
HEART RATE: 72 BPM | TEMPERATURE: 99 F | HEIGHT: 65 IN | WEIGHT: 189 LBS | DIASTOLIC BLOOD PRESSURE: 64 MMHG | BODY MASS INDEX: 31.49 KG/M2 | RESPIRATION RATE: 16 BRPM | SYSTOLIC BLOOD PRESSURE: 131 MMHG | OXYGEN SATURATION: 95 %

## 2024-10-03 DIAGNOSIS — G47.9 SLEEP DISORDER, UNSPECIFIED: ICD-10-CM

## 2024-10-03 DIAGNOSIS — J44.9 CHRONIC OBSTRUCTIVE PULMONARY DISEASE, UNSPECIFIED: ICD-10-CM

## 2024-10-03 PROCEDURE — ZZZZZ: CPT

## 2024-10-03 PROCEDURE — 94060 EVALUATION OF WHEEZING: CPT

## 2024-10-03 PROCEDURE — 94727 GAS DIL/WSHOT DETER LNG VOL: CPT

## 2024-10-03 PROCEDURE — 94729 DIFFUSING CAPACITY: CPT

## 2024-10-03 PROCEDURE — 99204 OFFICE O/P NEW MOD 45 MIN: CPT | Mod: 25

## 2024-10-03 RX ORDER — ALBUTEROL SULFATE 90 UG/1
108 (90 BASE) INHALANT RESPIRATORY (INHALATION)
Qty: 1 | Refills: 0 | Status: ACTIVE | COMMUNITY
Start: 2024-10-03 | End: 1900-01-01

## 2024-10-03 NOTE — REASON FOR VISIT
[Initial] : an initial visit [[employer] ] : provided by [employer]   [Time Spent: ____ minutes] : Total time spent using  services: [unfilled] minutes. The patient's primary language is not English thus required  services. [COPD] : COPD [TWNoteComboBox1] : Israeli

## 2024-10-03 NOTE — PHYSICAL EXAM
[TextEntry] : Constitutional: no acute distress   HEENT: normal oropharynx, Mallampati Class: IV Neck: normal appearance, no neck mass   Pulmonary: no resp distress, clear to auscultation bilaterally, no r/r/w Chest: no abnormalities Musculoskeletal: normal gait  Extremities: no clubbing, no cyanosis, no edema  Skin: normal color/ pigmentation  Psychiatric: oriented x3, normal affect

## 2024-10-03 NOTE — PLAN
[TextEntry] : ===================================================================      The patient is a 78 year man with past medical history of arthritis, HTN, cardiomyopathy, chronic A. fib, COPD, heart failure with pEF, varicose veins who came for pulmonary eval.  mild COPD. Good symptomatic control, COPD assessment test : 4 (good control) GOLD 1a, denies any recent admissions to the hospital with COPD exacerbation. He wasn't sure why he is here in the office. Ordered PRN albuterol but minimally symptomatic.   Possible SHU: examination suggestive of SHU. h/o heart failure PSG. ordered.

## 2024-10-03 NOTE — HISTORY OF PRESENT ILLNESS
[Former] : former [< 20 pack-years] : < 20 pack-years [TextBox_4] : The patient presented without any active pulmonary complaints but was referred for a lung evaluation. They reported not having any history of COPD or asthma and had undergone a lung test for assessment purposes. The patient expressed no significant respiratory symptoms, including no difficulty breathing, chest tightness, or wheezing. They did mention a mild cough but denied frequent episodes. The patient was concerned about their lung health following a previous hospital admission where fluid in the lungs was noted, which has since resolved.  The patient confirmed they do not experience any significant breathing difficulties and can walk easily without limitation. They reported not smoking for over 40 years and currently deny any issues with breathing or physical activity.  Relevant Medications: - No current medications for lung issues were deemed necessary, but an inhaler was provided for use as needed in case of shortness of breath.  Social History: - Former smoker, quit over 40 years ago. 10-15 pack years     Review of Systems: - Respiratory: No difficulty breathing, no wheezing, mild cough reported. - General: No daytime sleepiness, denies snoring. - Other: Occasional headaches, not frequent. [TextBox_11] : 1 [TextBox_13] : 10-15 [YearQuit] : 40 years ago

## 2024-10-03 NOTE — RESULTS/DATA
[TextEntry] :  PFT 10/03/2024 suboptimal technique Spirometry (FEV1):78  (%predicted) Ratio: 109 Lung volumes (TLC):  78  (% predicted) Diffusion capacity (DLCO): 78 (% predicted) No significant bronchodilator response   Interpretation: Mild restrictive ventilatory defect. Likely due to poor technique  ACC: [id number] EXAM: XR CHEST PORTABLE URGENT 1V ORDERED BY: ROSA MARTINES  PROCEDURE DATE: 06/19/2024    INTERPRETATION: INDICATION: Chest pain  Portable chest 6:14 PM  COMPARISON: 4/22/2024  FINDINGS: Heart/Vascular: The mediastinum, hilum and aorta are within normal limits for projection. Cardiomegaly. Micrapacemaker. Left chest wall single-lead pacemaker Pulmonary: Midline trachea. There is no focal infiltrate, congestion or effusion. Bones: There is no fracture. Lines and catheter: None  Impression:  No acute pulmonary disease.  Cardiomegaly  --- End of Report ---      GARRY BABCOCK DO; Attending Radiologist This document has been electronically signed. Jun 20 2024 12:54PM  CT chest reviewed images Pulmonary arteries: The right lower lobe pulmonary arteries are suboptimally opacified and there is a combination of mixing and streak artifact within the right lower lobe lobar and segmental arteries on arterial phase images there appear to be grossly patent on portal venous phase images. The remaining pulmonary arteries are well opacified without evidence of filling defect. LUNGS AND LARGE AIRWAYS: Patent central airways. There are several scattered calcified granulomas. There is dependent right lower lobe atelectasis adjacent to the right pleural effusion. No focal consolidation identified. PLEURA: Moderate size right pleural effusion. No significant left pleural effusion. No pneumothorax.  MIGUEL A GUILLERMO MD; Attending Radiologist This document has been electronically signed. Apr 22 2024 5:35PM

## 2024-10-04 ENCOUNTER — OUTPATIENT (OUTPATIENT)
Dept: OUTPATIENT SERVICES | Facility: HOSPITAL | Age: 79
LOS: 1 days | End: 2024-10-04
Payer: MEDICARE

## 2024-10-04 ENCOUNTER — RESULT REVIEW (OUTPATIENT)
Age: 79
End: 2024-10-04

## 2024-10-04 VITALS
HEART RATE: 69 BPM | TEMPERATURE: 98 F | RESPIRATION RATE: 16 BRPM | SYSTOLIC BLOOD PRESSURE: 146 MMHG | WEIGHT: 186.95 LBS | OXYGEN SATURATION: 99 % | HEIGHT: 66.14 IN | DIASTOLIC BLOOD PRESSURE: 80 MMHG

## 2024-10-04 DIAGNOSIS — Z95.0 PRESENCE OF CARDIAC PACEMAKER: Chronic | ICD-10-CM

## 2024-10-04 DIAGNOSIS — Z01.818 ENCOUNTER FOR OTHER PREPROCEDURAL EXAMINATION: ICD-10-CM

## 2024-10-04 DIAGNOSIS — Z98.890 OTHER SPECIFIED POSTPROCEDURAL STATES: Chronic | ICD-10-CM

## 2024-10-04 LAB
ANION GAP SERPL CALC-SCNC: 3 MMOL/L — LOW (ref 5–17)
APPEARANCE UR: ABNORMAL
APTT BLD: 38.6 SEC — HIGH (ref 24.5–35.6)
BACTERIA # UR AUTO: ABNORMAL /HPF
BASOPHILS # BLD AUTO: 0.04 K/UL — SIGNIFICANT CHANGE UP (ref 0–0.2)
BASOPHILS NFR BLD AUTO: 0.7 % — SIGNIFICANT CHANGE UP (ref 0–2)
BILIRUB UR-MCNC: ABNORMAL
BLD GP AB SCN SERPL QL: SIGNIFICANT CHANGE UP
BUN SERPL-MCNC: 11 MG/DL — SIGNIFICANT CHANGE UP (ref 7–23)
CALCIUM SERPL-MCNC: 9.3 MG/DL — SIGNIFICANT CHANGE UP (ref 8.5–10.1)
CAST: 1 /LPF — SIGNIFICANT CHANGE UP (ref 0–4)
CHLORIDE SERPL-SCNC: 112 MMOL/L — HIGH (ref 96–108)
CO2 SERPL-SCNC: 27 MMOL/L — SIGNIFICANT CHANGE UP (ref 22–31)
COLOR SPEC: SIGNIFICANT CHANGE UP
CREAT SERPL-MCNC: 1.12 MG/DL — SIGNIFICANT CHANGE UP (ref 0.5–1.3)
DIFF PNL FLD: ABNORMAL
EGFR: 67 ML/MIN/1.73M2 — SIGNIFICANT CHANGE UP
EOSINOPHIL # BLD AUTO: 0.1 K/UL — SIGNIFICANT CHANGE UP (ref 0–0.5)
EOSINOPHIL NFR BLD AUTO: 1.8 % — SIGNIFICANT CHANGE UP (ref 0–6)
GLUCOSE SERPL-MCNC: 99 MG/DL — SIGNIFICANT CHANGE UP (ref 70–99)
GLUCOSE UR QL: NEGATIVE MG/DL — SIGNIFICANT CHANGE UP
HCT VFR BLD CALC: 42 % — SIGNIFICANT CHANGE UP (ref 39–50)
HGB BLD-MCNC: 13 G/DL — SIGNIFICANT CHANGE UP (ref 13–17)
IMM GRANULOCYTES NFR BLD AUTO: 0.4 % — SIGNIFICANT CHANGE UP (ref 0–0.9)
INR BLD: 1.13 RATIO — SIGNIFICANT CHANGE UP (ref 0.85–1.16)
KETONES UR-MCNC: ABNORMAL MG/DL
LEUKOCYTE ESTERASE UR-ACNC: ABNORMAL
LYMPHOCYTES # BLD AUTO: 0.96 K/UL — LOW (ref 1–3.3)
LYMPHOCYTES # BLD AUTO: 17.2 % — SIGNIFICANT CHANGE UP (ref 13–44)
MCHC RBC-ENTMCNC: 31 GM/DL — LOW (ref 32–36)
MCHC RBC-ENTMCNC: 31.9 PG — SIGNIFICANT CHANGE UP (ref 27–34)
MCV RBC AUTO: 102.9 FL — HIGH (ref 80–100)
MONOCYTES # BLD AUTO: 0.6 K/UL — SIGNIFICANT CHANGE UP (ref 0–0.9)
MONOCYTES NFR BLD AUTO: 10.7 % — SIGNIFICANT CHANGE UP (ref 2–14)
NEUTROPHILS # BLD AUTO: 3.87 K/UL — SIGNIFICANT CHANGE UP (ref 1.8–7.4)
NEUTROPHILS NFR BLD AUTO: 69.2 % — SIGNIFICANT CHANGE UP (ref 43–77)
NITRITE UR-MCNC: POSITIVE
PH UR: 5.5 — SIGNIFICANT CHANGE UP (ref 5–8)
PLATELET # BLD AUTO: 107 K/UL — LOW (ref 150–400)
POTASSIUM SERPL-MCNC: 5.3 MMOL/L — SIGNIFICANT CHANGE UP (ref 3.5–5.3)
POTASSIUM SERPL-SCNC: 5.3 MMOL/L — SIGNIFICANT CHANGE UP (ref 3.5–5.3)
PROT UR-MCNC: 100 MG/DL
PROTHROM AB SERPL-ACNC: 13 SEC — SIGNIFICANT CHANGE UP (ref 9.9–13.4)
RBC # BLD: 4.08 M/UL — LOW (ref 4.2–5.8)
RBC # FLD: 12.9 % — SIGNIFICANT CHANGE UP (ref 10.3–14.5)
RBC CASTS # UR COMP ASSIST: 8 /HPF — HIGH (ref 0–4)
SODIUM SERPL-SCNC: 142 MMOL/L — SIGNIFICANT CHANGE UP (ref 135–145)
SP GR SPEC: 1.02 — SIGNIFICANT CHANGE UP (ref 1–1.03)
SQUAMOUS # UR AUTO: 1 /HPF — SIGNIFICANT CHANGE UP (ref 0–5)
UROBILINOGEN FLD QL: 1 MG/DL — SIGNIFICANT CHANGE UP (ref 0.2–1)
WBC # BLD: 5.59 K/UL — SIGNIFICANT CHANGE UP (ref 3.8–10.5)
WBC # FLD AUTO: 5.59 K/UL — SIGNIFICANT CHANGE UP (ref 3.8–10.5)
WBC UR QL: 928 /HPF — HIGH (ref 0–5)

## 2024-10-04 PROCEDURE — 87077 CULTURE AEROBIC IDENTIFY: CPT

## 2024-10-04 PROCEDURE — 86900 BLOOD TYPING SEROLOGIC ABO: CPT

## 2024-10-04 PROCEDURE — 85610 PROTHROMBIN TIME: CPT

## 2024-10-04 PROCEDURE — 71046 X-RAY EXAM CHEST 2 VIEWS: CPT

## 2024-10-04 PROCEDURE — 86850 RBC ANTIBODY SCREEN: CPT

## 2024-10-04 PROCEDURE — 85025 COMPLETE CBC W/AUTO DIFF WBC: CPT

## 2024-10-04 PROCEDURE — 93005 ELECTROCARDIOGRAM TRACING: CPT

## 2024-10-04 PROCEDURE — 71046 X-RAY EXAM CHEST 2 VIEWS: CPT | Mod: 26

## 2024-10-04 PROCEDURE — 86901 BLOOD TYPING SEROLOGIC RH(D): CPT

## 2024-10-04 PROCEDURE — 87186 SC STD MICRODIL/AGAR DIL: CPT

## 2024-10-04 PROCEDURE — 36415 COLL VENOUS BLD VENIPUNCTURE: CPT

## 2024-10-04 PROCEDURE — 99214 OFFICE O/P EST MOD 30 MIN: CPT | Mod: 25

## 2024-10-04 PROCEDURE — 81001 URINALYSIS AUTO W/SCOPE: CPT

## 2024-10-04 PROCEDURE — 80048 BASIC METABOLIC PNL TOTAL CA: CPT

## 2024-10-04 PROCEDURE — 93010 ELECTROCARDIOGRAM REPORT: CPT

## 2024-10-04 PROCEDURE — 87086 URINE CULTURE/COLONY COUNT: CPT

## 2024-10-04 PROCEDURE — 85730 THROMBOPLASTIN TIME PARTIAL: CPT

## 2024-10-04 RX ORDER — ASPIRIN 325 MG
1 TABLET ORAL
Refills: 0 | DISCHARGE

## 2024-10-04 NOTE — H&P PST ADULT - NSICDXPASTSURGICALHX_GEN_ALL_CORE_FT
PAST SURGICAL HISTORY:  Cardiac pacemaker single chamber meditronic  placed by Dr Tomas  May  of  2016    History of hernia surgery

## 2024-10-04 NOTE — H&P PST ADULT - ASSESSMENT
78 year old male poor historian, Urdu speaking PMH of  CHB s/p PPM, Afib on Xarelto, HTN, HLD, CHF ,BPH; c/o swelling and pressure of testicles; U/S was done and he was diagnosed with varicocele; he presents to PST for planned Left inguinal varicocelectomy       Plan:  1. PST instructions given ; NPO status/  instructions to be given by ASU   2. Pt instructed to take following meds on day of surgery: metoprolol , Entresto finasteride tamsulosin   3. Pt instructed to take routine evening medications unless indicated   4. Stop NSAIDS ( Aspirin Alev Motrin Mobic Diclofenac), herbal supplements , MVI , Vitamin fish oil 7 days prior to surgery  unless   directed by surgeon or cardiologist;   5. Medical Optimization  with Dr Rangel and cardio Dr Gilbert   6. Pt denies covid symptoms shortness of breath fever cough   7. Labs EKG CXR as per surgeon request

## 2024-10-04 NOTE — H&P PST ADULT - NSICDXPASTMEDICALHX_GEN_ALL_CORE_FT
PAST MEDICAL HISTORY:  AF (atrial fibrillation) on Eliquis    Arthritis     BPH (benign prostatic hyperplasia)     Cardiac arrhythmia     Chronic passive hepatic congestion     COPD (chronic obstructive pulmonary disease) not on inhalers    Coronary artery disease involving native heart with angina pectoris, unspecified vessel or lesion type     Heart failure with reduced ejection fraction     History of venous stasis ulcer of lower leg     HLD (hyperlipidemia)     HTN (hypertension)     Non-ischemic cardiomyopathy     Pacemaker single chamber meditronic  placed by Dr Tomas  May  of  2016    Poor historian     Thrombocytopenia     Venous insufficiency      PAST MEDICAL HISTORY:  AF (atrial fibrillation) on Eliquis    Arthritis     BPH (benign prostatic hyperplasia)     Cardiac arrhythmia     Chronic passive hepatic congestion     COPD (chronic obstructive pulmonary disease) not on inhalers    Coronary artery disease involving native heart with angina pectoris, unspecified vessel or lesion type     Heart failure with reduced ejection fraction     History of venous stasis ulcer of lower leg     HLD (hyperlipidemia)     HTN (hypertension)     Left varicocele     Non-ischemic cardiomyopathy     Pacemaker single chamber meditronic  placed by Dr Tomas  May  of  2016    Poor historian     Thrombocytopenia     Venous insufficiency      PAST MEDICAL HISTORY:  AF (atrial fibrillation) on Eliquis    Arthritis     BPH (benign prostatic hyperplasia)     Cardiac arrhythmia     Chronic kidney disease (CKD)     Chronic passive hepatic congestion     COPD (chronic obstructive pulmonary disease) not on inhalers    Coronary artery disease involving native heart with angina pectoris, unspecified vessel or lesion type     Heart failure with reduced ejection fraction     History of venous stasis ulcer of lower leg     HLD (hyperlipidemia)     HTN (hypertension)     Left varicocele     Non-ischemic cardiomyopathy     Pacemaker single chamber meditronic  placed by Dr Tomas  May  of  2016    Poor historian     Thrombocytopenia     Venous insufficiency

## 2024-10-04 NOTE — H&P PST ADULT - HISTORY OF PRESENT ILLNESS
78 year old male poor historian, Setswana speaking PMH of  CHB s/p PPM, Afib on Xarelto, HTN, HLD, CHF ,BPH; c/o swelling and pressure of testicles; U/S was done and he was diagnosed with varicocele; he presents to PST for planned Left inguinal varicocelectomy

## 2024-10-05 DIAGNOSIS — Z01.818 ENCOUNTER FOR OTHER PREPROCEDURAL EXAMINATION: ICD-10-CM

## 2024-10-08 DIAGNOSIS — N39.0 URINARY TRACT INFECTION, SITE NOT SPECIFIED: ICD-10-CM

## 2024-10-08 DIAGNOSIS — A49.9 URINARY TRACT INFECTION, SITE NOT SPECIFIED: ICD-10-CM

## 2024-10-08 RX ORDER — SULFAMETHOXAZOLE AND TRIMETHOPRIM 800; 160 MG/1; MG/1
800-160 TABLET ORAL TWICE DAILY
Qty: 14 | Refills: 0 | Status: ACTIVE | COMMUNITY
Start: 2024-10-08 | End: 1900-01-01

## 2024-10-09 ENCOUNTER — NON-APPOINTMENT (OUTPATIENT)
Age: 79
End: 2024-10-09

## 2024-10-09 ENCOUNTER — APPOINTMENT (OUTPATIENT)
Dept: CARDIOLOGY | Facility: CLINIC | Age: 79
End: 2024-10-09
Payer: MEDICARE

## 2024-10-09 VITALS
SYSTOLIC BLOOD PRESSURE: 122 MMHG | HEIGHT: 65 IN | DIASTOLIC BLOOD PRESSURE: 66 MMHG | OXYGEN SATURATION: 97 % | WEIGHT: 194 LBS | BODY MASS INDEX: 32.32 KG/M2 | HEART RATE: 62 BPM

## 2024-10-09 DIAGNOSIS — D56.9 THALASSEMIA, UNSPECIFIED: ICD-10-CM

## 2024-10-09 DIAGNOSIS — I10 ESSENTIAL (PRIMARY) HYPERTENSION: ICD-10-CM

## 2024-10-09 DIAGNOSIS — Z09 ENCOUNTER FOR FOLLOW-UP EXAMINATION AFTER COMPLETED TREATMENT FOR CONDITIONS OTHER THAN MALIGNANT NEOPLASM: ICD-10-CM

## 2024-10-09 DIAGNOSIS — Z01.810 ENCOUNTER FOR PREPROCEDURAL CARDIOVASCULAR EXAMINATION: ICD-10-CM

## 2024-10-09 DIAGNOSIS — Z86.79 PERSONAL HISTORY OF OTHER DISEASES OF THE CIRCULATORY SYSTEM: ICD-10-CM

## 2024-10-09 DIAGNOSIS — I42.8 OTHER CARDIOMYOPATHIES: ICD-10-CM

## 2024-10-09 DIAGNOSIS — Z87.891 PERSONAL HISTORY OF NICOTINE DEPENDENCE: ICD-10-CM

## 2024-10-09 DIAGNOSIS — I48.20 CHRONIC ATRIAL FIBRILLATION, UNSP: ICD-10-CM

## 2024-10-09 DIAGNOSIS — R10.84 GENERALIZED ABDOMINAL PAIN: ICD-10-CM

## 2024-10-09 DIAGNOSIS — Z86.39 PERSONAL HISTORY OF OTHER ENDOCRINE, NUTRITIONAL AND METABOLIC DISEASE: ICD-10-CM

## 2024-10-09 PROBLEM — K76.1 CHRONIC PASSIVE CONGESTION OF LIVER: Chronic | Status: ACTIVE | Noted: 2024-10-04

## 2024-10-09 PROBLEM — N18.9 CHRONIC KIDNEY DISEASE, UNSPECIFIED: Chronic | Status: ACTIVE | Noted: 2024-10-04

## 2024-10-09 PROBLEM — I49.9 CARDIAC ARRHYTHMIA, UNSPECIFIED: Chronic | Status: ACTIVE | Noted: 2024-10-04

## 2024-10-09 PROBLEM — I86.1 SCROTAL VARICES: Chronic | Status: ACTIVE | Noted: 2024-10-04

## 2024-10-09 PROCEDURE — 93000 ELECTROCARDIOGRAM COMPLETE: CPT

## 2024-10-09 PROCEDURE — 99215 OFFICE O/P EST HI 40 MIN: CPT

## 2024-10-09 PROCEDURE — G2211 COMPLEX E/M VISIT ADD ON: CPT

## 2024-10-10 PROBLEM — Z01.810 PREOPERATIVE CARDIOVASCULAR EXAMINATION: Status: ACTIVE | Noted: 2024-10-10

## 2024-10-13 ENCOUNTER — OUTPATIENT (OUTPATIENT)
Dept: OUTPATIENT SERVICES | Facility: HOSPITAL | Age: 79
LOS: 1 days | Discharge: HOME | End: 2024-10-13

## 2024-10-13 DIAGNOSIS — D64.9 ANEMIA, UNSPECIFIED: ICD-10-CM

## 2024-10-13 DIAGNOSIS — Z95.0 PRESENCE OF CARDIAC PACEMAKER: Chronic | ICD-10-CM

## 2024-10-13 DIAGNOSIS — Z98.890 OTHER SPECIFIED POSTPROCEDURAL STATES: Chronic | ICD-10-CM

## 2024-10-14 ENCOUNTER — RESULT REVIEW (OUTPATIENT)
Age: 79
End: 2024-10-14

## 2024-10-14 ENCOUNTER — APPOINTMENT (OUTPATIENT)
Dept: HEMATOLOGY ONCOLOGY | Facility: CLINIC | Age: 79
End: 2024-10-14
Payer: MEDICARE

## 2024-10-14 VITALS
HEIGHT: 65 IN | SYSTOLIC BLOOD PRESSURE: 164 MMHG | DIASTOLIC BLOOD PRESSURE: 81 MMHG | OXYGEN SATURATION: 96 % | WEIGHT: 186 LBS | BODY MASS INDEX: 30.99 KG/M2 | TEMPERATURE: 98.2 F | HEART RATE: 79 BPM

## 2024-10-14 DIAGNOSIS — D69.6 THROMBOCYTOPENIA, UNSPECIFIED: ICD-10-CM

## 2024-10-14 PROBLEM — Z78.9 OTHER SPECIFIED HEALTH STATUS: Chronic | Status: ACTIVE | Noted: 2024-10-04

## 2024-10-14 PROBLEM — I87.2 VENOUS INSUFFICIENCY (CHRONIC) (PERIPHERAL): Chronic | Status: ACTIVE | Noted: 2024-10-04

## 2024-10-14 LAB
ALBUMIN SERPL ELPH-MCNC: 4.3 G/DL
ALP BLD-CCNC: 100 U/L
ALT SERPL-CCNC: 17 U/L
ANION GAP SERPL CALC-SCNC: 12 MMOL/L
AST SERPL-CCNC: 26 U/L
BASOPHILS # BLD AUTO: 0.04 K/UL — SIGNIFICANT CHANGE UP (ref 0–0.2)
BASOPHILS NFR BLD AUTO: 1 % — SIGNIFICANT CHANGE UP (ref 0–2)
BILIRUB SERPL-MCNC: 1 MG/DL
BUN SERPL-MCNC: 14 MG/DL
CALCIUM SERPL-MCNC: 9.2 MG/DL
CHLORIDE SERPL-SCNC: 105 MMOL/L
CO2 SERPL-SCNC: 23 MMOL/L
CREAT SERPL-MCNC: 1.29 MG/DL
EGFR: 56 ML/MIN/1.73M2
EOSINOPHIL # BLD AUTO: 0.09 K/UL — SIGNIFICANT CHANGE UP (ref 0–0.5)
EOSINOPHIL NFR BLD AUTO: 2.2 % — SIGNIFICANT CHANGE UP (ref 0–6)
GLUCOSE SERPL-MCNC: 99 MG/DL
HCT VFR BLD CALC: 41.7 % — SIGNIFICANT CHANGE UP (ref 39–50)
HGB BLD-MCNC: 13.5 G/DL — SIGNIFICANT CHANGE UP (ref 13–17)
IMM GRANULOCYTES NFR BLD AUTO: 0.2 % — SIGNIFICANT CHANGE UP (ref 0–0.9)
LYMPHOCYTES # BLD AUTO: 0.88 K/UL — LOW (ref 1–3.3)
LYMPHOCYTES # BLD AUTO: 21.9 % — SIGNIFICANT CHANGE UP (ref 13–44)
MCHC RBC-ENTMCNC: 31.6 PG — SIGNIFICANT CHANGE UP (ref 27–34)
MCHC RBC-ENTMCNC: 32.4 GM/DL — SIGNIFICANT CHANGE UP (ref 32–36)
MCV RBC AUTO: 97.7 FL — SIGNIFICANT CHANGE UP (ref 80–100)
MONOCYTES # BLD AUTO: 0.4 K/UL — SIGNIFICANT CHANGE UP (ref 0–0.9)
MONOCYTES NFR BLD AUTO: 10 % — SIGNIFICANT CHANGE UP (ref 2–14)
NEUTROPHILS # BLD AUTO: 2.59 K/UL — SIGNIFICANT CHANGE UP (ref 1.8–7.4)
NEUTROPHILS NFR BLD AUTO: 64.7 % — SIGNIFICANT CHANGE UP (ref 43–77)
NRBC # BLD: 0 /100 WBCS — SIGNIFICANT CHANGE UP (ref 0–0)
NRBC BLD-RTO: 0 /100 WBCS — SIGNIFICANT CHANGE UP (ref 0–0)
PLATELET # BLD AUTO: 162 K/UL — SIGNIFICANT CHANGE UP (ref 150–400)
POTASSIUM SERPL-SCNC: 4.6 MMOL/L
PROT SERPL-MCNC: 7 G/DL
RBC # BLD: 4.27 M/UL — SIGNIFICANT CHANGE UP (ref 4.2–5.8)
RBC # FLD: 12.9 % — SIGNIFICANT CHANGE UP (ref 10.3–14.5)
SODIUM SERPL-SCNC: 140 MMOL/L
WBC # BLD: 4.01 K/UL — SIGNIFICANT CHANGE UP (ref 3.8–10.5)
WBC # FLD AUTO: 4.01 K/UL — SIGNIFICANT CHANGE UP (ref 3.8–10.5)

## 2024-10-14 PROCEDURE — 99203 OFFICE O/P NEW LOW 30 MIN: CPT

## 2024-10-15 LAB
B2 GLYCOPROT1 IGA SER QL: <2 U/ML — SIGNIFICANT CHANGE UP
B2 GLYCOPROT1 IGG SER-ACNC: <1.4 U/ML — SIGNIFICANT CHANGE UP
B2 GLYCOPROT1 IGM SER-ACNC: <1.5 U/ML — SIGNIFICANT CHANGE UP
CARDIOLIPIN IGM SER-MCNC: <1.5 MPL U/ML — SIGNIFICANT CHANGE UP
CARDIOLIPIN IGM SER-MCNC: <1.6 GPL U/ML — SIGNIFICANT CHANGE UP
DEPRECATED CARDIOLIPIN IGA SER: <2 APL U/ML — SIGNIFICANT CHANGE UP

## 2024-10-15 NOTE — ED ADULT TRIAGE NOTE - SOURCE OF INFORMATION
"Dx - s/p lefort colpocleisis, posterior colporrhaphy and cystoscopy"~"Met with pt at bedside"~"Reports she lives alone in an apartment at [facility]; + elevator access, 9 steps to enter, FF set-up"~"Independent at baseline, does own cooking/cleaning"~"Daughter available as needed when returns home"~"DME  - none"~"SNF/HH - no history"~"Has ride at discharge"~"PCP - Haleigh Peña"~"Pharm - [facility]"~"Plan - anticipate home no needs and family assist" Patient

## 2024-10-18 ENCOUNTER — APPOINTMENT (OUTPATIENT)
Dept: CARDIOLOGY | Facility: CLINIC | Age: 79
End: 2024-10-18

## 2024-10-25 ENCOUNTER — TRANSCRIPTION ENCOUNTER (OUTPATIENT)
Age: 79
End: 2024-10-25

## 2024-10-25 ENCOUNTER — OUTPATIENT (OUTPATIENT)
Dept: INPATIENT UNIT | Facility: HOSPITAL | Age: 79
LOS: 1 days | Discharge: HOME | End: 2024-10-25
Payer: MEDICARE

## 2024-10-25 ENCOUNTER — APPOINTMENT (OUTPATIENT)
Dept: UROLOGY | Facility: HOSPITAL | Age: 79
End: 2024-10-25

## 2024-10-25 VITALS
TEMPERATURE: 98 F | RESPIRATION RATE: 16 BRPM | HEART RATE: 62 BPM | SYSTOLIC BLOOD PRESSURE: 140 MMHG | DIASTOLIC BLOOD PRESSURE: 77 MMHG | OXYGEN SATURATION: 95 %

## 2024-10-25 VITALS
WEIGHT: 186.95 LBS | RESPIRATION RATE: 16 BRPM | TEMPERATURE: 98 F | DIASTOLIC BLOOD PRESSURE: 110 MMHG | SYSTOLIC BLOOD PRESSURE: 164 MMHG | OXYGEN SATURATION: 100 % | HEART RATE: 70 BPM | HEIGHT: 65.5 IN

## 2024-10-25 DIAGNOSIS — I86.1 SCROTAL VARICES: ICD-10-CM

## 2024-10-25 DIAGNOSIS — Z98.890 OTHER SPECIFIED POSTPROCEDURAL STATES: Chronic | ICD-10-CM

## 2024-10-25 DIAGNOSIS — Z95.0 PRESENCE OF CARDIAC PACEMAKER: Chronic | ICD-10-CM

## 2024-10-25 LAB
APTT BLD: 38 SEC — HIGH (ref 24.5–35.6)
INR BLD: 1 RATIO — SIGNIFICANT CHANGE UP (ref 0.85–1.16)
PROTHROM AB SERPL-ACNC: 11.8 SEC — SIGNIFICANT CHANGE UP (ref 9.9–13.4)

## 2024-10-25 PROCEDURE — 85730 THROMBOPLASTIN TIME PARTIAL: CPT

## 2024-10-25 PROCEDURE — 36415 COLL VENOUS BLD VENIPUNCTURE: CPT

## 2024-10-25 PROCEDURE — 85610 PROTHROMBIN TIME: CPT

## 2024-10-25 PROCEDURE — 55530 REVISE SPERMATIC CORD VEINS: CPT | Mod: LT

## 2024-10-25 RX ORDER — SACUBITRIL AND VALSARTAN 97; 103 MG/1; MG/1
1 TABLET, FILM COATED ORAL
Refills: 0 | DISCHARGE

## 2024-10-25 RX ORDER — SPIRONOLACTONE 100 MG/1
1 TABLET, FILM COATED ORAL
Refills: 0 | DISCHARGE

## 2024-10-25 RX ORDER — ONDANSETRON HCL/PF 4 MG/2 ML
4 VIAL (ML) INJECTION EVERY 6 HOURS
Refills: 0 | Status: DISCONTINUED | OUTPATIENT
Start: 2024-10-25 | End: 2024-10-25

## 2024-10-25 RX ORDER — FENTANYL CITRATE-0.9 % NACL/PF 300MCG/30
25 PATIENT CONTROLLED ANALGESIA VIAL INJECTION
Refills: 0 | Status: DISCONTINUED | OUTPATIENT
Start: 2024-10-25 | End: 2024-10-25

## 2024-10-25 RX ORDER — METOPROLOL TARTRATE 50 MG
1 TABLET ORAL
Refills: 0 | DISCHARGE

## 2024-10-25 RX ORDER — ONDANSETRON HCL/PF 4 MG/2 ML
4 VIAL (ML) INJECTION ONCE
Refills: 0 | Status: DISCONTINUED | OUTPATIENT
Start: 2024-10-25 | End: 2024-10-25

## 2024-10-25 RX ORDER — OXYCODONE HYDROCHLORIDE 30 MG/1
2.5 TABLET, FILM COATED, EXTENDED RELEASE ORAL ONCE
Refills: 0 | Status: DISCONTINUED | OUTPATIENT
Start: 2024-10-25 | End: 2024-10-25

## 2024-10-25 RX ORDER — OXYCODONE AND ACETAMINOPHEN 5; 325 MG/1; MG/1
1 TABLET ORAL EVERY 4 HOURS
Refills: 0 | Status: DISCONTINUED | OUTPATIENT
Start: 2024-10-25 | End: 2024-10-25

## 2024-10-25 RX ORDER — HYDROMORPHONE HYDROCHLORIDE 1 MG/ML
0.25 INJECTION, SOLUTION INTRAMUSCULAR; INTRAVENOUS; SUBCUTANEOUS
Refills: 0 | Status: DISCONTINUED | OUTPATIENT
Start: 2024-10-25 | End: 2024-10-25

## 2024-10-25 RX ORDER — TRAMADOL HYDROCHLORIDE 50 MG/1
1 TABLET, COATED ORAL
Qty: 12 | Refills: 0
Start: 2024-10-25 | End: 2024-10-27

## 2024-10-25 RX ORDER — SODIUM CHLORIDE 0.9 % (FLUSH) 0.9 %
1000 SYRINGE (ML) INJECTION
Refills: 0 | Status: DISCONTINUED | OUTPATIENT
Start: 2024-10-25 | End: 2024-10-25

## 2024-10-25 NOTE — BRIEF OPERATIVE NOTE - OPERATION/FINDINGS
large dilated veins in the spermatic cord tied off and cut. Gonadal artery, vas and vasal vessels spared

## 2024-10-25 NOTE — ASU DISCHARGE PLAN (ADULT/PEDIATRIC) - NS MD DC FALL RISK RISK
For information on Fall & Injury Prevention, visit: [email or web address] OR  [email or web address] OR  https://www.cdc.gov/steadi/patient.html

## 2024-10-25 NOTE — ASU PATIENT PROFILE, ADULT - NSICDXPASTMEDICALHX_GEN_ALL_CORE_FT
PAST MEDICAL HISTORY:  AF (atrial fibrillation) on Eliquis    Arthritis     BPH (benign prostatic hyperplasia)     Cardiac arrhythmia     Chronic kidney disease (CKD)     Chronic passive hepatic congestion     COPD (chronic obstructive pulmonary disease) not on inhalers    Coronary artery disease involving native heart with angina pectoris, unspecified vessel or lesion type     Heart failure with reduced ejection fraction     History of venous stasis ulcer of lower leg     HLD (hyperlipidemia)     HTN (hypertension)     Left varicocele     Non-ischemic cardiomyopathy     Pacemaker single chamber meditronic  placed by Dr Tomas  May  of  2016    Poor historian     Thrombocytopenia     Venous insufficiency

## 2024-10-25 NOTE — ASU DISCHARGE PLAN (ADULT/PEDIATRIC) - FINANCIAL ASSISTANCE
[facility] provides services at a reduced cost to those who are determined to be eligible through [facility]’s financial assistance program. Information regarding [facility]’s financial assistance program can be found by going to [email or web address] or by calling [phone].

## 2024-10-25 NOTE — BRIEF OPERATIVE NOTE - BRIEF OP NOTE DRAINS
-- DO NOT REPLY / DO NOT REPLY ALL --  -- Message is from the [facility]--    COVID-19 Universal Screening: Negative      Patient is requesting a medication refill - medication is on active list    Was Medication Pended? Yes.    Rx Name and Dose:  amLODIPine (NORVASC) 5 MG tablet    losartan (COZAAR) 100 MG tablet    Lovastatin (Unknown MG not on active list)    Duration: 90 days    Pharmacy  [facility] [address] [place] [address]    Patient confirmed the above pharmacy as correct?  Yes    Caller Information       Type Contact Phone    04/27/2020 02:57 PM Phone (Incoming) [facility] [address] [place] [address] (Pharmacy) [phone]          Alternative phone number: none    Turnaround time given to caller:   \"This message will be sent to [state Provider's name]. The clinical team will fulfill your request as soon as they review your message.\"  
Per other task, I called and left a message for the patient to call the office in [place] back to schedule a telephone or video visit for this meds refill request, per clinical.  
none

## 2024-10-25 NOTE — ASU DISCHARGE PLAN (ADULT/PEDIATRIC) - CARE PROVIDER_API CALL
Kuldeep Castillo Tucker  Urology  [address]  [place] [address]  Phone: [phone]  Fax: [phone]  Follow Up Time: 1 week

## 2024-10-30 ENCOUNTER — APPOINTMENT (OUTPATIENT)
Dept: UROLOGY | Facility: CLINIC | Age: 79
End: 2024-10-30
Payer: MEDICARE

## 2024-10-30 DIAGNOSIS — I48.91 UNSPECIFIED ATRIAL FIBRILLATION: ICD-10-CM

## 2024-10-30 DIAGNOSIS — M19.90 UNSPECIFIED OSTEOARTHRITIS, UNSPECIFIED SITE: ICD-10-CM

## 2024-10-30 DIAGNOSIS — N50.89 OTHER SPECIFIED DISORDERS OF THE MALE GENITAL ORGANS: ICD-10-CM

## 2024-10-30 DIAGNOSIS — Q21.12 PATENT FORAMEN OVALE: ICD-10-CM

## 2024-10-30 DIAGNOSIS — I13.10 HYPERTENSIVE HEART AND CHRONIC KIDNEY DISEASE WITHOUT HEART FAILURE, WITH STAGE 1 THROUGH STAGE 4 CHRONIC KIDNEY DISEASE, OR UNSPECIFIED CHRONIC KIDNEY DISEASE: ICD-10-CM

## 2024-10-30 DIAGNOSIS — I86.1 SCROTAL VARICES: ICD-10-CM

## 2024-10-30 DIAGNOSIS — E78.5 HYPERLIPIDEMIA, UNSPECIFIED: ICD-10-CM

## 2024-10-30 DIAGNOSIS — J44.9 CHRONIC OBSTRUCTIVE PULMONARY DISEASE, UNSPECIFIED: ICD-10-CM

## 2024-10-30 DIAGNOSIS — I25.10 ATHEROSCLEROTIC HEART DISEASE OF NATIVE CORONARY ARTERY WITHOUT ANGINA PECTORIS: ICD-10-CM

## 2024-10-30 DIAGNOSIS — N18.9 CHRONIC KIDNEY DISEASE, UNSPECIFIED: ICD-10-CM

## 2024-10-30 PROCEDURE — 99024 POSTOP FOLLOW-UP VISIT: CPT

## 2024-10-31 ENCOUNTER — NON-APPOINTMENT (OUTPATIENT)
Age: 79
End: 2024-10-31

## 2024-11-07 ENCOUNTER — APPOINTMENT (OUTPATIENT)
Dept: UROLOGY | Facility: CLINIC | Age: 79
End: 2024-11-07
Payer: MEDICARE

## 2024-11-07 PROCEDURE — 99024 POSTOP FOLLOW-UP VISIT: CPT

## 2024-11-19 NOTE — ED PROVIDER NOTE - NS ED MD DISPO DIVISION
- Continue Wegovy 1.7 mg weekly     Pediatric Weight Management Nurse Care Coordinator - [facility]   Judy Childers RN - [phone]    
[facility]

## 2024-12-03 ENCOUNTER — APPOINTMENT (OUTPATIENT)
Dept: ELECTROPHYSIOLOGY | Facility: CLINIC | Age: 79
End: 2024-12-03

## 2024-12-19 ENCOUNTER — APPOINTMENT (OUTPATIENT)
Dept: UROLOGY | Facility: CLINIC | Age: 79
End: 2024-12-19
Payer: MEDICARE

## 2024-12-19 DIAGNOSIS — N13.8 BENIGN PROSTATIC HYPERPLASIA WITH LOWER URINARY TRACT SYMPMS: ICD-10-CM

## 2024-12-19 DIAGNOSIS — N40.1 BENIGN PROSTATIC HYPERPLASIA WITH LOWER URINARY TRACT SYMPMS: ICD-10-CM

## 2024-12-19 DIAGNOSIS — I86.1 SCROTAL VARICES: ICD-10-CM

## 2024-12-19 PROCEDURE — 99024 POSTOP FOLLOW-UP VISIT: CPT

## 2024-12-31 ENCOUNTER — APPOINTMENT (OUTPATIENT)
Dept: ELECTROPHYSIOLOGY | Facility: CLINIC | Age: 79
End: 2024-12-31

## 2025-01-13 ENCOUNTER — APPOINTMENT (OUTPATIENT)
Dept: CARDIOLOGY | Facility: CLINIC | Age: 80
End: 2025-01-13
Payer: MEDICARE

## 2025-01-13 ENCOUNTER — NON-APPOINTMENT (OUTPATIENT)
Age: 80
End: 2025-01-13

## 2025-01-13 VITALS
BODY MASS INDEX: 31.16 KG/M2 | SYSTOLIC BLOOD PRESSURE: 136 MMHG | DIASTOLIC BLOOD PRESSURE: 80 MMHG | OXYGEN SATURATION: 97 % | HEART RATE: 84 BPM | WEIGHT: 187 LBS | HEIGHT: 65 IN

## 2025-01-13 VITALS — SYSTOLIC BLOOD PRESSURE: 106 MMHG | DIASTOLIC BLOOD PRESSURE: 62 MMHG

## 2025-01-13 DIAGNOSIS — I50.32 CHRONIC DIASTOLIC (CONGESTIVE) HEART FAILURE: ICD-10-CM

## 2025-01-13 DIAGNOSIS — I42.8 OTHER CARDIOMYOPATHIES: ICD-10-CM

## 2025-01-13 DIAGNOSIS — I48.20 CHRONIC ATRIAL FIBRILLATION, UNSP: ICD-10-CM

## 2025-01-13 DIAGNOSIS — I10 ESSENTIAL (PRIMARY) HYPERTENSION: ICD-10-CM

## 2025-01-13 PROCEDURE — G2211 COMPLEX E/M VISIT ADD ON: CPT

## 2025-01-13 PROCEDURE — 93000 ELECTROCARDIOGRAM COMPLETE: CPT

## 2025-01-13 PROCEDURE — 99214 OFFICE O/P EST MOD 30 MIN: CPT

## 2025-01-13 RX ORDER — SPIRONOLACTONE 25 MG/1
25 TABLET ORAL DAILY
Refills: 0 | Status: ACTIVE | COMMUNITY

## 2025-02-04 ENCOUNTER — EMERGENCY (EMERGENCY)
Facility: HOSPITAL | Age: 80
LOS: 0 days | Discharge: HOME | End: 2025-02-04
Attending: EMERGENCY MEDICINE
Payer: MEDICARE

## 2025-02-04 VITALS
TEMPERATURE: 98 F | DIASTOLIC BLOOD PRESSURE: 91 MMHG | OXYGEN SATURATION: 96 % | SYSTOLIC BLOOD PRESSURE: 108 MMHG | HEART RATE: 62 BPM | RESPIRATION RATE: 17 BRPM

## 2025-02-04 VITALS
WEIGHT: 197.31 LBS | HEART RATE: 71 BPM | SYSTOLIC BLOOD PRESSURE: 157 MMHG | OXYGEN SATURATION: 98 % | DIASTOLIC BLOOD PRESSURE: 92 MMHG | RESPIRATION RATE: 18 BRPM | HEIGHT: 63 IN | TEMPERATURE: 98 F

## 2025-02-04 DIAGNOSIS — R05.1 ACUTE COUGH: ICD-10-CM

## 2025-02-04 DIAGNOSIS — I50.9 HEART FAILURE, UNSPECIFIED: ICD-10-CM

## 2025-02-04 DIAGNOSIS — Z98.890 OTHER SPECIFIED POSTPROCEDURAL STATES: Chronic | ICD-10-CM

## 2025-02-04 DIAGNOSIS — J02.9 ACUTE PHARYNGITIS, UNSPECIFIED: ICD-10-CM

## 2025-02-04 DIAGNOSIS — Z95.0 PRESENCE OF CARDIAC PACEMAKER: Chronic | ICD-10-CM

## 2025-02-04 DIAGNOSIS — B34.9 VIRAL INFECTION, UNSPECIFIED: ICD-10-CM

## 2025-02-04 DIAGNOSIS — Z88.8 ALLERGY STATUS TO OTHER DRUGS, MEDICAMENTS AND BIOLOGICAL SUBSTANCES: ICD-10-CM

## 2025-02-04 DIAGNOSIS — R50.9 FEVER, UNSPECIFIED: ICD-10-CM

## 2025-02-04 LAB
FLUAV AG NPH QL: SIGNIFICANT CHANGE UP
FLUBV AG NPH QL: SIGNIFICANT CHANGE UP
RSV RNA NPH QL NAA+NON-PROBE: SIGNIFICANT CHANGE UP
SARS-COV-2 RNA SPEC QL NAA+PROBE: SIGNIFICANT CHANGE UP

## 2025-02-04 PROCEDURE — 0241U: CPT

## 2025-02-04 PROCEDURE — 71045 X-RAY EXAM CHEST 1 VIEW: CPT

## 2025-02-04 PROCEDURE — 99284 EMERGENCY DEPT VISIT MOD MDM: CPT

## 2025-02-04 PROCEDURE — 71045 X-RAY EXAM CHEST 1 VIEW: CPT | Mod: 26

## 2025-02-04 PROCEDURE — 99283 EMERGENCY DEPT VISIT LOW MDM: CPT | Mod: 25

## 2025-02-04 PROCEDURE — 94640 AIRWAY INHALATION TREATMENT: CPT

## 2025-02-04 RX ORDER — ACETAMINOPHEN 160 MG/5ML
650 SUSPENSION ORAL ONCE
Refills: 0 | Status: COMPLETED | OUTPATIENT
Start: 2025-02-04 | End: 2025-02-04

## 2025-02-04 RX ORDER — ALBUTEROL 90 MCG
2 AEROSOL REFILL (GRAM) INHALATION ONCE
Refills: 0 | Status: COMPLETED | OUTPATIENT
Start: 2025-02-04 | End: 2025-02-04

## 2025-02-04 RX ORDER — PREDNISONE 5 MG/1
50 TABLET ORAL ONCE
Refills: 0 | Status: COMPLETED | OUTPATIENT
Start: 2025-02-04 | End: 2025-02-04

## 2025-02-04 RX ORDER — PREDNISONE 5 MG/1
1 TABLET ORAL
Qty: 30 | Refills: 0
Start: 2025-02-04 | End: 2025-02-15

## 2025-02-04 RX ADMIN — Medication 2 PUFF(S): at 09:27

## 2025-02-04 RX ADMIN — ACETAMINOPHEN 650 MILLIGRAM(S): 160 SUSPENSION ORAL at 09:27

## 2025-02-04 RX ADMIN — PREDNISONE 50 MILLIGRAM(S): 5 TABLET ORAL at 09:26

## 2025-02-04 NOTE — ED PROVIDER NOTE - PATIENT PORTAL LINK FT
You can access the FollowMyHealth Patient Portal offered by [facility] by registering at the following website: [email or web address]. By joining [employer]’s FollowMyHealth portal, you will also be able to view your health information using other applications (apps) compatible with our system.

## 2025-02-04 NOTE — ED ADULT NURSE NOTE - NSFALLHARMRISKINTERV_ED_ALL_ED

## 2025-02-04 NOTE — ED PROVIDER NOTE - OBJECTIVE STATEMENT
79-year-old male past medical history CHF presents with fever cough for past few days.  Generalized weakness.  Patient also reports some sore throat.  Taking Tylenol without relief.  Patient also states he ran out of his albuterol a few days ago.

## 2025-02-04 NOTE — ED ADULT NURSE NOTE - OBJECTIVE STATEMENT
"inflammation in throat" cough and mucous in chest since yesterday morning, was increasingly bothersome last night states he was unable to sleep. also states he feels like he has a temperature

## 2025-02-04 NOTE — ED PROVIDER NOTE - CLINICAL SUMMARY MEDICAL DECISION MAKING FREE TEXT BOX
Patient with viral syndrome some wheezing on exam we will give nebs and steroids, will's flu swab anticipate discharge.Patient also has history of CHF will x-ray failure however patient does not clinically appear fluid overloaded at this time.

## 2025-02-04 NOTE — ED ADULT TRIAGE NOTE - CHIEF COMPLAINT QUOTE
"inflammation in throat" cough and mucous in chest since yesterday morning, was increasingly bothersome last night states he was unable to sleep. also states he feels like he has a temperature.

## 2025-02-04 NOTE — ED PROVIDER NOTE - NSFOLLOWUPINSTRUCTIONS_ED_ALL_ED_FT
Please take steroids as prescribed.  Please use albuterol every 4 hours as needed for wheezing and shortness of breath.    Viral Respiratory Infection  A respiratory infection is an illness that affects part of the respiratory system, such as the lungs, nose, or throat. A respiratory infection that is caused by a virus is called a viral respiratory infection.    Common types of viral respiratory infections include:  A cold.  The flu (influenza).  A respiratory syncytial virus (RSV) infection.  What are the causes?  This condition is caused by a virus. The virus may spread through contact with droplets or direct contact with infected people or their mucus or secretions. The virus may spread from person to person (is contagious).    What are the signs or symptoms?  Symptoms of this condition include:  A stuffy or runny nose.  A sore throat or cough.  Shortness of breath or difficulty breathing.  Yellow or green mucus (sputum).  Other symptoms may include:  A fever.  Sweating or chills.  Fatigue.  Achy muscles.  A headache.  How is this diagnosed?  This condition may be diagnosed based on:  Your symptoms.  A physical exam.  Testing of secretions from the nose or throat.  Chest X-ray.  How is this treated?  This condition may be treated with medicines, such as:  Antiviral medicine. This may shorten the length of time a person has symptoms.  Expectorants. These make it easier to cough up mucus.  Decongestant nasal sprays.  Acetaminophen or NSAIDs, such as ibuprofen, to relieve fever and pain.  Antibiotic medicines are not prescribed for viral infections.This is because antibiotics are designed to kill bacteria. They do not kill viruses.    Follow these instructions at home:  Managing pain and congestion    Take over-the-counter and prescription medicines only as told by your health care provider.  If you have a sore throat, gargle with a mixture of salt and water 3–4 times a day or as needed. To make salt water, completely dissolve ½–1 tsp (3–6 g) of salt in 1 cup (237 mL) of warm water.  Use nose drops made from salt water to ease congestion and soften raw skin around your nose.  Take 2 tsp (10 mL) of honey at bedtime to lessen coughing at night.  Do not give honey to children who are younger than 1 year.  Drink enough fluid to keep your urine pale yellow. This helps prevent dehydration and helps loosen up mucus.  General instructions    A sign telling the reader not to smoke.  Rest as much as possible.  Do not drink alcohol.  Do not use any products that contain nicotine or tobacco. These products include cigarettes, chewing tobacco, and vaping devices, such as e-cigarettes. If you need help quitting, ask your health care provider.  Keep all follow-up visits. This is important.  How is this prevented?  Washing hands with soap and water.  A person covering her mouth and nose with a cloth while sneezing.  Get an annual flu shot. You may get the flu shot in late summer, fall, or winter. Ask your health care provider when you should get your flu shot.  Avoid spreading your infection to other people. If you are sick:  Wash your hands with soap and water often, especially after you cough or sneeze. Wash for at least 20 seconds. If soap and water are not available, use alcohol-based hand .  Cover your mouth when you cough. Cover your nose and mouth when you sneeze.  Do not share cups or eating utensils.  Clean commonly used objects often. Clean commonly touched surfaces.  Stay home from work or school as told by your health care provider.  Avoid contact with people who are sick during cold and flu season. This is generally fall and winter.  Contact a health care provider if:  Your symptoms last for 10 days or longer.  Your symptoms get worse over time.  You have severe sinus pain in your face or forehead.  The glands in your jaw or neck become very swollen.  You have shortness of breath.  Get help right away if you:  Feel pain or pressure in your chest.  Have trouble breathing.  Faint or feel like you will faint.  Have severe and persistent vomiting.  Feel confused or disoriented.  These symptoms may represent a serious problem that is an emergency. Do not wait to see if the symptoms will go away. Get medical help right away. Call your local emergency services (911 in the [place] Do not drive yourself to the hospital.    Summary  A respiratory infection is an illness that affects part of the respiratory system, such as the lungs, nose, or throat. A respiratory infection that is caused by a virus is called a viral respiratory infection.  Common types of viral respiratory infections include a cold, influenza, and respiratory syncytial virus (RSV) infection.  Symptoms of this condition include a stuffy or runny nose, cough, fatigue, achy muscles, sore throat, and fevers or chills.  Antibiotic medicines are not prescribed for viral infections. This is because antibiotics are designed to kill bacteria. They are not effective against viruses.  This information is not intended to replace advice given to you by your health care provider. Make sure you discuss any questions you have with your health care provider.    Document Revised: 03/24/2022 Document Reviewed: 03/24/2022  [employer] Patient Education © 2023 [employer]  [employer] logo  Terms and Conditions  Privacy Policy  Editorial Policy  All content on this site: Copyright © 2023 Elsevier, its licensors, and contributors. All rights are reserved, including those for text and data mining, AI training, and similar technologies. For all open access content, the Creative Commons licensing terms apply.  Cookies are used by this site. To decline or learn more, visit our Cookies page.  [employer]

## 2025-02-04 NOTE — ED PROVIDER NOTE - PHYSICAL EXAMINATION
GEN - NAD; well appearing; A+O x3  HEAD - NC/AT    EYES - EOMI, no conjunctival pallor, no scleral icterus  ENT -   mucous membranes  moist , no discharge  PULM -scant wheezes bilaterally  COR -  RRR, S1 S2, no murmurs  ABD - ND, NT, soft, no guarding, no rebound, no masses    EXTREMS -no edema, no deformity, warm and well perfused   SKIN - no rash or bruising      NEUROLOGIC - alert, sensation nl, motor 5/5 RUE/LUE/RLE/LLE

## 2025-03-24 ENCOUNTER — APPOINTMENT (OUTPATIENT)
Dept: CARDIOLOGY | Facility: CLINIC | Age: 80
End: 2025-03-24

## 2025-04-09 ENCOUNTER — NON-APPOINTMENT (OUTPATIENT)
Age: 80
End: 2025-04-09

## 2025-04-09 ENCOUNTER — APPOINTMENT (OUTPATIENT)
Dept: INTERNAL MEDICINE | Facility: CLINIC | Age: 80
End: 2025-04-09
Payer: MEDICARE

## 2025-04-09 VITALS
BODY MASS INDEX: 32.82 KG/M2 | WEIGHT: 197 LBS | TEMPERATURE: 98.4 F | OXYGEN SATURATION: 97 % | HEIGHT: 65 IN | SYSTOLIC BLOOD PRESSURE: 134 MMHG | RESPIRATION RATE: 16 BRPM | DIASTOLIC BLOOD PRESSURE: 80 MMHG | HEART RATE: 78 BPM

## 2025-04-09 DIAGNOSIS — J44.9 CHRONIC OBSTRUCTIVE PULMONARY DISEASE, UNSPECIFIED: ICD-10-CM

## 2025-04-09 PROCEDURE — 99214 OFFICE O/P EST MOD 30 MIN: CPT | Mod: 25

## 2025-04-09 PROCEDURE — 94060 EVALUATION OF WHEEZING: CPT

## 2025-04-09 RX ORDER — ALBUTEROL SULFATE 90 UG/1
108 (90 BASE) INHALANT RESPIRATORY (INHALATION)
Qty: 1 | Refills: 3 | Status: ACTIVE | COMMUNITY
Start: 2025-04-09 | End: 1900-01-01

## 2025-04-14 NOTE — H&P PST ADULT - SKIN
[id number] [address] - [phone]    warm and dry

## 2025-05-06 ENCOUNTER — APPOINTMENT (OUTPATIENT)
Dept: CARDIOLOGY | Facility: CLINIC | Age: 80
End: 2025-05-06

## 2025-05-14 ENCOUNTER — APPOINTMENT (OUTPATIENT)
Dept: CARDIOLOGY | Facility: CLINIC | Age: 80
End: 2025-05-14

## 2025-05-21 NOTE — PATIENT PROFILE ADULT. - AS SC BRADEN MOISTURE
Preadmit talked to patient, she said she never knew the new date. A letter was mailed to her in March when she rescheduled though. Said she never got prep either.     Resent prep high priority to pool   (4) rarely moist

## 2025-06-02 ENCOUNTER — APPOINTMENT (OUTPATIENT)
Dept: CARDIOLOGY | Facility: CLINIC | Age: 80
End: 2025-06-02
Payer: MEDICARE

## 2025-06-09 ENCOUNTER — APPOINTMENT (OUTPATIENT)
Dept: CARDIOLOGY | Facility: CLINIC | Age: 80
End: 2025-06-09
Payer: MEDICARE

## 2025-06-09 VITALS
BODY MASS INDEX: 29.99 KG/M2 | WEIGHT: 180 LBS | HEART RATE: 71 BPM | DIASTOLIC BLOOD PRESSURE: 66 MMHG | OXYGEN SATURATION: 98 % | SYSTOLIC BLOOD PRESSURE: 130 MMHG | HEIGHT: 65 IN

## 2025-06-09 PROCEDURE — 99214 OFFICE O/P EST MOD 30 MIN: CPT

## 2025-06-09 PROCEDURE — 93000 ELECTROCARDIOGRAM COMPLETE: CPT

## 2025-06-09 PROCEDURE — G2211 COMPLEX E/M VISIT ADD ON: CPT

## 2025-06-13 ENCOUNTER — INPATIENT (INPATIENT)
Facility: HOSPITAL | Age: 80
LOS: 2 days | Discharge: HOME | DRG: 948 | End: 2025-06-16
Attending: STUDENT IN AN ORGANIZED HEALTH CARE EDUCATION/TRAINING PROGRAM | Admitting: STUDENT IN AN ORGANIZED HEALTH CARE EDUCATION/TRAINING PROGRAM
Payer: MEDICARE

## 2025-06-13 VITALS
OXYGEN SATURATION: 99 % | WEIGHT: 160.06 LBS | DIASTOLIC BLOOD PRESSURE: 62 MMHG | HEIGHT: 66 IN | TEMPERATURE: 98 F | HEART RATE: 62 BPM | SYSTOLIC BLOOD PRESSURE: 98 MMHG | RESPIRATION RATE: 18 BRPM

## 2025-06-13 DIAGNOSIS — Z98.890 OTHER SPECIFIED POSTPROCEDURAL STATES: Chronic | ICD-10-CM

## 2025-06-13 DIAGNOSIS — Z45.018 ENCOUNTER FOR ADJUSTMENT AND MANAGEMENT OF OTHER PART OF CARDIAC PACEMAKER: ICD-10-CM

## 2025-06-13 DIAGNOSIS — Z95.0 PRESENCE OF CARDIAC PACEMAKER: Chronic | ICD-10-CM

## 2025-06-13 DIAGNOSIS — R79.89 OTHER SPECIFIED ABNORMAL FINDINGS OF BLOOD CHEMISTRY: ICD-10-CM

## 2025-06-13 LAB
ALBUMIN SERPL ELPH-MCNC: 3.8 G/DL — SIGNIFICANT CHANGE UP (ref 3.3–5)
ALP SERPL-CCNC: 134 U/L — HIGH (ref 40–120)
ALT FLD-CCNC: 42 U/L — SIGNIFICANT CHANGE UP (ref 12–78)
ANION GAP SERPL CALC-SCNC: 3 MMOL/L — LOW (ref 5–17)
APPEARANCE UR: ABNORMAL
APTT BLD: 40.9 SEC — HIGH (ref 26.1–36.8)
AST SERPL-CCNC: 26 U/L — SIGNIFICANT CHANGE UP (ref 15–37)
BACTERIA # UR AUTO: ABNORMAL /HPF
BASOPHILS # BLD AUTO: 0.04 K/UL — SIGNIFICANT CHANGE UP (ref 0–0.2)
BASOPHILS NFR BLD AUTO: 0.6 % — SIGNIFICANT CHANGE UP (ref 0–2)
BILIRUB SERPL-MCNC: 0.9 MG/DL — SIGNIFICANT CHANGE UP (ref 0.2–1.2)
BILIRUB UR-MCNC: NEGATIVE — SIGNIFICANT CHANGE UP
BUN SERPL-MCNC: 35 MG/DL — HIGH (ref 7–23)
CALCIUM SERPL-MCNC: 9.1 MG/DL — SIGNIFICANT CHANGE UP (ref 8.5–10.1)
CAST: SIGNIFICANT CHANGE UP /LPF
CHLORIDE SERPL-SCNC: 108 MMOL/L — SIGNIFICANT CHANGE UP (ref 96–108)
CO2 SERPL-SCNC: 28 MMOL/L — SIGNIFICANT CHANGE UP (ref 22–31)
COLOR SPEC: YELLOW — SIGNIFICANT CHANGE UP
CREAT SERPL-MCNC: 1.43 MG/DL — HIGH (ref 0.5–1.3)
DIFF PNL FLD: NEGATIVE — SIGNIFICANT CHANGE UP
EGFR: 50 ML/MIN/1.73M2 — LOW
EGFR: 50 ML/MIN/1.73M2 — LOW
EOSINOPHIL # BLD AUTO: 0.2 K/UL — SIGNIFICANT CHANGE UP (ref 0–0.5)
EOSINOPHIL NFR BLD AUTO: 3.2 % — SIGNIFICANT CHANGE UP (ref 0–6)
EPI CELLS # UR: PRESENT
GLUCOSE SERPL-MCNC: 110 MG/DL — HIGH (ref 70–99)
GLUCOSE UR QL: NEGATIVE MG/DL — SIGNIFICANT CHANGE UP
HCT VFR BLD CALC: 45.3 % — SIGNIFICANT CHANGE UP (ref 39–50)
HGB BLD-MCNC: 14.8 G/DL — SIGNIFICANT CHANGE UP (ref 13–17)
IMM GRANULOCYTES # BLD AUTO: 0.02 K/UL — SIGNIFICANT CHANGE UP (ref 0–0.07)
IMM GRANULOCYTES NFR BLD AUTO: 0.3 % — SIGNIFICANT CHANGE UP (ref 0–0.9)
INR BLD: 1.13 RATIO — SIGNIFICANT CHANGE UP (ref 0.85–1.16)
KETONES UR QL: NEGATIVE MG/DL — SIGNIFICANT CHANGE UP
LACTATE SERPL-SCNC: 0.8 MMOL/L — SIGNIFICANT CHANGE UP (ref 0.7–2)
LEUKOCYTE ESTERASE UR-ACNC: ABNORMAL
LYMPHOCYTES # BLD AUTO: 1.21 K/UL — SIGNIFICANT CHANGE UP (ref 1–3.3)
LYMPHOCYTES NFR BLD AUTO: 19.5 % — SIGNIFICANT CHANGE UP (ref 13–44)
MCHC RBC-ENTMCNC: 31.6 PG — SIGNIFICANT CHANGE UP (ref 27–34)
MCHC RBC-ENTMCNC: 32.7 G/DL — SIGNIFICANT CHANGE UP (ref 32–36)
MCV RBC AUTO: 96.6 FL — SIGNIFICANT CHANGE UP (ref 80–100)
MONOCYTES # BLD AUTO: 0.54 K/UL — SIGNIFICANT CHANGE UP (ref 0–0.9)
MONOCYTES NFR BLD AUTO: 8.7 % — SIGNIFICANT CHANGE UP (ref 2–14)
NEUTROPHILS # BLD AUTO: 4.18 K/UL — SIGNIFICANT CHANGE UP (ref 1.8–7.4)
NEUTROPHILS NFR BLD AUTO: 67.7 % — SIGNIFICANT CHANGE UP (ref 43–77)
NITRITE UR-MCNC: NEGATIVE — SIGNIFICANT CHANGE UP
NRBC # BLD AUTO: 0 K/UL — SIGNIFICANT CHANGE UP (ref 0–0)
NRBC # FLD: 0 K/UL — SIGNIFICANT CHANGE UP (ref 0–0)
NRBC BLD AUTO-RTO: 0 /100 WBCS — SIGNIFICANT CHANGE UP (ref 0–0)
PH UR: 5.5 — SIGNIFICANT CHANGE UP (ref 5–8)
PLATELET # BLD AUTO: 120 K/UL — LOW (ref 150–400)
PMV BLD: 12.7 FL — SIGNIFICANT CHANGE UP (ref 7–13)
POTASSIUM SERPL-MCNC: 4.8 MMOL/L — SIGNIFICANT CHANGE UP (ref 3.5–5.3)
POTASSIUM SERPL-SCNC: 4.8 MMOL/L — SIGNIFICANT CHANGE UP (ref 3.5–5.3)
PROT SERPL-MCNC: 7.2 GM/DL — SIGNIFICANT CHANGE UP (ref 6–8.3)
PROT UR-MCNC: NEGATIVE MG/DL — SIGNIFICANT CHANGE UP
PROTHROM AB SERPL-ACNC: 13 SEC — SIGNIFICANT CHANGE UP (ref 9.9–13.4)
RBC # BLD: 4.69 M/UL — SIGNIFICANT CHANGE UP (ref 4.2–5.8)
RBC # FLD: 13.2 % — SIGNIFICANT CHANGE UP (ref 10.3–14.5)
RBC CASTS # UR COMP ASSIST: 8 /HPF — HIGH (ref 0–4)
SODIUM SERPL-SCNC: 139 MMOL/L — SIGNIFICANT CHANGE UP (ref 135–145)
SP GR SPEC: 1.02 — SIGNIFICANT CHANGE UP (ref 1–1.03)
SQUAMOUS # UR AUTO: 1 /HPF — SIGNIFICANT CHANGE UP (ref 0–5)
TROPONIN I, HIGH SENSITIVITY RESULT: 274.51 NG/L — HIGH
TROPONIN I, HIGH SENSITIVITY RESULT: 315.58 NG/L — HIGH
UROBILINOGEN FLD QL: 1 MG/DL — SIGNIFICANT CHANGE UP (ref 0.2–1)
WBC # BLD: 6.19 K/UL — SIGNIFICANT CHANGE UP (ref 3.8–10.5)
WBC # FLD AUTO: 6.19 K/UL — SIGNIFICANT CHANGE UP (ref 3.8–10.5)
WBC UR QL: 93 /HPF — HIGH (ref 0–5)

## 2025-06-13 PROCEDURE — 81001 URINALYSIS AUTO W/SCOPE: CPT

## 2025-06-13 PROCEDURE — 87186 SC STD MICRODIL/AGAR DIL: CPT

## 2025-06-13 PROCEDURE — 99223 1ST HOSP IP/OBS HIGH 75: CPT

## 2025-06-13 PROCEDURE — 70551 MRI BRAIN STEM W/O DYE: CPT

## 2025-06-13 PROCEDURE — 97162 PT EVAL MOD COMPLEX 30 MIN: CPT | Mod: GP

## 2025-06-13 PROCEDURE — 71045 X-RAY EXAM CHEST 1 VIEW: CPT | Mod: 26

## 2025-06-13 PROCEDURE — 80048 BASIC METABOLIC PNL TOTAL CA: CPT

## 2025-06-13 PROCEDURE — 70450 CT HEAD/BRAIN W/O DYE: CPT | Mod: 26

## 2025-06-13 PROCEDURE — 87086 URINE CULTURE/COLONY COUNT: CPT

## 2025-06-13 PROCEDURE — 93306 TTE W/DOPPLER COMPLETE: CPT

## 2025-06-13 PROCEDURE — 97116 GAIT TRAINING THERAPY: CPT | Mod: GP

## 2025-06-13 PROCEDURE — 80061 LIPID PANEL: CPT

## 2025-06-13 PROCEDURE — 36415 COLL VENOUS BLD VENIPUNCTURE: CPT

## 2025-06-13 PROCEDURE — 80053 COMPREHEN METABOLIC PANEL: CPT

## 2025-06-13 PROCEDURE — 83735 ASSAY OF MAGNESIUM: CPT

## 2025-06-13 PROCEDURE — 83036 HEMOGLOBIN GLYCOSYLATED A1C: CPT

## 2025-06-13 PROCEDURE — 85027 COMPLETE CBC AUTOMATED: CPT

## 2025-06-13 PROCEDURE — 99285 EMERGENCY DEPT VISIT HI MDM: CPT

## 2025-06-13 PROCEDURE — 93010 ELECTROCARDIOGRAM REPORT: CPT

## 2025-06-13 PROCEDURE — 84484 ASSAY OF TROPONIN QUANT: CPT

## 2025-06-13 PROCEDURE — 93880 EXTRACRANIAL BILAT STUDY: CPT

## 2025-06-13 PROCEDURE — 93880 EXTRACRANIAL BILAT STUDY: CPT | Mod: 26

## 2025-06-13 RX ORDER — ASPIRIN 325 MG
1 TABLET ORAL
Refills: 0 | DISCHARGE

## 2025-06-13 RX ORDER — MELATONIN 5 MG
3 TABLET ORAL AT BEDTIME
Refills: 0 | Status: DISCONTINUED | OUTPATIENT
Start: 2025-06-13 | End: 2025-06-16

## 2025-06-13 RX ORDER — TAMSULOSIN HYDROCHLORIDE 0.4 MG/1
0.4 CAPSULE ORAL AT BEDTIME
Refills: 0 | Status: DISCONTINUED | OUTPATIENT
Start: 2025-06-13 | End: 2025-06-16

## 2025-06-13 RX ORDER — METOPROLOL SUCCINATE 50 MG/1
25 TABLET, EXTENDED RELEASE ORAL DAILY
Refills: 0 | Status: DISCONTINUED | OUTPATIENT
Start: 2025-06-13 | End: 2025-06-16

## 2025-06-13 RX ORDER — MAGNESIUM, ALUMINUM HYDROXIDE 200-200 MG
30 TABLET,CHEWABLE ORAL EVERY 4 HOURS
Refills: 0 | Status: DISCONTINUED | OUTPATIENT
Start: 2025-06-13 | End: 2025-06-13

## 2025-06-13 RX ORDER — ASPIRIN 325 MG
324 TABLET ORAL ONCE
Refills: 0 | Status: COMPLETED | OUTPATIENT
Start: 2025-06-13 | End: 2025-06-13

## 2025-06-13 RX ORDER — ATORVASTATIN CALCIUM 80 MG/1
80 TABLET, FILM COATED ORAL AT BEDTIME
Refills: 0 | Status: DISCONTINUED | OUTPATIENT
Start: 2025-06-13 | End: 2025-06-16

## 2025-06-13 RX ORDER — ONDANSETRON HCL/PF 4 MG/2 ML
4 VIAL (ML) INJECTION EVERY 8 HOURS
Refills: 0 | Status: DISCONTINUED | OUTPATIENT
Start: 2025-06-13 | End: 2025-06-13

## 2025-06-13 RX ORDER — RIVAROXABAN 10 MG/1
1 TABLET, FILM COATED ORAL
Refills: 0 | DISCHARGE

## 2025-06-13 RX ORDER — ASPIRIN 325 MG
81 TABLET ORAL DAILY
Refills: 0 | Status: DISCONTINUED | OUTPATIENT
Start: 2025-06-13 | End: 2025-06-16

## 2025-06-13 RX ORDER — ACETAMINOPHEN 500 MG/5ML
650 LIQUID (ML) ORAL EVERY 6 HOURS
Refills: 0 | Status: DISCONTINUED | OUTPATIENT
Start: 2025-06-13 | End: 2025-06-16

## 2025-06-13 RX ORDER — FINASTERIDE 1 MG/1
5 TABLET, FILM COATED ORAL DAILY
Refills: 0 | Status: DISCONTINUED | OUTPATIENT
Start: 2025-06-13 | End: 2025-06-16

## 2025-06-13 RX ORDER — RIVAROXABAN 10 MG/1
15 TABLET, FILM COATED ORAL
Refills: 0 | Status: DISCONTINUED | OUTPATIENT
Start: 2025-06-13 | End: 2025-06-16

## 2025-06-13 RX ADMIN — METOPROLOL SUCCINATE 25 MILLIGRAM(S): 50 TABLET, EXTENDED RELEASE ORAL at 17:53

## 2025-06-13 RX ADMIN — RIVAROXABAN 15 MILLIGRAM(S): 10 TABLET, FILM COATED ORAL at 17:52

## 2025-06-13 RX ADMIN — Medication 324 MILLIGRAM(S): at 13:44

## 2025-06-13 RX ADMIN — ATORVASTATIN CALCIUM 80 MILLIGRAM(S): 80 TABLET, FILM COATED ORAL at 21:09

## 2025-06-13 RX ADMIN — TAMSULOSIN HYDROCHLORIDE 0.4 MILLIGRAM(S): 0.4 CAPSULE ORAL at 21:10

## 2025-06-13 RX ADMIN — FINASTERIDE 5 MILLIGRAM(S): 1 TABLET, FILM COATED ORAL at 17:52

## 2025-06-13 RX ADMIN — Medication 1000 MILLILITER(S): at 12:09

## 2025-06-13 RX ADMIN — Medication 3 MILLIGRAM(S): at 21:09

## 2025-06-13 NOTE — ED ADULT NURSE NOTE - OBJECTIVE STATEMENT
Pt sent in by md for low BP 98/62 at triage. Pt c/o intermittent dizziness x 2 weeks. Last episode pta. Endorses dizziness and cloudy vision x 5-6 minutes. . Stat ekg and monitor. No code stroke called by MD Woods. Pt denies CP and SOB. Pt on stretcher in hallway now, cardiac monitor on. IV inserted to L AC. Pt is alert, awake, able to answer all questions. Safety and comfort measures maintained.

## 2025-06-13 NOTE — PATIENT PROFILE ADULT - FALL HARM RISK - HARM RISK INTERVENTIONS

## 2025-06-13 NOTE — H&P ADULT - HISTORY OF PRESENT ILLNESS
79-yo Japanese speaking M with PMHx of HTN, HLD,  COPD, CKD A-fib on Eliquis, pacemaker ( interrogated in ER ) HFmrEF 45%-50% , hx of  Patent foramen ovale versus secundum atrial septal defect with left to right shunting by color flow Doppler 2024 came in with blurry vision  for the past 2 to 3 weeks. Patient reports he was seen by PCP and cardiologist recently. Reports he is more symptomatic  when exerting himself and he feels slightly dizzy.  Went to his primary care doctor who told him his blood pressure was low so sent him to the hospital.  No numbness weakness slurred speech no chest pain shortness of breath no diarrhea no abdominal pain.  States he does not have dizziness when going from sitting to standing.  Intermittent blurry vision for 2 to 3 weeks with low blood pressure  PPM interrogated in ER -->Normal single chamber PPM function. Chronic Afib, pacer dependent.  Last NSVT in April 2025, maintained on Xarelto, Metoprolol and GDMT per chart.  Battery status: 3.5 years.  Pt also has an old MICRA PPM that is Not in use.     Code stroke in ER   Cranial nerves II through XII intact normal strength sensation coordination NIH is 0     IN ED  BUN/Cr 35/1.4     Trop 315   CT HEAD : No significant interval change. No acute intracranial hemorrhage, mass effect or midline shift.    EKG 60 pacemaker: good capture, regular rhythm and appropriate intervals TC: 458 o st elevations    CXR No acute finding or change.  s/p ASA 325mg x1 1  L NS in  ER

## 2025-06-13 NOTE — ED ADULT NURSE NOTE - NSFALLHARMRISKINTERV_ED_ALL_ED
Assistance OOB with selected safe patient handling equipment if applicable/Assistance with ambulation/Communicate risk of Fall with Harm to all staff, patient, and family/Encourage patient to sit up slowly, dangle for a short time, stand at bedside before walking/Monitor gait and stability/Orthostatic vital signs/Provide visual cue: red socks, yellow wristband, yellow gown, etc/Reinforce activity limits and safety measures with patient and family/Bed in lowest position, wheels locked, appropriate side rails in place/Call bell, personal items and telephone in reach/Instruct patient to call for assistance before getting out of bed/chair/stretcher/Non-slip footwear applied when patient is off stretcher/[place] to call system/Physically safe environment - no spills, clutter or unnecessary equipment/Purposeful Proactive Rounding/Room/bathroom lighting operational, light cord in reach

## 2025-06-13 NOTE — ED ADULT NURSE REASSESSMENT NOTE - NS ED NURSE REASSESS COMMENT FT1
Pt received A+Ox4. VSS. Pt denies blurry vision presently but states he gets it occasionally for the past few weeks.  Kenan #[id number] translated. Pt instructed in plan of care. Vpaced/ afib on monitor. Monitored closely. Call bell in reach.

## 2025-06-13 NOTE — ED ADULT TRIAGE NOTE - PATIENT'S PREFERRED PRONOUN
sulfate, magnesium hydroxide, nicotine, ondansetron, acetaminophen, glucose, dextrose, glucagon (rDNA), dextrose    Objective     Vitals:  Patient Vitals for the past 8 hrs:   BP Temp Temp src Pulse Resp SpO2 Weight   01/10/20 0751 136/67 97.6 °F (36.4 °C) Oral 74 18 100 % --   01/10/20 0608 -- -- -- -- -- -- 299 lb 1.6 oz (135.7 kg)   01/10/20 0359 (!) 148/74 97.9 °F (36.6 °C) Oral 80 18 98 % --     Average, Min, and Max for last 24 hours Vitals:  TEMPERATURE:  Temp  Av.5 °F (35.8 °C)  Min: 94.6 °F (34.8 °C)  Max: 98.2 °F (36.8 °C)    RESPIRATIONS RANGE: Resp  Av.1  Min: 0  Max: 25    PULSE RANGE: Pulse  Av.1  Min: 66  Max: 80    BLOOD PRESSURE RANGE:  Systolic (62GXI), SOQ:458 , Min:83 , BTH:131   ; Diastolic (34JPF), BNH:72, Min:54, Max:93      PULSE OXIMETRY RANGE: SpO2  Av.1 %  Min: 94 %  Max: 100 %    I/O last 3 completed shifts: In: 8444 [P.O.:1080; I.V.:2363]  Out: 1300 [Urine:1150; Blood:150]    CBC:  Recent Labs     203 20   WBC 8.9 8.1   HGB 9.4* 9.4*   HCT 29.9* 30.2*    374   .2* 142.6*        BMP:  Recent Labs     203 20   * 142   K 3.8 3.7    107   CO2 23 25   BUN 22* 17   CREATININE 1.36* 1.31*   GLUCOSE 231* 126*   CALCIUM 7.9* 8.2*        Coags:  No results for input(s): APTT, PROT, INR in the last 72 hours. Lab Results   Component Value Date    SEDRATE >130 (H) 2020     Recent Labs     20   .2* 142.6*     Lower Extremity Physical Exam:    Vascular: DP and PT pulses are palpable. CFT <3 seconds to TMA stump, R. Hair growth is absent to level of forefoot. Mild non-pitting edema to R foot consistent with post-op course.     Neuro: Saph/sural/SP/DP/plantar sensation diminished to light touch.     Musculoskeletal: Muscle strength 5/5 to RLE. Minimal TTP to R TMA/ZAIRE surgical site. S/p R TMA w/ ZAIRE.      Dermatologic: R foot TMA/ZAIRE sites are well-coapted with sutures intact, no dehiscence. Approximately 5 cc of sanguinous output in SAMARA drain, no changes required overnight. Minimal erythema rebel-incision, no purulence, malodor or fluctuance. Left foot is warm and dry with no open wounds                Imaging:   US RENAL COMPLETE   Final Result   Unremarkable ultrasound of the kidneys and urinary bladder. VL LOWER EXTREMITY ARTERIAL SEGMENTAL PRESSURES W PPG BILATERAL   Final Result      XR FOOT RIGHT (MIN 3 VIEWS)   Final Result   1. Soft tissue swelling and gas in the great toe, extending to the level of   the 1st metatarsal head, concerning for gas gangrene. 2.  No radiographic evidence of osteomyelitis. MRI FOOT RIGHT W WO CONTRAST   Final Result   1. No evidence of osteomyelitis in the right forefoot. 2. Focal confluent soft tissue edema at the plantar aspect of the proximal   great toe, possibly focal cellulitis or phlegmon. No definite organized   fluid collection to indicate abscess at this time. This area measures   approximately 1.9 x 0.8 x 2.1 cm.   3. A couple low signal foci are seen within the plantar soft tissues of the   proximal great toe, favored to be vascular structures but possibility of soft   tissue gas is not entirely excluded. 4. Small joint effusion at the 1st MTP joint. MRI FOOT RIGHT W WO CONTRAST   Final Result   1. No osteomyelitis or other acute osseous abnormality of the hindfoot. 2. Circumferential subcutaneous edema about the ankle and along the dorsum of   the midfoot compatible with bland edema versus cellulitis. No deep soft   tissue ulceration or drainable fluid collection. VL DUP LOWER EXTREMITY VENOUS RIGHT   Final Result      XR FOOT RIGHT (MIN 3 VIEWS)   Final Result   Soft tissue edema surrounding the 1st digit without evidence for radiopaque   foreign body or subcutaneous air. Cultures:   12/31/2019 R hallux wound cx: Group B Strep, Candida Albicans, S.  Aureus, Prevotella  12/31/19: Blood cx x2 - ngtd  1/10/20 L forefoot surgical path - pending    Assessment   Chalino Youngblood is a 38 y.o. female with   1. S/p TMA w/ ZAIRE, RLE (DOS: 1/9/20)   2. S/p R hallux amputation (DOS 1/6/20)  3. S/p I&D, right foot   4. Diabetic wound to the level of subcutaneous tissue, right hallux  5. Cellulitis, right foot - resolving   6. DM2 with neuropathy  7. H/o DVT  8. Chronic CHF    Principal Problem:    Cellulitis of right foot  Active Problems:    Other proteinuria    Hypoalbuminemia    Renovascular hypertension    Obesity, morbid, BMI 50 or higher (HCC)    Hypothyroidism    Diabetic polyneuropathy (HCC)    Type 2 diabetes mellitus with neurologic complication, with long-term current use of insulin (HCC)    Chronic diastolic congestive heart failure ([facility])    CAROLYN (acute kidney injury) ([facility] [address]    Cellulitis of foot    Type 2 diabetes mellitus with right diabetic foot infection ([facility] [address]    Septic arthritis of right foot ([facility])    Equinus contracture of right ankle  Resolved Problems:    Foot infection    Hyperglycemia    Non compliance w medication regimen    H/O deep venous thrombosis    Plan     · Patient examined and evaluated at bedside  · Treatment options discussed in detail with the patient  · Medical management per primary  · ID following - IV Ceftriazone & PO Diflucan  · RLE post-op dressing changed: Adaptic/DSD/ACE/Posterior splint  · Strict NWB to R foot in surgical shoe w/ walker  · Continue PRN SAMARA drain exchange per nursing order  · Plan to remove drain tomorrow w/ dressing change then likely ok for dc pending final abx reccs & dc arrangements  ? Discussed w/ pt that R foot surgical post-op course involves weekly outpatient follow-up w/ suture removal ~3 weeks, Strict NWB until complete healing followed by gradual protected WBAT & prosthetist consult for custom shoes  ?  Patient voiced understanding & plans to work w/ Case Management to determine if her out-of-[place] insurance would allow Him/He

## 2025-06-13 NOTE — ED PROVIDER NOTE - CLINICAL SUMMARY MEDICAL DECISION MAKING FREE TEXT BOX
#[id number] (triage) and #[id number] (while in room)  79-year-old male who history of hypertension high cholesterol COPD CKD A-fib on Eliquis pacemaker presents to the emergency department for blurry vision.  Patient states symptoms have been present for the past 2 to 3 weeks occurs intermittently seems to occur more so when exerting himself and he feels slightly dizzy.  Went to his primary care doctor who told him his blood pressure was low so sent him to the hospital.  No numbness weakness slurred speech no chest pain shortness of breath no diarrhea no abdominal pain.  States he does not have dizziness when going from sitting to standing.  Called to triage to evaluate patient for code stroke.  Patient with symptoms present for 2 to 3 weeks as well as normal neurological exam here so no code stroke was called.  Patient further evaluated while in examination room.  Cranial nerves II through XII intact normal strength sensation coordination NIH is 0 normal peripheral pulses no lower extremity swelling.  Patient with intermittent blurry vision for 2 to 3 weeks with low blood pressure will workup to rule out cause of low blood pressure which is likely causing his change in vision CT head EKG reassess  #[id number] (triage) and #[id number] (while in room)  79-year-old male who history of hypertension high cholesterol COPD CKD A-fib on Eliquis pacemaker presents to the emergency department for blurry vision.  Patient states symptoms have been present for the past 2 to 3 weeks occurs intermittently seems to occur more so when exerting himself and he feels slightly dizzy.  Went to his primary care doctor who told him his blood pressure was low so sent him to the hospital.  No numbness weakness slurred speech no chest pain shortness of breath no diarrhea no abdominal pain.  States he does not have dizziness when going from sitting to standing.  Called to triage to evaluate patient for code stroke.  Patient with symptoms present for 2 to 3 weeks as well as normal neurological exam here so no code stroke was called.  Patient further evaluated while in examination room.  Cranial nerves II through XII intact normal strength sensation coordination NIH is 0 normal peripheral pulses no lower extremity swelling.  Patient with intermittent blurry vision for 2 to 3 weeks with low blood pressure will workup to rule out cause of low blood pressure which is likely causing his change in vision CT head EKG reassess  133 All labs imaging reviewed by myself.  Labs show elevated troponin.  CT head is negative.  Patient seen in the ED by EP no events.  Case discussed with attending hospitalist Dr. Loaiza who accepts

## 2025-06-13 NOTE — ED ADULT NURSE NOTE - NSICDXPASTSURGICALHX_GEN_ALL_CORE_FT
PAST SURGICAL HISTORY:  Cardiac pacemaker single chamber meditronic  placed by Dr Tomas  May  of  2016    History of hernia surgery      (0) independent

## 2025-06-13 NOTE — PROCEDURE NOTE - ADDITIONAL PROCEDURE DETAILS
Normal single chamber PPM function.  Chronic Afib, pacer dependent.  Last NSVT in April 2025, maintained on Xarelto, Metoprolol and GDMT per chart.  Battery status: 3.5 years.  Pt also has an old MICRA PPM that is Not in use.

## 2025-06-13 NOTE — H&P ADULT - NSHPPHYSICALEXAM_GEN_ALL_CORE
Vital Signs Last 24 Hrs  T(C): 36.3 (13 Jun 2025 14:58), Max: 36.6 (13 Jun 2025 11:03)  T(F): 97.4 (13 Jun 2025 14:58), Max: 97.8 (13 Jun 2025 11:03)  HR: 61 (13 Jun 2025 14:58) (61 - 62)  BP: 115/62 (13 Jun 2025 14:58) (98/62 - 115/62)  BP(mean): 75 (13 Jun 2025 14:58) (74 - 75)  RR: 18 (13 Jun 2025 14:58) (16 - 18)  SpO2: 98% (13 Jun 2025 14:58) (98% - 99%)    Parameters below as of 13 Jun 2025 14:58  Patient On (Oxygen Delivery Method): room air    · Physical Examination: Constitutional: NAD AAOx3  	Eyes: PERRLA EOMI  	Head: Normocephalic atraumatic  	Mouth: MMM  	Cardiac: regular rate normal pulses no LE swelling  	Resp: unlabored breathing clear b/l  	GI: Abd s/nt/nd  	Neuro: grossly normal and intact cn2-12 intact normal strength sensation coordination nih-0                      Skin: No visible rashes

## 2025-06-13 NOTE — PATIENT PROFILE ADULT - STATED REASON FOR ADMISSION
Appointment at [facility]-they checked my BP and it was low so I came here via ambulance

## 2025-06-13 NOTE — ED PROVIDER NOTE - CARE PLAN
1 Principal Discharge DX:	Elevated troponin  Secondary Diagnosis:	Blurry vision  Secondary Diagnosis:	Near syncope

## 2025-06-13 NOTE — ED PROVIDER NOTE - PHYSICAL EXAMINATION
Constitutional: NAD AAOx3  Eyes: PERRLA EOMI  Head: Normocephalic atraumatic  Mouth: MMM  Cardiac: regular rate normal pulses no LE swelling  Resp: unlabored breathing clear b/l  GI: Abd s/nt/nd  Neuro: grossly normal and intact cn2-12 intact normal strength sensation coordination nih-0  Skin: No visible rashes

## 2025-06-13 NOTE — ED ADULT TRIAGE NOTE - CHIEF COMPLAINT QUOTE
Pt sent in by md for low BP 98/62 at triage. Pt c/o intermittent dizziness x 2 weeks. Last episode pta. Endorses dizziness and cloudy vision x 5-6 minutes. . Stat ekg and monitor. No code stroke called by MD Woods.

## 2025-06-13 NOTE — H&P ADULT - ASSESSMENT
79-yo Wallisian speaking M with PMHx of HTN, HLD,  COPD, CKD A-fib on Eliquis, pacemaker ( interrogated in ER ) HFmrEF 45%-50% , hx of  Patent foramen ovale versus secundum atrial septal defect with left to right shunting by color flow Doppler 2024 came in with blurry vision  for the past 2 to 3 weeks. Patient reports he was seen by PCP and cardiologist recently. Reports he is more symptomatic  when exerting himself and he feels slightly dizzy.  Went to his primary care doctor who told him his blood pressure was low so sent him to the hospital.  No numbness weakness slurred speech no chest pain shortness of breath no diarrhea no abdominal pain.  States he does not have dizziness when going from sitting to standing.    # Blurry vision/ dizziness/ near syncope   # hx of  Patent foramen ovale versus secundum atrial septal defect with left to right shunting by color flow Doppler 2024   - Code stroke in ER   Cranial nerves II through XII intact normal strength sensation coordination NIH is 0   - CT HEAD : No significant interval change. No acute intracranial hemorrhage, mass effect or midline shift.  - PPM interrogated today in ER , Battery status: 3.5 years.  - EKG 60 pacemaker: good capture, regular rhythm and appropriate intervals TC: 458 o st elevation  - CXR No acute finding or change.  - s/p ASA 325mg x1 1  L NS in  ER  - start ASA, high dose statin   - at home on : Lasix and Aldactone, Entresto  held; continue Toprol   -  F/u TTE  (PFO ) and  carotid dopplers   - MRI head   - Q4 Neuro checks  - Goal BP: SBP Goal < 180   Diastolic < 105  - Fall precaution.  - PT and OT consultation;   - follow A1c, f/u lipid panel   - neuro and cardio consulted     # elevated Trop  - Trop 315  trend to peak likely  demand   - EKG  60 pacemaker: good capture, regular rhythm and appropriate intervals TC: 458 o st elevation  - denies chest pain, palpitation   - cardio consulted    # ELIS on CKD?  - BUN/Cr 35/1.4 ( Per chart review 1.1- 1.2 )  pt is on aldactone, Lasix  HOLD and monitor cr   avoid IVF ( caution with reduced EF)       # Chronic systolic congestive heart failure appears euvolemic.  -  Echo with EF 43% (was 54% in April 2024)  follow repeat TTE   Previous TTE  Left ventricular cavity is mildly dilated. Left ventricular systolic function is mildly decreased with an ejection fraction of 48 % by Rascon's method of disks with an ejection fraction visually estimated at 45 to 50 %.  severe pulmonary hypertension.Mild to moderate aortic regurgitation. Patent foramen ovale versus secundum atrial septal defect with left to right shunting by color flow Doppler.  -  per EP->Normal single chamber PPM function. Chronic Afib, pacer dependent.  Last NSVT in April 2025, maintained on Xarelto, Metoprolol and GDMT per chart.  Battery status: 3.5 years.  Pt also has an old MICRA PPM that is Not in use.    # Chronic atrial fibrillation.   -  rate controlled / Vpaced  -  cont Toprol XL, Xarelto.    # DVT pro   Xarelto renally dosed 15mg daily      # PT consulted  neuro, cardio

## 2025-06-13 NOTE — ED PROVIDER NOTE - OBJECTIVE STATEMENT
#[id number] (triage) and #[id number] (while in room)  79-year-old male who history of hypertension high cholesterol COPD CKD A-fib on Eliquis pacemaker presents to the emergency department for blurry vision.  Patient states symptoms have been present for the past 2 to 3 weeks occurs intermittently seems to occur more so when exerting himself and he feels slightly dizzy.  Went to his primary care doctor who told him his blood pressure was low so sent him to the hospital.  No numbness weakness slurred speech no chest pain shortness of breath no diarrhea no abdominal pain.  States he does not have dizziness when going from sitting to standing.  Called to triage to evaluate patient for code stroke.  Patient with symptoms present for 2 to 3 weeks as well as normal neurological exam here so no code stroke was called.  Patient further evaluated while in examination room.  Cranial nerves II through XII intact normal strength sensation coordination NIH is 0 normal peripheral pulses no lower extremity swelling.  Patient with intermittent blurry vision for 2 to 3 weeks with low blood pressure will workup to rule out cause of low blood pressure which is likely causing his change in vision CT head EKG reassess

## 2025-06-13 NOTE — ED ADULT NURSE NOTE - NS ED NURSE LEVEL OF CONSCIOUSNESS ORIENTATION
Additional Notes: Patient consent was obtained to proceed with the visit and recommended plan of care after discussion of all risks and benefits, including the risks of COVID-19 exposure. Detail Level: Simple Oriented - self; Oriented - place; Oriented - time

## 2025-06-14 ENCOUNTER — RESULT REVIEW (OUTPATIENT)
Age: 80
End: 2025-06-14

## 2025-06-14 LAB
A1C WITH ESTIMATED AVERAGE GLUCOSE RESULT: 6.1 % — HIGH (ref 4–5.6)
ALBUMIN SERPL ELPH-MCNC: 3.3 G/DL — SIGNIFICANT CHANGE UP (ref 3.3–5)
ALP SERPL-CCNC: 105 U/L — SIGNIFICANT CHANGE UP (ref 40–120)
ALT FLD-CCNC: 37 U/L — SIGNIFICANT CHANGE UP (ref 12–78)
ANION GAP SERPL CALC-SCNC: 3 MMOL/L — LOW (ref 5–17)
AST SERPL-CCNC: 23 U/L — SIGNIFICANT CHANGE UP (ref 15–37)
BILIRUB SERPL-MCNC: 1 MG/DL — SIGNIFICANT CHANGE UP (ref 0.2–1.2)
BUN SERPL-MCNC: 31 MG/DL — HIGH (ref 7–23)
CALCIUM SERPL-MCNC: 8.9 MG/DL — SIGNIFICANT CHANGE UP (ref 8.5–10.1)
CHLORIDE SERPL-SCNC: 111 MMOL/L — HIGH (ref 96–108)
CHOLEST SERPL-MCNC: 92 MG/DL — SIGNIFICANT CHANGE UP
CO2 SERPL-SCNC: 26 MMOL/L — SIGNIFICANT CHANGE UP (ref 22–31)
CREAT SERPL-MCNC: 1.07 MG/DL — SIGNIFICANT CHANGE UP (ref 0.5–1.3)
EGFR: 70 ML/MIN/1.73M2 — SIGNIFICANT CHANGE UP
EGFR: 70 ML/MIN/1.73M2 — SIGNIFICANT CHANGE UP
ESTIMATED AVERAGE GLUCOSE: 128 MG/DL — HIGH (ref 68–114)
GLUCOSE SERPL-MCNC: 100 MG/DL — HIGH (ref 70–99)
HCT VFR BLD CALC: 45.2 % — SIGNIFICANT CHANGE UP (ref 39–50)
HDLC SERPL-MCNC: 34 MG/DL — LOW
HGB BLD-MCNC: 14.4 G/DL — SIGNIFICANT CHANGE UP (ref 13–17)
LDLC SERPL-MCNC: 43 MG/DL — SIGNIFICANT CHANGE UP
LIPID PNL WITH DIRECT LDL SERPL: 43 MG/DL — SIGNIFICANT CHANGE UP
MCHC RBC-ENTMCNC: 30.8 PG — SIGNIFICANT CHANGE UP (ref 27–34)
MCHC RBC-ENTMCNC: 31.9 G/DL — LOW (ref 32–36)
MCV RBC AUTO: 96.8 FL — SIGNIFICANT CHANGE UP (ref 80–100)
NONHDLC SERPL-MCNC: 58 MG/DL — SIGNIFICANT CHANGE UP
NRBC # BLD AUTO: 0 K/UL — SIGNIFICANT CHANGE UP (ref 0–0)
NRBC # FLD: 0 K/UL — SIGNIFICANT CHANGE UP (ref 0–0)
NRBC BLD AUTO-RTO: 0 /100 WBCS — SIGNIFICANT CHANGE UP (ref 0–0)
PLATELET # BLD AUTO: 115 K/UL — LOW (ref 150–400)
PMV BLD: 12.4 FL — SIGNIFICANT CHANGE UP (ref 7–13)
POTASSIUM SERPL-MCNC: 4.6 MMOL/L — SIGNIFICANT CHANGE UP (ref 3.5–5.3)
POTASSIUM SERPL-SCNC: 4.6 MMOL/L — SIGNIFICANT CHANGE UP (ref 3.5–5.3)
PROT SERPL-MCNC: 6.5 GM/DL — SIGNIFICANT CHANGE UP (ref 6–8.3)
RBC # BLD: 4.67 M/UL — SIGNIFICANT CHANGE UP (ref 4.2–5.8)
RBC # FLD: 13.2 % — SIGNIFICANT CHANGE UP (ref 10.3–14.5)
SODIUM SERPL-SCNC: 140 MMOL/L — SIGNIFICANT CHANGE UP (ref 135–145)
TRIGL SERPL-MCNC: 70 MG/DL — SIGNIFICANT CHANGE UP
WBC # BLD: 7.22 K/UL — SIGNIFICANT CHANGE UP (ref 3.8–10.5)
WBC # FLD AUTO: 7.22 K/UL — SIGNIFICANT CHANGE UP (ref 3.8–10.5)

## 2025-06-14 PROCEDURE — 99233 SBSQ HOSP IP/OBS HIGH 50: CPT

## 2025-06-14 PROCEDURE — 93306 TTE W/DOPPLER COMPLETE: CPT | Mod: 26

## 2025-06-14 PROCEDURE — 99222 1ST HOSP IP/OBS MODERATE 55: CPT

## 2025-06-14 PROCEDURE — 99223 1ST HOSP IP/OBS HIGH 75: CPT | Mod: FS

## 2025-06-14 RX ADMIN — METOPROLOL SUCCINATE 25 MILLIGRAM(S): 50 TABLET, EXTENDED RELEASE ORAL at 09:27

## 2025-06-14 RX ADMIN — Medication 3 MILLIGRAM(S): at 21:04

## 2025-06-14 RX ADMIN — RIVAROXABAN 15 MILLIGRAM(S): 10 TABLET, FILM COATED ORAL at 17:06

## 2025-06-14 RX ADMIN — Medication 81 MILLIGRAM(S): at 09:27

## 2025-06-14 RX ADMIN — FINASTERIDE 5 MILLIGRAM(S): 1 TABLET, FILM COATED ORAL at 09:26

## 2025-06-14 RX ADMIN — TAMSULOSIN HYDROCHLORIDE 0.4 MILLIGRAM(S): 0.4 CAPSULE ORAL at 21:04

## 2025-06-14 RX ADMIN — Medication 60 MILLILITER(S): at 17:06

## 2025-06-14 RX ADMIN — ATORVASTATIN CALCIUM 80 MILLIGRAM(S): 80 TABLET, FILM COATED ORAL at 21:03

## 2025-06-14 NOTE — PHYSICAL THERAPY INITIAL EVALUATION ADULT - GENERAL OBSERVATIONS, REHAB EVAL
Pt was found lying in bed with tele+, Pt is pleasant and willing to participate in PT, Kazakh speaking, used [employer] for translation services, Orthostatics were done which are +, pt c/o blurry/cloudy vision

## 2025-06-14 NOTE — CONSULT NOTE ADULT - SUBJECTIVE AND OBJECTIVE BOX
Neurology Consult requested by:   Patient is a 79y old  Male who presents with a chief complaint of blurry vision, near syncope , dizziness (14 Jun 2025 09:54)     HPI:  79-yo Right-handed man with a past medical history of hypertension, hyperlipidemia, chronic renal insufficiency, PFO, atrial fibrillation on Eliquis.  Status post permanent pacemaker.  Admitted with several episodes of transient lightheadedness palpitations and blurry vision recalls his episodes usually last a few minutes and then resolve.  No symptoms at present.  Recalls an associated mild headache more pressure heaviness, no history of migraines.  Denies any loss of vision, double vision, slurred speech, no numbness, weakness of the face, no numbness weakness of the extremities.  No recent falls.  No fever or chills, no cough no nausea or vomiting.      PAST MEDICAL & SURGICAL HISTORY:  AF (atrial fibrillation)  on Eliquis      Pacemaker  single chamber meditronic  placed by Dr Tomas  May  of  2016      Coronary artery disease involving native heart with angina pectoris, unspecified vessel or lesion type      HTN (hypertension)      Arthritis      COPD (chronic obstructive pulmonary disease)  not on inhalers      HLD (hyperlipidemia)      Heart failure with reduced ejection fraction      Thrombocytopenia      BPH (benign prostatic hyperplasia)      Non-ischemic cardiomyopathy      Cardiac arrhythmia      Venous insufficiency      Chronic passive hepatic congestion      History of venous stasis ulcer of lower leg      Poor historian      Left varicocele      Chronic kidney disease (CKD)      Cardiac pacemaker  single chamber meditronic  placed by Dr Tomas  May  of  2016      History of hernia surgery        FAMILY HISTORY:  No pertinent family history  of diabetes or heart disease      Social Hx:  Nonsmoker, no drug or alcohol use  Medications and Allergies ReviewedMEDICATIONS  (STANDING):  aspirin enteric coated 81 milliGRAM(s) Oral daily  atorvastatin 80 milliGRAM(s) Oral at bedtime  finasteride 5 milliGRAM(s) Oral daily  metoprolol succinate ER 25 milliGRAM(s) Oral daily  rivaroxaban 15 milliGRAM(s) Oral with dinner  tamsulosin 0.4 milliGRAM(s) Oral at bedtime     ROS: Pertinent positives in HPI, all other ROS were reviewed and are negative.      Examination:   Vital Signs Last 24 Hrs  T(C): 36.4 (14 Jun 2025 12:37), Max: 36.4 (14 Jun 2025 08:19)  T(F): 97.5 (14 Jun 2025 12:37), Max: 97.5 (14 Jun 2025 08:19)  HR: 63 (14 Jun 2025 12:37) (60 - 64)  BP: 111/58 (14 Jun 2025 12:37) (102/64 - 119/71)  BP(mean): 81 (14 Jun 2025 08:19) (75 - 81)  RR: 18 (14 Jun 2025 12:37) (17 - 18)  SpO2: 100% (14 Jun 2025 12:37) (97% - 100%)    Parameters below as of 14 Jun 2025 08:19  Patient On (Oxygen Delivery Method): room air    Orthostatic VS    06-14-25 @ 08:19  Lying BP: Orthostatic BP (Lying Systolic): 111/Orthostatic BP (Lying Diastolic (mm Hg)): 58 HR: Orthostatic Pulse (Heart Rate (beats/min)): 63   Sitting BP: Orthostatic BP (Sitting Systolic): 91/Orthostatic BP (Sitting Diastolic (mm Hg)): 56 HR: Orthostatic Pulse (Heart Rate (beats/min)): 65  Standing BP: Orthostatic BP (Standing Systolic): 82/Orthostatic BP (Standing Diastolic (mm Hg)): 50 HR: Orthostatic Pulse (Heart Rate (beats/min)): 63  Site: --   Mode: --      General: Cooperative, NAD   NECK: supple, no masses  ENT: Normal hearing   Vascular : no carotid bruits,   Lungs: CTAB  Chest: RRR, no murmurs  Extremities: nontender, no edema  Musculoskeletal: no adventitious movements, no joint stiffness  Skin: no rash    Neurological Examination:  NIHSS:0  MS: AOx3. Appropriately interactive, normal affect. Speech fluent w/o paraphasic error, repetition, naming intact,  Pt cannot read.   CN: VFFTC, PERLL, EOMI, V1-3 sensation intact, face symmetric, hearing intact, palate elevates symmetrically, tongue midline, SCM equal bilaterally  Motor: normal bulk and tone, no tremor, rigidity or bradykinesia.  5/5 all over   Sens: Intact to light touch.    Reflexes: 0-1/4 all over, downgoing toes b/l  Coord:  No dysmetria, ROXANA intact   Gait: Cannot test    Labs: Reviewed  Imaging: Reviewed    Comprehensive Metabolic Panel (06.14.25 @ 07:06)   Sodium: 140 mmol/L  Potassium: 4.6 mmol/L  Chloride: 111 mmol/L  Carbon Dioxide: 26 mmol/L  Anion Gap: 3 mmol/L  Blood Urea Nitrogen: 31 mg/dL  Creatinine: 1.07 mg/dL  Glucose: 100 mg/dL  Calcium: 8.9 mg/dL  Protein Total: 6.5 gm/dL  Albumin: 3.3 g/dL  Bilirubin Total: 1.0 mg/dL  Alkaline Phosphatase: 105 U/L  Aspartate Aminotransferase (AST/SGOT): 23 U/L  Alanine Aminotransferase (ALT/SGPT): 37 U/L  eGFR: 70:     < from: CT Head No Cont (06.13.25 @ 11:41) >    INTERPRETATION:  CLINICAL INFORMATION: dizziness    COMPARISON: CT head 8/24/2023    CONTRAST:  IV Contrast: NONE  .    TECHNIQUE:  Serial axial imageswere obtained from the skull base to the   vertex using multi-slice helical technique. Sagittal and coronal   reformats were obtained.    FINDINGS:    VENTRICLES AND SULCI: There is mild prominence of cortical sulci,   fissures and ventricles relatedto generalized volume loss.  INTRA-AXIAL: No mass effect, acute hemorrhage, or midline shift.  EXTRA-AXIAL: No mass or fluid collection. Basal cisterns are normal in   appearance.    VISUALIZED SINUSES:  Minimal ethmoid sinus mucosal thickening.  TYMPANOMASTOID CAVITIES:  Clear. Bilateral underpneumatized mastoid.  VISUALIZED ORBITS: Normal.  CALVARIUM: Intact.          IMPRESSION:    No significant interval change.    No acute intracranial hemorrhage, mass effect or midline shift.      --- End of Report ---            DAYNA HERNANDEZ MD; Attending Radiologist  This document has been electronically signed. Jun 13 2025 12:01PM    < end of copied text >      < from: US Duplex Carotid Arteries Complete, Bilateral (06.13.25 @ 16:22) >    Antegrade flow is noted within both vertebral arteries.    IMPRESSION: No significant hemodynamic stenosis of either internal   carotid artery.    Measurement of carotid stenosis is based on updated recommendations for   carotid stenosis interpretation criteria from the Intersocietal   Accreditation Commission(IAC) Vascular Testing Board, modified from the   Society of Radiologists in Ultrasound (SRU) Consensus Conference Criteria   for Internal Carotid Artery Stenosis.    < end of copied text >    < from: TTE W or WO Ultrasound Enhancing Agent (06.14.25 @ 11:44) >  CONCLUSIONS:      1. Mild left ventricular hypertrophy.   2. Left ventricular cavity is normal in size. Left ventricular wall thickness is normal. Leftventricular systolic function is mildly decreased with an ejection fraction visually estimated at 45 to 50 %.   3. Normal right ventricular cavity size, with normal wall thickness, and normal right ventricular systolic function.   4. Left atrium is moderately dilated.   5. Device lead is visualized in the right heart.   6. Moderate mitral regurgitation.   7. Mild to moderate tricuspid regurgitation.   8. Estimated pulmonary artery systolic pressure is 42 mmHg, consistent with mild pulmonary hypertension.   9. Trileaflet aortic valve with normal systolic excursion. There is focal calcification of the aortic valve leaflets.  10. Mild aortic regurgitation.  11. Compared to the transthoracic echocardiogram performed on 7/19/2024, there have been no significant interval changes.    < end of copied text >    
  CHIEF COMPLAINT: dizziness       HPI:   #[id number]    79 Korean speaking male with a hx HFmrEF LVEF 45-50%, HTN, HLD, COPD, Afib on eliquis s/p PPM presenting with several weeks of blurry vision and dizziness. Also notes BP was low prior to presentation. Denies chest pain, SOB, LE edema, palpitations.     In the ED, his BP was 98/62, HR 62  Labs notable for Hs trop 315 then 274, Cr 1.43 now 1.07  CTH neg for acute change   ECG vpaced 60 bpm     PAST MEDICAL / SURGICAL HISTORY:  PAST MEDICAL & SURGICAL HISTORY:  AF (atrial fibrillation)  on Eliquis      Pacemaker  single chamber meditronic  placed by Dr Tomas  May  of  2016      Coronary artery disease involving native heart with angina pectoris, unspecified vessel or lesion type      HTN (hypertension)      Arthritis      COPD (chronic obstructive pulmonary disease)  not on inhalers      HLD (hyperlipidemia)      Heart failure with reduced ejection fraction      Thrombocytopenia      BPH (benign prostatic hyperplasia)      Non-ischemic cardiomyopathy      Cardiac arrhythmia      Venous insufficiency      Chronic passive hepatic congestion      History of venous stasis ulcer of lower leg      Poor historian      Left varicocele      Chronic kidney disease (CKD)      Cardiac pacemaker  single chamber meditronic  placed by Dr Tomas  May  of  2016      History of hernia surgery          SOCIAL HISTORY:   Alcohol: Denied  Smoking: Nonsmoker  Drug Use: Denied  Marital Status:     FAMILY HISTORY: FAMILY HISTORY:  No pertinent family history  of diabetes or heart disease        MEDICATIONS  (STANDING):  aspirin enteric coated 81 milliGRAM(s) Oral daily  atorvastatin 80 milliGRAM(s) Oral at bedtime  finasteride 5 milliGRAM(s) Oral daily  metoprolol succinate ER 25 milliGRAM(s) Oral daily  rivaroxaban 15 milliGRAM(s) Oral with dinner  tamsulosin 0.4 milliGRAM(s) Oral at bedtime    MEDICATIONS  (PRN):  acetaminophen     Tablet .. 650 milliGRAM(s) Oral every 6 hours PRN Temp greater or equal to 38C (100.4F), Mild Pain (1 - 3)  melatonin 3 milliGRAM(s) Oral at bedtime PRN Insomnia      Allergies    amiodarone (Other)  ACE inhibitors (Unknown)    Intolerances        REVIEW OF SYSTEMS:  CONSTITUTIONAL: No weakness, fevers or chills  Eyes: + blurry vision   NECK: No pain or stiffness  RESPIRATORY: No cough, wheezing, hemoptysis; No shortness of breath  CARDIOVASCULAR: No chest pain or palpitations  GASTROINTESTINAL: No abdominal pain. No nausea, vomiting, or hematemesis; No diarrhea or constipation. No melena or hematochezia.  GENITOURINARY: No dysuria, frequency or hematuria  NEUROLOGICAL: No numbness. +dizziness  SKIN: No itching or rash  All other review of systems is negative unless indicated above    VITAL SIGNS:   Vital Signs Last 24 Hrs  T(C): 36.4 (14 Jun 2025 08:19), Max: 36.6 (13 Jun 2025 11:03)  T(F): 97.5 (14 Jun 2025 08:19), Max: 97.8 (13 Jun 2025 11:03)  HR: 61 (14 Jun 2025 08:19) (60 - 64)  BP: 115/66 (14 Jun 2025 08:19) (98/62 - 119/71)  BP(mean): 81 (14 Jun 2025 08:19) (74 - 81)  RR: 18 (14 Jun 2025 08:19) (16 - 18)  SpO2: 97% (14 Jun 2025 08:19) (97% - 100%)    Parameters below as of 14 Jun 2025 08:19  Patient On (Oxygen Delivery Method): room air        I&O's Summary      PHYSICAL EXAM:  Constitutional: NAD, awake and alert  HEENT:  EOMI,  Pupils round, No oral cyanosis.  Pulmonary: Non-labored, breath sounds are clear bilaterally, No wheezing, rales or rhonchi  Cardiovascular: S1 and S2, regular rate and rhythm, systolic murmur   Gastrointestinal: Bowel Sounds present, soft, nontender.   Lymph: No peripheral edema. No cervical lymphadenopathy.  Neurological: Alert, no focal deficits  Skin: No rashes.  Psych:  Mood & affect appropriate    LABS:                        14.4   7.22  )-----------( 115      ( 14 Jun 2025 07:06 )             45.2                         14.8   6.19  )-----------( 120      ( 13 Jun 2025 12:06 )             45.3     14 Jun 2025 07:06    140    |  111    |  31     ----------------------------<  100    4.6     |  26     |  1.07   13 Jun 2025 12:06    139    |  108    |  35     ----------------------------<  110    4.8     |  28     |  1.43     Ca    8.9        14 Jun 2025 07:06  Ca    9.1        13 Jun 2025 12:06    TPro  6.5    /  Alb  3.3    /  TBili  1.0    /  DBili  x      /  AST  23     /  ALT  37     /  AlkPhos  105    14 Jun 2025 07:06  TPro  7.2    /  Alb  3.8    /  TBili  0.9    /  DBili  x      /  AST  26     /  ALT  42     /  AlkPhos  134    13 Jun 2025 12:06    PT/INR - ( 13 Jun 2025 12:06 )   PT: 13.0 sec;   INR: 1.13 ratio         PTT - ( 13 Jun 2025 12:06 )  PTT:40.9 sec

## 2025-06-14 NOTE — PROGRESS NOTE ADULT - SUBJECTIVE AND OBJECTIVE BOX
HOSPITALIST PROGRESS NOTE    SUBJECTIVE / INTERVAL HPI: Patient seen and examined at bedside. Used  ID#[id number]. No new complaints. Still with intermittent episodes of blurry vision similar to presentation.     ROS: All 10 systems reviewed and found to be negative with the exception of what has been described above.     VITAL SIGNS:  Vital Signs Last 24 Hrs  T(C): 36.4 (14 Jun 2025 12:37), Max: 36.4 (14 Jun 2025 08:19)  T(F): 97.5 (14 Jun 2025 12:37), Max: 97.5 (14 Jun 2025 08:19)  HR: 63 (14 Jun 2025 12:37) (60 - 64)  BP: 111/58 (14 Jun 2025 12:37) (102/64 - 119/71)  BP(mean): 81 (14 Jun 2025 08:19) (81 - 81)  RR: 18 (14 Jun 2025 12:37) (17 - 18)  SpO2: 100% (14 Jun 2025 12:37) (97% - 100%)    Parameters below as of 14 Jun 2025 08:19  Patient On (Oxygen Delivery Method): room air      PHYSICAL EXAM:  General: No acute distress  HEENT: NC/AT; PERRL, anicteric sclera; MMM  Neck: Supple  Cardiovascular: +S1/S2, RRR, no murmurs, rubs, gallops  Respiratory: CTA B/L; no W/R/R  Gastrointestinal: soft, NT/ND; +BSx4  Extremities: WWP; no edema, clubbing or cyanosis  Vascular: 2+ radial, DP/PT pulses B/L  Neurological: AAOx3; no focal deficits    MEDICATIONS:  MEDICATIONS  (STANDING):  aspirin enteric coated 81 milliGRAM(s) Oral daily  atorvastatin 80 milliGRAM(s) Oral at bedtime  finasteride 5 milliGRAM(s) Oral daily  metoprolol succinate ER 25 milliGRAM(s) Oral daily  rivaroxaban 15 milliGRAM(s) Oral with dinner  tamsulosin 0.4 milliGRAM(s) Oral at bedtime    MEDICATIONS  (PRN):  acetaminophen     Tablet .. 650 milliGRAM(s) Oral every 6 hours PRN Temp greater or equal to 38C (100.4F), Mild Pain (1 - 3)  melatonin 3 milliGRAM(s) Oral at bedtime PRN Insomnia      ALLERGIES:  Allergies    amiodarone (Other)  ACE inhibitors (Unknown)    Intolerances        LABS:                        14.4   7.22  )-----------( 115      ( 14 Jun 2025 07:06 )             45.2     06-14    140  |  111[H]  |  31[H]  ----------------------------<  100[H]  4.6   |  26  |  1.07    Ca    8.9      14 Jun 2025 07:06    TPro  6.5  /  Alb  3.3  /  TBili  1.0  /  DBili  x   /  AST  23  /  ALT  37  /  AlkPhos  105  06-14    PT/INR - ( 13 Jun 2025 12:06 )   PT: 13.0 sec;   INR: 1.13 ratio         PTT - ( 13 Jun 2025 12:06 )  PTT:40.9 sec  Urinalysis Basic - ( 14 Jun 2025 07:06 )    Color: x / Appearance: x / SG: x / pH: x  Gluc: 100 mg/dL / Ketone: x  / Bili: x / Urobili: x   Blood: x / Protein: x / Nitrite: x   Leuk Esterase: x / RBC: x / WBC x   Sq Epi: x / Non Sq Epi: x / Bacteria: x      CAPILLARY BLOOD GLUCOSE      POCT Blood Glucose.: 111 mg/dL (13 Jun 2025 10:57)    Blood, Urine: Negative (06-13 @ 16:45)      RADIOLOGY & ADDITIONAL TESTS: Reviewed.

## 2025-06-14 NOTE — PROGRESS NOTE ADULT - ASSESSMENT
79-yo Taiwanese speaking M with PMHx of HTN, HLD,  COPD, CKD A-fib on Eliquis, pacemaker ( interrogated in ER ) HFmrEF 45%-50% , hx of  Patent foramen ovale versus secundum atrial septal defect with left to right shunting by color flow Doppler 2024 came in with blurry vision  for the past 2 to 3 weeks. Patient reports he was seen by PCP and cardiologist recently. Reports he is more symptomatic  when exerting himself and he feels slightly dizzy.  Went to his primary care doctor who told him his blood pressure was low so sent him to the hospital.  No numbness weakness slurred speech no chest pain shortness of breath no diarrhea no abdominal pain.  States he does not have dizziness when going from sitting to standing.    # Blurry vision/ dizziness/ near syncope   # hx of  Patent foramen ovale versus secundum atrial septal defect with left to right shunting by color flow Doppler 2024   # Orthostatic hypotension  - Code stroke in ER   Cranial nerves II through XII intact normal strength sensation coordination NIH is 0   - CT HEAD : No significant interval change. No acute intracranial hemorrhage, mass effect or midline shift.  - PPM interrogated today in ER , Battery status: 3.5 years.  - EKG 60 pacemaker: good capture, regular rhythm and appropriate intervals TC: 458 o st elevation  - CXR No acute finding or change.  - s/p ASA 325mg x1 1  L NS in  ER  - start ASA, high dose statin   - at home on : Lasix and Aldactone, Entresto  held; continue Toprol   - F/u TTE Pending  - MRI head pending   - Fall precaution.  - PT and OT consultation;   - follow A1c, f/u lipid panel   - neuro and cardio consult appreciated  - Positive orthostatics today. Gentle IV hydration overnight (hx of HF). Cautious with resuming outpatient antihypertensives given soft pressures. Repeat Orthostatics tomorrow      # elevated Trop  - Trop 315  trend to peak likely  demand   - EKG  60 pacemaker: good capture, regular rhythm and appropriate intervals TC: 458 o st elevation  - denies chest pain, palpitation   - cardio consult appreciated     # ELIS on CKD?  - BUN/Cr 35/1.4 ( Per chart review 1.1- 1.2 )  pt is on aldactone, Lasix, currently holding  ELIS resolved.   HOLD and monitor cr     # Chronic systolic congestive heart failure appears euvolemic.  -  Echo with EF 43% (was 54% in April 2024)  follow repeat TTE   Previous TTE  Left ventricular cavity is mildly dilated. Left ventricular systolic function is mildly decreased with an ejection fraction of 48 % by Rascon's method of disks with an ejection fraction visually estimated at 45 to 50 %.  severe pulmonary hypertension.Mild to moderate aortic regurgitation. Patent foramen ovale versus secundum atrial septal defect with left to right shunting by color flow Doppler.  -  per EP->Normal single chamber PPM function. Chronic Afib, pacer dependent.  Last NSVT in April 2025, maintained on Xarelto, Metoprolol and GDMT per chart.  Battery status: 3.5 years.  Pt also has an old MICRA PPM that is Not in use.  Repeat echo    # Chronic atrial fibrillation.   -  rate controlled / Vpaced  -  cont Toprol XL, Xarelto.    # DVT pro   Xarelto renally dosed 15mg daily      # PT consulted  neuro, cardio

## 2025-06-14 NOTE — CONSULT NOTE ADULT - ASSESSMENT
79-year-old man right-handed with a history of hypertension, hyperlipidemia, atrial fibrillation on long-term anticoagulation, status post permanent pacemaker, presents with recurrent transient episodes of lightheadedness, with blurry vision, palpitations.  Presently all symptoms resolved.  Nonfocal exam.  CT scan of the head on admission showed no acute changes, carotid Dopplers no large vessel disease.  Symptoms more suggestive of presyncope, vasovagal, patient has orthostatic hypotension.  Doubt CVA/TIA.  Recommendations:  Follow-up telemetry.  Fluids  F/u B/P, adjust anti HTN as needed  Continue with long-term anticoagulation.  Continue statin.    No further neuro recommendations.

## 2025-06-14 NOTE — PHYSICAL THERAPY INITIAL EVALUATION ADULT - DIAGNOSIS, PT EVAL
Blurry vision/ dizziness/ near syncope Blurry vision/ dizziness/ near syncope, Orthostatic Hypotension

## 2025-06-14 NOTE — CONSULT NOTE ADULT - ASSESSMENT
79 Azeri speaking male with a hx HFmrEF LVEF 45-50%, HTN, HLD, COPD, Afib on eliquis s/p PPM presenting with several weeks of blurry vision and dizziness.       Dizziness  possibly secondary to symptomatic hypotension as BP soft on arrival with ELIS   check orthostatic vital signs   MRI brain pending  PPM with normal function     Elevated troponin   Hs trop 315 then 274; no chest pain or SOB- ECG Vpaced. not consistent with ACS  echo pending.  likely demand ischemia     HFmrEF  LVEF 45-50%, appears compenstated without signs or sx decompensated HF  cont metoprolol. entresto on hold due to soft BP. consider resuming tomorrow if BP/Cr tolerate     HLD   cont atorvastatin     Afib   cont xarelto   controlled with metoprolol     outpatient cardiologist: Dr. Palla

## 2025-06-14 NOTE — PHYSICAL THERAPY INITIAL EVALUATION ADULT - PERTINENT HX OF CURRENT PROBLEM, REHAB EVAL
79 Maltese speaking male with a hx HFmrEF LVEF 45-50%, HTN, HLD, COPD, Afib on eliquis s/p PPM presenting with several weeks of blurry vision and dizziness. Also notes BP was low prior to presentation. Intermittent blurry vision for 2 to 3 weeks with low blood pressure

## 2025-06-15 LAB
ANION GAP SERPL CALC-SCNC: 4 MMOL/L — LOW (ref 5–17)
BUN SERPL-MCNC: 30 MG/DL — HIGH (ref 7–23)
CALCIUM SERPL-MCNC: 8.7 MG/DL — SIGNIFICANT CHANGE UP (ref 8.5–10.1)
CHLORIDE SERPL-SCNC: 111 MMOL/L — HIGH (ref 96–108)
CO2 SERPL-SCNC: 25 MMOL/L — SIGNIFICANT CHANGE UP (ref 22–31)
CREAT SERPL-MCNC: 0.98 MG/DL — SIGNIFICANT CHANGE UP (ref 0.5–1.3)
EGFR: 78 ML/MIN/1.73M2 — SIGNIFICANT CHANGE UP
EGFR: 78 ML/MIN/1.73M2 — SIGNIFICANT CHANGE UP
GLUCOSE SERPL-MCNC: 107 MG/DL — HIGH (ref 70–99)
HCT VFR BLD CALC: 43.5 % — SIGNIFICANT CHANGE UP (ref 39–50)
HGB BLD-MCNC: 14 G/DL — SIGNIFICANT CHANGE UP (ref 13–17)
MAGNESIUM SERPL-MCNC: 1.9 MG/DL — SIGNIFICANT CHANGE UP (ref 1.6–2.6)
MCHC RBC-ENTMCNC: 31.4 PG — SIGNIFICANT CHANGE UP (ref 27–34)
MCHC RBC-ENTMCNC: 32.2 G/DL — SIGNIFICANT CHANGE UP (ref 32–36)
MCV RBC AUTO: 97.5 FL — SIGNIFICANT CHANGE UP (ref 80–100)
NRBC # BLD AUTO: 0 K/UL — SIGNIFICANT CHANGE UP (ref 0–0)
NRBC # FLD: 0 K/UL — SIGNIFICANT CHANGE UP (ref 0–0)
NRBC BLD AUTO-RTO: 0 /100 WBCS — SIGNIFICANT CHANGE UP (ref 0–0)
PLATELET # BLD AUTO: 115 K/UL — LOW (ref 150–400)
PMV BLD: 13 FL — SIGNIFICANT CHANGE UP (ref 7–13)
POTASSIUM SERPL-MCNC: 4.4 MMOL/L — SIGNIFICANT CHANGE UP (ref 3.5–5.3)
POTASSIUM SERPL-SCNC: 4.4 MMOL/L — SIGNIFICANT CHANGE UP (ref 3.5–5.3)
RBC # BLD: 4.46 M/UL — SIGNIFICANT CHANGE UP (ref 4.2–5.8)
RBC # FLD: 13.2 % — SIGNIFICANT CHANGE UP (ref 10.3–14.5)
SODIUM SERPL-SCNC: 140 MMOL/L — SIGNIFICANT CHANGE UP (ref 135–145)
WBC # BLD: 5.48 K/UL — SIGNIFICANT CHANGE UP (ref 3.8–10.5)
WBC # FLD AUTO: 5.48 K/UL — SIGNIFICANT CHANGE UP (ref 3.8–10.5)

## 2025-06-15 PROCEDURE — 99233 SBSQ HOSP IP/OBS HIGH 50: CPT | Mod: FS

## 2025-06-15 PROCEDURE — 99233 SBSQ HOSP IP/OBS HIGH 50: CPT

## 2025-06-15 RX ORDER — SACUBITRIL AND VALSARTAN 49; 51 MG/1; MG/1
1 TABLET, FILM COATED ORAL
Refills: 0 | Status: DISCONTINUED | OUTPATIENT
Start: 2025-06-15 | End: 2025-06-16

## 2025-06-15 RX ADMIN — SACUBITRIL AND VALSARTAN 1 TABLET(S): 49; 51 TABLET, FILM COATED ORAL at 21:08

## 2025-06-15 RX ADMIN — TAMSULOSIN HYDROCHLORIDE 0.4 MILLIGRAM(S): 0.4 CAPSULE ORAL at 21:08

## 2025-06-15 RX ADMIN — Medication 81 MILLIGRAM(S): at 09:18

## 2025-06-15 RX ADMIN — SACUBITRIL AND VALSARTAN 1 TABLET(S): 49; 51 TABLET, FILM COATED ORAL at 09:54

## 2025-06-15 RX ADMIN — METOPROLOL SUCCINATE 25 MILLIGRAM(S): 50 TABLET, EXTENDED RELEASE ORAL at 09:14

## 2025-06-15 RX ADMIN — FINASTERIDE 5 MILLIGRAM(S): 1 TABLET, FILM COATED ORAL at 09:13

## 2025-06-15 RX ADMIN — ATORVASTATIN CALCIUM 80 MILLIGRAM(S): 80 TABLET, FILM COATED ORAL at 21:07

## 2025-06-15 RX ADMIN — RIVAROXABAN 15 MILLIGRAM(S): 10 TABLET, FILM COATED ORAL at 18:01

## 2025-06-15 NOTE — PROGRESS NOTE ADULT - ASSESSMENT
79 Luxembourgish speaking male with a hx HFmrEF LVEF 45-50%, HTN, HLD, COPD, Afib on eliquis s/p PPM presenting with several weeks of blurry vision and dizziness.       Dizziness  possibly secondary to symptomatic hypotension as BP soft on arrival with ELIS   Cr normalized   MRI brain pending  PPM with normal function     Elevated troponin   Hs trop 315 then 274; no chest pain or SOB- ECG Vpaced. not consistent with ACS  echo pending.  likely demand ischemia     HFmrEF  LVEF 45-50%, appears compenstated without signs or sx decompensated HF  cont metoprolol. low dose entresto added back this AM - reassess BP/orthostatics prior to DC     HLD   cont atorvastatin     Afib   cont xarelto   controlled with metoprolol     outpatient cardiologist: Dr. Palla

## 2025-06-15 NOTE — PROGRESS NOTE ADULT - ASSESSMENT
79-yo [place] speaking M with PMHx of HTN, HLD,  COPD, CKD A-fib on Eliquis, pacemaker ( interrogated in ER ) HFmrEF 45%-50% , hx of  Patent foramen ovale versus secundum atrial septal defect with left to right shunting by color flow Doppler 2024 came in with blurry vision  for the past 2 to 3 weeks. Patient reports he was seen by PCP and cardiologist recently. Reports he is more symptomatic  when exerting himself and he feels slightly dizzy.  Went to his primary care doctor who told him his blood pressure was low so sent him to the hospital.  No numbness weakness slurred speech no chest pain shortness of breath no diarrhea no abdominal pain.  States he does not have dizziness when going from sitting to standing.    # Blurry vision/ dizziness/ near syncope   # hx of  Patent foramen ovale versus secundum atrial septal defect with left to right shunting by color flow Doppler 2024   # Orthostatic hypotension  - Code stroke in ER   Cranial nerves II through XII intact normal strength sensation coordination NIH is 0   - CT HEAD : No significant interval change. No acute intracranial hemorrhage, mass effect or midline shift.  - PPM interrogated today in ER , Battery status: 3.5 years.  - EKG 60 pacemaker: good capture, regular rhythm and appropriate intervals TC: 458 o st elevation  - CXR No acute finding or change.  - s/p ASA 325mg x1 1  L NS in  ER  - start ASA, high dose statin   - at home on : Lasix and Aldactone, Entresto  held; continue Toprol   - TTE reviewed -- no new findings   - MRI head pending -- unable to perform until tomorrow due to pacemaker   - Fall precaution.  - PT and OT consultation; appreciated  - A1c, lipid panel noted  - neuro and cardio consult appreciated  - Positive orthostatics 6/14. s/p gentle IV hydration overnight (hx of HF). Cautious with resuming outpatient antihypertensives given soft pressures. Will reintroduce Entresto (low dose) today -- discussed with Cardiology. Continue to hold Lasix and Spironolactone for now. Repeat Orthostatics today     # elevated Trop  - Trop 315  trend to peak likely  demand   - EKG  60 pacemaker: good capture, regular rhythm and appropriate intervals TC: 458 o st elevation  - denies chest pain, palpitation   - cardio consult appreciated     #ELIS on CKD  - BUN/Cr 35/1.4 ( Per chart review 1.1- 1.2 )  pt is on aldactone, Lasix, currently holding  ELIS resolved    #Chronic systolic congestive heart failure appears euvolemic.  -  Echo with EF 43% (was 54% in April 2024)  follow repeat TTE   Previous TTE  Left ventricular cavity is mildly dilated. Left ventricular systolic function is mildly decreased with an ejection fraction of 48 % by Rascon's method of disks with an ejection fraction visually estimated at 45 to 50 %.  severe pulmonary hypertension.Mild to moderate aortic regurgitation. Patent foramen ovale versus secundum atrial septal defect with left to right shunting by color flow Doppler.  -  per EP->Normal single chamber PPM function. Chronic Afib, pacer dependent.  Last NSVT in April 2025, maintained on Xarelto, Metoprolol and GDMT per chart.  Battery status: 3.5 years.  Pt also has an old MICRA PPM that is Not in use.  Repeat echo reviewed   Resume home Entresto and monitor BP closely. Holding home Lasix and Spironolactone given soft pressures and orthostasis     #Chronic atrial fibrillation.   -  rate controlled / Vpaced  -  cont Toprol XL, Xarelto.    #DVT ppx   Xarelto renally dosed 15mg daily      PT consulted

## 2025-06-15 NOTE — PROGRESS NOTE ADULT - SUBJECTIVE AND OBJECTIVE BOX
HOSPITALIST PROGRESS NOTE    SUBJECTIVE / INTERVAL HPI: Patient seen and examined at bedside. Denies blurry vision at this time. Found to be orthostatic yesterday -- fluids given.     ROS: All 10 systems reviewed and found to be negative with the exception of what has been described above.     VITAL SIGNS:  Vital Signs Last 24 Hrs  T(C): 36.5 (15 Demario 2025 08:42), Max: 36.8 (14 Jun 2025 23:54)  T(F): 97.7 (15 Demario 2025 08:42), Max: 98.2 (14 Jun 2025 23:54)  HR: 62 (15 Demario 2025 09:07) (60 - 63)  BP: 110/63 (15 Demario 2025 09:07) (110/63 - 127/66)  BP(mean): --  RR: 18 (15 Demario 2025 08:42) (18 - 19)  SpO2: 98% (15 Demario 2025 08:42) (97% - 100%)    Parameters below as of 15 Demario 2025 08:42  Patient On (Oxygen Delivery Method): room air    PHYSICAL EXAM:  General: No acute distress  HEENT: NC/AT; PERRL, anicteric sclera; MMM  Neck: Supple  Cardiovascular: +S1/S2, RRR, no murmurs, rubs, gallops  Respiratory: CTA B/L; no W/R/R  Gastrointestinal: soft, NT/ND; +BSx4  Extremities: WWP; no edema, clubbing or cyanosis  Vascular: 2+ radial, DP/PT pulses B/L  Neurological: AAOx3; no focal deficits    MEDICATIONS:  MEDICATIONS  (STANDING):  aspirin enteric coated 81 milliGRAM(s) Oral daily  atorvastatin 80 milliGRAM(s) Oral at bedtime  finasteride 5 milliGRAM(s) Oral daily  metoprolol succinate ER 25 milliGRAM(s) Oral daily  rivaroxaban 15 milliGRAM(s) Oral with dinner  sacubitril 24 mG/valsartan 26 mG 1 Tablet(s) Oral two times a day  sodium chloride 0.9%. 1000 milliLiter(s) (60 mL/Hr) IV Continuous <Continuous>  tamsulosin 0.4 milliGRAM(s) Oral at bedtime    MEDICATIONS  (PRN):  acetaminophen     Tablet .. 650 milliGRAM(s) Oral every 6 hours PRN Temp greater or equal to 38C (100.4F), Mild Pain (1 - 3)  melatonin 3 milliGRAM(s) Oral at bedtime PRN Insomnia      ALLERGIES:  Allergies    amiodarone (Other)  ACE inhibitors (Unknown)    Intolerances        LABS:                        14.0   5.48  )-----------( 115      ( 15 Demario 2025 07:54 )             43.5     06-15    140  |  111[H]  |  30[H]  ----------------------------<  107[H]  4.4   |  25  |  0.98    Ca    8.7      15 Demario 2025 07:54  Mg     1.9     06-15    TPro  6.5  /  Alb  3.3  /  TBili  1.0  /  DBili  x   /  AST  23  /  ALT  37  /  AlkPhos  105  06-14    PT/INR - ( 13 Jun 2025 12:06 )   PT: 13.0 sec;   INR: 1.13 ratio         PTT - ( 13 Jun 2025 12:06 )  PTT:40.9 sec  Urinalysis Basic - ( 15 Demario 2025 07:54 )    Color: x / Appearance: x / SG: x / pH: x  Gluc: 107 mg/dL / Ketone: x  / Bili: x / Urobili: x   Blood: x / Protein: x / Nitrite: x   Leuk Esterase: x / RBC: x / WBC x   Sq Epi: x / Non Sq Epi: x / Bacteria: x      CAPILLARY BLOOD GLUCOSE      RADIOLOGY & ADDITIONAL TESTS: Reviewed.

## 2025-06-15 NOTE — PROGRESS NOTE ADULT - SUBJECTIVE AND OBJECTIVE BOX
CHIEF COMPLAINT: dizziness       HPI:   #[id number]    79 Faroese speaking male with a hx HFmrEF LVEF 45-50%, HTN, HLD, COPD, Afib on eliquis s/p PPM presenting with several weeks of blurry vision and dizziness. Also notes BP was low prior to presentation. Denies chest pain, SOB, LE edema, palpitations.     In the ED, his BP was 98/62, HR 62  Labs notable for Hs trop 315 then 274, Cr 1.43 now 1.07  CTH neg for acute change   ECG vpaced 60 bpm     6/15: reports feeling well without cardiac sx     PAST MEDICAL / SURGICAL HISTORY:  PAST MEDICAL & SURGICAL HISTORY:  AF (atrial fibrillation)  on Eliquis      Pacemaker  single chamber meditronic  placed by Dr Tomas  May  of  2016      Coronary artery disease involving native heart with angina pectoris, unspecified vessel or lesion type      HTN (hypertension)      Arthritis      COPD (chronic obstructive pulmonary disease)  not on inhalers      HLD (hyperlipidemia)      Heart failure with reduced ejection fraction      Thrombocytopenia      BPH (benign prostatic hyperplasia)      Non-ischemic cardiomyopathy      Cardiac arrhythmia      Venous insufficiency      Chronic passive hepatic congestion      History of venous stasis ulcer of lower leg      Poor historian      Left varicocele      Chronic kidney disease (CKD)      Cardiac pacemaker  single chamber meditronic  placed by Dr Tomas  May  of  2016      History of hernia surgery          SOCIAL HISTORY:   Alcohol: Denied  Smoking: Nonsmoker  Drug Use: Denied  Marital Status:     FAMILY HISTORY: FAMILY HISTORY:  No pertinent family history  of diabetes or heart disease        MEDICATIONS  (STANDING):  aspirin enteric coated 81 milliGRAM(s) Oral daily  atorvastatin 80 milliGRAM(s) Oral at bedtime  finasteride 5 milliGRAM(s) Oral daily  metoprolol succinate ER 25 milliGRAM(s) Oral daily  rivaroxaban 15 milliGRAM(s) Oral with dinner  tamsulosin 0.4 milliGRAM(s) Oral at bedtime    MEDICATIONS  (PRN):  acetaminophen     Tablet .. 650 milliGRAM(s) Oral every 6 hours PRN Temp greater or equal to 38C (100.4F), Mild Pain (1 - 3)  melatonin 3 milliGRAM(s) Oral at bedtime PRN Insomnia      Allergies    amiodarone (Other)  ACE inhibitors (Unknown)    Intolerances        REVIEW OF SYSTEMS:  CONSTITUTIONAL: No weakness, fevers or chills  Eyes: + blurry vision   NECK: No pain or stiffness  RESPIRATORY: No cough, wheezing, hemoptysis; No shortness of breath  CARDIOVASCULAR: No chest pain or palpitations  GASTROINTESTINAL: No abdominal pain. No nausea, vomiting, or hematemesis; No diarrhea or constipation. No melena or hematochezia.  GENITOURINARY: No dysuria, frequency or hematuria  NEUROLOGICAL: No numbness. +dizziness  SKIN: No itching or rash  All other review of systems is negative unless indicated above    VITAL SIGNS:   Vital Signs Last 24 Hrs  T(C): 36.4 (14 Jun 2025 08:19), Max: 36.6 (13 Jun 2025 11:03)  T(F): 97.5 (14 Jun 2025 08:19), Max: 97.8 (13 Jun 2025 11:03)  HR: 61 (14 Jun 2025 08:19) (60 - 64)  BP: 115/66 (14 Jun 2025 08:19) (98/62 - 119/71)  BP(mean): 81 (14 Jun 2025 08:19) (74 - 81)  RR: 18 (14 Jun 2025 08:19) (16 - 18)  SpO2: 97% (14 Jun 2025 08:19) (97% - 100%)    Parameters below as of 14 Jun 2025 08:19  Patient On (Oxygen Delivery Method): room air        I&O's Summary      PHYSICAL EXAM:  Constitutional: NAD, awake and alert  HEENT:  EOMI,  Pupils round, No oral cyanosis.  Pulmonary: Non-labored, breath sounds are clear bilaterally, No wheezing, rales or rhonchi  Cardiovascular: S1 and S2, regular rate and rhythm, systolic murmur   Gastrointestinal: Bowel Sounds present, soft, nontender.   Lymph: No peripheral edema. No cervical lymphadenopathy.  Neurological: Alert, no focal deficits  Skin: No rashes.  Psych:  Mood & affect appropriate    LABS:                        14.4   7.22  )-----------( 115      ( 14 Jun 2025 07:06 )             45.2                         14.8   6.19  )-----------( 120      ( 13 Jun 2025 12:06 )             45.3     14 Jun 2025 07:06    140    |  111    |  31     ----------------------------<  100    4.6     |  26     |  1.07   13 Jun 2025 12:06    139    |  108    |  35     ----------------------------<  110    4.8     |  28     |  1.43     Ca    8.9        14 Jun 2025 07:06  Ca    9.1        13 Jun 2025 12:06    TPro  6.5    /  Alb  3.3    /  TBili  1.0    /  DBili  x      /  AST  23     /  ALT  37     /  AlkPhos  105    14 Jun 2025 07:06  TPro  7.2    /  Alb  3.8    /  TBili  0.9    /  DBili  x      /  AST  26     /  ALT  42     /  AlkPhos  134    13 Jun 2025 12:06    PT/INR - ( 13 Jun 2025 12:06 )   PT: 13.0 sec;   INR: 1.13 ratio         PTT - ( 13 Jun 2025 12:06 )  PTT:40.9 sec

## 2025-06-16 ENCOUNTER — TRANSCRIPTION ENCOUNTER (OUTPATIENT)
Age: 80
End: 2025-06-16

## 2025-06-16 PROCEDURE — 99239 HOSP IP/OBS DSCHRG MGMT >30: CPT

## 2025-06-16 PROCEDURE — 70551 MRI BRAIN STEM W/O DYE: CPT | Mod: 26

## 2025-06-16 PROCEDURE — 99232 SBSQ HOSP IP/OBS MODERATE 35: CPT | Mod: FS

## 2025-06-16 RX ORDER — RIVAROXABAN 10 MG/1
20 TABLET, FILM COATED ORAL
Refills: 0 | Status: DISCONTINUED | OUTPATIENT
Start: 2025-06-16 | End: 2025-06-16

## 2025-06-16 RX ADMIN — FINASTERIDE 5 MILLIGRAM(S): 1 TABLET, FILM COATED ORAL at 09:17

## 2025-06-16 RX ADMIN — METOPROLOL SUCCINATE 25 MILLIGRAM(S): 50 TABLET, EXTENDED RELEASE ORAL at 09:18

## 2025-06-16 RX ADMIN — RIVAROXABAN 20 MILLIGRAM(S): 10 TABLET, FILM COATED ORAL at 16:26

## 2025-06-16 RX ADMIN — SACUBITRIL AND VALSARTAN 1 TABLET(S): 49; 51 TABLET, FILM COATED ORAL at 09:22

## 2025-06-16 RX ADMIN — Medication 81 MILLIGRAM(S): at 09:22

## 2025-06-16 NOTE — DISCHARGE NOTE NURSING/CASE MANAGEMENT/SOCIAL WORK - NSDCPNINST_GEN_ALL_CORE
Thank you for choosing [facility]. It has been our pleasure taking care of you. Please let us know if you have any questions, concerns or needs. For a complete copy of medical records please visit : [email or web address]

## 2025-06-16 NOTE — DISCHARGE NOTE PROVIDER - NSDCCPTREATMENT_GEN_ALL_CORE_FT
PRINCIPAL PROCEDURE  Procedure: Brain MRI  Findings and Treatment: FINDINGS:  Mild-to-moderate generalized cerebral volume loss, with distention of the   sulci and concomitant ex-vacuo ventricular dilatation.No evidence of   white matter disease.  No acute intracranial hemorrhage. No midline shift or herniation. No   acute ischemia. Intracranial flow voids are patent. The cerebellar   tonsils are normally positioned.  Limited views of the sinuses and mastoids show mild mucosal thickening   without air-fluid levels, likely chronic.  Limited views of the orbits and visualized soft tissues of the neck,   face, scalp, skull base, and calvarium are otherwise unremarkable.  IMPRESSION:  1.  Mild to moderate volume loss without acute ischemia.        SECONDARY PROCEDURE  Procedure: Complete transthoracic echocardiography (TTE)  Findings and Treatment: CONCLUSIONS:      1. Mild left ventricular hypertrophy.   2. Left ventricular cavity is normal in size. Left ventricular wall thickness is normal. Leftventricular systolic function is mildly decreased with an ejection fraction visually estimated at 45 to 50 %.   3. Normal right ventricular cavity size, with normal wall thickness, and normal right ventricular systolic function.   4. Left atrium is moderately dilated.   5. Device lead is visualized in the right heart.   6. Moderate mitral regurgitation.   7. Mild to moderate tricuspid regurgitation.   8. Estimated pulmonary artery systolic pressure is 42 mmHg, consistent with mild pulmonary hypertension.   9. Trileaflet aortic valve with normal systolic excursion. There is focal calcification of the aortic valve leaflets.  10. Mild aortic regurgitation.  11. Compared to the transthoracic echocardiogram performed on 7/19/2024, there have been no significant interval changes.

## 2025-06-16 NOTE — DISCHARGE NOTE PROVIDER - CARE PROVIDER_API CALL
Palla, Venugopal Hardwick  Cardiovascular Disease  [address]  [place] [address]  Phone: [phone]  Fax: [phone]  Follow Up Time: 1 week

## 2025-06-16 NOTE — DISCHARGE NOTE NURSING/CASE MANAGEMENT/SOCIAL WORK - PATIENT PORTAL LINK FT
You can access the FollowMyHealth Patient Portal offered by [facility] by registering at the following website: [email or web address]. By joining [employer]’s FollowMyHealth portal, you will also be able to view your health information using other applications (apps) compatible with our system.

## 2025-06-16 NOTE — DISCHARGE NOTE PROVIDER - HOSPITAL COURSE
Admission HPI: 79-yo Malay speaking M with PMHx of HTN, HLD,  COPD, CKD A-fib on Eliquis, pacemaker ( interrogated in ER ) HFmrEF 45%-50% , hx of  Patent foramen ovale versus secundum atrial septal defect with left to right shunting by color flow Doppler 2024 came in with blurry vision  for the past 2 to 3 weeks. Patient reports he was seen by PCP and cardiologist recently. Reports he is more symptomatic  when exerting himself and he feels slightly dizzy.  Went to his primary care doctor who told him his blood pressure was low so sent him to the hospital.  No numbness weakness slurred speech no chest pain shortness of breath no diarrhea no abdominal pain.  States he does not have dizziness when going from sitting to standing.  Intermittent blurry vision for 2 to 3 weeks with low blood pressure  PPM interrogated in ER -->Normal single chamber PPM function. Chronic Afib, pacer dependent.  Last NSVT in April 2025, maintained on Xarelto, Metoprolol and GDMT per chart.  Battery status: 3.5 years.  Pt also has an old MICRA PPM that is Not in use.  Code stroke in ER   Cranial nerves II through XII intact normal strength sensation coordination NIH is 0     Brief Hospital Course: Patient admitted for stroke workup which was negative. Blurry vision/near syncope likely from hypotension -- patient dehydrated with soft pressures on admission. Improved once hydrated and adjustments made to blood pressure regimen. Orthostatics initially positive now resolved. Patient with no further signs of blurry vision/dizziness. Stable for discharge home with close outpatient follow up.     Hospital course (by problem):   #Blurry vision/ dizziness/ near syncope from hypotension   #hx of  Patent foramen ovale versus secundum atrial septal defect with left to right shunting by color flow Doppler 2024   #Orthostatic hypotension  - Code stroke in ER   Cranial nerves II through XII intact normal strength sensation coordination NIH is 0   - CT HEAD : No significant interval change. No acute intracranial hemorrhage, mass effect or midline shift.  - PPM interrogated today in ER , Battery status: 3.5 years. No recent events   - EKG 60 pacemaker: good capture, regular rhythm and appropriate intervals TC: 458 o st elevation  - CXR No acute finding or change.  - s/p ASA 325mg x1 1  L NS in  ER  - start ASA, high dose statin   - TTE reviewed -- no new findings   - MRI negative for acute stroke   - Fall precaution.  - PT and OT consultation; appreciated  - A1c, lipid panel noted  - neuro and cardio consult appreciated  - Positive orthostatics 6/14. s/p gentle IV hydration (hx of HF). Cautious with resuming outpatient antihypertensives given soft pressures. Reintroduced Entresto (low dose) after discussing with Cardiology. BP tolerating Toprol and Entresto. Orthostasis is treated. Continue to hold Lasix and Spironolactone for now. Euvolemic on exam.     #Elevated Trop  - Trop 315  trend to peak likely demand   - EKG  60 pacemaker: good capture, regular rhythm and appropriate intervals TC: 458 o st elevation  - denies chest pain, palpitation   - cardio consult appreciated     #ELIS on CKD  - BUN/Cr 35/1.4 ( Per chart review 1.1- 1.2 )  pt is on aldactone, Lasix, currently holding. Reassess on discharge   ELIS resolved    #Chronic systolic congestive heart failure appears euvolemic.  -  Echo with EF 43% (was 54% in April 2024)  follow repeat TTE   Previous TTE  Left ventricular cavity is mildly dilated. Left ventricular systolic function is mildly decreased with an ejection fraction of 48 % by Rascon's method of disks with an ejection fraction visually estimated at 45 to 50 %.  severe pulmonary hypertension.Mild to moderate aortic regurgitation. Patent foramen ovale versus secundum atrial septal defect with left to right shunting by color flow Doppler.  -  per EP->Normal single chamber PPM function. Chronic Afib, pacer dependent.  Last NSVT in April 2025, maintained on Xarelto, Metoprolol and GDMT per chart.  Battery status: 3.5 years.  Pt also has an old MICRA PPM that is Not in use.  Repeat echo reviewed   Resumed home Entresto and monitor BP closely. Holding home Lasix and Spironolactone on discharge. Close outpatient follow up and resume as tolerated     #Chronic atrial fibrillation.   -  rate controlled / Vpaced  -  cont Toprol XL, Xarelto.    Patient was discharged to: Home (independent)     Physical exam at the time of discharge:  LOS: 3d    VITALS:   T(C): 36.4 (06-16-25 @ 12:31), Max: 36.7 (06-15-25 @ 17:01)  HR: 60 (06-16-25 @ 12:31) (60 - 63)  BP: 107/58 (06-16-25 @ 12:31) (107/58 - 125/61)  RR: 17 (06-16-25 @ 12:31) (17 - 18)  SpO2: 100% (06-16-25 @ 12:31) (97% - 100%)    PHYSICAL EXAM:  GENERAL: No distress  HEAD:  Atraumatic, Normocephalic  EYES: EOMI, PERRLA, conjunctiva and sclera clear  ENMT: No tonsillar erythema, exudates, or enlargement; Moist mucous membranes  NECK: Supple, No JVD, Normal thyroid  HEART: Regular rate and rhythm; No murmurs, rubs, or gallops  RESPIRATORY: Unlabored respirations. CTA B/L, No W/R/R  ABDOMEN: Soft, Nontender, Nondistended; Bowel sounds present  NEUROLOGY: A&Ox3, nonfocal, moving all extremities  EXTREMITIES:  2+ Peripheral Pulses, No clubbing, cyanosis, or edema  SKIN: warm, dry, normal color, no rash or abnormal lesions

## 2025-06-16 NOTE — DISCHARGE NOTE PROVIDER - NSDCMRMEDTOKEN_GEN_ALL_CORE_FT
aspirin 81 mg oral tablet, chewable: 1 tab(s) chewed once a day  atorvastatin 20 mg oral tablet: 1 tab(s) orally once a day (at bedtime)  finasteride 5 mg oral tablet: 1 tab(s) orally once a day  metoprolol succinate 25 mg oral capsule, extended release: 1 cap(s) orally once a day  sacubitril-valsartan 24 mg-26 mg oral tablet: 1 tab(s) orally 2 times a day  tamsulosin 0.4 mg oral capsule: 1 cap(s) orally 2 times a day  Xarelto 20 mg oral tablet: 1 tab(s) orally once a day (at bedtime) cardio for management instructed patient

## 2025-06-16 NOTE — PROGRESS NOTE ADULT - ASSESSMENT
79 Arabic speaking male with a hx HFmrEF LVEF 45-50%, HTN, HLD, COPD, Afib on eliquis s/p PPM presenting with several weeks of blurry vision and dizziness.       Dizziness  possibly secondary to symptomatic hypotension as BP soft on arrival with ELIS   Cr normalized   MRI brain pending  PPM with normal function     Elevated troponin   Hs trop 315 then 274; no chest pain or SOB- ECG Vpaced. not consistent with ACS  echo shows mildly reduced LVF, EF 45-50% (unchanged), Mod MR, Mild-Mod TR, Mild pulm HTN  Likely demand ischemia.  Last cardiac cath 2020 showed nonobstructive CAD with abnormal LVF    HFmrEF  LVEF 45-50%, appears compenstated without signs or sx decompensated HF  cont metoprolol. low dose entresto     HLD   cont atorvastatin     Afib   cont xarelto   controlled with metoprolol     outpatient cardiologist: Dr. Palla    79 Korean speaking male with a hx HFmrEF LVEF 45-50%, HTN, HLD, COPD, Afib on eliquis s/p PPM presenting with several weeks of blurry vision and dizziness.       Dizziness  possibly secondary to symptomatic hypotension as BP soft on arrival with ELIS   Cr normalized   MRI brain pending  PPM with normal function     Elevated troponin   Hs trop 315 then 274; no chest pain or SOB- ECG Vpaced. not consistent with ACS  echo shows mildly reduced LVF, EF 45-50% (unchanged), Mod MR, Mild-Mod TR, Mild pulm HTN  Likely demand ischemia.  Last cardiac cath 2020 showed nonobstructive CAD with abnormal LVF    HFmrEF  LVEF 45-50%, appears compensated without signs or sx decompensated HF  cont metoprolol. low dose entresto     HLD   cont atorvastatin     Afib   cont xarelto   controlled with metoprolol     outpatient cardiologist: Dr. Palla

## 2025-06-16 NOTE — DISCHARGE NOTE PROVIDER - NSDCCPCAREPLAN_GEN_ALL_CORE_FT
PRINCIPAL DISCHARGE DIAGNOSIS  Diagnosis: Near syncope  Assessment and Plan of Treatment: Your blurry vision and near syncope (fainting) episode was likely from low blood pressure. Your imaging did NOT show signs of stroke.  Upon admission, your blood pressure was very low and you had signs of dehydration.   We adjusted your heart failure medications and your blood pressure improved. Your symptoms have resolved.  Continue your Toprol 25 mg once daily and Entresto 24-26 twice daily as prescribed previously.  HOLD your Lasix and Spironolactone at the time of discharge. Please follow up with Dr. Palla (Cardiologist) within 1-2 weeks to determine when these medications are safe to resume.   Resume all other home medications as listed.     V-Y Plasty Text: The defect edges were debeveled with a #15 scalpel blade. Given the location of the defect, shape of the defect and the proximity to free margins an V-Y advancement flap was deemed most appropriate. Using a sterile surgical marker, an appropriate advancement flap was drawn incorporating the defect and placing the expected incisions within the relaxed skin tension lines where possible. The area thus outlined was incised deep to adipose tissue with a #15 scalpel blade. The skin margins were undermined to an appropriate distance in all directions utilizing iris scissors. Following this, the designed flap was advanced and carried over into the primary defect and sutured into place.

## 2025-06-16 NOTE — DISCHARGE NOTE NURSING/CASE MANAGEMENT/SOCIAL WORK - NSDCVIVACCINE_GEN_ALL_CORE_FT
influenza, injectable, quadrivalent, preservative free; 03-Nov-2020 13:38; Marcy Bartlett (RN); [employer]; [id number] (Exp. Date: 30-Jun-2021); IntraMuscular; Deltoid Left.; 0.5 milliLiter(s); VIS (VIS Published: 15-Aug-2019, VIS Presented: 03-Nov-2020);

## 2025-06-16 NOTE — PROGRESS NOTE ADULT - REASON FOR ADMISSION
blurry vision, near syncope , dizziness

## 2025-06-16 NOTE — DISCHARGE NOTE NURSING/CASE MANAGEMENT/SOCIAL WORK - FINANCIAL ASSISTANCE
[facility] provides services at a reduced cost to those who are determined to be eligible through [facility]’s financial assistance program. Information regarding [facility]’s financial assistance program can be found by going to [email or web address] or by calling [phone].

## 2025-06-16 NOTE — DISCHARGE NOTE NURSING/CASE MANAGEMENT/SOCIAL WORK - NSDCPEFALRISK_GEN_ALL_CORE
For information on Fall & Injury Prevention, visit: [email or web address] OR  [email or web address] OR  https://www.cdc.gov/steadi/patient.html

## 2025-06-16 NOTE — PROGRESS NOTE ADULT - SUBJECTIVE AND OBJECTIVE BOX
CHIEF COMPLAINT: dizziness       HPI:   #[id number]    79 Kazakh speaking male with a hx HFmrEF LVEF 45-50%, HTN, HLD, COPD, Afib on eliquis s/p PPM presenting with several weeks of blurry vision and dizziness. Also notes BP was low prior to presentation. Denies chest pain, SOB, LE edema, palpitations.     In the ED, his BP was 98/62, HR 62  Labs notable for Hs trop 315 then 274, Cr 1.43 now 1.07  CTH neg for acute change   ECG vpaced 60 bpm     6/15: reports feeling well without cardiac sx   6/16/25: Pt sitting up in chair. Denies cp or sob. Denies dizziness. Tele: V paced    PAST MEDICAL / SURGICAL HISTORY:  PAST MEDICAL & SURGICAL HISTORY:  AF (atrial fibrillation)  on Eliquis      Pacemaker  single chamber meditronic  placed by Dr Tomas  May  of  2016      Coronary artery disease involving native heart with angina pectoris, unspecified vessel or lesion type      HTN (hypertension)      Arthritis      COPD (chronic obstructive pulmonary disease)  not on inhalers      HLD (hyperlipidemia)      Heart failure with reduced ejection fraction      Thrombocytopenia      BPH (benign prostatic hyperplasia)      Non-ischemic cardiomyopathy      Cardiac arrhythmia      Venous insufficiency      Chronic passive hepatic congestion      History of venous stasis ulcer of lower leg      Poor historian      Left varicocele      Chronic kidney disease (CKD)      Cardiac pacemaker  single chamber meditronic  placed by Dr Tomas  May  of  2016      History of hernia surgery          SOCIAL HISTORY:   Alcohol: Denied  Smoking: Nonsmoker  Drug Use: Denied  Marital Status:     FAMILY HISTORY: FAMILY HISTORY:  No pertinent family history  of diabetes or heart disease    Home Medications:  aspirin 81 mg oral tablet, chewable: 1 tab(s) chewed once a day (13 Jun 2025 15:14)  finasteride 5 mg oral tablet: 1 tab(s) orally once a day (13 Jun 2025 15:12)  metoprolol succinate 25 mg oral capsule, extended release: 1 cap(s) orally once a day (13 Jun 2025 15:12)  sacubitril-valsartan 24 mg-26 mg oral tablet: 1 tab(s) orally 2 times a day (13 Jun 2025 15:12)  spironolactone 25 mg oral tablet: 1 tab(s) orally once a day (13 Jun 2025 15:12)  tamsulosin 0.4 mg oral capsule: 1 cap(s) orally 2 times a day (13 Jun 2025 15:12)  Xarelto 20 mg oral tablet: 1 tab(s) orally once a day (at bedtime) cardio for management instructed patient (13 Jun 2025 15:12)    MEDICATIONS  (STANDING):  aspirin enteric coated 81 milliGRAM(s) Oral daily  atorvastatin 80 milliGRAM(s) Oral at bedtime  finasteride 5 milliGRAM(s) Oral daily  metoprolol succinate ER 25 milliGRAM(s) Oral daily  rivaroxaban 15 milliGRAM(s) Oral with dinner  sacubitril 24 mG/valsartan 26 mG 1 Tablet(s) Oral two times a day  sodium chloride 0.9%. 1000 milliLiter(s) (60 mL/Hr) IV Continuous <Continuous>  tamsulosin 0.4 milliGRAM(s) Oral at bedtime    MEDICATIONS  (PRN):  acetaminophen     Tablet .. 650 milliGRAM(s) Oral every 6 hours PRN Temp greater or equal to 38C (100.4F), Mild Pain (1 - 3)  melatonin 3 milliGRAM(s) Oral at bedtime PRN Insomnia        Allergies    amiodarone (Other)  ACE inhibitors (Unknown)    Intolerances    Vital Signs Last 24 Hrs  T(C): 36.6 (16 Jun 2025 08:00), Max: 36.7 (15 Demario 2025 17:01)  T(F): 97.8 (16 Jun 2025 08:00), Max: 98 (15 Demario 2025 17:01)  HR: 63 (16 Jun 2025 08:00) (60 - 63)  BP: 113/60 (16 Jun 2025 08:00) (110/63 - 125/61)  BP(mean): 72 (16 Jun 2025 08:00) (72 - 72)  RR: 17 (16 Jun 2025 08:00) (17 - 18)  SpO2: 97% (16 Jun 2025 08:00) (97% - 100%)    Parameters below as of 16 Jun 2025 08:00  Patient On (Oxygen Delivery Method): room air            I&O's Summary      PHYSICAL EXAM:  Constitutional: NAD, awake and alert  HEENT:  EOMI,  Pupils round, No oral cyanosis.  Pulmonary: Non-labored, breath sounds are clear bilaterally, No wheezing, rales or rhonchi  Cardiovascular: S1 and S2, regular rate and rhythm, systolic murmur   Gastrointestinal: Bowel Sounds present, soft, nontender.   Lymph: No peripheral edema. No cervical lymphadenopathy.  Neurological: Alert, no focal deficits  Skin: No rashes.  Psych:  Mood & affect appropriate    LABS:                          14.0   5.48  )-----------( 115      ( 15 Demario 2025 07:54 )             43.5                           14.4   7.22  )-----------( 115      ( 14 Jun 2025 07:06 )             45.2                         14.8   6.19  )-----------( 120      ( 13 Jun 2025 12:06 )             45.3     06-15    140  |  111[H]  |  30[H]  ----------------------------<  107[H]  4.4   |  25  |  0.98    Ca    8.7      15 Demario 2025 07:54  Mg     1.9     06-15        14 Jun 2025 07:06    140    |  111    |  31     ----------------------------<  100    4.6     |  26     |  1.07   13 Jun 2025 12:06    139    |  108    |  35     ----------------------------<  110    4.8     |  28     |  1.43     Ca    8.9        14 Jun 2025 07:06  Ca    9.1        13 Jun 2025 12:06    TPro  6.5    /  Alb  3.3    /  TBili  1.0    /  DBili  x      /  AST  23     /  ALT  37     /  AlkPhos  105    14 Jun 2025 07:06  TPro  7.2    /  Alb  3.8    /  TBili  0.9    /  DBili  x      /  AST  26     /  ALT  42     /  AlkPhos  134    13 Jun 2025 12:06    PT/INR - ( 13 Jun 2025 12:06 )   PT: 13.0 sec;   INR: 1.13 ratio         PTT - ( 13 Jun 2025 12:06 )  PTT:40.9 sec      < from: US Duplex Carotid Arteries Complete, Bilateral (06.13.25 @ 16:22) >  IMPRESSION: No significant hemodynamic stenosis of either internal   carotid artery.    < end of copied text >    < from: CT Head No Cont (06.13.25 @ 11:41) >    IMPRESSION:    No significant interval change.    No acute intracranial hemorrhage, mass effect or midline shift.      < end of copied text >    < from: Xray Chest 1 View- PORTABLE-Urgent (06.13.25 @ 11:38) >  IMPRESSION: No acute finding or change.    < end of copied text >    < from: TTE W or WO Ultrasound Enhancing Agent (06.14.25 @ 11:44) >     CONCLUSIONS:      1. Mild left ventricular hypertrophy.   2. Left ventricular cavity is normal in size. Left ventricular wall thickness is normal. Leftventricular systolic function is mildly decreased with an ejection fraction visually estimated at 45 to 50 %.   3. Normal right ventricular cavity size, with normal wall thickness, and normal right ventricular systolic function.   4. Left atrium is moderately dilated.   5. Device lead is visualized in the right heart.   6. Moderate mitral regurgitation.   7. Mild to moderate tricuspid regurgitation.   8. Estimated pulmonary artery systolic pressure is 42 mmHg, consistent with mild pulmonary hypertension.   9. Trileaflet aortic valve with normal systolic excursion. There is focal calcification of the aortic valve leaflets.  10. Mild aortic regurgitation.  11. Compared to the transthoracic echocardiogram performed on 7/19/2024, there have been no significant interval changes.    < end of copied text >    · Underlying Rhythm	atrial fib with V-paced 99.6  · Comments	10 NSVT events, last on 4/28/25 lasting 12 sec.  · Additional Procedure Details	Normal single chamber PPM function.  Chronic Afib, pacer dependent.  Last NSVT in April 2025, maintained on Xarelto, Metoprolol and GDMT per chart.  Battery status: 3.5 years.  Pt also has an old MICRA PPM that is Not in use.     CHIEF COMPLAINT: dizziness     HPI:   #[id number]    79 Albanian speaking male with a hx HFmrEF LVEF 45-50%, HTN, HLD, COPD, Afib on eliquis s/p PPM presenting with several weeks of blurry vision and dizziness. Also notes BP was low prior to presentation. Denies chest pain, SOB, LE edema, palpitations.     In the ED, his BP was 98/62, HR 62  Labs notable for Hs trop 315 then 274, Cr 1.43 now 1.07  CTH neg for acute change   ECG vpaced 60 bpm     6/15: reports feeling well without cardiac sx   6/16/25: Pt sitting up in chair. Denies cp or sob. Denies dizziness. Tele: V paced    MEDICATIONS  (STANDING):  aspirin enteric coated 81 milliGRAM(s) Oral daily  atorvastatin 80 milliGRAM(s) Oral at bedtime  finasteride 5 milliGRAM(s) Oral daily  metoprolol succinate ER 25 milliGRAM(s) Oral daily  rivaroxaban 15 milliGRAM(s) Oral with dinner  sacubitril 24 mG/valsartan 26 mG 1 Tablet(s) Oral two times a day  sodium chloride 0.9%. 1000 milliLiter(s) (60 mL/Hr) IV Continuous <Continuous>  tamsulosin 0.4 milliGRAM(s) Oral at bedtime    MEDICATIONS  (PRN):  acetaminophen     Tablet .. 650 milliGRAM(s) Oral every 6 hours PRN Temp greater or equal to 38C (100.4F), Mild Pain (1 - 3)  melatonin 3 milliGRAM(s) Oral at bedtime PRN Insomnia    Vital Signs Last 24 Hrs  T(C): 36.6 (16 Jun 2025 08:00), Max: 36.7 (15 Demario 2025 17:01)  T(F): 97.8 (16 Jun 2025 08:00), Max: 98 (15 Demario 2025 17:01)  HR: 63 (16 Jun 2025 08:00) (60 - 63)  BP: 113/60 (16 Jun 2025 08:00) (110/63 - 125/61)  BP(mean): 72 (16 Jun 2025 08:00) (72 - 72)  RR: 17 (16 Jun 2025 08:00) (17 - 18)  SpO2: 97% (16 Jun 2025 08:00) (97% - 100%)  Patient On (Oxygen Delivery Method): room air    PHYSICAL EXAM:  Constitutional: NAD, awake and alert  HEENT:  EOMI,  Pupils round, No oral cyanosis.  Pulmonary: Non-labored, breath sounds are clear bilaterally, No wheezing, rales or rhonchi  Cardiovascular: S1 and S2, regular rate and rhythm, systolic murmur   Gastrointestinal: Bowel Sounds present, soft, nontender.   Lymph: No peripheral edema. No cervical lymphadenopathy.  Neurological: Alert, no focal deficits  Skin: No rashes.  Psych:  Mood & affect appropriate    LABS:                      14.0   5.48  )-----------( 115      ( 15 Demario 2025 07:54 )             43.5     140  |  111[H]  |  30[H]  ----------------------------<  107[H]  4.4   |  25  |  0.98    US Duplex Carotid Arteries Complete, Bilateral (06.13.25 @ 16:22): IMPRESSION: No significant hemodynamic stenosis of either internal  carotid artery.    CT Head No Cont (06.13.25 @ 11:41):  No significant interval change. No acute intracranial hemorrhage, mass effect or midline shift.    Xray Chest 1 View- PORTABLE-Urgent (06.13.25 @ 11:38): IMPRESSION: No acute finding or change.    TTE W or WO Ultrasound Enhancing Agent (06.14.25 @ 11:44) >   1. Mild left ventricular hypertrophy.   2. Left ventricular cavity is normal in size. Left ventricular wall thickness is normal. Leftventricular systolic function is mildly decreased with an ejection fraction visually estimated at 45 to 50 %.   3. Normal right ventricular cavity size, with normal wall thickness, and normal right ventricular systolic function.   4. Left atrium is moderately dilated.   5. Device lead is visualized in the right heart.   6. Moderate mitral regurgitation.   7. Mild to moderate tricuspid regurgitation.   8. Estimated pulmonary artery systolic pressure is 42 mmHg, consistent with mild pulmonary hypertension.   9. Trileaflet aortic valve with normal systolic excursion. There is focal calcification of the aortic valve leaflets.  10. Mild aortic regurgitation.  11. Compared to the transthoracic echocardiogram performed on 7/19/2024, there have been no significant interval changes.    < end of copied text >    · Underlying Rhythm	atrial fib with V-paced 99.6  · Comments	10 NSVT events, last on 4/28/25 lasting 12 sec.  · Additional Procedure Details	Normal single chamber PPM function.  Chronic Afib, pacer dependent.  Last NSVT in April 2025, maintained on Xarelto, Metoprolol and GDMT per chart.  Battery status: 3.5 years.  Pt also has an old MICRA PPM that is Not in use.

## 2025-06-16 NOTE — DISCHARGE NOTE PROVIDER - NSDCFUSCHEDAPPT_GEN_ALL_CORE_FT
Kuldeep Castillo  [facility]  UROLOGY [address]  Scheduled Appointment: 06/19/2025    Palla, Venugopal R  [facility]  CARDIOLOGY [address]  Scheduled Appointment: 08/04/2025

## 2025-06-16 NOTE — PHARMACOTHERAPY INTERVENTION NOTE - COMMENTS
Patient ordered Xarelto 15 mg [reduced for renal function on admission], CrCl 57, recommended increasing Xarelto to 20 mg with dinner for atrial fibrillation.

## 2025-06-17 VITALS
OXYGEN SATURATION: 100 % | SYSTOLIC BLOOD PRESSURE: 127 MMHG | HEART RATE: 60 BPM | TEMPERATURE: 99 F | DIASTOLIC BLOOD PRESSURE: 73 MMHG | RESPIRATION RATE: 18 BRPM

## 2025-06-17 LAB
-  AMOXICILLIN/CLAVULANIC ACID: SIGNIFICANT CHANGE UP
-  AMPICILLIN/SULBACTAM: SIGNIFICANT CHANGE UP
-  AMPICILLIN: SIGNIFICANT CHANGE UP
-  AZTREONAM: SIGNIFICANT CHANGE UP
-  CEFAZOLIN: SIGNIFICANT CHANGE UP
-  CEFEPIME: SIGNIFICANT CHANGE UP
-  CEFOXITIN: SIGNIFICANT CHANGE UP
-  CEFTRIAXONE: SIGNIFICANT CHANGE UP
-  CEFUROXIME: SIGNIFICANT CHANGE UP
-  CIPROFLOXACIN: SIGNIFICANT CHANGE UP
-  ERTAPENEM: SIGNIFICANT CHANGE UP
-  GENTAMICIN: SIGNIFICANT CHANGE UP
-  IMIPENEM: SIGNIFICANT CHANGE UP
-  LEVOFLOXACIN: SIGNIFICANT CHANGE UP
-  MEROPENEM: SIGNIFICANT CHANGE UP
-  NITROFURANTOIN: SIGNIFICANT CHANGE UP
-  PIPERACILLIN/TAZOBACTAM: SIGNIFICANT CHANGE UP
-  TIGECYCLINE: SIGNIFICANT CHANGE UP
-  TOBRAMYCIN: SIGNIFICANT CHANGE UP
-  TRIMETHOPRIM/SULFAMETHOXAZOLE: SIGNIFICANT CHANGE UP
CULTURE RESULTS: ABNORMAL
METHOD TYPE: SIGNIFICANT CHANGE UP
ORGANISM # SPEC MICROSCOPIC CNT: ABNORMAL
ORGANISM # SPEC MICROSCOPIC CNT: SIGNIFICANT CHANGE UP
SPECIMEN SOURCE: SIGNIFICANT CHANGE UP

## 2025-06-19 ENCOUNTER — APPOINTMENT (OUTPATIENT)
Dept: UROLOGY | Facility: CLINIC | Age: 80
End: 2025-06-19
Payer: MEDICARE

## 2025-06-19 PROCEDURE — 51798 US URINE CAPACITY MEASURE: CPT

## 2025-06-19 PROCEDURE — 99214 OFFICE O/P EST MOD 30 MIN: CPT

## 2025-06-23 DIAGNOSIS — N18.9 CHRONIC KIDNEY DISEASE, UNSPECIFIED: ICD-10-CM

## 2025-06-23 DIAGNOSIS — N17.9 ACUTE KIDNEY FAILURE, UNSPECIFIED: ICD-10-CM

## 2025-06-23 DIAGNOSIS — Z88.8 ALLERGY STATUS TO OTHER DRUGS, MEDICAMENTS AND BIOLOGICAL SUBSTANCES: ICD-10-CM

## 2025-06-23 DIAGNOSIS — M19.90 UNSPECIFIED OSTEOARTHRITIS, UNSPECIFIED SITE: ICD-10-CM

## 2025-06-23 DIAGNOSIS — D69.6 THROMBOCYTOPENIA, UNSPECIFIED: ICD-10-CM

## 2025-06-23 DIAGNOSIS — I50.22 CHRONIC SYSTOLIC (CONGESTIVE) HEART FAILURE: ICD-10-CM

## 2025-06-23 DIAGNOSIS — Z95.0 PRESENCE OF CARDIAC PACEMAKER: ICD-10-CM

## 2025-06-23 DIAGNOSIS — I10 ESSENTIAL (PRIMARY) HYPERTENSION: ICD-10-CM

## 2025-06-23 DIAGNOSIS — I48.20 CHRONIC ATRIAL FIBRILLATION, UNSPECIFIED: ICD-10-CM

## 2025-06-23 DIAGNOSIS — R55 SYNCOPE AND COLLAPSE: ICD-10-CM

## 2025-06-23 DIAGNOSIS — I95.1 ORTHOSTATIC HYPOTENSION: ICD-10-CM

## 2025-06-23 DIAGNOSIS — I25.10 ATHEROSCLEROTIC HEART DISEASE OF NATIVE CORONARY ARTERY WITHOUT ANGINA PECTORIS: ICD-10-CM

## 2025-06-23 DIAGNOSIS — I13.0 HYPERTENSIVE HEART AND CHRONIC KIDNEY DISEASE WITH HEART FAILURE AND STAGE 1 THROUGH STAGE 4 CHRONIC KIDNEY DISEASE, OR UNSPECIFIED CHRONIC KIDNEY DISEASE: ICD-10-CM

## 2025-06-23 DIAGNOSIS — Z79.01 LONG TERM (CURRENT) USE OF ANTICOAGULANTS: ICD-10-CM

## 2025-06-23 DIAGNOSIS — R79.89 OTHER SPECIFIED ABNORMAL FINDINGS OF BLOOD CHEMISTRY: ICD-10-CM

## 2025-06-23 DIAGNOSIS — E78.5 HYPERLIPIDEMIA, UNSPECIFIED: ICD-10-CM

## 2025-06-23 DIAGNOSIS — N40.0 BENIGN PROSTATIC HYPERPLASIA WITHOUT LOWER URINARY TRACT SYMPTOMS: ICD-10-CM

## 2025-06-23 DIAGNOSIS — J44.9 CHRONIC OBSTRUCTIVE PULMONARY DISEASE, UNSPECIFIED: ICD-10-CM

## 2025-06-23 DIAGNOSIS — I24.89 OTHER FORMS OF ACUTE ISCHEMIC HEART DISEASE: ICD-10-CM

## 2025-06-24 ENCOUNTER — APPOINTMENT (OUTPATIENT)
Dept: CARDIOLOGY | Facility: CLINIC | Age: 80
End: 2025-06-24
Payer: MEDICARE

## 2025-06-24 VITALS
BODY MASS INDEX: 30.85 KG/M2 | HEART RATE: 62 BPM | DIASTOLIC BLOOD PRESSURE: 64 MMHG | HEIGHT: 65 IN | WEIGHT: 185.19 LBS | SYSTOLIC BLOOD PRESSURE: 122 MMHG | OXYGEN SATURATION: 97 %

## 2025-06-24 PROCEDURE — 93000 ELECTROCARDIOGRAM COMPLETE: CPT

## 2025-06-24 PROCEDURE — G2211 COMPLEX E/M VISIT ADD ON: CPT

## 2025-06-24 PROCEDURE — 99214 OFFICE O/P EST MOD 30 MIN: CPT

## 2025-06-24 RX ORDER — ASPIRIN 81 MG
81 TABLET, DELAYED RELEASE (ENTERIC COATED) ORAL DAILY
Refills: 0 | Status: ACTIVE | COMMUNITY

## 2025-06-24 RX ORDER — FUROSEMIDE 20 MG/1
20 TABLET ORAL
Qty: 90 | Refills: 0 | Status: ACTIVE | COMMUNITY
Start: 2025-06-24 | End: 1900-01-01

## 2025-07-11 ENCOUNTER — OFFICE (OUTPATIENT)
Dept: URBAN - METROPOLITAN AREA CLINIC 102 | Facility: CLINIC | Age: 80
Setting detail: OPHTHALMOLOGY
End: 2025-07-11
Payer: MEDICAID

## 2025-07-11 DIAGNOSIS — H11.042: ICD-10-CM

## 2025-07-11 DIAGNOSIS — H25.13: ICD-10-CM

## 2025-07-11 PROCEDURE — 99204 OFFICE O/P NEW MOD 45 MIN: CPT | Performed by: OPHTHALMOLOGY

## 2025-07-11 ASSESSMENT — VISUAL ACUITY
OS_BCVA: 20/40
OD_BCVA: 20/50

## 2025-07-11 ASSESSMENT — TONOMETRY
OS_IOP_MMHG: 12
OD_IOP_MMHG: 12

## 2025-07-11 ASSESSMENT — REFRACTION_MANIFEST
OS_VA1: 20/50
OS_CYLINDER: -0.75
OD_AXIS: 088
OS_AXIS: 146
OS_SPHERE: +2.25
OD_VA1: 20/40
OU_VA: 20/30
OD_CYLINDER: -1.50
OD_SPHERE: +2.75

## 2025-07-11 ASSESSMENT — KERATOMETRY
OD_K1POWER_DIOPTERS: 44.00
OS_K2POWER_DIOPTERS: 44.75
OS_AXISANGLE_DEGREES: 077
OS_K1POWER_DIOPTERS: 44.00
OD_AXISANGLE_DEGREES: 160
OD_K2POWER_DIOPTERS: 44.75

## 2025-07-11 ASSESSMENT — REFRACTION_AUTOREFRACTION
OD_SPHERE: +2.75
OD_AXIS: 081
OS_AXIS: 146
OS_SPHERE: +2.25
OS_CYLINDER: -0.75
OD_CYLINDER: -1.50

## 2025-07-11 ASSESSMENT — CONFRONTATIONAL VISUAL FIELD TEST (CVF)
OS_FINDINGS: FULL
OD_FINDINGS: FULL

## 2025-07-11 ASSESSMENT — CORNEAL PTERYGIUM: OS_PTERYGIUM: NASAL 2MM

## 2025-07-22 ENCOUNTER — APPOINTMENT (OUTPATIENT)
Dept: CARDIOLOGY | Facility: CLINIC | Age: 80
End: 2025-07-22

## 2025-07-31 ENCOUNTER — EMERGENCY (EMERGENCY)
Facility: HOSPITAL | Age: 80
LOS: 0 days | Discharge: HOME | End: 2025-08-01
Attending: EMERGENCY MEDICINE
Payer: MEDICARE

## 2025-07-31 VITALS
RESPIRATION RATE: 18 BRPM | HEART RATE: 60 BPM | DIASTOLIC BLOOD PRESSURE: 54 MMHG | OXYGEN SATURATION: 97 % | SYSTOLIC BLOOD PRESSURE: 112 MMHG | TEMPERATURE: 99 F

## 2025-07-31 DIAGNOSIS — Z95.0 PRESENCE OF CARDIAC PACEMAKER: ICD-10-CM

## 2025-07-31 DIAGNOSIS — M54.50 LOW BACK PAIN, UNSPECIFIED: ICD-10-CM

## 2025-07-31 DIAGNOSIS — N18.9 CHRONIC KIDNEY DISEASE, UNSPECIFIED: ICD-10-CM

## 2025-07-31 DIAGNOSIS — M54.41 LUMBAGO WITH SCIATICA, RIGHT SIDE: ICD-10-CM

## 2025-07-31 DIAGNOSIS — Z95.0 PRESENCE OF CARDIAC PACEMAKER: Chronic | ICD-10-CM

## 2025-07-31 DIAGNOSIS — Z98.890 OTHER SPECIFIED POSTPROCEDURAL STATES: Chronic | ICD-10-CM

## 2025-07-31 DIAGNOSIS — I50.9 HEART FAILURE, UNSPECIFIED: ICD-10-CM

## 2025-07-31 DIAGNOSIS — I13.0 HYPERTENSIVE HEART AND CHRONIC KIDNEY DISEASE WITH HEART FAILURE AND STAGE 1 THROUGH STAGE 4 CHRONIC KIDNEY DISEASE, OR UNSPECIFIED CHRONIC KIDNEY DISEASE: ICD-10-CM

## 2025-07-31 DIAGNOSIS — J44.9 CHRONIC OBSTRUCTIVE PULMONARY DISEASE, UNSPECIFIED: ICD-10-CM

## 2025-07-31 DIAGNOSIS — E78.5 HYPERLIPIDEMIA, UNSPECIFIED: ICD-10-CM

## 2025-07-31 DIAGNOSIS — Z79.01 LONG TERM (CURRENT) USE OF ANTICOAGULANTS: ICD-10-CM

## 2025-07-31 DIAGNOSIS — I48.91 UNSPECIFIED ATRIAL FIBRILLATION: ICD-10-CM

## 2025-07-31 DIAGNOSIS — Z88.8 ALLERGY STATUS TO OTHER DRUGS, MEDICAMENTS AND BIOLOGICAL SUBSTANCES: ICD-10-CM

## 2025-07-31 LAB
ALBUMIN SERPL ELPH-MCNC: 3.3 G/DL — SIGNIFICANT CHANGE UP (ref 3.3–5)
ALP SERPL-CCNC: 105 U/L — SIGNIFICANT CHANGE UP (ref 40–120)
ALT FLD-CCNC: 28 U/L — SIGNIFICANT CHANGE UP (ref 12–78)
ANION GAP SERPL CALC-SCNC: 4 MMOL/L — LOW (ref 5–17)
AST SERPL-CCNC: 22 U/L — SIGNIFICANT CHANGE UP (ref 15–37)
BASOPHILS # BLD AUTO: 0.04 K/UL — SIGNIFICANT CHANGE UP (ref 0–0.2)
BASOPHILS NFR BLD AUTO: 0.6 % — SIGNIFICANT CHANGE UP (ref 0–2)
BILIRUB SERPL-MCNC: 0.7 MG/DL — SIGNIFICANT CHANGE UP (ref 0.2–1.2)
BUN SERPL-MCNC: 23 MG/DL — SIGNIFICANT CHANGE UP (ref 7–23)
CALCIUM SERPL-MCNC: 8.4 MG/DL — LOW (ref 8.5–10.1)
CHLORIDE SERPL-SCNC: 113 MMOL/L — HIGH (ref 96–108)
CO2 SERPL-SCNC: 26 MMOL/L — SIGNIFICANT CHANGE UP (ref 22–31)
CREAT SERPL-MCNC: 1.13 MG/DL — SIGNIFICANT CHANGE UP (ref 0.5–1.3)
EGFR: 66 ML/MIN/1.73M2 — SIGNIFICANT CHANGE UP
EGFR: 66 ML/MIN/1.73M2 — SIGNIFICANT CHANGE UP
EOSINOPHIL # BLD AUTO: 0.17 K/UL — SIGNIFICANT CHANGE UP (ref 0–0.5)
EOSINOPHIL NFR BLD AUTO: 2.8 % — SIGNIFICANT CHANGE UP (ref 0–6)
GLUCOSE SERPL-MCNC: 118 MG/DL — HIGH (ref 70–99)
HCT VFR BLD CALC: 40.9 % — SIGNIFICANT CHANGE UP (ref 39–50)
HGB BLD-MCNC: 13.2 G/DL — SIGNIFICANT CHANGE UP (ref 13–17)
IMM GRANULOCYTES # BLD AUTO: 0.02 K/UL — SIGNIFICANT CHANGE UP (ref 0–0.07)
IMM GRANULOCYTES NFR BLD AUTO: 0.3 % — SIGNIFICANT CHANGE UP (ref 0–0.9)
LYMPHOCYTES # BLD AUTO: 1.63 K/UL — SIGNIFICANT CHANGE UP (ref 1–3.3)
LYMPHOCYTES NFR BLD AUTO: 26.4 % — SIGNIFICANT CHANGE UP (ref 13–44)
MCHC RBC-ENTMCNC: 32.2 PG — SIGNIFICANT CHANGE UP (ref 27–34)
MCHC RBC-ENTMCNC: 32.3 G/DL — SIGNIFICANT CHANGE UP (ref 32–36)
MCV RBC AUTO: 99.8 FL — SIGNIFICANT CHANGE UP (ref 80–100)
MONOCYTES # BLD AUTO: 0.61 K/UL — SIGNIFICANT CHANGE UP (ref 0–0.9)
MONOCYTES NFR BLD AUTO: 9.9 % — SIGNIFICANT CHANGE UP (ref 2–14)
NEUTROPHILS # BLD AUTO: 3.7 K/UL — SIGNIFICANT CHANGE UP (ref 1.8–7.4)
NEUTROPHILS NFR BLD AUTO: 60 % — SIGNIFICANT CHANGE UP (ref 43–77)
NRBC # BLD AUTO: 0 K/UL — SIGNIFICANT CHANGE UP (ref 0–0)
NRBC # FLD: 0 K/UL — SIGNIFICANT CHANGE UP (ref 0–0)
NRBC BLD AUTO-RTO: 0 /100 WBCS — SIGNIFICANT CHANGE UP (ref 0–0)
PLATELET # BLD AUTO: 131 K/UL — LOW (ref 150–400)
PMV BLD: 12 FL — SIGNIFICANT CHANGE UP (ref 7–13)
POTASSIUM SERPL-MCNC: 4.8 MMOL/L — SIGNIFICANT CHANGE UP (ref 3.5–5.3)
POTASSIUM SERPL-SCNC: 4.8 MMOL/L — SIGNIFICANT CHANGE UP (ref 3.5–5.3)
PROT SERPL-MCNC: 6.6 GM/DL — SIGNIFICANT CHANGE UP (ref 6–8.3)
RBC # BLD: 4.1 M/UL — LOW (ref 4.2–5.8)
RBC # FLD: 13.4 % — SIGNIFICANT CHANGE UP (ref 10.3–14.5)
SODIUM SERPL-SCNC: 143 MMOL/L — SIGNIFICANT CHANGE UP (ref 135–145)
WBC # BLD: 6.17 K/UL — SIGNIFICANT CHANGE UP (ref 3.8–10.5)
WBC # FLD AUTO: 6.17 K/UL — SIGNIFICANT CHANGE UP (ref 3.8–10.5)

## 2025-07-31 PROCEDURE — 96374 THER/PROPH/DIAG INJ IV PUSH: CPT

## 2025-07-31 PROCEDURE — 99284 EMERGENCY DEPT VISIT MOD MDM: CPT | Mod: 25

## 2025-07-31 PROCEDURE — 36415 COLL VENOUS BLD VENIPUNCTURE: CPT

## 2025-07-31 PROCEDURE — 99284 EMERGENCY DEPT VISIT MOD MDM: CPT

## 2025-07-31 PROCEDURE — 80053 COMPREHEN METABOLIC PANEL: CPT

## 2025-07-31 PROCEDURE — 85025 COMPLETE CBC W/AUTO DIFF WBC: CPT

## 2025-07-31 RX ORDER — LIDOCAINE HYDROCHLORIDE 20 MG/ML
1 JELLY TOPICAL ONCE
Refills: 0 | Status: COMPLETED | OUTPATIENT
Start: 2025-07-31 | End: 2025-07-31

## 2025-07-31 RX ORDER — OXYCODONE HYDROCHLORIDE AND ACETAMINOPHEN 10; 325 MG/1; MG/1
1 TABLET ORAL ONCE
Refills: 0 | Status: DISCONTINUED | OUTPATIENT
Start: 2025-07-31 | End: 2025-07-31

## 2025-07-31 RX ORDER — LIDOCAINE HYDROCHLORIDE 20 MG/ML
1 JELLY TOPICAL
Qty: 5 | Refills: 0
Start: 2025-07-31 | End: 2025-08-04

## 2025-07-31 RX ORDER — CYCLOBENZAPRINE HYDROCHLORIDE 15 MG/1
1 CAPSULE, EXTENDED RELEASE ORAL
Qty: 15 | Refills: 0
Start: 2025-07-31 | End: 2025-08-04

## 2025-07-31 RX ORDER — OXYCODONE HYDROCHLORIDE AND ACETAMINOPHEN 10; 325 MG/1; MG/1
1 TABLET ORAL
Qty: 9 | Refills: 0
Start: 2025-07-31 | End: 2025-08-02

## 2025-07-31 RX ORDER — CYCLOBENZAPRINE HYDROCHLORIDE 15 MG/1
5 CAPSULE, EXTENDED RELEASE ORAL ONCE
Refills: 0 | Status: COMPLETED | OUTPATIENT
Start: 2025-07-31 | End: 2025-07-31

## 2025-07-31 RX ADMIN — OXYCODONE HYDROCHLORIDE AND ACETAMINOPHEN 1 TABLET(S): 10; 325 TABLET ORAL at 18:44

## 2025-07-31 RX ADMIN — CYCLOBENZAPRINE HYDROCHLORIDE 5 MILLIGRAM(S): 15 CAPSULE, EXTENDED RELEASE ORAL at 18:46

## 2025-07-31 RX ADMIN — Medication 4 MILLIGRAM(S): at 23:31

## 2025-07-31 RX ADMIN — LIDOCAINE HYDROCHLORIDE 1 PATCH: 20 JELLY TOPICAL at 18:46

## 2025-07-31 NOTE — ED PROVIDER NOTE - NSFOLLOWUPCLINICS_GEN_ALL_ED_FT
[facility]  Orthopedic Surgery  [address], [address]rd & [address]th [place] [address]  Phone: [phone]  Fax:   Follow Up Time: 4-6 Days

## 2025-07-31 NOTE — ED PROVIDER NOTE - PATIENT PORTAL LINK FT
You can access the FollowMyHealth Patient Portal offered by [facility] by registering at the following website: [email or web address]. By joining [facility]’s FollowMyHealth portal, you will also be able to view your health information using other applications (apps) compatible with our system.

## 2025-07-31 NOTE — ED ADULT NURSE REASSESSMENT NOTE - NS ED NURSE REASSESS COMMENT FT1
received pt from previous RN. pt resting comfortably at this time. no signs of distress noted. VSS as charted. no further complaints or discomforts reported at this time. safety and comfort maintained.

## 2025-07-31 NOTE — ED PROVIDER NOTE - PROGRESS NOTE DETAILS
Pt feeling much better after medications, pt smiling, eating dinner. Able to ambulate, ready for d/c.

## 2025-07-31 NOTE — ED ADULT NURSE NOTE - CHIEF COMPLAINT QUOTE
Attempted to call patient again on all phone numbers listen in chart. Numbers are disconnected/unavailable.    Will attempt to get in touch with Dr. Patel's office (patient has appt tomorrow, 5/23) to discuss obtaining an additional phone number for patient/directing patient to call [facility] ASAP.  
Patient due for INR today.    RN called [facility] for result and was told patient discharged Saturday, 5/19. [facility] was never notified.    RN attempted to call all phone numbers listed in patient's chart 3x each. Unable to get a call through/leave any messages.    Will attempt again later.  
Patient scheduled for cardiology appointment tomorrow with Dr. Patel. Writer called Dr. Patel's office and spoke with nurse informing her of invalid numbers and attempts to reach patient. Dr. Patel's office will obtain new phone numbers from patient as well as let him know to reach out to anticoagulation clinic.   
RN reached out to [facility] due to both phone numbers on file for patient being disconnected as well as not accepting phone calls. [facility] had the same phone numbers on file for patient. Tried to call numbers 3X and was unable to get through or leave any messages. Will attempt again.   
Writer received message from NAVI Rutledge in regards to INR management. Dr. Patel's staff will be taking back over INR management as patient is seeing Dr. Patel today and was previously managing INRs/warfarin therapy. Standing INR order discontinued. Dr. Ruiz and team informed.       
Writer tried to reach out to patient again. Phone keeps rings and gives message of invalid number. No new numbers seen in chart to contact patient.   
pt presents to ED due to complaints of BL leg pain since Tuesday difficulty ambulating

## 2025-07-31 NOTE — ED PROVIDER NOTE - CLINICAL SUMMARY MEDICAL DECISION MAKING FREE TEXT BOX
Ddx: Sciatica/ no evidence of cauda equina, no trauma to suggest fx/ no dysuria to suggest UTI  Plan: Pain control, reassess

## 2025-07-31 NOTE — ED ADULT NURSE NOTE - NSFALLLASTSIX_ED_ALL_ED
[FreeTextEntry1] : 26 y/o M with long-standing  complaints presents for a follow up visit.   He complains of vague symptoms of discomfort with urination and right groin pain, and possible lesion on the tip of his penis. The symptoms started after spending time in [place] and swimming in the hotel pool.  Seen by urology who prescribed azithromycin; patient reports that symptoms have improved by 60%.    He also wants to be checked for STDs because he spent 4-month away from his wife, and although he has no doubts about their commitment to each other, he wants to make sure he does not have a STD.   He also complains of right upper quadrant discomfort. He was previously told that he had an enlarged liver, and that he likely has hepatic steatosis. He's also gained weight since the last time I saw him.  His exam is normal there are no penile lesions or no inguinal lymphadenopathy.  At this time, we will not prescribe any medications we will just check blood work including HIV syphilis hepatitis and CBC UA and urine culture
No.

## 2025-07-31 NOTE — ED PROVIDER NOTE - OBJECTIVE STATEMENT
Pt is a 80 yo gentleman with a pmhx of HTN, HL, COPD, CKD, Afib on eliquis, PM, CHF who presents to the ED with back pain. Started on Tuesday, it is on lower back, and goes down both legs, but worse on the R. No numbness or tingling, no bowel or bladder incontinence, no saddle anesthesia. No falls or trauma, no dysuria, no hematuria. Has only tried to take occasional tylenol.

## 2025-07-31 NOTE — ED ADULT NURSE NOTE - OBJECTIVE STATEMENT
Patient coming in for left sided lower back pain radiating down to the left leg since Tuesday. Pt is A&OX3, GCS 15. Pt given pain medication as ordered. Pt has no other complaintsd.

## 2025-07-31 NOTE — ED PROVIDER NOTE - MUSCULOSKELETAL, MLM
Spine appears normal, range of motion is not limited, no muscle or joint tenderness. No midline back pain. No CVA tenderness, Straight leg raise positive, worse on R.

## 2025-08-01 VITALS
DIASTOLIC BLOOD PRESSURE: 77 MMHG | HEART RATE: 90 BPM | RESPIRATION RATE: 18 BRPM | SYSTOLIC BLOOD PRESSURE: 133 MMHG | TEMPERATURE: 98 F | OXYGEN SATURATION: 100 %

## 2025-08-01 RX ORDER — IBUPROFEN 200 MG
1 TABLET ORAL
Qty: 20 | Refills: 0
Start: 2025-08-01 | End: 2025-08-05

## 2025-08-04 ENCOUNTER — APPOINTMENT (OUTPATIENT)
Dept: CARDIOLOGY | Facility: CLINIC | Age: 80
End: 2025-08-04